# Patient Record
Sex: FEMALE | Race: WHITE | Employment: UNEMPLOYED | URBAN - METROPOLITAN AREA
[De-identification: names, ages, dates, MRNs, and addresses within clinical notes are randomized per-mention and may not be internally consistent; named-entity substitution may affect disease eponyms.]

---

## 2017-01-01 ENCOUNTER — HOSPITAL ENCOUNTER (OUTPATIENT)
Age: 62
Setting detail: SPECIMEN
Discharge: HOME | End: 2017-11-10

## 2017-01-01 ENCOUNTER — TELEPHONE (OUTPATIENT)
Dept: FAMILY MEDICINE CLINIC | Age: 62
End: 2017-01-01

## 2017-01-01 LAB
ALBUMIN SERPL-MCNC: 3.8 G/DL (ref 3.5–5.2)
ALBUMIN/GLOBULIN RATIO: 1 (ref 1–2.5)
ALP BLD-CCNC: 79 U/L (ref 35–104)
ALT SERPL-CCNC: <5 U/L (ref 5–33)
ANION GAP SERPL CALCULATED.3IONS-SCNC: 17 MMOL/L (ref 9–17)
AST SERPL-CCNC: 8 U/L
BILIRUB SERPL-MCNC: 0.18 MG/DL (ref 0.3–1.2)
BUN BLDV-MCNC: 64 MG/DL (ref 8–23)
BUN/CREAT BLD: ABNORMAL (ref 9–20)
CALCIUM SERPL-MCNC: 10 MG/DL (ref 8.6–10.4)
CHLORIDE BLD-SCNC: 110 MMOL/L (ref 98–107)
CO2: 16 MMOL/L (ref 20–31)
CREAT SERPL-MCNC: 3.13 MG/DL (ref 0.5–0.9)
ESTIMATED AVERAGE GLUCOSE: 108 MG/DL
GFR AFRICAN AMERICAN: 18 ML/MIN
GFR NON-AFRICAN AMERICAN: 15 ML/MIN
GFR SERPL CREATININE-BSD FRML MDRD: ABNORMAL ML/MIN/{1.73_M2}
GFR SERPL CREATININE-BSD FRML MDRD: ABNORMAL ML/MIN/{1.73_M2}
GLUCOSE BLD-MCNC: 56 MG/DL (ref 70–99)
HBA1C MFR BLD: 5.4 % (ref 4–6)
POTASSIUM SERPL-SCNC: 5.2 MMOL/L (ref 3.7–5.3)
SODIUM BLD-SCNC: 143 MMOL/L (ref 135–144)
TOTAL PROTEIN: 7.7 G/DL (ref 6.4–8.3)

## 2017-01-01 RX ORDER — LEVOTHYROXINE SODIUM 0.1 MG/1
100 TABLET ORAL DAILY
Qty: 90 TABLET | Refills: 3 | Status: SHIPPED | OUTPATIENT
Start: 2017-01-01 | End: 2017-01-01 | Stop reason: SDUPTHER

## 2017-01-01 RX ORDER — LEVOTHYROXINE SODIUM 0.1 MG/1
100 TABLET ORAL DAILY
Qty: 90 TABLET | Refills: 3 | Status: SHIPPED | OUTPATIENT
Start: 2017-01-01 | End: 2018-01-01

## 2017-01-04 RX ORDER — INSULIN GLARGINE 100 [IU]/ML
60 INJECTION, SOLUTION SUBCUTANEOUS 2 TIMES DAILY
Qty: 11 VIAL | Refills: 3 | Status: SHIPPED | OUTPATIENT
Start: 2017-01-04 | End: 2018-01-01 | Stop reason: SDUPTHER

## 2017-02-23 ENCOUNTER — TELEPHONE (OUTPATIENT)
Dept: FAMILY MEDICINE CLINIC | Facility: CLINIC | Age: 62
End: 2017-02-23

## 2017-03-09 ENCOUNTER — TELEPHONE (OUTPATIENT)
Dept: FAMILY MEDICINE CLINIC | Facility: CLINIC | Age: 62
End: 2017-03-09

## 2017-03-21 ENCOUNTER — TELEPHONE (OUTPATIENT)
Dept: FAMILY MEDICINE CLINIC | Age: 62
End: 2017-03-21

## 2017-04-13 RX ORDER — AMLODIPINE BESYLATE 10 MG/1
10 TABLET ORAL DAILY
Qty: 90 TABLET | Refills: 3 | Status: ON HOLD | OUTPATIENT
Start: 2017-04-13 | End: 2018-01-01 | Stop reason: HOSPADM

## 2017-04-13 RX ORDER — LISINOPRIL AND HYDROCHLOROTHIAZIDE 20; 12.5 MG/1; MG/1
1 TABLET ORAL 2 TIMES DAILY
Qty: 180 TABLET | Refills: 3 | Status: ON HOLD | OUTPATIENT
Start: 2017-04-13 | End: 2018-01-01 | Stop reason: HOSPADM

## 2017-04-13 RX ORDER — AMITRIPTYLINE HYDROCHLORIDE 75 MG/1
75 TABLET, FILM COATED ORAL NIGHTLY
Qty: 90 TABLET | Refills: 3 | Status: SHIPPED | OUTPATIENT
Start: 2017-04-13 | End: 2018-01-01 | Stop reason: SDUPTHER

## 2017-04-13 RX ORDER — GLYBURIDE 5 MG/1
TABLET ORAL
Qty: 360 TABLET | Refills: 3 | Status: SHIPPED | OUTPATIENT
Start: 2017-04-13 | End: 2018-01-01 | Stop reason: ALTCHOICE

## 2017-04-28 ENCOUNTER — TELEPHONE (OUTPATIENT)
Dept: FAMILY MEDICINE CLINIC | Age: 62
End: 2017-04-28

## 2017-05-25 ENCOUNTER — HOSPITAL ENCOUNTER (OUTPATIENT)
Dept: DIABETES SERVICES | Age: 62
Setting detail: THERAPIES SERIES
Discharge: HOME | End: 2017-05-25
Payer: MEDICAID

## 2017-05-25 VITALS
HEART RATE: 88 BPM | WEIGHT: 246.69 LBS | HEIGHT: 69 IN | BODY MASS INDEX: 36.54 KG/M2 | SYSTOLIC BLOOD PRESSURE: 160 MMHG | DIASTOLIC BLOOD PRESSURE: 101 MMHG

## 2017-05-25 PROCEDURE — G0108 DIAB MANAGE TRN  PER INDIV: HCPCS

## 2017-05-25 PROCEDURE — 99078 GROUP HEALTH EDUCATION: CPT

## 2017-05-25 RX ORDER — INSULIN ASPART 100 [IU]/ML
INJECTION, SOLUTION INTRAVENOUS; SUBCUTANEOUS 2 TIMES DAILY WITH MEALS
COMMUNITY
End: 2018-01-01

## 2017-06-05 ENCOUNTER — HOSPITAL ENCOUNTER (OUTPATIENT)
Dept: DIABETES SERVICES | Age: 62
Setting detail: THERAPIES SERIES
Discharge: HOME | End: 2017-06-05
Payer: MEDICAID

## 2017-06-05 DIAGNOSIS — Z79.4 TYPE 2 DIABETES MELLITUS WITH DIABETIC NEPHROPATHY, WITH LONG-TERM CURRENT USE OF INSULIN (HCC): Primary | ICD-10-CM

## 2017-06-05 DIAGNOSIS — E11.21 TYPE 2 DIABETES MELLITUS WITH DIABETIC NEPHROPATHY, WITH LONG-TERM CURRENT USE OF INSULIN (HCC): Primary | ICD-10-CM

## 2017-06-05 PROCEDURE — G0109 DIAB MANAGE TRN IND/GROUP: HCPCS

## 2017-06-05 PROCEDURE — 99078 GROUP HEALTH EDUCATION: CPT

## 2017-06-06 ENCOUNTER — TELEPHONE (OUTPATIENT)
Dept: FAMILY MEDICINE CLINIC | Age: 62
End: 2017-06-06

## 2017-06-13 ENCOUNTER — HOSPITAL ENCOUNTER (OUTPATIENT)
Dept: DIABETES SERVICES | Age: 62
Setting detail: THERAPIES SERIES
Discharge: HOME | End: 2017-06-13
Payer: MEDICAID

## 2017-06-13 DIAGNOSIS — Z79.4 TYPE 2 DIABETES MELLITUS WITH DIABETIC NEPHROPATHY, WITH LONG-TERM CURRENT USE OF INSULIN (HCC): Primary | ICD-10-CM

## 2017-06-13 DIAGNOSIS — E11.21 TYPE 2 DIABETES MELLITUS WITH DIABETIC NEPHROPATHY, WITH LONG-TERM CURRENT USE OF INSULIN (HCC): Primary | ICD-10-CM

## 2017-06-13 PROCEDURE — 99078 GROUP HEALTH EDUCATION: CPT

## 2017-06-13 PROCEDURE — G0109 DIAB MANAGE TRN IND/GROUP: HCPCS

## 2017-06-21 ENCOUNTER — HOSPITAL ENCOUNTER (OUTPATIENT)
Dept: DIABETES SERVICES | Age: 62
Setting detail: THERAPIES SERIES
Discharge: HOME | End: 2017-06-21
Payer: MEDICAID

## 2017-06-21 PROCEDURE — 99078 GROUP HEALTH EDUCATION: CPT

## 2017-06-21 PROCEDURE — G0109 DIAB MANAGE TRN IND/GROUP: HCPCS

## 2017-06-29 ENCOUNTER — HOSPITAL ENCOUNTER (OUTPATIENT)
Dept: DIABETES SERVICES | Age: 62
Setting detail: THERAPIES SERIES
Discharge: HOME | End: 2017-06-29
Payer: MEDICAID

## 2017-06-29 DIAGNOSIS — E11.21 TYPE 2 DIABETES MELLITUS WITH DIABETIC NEPHROPATHY, WITH LONG-TERM CURRENT USE OF INSULIN (HCC): Primary | ICD-10-CM

## 2017-06-29 DIAGNOSIS — Z79.4 TYPE 2 DIABETES MELLITUS WITH DIABETIC NEPHROPATHY, WITH LONG-TERM CURRENT USE OF INSULIN (HCC): Primary | ICD-10-CM

## 2017-06-29 PROCEDURE — G0109 DIAB MANAGE TRN IND/GROUP: HCPCS

## 2017-07-27 RX ORDER — ATORVASTATIN CALCIUM 20 MG/1
TABLET, FILM COATED ORAL
Qty: 90 TABLET | Refills: 3 | Status: SHIPPED | OUTPATIENT
Start: 2017-07-27 | End: 2018-01-01 | Stop reason: SDUPTHER

## 2017-08-21 ENCOUNTER — HOSPITAL ENCOUNTER (OUTPATIENT)
Age: 62
Setting detail: SPECIMEN
Discharge: HOME | End: 2017-08-21
Payer: MEDICAID

## 2017-08-21 DIAGNOSIS — Z11.59 ENCOUNTER FOR HEPATITIS C SCREENING TEST FOR LOW RISK PATIENT: ICD-10-CM

## 2017-08-21 DIAGNOSIS — Z79.4 TYPE 2 DIABETES MELLITUS WITH DIABETIC NEPHROPATHY, WITH LONG-TERM CURRENT USE OF INSULIN (HCC): Chronic | ICD-10-CM

## 2017-08-21 DIAGNOSIS — N28.9 RENAL INSUFFICIENCY: Chronic | ICD-10-CM

## 2017-08-21 DIAGNOSIS — Z11.4 SCREENING FOR HIV (HUMAN IMMUNODEFICIENCY VIRUS): Primary | ICD-10-CM

## 2017-08-21 DIAGNOSIS — E11.21 TYPE 2 DIABETES MELLITUS WITH DIABETIC NEPHROPATHY, WITH LONG-TERM CURRENT USE OF INSULIN (HCC): Chronic | ICD-10-CM

## 2017-08-21 DIAGNOSIS — Z11.4 SCREENING FOR HIV (HUMAN IMMUNODEFICIENCY VIRUS): ICD-10-CM

## 2017-08-21 LAB
CHOLESTEROL/HDL RATIO: 4.1
CHOLESTEROL: 107 MG/DL
CREATININE URINE: 88.8 MG/DL (ref 28–217)
HDLC SERPL-MCNC: 26 MG/DL
HEPATITIS C ANTIBODY: NONREACTIVE
HIV AG/AB: NONREACTIVE
LDL CHOLESTEROL: 55 MG/DL (ref 0–130)
MICROALBUMIN/CREAT 24H UR: 206 MG/L
MICROALBUMIN/CREAT UR-RTO: 232 MCG/MG CREAT
TRIGL SERPL-MCNC: 131 MG/DL
VLDLC SERPL CALC-MCNC: ABNORMAL MG/DL (ref 1–30)

## 2017-09-07 ENCOUNTER — OFFICE VISIT (OUTPATIENT)
Dept: FAMILY MEDICINE CLINIC | Age: 62
End: 2017-09-07
Payer: MEDICAID

## 2017-09-07 VITALS
WEIGHT: 239.6 LBS | OXYGEN SATURATION: 98 % | DIASTOLIC BLOOD PRESSURE: 60 MMHG | SYSTOLIC BLOOD PRESSURE: 120 MMHG | HEART RATE: 92 BPM | BODY MASS INDEX: 36.31 KG/M2 | HEIGHT: 68 IN

## 2017-09-07 DIAGNOSIS — K21.9 GASTROESOPHAGEAL REFLUX DISEASE, ESOPHAGITIS PRESENCE NOT SPECIFIED: ICD-10-CM

## 2017-09-07 DIAGNOSIS — L60.9 NAIL ABNORMALITY: Primary | ICD-10-CM

## 2017-09-07 PROCEDURE — 99213 OFFICE O/P EST LOW 20 MIN: CPT | Performed by: FAMILY MEDICINE

## 2017-09-07 PROCEDURE — 90471 IMMUNIZATION ADMIN: CPT | Performed by: FAMILY MEDICINE

## 2017-09-07 PROCEDURE — 90688 IIV4 VACCINE SPLT 0.5 ML IM: CPT | Performed by: FAMILY MEDICINE

## 2017-09-07 RX ORDER — OMEPRAZOLE 20 MG/1
20 CAPSULE, DELAYED RELEASE ORAL DAILY
Qty: 90 CAPSULE | Refills: 3 | Status: SHIPPED | OUTPATIENT
Start: 2017-09-07 | End: 2018-01-01 | Stop reason: SDUPTHER

## 2017-09-07 ASSESSMENT — PATIENT HEALTH QUESTIONNAIRE - PHQ9
SUM OF ALL RESPONSES TO PHQ9 QUESTIONS 1 & 2: 0
SUM OF ALL RESPONSES TO PHQ QUESTIONS 1-9: 0
1. LITTLE INTEREST OR PLEASURE IN DOING THINGS: 0

## 2017-09-08 ENCOUNTER — HOSPITAL ENCOUNTER (OUTPATIENT)
Dept: DIABETES SERVICES | Age: 62
Setting detail: THERAPIES SERIES
Discharge: HOME | End: 2017-09-08
Payer: COMMERCIAL

## 2017-09-08 DIAGNOSIS — Z79.4 TYPE 2 DIABETES MELLITUS WITH DIABETIC NEPHROPATHY, WITH LONG-TERM CURRENT USE OF INSULIN (HCC): Primary | ICD-10-CM

## 2017-09-08 DIAGNOSIS — E11.21 TYPE 2 DIABETES MELLITUS WITH DIABETIC NEPHROPATHY, WITH LONG-TERM CURRENT USE OF INSULIN (HCC): Primary | ICD-10-CM

## 2017-09-08 PROCEDURE — G0109 DIAB MANAGE TRN IND/GROUP: HCPCS

## 2017-09-08 PROCEDURE — G0108 DIAB MANAGE TRN  PER INDIV: HCPCS

## 2017-09-26 RX ORDER — LEVOTHYROXINE SODIUM 0.1 MG/1
100 TABLET ORAL DAILY
Qty: 90 TABLET | Refills: 3 | Status: SHIPPED | OUTPATIENT
Start: 2017-09-26 | End: 2017-01-01 | Stop reason: SDUPTHER

## 2017-10-16 NOTE — TELEPHONE ENCOUNTER
Left message on patient's recorder that Synthroid from [employer] patient assistance is at  for

## 2017-11-14 NOTE — TELEPHONE ENCOUNTER
Patient needs rx for januvia and synthroid [facility] to [facility].this must be a patient assistance thing.

## 2017-11-16 NOTE — TELEPHONE ENCOUNTER
I have dc'd the [facility] and [facility], her hba1c is too low. Synthroid rx has been printed.     Recheck hba1c in 12 weeks

## 2017-11-16 NOTE — TELEPHONE ENCOUNTER
It is the patient assistance company. She also needs a script for synthroid for patient assistance as well please.

## 2018-01-01 ENCOUNTER — OFFICE VISIT (OUTPATIENT)
Dept: FAMILY MEDICINE CLINIC | Age: 63
End: 2018-01-01
Payer: COMMERCIAL

## 2018-01-01 ENCOUNTER — HOSPITAL ENCOUNTER (OUTPATIENT)
Dept: PAIN MANAGEMENT | Age: 63
Discharge: HOME | End: 2018-07-12
Payer: COMMERCIAL

## 2018-01-01 ENCOUNTER — TELEPHONE (OUTPATIENT)
Dept: FAMILY MEDICINE CLINIC | Age: 63
End: 2018-01-01

## 2018-01-01 ENCOUNTER — HOSPITAL ENCOUNTER (OUTPATIENT)
Facility: MEDICAL CENTER | Age: 63
End: 2018-01-01
Payer: COMMERCIAL

## 2018-01-01 ENCOUNTER — APPOINTMENT (OUTPATIENT)
Dept: GENERAL RADIOLOGY | Age: 63
DRG: 682 | End: 2018-01-01
Attending: INTERNAL MEDICINE
Payer: COMMERCIAL

## 2018-01-01 ENCOUNTER — HOSPITAL ENCOUNTER (OUTPATIENT)
Dept: INFUSION THERAPY | Facility: MEDICAL CENTER | Age: 63
Discharge: HOME | End: 2018-08-01
Payer: COMMERCIAL

## 2018-01-01 ENCOUNTER — ANESTHESIA (OUTPATIENT)
Dept: ENDOSCOPY | Age: 63
DRG: 356 | End: 2018-01-01
Payer: COMMERCIAL

## 2018-01-01 ENCOUNTER — HOSPITAL ENCOUNTER (INPATIENT)
Age: 63
LOS: 5 days | Discharge: NURSING, REHAB OR LONG-TERM CARE | DRG: 871 | End: 2018-09-20
Attending: EMERGENCY MEDICINE | Admitting: FAMILY MEDICINE
Payer: COMMERCIAL

## 2018-01-01 ENCOUNTER — TELEPHONE (OUTPATIENT)
Dept: ONCOLOGY | Age: 63
End: 2018-01-01

## 2018-01-01 ENCOUNTER — HOSPITAL ENCOUNTER (OUTPATIENT)
Facility: MEDICAL CENTER | Age: 63
Discharge: HOME | End: 2018-03-21
Payer: COMMERCIAL

## 2018-01-01 ENCOUNTER — HOSPITAL ENCOUNTER (INPATIENT)
Age: 63
LOS: 3 days | Discharge: HOME HEALTH | DRG: 682 | End: 2018-06-10
Attending: EMERGENCY MEDICINE | Admitting: INTERNAL MEDICINE
Payer: COMMERCIAL

## 2018-01-01 ENCOUNTER — ANESTHESIA (OUTPATIENT)
Dept: OPERATING ROOM | Age: 63
DRG: 516 | End: 2018-01-01
Payer: COMMERCIAL

## 2018-01-01 ENCOUNTER — APPOINTMENT (OUTPATIENT)
Dept: MRI IMAGING | Age: 63
DRG: 682 | End: 2018-01-01
Payer: COMMERCIAL

## 2018-01-01 ENCOUNTER — HOSPITAL ENCOUNTER (OUTPATIENT)
Age: 63
Setting detail: SPECIMEN
Discharge: HOME | End: 2018-06-22
Payer: COMMERCIAL

## 2018-01-01 ENCOUNTER — OFFICE VISIT (OUTPATIENT)
Dept: ONCOLOGY | Age: 63
End: 2018-01-01
Payer: COMMERCIAL

## 2018-01-01 ENCOUNTER — TELEPHONE (OUTPATIENT)
Dept: GASTROENTEROLOGY | Age: 63
End: 2018-01-01

## 2018-01-01 ENCOUNTER — TELEPHONE (OUTPATIENT)
Dept: PHARMACY | Age: 63
End: 2018-01-01

## 2018-01-01 ENCOUNTER — HOSPITAL ENCOUNTER (OUTPATIENT)
Dept: RADIATION ONCOLOGY | Facility: MEDICAL CENTER | Age: 63
Discharge: HOME | End: 2018-02-13
Payer: COMMERCIAL

## 2018-01-01 ENCOUNTER — TELEPHONE (OUTPATIENT)
Dept: CASE MANAGEMENT | Age: 63
End: 2018-01-01

## 2018-01-01 ENCOUNTER — HOSPITAL ENCOUNTER (OUTPATIENT)
Age: 63
Setting detail: SPECIMEN
Discharge: HOME | End: 2018-03-15
Payer: COMMERCIAL

## 2018-01-01 ENCOUNTER — APPOINTMENT (OUTPATIENT)
Dept: DIALYSIS | Age: 63
DRG: 356 | End: 2018-01-01
Payer: COMMERCIAL

## 2018-01-01 ENCOUNTER — APPOINTMENT (OUTPATIENT)
Dept: GENERAL RADIOLOGY | Age: 63
DRG: 516 | End: 2018-01-01
Attending: INTERNAL MEDICINE
Payer: COMMERCIAL

## 2018-01-01 ENCOUNTER — HOSPITAL ENCOUNTER (OUTPATIENT)
Dept: INFUSION THERAPY | Facility: MEDICAL CENTER | Age: 63
Discharge: HOME | End: 2018-07-25

## 2018-01-01 ENCOUNTER — OFFICE VISIT (OUTPATIENT)
Dept: NEUROSURGERY | Age: 63
End: 2018-01-01
Payer: MEDICAID

## 2018-01-01 ENCOUNTER — HOSPITAL ENCOUNTER (OUTPATIENT)
Age: 63
Setting detail: SPECIMEN
Discharge: HOME | End: 2018-03-22
Payer: COMMERCIAL

## 2018-01-01 ENCOUNTER — APPOINTMENT (OUTPATIENT)
Dept: GENERAL RADIOLOGY | Age: 63
DRG: 871 | End: 2018-01-01
Payer: COMMERCIAL

## 2018-01-01 ENCOUNTER — TELEPHONE (OUTPATIENT)
Dept: INFUSION THERAPY | Age: 63
End: 2018-01-01

## 2018-01-01 ENCOUNTER — APPOINTMENT (OUTPATIENT)
Dept: CT IMAGING | Age: 63
DRG: 516 | End: 2018-01-01
Attending: INTERNAL MEDICINE
Payer: COMMERCIAL

## 2018-01-01 ENCOUNTER — HOSPITAL ENCOUNTER (OUTPATIENT)
Age: 63
Discharge: HOME | End: 2018-05-02
Payer: COMMERCIAL

## 2018-01-01 ENCOUNTER — APPOINTMENT (OUTPATIENT)
Dept: CT IMAGING | Age: 63
DRG: 516 | End: 2018-01-01
Payer: COMMERCIAL

## 2018-01-01 ENCOUNTER — TELEPHONE (OUTPATIENT)
Dept: UROLOGY | Age: 63
End: 2018-01-01

## 2018-01-01 ENCOUNTER — APPOINTMENT (OUTPATIENT)
Dept: MRI IMAGING | Age: 63
DRG: 673 | End: 2018-01-01
Payer: COMMERCIAL

## 2018-01-01 ENCOUNTER — ANESTHESIA EVENT (OUTPATIENT)
Dept: ENDOSCOPY | Age: 63
DRG: 356 | End: 2018-01-01
Payer: COMMERCIAL

## 2018-01-01 ENCOUNTER — HOSPITAL ENCOUNTER (INPATIENT)
Age: 63
LOS: 7 days | Discharge: NURSING, REHAB OR LONG-TERM CARE | DRG: 356 | End: 2018-08-21
Attending: EMERGENCY MEDICINE | Admitting: INTERNAL MEDICINE
Payer: COMMERCIAL

## 2018-01-01 ENCOUNTER — HOSPITAL ENCOUNTER (OUTPATIENT)
Dept: PAIN MANAGEMENT | Age: 63
Discharge: HOME | End: 2018-06-21
Payer: COMMERCIAL

## 2018-01-01 ENCOUNTER — HOSPITAL ENCOUNTER (OUTPATIENT)
Dept: GENERAL RADIOLOGY | Age: 63
Discharge: HOME | End: 2018-07-14
Payer: COMMERCIAL

## 2018-01-01 ENCOUNTER — APPOINTMENT (OUTPATIENT)
Dept: CT IMAGING | Age: 63
DRG: 673 | End: 2018-01-01
Payer: COMMERCIAL

## 2018-01-01 ENCOUNTER — HOSPITAL ENCOUNTER (OUTPATIENT)
Age: 63
Setting detail: SPECIMEN
Discharge: HOME | End: 2018-01-31
Payer: COMMERCIAL

## 2018-01-01 ENCOUNTER — HOSPITAL ENCOUNTER (OUTPATIENT)
Facility: CLINIC | Age: 63
Discharge: HOME | End: 2018-07-19
Payer: COMMERCIAL

## 2018-01-01 ENCOUNTER — HOSPITAL ENCOUNTER (EMERGENCY)
Facility: CLINIC | Age: 63
Discharge: ANOTHER HOSPITAL | End: 2018-01-18
Attending: EMERGENCY MEDICINE
Payer: MEDICAID

## 2018-01-01 ENCOUNTER — TELEPHONE (OUTPATIENT)
Dept: INFUSION THERAPY | Facility: MEDICAL CENTER | Age: 63
End: 2018-01-01

## 2018-01-01 ENCOUNTER — HOSPITAL ENCOUNTER (OUTPATIENT)
Dept: RADIATION ONCOLOGY | Facility: MEDICAL CENTER | Age: 63
Discharge: HOME | End: 2018-08-09
Payer: COMMERCIAL

## 2018-01-01 ENCOUNTER — HOSPITAL ENCOUNTER (OUTPATIENT)
Facility: CLINIC | Age: 63
Discharge: HOME | End: 2018-03-02
Payer: COMMERCIAL

## 2018-01-01 ENCOUNTER — OFFICE VISIT (OUTPATIENT)
Dept: UROLOGY | Age: 63
End: 2018-01-01
Payer: COMMERCIAL

## 2018-01-01 ENCOUNTER — TELEPHONE (OUTPATIENT)
Dept: NEUROSURGERY | Age: 63
End: 2018-01-01

## 2018-01-01 ENCOUNTER — APPOINTMENT (OUTPATIENT)
Dept: DIALYSIS | Age: 63
DRG: 673 | End: 2018-01-01
Payer: COMMERCIAL

## 2018-01-01 ENCOUNTER — HOSPITAL ENCOUNTER (OUTPATIENT)
Age: 63
Discharge: HOME | End: 2018-05-16
Payer: COMMERCIAL

## 2018-01-01 ENCOUNTER — HOSPITAL ENCOUNTER (OUTPATIENT)
Dept: INFUSION THERAPY | Facility: MEDICAL CENTER | Age: 63
End: 2018-01-01
Payer: COMMERCIAL

## 2018-01-01 ENCOUNTER — HOSPITAL ENCOUNTER (OUTPATIENT)
Dept: RADIATION ONCOLOGY | Facility: MEDICAL CENTER | Age: 63
Discharge: HOME | End: 2018-02-26
Payer: COMMERCIAL

## 2018-01-01 ENCOUNTER — HOSPITAL ENCOUNTER (OUTPATIENT)
Facility: MEDICAL CENTER | Age: 63
End: 2018-01-01

## 2018-01-01 ENCOUNTER — APPOINTMENT (OUTPATIENT)
Dept: GENERAL RADIOLOGY | Facility: CLINIC | Age: 63
End: 2018-01-01
Payer: MEDICAID

## 2018-01-01 ENCOUNTER — HOSPITAL ENCOUNTER (OUTPATIENT)
Dept: GENERAL RADIOLOGY | Age: 63
Discharge: HOME | End: 2018-09-02
Payer: COMMERCIAL

## 2018-01-01 ENCOUNTER — APPOINTMENT (OUTPATIENT)
Dept: ULTRASOUND IMAGING | Age: 63
DRG: 673 | End: 2018-01-01
Payer: COMMERCIAL

## 2018-01-01 ENCOUNTER — TELEPHONE (OUTPATIENT)
Dept: NEUROLOGY | Age: 63
End: 2018-01-01

## 2018-01-01 ENCOUNTER — HOSPITAL ENCOUNTER (OUTPATIENT)
Dept: DIALYSIS | Age: 63
Setting detail: DIALYSIS SERIES
Discharge: HOME | End: 2018-09-07
Payer: COMMERCIAL

## 2018-01-01 ENCOUNTER — HOSPITAL ENCOUNTER (OUTPATIENT)
Dept: CT IMAGING | Facility: CLINIC | Age: 63
Discharge: HOME | End: 2018-05-26
Payer: COMMERCIAL

## 2018-01-01 ENCOUNTER — OFFICE VISIT (OUTPATIENT)
Dept: NEUROSURGERY | Age: 63
End: 2018-01-01
Payer: COMMERCIAL

## 2018-01-01 ENCOUNTER — HOSPITAL ENCOUNTER (OUTPATIENT)
Dept: RADIATION ONCOLOGY | Facility: MEDICAL CENTER | Age: 63
Discharge: HOME | End: 2018-02-08
Payer: COMMERCIAL

## 2018-01-01 ENCOUNTER — HOSPITAL ENCOUNTER (OUTPATIENT)
Dept: GENERAL RADIOLOGY | Age: 63
Discharge: HOME | End: 2018-05-16
Payer: COMMERCIAL

## 2018-01-01 ENCOUNTER — OFFICE VISIT (OUTPATIENT)
Dept: NEUROSURGERY | Age: 63
End: 2018-01-01

## 2018-01-01 ENCOUNTER — APPOINTMENT (OUTPATIENT)
Dept: MRI IMAGING | Age: 63
DRG: 682 | End: 2018-01-01
Attending: INTERNAL MEDICINE
Payer: COMMERCIAL

## 2018-01-01 ENCOUNTER — HOSPITAL ENCOUNTER (OUTPATIENT)
Age: 63
Discharge: HOME | End: 2018-06-29
Payer: COMMERCIAL

## 2018-01-01 ENCOUNTER — APPOINTMENT (OUTPATIENT)
Dept: DIALYSIS | Age: 63
DRG: 871 | End: 2018-01-01
Payer: COMMERCIAL

## 2018-01-01 ENCOUNTER — APPOINTMENT (OUTPATIENT)
Dept: CT IMAGING | Age: 63
DRG: 871 | End: 2018-01-01
Payer: COMMERCIAL

## 2018-01-01 ENCOUNTER — HOSPITAL ENCOUNTER (OUTPATIENT)
Facility: CLINIC | Age: 63
Discharge: HOME | End: 2018-02-13
Payer: COMMERCIAL

## 2018-01-01 ENCOUNTER — ANESTHESIA EVENT (OUTPATIENT)
Dept: OPERATING ROOM | Age: 63
DRG: 516 | End: 2018-01-01
Payer: COMMERCIAL

## 2018-01-01 ENCOUNTER — APPOINTMENT (OUTPATIENT)
Dept: ULTRASOUND IMAGING | Age: 63
DRG: 682 | End: 2018-01-01
Payer: COMMERCIAL

## 2018-01-01 ENCOUNTER — HOSPITAL ENCOUNTER (OUTPATIENT)
Age: 63
Discharge: HOME | End: 2018-04-11
Payer: COMMERCIAL

## 2018-01-01 ENCOUNTER — APPOINTMENT (OUTPATIENT)
Dept: MRI IMAGING | Age: 63
DRG: 516 | End: 2018-01-01
Attending: INTERNAL MEDICINE
Payer: COMMERCIAL

## 2018-01-01 ENCOUNTER — OFFICE VISIT (OUTPATIENT)
Dept: NEUROLOGY | Age: 63
End: 2018-01-01
Payer: COMMERCIAL

## 2018-01-01 ENCOUNTER — HOSPITAL ENCOUNTER (OUTPATIENT)
Dept: MRI IMAGING | Age: 63
Discharge: HOME | End: 2018-07-11
Payer: COMMERCIAL

## 2018-01-01 ENCOUNTER — HOSPITAL ENCOUNTER (INPATIENT)
Age: 63
LOS: 11 days | Discharge: OTHER INSTITUTION | DRG: 682 | End: 2018-03-14
Attending: INTERNAL MEDICINE | Admitting: INTERNAL MEDICINE
Payer: COMMERCIAL

## 2018-01-01 ENCOUNTER — HOSPITAL ENCOUNTER (OUTPATIENT)
Dept: RADIATION ONCOLOGY | Facility: MEDICAL CENTER | Age: 63
Discharge: HOME | End: 2018-03-05
Payer: COMMERCIAL

## 2018-01-01 ENCOUNTER — HOSPITAL ENCOUNTER (INPATIENT)
Age: 63
LOS: 9 days | Discharge: NURSING, REHAB OR LONG-TERM CARE | DRG: 673 | End: 2018-08-03
Attending: EMERGENCY MEDICINE | Admitting: FAMILY MEDICINE
Payer: COMMERCIAL

## 2018-01-01 ENCOUNTER — APPOINTMENT (OUTPATIENT)
Dept: ULTRASOUND IMAGING | Age: 63
DRG: 516 | End: 2018-01-01
Attending: INTERNAL MEDICINE
Payer: COMMERCIAL

## 2018-01-01 ENCOUNTER — HOSPITAL ENCOUNTER (INPATIENT)
Age: 63
LOS: 9 days | Discharge: HOME HEALTH | DRG: 516 | End: 2018-01-27
Attending: INTERNAL MEDICINE | Admitting: INTERNAL MEDICINE
Payer: COMMERCIAL

## 2018-01-01 ENCOUNTER — HOSPITAL ENCOUNTER (EMERGENCY)
Facility: CLINIC | Age: 63
Discharge: ANOTHER HOSPITAL | End: 2018-03-03
Attending: EMERGENCY MEDICINE
Payer: COMMERCIAL

## 2018-01-01 ENCOUNTER — APPOINTMENT (OUTPATIENT)
Dept: INTERVENTIONAL RADIOLOGY/VASCULAR | Age: 63
DRG: 673 | End: 2018-01-01
Payer: COMMERCIAL

## 2018-01-01 ENCOUNTER — HOSPITAL ENCOUNTER (OUTPATIENT)
Dept: RADIATION ONCOLOGY | Facility: MEDICAL CENTER | Age: 63
Discharge: HOME | End: 2018-02-20
Payer: COMMERCIAL

## 2018-01-01 ENCOUNTER — APPOINTMENT (OUTPATIENT)
Dept: ULTRASOUND IMAGING | Age: 63
DRG: 682 | End: 2018-01-01
Attending: INTERNAL MEDICINE
Payer: COMMERCIAL

## 2018-01-01 ENCOUNTER — HOSPITAL ENCOUNTER (OUTPATIENT)
Dept: PAIN MANAGEMENT | Age: 63
Discharge: HOME | End: 2018-07-13
Payer: COMMERCIAL

## 2018-01-01 ENCOUNTER — APPOINTMENT (OUTPATIENT)
Dept: CT IMAGING | Age: 63
DRG: 682 | End: 2018-01-01
Attending: INTERNAL MEDICINE
Payer: COMMERCIAL

## 2018-01-01 ENCOUNTER — HOSPITAL ENCOUNTER (OUTPATIENT)
Age: 63
Discharge: HOME | End: 2018-07-14
Payer: COMMERCIAL

## 2018-01-01 ENCOUNTER — HOSPITAL ENCOUNTER (OUTPATIENT)
Age: 63
Discharge: HOME | End: 2018-09-02
Payer: COMMERCIAL

## 2018-01-01 VITALS
DIASTOLIC BLOOD PRESSURE: 48 MMHG | TEMPERATURE: 99.1 F | WEIGHT: 189 LBS | HEART RATE: 92 BPM | RESPIRATION RATE: 18 BRPM | SYSTOLIC BLOOD PRESSURE: 98 MMHG | BODY MASS INDEX: 28.74 KG/M2

## 2018-01-01 VITALS
HEIGHT: 68 IN | RESPIRATION RATE: 18 BRPM | WEIGHT: 186 LBS | HEART RATE: 97 BPM | DIASTOLIC BLOOD PRESSURE: 57 MMHG | HEIGHT: 68 IN | WEIGHT: 216 LBS | HEART RATE: 93 BPM | SYSTOLIC BLOOD PRESSURE: 142 MMHG | BODY MASS INDEX: 32.74 KG/M2 | DIASTOLIC BLOOD PRESSURE: 64 MMHG | SYSTOLIC BLOOD PRESSURE: 107 MMHG | TEMPERATURE: 98.5 F | BODY MASS INDEX: 28.19 KG/M2

## 2018-01-01 VITALS
BODY MASS INDEX: 27.26 KG/M2 | OXYGEN SATURATION: 94 % | HEIGHT: 68 IN | HEART RATE: 74 BPM | SYSTOLIC BLOOD PRESSURE: 122 MMHG | RESPIRATION RATE: 16 BRPM | TEMPERATURE: 98.2 F | DIASTOLIC BLOOD PRESSURE: 53 MMHG | WEIGHT: 179.9 LBS

## 2018-01-01 VITALS
SYSTOLIC BLOOD PRESSURE: 120 MMHG | DIASTOLIC BLOOD PRESSURE: 80 MMHG | BODY MASS INDEX: 28.62 KG/M2 | OXYGEN SATURATION: 97 % | HEART RATE: 97 BPM | WEIGHT: 188.2 LBS

## 2018-01-01 VITALS
RESPIRATION RATE: 20 BRPM | SYSTOLIC BLOOD PRESSURE: 154 MMHG | BODY MASS INDEX: 27.52 KG/M2 | HEART RATE: 81 BPM | TEMPERATURE: 97.7 F | WEIGHT: 181 LBS | DIASTOLIC BLOOD PRESSURE: 65 MMHG

## 2018-01-01 VITALS
TEMPERATURE: 98.1 F | HEART RATE: 93 BPM | RESPIRATION RATE: 16 BRPM | OXYGEN SATURATION: 97 % | HEIGHT: 68 IN | BODY MASS INDEX: 29.67 KG/M2 | DIASTOLIC BLOOD PRESSURE: 64 MMHG | SYSTOLIC BLOOD PRESSURE: 134 MMHG | WEIGHT: 195.8 LBS

## 2018-01-01 VITALS
BODY MASS INDEX: 33.34 KG/M2 | TEMPERATURE: 98.9 F | WEIGHT: 220 LBS | HEART RATE: 77 BPM | DIASTOLIC BLOOD PRESSURE: 67 MMHG | HEIGHT: 68 IN | SYSTOLIC BLOOD PRESSURE: 138 MMHG

## 2018-01-01 VITALS
HEIGHT: 68 IN | BODY MASS INDEX: 27.58 KG/M2 | TEMPERATURE: 98.4 F | DIASTOLIC BLOOD PRESSURE: 56 MMHG | HEART RATE: 87 BPM | SYSTOLIC BLOOD PRESSURE: 110 MMHG | RESPIRATION RATE: 16 BRPM | WEIGHT: 182 LBS

## 2018-01-01 VITALS
RESPIRATION RATE: 15 BRPM | BODY MASS INDEX: 31.83 KG/M2 | WEIGHT: 210 LBS | HEIGHT: 68 IN | SYSTOLIC BLOOD PRESSURE: 120 MMHG | TEMPERATURE: 97.6 F | DIASTOLIC BLOOD PRESSURE: 49 MMHG | HEART RATE: 86 BPM | OXYGEN SATURATION: 97 %

## 2018-01-01 VITALS
HEART RATE: 88 BPM | WEIGHT: 191.58 LBS | TEMPERATURE: 98.2 F | RESPIRATION RATE: 17 BRPM | DIASTOLIC BLOOD PRESSURE: 50 MMHG | BODY MASS INDEX: 29.13 KG/M2 | SYSTOLIC BLOOD PRESSURE: 118 MMHG

## 2018-01-01 VITALS
TEMPERATURE: 99.5 F | DIASTOLIC BLOOD PRESSURE: 51 MMHG | WEIGHT: 190 LBS | BODY MASS INDEX: 28.89 KG/M2 | SYSTOLIC BLOOD PRESSURE: 96 MMHG | HEART RATE: 98 BPM

## 2018-01-01 VITALS
HEIGHT: 68 IN | RESPIRATION RATE: 16 BRPM | HEART RATE: 80 BPM | BODY MASS INDEX: 30.04 KG/M2 | DIASTOLIC BLOOD PRESSURE: 47 MMHG | SYSTOLIC BLOOD PRESSURE: 110 MMHG | TEMPERATURE: 98.4 F | OXYGEN SATURATION: 96 % | WEIGHT: 198.19 LBS

## 2018-01-01 VITALS
RESPIRATION RATE: 20 BRPM | HEART RATE: 89 BPM | SYSTOLIC BLOOD PRESSURE: 123 MMHG | DIASTOLIC BLOOD PRESSURE: 59 MMHG | WEIGHT: 180 LBS | BODY MASS INDEX: 27.37 KG/M2

## 2018-01-01 VITALS — SYSTOLIC BLOOD PRESSURE: 96 MMHG | DIASTOLIC BLOOD PRESSURE: 54 MMHG | TEMPERATURE: 96.5 F | OXYGEN SATURATION: 95 %

## 2018-01-01 VITALS
OXYGEN SATURATION: 99 % | HEIGHT: 68 IN | HEART RATE: 79 BPM | DIASTOLIC BLOOD PRESSURE: 53 MMHG | BODY MASS INDEX: 28.8 KG/M2 | TEMPERATURE: 98.6 F | RESPIRATION RATE: 17 BRPM | WEIGHT: 190.04 LBS | SYSTOLIC BLOOD PRESSURE: 126 MMHG

## 2018-01-01 VITALS — HEART RATE: 94 BPM | DIASTOLIC BLOOD PRESSURE: 54 MMHG | SYSTOLIC BLOOD PRESSURE: 83 MMHG

## 2018-01-01 VITALS
DIASTOLIC BLOOD PRESSURE: 51 MMHG | RESPIRATION RATE: 13 BRPM | HEART RATE: 72 BPM | BODY MASS INDEX: 37.59 KG/M2 | WEIGHT: 248 LBS | HEIGHT: 68 IN | SYSTOLIC BLOOD PRESSURE: 153 MMHG | OXYGEN SATURATION: 95 % | TEMPERATURE: 97.1 F

## 2018-01-01 VITALS
HEART RATE: 93 BPM | SYSTOLIC BLOOD PRESSURE: 124 MMHG | BODY MASS INDEX: 27.22 KG/M2 | OXYGEN SATURATION: 98 % | DIASTOLIC BLOOD PRESSURE: 70 MMHG | WEIGHT: 179 LBS

## 2018-01-01 VITALS
OXYGEN SATURATION: 99 % | TEMPERATURE: 98.4 F | WEIGHT: 202.16 LBS | DIASTOLIC BLOOD PRESSURE: 51 MMHG | BODY MASS INDEX: 30.64 KG/M2 | RESPIRATION RATE: 16 BRPM | HEIGHT: 68 IN | SYSTOLIC BLOOD PRESSURE: 108 MMHG | HEART RATE: 88 BPM

## 2018-01-01 VITALS
SYSTOLIC BLOOD PRESSURE: 116 MMHG | BODY MASS INDEX: 28.7 KG/M2 | DIASTOLIC BLOOD PRESSURE: 69 MMHG | HEART RATE: 100 BPM | WEIGHT: 189.4 LBS | HEIGHT: 68 IN

## 2018-01-01 VITALS
WEIGHT: 182 LBS | SYSTOLIC BLOOD PRESSURE: 114 MMHG | HEIGHT: 68 IN | DIASTOLIC BLOOD PRESSURE: 72 MMHG | HEART RATE: 98 BPM | BODY MASS INDEX: 27.58 KG/M2

## 2018-01-01 VITALS
RESPIRATION RATE: 15 BRPM | DIASTOLIC BLOOD PRESSURE: 48 MMHG | OXYGEN SATURATION: 100 % | SYSTOLIC BLOOD PRESSURE: 104 MMHG

## 2018-01-01 VITALS
BODY MASS INDEX: 33.45 KG/M2 | HEART RATE: 85 BPM | SYSTOLIC BLOOD PRESSURE: 145 MMHG | WEIGHT: 220 LBS | DIASTOLIC BLOOD PRESSURE: 72 MMHG

## 2018-01-01 VITALS
SYSTOLIC BLOOD PRESSURE: 110 MMHG | OXYGEN SATURATION: 96 % | TEMPERATURE: 97.1 F | RESPIRATION RATE: 14 BRPM | DIASTOLIC BLOOD PRESSURE: 60 MMHG | HEART RATE: 85 BPM

## 2018-01-01 VITALS
WEIGHT: 220 LBS | HEIGHT: 68 IN | DIASTOLIC BLOOD PRESSURE: 66 MMHG | OXYGEN SATURATION: 100 % | SYSTOLIC BLOOD PRESSURE: 148 MMHG | BODY MASS INDEX: 33.34 KG/M2 | RESPIRATION RATE: 16 BRPM | HEART RATE: 88 BPM | TEMPERATURE: 98 F

## 2018-01-01 VITALS
WEIGHT: 183 LBS | SYSTOLIC BLOOD PRESSURE: 105 MMHG | HEART RATE: 99 BPM | BODY MASS INDEX: 27.83 KG/M2 | DIASTOLIC BLOOD PRESSURE: 68 MMHG

## 2018-01-01 VITALS
HEART RATE: 82 BPM | DIASTOLIC BLOOD PRESSURE: 64 MMHG | RESPIRATION RATE: 18 BRPM | WEIGHT: 189.7 LBS | SYSTOLIC BLOOD PRESSURE: 118 MMHG | BODY MASS INDEX: 28.84 KG/M2 | TEMPERATURE: 98.4 F

## 2018-01-01 VITALS — DIASTOLIC BLOOD PRESSURE: 64 MMHG | HEIGHT: 68 IN | SYSTOLIC BLOOD PRESSURE: 101 MMHG | HEART RATE: 98 BPM

## 2018-01-01 VITALS
RESPIRATION RATE: 18 BRPM | TEMPERATURE: 97.6 F | DIASTOLIC BLOOD PRESSURE: 34 MMHG | HEART RATE: 87 BPM | SYSTOLIC BLOOD PRESSURE: 92 MMHG | WEIGHT: 182 LBS | BODY MASS INDEX: 27.67 KG/M2

## 2018-01-01 VITALS
WEIGHT: 177 LBS | BODY MASS INDEX: 26.91 KG/M2 | HEART RATE: 85 BPM | DIASTOLIC BLOOD PRESSURE: 74 MMHG | OXYGEN SATURATION: 98 % | SYSTOLIC BLOOD PRESSURE: 120 MMHG

## 2018-01-01 VITALS
WEIGHT: 189 LBS | BODY MASS INDEX: 28.64 KG/M2 | HEIGHT: 68 IN | DIASTOLIC BLOOD PRESSURE: 48 MMHG | SYSTOLIC BLOOD PRESSURE: 83 MMHG | HEART RATE: 73 BPM

## 2018-01-01 VITALS
TEMPERATURE: 98.2 F | SYSTOLIC BLOOD PRESSURE: 133 MMHG | HEART RATE: 77 BPM | RESPIRATION RATE: 16 BRPM | DIASTOLIC BLOOD PRESSURE: 78 MMHG

## 2018-01-01 VITALS
OXYGEN SATURATION: 100 % | RESPIRATION RATE: 16 BRPM | SYSTOLIC BLOOD PRESSURE: 113 MMHG | DIASTOLIC BLOOD PRESSURE: 41 MMHG

## 2018-01-01 VITALS
DIASTOLIC BLOOD PRESSURE: 63 MMHG | SYSTOLIC BLOOD PRESSURE: 127 MMHG | WEIGHT: 182 LBS | HEART RATE: 77 BPM | BODY MASS INDEX: 27.67 KG/M2 | RESPIRATION RATE: 20 BRPM | TEMPERATURE: 98.5 F

## 2018-01-01 VITALS
BODY MASS INDEX: 27.83 KG/M2 | HEART RATE: 97 BPM | DIASTOLIC BLOOD PRESSURE: 70 MMHG | SYSTOLIC BLOOD PRESSURE: 128 MMHG | TEMPERATURE: 98.5 F | WEIGHT: 183 LBS

## 2018-01-01 VITALS
WEIGHT: 183 LBS | SYSTOLIC BLOOD PRESSURE: 114 MMHG | DIASTOLIC BLOOD PRESSURE: 76 MMHG | HEART RATE: 78 BPM | BODY MASS INDEX: 27.83 KG/M2

## 2018-01-01 VITALS
HEART RATE: 100 BPM | DIASTOLIC BLOOD PRESSURE: 64 MMHG | WEIGHT: 181 LBS | SYSTOLIC BLOOD PRESSURE: 120 MMHG | BODY MASS INDEX: 27.52 KG/M2 | RESPIRATION RATE: 20 BRPM | TEMPERATURE: 98.4 F

## 2018-01-01 VITALS
HEART RATE: 72 BPM | DIASTOLIC BLOOD PRESSURE: 76 MMHG | SYSTOLIC BLOOD PRESSURE: 138 MMHG | BODY MASS INDEX: 34.15 KG/M2 | WEIGHT: 224.6 LBS

## 2018-01-01 DIAGNOSIS — E87.5 HYPERKALEMIA: ICD-10-CM

## 2018-01-01 DIAGNOSIS — C64.1 RENAL CELL CARCINOMA OF RIGHT KIDNEY METASTATIC TO OTHER SITE (HCC): Primary | ICD-10-CM

## 2018-01-01 DIAGNOSIS — C64.9 RENAL CELL CARCINOMA, UNSPECIFIED LATERALITY (HCC): Primary | ICD-10-CM

## 2018-01-01 DIAGNOSIS — N28.89 RENAL MASS: ICD-10-CM

## 2018-01-01 DIAGNOSIS — C64.9 RENAL CELL CARCINOMA, UNSPECIFIED LATERALITY (HCC): ICD-10-CM

## 2018-01-01 DIAGNOSIS — M54.2 CERVICALGIA: ICD-10-CM

## 2018-01-01 DIAGNOSIS — C79.51 METASTASIS TO SPINAL COLUMN (HCC): ICD-10-CM

## 2018-01-01 DIAGNOSIS — C79.51 METASTASIS TO SPINAL COLUMN (HCC): Primary | ICD-10-CM

## 2018-01-01 DIAGNOSIS — D50.9 IRON DEFICIENCY ANEMIA, UNSPECIFIED IRON DEFICIENCY ANEMIA TYPE: ICD-10-CM

## 2018-01-01 DIAGNOSIS — I10 ESSENTIAL HYPERTENSION: ICD-10-CM

## 2018-01-01 DIAGNOSIS — R32 BOWEL AND BLADDER INCONTINENCE: Primary | ICD-10-CM

## 2018-01-01 DIAGNOSIS — D50.0 BLOOD LOSS ANEMIA: ICD-10-CM

## 2018-01-01 DIAGNOSIS — N18.6 ESRD (END STAGE RENAL DISEASE) (HCC): ICD-10-CM

## 2018-01-01 DIAGNOSIS — E43 SEVERE MALNUTRITION (HCC): ICD-10-CM

## 2018-01-01 DIAGNOSIS — M84.48XA PATHOLOGIC FRACTURE OF LUMBAR VERTEBRA, INITIAL ENCOUNTER: ICD-10-CM

## 2018-01-01 DIAGNOSIS — M54.5 CHRONIC BILATERAL LOW BACK PAIN, WITH SCIATICA PRESENCE UNSPECIFIED: ICD-10-CM

## 2018-01-01 DIAGNOSIS — C79.51 SECONDARY MALIGNANT NEOPLASM OF LUMBAR VERTEBRAL COLUMN (HCC): ICD-10-CM

## 2018-01-01 DIAGNOSIS — N17.9 ACUTE KIDNEY INJURY (HCC): Primary | ICD-10-CM

## 2018-01-01 DIAGNOSIS — S32.010A CLOSED WEDGE COMPRESSION FRACTURE OF FIRST LUMBAR VERTEBRA, INITIAL ENCOUNTER: Primary | ICD-10-CM

## 2018-01-01 DIAGNOSIS — M48.50XG PATHOLOGIC COMPRESSION FRACTURE OF SPINE WITH DELAYED HEALING: ICD-10-CM

## 2018-01-01 DIAGNOSIS — N18.4 STAGE 4 CHRONIC KIDNEY DISEASE (HCC): Primary | ICD-10-CM

## 2018-01-01 DIAGNOSIS — R39.9 UTI SYMPTOMS: ICD-10-CM

## 2018-01-01 DIAGNOSIS — N18.9 ANEMIA IN CHRONIC KIDNEY DISEASE, UNSPECIFIED CKD STAGE: ICD-10-CM

## 2018-01-01 DIAGNOSIS — Z98.1 S/P LUMBAR FUSION: ICD-10-CM

## 2018-01-01 DIAGNOSIS — N18.4 STAGE 4 CHRONIC KIDNEY DISEASE (HCC): ICD-10-CM

## 2018-01-01 DIAGNOSIS — C79.51 CANCER, METASTATIC TO BONE (HCC): ICD-10-CM

## 2018-01-01 DIAGNOSIS — C64.1 RENAL CELL CARCINOMA OF RIGHT KIDNEY (HCC): Primary | ICD-10-CM

## 2018-01-01 DIAGNOSIS — N17.9 ACUTE RENAL FAILURE, UNSPECIFIED ACUTE RENAL FAILURE TYPE (HCC): ICD-10-CM

## 2018-01-01 DIAGNOSIS — R11.0 NAUSEA: ICD-10-CM

## 2018-01-01 DIAGNOSIS — C64.9 MALIGNANT NEOPLASM OF KIDNEY, UNSPECIFIED LATERALITY (HCC): ICD-10-CM

## 2018-01-01 DIAGNOSIS — E16.2 HYPOGLYCEMIA: ICD-10-CM

## 2018-01-01 DIAGNOSIS — I10 ESSENTIAL HYPERTENSION: Primary | ICD-10-CM

## 2018-01-01 DIAGNOSIS — R31.9 HEMATURIA, UNSPECIFIED TYPE: ICD-10-CM

## 2018-01-01 DIAGNOSIS — Z71.89 GOALS OF CARE, COUNSELING/DISCUSSION: ICD-10-CM

## 2018-01-01 DIAGNOSIS — D64.9 ANEMIA, UNSPECIFIED TYPE: ICD-10-CM

## 2018-01-01 DIAGNOSIS — N18.9 ANEMIA DUE TO CHRONIC KIDNEY DISEASE: ICD-10-CM

## 2018-01-01 DIAGNOSIS — I82.4Z9 DEEP VEIN THROMBOSIS (DVT) OF DISTAL VEIN OF LOWER EXTREMITY, UNSPECIFIED CHRONICITY, UNSPECIFIED LATERALITY (HCC): ICD-10-CM

## 2018-01-01 DIAGNOSIS — R31.0 GROSS HEMATURIA: Primary | ICD-10-CM

## 2018-01-01 DIAGNOSIS — E87.20 METABOLIC ACIDOSIS: ICD-10-CM

## 2018-01-01 DIAGNOSIS — C64.1 RENAL CELL CARCINOMA OF RIGHT KIDNEY (HCC): ICD-10-CM

## 2018-01-01 DIAGNOSIS — G89.29 CHRONIC BILATERAL LOW BACK PAIN, WITH SCIATICA PRESENCE UNSPECIFIED: Primary | ICD-10-CM

## 2018-01-01 DIAGNOSIS — C79.51 SECONDARY MALIGNANT NEOPLASM OF LUMBAR VERTEBRAL COLUMN (HCC): Primary | ICD-10-CM

## 2018-01-01 DIAGNOSIS — R15.9 BOWEL AND BLADDER INCONTINENCE: Primary | ICD-10-CM

## 2018-01-01 DIAGNOSIS — N17.9 ACUTE RENAL FAILURE, UNSPECIFIED ACUTE RENAL FAILURE TYPE (HCC): Primary | ICD-10-CM

## 2018-01-01 DIAGNOSIS — G89.29 CHRONIC RIGHT-SIDED LOW BACK PAIN WITHOUT SCIATICA: Primary | ICD-10-CM

## 2018-01-01 DIAGNOSIS — R33.9 URINARY RETENTION: Primary | ICD-10-CM

## 2018-01-01 DIAGNOSIS — E86.0 DEHYDRATION: ICD-10-CM

## 2018-01-01 DIAGNOSIS — E87.6 HYPOKALEMIA: ICD-10-CM

## 2018-01-01 DIAGNOSIS — G95.29 SPINAL CORD COMPRESSION DUE TO MALIGNANT NEOPLASM METASTATIC TO SPINE (HCC): Primary | ICD-10-CM

## 2018-01-01 DIAGNOSIS — I95.9 HYPOTENSION, UNSPECIFIED HYPOTENSION TYPE: ICD-10-CM

## 2018-01-01 DIAGNOSIS — K92.2 CHRONIC GI BLEEDING: Primary | ICD-10-CM

## 2018-01-01 DIAGNOSIS — M84.48XS: ICD-10-CM

## 2018-01-01 DIAGNOSIS — R79.89 CREATININE ELEVATION: ICD-10-CM

## 2018-01-01 DIAGNOSIS — S30.0XXA TRAUMATIC HEMATOMA OF BUTTOCK, INITIAL ENCOUNTER: ICD-10-CM

## 2018-01-01 DIAGNOSIS — M54.2 CERVICAL MUSCLE PAIN: ICD-10-CM

## 2018-01-01 DIAGNOSIS — N28.89 RENAL MASS: Primary | ICD-10-CM

## 2018-01-01 DIAGNOSIS — Z79.4 TYPE 2 DIABETES MELLITUS WITH DIABETIC NEPHROPATHY, WITH LONG-TERM CURRENT USE OF INSULIN (HCC): ICD-10-CM

## 2018-01-01 DIAGNOSIS — C79.51 SPINAL CORD COMPRESSION DUE TO MALIGNANT NEOPLASM METASTATIC TO SPINE (HCC): Primary | ICD-10-CM

## 2018-01-01 DIAGNOSIS — D63.1 ANEMIA DUE TO CHRONIC KIDNEY DISEASE: ICD-10-CM

## 2018-01-01 DIAGNOSIS — K92.2 GASTROINTESTINAL HEMORRHAGE, UNSPECIFIED GASTROINTESTINAL HEMORRHAGE TYPE: ICD-10-CM

## 2018-01-01 DIAGNOSIS — G89.29 CHRONIC BILATERAL LOW BACK PAIN, WITH SCIATICA PRESENCE UNSPECIFIED: ICD-10-CM

## 2018-01-01 DIAGNOSIS — S32.010D CLOSED WEDGE COMPRESSION FRACTURE OF FIRST LUMBAR VERTEBRA WITH ROUTINE HEALING: Primary | ICD-10-CM

## 2018-01-01 DIAGNOSIS — A41.9 SEPSIS, DUE TO UNSPECIFIED ORGANISM: Primary | ICD-10-CM

## 2018-01-01 DIAGNOSIS — E11.21 TYPE 2 DIABETES MELLITUS WITH DIABETIC NEPHROPATHY, WITH LONG-TERM CURRENT USE OF INSULIN (HCC): ICD-10-CM

## 2018-01-01 DIAGNOSIS — G89.3 CANCER RELATED PAIN: ICD-10-CM

## 2018-01-01 DIAGNOSIS — Z98.1 S/P LUMBAR FUSION: Primary | ICD-10-CM

## 2018-01-01 DIAGNOSIS — M54.5 CHRONIC BILATERAL LOW BACK PAIN, WITH SCIATICA PRESENCE UNSPECIFIED: Primary | ICD-10-CM

## 2018-01-01 DIAGNOSIS — D36.9 TUBULAR ADENOMA: ICD-10-CM

## 2018-01-01 DIAGNOSIS — I82.4Y1 ACUTE DEEP VEIN THROMBOSIS (DVT) OF PROXIMAL VEIN OF RIGHT LOWER EXTREMITY (HCC): Primary | ICD-10-CM

## 2018-01-01 DIAGNOSIS — D63.1 ANEMIA IN CHRONIC KIDNEY DISEASE, UNSPECIFIED CKD STAGE: ICD-10-CM

## 2018-01-01 DIAGNOSIS — C64.1 RENAL CELL CANCER, RIGHT (HCC): ICD-10-CM

## 2018-01-01 DIAGNOSIS — I82.412 ACUTE DEEP VEIN THROMBOSIS (DVT) OF FEMORAL VEIN OF LEFT LOWER EXTREMITY (HCC): ICD-10-CM

## 2018-01-01 DIAGNOSIS — N18.9 CHRONIC KIDNEY DISEASE, UNSPECIFIED CKD STAGE: ICD-10-CM

## 2018-01-01 DIAGNOSIS — R33.9 URINARY RETENTION: ICD-10-CM

## 2018-01-01 DIAGNOSIS — N28.9 RENAL DYSFUNCTION: ICD-10-CM

## 2018-01-01 DIAGNOSIS — G24.3 SPASMODIC TORTICOLLIS: Primary | ICD-10-CM

## 2018-01-01 DIAGNOSIS — J18.9 PNEUMONIA DUE TO ORGANISM: ICD-10-CM

## 2018-01-01 DIAGNOSIS — M43.6 TORTICOLLIS: ICD-10-CM

## 2018-01-01 DIAGNOSIS — M54.50 CHRONIC RIGHT-SIDED LOW BACK PAIN WITHOUT SCIATICA: Primary | ICD-10-CM

## 2018-01-01 LAB
-: ABNORMAL
-: NORMAL
6-ACETYLMORPHINE, UR: NOT DETECTED
7-AMINOCLONAZEPAM, URINE: NOT DETECTED
ABO/RH: NORMAL
ABSOLUTE EOS #: 0 K/UL (ref 0–0.4)
ABSOLUTE EOS #: 0 K/UL (ref 0–0.44)
ABSOLUTE EOS #: 0.07 K/UL (ref 0–0.44)
ABSOLUTE EOS #: 0.08 K/UL (ref 0–0.44)
ABSOLUTE EOS #: 0.08 K/UL (ref 0–0.44)
ABSOLUTE EOS #: 0.09 K/UL (ref 0–0.4)
ABSOLUTE EOS #: 0.09 K/UL (ref 0–0.44)
ABSOLUTE EOS #: 0.12 K/UL (ref 0–0.4)
ABSOLUTE EOS #: 0.14 K/UL (ref 0–0.44)
ABSOLUTE EOS #: 0.16 K/UL (ref 0–0.4)
ABSOLUTE EOS #: 0.16 K/UL (ref 0–0.4)
ABSOLUTE EOS #: 0.18 K/UL (ref 0–0.44)
ABSOLUTE EOS #: 0.21 K/UL (ref 0–0.44)
ABSOLUTE EOS #: 0.22 K/UL (ref 0–0.44)
ABSOLUTE EOS #: 0.23 K/UL (ref 0–0.44)
ABSOLUTE EOS #: 0.23 K/UL (ref 0–0.44)
ABSOLUTE EOS #: 0.24 K/UL (ref 0–0.44)
ABSOLUTE EOS #: 0.25 K/UL (ref 0–0.44)
ABSOLUTE EOS #: 0.26 K/UL (ref 0–0.44)
ABSOLUTE EOS #: 0.27 K/UL (ref 0–0.44)
ABSOLUTE EOS #: 0.28 K/UL (ref 0–0.44)
ABSOLUTE EOS #: 0.29 K/UL (ref 0–0.4)
ABSOLUTE EOS #: 0.29 K/UL (ref 0–0.44)
ABSOLUTE EOS #: 0.3 K/UL (ref 0–0.4)
ABSOLUTE EOS #: 0.31 K/UL (ref 0–0.4)
ABSOLUTE EOS #: 0.32 K/UL (ref 0–0.4)
ABSOLUTE EOS #: 0.32 K/UL (ref 0–0.4)
ABSOLUTE EOS #: 0.34 K/UL (ref 0–0.4)
ABSOLUTE EOS #: 0.36 K/UL (ref 0–0.4)
ABSOLUTE EOS #: 0.56 K/UL (ref 0–0.4)
ABSOLUTE IMMATURE GRANULOCYTE: 0 K/UL (ref 0–0.3)
ABSOLUTE IMMATURE GRANULOCYTE: 0.05 K/UL (ref 0–0.3)
ABSOLUTE IMMATURE GRANULOCYTE: 0.07 K/UL (ref 0–0.3)
ABSOLUTE IMMATURE GRANULOCYTE: 0.07 K/UL (ref 0–0.3)
ABSOLUTE IMMATURE GRANULOCYTE: 0.08 K/UL (ref 0–0.3)
ABSOLUTE IMMATURE GRANULOCYTE: 0.09 K/UL (ref 0–0.3)
ABSOLUTE IMMATURE GRANULOCYTE: 0.1 K/UL (ref 0–0.3)
ABSOLUTE IMMATURE GRANULOCYTE: 0.11 K/UL (ref 0–0.3)
ABSOLUTE IMMATURE GRANULOCYTE: 0.12 K/UL (ref 0–0.3)
ABSOLUTE IMMATURE GRANULOCYTE: 0.13 K/UL (ref 0–0.3)
ABSOLUTE IMMATURE GRANULOCYTE: 0.14 K/UL (ref 0–0.3)
ABSOLUTE IMMATURE GRANULOCYTE: 0.14 K/UL (ref 0–0.3)
ABSOLUTE IMMATURE GRANULOCYTE: 0.16 K/UL (ref 0–0.3)
ABSOLUTE IMMATURE GRANULOCYTE: 0.16 K/UL (ref 0–0.3)
ABSOLUTE IMMATURE GRANULOCYTE: 0.18 K/UL (ref 0–0.3)
ABSOLUTE IMMATURE GRANULOCYTE: <0.03 K/UL (ref 0–0.3)
ABSOLUTE IMMATURE GRANULOCYTE: ABNORMAL K/UL (ref 0–0.3)
ABSOLUTE LYMPH #: 0.36 K/UL (ref 1–4.8)
ABSOLUTE LYMPH #: 0.37 K/UL (ref 1–4.8)
ABSOLUTE LYMPH #: 0.58 K/UL (ref 1.1–3.7)
ABSOLUTE LYMPH #: 0.6 K/UL (ref 1–4.8)
ABSOLUTE LYMPH #: 0.63 K/UL (ref 1.1–3.7)
ABSOLUTE LYMPH #: 0.71 K/UL (ref 1–4.8)
ABSOLUTE LYMPH #: 0.75 K/UL (ref 1.1–3.7)
ABSOLUTE LYMPH #: 0.76 K/UL (ref 1.1–3.7)
ABSOLUTE LYMPH #: 0.78 K/UL (ref 1.1–3.7)
ABSOLUTE LYMPH #: 0.81 K/UL (ref 1.1–3.7)
ABSOLUTE LYMPH #: 0.82 K/UL (ref 1.1–3.7)
ABSOLUTE LYMPH #: 0.82 K/UL (ref 1–4.8)
ABSOLUTE LYMPH #: 0.82 K/UL (ref 1–4.8)
ABSOLUTE LYMPH #: 0.83 K/UL (ref 1.1–3.7)
ABSOLUTE LYMPH #: 0.83 K/UL (ref 1–4.8)
ABSOLUTE LYMPH #: 0.9 K/UL (ref 1–4.8)
ABSOLUTE LYMPH #: 0.9 K/UL (ref 1–4.8)
ABSOLUTE LYMPH #: 0.94 K/UL (ref 1.1–3.7)
ABSOLUTE LYMPH #: 0.94 K/UL (ref 1–4.8)
ABSOLUTE LYMPH #: 1.03 K/UL (ref 1.1–3.7)
ABSOLUTE LYMPH #: 1.05 K/UL (ref 1.1–3.7)
ABSOLUTE LYMPH #: 1.08 K/UL (ref 1.1–3.7)
ABSOLUTE LYMPH #: 1.12 K/UL (ref 1.1–3.7)
ABSOLUTE LYMPH #: 1.13 K/UL (ref 1.1–3.7)
ABSOLUTE LYMPH #: 1.23 K/UL (ref 1–4.8)
ABSOLUTE LYMPH #: 1.39 K/UL (ref 1.1–3.7)
ABSOLUTE LYMPH #: 1.62 K/UL (ref 1–4.8)
ABSOLUTE LYMPH #: 1.68 K/UL (ref 1–4.8)
ABSOLUTE LYMPH #: 1.73 K/UL (ref 1–4.8)
ABSOLUTE LYMPH #: 2.31 K/UL (ref 1–4.8)
ABSOLUTE LYMPH #: 2.83 K/UL (ref 1–4.8)
ABSOLUTE MONO #: 0 K/UL (ref 0.1–0.8)
ABSOLUTE MONO #: 0.1 K/UL (ref 0.1–0.8)
ABSOLUTE MONO #: 0.12 K/UL (ref 0.1–0.8)
ABSOLUTE MONO #: 0.12 K/UL (ref 0.1–0.8)
ABSOLUTE MONO #: 0.2 K/UL (ref 0.1–0.8)
ABSOLUTE MONO #: 0.24 K/UL (ref 0.1–0.8)
ABSOLUTE MONO #: 0.29 K/UL (ref 0.2–0.8)
ABSOLUTE MONO #: 0.3 K/UL (ref 0.2–0.8)
ABSOLUTE MONO #: 0.32 K/UL (ref 0.2–0.8)
ABSOLUTE MONO #: 0.33 K/UL (ref 0.1–1.2)
ABSOLUTE MONO #: 0.34 K/UL (ref 0.1–0.8)
ABSOLUTE MONO #: 0.43 K/UL (ref 0.1–1.2)
ABSOLUTE MONO #: 0.45 K/UL (ref 0.1–1.2)
ABSOLUTE MONO #: 0.47 K/UL (ref 0.1–1.2)
ABSOLUTE MONO #: 0.48 K/UL (ref 0.1–1.2)
ABSOLUTE MONO #: 0.48 K/UL (ref 0.1–1.2)
ABSOLUTE MONO #: 0.51 K/UL (ref 0.1–1.2)
ABSOLUTE MONO #: 0.52 K/UL (ref 0.1–1.2)
ABSOLUTE MONO #: 0.55 K/UL (ref 0.1–0.8)
ABSOLUTE MONO #: 0.56 K/UL (ref 0.1–0.8)
ABSOLUTE MONO #: 0.56 K/UL (ref 0.1–1.2)
ABSOLUTE MONO #: 0.57 K/UL (ref 0.1–1.2)
ABSOLUTE MONO #: 0.59 K/UL (ref 0.1–1.2)
ABSOLUTE MONO #: 0.6 K/UL (ref 0.1–1.2)
ABSOLUTE MONO #: 0.61 K/UL (ref 0.1–1.2)
ABSOLUTE MONO #: 0.62 K/UL (ref 0.2–0.8)
ABSOLUTE MONO #: 0.63 K/UL (ref 0.1–1.2)
ABSOLUTE MONO #: 0.64 K/UL (ref 0.2–0.8)
ABSOLUTE MONO #: 0.68 K/UL (ref 0.1–1.2)
ABSOLUTE MONO #: 0.7 K/UL (ref 0.1–0.8)
ABSOLUTE MONO #: 0.75 K/UL (ref 0.1–1.2)
ABSOLUTE MONO #: 0.82 K/UL (ref 0.1–1.2)
ABSOLUTE MONO #: 1.41 K/UL (ref 0.1–0.8)
ALBUMIN SERPL-MCNC: 1.1 G/DL (ref 3.5–5.2)
ALBUMIN SERPL-MCNC: 1.4 G/DL (ref 3.5–5.2)
ALBUMIN SERPL-MCNC: 1.6 G/DL (ref 3.5–5.2)
ALBUMIN SERPL-MCNC: 2 G/DL (ref 3.5–5.2)
ALBUMIN SERPL-MCNC: 2.1 G/DL (ref 3.5–5.2)
ALBUMIN SERPL-MCNC: 2.5 G/DL (ref 3.5–5.2)
ALBUMIN SERPL-MCNC: 2.8 G/DL (ref 3.5–5.2)
ALBUMIN SERPL-MCNC: 2.9 G/DL (ref 3.5–5.2)
ALBUMIN SERPL-MCNC: 2.9 G/DL (ref 3.5–5.2)
ALBUMIN SERPL-MCNC: 3.1 G/DL (ref 3.5–5.2)
ALBUMIN SERPL-MCNC: 3.2 G/DL (ref 3.5–5.2)
ALBUMIN SERPL-MCNC: 3.3 G/DL (ref 3.5–5.2)
ALBUMIN SERPL-MCNC: 3.4 G/DL (ref 3.5–5.2)
ALBUMIN/GLOBULIN RATIO: 0.4 (ref 1–2.5)
ALBUMIN/GLOBULIN RATIO: 0.5 (ref 1–2.5)
ALBUMIN/GLOBULIN RATIO: 0.6 (ref 1–2.5)
ALBUMIN/GLOBULIN RATIO: 0.7 (ref 1–2.5)
ALBUMIN/GLOBULIN RATIO: 0.8 (ref 1–2.5)
ALBUMIN/GLOBULIN RATIO: 0.9 (ref 1–2.5)
ALBUMIN/GLOBULIN RATIO: 0.9 (ref 1–2.5)
ALBUMIN/GLOBULIN RATIO: 1 (ref 1–2.5)
ALBUMIN/GLOBULIN RATIO: 1.1 (ref 1–2.5)
ALBUMIN/GLOBULIN RATIO: ABNORMAL (ref 1–2.5)
ALLEN TEST: ABNORMAL
ALP BLD-CCNC: 106 U/L (ref 35–104)
ALP BLD-CCNC: 200 U/L (ref 35–104)
ALP BLD-CCNC: 237 U/L (ref 35–104)
ALP BLD-CCNC: 61 U/L (ref 35–104)
ALP BLD-CCNC: 63 U/L (ref 35–104)
ALP BLD-CCNC: 65 U/L (ref 35–104)
ALP BLD-CCNC: 70 U/L (ref 35–104)
ALP BLD-CCNC: 70 U/L (ref 35–104)
ALP BLD-CCNC: 74 U/L (ref 35–104)
ALP BLD-CCNC: 76 U/L (ref 35–104)
ALP BLD-CCNC: 80 U/L (ref 35–104)
ALP BLD-CCNC: 92 U/L (ref 35–104)
ALP BLD-CCNC: 99 U/L (ref 35–104)
ALPHA-OH-ALPRAZ, URINE: NOT DETECTED
ALPRAZOLAM, URINE: NOT DETECTED
ALT SERPL-CCNC: 5 U/L (ref 5–33)
ALT SERPL-CCNC: 6 U/L (ref 5–33)
ALT SERPL-CCNC: 8 U/L (ref 5–33)
ALT SERPL-CCNC: 8 U/L (ref 5–33)
ALT SERPL-CCNC: <5 U/L (ref 5–33)
AMORPHOUS: ABNORMAL
AMORPHOUS: NORMAL
AMORPHOUS: NORMAL
AMPHETAMINES, URINE: NOT DETECTED
AMYLASE: 46 U/L (ref 28–100)
ANION GAP SERPL CALCULATED.3IONS-SCNC: 10 MMOL/L (ref 9–17)
ANION GAP SERPL CALCULATED.3IONS-SCNC: 11 MMOL/L (ref 9–17)
ANION GAP SERPL CALCULATED.3IONS-SCNC: 12 MMOL/L (ref 9–17)
ANION GAP SERPL CALCULATED.3IONS-SCNC: 13 MMOL/L (ref 9–17)
ANION GAP SERPL CALCULATED.3IONS-SCNC: 14 MMOL/L (ref 9–17)
ANION GAP SERPL CALCULATED.3IONS-SCNC: 15 MMOL/L (ref 9–17)
ANION GAP SERPL CALCULATED.3IONS-SCNC: 16 MMOL/L (ref 9–17)
ANION GAP SERPL CALCULATED.3IONS-SCNC: 17 MMOL/L (ref 9–17)
ANION GAP SERPL CALCULATED.3IONS-SCNC: 18 MMOL/L (ref 9–17)
ANION GAP SERPL CALCULATED.3IONS-SCNC: 21 MMOL/L (ref 9–17)
ANION GAP SERPL CALCULATED.3IONS-SCNC: 21 MMOL/L (ref 9–17)
ANION GAP SERPL CALCULATED.3IONS-SCNC: 22 MMOL/L (ref 9–17)
ANION GAP SERPL CALCULATED.3IONS-SCNC: 24 MMOL/L (ref 9–17)
ANION GAP SERPL CALCULATED.3IONS-SCNC: 9 MMOL/L (ref 9–17)
ANION GAP: 12 MMOL/L (ref 7–16)
ANTI-NUCLEAR ANTIBODY (ANA): NEGATIVE
ANTIBODY SCREEN: NEGATIVE
ARM BAND NUMBER: NORMAL
AST SERPL-CCNC: 10 U/L
AST SERPL-CCNC: 11 U/L
AST SERPL-CCNC: 12 U/L
AST SERPL-CCNC: 14 U/L
AST SERPL-CCNC: 17 U/L
AST SERPL-CCNC: 25 U/L
AST SERPL-CCNC: 7 U/L
AST SERPL-CCNC: 8 U/L
AST SERPL-CCNC: 8 U/L
AST SERPL-CCNC: 9 U/L
AST SERPL-CCNC: 9 U/L
AST SERPL-CCNC: <5 U/L
BACTERIA: ABNORMAL
BACTERIA: NORMAL
BACTERIA: NORMAL
BARBITURATES, URINE: NOT DETECTED
BASOPHILS # BLD: 0 %
BASOPHILS # BLD: 0 % (ref 0–2)
BASOPHILS # BLD: 1 %
BASOPHILS # BLD: 1 % (ref 0–2)
BASOPHILS ABSOLUTE: 0 K/UL (ref 0–0.2)
BASOPHILS ABSOLUTE: 0.03 K/UL (ref 0–0.2)
BASOPHILS ABSOLUTE: 0.03 K/UL (ref 0–0.2)
BASOPHILS ABSOLUTE: 0.04 K/UL (ref 0–0.2)
BASOPHILS ABSOLUTE: 0.04 K/UL (ref 0–0.2)
BASOPHILS ABSOLUTE: 0.05 K/UL (ref 0–0.2)
BASOPHILS ABSOLUTE: 0.06 K/UL (ref 0–0.2)
BASOPHILS ABSOLUTE: 0.06 K/UL (ref 0–0.2)
BASOPHILS ABSOLUTE: 0.07 K/UL (ref 0–0.2)
BASOPHILS ABSOLUTE: 0.08 K/UL (ref 0–0.2)
BASOPHILS ABSOLUTE: 0.09 K/UL (ref 0–0.2)
BASOPHILS ABSOLUTE: <0.03 K/UL (ref 0–0.2)
BENZOYLECGONINE, UR: NOT DETECTED
BILIRUB SERPL-MCNC: 0.18 MG/DL (ref 0.3–1.2)
BILIRUB SERPL-MCNC: 0.19 MG/DL (ref 0.3–1.2)
BILIRUB SERPL-MCNC: 0.19 MG/DL (ref 0.3–1.2)
BILIRUB SERPL-MCNC: 0.2 MG/DL (ref 0.3–1.2)
BILIRUB SERPL-MCNC: 0.2 MG/DL (ref 0.3–1.2)
BILIRUB SERPL-MCNC: 0.21 MG/DL (ref 0.3–1.2)
BILIRUB SERPL-MCNC: 0.22 MG/DL (ref 0.3–1.2)
BILIRUB SERPL-MCNC: 0.23 MG/DL (ref 0.3–1.2)
BILIRUB SERPL-MCNC: 0.23 MG/DL (ref 0.3–1.2)
BILIRUB SERPL-MCNC: 0.27 MG/DL (ref 0.3–1.2)
BILIRUB SERPL-MCNC: 0.29 MG/DL (ref 0.3–1.2)
BILIRUB SERPL-MCNC: 0.39 MG/DL (ref 0.3–1.2)
BILIRUB SERPL-MCNC: 0.43 MG/DL (ref 0.3–1.2)
BILIRUB SERPL-MCNC: 0.55 MG/DL (ref 0.3–1.2)
BILIRUB SERPL-MCNC: <0.1 MG/DL (ref 0.3–1.2)
BILIRUBIN DIRECT: 0.09 MG/DL
BILIRUBIN URINE: NEGATIVE
BILIRUBIN, INDIRECT: 0.09 MG/DL (ref 0–1)
BILIRUBIN, POC: ABNORMAL
BILIRUBIN, POC: ABNORMAL
BLD PROD TYP BPU: NORMAL
BLOOD BANK SPECIMEN: NORMAL
BLOOD URINE, POC: ABNORMAL
BLOOD URINE, POC: ABNORMAL
BNP INTERPRETATION: ABNORMAL
BUN BLDV-MCNC: 101 MG/DL (ref 8–23)
BUN BLDV-MCNC: 101 MG/DL (ref 8–23)
BUN BLDV-MCNC: 13 MG/DL (ref 8–23)
BUN BLDV-MCNC: 18 MG/DL (ref 8–23)
BUN BLDV-MCNC: 19 MG/DL (ref 8–23)
BUN BLDV-MCNC: 19 MG/DL (ref 8–23)
BUN BLDV-MCNC: 22 MG/DL (ref 8–23)
BUN BLDV-MCNC: 23 MG/DL (ref 8–23)
BUN BLDV-MCNC: 24 MG/DL (ref 8–23)
BUN BLDV-MCNC: 25 MG/DL (ref 8–23)
BUN BLDV-MCNC: 29 MG/DL (ref 8–23)
BUN BLDV-MCNC: 31 MG/DL (ref 8–23)
BUN BLDV-MCNC: 34 MG/DL (ref 8–23)
BUN BLDV-MCNC: 34 MG/DL (ref 8–23)
BUN BLDV-MCNC: 35 MG/DL (ref 8–23)
BUN BLDV-MCNC: 38 MG/DL (ref 8–23)
BUN BLDV-MCNC: 38 MG/DL (ref 8–23)
BUN BLDV-MCNC: 39 MG/DL (ref 8–23)
BUN BLDV-MCNC: 40 MG/DL (ref 8–23)
BUN BLDV-MCNC: 41 MG/DL (ref 8–23)
BUN BLDV-MCNC: 42 MG/DL (ref 8–23)
BUN BLDV-MCNC: 43 MG/DL (ref 8–23)
BUN BLDV-MCNC: 46 MG/DL (ref 8–23)
BUN BLDV-MCNC: 46 MG/DL (ref 8–23)
BUN BLDV-MCNC: 47 MG/DL (ref 8–23)
BUN BLDV-MCNC: 48 MG/DL (ref 8–23)
BUN BLDV-MCNC: 49 MG/DL (ref 8–23)
BUN BLDV-MCNC: 50 MG/DL (ref 8–23)
BUN BLDV-MCNC: 52 MG/DL (ref 8–23)
BUN BLDV-MCNC: 54 MG/DL (ref 8–23)
BUN BLDV-MCNC: 54 MG/DL (ref 8–23)
BUN BLDV-MCNC: 55 MG/DL (ref 8–23)
BUN BLDV-MCNC: 55 MG/DL (ref 8–23)
BUN BLDV-MCNC: 57 MG/DL (ref 8–23)
BUN BLDV-MCNC: 57 MG/DL (ref 8–23)
BUN BLDV-MCNC: 58 MG/DL (ref 8–23)
BUN BLDV-MCNC: 58 MG/DL (ref 8–23)
BUN BLDV-MCNC: 59 MG/DL (ref 8–23)
BUN BLDV-MCNC: 60 MG/DL (ref 8–23)
BUN BLDV-MCNC: 62 MG/DL (ref 8–23)
BUN BLDV-MCNC: 63 MG/DL (ref 8–23)
BUN BLDV-MCNC: 63 MG/DL (ref 8–23)
BUN BLDV-MCNC: 64 MG/DL (ref 8–23)
BUN BLDV-MCNC: 66 MG/DL (ref 8–23)
BUN BLDV-MCNC: 67 MG/DL (ref 8–23)
BUN BLDV-MCNC: 67 MG/DL (ref 8–23)
BUN BLDV-MCNC: 72 MG/DL (ref 8–23)
BUN BLDV-MCNC: 98 MG/DL (ref 8–23)
BUN BLDV-MCNC: 99 MG/DL (ref 8–23)
BUN/CREAT BLD: 14 (ref 9–20)
BUN/CREAT BLD: 15 (ref 9–20)
BUN/CREAT BLD: 16 (ref 9–20)
BUN/CREAT BLD: 17 (ref 9–20)
BUN/CREAT BLD: 18 (ref 9–20)
BUN/CREAT BLD: 18 (ref 9–20)
BUN/CREAT BLD: 19 (ref 9–20)
BUN/CREAT BLD: ABNORMAL (ref 9–20)
BUPRENORPHINE URINE: NOT DETECTED
CALCIUM IONIZED: 1.12 MMOL/L (ref 1.13–1.33)
CALCIUM IONIZED: 1.29 MMOL/L (ref 1.13–1.33)
CALCIUM SERPL-MCNC: 7.4 MG/DL (ref 8.6–10.4)
CALCIUM SERPL-MCNC: 7.5 MG/DL (ref 8.6–10.4)
CALCIUM SERPL-MCNC: 7.6 MG/DL (ref 8.6–10.4)
CALCIUM SERPL-MCNC: 7.7 MG/DL (ref 8.6–10.4)
CALCIUM SERPL-MCNC: 7.7 MG/DL (ref 8.6–10.4)
CALCIUM SERPL-MCNC: 7.8 MG/DL (ref 8.6–10.4)
CALCIUM SERPL-MCNC: 7.9 MG/DL (ref 8.6–10.4)
CALCIUM SERPL-MCNC: 8 MG/DL (ref 8.6–10.4)
CALCIUM SERPL-MCNC: 8 MG/DL (ref 8.6–10.4)
CALCIUM SERPL-MCNC: 8.2 MG/DL (ref 8.6–10.4)
CALCIUM SERPL-MCNC: 8.4 MG/DL (ref 8.6–10.4)
CALCIUM SERPL-MCNC: 8.6 MG/DL (ref 8.6–10.4)
CALCIUM SERPL-MCNC: 8.6 MG/DL (ref 8.6–10.4)
CALCIUM SERPL-MCNC: 8.7 MG/DL (ref 8.6–10.4)
CALCIUM SERPL-MCNC: 8.8 MG/DL (ref 8.6–10.4)
CALCIUM SERPL-MCNC: 8.9 MG/DL (ref 8.6–10.4)
CALCIUM SERPL-MCNC: 8.9 MG/DL (ref 8.6–10.4)
CALCIUM SERPL-MCNC: 9 MG/DL (ref 8.6–10.4)
CALCIUM SERPL-MCNC: 9.1 MG/DL (ref 8.6–10.4)
CALCIUM SERPL-MCNC: 9.2 MG/DL (ref 8.6–10.4)
CALCIUM SERPL-MCNC: 9.3 MG/DL (ref 8.6–10.4)
CALCIUM SERPL-MCNC: 9.4 MG/DL (ref 8.6–10.4)
CALCIUM SERPL-MCNC: 9.5 MG/DL (ref 8.6–10.4)
CALCIUM SERPL-MCNC: 9.6 MG/DL (ref 8.6–10.4)
CALCIUM SERPL-MCNC: 9.7 MG/DL (ref 8.6–10.4)
CALCIUM SERPL-MCNC: 9.8 MG/DL (ref 8.6–10.4)
CALCIUM SERPL-MCNC: 9.9 MG/DL (ref 8.6–10.4)
CALCIUM SERPL-MCNC: 9.9 MG/DL (ref 8.6–10.4)
CARBOXYHEMOGLOBIN: 1.3 % (ref 0–5)
CARBOXYHEMOGLOBIN: 1.4 % (ref 0–5)
CARISOPRODOL, UR: NOT DETECTED
CASTS UA: ABNORMAL /LPF (ref 0–2)
CASTS UA: ABNORMAL /LPF (ref 0–8)
CASTS UA: NORMAL /LPF
CASTS UA: NORMAL /LPF (ref 0–8)
CHLORIDE BLD-SCNC: 100 MMOL/L (ref 98–107)
CHLORIDE BLD-SCNC: 101 MMOL/L (ref 98–107)
CHLORIDE BLD-SCNC: 102 MMOL/L (ref 98–107)
CHLORIDE BLD-SCNC: 103 MMOL/L (ref 98–107)
CHLORIDE BLD-SCNC: 104 MMOL/L (ref 98–107)
CHLORIDE BLD-SCNC: 105 MMOL/L (ref 98–107)
CHLORIDE BLD-SCNC: 106 MMOL/L (ref 98–107)
CHLORIDE BLD-SCNC: 107 MMOL/L (ref 98–107)
CHLORIDE BLD-SCNC: 108 MMOL/L (ref 98–107)
CHLORIDE BLD-SCNC: 110 MMOL/L (ref 98–107)
CHLORIDE BLD-SCNC: 92 MMOL/L (ref 98–107)
CHLORIDE BLD-SCNC: 93 MMOL/L (ref 98–107)
CHLORIDE BLD-SCNC: 95 MMOL/L (ref 98–107)
CHLORIDE BLD-SCNC: 97 MMOL/L (ref 98–107)
CHLORIDE BLD-SCNC: 98 MMOL/L (ref 98–107)
CHLORIDE BLD-SCNC: 99 MMOL/L (ref 98–107)
CHLORIDE, WHOLE BLOOD: 111 MMOL/L (ref 98–110)
CHLORIDE, WHOLE BLOOD: 113 MMOL/L (ref 98–110)
CLARITY, POC: CLEAR
CLARITY, POC: CLEAR
CLONAZEPAM, URINE: NOT DETECTED
CO2: 10 MMOL/L (ref 20–31)
CO2: 11 MMOL/L (ref 20–31)
CO2: 13 MMOL/L (ref 20–31)
CO2: 17 MMOL/L (ref 20–31)
CO2: 18 MMOL/L (ref 20–31)
CO2: 19 MMOL/L (ref 20–31)
CO2: 20 MMOL/L (ref 20–31)
CO2: 21 MMOL/L (ref 20–31)
CO2: 22 MMOL/L (ref 20–31)
CO2: 23 MMOL/L (ref 20–31)
CO2: 24 MMOL/L (ref 20–31)
CO2: 25 MMOL/L (ref 20–31)
CO2: 26 MMOL/L (ref 20–31)
CO2: 27 MMOL/L (ref 20–31)
CO2: 27 MMOL/L (ref 20–31)
CODEINE, URINE: NOT DETECTED
COLOR, POC: YELLOW
COLOR, POC: YELLOW
COLOR: YELLOW
COMMENT UA: ABNORMAL
COMMENT UA: NORMAL
COMPLEMENT C3: 117 MG/DL (ref 90–180)
COMPLEMENT C4: 26 MG/DL (ref 10–40)
CORTISOL COLLECTION INFO: NORMAL
CORTISOL: 15 UG/DL (ref 2.7–18.4)
CREAT SERPL-MCNC: 1.79 MG/DL (ref 0.5–0.9)
CREAT SERPL-MCNC: 1.86 MG/DL (ref 0.5–0.9)
CREAT SERPL-MCNC: 1.92 MG/DL (ref 0.5–0.9)
CREAT SERPL-MCNC: 1.97 MG/DL (ref 0.5–0.9)
CREAT SERPL-MCNC: 2 MG/DL (ref 0.5–0.9)
CREAT SERPL-MCNC: 2.01 MG/DL (ref 0.5–0.9)
CREAT SERPL-MCNC: 2.03 MG/DL (ref 0.5–0.9)
CREAT SERPL-MCNC: 2.03 MG/DL (ref 0.5–0.9)
CREAT SERPL-MCNC: 2.05 MG/DL (ref 0.5–0.9)
CREAT SERPL-MCNC: 2.08 MG/DL (ref 0.5–0.9)
CREAT SERPL-MCNC: 2.16 MG/DL (ref 0.5–0.9)
CREAT SERPL-MCNC: 2.17 MG/DL (ref 0.5–0.9)
CREAT SERPL-MCNC: 2.18 MG/DL (ref 0.5–0.9)
CREAT SERPL-MCNC: 2.28 MG/DL (ref 0.5–0.9)
CREAT SERPL-MCNC: 2.31 MG/DL (ref 0.5–0.9)
CREAT SERPL-MCNC: 2.32 MG/DL (ref 0.5–0.9)
CREAT SERPL-MCNC: 2.38 MG/DL (ref 0.5–0.9)
CREAT SERPL-MCNC: 2.38 MG/DL (ref 0.5–0.9)
CREAT SERPL-MCNC: 2.42 MG/DL (ref 0.5–0.9)
CREAT SERPL-MCNC: 2.46 MG/DL (ref 0.5–0.9)
CREAT SERPL-MCNC: 2.48 MG/DL (ref 0.5–0.9)
CREAT SERPL-MCNC: 2.55 MG/DL (ref 0.5–0.9)
CREAT SERPL-MCNC: 2.56 MG/DL (ref 0.5–0.9)
CREAT SERPL-MCNC: 2.6 MG/DL (ref 0.5–0.9)
CREAT SERPL-MCNC: 2.66 MG/DL (ref 0.5–0.9)
CREAT SERPL-MCNC: 2.8 MG/DL (ref 0.5–0.9)
CREAT SERPL-MCNC: 2.87 MG/DL (ref 0.5–0.9)
CREAT SERPL-MCNC: 2.87 MG/DL (ref 0.5–0.9)
CREAT SERPL-MCNC: 2.88 MG/DL (ref 0.5–0.9)
CREAT SERPL-MCNC: 2.89 MG/DL (ref 0.5–0.9)
CREAT SERPL-MCNC: 2.9 MG/DL (ref 0.5–0.9)
CREAT SERPL-MCNC: 2.96 MG/DL (ref 0.5–0.9)
CREAT SERPL-MCNC: 2.99 MG/DL (ref 0.5–0.9)
CREAT SERPL-MCNC: 2.99 MG/DL (ref 0.5–0.9)
CREAT SERPL-MCNC: 3 MG/DL (ref 0.5–0.9)
CREAT SERPL-MCNC: 3.05 MG/DL (ref 0.5–0.9)
CREAT SERPL-MCNC: 3.06 MG/DL (ref 0.5–0.9)
CREAT SERPL-MCNC: 3.06 MG/DL (ref 0.5–0.9)
CREAT SERPL-MCNC: 3.08 MG/DL (ref 0.5–0.9)
CREAT SERPL-MCNC: 3.08 MG/DL (ref 0.5–0.9)
CREAT SERPL-MCNC: 3.11 MG/DL (ref 0.5–0.9)
CREAT SERPL-MCNC: 3.31 MG/DL (ref 0.5–0.9)
CREAT SERPL-MCNC: 3.4 MG/DL (ref 0.5–0.9)
CREAT SERPL-MCNC: 3.43 MG/DL (ref 0.5–0.9)
CREAT SERPL-MCNC: 3.44 MG/DL (ref 0.5–0.9)
CREAT SERPL-MCNC: 3.68 MG/DL (ref 0.5–0.9)
CREAT SERPL-MCNC: 3.7 MG/DL (ref 0.5–0.9)
CREAT SERPL-MCNC: 3.7 MG/DL (ref 0.5–0.9)
CREAT SERPL-MCNC: 4.03 MG/DL (ref 0.5–0.9)
CREAT SERPL-MCNC: 4.21 MG/DL (ref 0.5–0.9)
CREAT SERPL-MCNC: 4.24 MG/DL (ref 0.5–0.9)
CREAT SERPL-MCNC: 4.54 MG/DL (ref 0.5–0.9)
CREAT SERPL-MCNC: 4.6 MG/DL (ref 0.5–0.9)
CREAT SERPL-MCNC: 5.11 MG/DL (ref 0.5–0.9)
CREAT SERPL-MCNC: 5.54 MG/DL (ref 0.5–0.9)
CREAT SERPL-MCNC: 6.79 MG/DL (ref 0.5–0.9)
CREAT SERPL-MCNC: 9.06 MG/DL (ref 0.5–0.9)
CREAT SERPL-MCNC: 9.09 MG/DL (ref 0.5–0.9)
CREAT SERPL-MCNC: 9.34 MG/DL (ref 0.5–0.9)
CREAT SERPL-MCNC: 9.62 MG/DL (ref 0.5–0.9)
CREATININE URINE: 109.9 MG/DL (ref 28–217)
CREATININE URINE: 118 MG/DL (ref 28–217)
CREATININE URINE: 121.3 MG/DL (ref 20–400)
CREATININE URINE: 44.2 MG/DL (ref 28–217)
CROSSMATCH RESULT: NORMAL
CRYSTALS, UA: ABNORMAL /HPF
CRYSTALS, UA: NORMAL /HPF
CRYSTALS, UA: NORMAL /HPF
CULTURE: ABNORMAL
CULTURE: NO GROWTH
CULTURE: NORMAL
DIAZEPAM, URINE: NOT DETECTED
DIFFERENTIAL TYPE: ABNORMAL
DIRECT EXAM: NORMAL
DISPENSE STATUS BLOOD BANK: NORMAL
EER PAIN MGT DRUG PANEL, HIGH RES/EMIT U: NORMAL
EKG ATRIAL RATE: 78 BPM
EKG ATRIAL RATE: 82 BPM
EKG ATRIAL RATE: 89 BPM
EKG ATRIAL RATE: 89 BPM
EKG ATRIAL RATE: 95 BPM
EKG P AXIS: -4 DEGREES
EKG P AXIS: 33 DEGREES
EKG P AXIS: 42 DEGREES
EKG P AXIS: 46 DEGREES
EKG P-R INTERVAL: 128 MS
EKG P-R INTERVAL: 138 MS
EKG P-R INTERVAL: 140 MS
EKG P-R INTERVAL: 152 MS
EKG P-R INTERVAL: 168 MS
EKG Q-T INTERVAL: 342 MS
EKG Q-T INTERVAL: 356 MS
EKG Q-T INTERVAL: 368 MS
EKG Q-T INTERVAL: 382 MS
EKG Q-T INTERVAL: 398 MS
EKG QRS DURATION: 74 MS
EKG QRS DURATION: 76 MS
EKG QRS DURATION: 78 MS
EKG QRS DURATION: 82 MS
EKG QRS DURATION: 86 MS
EKG QTC CALCULATION (BAZETT): 416 MS
EKG QTC CALCULATION (BAZETT): 446 MS
EKG QTC CALCULATION (BAZETT): 447 MS
EKG QTC CALCULATION (BAZETT): 447 MS
EKG QTC CALCULATION (BAZETT): 453 MS
EKG R AXIS: -3 DEGREES
EKG R AXIS: -4 DEGREES
EKG R AXIS: -5 DEGREES
EKG R AXIS: 11 DEGREES
EKG R AXIS: 6 DEGREES
EKG T AXIS: 14 DEGREES
EKG T AXIS: 172 DEGREES
EKG T AXIS: 22 DEGREES
EKG T AXIS: 3 DEGREES
EKG T AXIS: 9 DEGREES
EKG VENTRICULAR RATE: 78 BPM
EKG VENTRICULAR RATE: 82 BPM
EKG VENTRICULAR RATE: 89 BPM
EKG VENTRICULAR RATE: 89 BPM
EKG VENTRICULAR RATE: 95 BPM
EOSINOPHILS RELATIVE PERCENT: 0 % (ref 1–4)
EOSINOPHILS RELATIVE PERCENT: 1 % (ref 1–4)
EOSINOPHILS RELATIVE PERCENT: 10 % (ref 1–4)
EOSINOPHILS RELATIVE PERCENT: 10 % (ref 1–4)
EOSINOPHILS RELATIVE PERCENT: 2 % (ref 1–4)
EOSINOPHILS RELATIVE PERCENT: 3 % (ref 1–4)
EOSINOPHILS RELATIVE PERCENT: 4 % (ref 1–4)
EOSINOPHILS RELATIVE PERCENT: 5 % (ref 1–4)
EOSINOPHILS RELATIVE PERCENT: 6 % (ref 1–4)
EOSINOPHILS RELATIVE PERCENT: 7 % (ref 1–4)
EPITHELIAL CELLS UA: ABNORMAL /HPF (ref 0–5)
EPITHELIAL CELLS UA: NORMAL /HPF (ref 0–5)
EPITHELIAL CELLS UA: NORMAL /HPF (ref 0–5)
ESTIMATED AVERAGE GLUCOSE: 123 MG/DL
ESTIMATED AVERAGE GLUCOSE: 131 MG/DL
ETHYL GLUCURONIDE UR: NOT DETECTED
EXPIRATION DATE: NORMAL
FENTANYL URINE: NOT DETECTED
FERRITIN: 187 UG/L (ref 13–150)
FERRITIN: 97 UG/L (ref 13–150)
FIO2: 40
FIO2: ABNORMAL
FIO2: ABNORMAL
FOLATE: 12.8 NG/ML
FOLATE: 5.7 NG/ML
FOLATE: 8.4 NG/ML
FREE KAPPA/LAMBDA RATIO: 1.3 (ref 0.26–1.65)
GFR AFRICAN AMERICAN: 10 ML/MIN
GFR AFRICAN AMERICAN: 12 ML/MIN
GFR AFRICAN AMERICAN: 12 ML/MIN
GFR AFRICAN AMERICAN: 13 ML/MIN
GFR AFRICAN AMERICAN: 13 ML/MIN
GFR AFRICAN AMERICAN: 14 ML/MIN
GFR AFRICAN AMERICAN: 15 ML/MIN
GFR AFRICAN AMERICAN: 16 ML/MIN
GFR AFRICAN AMERICAN: 16 ML/MIN
GFR AFRICAN AMERICAN: 17 ML/MIN
GFR AFRICAN AMERICAN: 17 ML/MIN
GFR AFRICAN AMERICAN: 18 ML/MIN
GFR AFRICAN AMERICAN: 19 ML/MIN
GFR AFRICAN AMERICAN: 20 ML/MIN
GFR AFRICAN AMERICAN: 21 ML/MIN
GFR AFRICAN AMERICAN: 22 ML/MIN
GFR AFRICAN AMERICAN: 23 ML/MIN
GFR AFRICAN AMERICAN: 24 ML/MIN
GFR AFRICAN AMERICAN: 24 ML/MIN
GFR AFRICAN AMERICAN: 25 ML/MIN
GFR AFRICAN AMERICAN: 26 ML/MIN
GFR AFRICAN AMERICAN: 28 ML/MIN
GFR AFRICAN AMERICAN: 29 ML/MIN
GFR AFRICAN AMERICAN: 30 ML/MIN
GFR AFRICAN AMERICAN: 31 ML/MIN
GFR AFRICAN AMERICAN: 32 ML/MIN
GFR AFRICAN AMERICAN: 33 ML/MIN
GFR AFRICAN AMERICAN: 35 ML/MIN
GFR AFRICAN AMERICAN: 5 ML/MIN
GFR AFRICAN AMERICAN: 7 ML/MIN
GFR AFRICAN AMERICAN: 9 ML/MIN
GFR NON-AFRICAN AMERICAN: 10 ML/MIN
GFR NON-AFRICAN AMERICAN: 10 ML/MIN
GFR NON-AFRICAN AMERICAN: 11 ML/MIN
GFR NON-AFRICAN AMERICAN: 12 ML/MIN
GFR NON-AFRICAN AMERICAN: 12 ML/MIN
GFR NON-AFRICAN AMERICAN: 13 ML/MIN
GFR NON-AFRICAN AMERICAN: 14 ML/MIN
GFR NON-AFRICAN AMERICAN: 15 ML/MIN
GFR NON-AFRICAN AMERICAN: 16 ML/MIN
GFR NON-AFRICAN AMERICAN: 17 ML/MIN
GFR NON-AFRICAN AMERICAN: 18 ML/MIN
GFR NON-AFRICAN AMERICAN: 19 ML/MIN
GFR NON-AFRICAN AMERICAN: 20 ML/MIN
GFR NON-AFRICAN AMERICAN: 21 ML/MIN
GFR NON-AFRICAN AMERICAN: 22 ML/MIN
GFR NON-AFRICAN AMERICAN: 23 ML/MIN
GFR NON-AFRICAN AMERICAN: 24 ML/MIN
GFR NON-AFRICAN AMERICAN: 25 ML/MIN
GFR NON-AFRICAN AMERICAN: 26 ML/MIN
GFR NON-AFRICAN AMERICAN: 27 ML/MIN
GFR NON-AFRICAN AMERICAN: 28 ML/MIN
GFR NON-AFRICAN AMERICAN: 29 ML/MIN
GFR NON-AFRICAN AMERICAN: 4 ML/MIN
GFR NON-AFRICAN AMERICAN: 6 ML/MIN
GFR NON-AFRICAN AMERICAN: 8 ML/MIN
GFR NON-AFRICAN AMERICAN: 9 ML/MIN
GFR SERPL CREATININE-BSD FRML MDRD: 20 ML/MIN
GFR SERPL CREATININE-BSD FRML MDRD: ABNORMAL ML/MIN/{1.73_M2}
GLUCOSE BLD-MCNC: 100 MG/DL (ref 65–105)
GLUCOSE BLD-MCNC: 101 MG/DL (ref 65–105)
GLUCOSE BLD-MCNC: 102 MG/DL (ref 65–105)
GLUCOSE BLD-MCNC: 103 MG/DL (ref 65–105)
GLUCOSE BLD-MCNC: 104 MG/DL (ref 70–99)
GLUCOSE BLD-MCNC: 105 MG/DL (ref 70–99)
GLUCOSE BLD-MCNC: 106 MG/DL (ref 65–105)
GLUCOSE BLD-MCNC: 107 MG/DL (ref 65–105)
GLUCOSE BLD-MCNC: 108 MG/DL (ref 65–105)
GLUCOSE BLD-MCNC: 109 MG/DL (ref 65–105)
GLUCOSE BLD-MCNC: 110 MG/DL (ref 65–105)
GLUCOSE BLD-MCNC: 110 MG/DL (ref 65–105)
GLUCOSE BLD-MCNC: 110 MG/DL (ref 70–99)
GLUCOSE BLD-MCNC: 111 MG/DL (ref 65–105)
GLUCOSE BLD-MCNC: 112 MG/DL (ref 65–105)
GLUCOSE BLD-MCNC: 112 MG/DL (ref 65–105)
GLUCOSE BLD-MCNC: 112 MG/DL (ref 70–99)
GLUCOSE BLD-MCNC: 113 MG/DL (ref 65–105)
GLUCOSE BLD-MCNC: 113 MG/DL (ref 70–99)
GLUCOSE BLD-MCNC: 113 MG/DL (ref 70–99)
GLUCOSE BLD-MCNC: 115 MG/DL (ref 70–99)
GLUCOSE BLD-MCNC: 116 MG/DL (ref 65–105)
GLUCOSE BLD-MCNC: 116 MG/DL (ref 70–99)
GLUCOSE BLD-MCNC: 117 MG/DL (ref 65–105)
GLUCOSE BLD-MCNC: 118 MG/DL (ref 65–105)
GLUCOSE BLD-MCNC: 119 MG/DL (ref 65–105)
GLUCOSE BLD-MCNC: 120 MG/DL (ref 65–105)
GLUCOSE BLD-MCNC: 120 MG/DL (ref 70–99)
GLUCOSE BLD-MCNC: 122 MG/DL (ref 70–99)
GLUCOSE BLD-MCNC: 122 MG/DL (ref 70–99)
GLUCOSE BLD-MCNC: 123 MG/DL (ref 65–105)
GLUCOSE BLD-MCNC: 123 MG/DL (ref 65–105)
GLUCOSE BLD-MCNC: 123 MG/DL (ref 70–99)
GLUCOSE BLD-MCNC: 123 MG/DL (ref 70–99)
GLUCOSE BLD-MCNC: 125 MG/DL (ref 65–105)
GLUCOSE BLD-MCNC: 126 MG/DL (ref 65–105)
GLUCOSE BLD-MCNC: 126 MG/DL (ref 70–99)
GLUCOSE BLD-MCNC: 128 MG/DL (ref 65–105)
GLUCOSE BLD-MCNC: 128 MG/DL (ref 65–105)
GLUCOSE BLD-MCNC: 128 MG/DL (ref 70–99)
GLUCOSE BLD-MCNC: 129 MG/DL (ref 65–105)
GLUCOSE BLD-MCNC: 130 MG/DL (ref 70–99)
GLUCOSE BLD-MCNC: 131 MG/DL (ref 65–105)
GLUCOSE BLD-MCNC: 132 MG/DL (ref 65–105)
GLUCOSE BLD-MCNC: 132 MG/DL (ref 65–105)
GLUCOSE BLD-MCNC: 133 MG/DL (ref 65–105)
GLUCOSE BLD-MCNC: 134 MG/DL (ref 65–105)
GLUCOSE BLD-MCNC: 135 MG/DL (ref 65–105)
GLUCOSE BLD-MCNC: 136 MG/DL (ref 65–105)
GLUCOSE BLD-MCNC: 136 MG/DL (ref 65–105)
GLUCOSE BLD-MCNC: 137 MG/DL (ref 70–99)
GLUCOSE BLD-MCNC: 138 MG/DL (ref 65–105)
GLUCOSE BLD-MCNC: 139 MG/DL (ref 65–105)
GLUCOSE BLD-MCNC: 139 MG/DL (ref 70–99)
GLUCOSE BLD-MCNC: 140 MG/DL (ref 65–105)
GLUCOSE BLD-MCNC: 142 MG/DL (ref 65–105)
GLUCOSE BLD-MCNC: 142 MG/DL (ref 70–99)
GLUCOSE BLD-MCNC: 142 MG/DL (ref 70–99)
GLUCOSE BLD-MCNC: 143 MG/DL (ref 65–105)
GLUCOSE BLD-MCNC: 143 MG/DL (ref 70–99)
GLUCOSE BLD-MCNC: 144 MG/DL (ref 65–105)
GLUCOSE BLD-MCNC: 145 MG/DL (ref 70–99)
GLUCOSE BLD-MCNC: 145 MG/DL (ref 74–100)
GLUCOSE BLD-MCNC: 146 MG/DL (ref 65–105)
GLUCOSE BLD-MCNC: 147 MG/DL (ref 70–99)
GLUCOSE BLD-MCNC: 148 MG/DL (ref 65–105)
GLUCOSE BLD-MCNC: 150 MG/DL (ref 70–99)
GLUCOSE BLD-MCNC: 151 MG/DL (ref 65–105)
GLUCOSE BLD-MCNC: 152 MG/DL (ref 65–105)
GLUCOSE BLD-MCNC: 153 MG/DL (ref 65–105)
GLUCOSE BLD-MCNC: 153 MG/DL (ref 65–105)
GLUCOSE BLD-MCNC: 153 MG/DL (ref 70–99)
GLUCOSE BLD-MCNC: 154 MG/DL (ref 65–105)
GLUCOSE BLD-MCNC: 155 MG/DL (ref 65–105)
GLUCOSE BLD-MCNC: 155 MG/DL (ref 65–105)
GLUCOSE BLD-MCNC: 156 MG/DL (ref 65–105)
GLUCOSE BLD-MCNC: 157 MG/DL (ref 65–105)
GLUCOSE BLD-MCNC: 157 MG/DL (ref 65–105)
GLUCOSE BLD-MCNC: 158 MG/DL (ref 65–105)
GLUCOSE BLD-MCNC: 158 MG/DL (ref 70–99)
GLUCOSE BLD-MCNC: 159 MG/DL (ref 65–105)
GLUCOSE BLD-MCNC: 160 MG/DL (ref 65–105)
GLUCOSE BLD-MCNC: 161 MG/DL (ref 65–105)
GLUCOSE BLD-MCNC: 162 MG/DL (ref 65–105)
GLUCOSE BLD-MCNC: 163 MG/DL (ref 65–105)
GLUCOSE BLD-MCNC: 163 MG/DL (ref 65–105)
GLUCOSE BLD-MCNC: 164 MG/DL (ref 65–105)
GLUCOSE BLD-MCNC: 164 MG/DL (ref 65–105)
GLUCOSE BLD-MCNC: 164 MG/DL (ref 70–99)
GLUCOSE BLD-MCNC: 165 MG/DL (ref 65–105)
GLUCOSE BLD-MCNC: 165 MG/DL (ref 70–99)
GLUCOSE BLD-MCNC: 166 MG/DL (ref 65–105)
GLUCOSE BLD-MCNC: 166 MG/DL (ref 70–99)
GLUCOSE BLD-MCNC: 168 MG/DL (ref 65–105)
GLUCOSE BLD-MCNC: 168 MG/DL (ref 70–99)
GLUCOSE BLD-MCNC: 169 MG/DL (ref 65–105)
GLUCOSE BLD-MCNC: 170 MG/DL (ref 65–105)
GLUCOSE BLD-MCNC: 171 MG/DL (ref 65–105)
GLUCOSE BLD-MCNC: 172 MG/DL (ref 65–105)
GLUCOSE BLD-MCNC: 172 MG/DL (ref 70–99)
GLUCOSE BLD-MCNC: 173 MG/DL (ref 70–99)
GLUCOSE BLD-MCNC: 174 MG/DL (ref 70–99)
GLUCOSE BLD-MCNC: 176 MG/DL (ref 65–105)
GLUCOSE BLD-MCNC: 177 MG/DL (ref 65–105)
GLUCOSE BLD-MCNC: 177 MG/DL (ref 65–105)
GLUCOSE BLD-MCNC: 180 MG/DL (ref 65–105)
GLUCOSE BLD-MCNC: 181 MG/DL (ref 65–105)
GLUCOSE BLD-MCNC: 181 MG/DL (ref 70–99)
GLUCOSE BLD-MCNC: 183 MG/DL (ref 65–105)
GLUCOSE BLD-MCNC: 184 MG/DL (ref 65–105)
GLUCOSE BLD-MCNC: 184 MG/DL (ref 65–105)
GLUCOSE BLD-MCNC: 185 MG/DL (ref 65–105)
GLUCOSE BLD-MCNC: 185 MG/DL (ref 65–105)
GLUCOSE BLD-MCNC: 186 MG/DL (ref 65–105)
GLUCOSE BLD-MCNC: 186 MG/DL (ref 65–105)
GLUCOSE BLD-MCNC: 188 MG/DL (ref 65–105)
GLUCOSE BLD-MCNC: 188 MG/DL (ref 70–99)
GLUCOSE BLD-MCNC: 189 MG/DL (ref 65–105)
GLUCOSE BLD-MCNC: 190 MG/DL (ref 65–105)
GLUCOSE BLD-MCNC: 190 MG/DL (ref 65–105)
GLUCOSE BLD-MCNC: 192 MG/DL (ref 65–105)
GLUCOSE BLD-MCNC: 196 MG/DL (ref 65–105)
GLUCOSE BLD-MCNC: 197 MG/DL (ref 70–99)
GLUCOSE BLD-MCNC: 201 MG/DL (ref 65–105)
GLUCOSE BLD-MCNC: 202 MG/DL (ref 65–105)
GLUCOSE BLD-MCNC: 202 MG/DL (ref 65–105)
GLUCOSE BLD-MCNC: 206 MG/DL (ref 65–105)
GLUCOSE BLD-MCNC: 207 MG/DL (ref 65–105)
GLUCOSE BLD-MCNC: 208 MG/DL (ref 70–99)
GLUCOSE BLD-MCNC: 209 MG/DL (ref 70–99)
GLUCOSE BLD-MCNC: 212 MG/DL (ref 65–105)
GLUCOSE BLD-MCNC: 215 MG/DL (ref 70–99)
GLUCOSE BLD-MCNC: 216 MG/DL (ref 65–105)
GLUCOSE BLD-MCNC: 216 MG/DL (ref 65–105)
GLUCOSE BLD-MCNC: 222 MG/DL (ref 65–105)
GLUCOSE BLD-MCNC: 223 MG/DL (ref 65–105)
GLUCOSE BLD-MCNC: 228 MG/DL (ref 65–105)
GLUCOSE BLD-MCNC: 230 MG/DL (ref 65–105)
GLUCOSE BLD-MCNC: 239 MG/DL (ref 65–105)
GLUCOSE BLD-MCNC: 245 MG/DL (ref 65–105)
GLUCOSE BLD-MCNC: 245 MG/DL (ref 70–99)
GLUCOSE BLD-MCNC: 246 MG/DL (ref 70–99)
GLUCOSE BLD-MCNC: 258 MG/DL (ref 65–105)
GLUCOSE BLD-MCNC: 260 MG/DL (ref 65–105)
GLUCOSE BLD-MCNC: 268 MG/DL (ref 70–99)
GLUCOSE BLD-MCNC: 271 MG/DL (ref 65–105)
GLUCOSE BLD-MCNC: 272 MG/DL (ref 65–105)
GLUCOSE BLD-MCNC: 278 MG/DL (ref 65–105)
GLUCOSE BLD-MCNC: 280 MG/DL (ref 65–105)
GLUCOSE BLD-MCNC: 282 MG/DL (ref 65–105)
GLUCOSE BLD-MCNC: 283 MG/DL (ref 70–99)
GLUCOSE BLD-MCNC: 285 MG/DL (ref 65–105)
GLUCOSE BLD-MCNC: 286 MG/DL (ref 65–105)
GLUCOSE BLD-MCNC: 291 MG/DL (ref 70–99)
GLUCOSE BLD-MCNC: 296 MG/DL (ref 65–105)
GLUCOSE BLD-MCNC: 304 MG/DL (ref 65–105)
GLUCOSE BLD-MCNC: 311 MG/DL (ref 65–105)
GLUCOSE BLD-MCNC: 312 MG/DL (ref 70–99)
GLUCOSE BLD-MCNC: 315 MG/DL (ref 65–105)
GLUCOSE BLD-MCNC: 32 MG/DL (ref 65–105)
GLUCOSE BLD-MCNC: 324 MG/DL (ref 65–105)
GLUCOSE BLD-MCNC: 326 MG/DL (ref 70–99)
GLUCOSE BLD-MCNC: 33 MG/DL (ref 65–105)
GLUCOSE BLD-MCNC: 349 MG/DL (ref 65–105)
GLUCOSE BLD-MCNC: 36 MG/DL (ref 65–105)
GLUCOSE BLD-MCNC: 36 MG/DL (ref 65–105)
GLUCOSE BLD-MCNC: 389 MG/DL (ref 65–105)
GLUCOSE BLD-MCNC: 39 MG/DL (ref 70–99)
GLUCOSE BLD-MCNC: 415 MG/DL (ref 65–105)
GLUCOSE BLD-MCNC: 43 MG/DL (ref 65–105)
GLUCOSE BLD-MCNC: 458 MG/DL (ref 65–105)
GLUCOSE BLD-MCNC: 46 MG/DL (ref 65–105)
GLUCOSE BLD-MCNC: 49 MG/DL (ref 65–105)
GLUCOSE BLD-MCNC: 55 MG/DL (ref 70–99)
GLUCOSE BLD-MCNC: 56 MG/DL (ref 70–99)
GLUCOSE BLD-MCNC: 60 MG/DL (ref 65–105)
GLUCOSE BLD-MCNC: 61 MG/DL (ref 65–105)
GLUCOSE BLD-MCNC: 63 MG/DL (ref 70–99)
GLUCOSE BLD-MCNC: 66 MG/DL (ref 65–105)
GLUCOSE BLD-MCNC: 67 MG/DL (ref 65–105)
GLUCOSE BLD-MCNC: 72 MG/DL (ref 65–105)
GLUCOSE BLD-MCNC: 72 MG/DL (ref 70–99)
GLUCOSE BLD-MCNC: 73 MG/DL (ref 70–99)
GLUCOSE BLD-MCNC: 74 MG/DL (ref 65–105)
GLUCOSE BLD-MCNC: 74 MG/DL (ref 70–99)
GLUCOSE BLD-MCNC: 75 MG/DL (ref 65–105)
GLUCOSE BLD-MCNC: 80 MG/DL (ref 65–105)
GLUCOSE BLD-MCNC: 81 MG/DL (ref 65–105)
GLUCOSE BLD-MCNC: 81 MG/DL (ref 65–105)
GLUCOSE BLD-MCNC: 82 MG/DL (ref 65–105)
GLUCOSE BLD-MCNC: 84 MG/DL (ref 70–99)
GLUCOSE BLD-MCNC: 86 MG/DL (ref 65–105)
GLUCOSE BLD-MCNC: 87 MG/DL (ref 65–105)
GLUCOSE BLD-MCNC: 87 MG/DL (ref 65–105)
GLUCOSE BLD-MCNC: 89 MG/DL (ref 70–99)
GLUCOSE BLD-MCNC: 90 MG/DL (ref 70–99)
GLUCOSE BLD-MCNC: 96 MG/DL (ref 65–105)
GLUCOSE BLD-MCNC: 96 MG/DL (ref 65–105)
GLUCOSE BLD-MCNC: 96 MG/DL (ref 70–99)
GLUCOSE BLD-MCNC: 97 MG/DL (ref 70–99)
GLUCOSE BLD-MCNC: 97 MG/DL (ref 70–99)
GLUCOSE BLD-MCNC: 98 MG/DL (ref 65–105)
GLUCOSE BLD-MCNC: 98 MG/DL (ref 70–99)
GLUCOSE BLD-MCNC: 99 MG/DL (ref 65–105)
GLUCOSE BLD-MCNC: 99 MG/DL (ref 70–99)
GLUCOSE URINE, POC: ABNORMAL
GLUCOSE URINE, POC: ABNORMAL
GLUCOSE URINE: NEGATIVE
HBA1C MFR BLD: 5.9 % (ref 4–6)
HBA1C MFR BLD: 6.2 % (ref 4–6)
HBV SURFACE AB TITR SER: <3.5 MIU/ML
HCO3 ARTERIAL: 17.7 MMOL/L (ref 22–27)
HCO3 ARTERIAL: 18.3 MMOL/L (ref 22–27)
HCO3 VENOUS: 27.1 MMOL/L (ref 22–29)
HCT VFR BLD CALC: 21.1 % (ref 36.3–47.1)
HCT VFR BLD CALC: 21.2 %
HCT VFR BLD CALC: 21.4 % (ref 36.3–47.1)
HCT VFR BLD CALC: 22.6 % (ref 36.3–47.1)
HCT VFR BLD CALC: 23 % (ref 36.3–47.1)
HCT VFR BLD CALC: 23.1 % (ref 36.3–47.1)
HCT VFR BLD CALC: 23.1 % (ref 36.3–47.1)
HCT VFR BLD CALC: 23.2 % (ref 36.3–47.1)
HCT VFR BLD CALC: 23.5 % (ref 36.3–47.1)
HCT VFR BLD CALC: 23.5 % (ref 36.3–47.1)
HCT VFR BLD CALC: 23.8 % (ref 36.3–47.1)
HCT VFR BLD CALC: 23.8 % (ref 36.3–47.1)
HCT VFR BLD CALC: 24 % (ref 36.3–47.1)
HCT VFR BLD CALC: 24.1 % (ref 36.3–47.1)
HCT VFR BLD CALC: 24.2 % (ref 36.3–47.1)
HCT VFR BLD CALC: 24.3 % (ref 36.3–47.1)
HCT VFR BLD CALC: 24.4 % (ref 36.3–47.1)
HCT VFR BLD CALC: 24.4 % (ref 36.3–47.1)
HCT VFR BLD CALC: 24.8 % (ref 36.3–47.1)
HCT VFR BLD CALC: 25 % (ref 36.3–47.1)
HCT VFR BLD CALC: 25.1 % (ref 36.3–47.1)
HCT VFR BLD CALC: 25.1 % (ref 36.3–47.1)
HCT VFR BLD CALC: 25.5 % (ref 36.3–47.1)
HCT VFR BLD CALC: 25.5 % (ref 36.3–47.1)
HCT VFR BLD CALC: 25.8 % (ref 36.3–47.1)
HCT VFR BLD CALC: 26.1 % (ref 36.3–47.1)
HCT VFR BLD CALC: 26.1 % (ref 36.3–47.1)
HCT VFR BLD CALC: 26.2 % (ref 36.3–47.1)
HCT VFR BLD CALC: 26.5 % (ref 36.3–47.1)
HCT VFR BLD CALC: 26.7 % (ref 36.3–47.1)
HCT VFR BLD CALC: 26.8 %
HCT VFR BLD CALC: 27 % (ref 36–46)
HCT VFR BLD CALC: 27.1 % (ref 36.3–47.1)
HCT VFR BLD CALC: 27.1 % (ref 36–46)
HCT VFR BLD CALC: 27.9 % (ref 36.3–47.1)
HCT VFR BLD CALC: 28 % (ref 36–46)
HCT VFR BLD CALC: 28.2 % (ref 36–46)
HCT VFR BLD CALC: 28.4 % (ref 36.3–47.1)
HCT VFR BLD CALC: 28.4 % (ref 36.3–47.1)
HCT VFR BLD CALC: 28.6 % (ref 36–46)
HCT VFR BLD CALC: 28.8 % (ref 36.3–47.1)
HCT VFR BLD CALC: 28.9 % (ref 36.3–47.1)
HCT VFR BLD CALC: 29 % (ref 36.3–47.1)
HCT VFR BLD CALC: 29.7 % (ref 36.3–47.1)
HCT VFR BLD CALC: 30 % (ref 36.3–47.1)
HCT VFR BLD CALC: 30.1 % (ref 36.3–47.1)
HCT VFR BLD CALC: 30.3 % (ref 36.3–47.1)
HCT VFR BLD CALC: 30.9 % (ref 36.3–47.1)
HCT VFR BLD CALC: 31 % (ref 36.3–47.1)
HCT VFR BLD CALC: 31.1 % (ref 36.3–47.1)
HCT VFR BLD CALC: 31.1 % (ref 36.3–47.1)
HCT VFR BLD CALC: 31.9 % (ref 36.3–47.1)
HCT VFR BLD CALC: 32.3 % (ref 36.3–47.1)
HCT VFR BLD CALC: 33.2 % (ref 36–46)
HCT VFR BLD CALC: 33.4 % (ref 36.3–47.1)
HCT VFR BLD CALC: 33.5 % (ref 36.3–47.1)
HCT VFR BLD CALC: 33.7 % (ref 36.3–47.1)
HCT VFR BLD CALC: 34 % (ref 36.3–47.1)
HCT VFR BLD CALC: 34.2 % (ref 36.3–47.1)
HCT VFR BLD CALC: 34.6 % (ref 36.3–47.1)
HCT VFR BLD CALC: 35.8 % (ref 36–46)
HCT VFR BLD CALC: 36 % (ref 36–46)
HEMOGLOBIN: 10.2 G/DL (ref 11.9–15.1)
HEMOGLOBIN: 10.4 G/DL (ref 12–16)
HEMOGLOBIN: 11.1 G/DL (ref 12–16)
HEMOGLOBIN: 11.1 G/DL (ref 12–16)
HEMOGLOBIN: 6.4 G/DL (ref 11.9–15.1)
HEMOGLOBIN: 6.4 G/DL (ref 11.9–15.1)
HEMOGLOBIN: 6.6 G/DL (ref 11.9–15.1)
HEMOGLOBIN: 6.8 GM/DL
HEMOGLOBIN: 6.9 G/DL (ref 11.9–15.1)
HEMOGLOBIN: 6.9 G/DL (ref 11.9–15.1)
HEMOGLOBIN: 7 G/DL (ref 11.9–15.1)
HEMOGLOBIN: 7.1 G/DL (ref 11.9–15.1)
HEMOGLOBIN: 7.2 G/DL (ref 11.9–15.1)
HEMOGLOBIN: 7.3 G/DL (ref 11.9–15.1)
HEMOGLOBIN: 7.3 G/DL (ref 11.9–15.1)
HEMOGLOBIN: 7.4 G/DL (ref 11.9–15.1)
HEMOGLOBIN: 7.4 G/DL (ref 11.9–15.1)
HEMOGLOBIN: 7.5 G/DL (ref 11.9–15.1)
HEMOGLOBIN: 7.7 G/DL (ref 11.9–15.1)
HEMOGLOBIN: 7.8 G/DL (ref 11.9–15.1)
HEMOGLOBIN: 7.9 G/DL (ref 11.9–15.1)
HEMOGLOBIN: 7.9 G/DL (ref 11.9–15.1)
HEMOGLOBIN: 8 G/DL (ref 11.9–15.1)
HEMOGLOBIN: 8.1 G/DL (ref 11.9–15.1)
HEMOGLOBIN: 8.2 G/DL (ref 11.9–15.1)
HEMOGLOBIN: 8.2 G/DL (ref 11.9–15.1)
HEMOGLOBIN: 8.3 G/DL (ref 11.9–15.1)
HEMOGLOBIN: 8.3 G/DL (ref 12–16)
HEMOGLOBIN: 8.4 G/DL (ref 11.9–15.1)
HEMOGLOBIN: 8.4 G/DL (ref 12–16)
HEMOGLOBIN: 8.5 G/DL (ref 11.9–15.1)
HEMOGLOBIN: 8.5 G/DL (ref 11.9–15.1)
HEMOGLOBIN: 8.6 G/DL (ref 11.9–15.1)
HEMOGLOBIN: 8.6 G/DL (ref 11.9–15.1)
HEMOGLOBIN: 8.6 GM/DL
HEMOGLOBIN: 8.7 G/DL (ref 11.9–15.1)
HEMOGLOBIN: 8.8 G/DL (ref 11.9–15.1)
HEMOGLOBIN: 8.8 G/DL (ref 12–16)
HEMOGLOBIN: 8.8 G/DL (ref 12–16)
HEMOGLOBIN: 9 G/DL (ref 11.9–15.1)
HEMOGLOBIN: 9 G/DL (ref 11.9–15.1)
HEMOGLOBIN: 9.2 G/DL (ref 11.9–15.1)
HEMOGLOBIN: 9.3 G/DL (ref 11.9–15.1)
HEMOGLOBIN: 9.3 G/DL (ref 11.9–15.1)
HEMOGLOBIN: 9.5 G/DL (ref 12–16)
HEMOGLOBIN: 9.6 G/DL (ref 11.9–15.1)
HEMOGLOBIN: 9.7 G/DL (ref 11.9–15.1)
HEPATITIS B CORE TOTAL ANTIBODY: NONREACTIVE
HEPATITIS B SURFACE ANTIGEN: NONREACTIVE
HEPATITIS C ANTIBODY: NONREACTIVE
HYDROCODONE, URINE: NOT DETECTED
HYDROMORPHONE, URINE: NOT DETECTED
IMMATURE GRANULOCYTES: 0 %
IMMATURE GRANULOCYTES: 1 %
IMMATURE GRANULOCYTES: 2 %
IMMATURE GRANULOCYTES: ABNORMAL %
INR BLD: 0.9
INR BLD: 1
INR BLD: 1.1
INR BLD: 1.1
INR BLD: 1.2
INR BLD: 1.3
INR BLD: 1.4
INR BLD: 1.6
INR BLD: 2
INR BLD: 2.1
INR BLD: 2.3
INR BLD: 2.5
INR BLD: 2.5
INR BLD: 2.8
INR BLD: 6.3
INR BLD: 6.9
INR BLD: 7.6
IRON SATURATION: 10 % (ref 20–55)
IRON SATURATION: 20 % (ref 20–55)
IRON SATURATION: 8 % (ref 20–55)
IRON: 17 UG/DL (ref 37–145)
IRON: 19 UG/DL (ref 37–145)
IRON: 36 UG/DL (ref 37–145)
KAPPA FREE LIGHT CHAINS QNT: 2.28 MG/DL (ref 0.37–1.94)
KETONES, POC: ABNORMAL
KETONES, POC: ABNORMAL
KETONES, URINE: NEGATIVE
LACTATE DEHYDROGENASE: 153 U/L (ref 135–214)
LACTIC ACID, WHOLE BLOOD: 1.8 MMOL/L (ref 0.7–2.1)
LAMBDA FREE LIGHT CHAINS QNT: 1.76 MG/DL (ref 0.57–2.63)
LEUKOCYTE EST, POC: ABNORMAL
LEUKOCYTE EST, POC: ABNORMAL
LEUKOCYTE ESTERASE, URINE: ABNORMAL
LEUKOCYTE ESTERASE, URINE: NEGATIVE
LIPASE: 243 U/L (ref 13–60)
LIPASE: 43 U/L (ref 13–60)
LIPASE: 9 U/L (ref 13–60)
LORAZEPAM, URINE: NOT DETECTED
LV EF: 58 %
LVEF MODALITY: NORMAL
LYMPHOCYTES # BLD: 10 % (ref 24–43)
LYMPHOCYTES # BLD: 11 % (ref 24–43)
LYMPHOCYTES # BLD: 12 % (ref 24–43)
LYMPHOCYTES # BLD: 13 % (ref 24–43)
LYMPHOCYTES # BLD: 14 % (ref 24–43)
LYMPHOCYTES # BLD: 14 % (ref 24–44)
LYMPHOCYTES # BLD: 14 % (ref 24–44)
LYMPHOCYTES # BLD: 15 % (ref 24–43)
LYMPHOCYTES # BLD: 17 % (ref 24–43)
LYMPHOCYTES # BLD: 17 % (ref 24–44)
LYMPHOCYTES # BLD: 18 % (ref 24–44)
LYMPHOCYTES # BLD: 19 % (ref 24–43)
LYMPHOCYTES # BLD: 20 % (ref 24–43)
LYMPHOCYTES # BLD: 21 % (ref 24–43)
LYMPHOCYTES # BLD: 21 % (ref 24–44)
LYMPHOCYTES # BLD: 22 % (ref 24–44)
LYMPHOCYTES # BLD: 25 % (ref 24–44)
LYMPHOCYTES # BLD: 26 % (ref 24–44)
LYMPHOCYTES # BLD: 26 % (ref 24–44)
LYMPHOCYTES # BLD: 27 % (ref 24–43)
LYMPHOCYTES # BLD: 27 % (ref 24–44)
LYMPHOCYTES # BLD: 3 % (ref 24–44)
LYMPHOCYTES # BLD: 3 % (ref 24–44)
LYMPHOCYTES # BLD: 30 % (ref 24–44)
LYMPHOCYTES # BLD: 5 % (ref 24–43)
LYMPHOCYTES # BLD: 6 % (ref 24–44)
LYMPHOCYTES # BLD: 6 % (ref 24–44)
LYMPHOCYTES # BLD: 7 % (ref 24–43)
LYMPHOCYTES # BLD: 8 % (ref 24–44)
Lab: ABNORMAL
Lab: NORMAL
MAGNESIUM: 1.6 MG/DL (ref 1.6–2.6)
MAGNESIUM: 1.7 MG/DL (ref 1.6–2.6)
MAGNESIUM: 1.7 MG/DL (ref 1.6–2.6)
MAGNESIUM: 1.8 MG/DL (ref 1.6–2.6)
MAGNESIUM: 1.9 MG/DL (ref 1.6–2.6)
MAGNESIUM: 2 MG/DL (ref 1.6–2.6)
MAGNESIUM: 2.1 MG/DL (ref 1.6–2.6)
MAGNESIUM: 2.2 MG/DL (ref 1.6–2.6)
MAGNESIUM: 2.2 MG/DL (ref 1.6–2.6)
MAGNESIUM: 2.3 MG/DL (ref 1.6–2.6)
MAGNESIUM: 2.3 MG/DL (ref 1.6–2.6)
MARIJUANA METAB, UR: NOT DETECTED
MCH RBC QN AUTO: 20.7 PG (ref 26–34)
MCH RBC QN AUTO: 20.9 PG (ref 25.2–33.5)
MCH RBC QN AUTO: 21.2 PG (ref 25.2–33.5)
MCH RBC QN AUTO: 22.2 PG (ref 25.2–33.5)
MCH RBC QN AUTO: 22.3 PG (ref 25.2–33.5)
MCH RBC QN AUTO: 22.3 PG (ref 25.2–33.5)
MCH RBC QN AUTO: 22.4 PG (ref 25.2–33.5)
MCH RBC QN AUTO: 22.5 PG (ref 25.2–33.5)
MCH RBC QN AUTO: 22.6 PG (ref 25.2–33.5)
MCH RBC QN AUTO: 22.6 PG (ref 25.2–33.5)
MCH RBC QN AUTO: 22.6 PG (ref 26–34)
MCH RBC QN AUTO: 22.7 PG (ref 25.2–33.5)
MCH RBC QN AUTO: 22.8 PG (ref 25.2–33.5)
MCH RBC QN AUTO: 22.9 PG (ref 25.2–33.5)
MCH RBC QN AUTO: 23.2 PG (ref 26–34)
MCH RBC QN AUTO: 23.7 PG (ref 26–34)
MCH RBC QN AUTO: 24.3 PG (ref 26–34)
MCH RBC QN AUTO: 25.8 PG (ref 25.2–33.5)
MCH RBC QN AUTO: 26 PG (ref 25.2–33.5)
MCH RBC QN AUTO: 26.3 PG (ref 26–34)
MCH RBC QN AUTO: 26.3 PG (ref 26–34)
MCH RBC QN AUTO: 26.5 PG (ref 25.2–33.5)
MCH RBC QN AUTO: 26.5 PG (ref 25.2–33.5)
MCH RBC QN AUTO: 26.7 PG (ref 25.2–33.5)
MCH RBC QN AUTO: 26.8 PG (ref 25.2–33.5)
MCH RBC QN AUTO: 27.1 PG (ref 25.2–33.5)
MCH RBC QN AUTO: 27.1 PG (ref 25.2–33.5)
MCH RBC QN AUTO: 27.1 PG (ref 26–34)
MCH RBC QN AUTO: 27.2 PG (ref 25.2–33.5)
MCH RBC QN AUTO: 27.3 PG (ref 25.2–33.5)
MCH RBC QN AUTO: 27.4 PG (ref 25.2–33.5)
MCH RBC QN AUTO: 28 PG (ref 25.2–33.5)
MCH RBC QN AUTO: 28 PG (ref 25.2–33.5)
MCH RBC QN AUTO: 28.3 PG (ref 25.2–33.5)
MCH RBC QN AUTO: 28.5 PG (ref 25.2–33.5)
MCH RBC QN AUTO: 28.6 PG (ref 25.2–33.5)
MCH RBC QN AUTO: 28.8 PG (ref 25.2–33.5)
MCH RBC QN AUTO: 28.9 PG (ref 25.2–33.5)
MCH RBC QN AUTO: 29.1 PG (ref 25.2–33.5)
MCH RBC QN AUTO: 29.3 PG (ref 25.2–33.5)
MCH RBC QN AUTO: 29.9 PG (ref 25.2–33.5)
MCH RBC QN AUTO: 30.4 PG (ref 25.2–33.5)
MCHC RBC AUTO-ENTMCNC: 26.4 G/DL (ref 28.4–34.8)
MCHC RBC AUTO-ENTMCNC: 26.6 G/DL (ref 28.4–34.8)
MCHC RBC AUTO-ENTMCNC: 26.9 G/DL (ref 28.4–34.8)
MCHC RBC AUTO-ENTMCNC: 26.9 G/DL (ref 28.4–34.8)
MCHC RBC AUTO-ENTMCNC: 27.3 G/DL (ref 28.4–34.8)
MCHC RBC AUTO-ENTMCNC: 27.3 G/DL (ref 28.4–34.8)
MCHC RBC AUTO-ENTMCNC: 27.5 G/DL (ref 28.4–34.8)
MCHC RBC AUTO-ENTMCNC: 27.5 G/DL (ref 28.4–34.8)
MCHC RBC AUTO-ENTMCNC: 27.6 G/DL (ref 28.4–34.8)
MCHC RBC AUTO-ENTMCNC: 27.6 G/DL (ref 28.4–34.8)
MCHC RBC AUTO-ENTMCNC: 27.7 G/DL (ref 28.4–34.8)
MCHC RBC AUTO-ENTMCNC: 27.8 G/DL (ref 28.4–34.8)
MCHC RBC AUTO-ENTMCNC: 27.8 G/DL (ref 28.4–34.8)
MCHC RBC AUTO-ENTMCNC: 28.2 G/DL (ref 28.4–34.8)
MCHC RBC AUTO-ENTMCNC: 28.2 G/DL (ref 28.4–34.8)
MCHC RBC AUTO-ENTMCNC: 28.4 G/DL (ref 28.4–34.8)
MCHC RBC AUTO-ENTMCNC: 28.5 G/DL (ref 28.4–34.8)
MCHC RBC AUTO-ENTMCNC: 28.6 G/DL (ref 28.4–34.8)
MCHC RBC AUTO-ENTMCNC: 28.6 G/DL (ref 28.4–34.8)
MCHC RBC AUTO-ENTMCNC: 28.8 G/DL (ref 28.4–34.8)
MCHC RBC AUTO-ENTMCNC: 28.9 G/DL (ref 28.4–34.8)
MCHC RBC AUTO-ENTMCNC: 28.9 G/DL (ref 28.4–34.8)
MCHC RBC AUTO-ENTMCNC: 29.3 G/DL (ref 28.4–34.8)
MCHC RBC AUTO-ENTMCNC: 29.4 G/DL (ref 28.4–34.8)
MCHC RBC AUTO-ENTMCNC: 29.4 G/DL (ref 28.4–34.8)
MCHC RBC AUTO-ENTMCNC: 29.5 G/DL (ref 28.4–34.8)
MCHC RBC AUTO-ENTMCNC: 29.6 G/DL (ref 28.4–34.8)
MCHC RBC AUTO-ENTMCNC: 29.7 G/DL (ref 31–37)
MCHC RBC AUTO-ENTMCNC: 29.8 G/DL (ref 28.4–34.8)
MCHC RBC AUTO-ENTMCNC: 29.8 G/DL (ref 28.4–34.8)
MCHC RBC AUTO-ENTMCNC: 29.9 G/DL (ref 28.4–34.8)
MCHC RBC AUTO-ENTMCNC: 29.9 G/DL (ref 31–37)
MCHC RBC AUTO-ENTMCNC: 30 G/DL (ref 28.4–34.8)
MCHC RBC AUTO-ENTMCNC: 30.2 G/DL (ref 28.4–34.8)
MCHC RBC AUTO-ENTMCNC: 30.2 G/DL (ref 28.4–34.8)
MCHC RBC AUTO-ENTMCNC: 30.3 G/DL (ref 28.4–34.8)
MCHC RBC AUTO-ENTMCNC: 30.5 G/DL (ref 28.4–34.8)
MCHC RBC AUTO-ENTMCNC: 30.7 G/DL (ref 28.4–34.8)
MCHC RBC AUTO-ENTMCNC: 30.7 G/DL (ref 28.4–34.8)
MCHC RBC AUTO-ENTMCNC: 30.8 G/DL (ref 31–37)
MCHC RBC AUTO-ENTMCNC: 31.1 G/DL (ref 31–37)
MCHC RBC AUTO-ENTMCNC: 31.3 G/DL (ref 31–37)
MCHC RBC AUTO-ENTMCNC: 32.3 G/DL (ref 31–37)
MCHC RBC AUTO-ENTMCNC: 32.4 G/DL (ref 31–37)
MCHC RBC AUTO-ENTMCNC: 33.3 G/DL (ref 31–37)
MCV RBC AUTO: 100 FL (ref 82.6–102.9)
MCV RBC AUTO: 101.3 FL (ref 82.6–102.9)
MCV RBC AUTO: 69.4 FL (ref 80–100)
MCV RBC AUTO: 75.2 FL (ref 80–100)
MCV RBC AUTO: 75.6 FL (ref 80–100)
MCV RBC AUTO: 76.2 FL (ref 80–100)
MCV RBC AUTO: 77 FL (ref 82.6–102.9)
MCV RBC AUTO: 77.4 FL (ref 82.6–102.9)
MCV RBC AUTO: 77.7 FL (ref 80–100)
MCV RBC AUTO: 78.2 FL (ref 82.6–102.9)
MCV RBC AUTO: 79 FL (ref 82.6–102.9)
MCV RBC AUTO: 79.3 FL (ref 82.6–102.9)
MCV RBC AUTO: 80.4 FL (ref 82.6–102.9)
MCV RBC AUTO: 80.5 FL (ref 82.6–102.9)
MCV RBC AUTO: 80.7 FL (ref 82.6–102.9)
MCV RBC AUTO: 80.8 FL (ref 82.6–102.9)
MCV RBC AUTO: 81.1 FL (ref 82.6–102.9)
MCV RBC AUTO: 81.2 FL (ref 80–100)
MCV RBC AUTO: 81.2 FL (ref 82.6–102.9)
MCV RBC AUTO: 81.3 FL (ref 82.6–102.9)
MCV RBC AUTO: 81.5 FL (ref 80–100)
MCV RBC AUTO: 81.6 FL (ref 80–100)
MCV RBC AUTO: 81.6 FL (ref 82.6–102.9)
MCV RBC AUTO: 81.8 FL (ref 82.6–102.9)
MCV RBC AUTO: 81.9 FL (ref 82.6–102.9)
MCV RBC AUTO: 82.6 FL (ref 82.6–102.9)
MCV RBC AUTO: 83.1 FL (ref 82.6–102.9)
MCV RBC AUTO: 83.6 FL (ref 82.6–102.9)
MCV RBC AUTO: 84.3 FL (ref 82.6–102.9)
MCV RBC AUTO: 87 FL (ref 82.6–102.9)
MCV RBC AUTO: 89.3 FL (ref 82.6–102.9)
MCV RBC AUTO: 89.4 FL (ref 82.6–102.9)
MCV RBC AUTO: 89.6 FL (ref 82.6–102.9)
MCV RBC AUTO: 90.4 FL (ref 82.6–102.9)
MCV RBC AUTO: 91.9 FL (ref 82.6–102.9)
MCV RBC AUTO: 92.4 FL (ref 82.6–102.9)
MCV RBC AUTO: 92.7 FL (ref 82.6–102.9)
MCV RBC AUTO: 93.9 FL (ref 82.6–102.9)
MCV RBC AUTO: 94.4 FL (ref 82.6–102.9)
MCV RBC AUTO: 94.9 FL (ref 82.6–102.9)
MCV RBC AUTO: 95.1 FL (ref 82.6–102.9)
MCV RBC AUTO: 95.9 FL (ref 82.6–102.9)
MCV RBC AUTO: 96.3 FL (ref 82.6–102.9)
MCV RBC AUTO: 97.3 FL (ref 82.6–102.9)
MCV RBC AUTO: 97.4 FL (ref 82.6–102.9)
MCV RBC AUTO: 97.9 FL (ref 82.6–102.9)
MCV RBC AUTO: 99.6 FL (ref 82.6–102.9)
MDA, UR: NOT DETECTED
MDEA, EVE, UR: NOT DETECTED
MDMA URINE: NOT DETECTED
MEPERIDINE METAB, UR: NOT DETECTED
METHADONE, URINE: NOT DETECTED
METHAMPHETAMINE, URINE: NOT DETECTED
METHEMOGLOBIN: ABNORMAL % (ref 0–1.5)
METHEMOGLOBIN: ABNORMAL % (ref 0–1.5)
METHYLPHENIDATE: NOT DETECTED
MIDAZOLAM, URINE: NOT DETECTED
MODE: ABNORMAL
MONOCYTES # BLD: 0 % (ref 1–7)
MONOCYTES # BLD: 1 % (ref 1–7)
MONOCYTES # BLD: 1 % (ref 1–7)
MONOCYTES # BLD: 10 % (ref 1–7)
MONOCYTES # BLD: 10 % (ref 3–12)
MONOCYTES # BLD: 11 % (ref 1–7)
MONOCYTES # BLD: 13 % (ref 3–12)
MONOCYTES # BLD: 2 % (ref 1–7)
MONOCYTES # BLD: 3 % (ref 1–7)
MONOCYTES # BLD: 5 % (ref 1–7)
MONOCYTES # BLD: 5 % (ref 3–12)
MONOCYTES # BLD: 6 % (ref 1–7)
MONOCYTES # BLD: 7 % (ref 1–7)
MONOCYTES # BLD: 7 % (ref 3–12)
MONOCYTES # BLD: 7 % (ref 3–12)
MONOCYTES # BLD: 8 % (ref 1–7)
MONOCYTES # BLD: 8 % (ref 3–12)
MONOCYTES # BLD: 9 % (ref 1–7)
MONOCYTES # BLD: 9 % (ref 1–7)
MONOCYTES # BLD: 9 % (ref 3–12)
MORPHINE URINE: NOT DETECTED
MORPHOLOGY: ABNORMAL
MUCUS: ABNORMAL
MUCUS: NORMAL
MUCUS: NORMAL
NEGATIVE BASE EXCESS, ART: 7.2 MMOL/L (ref 0–2)
NEGATIVE BASE EXCESS, ART: 7.6 MMOL/L (ref 0–2)
NEGATIVE BASE EXCESS, VEN: ABNORMAL (ref 0–2)
NITRITE, POC: ABNORMAL
NITRITE, POC: ABNORMAL
NITRITE, URINE: NEGATIVE
NITRITE, URINE: POSITIVE
NITRITE, URINE: POSITIVE
NORBUPRENORPHINE, URINE: NOT DETECTED
NORDIAZEPAM, URINE: NOT DETECTED
NORFENTANYL, URINE: NOT DETECTED
NORHYDROCODONE, URINE: NOT DETECTED
NOROXYCODONE, URINE: PRESENT
NOROXYMORPHONE, URINE: PRESENT
NOTIFICATION TIME: ABNORMAL
NOTIFICATION TIME: ABNORMAL
NOTIFICATION: ABNORMAL
NOTIFICATION: ABNORMAL
NRBC AUTOMATED: 0 PER 100 WBC
NRBC AUTOMATED: 0.1 PER 100 WBC
NRBC AUTOMATED: 0.2 PER 100 WBC
NRBC AUTOMATED: 0.4 PER 100 WBC
NRBC AUTOMATED: ABNORMAL PER 100 WBC
NUCLEATED RED BLOOD CELLS: 1 PER 100 WBC
O2 DEVICE/FLOW/%: ABNORMAL
O2 SAT, ARTERIAL: 98.4 % (ref 94–100)
O2 SAT, ARTERIAL: 99.5 % (ref 94–100)
O2 SAT, VEN: 33 % (ref 60–85)
ORGANISM: ABNORMAL
ORGANISM: ABNORMAL
OTHER OBSERVATIONS UA: ABNORMAL
OTHER OBSERVATIONS UA: NORMAL
OTHER OBSERVATIONS UA: NORMAL
OXAZEPAM, URINE: NOT DETECTED
OXYCODONE URINE: PRESENT
OXYHEMOGLOBIN: ABNORMAL % (ref 95–98)
OXYHEMOGLOBIN: ABNORMAL % (ref 95–98)
OXYMORPHONE, URINE: PRESENT
PAIN MGT DRUG PANEL, HI RES, UR: NORMAL
PARTIAL THROMBOPLASTIN TIME: 19.2 SEC (ref 21.3–31.3)
PARTIAL THROMBOPLASTIN TIME: 21 SEC (ref 21.3–31.3)
PARTIAL THROMBOPLASTIN TIME: 26.3 SEC (ref 20.5–30.5)
PARTIAL THROMBOPLASTIN TIME: 27.4 SEC (ref 20.5–30.5)
PARTIAL THROMBOPLASTIN TIME: 38.1 SEC (ref 20.5–30.5)
PARTIAL THROMBOPLASTIN TIME: 38.4 SEC (ref 20.5–30.5)
PARTIAL THROMBOPLASTIN TIME: 41.5 SEC (ref 20.5–30.5)
PARTIAL THROMBOPLASTIN TIME: 59.8 SEC (ref 20.5–30.5)
PARTIAL THROMBOPLASTIN TIME: 61 SEC (ref 20.5–30.5)
PARTIAL THROMBOPLASTIN TIME: 62 SEC (ref 20.5–30.5)
PARTIAL THROMBOPLASTIN TIME: 89.3 SEC (ref 20.5–30.5)
PARTIAL THROMBOPLASTIN TIME: >120 SEC (ref 20.5–30.5)
PARTIAL THROMBOPLASTIN TIME: >120 SEC (ref 20.5–30.5)
PATIENT TEMP: 37
PATIENT TEMP: 37
PATIENT TEMP: ABNORMAL
PCO2 ARTERIAL: 37.8 MMHG (ref 32–45)
PCO2 ARTERIAL: 39 MMHG (ref 32–45)
PCO2, ART, TEMP ADJ: ABNORMAL (ref 32–45)
PCO2, ART, TEMP ADJ: ABNORMAL (ref 32–45)
PCO2, VEN: 35.5 MM HG (ref 41–51)
PCP,URINE: NOT DETECTED
PDW BLD-RTO: 17.8 % (ref 11.8–14.4)
PDW BLD-RTO: 18.1 % (ref 11.8–14.4)
PDW BLD-RTO: 18.4 % (ref 11.8–14.4)
PDW BLD-RTO: 18.5 % (ref 11.8–14.4)
PDW BLD-RTO: 18.5 % (ref 11.8–14.4)
PDW BLD-RTO: 18.6 % (ref 11.8–14.4)
PDW BLD-RTO: 18.8 % (ref 11.8–14.4)
PDW BLD-RTO: 18.9 % (ref 11.8–14.4)
PDW BLD-RTO: 18.9 % (ref 11.8–14.4)
PDW BLD-RTO: 19 % (ref 11.8–14.4)
PDW BLD-RTO: 19.1 % (ref 11.8–14.4)
PDW BLD-RTO: 19.2 % (ref 11.8–14.4)
PDW BLD-RTO: 19.5 % (ref 11.8–14.4)
PDW BLD-RTO: 19.7 % (ref 11.8–14.4)
PDW BLD-RTO: 19.9 % (ref 11.8–14.4)
PDW BLD-RTO: 20 % (ref 11.8–14.4)
PDW BLD-RTO: 20.1 % (ref 11.8–14.4)
PDW BLD-RTO: 20.3 % (ref 11.8–14.4)
PDW BLD-RTO: 20.4 % (ref 11.8–14.4)
PDW BLD-RTO: 20.6 % (ref 11.8–14.4)
PDW BLD-RTO: 20.7 % (ref 11.8–14.4)
PDW BLD-RTO: 20.7 % (ref 11.8–14.4)
PDW BLD-RTO: 20.8 % (ref 11.8–14.4)
PDW BLD-RTO: 21 % (ref 11.8–14.4)
PDW BLD-RTO: 21.1 % (ref 11.8–14.4)
PDW BLD-RTO: 21.2 % (ref 11.8–14.4)
PDW BLD-RTO: 21.4 % (ref 11.8–14.4)
PDW BLD-RTO: 21.4 % (ref 12.5–15.4)
PDW BLD-RTO: 21.5 % (ref 11.8–14.4)
PDW BLD-RTO: 21.6 % (ref 11.8–14.4)
PDW BLD-RTO: 21.6 % (ref 11.8–14.4)
PDW BLD-RTO: 21.8 % (ref 11.5–14.5)
PDW BLD-RTO: 21.8 % (ref 11.8–14.4)
PDW BLD-RTO: 21.8 % (ref 11.8–14.4)
PDW BLD-RTO: 21.9 % (ref 11.5–14.5)
PDW BLD-RTO: 22 % (ref 11.8–14.4)
PDW BLD-RTO: 22 % (ref 11.8–14.4)
PDW BLD-RTO: 22.3 % (ref 11.5–14.5)
PDW BLD-RTO: 22.3 % (ref 11.8–14.4)
PDW BLD-RTO: 22.3 % (ref 12.5–15.4)
PDW BLD-RTO: 22.5 % (ref 11.8–14.4)
PDW BLD-RTO: 23.1 % (ref 11.5–14.5)
PDW BLD-RTO: 23.4 % (ref 11.8–14.4)
PEEP/CPAP: ABNORMAL
PEEP/CPAP: ABNORMAL
PH ARTERIAL: 7.29 (ref 7.35–7.45)
PH ARTERIAL: 7.29 (ref 7.35–7.45)
PH UA: 5 (ref 5–8)
PH UA: 7 (ref 5–8)
PH UA: 7.5 (ref 5–8)
PH UA: 8 (ref 5–8)
PH UA: 8 (ref 5–8)
PH VENOUS: 7.49 (ref 7.32–7.43)
PH, ART, TEMP ADJ: ABNORMAL (ref 7.35–7.45)
PH, ART, TEMP ADJ: ABNORMAL (ref 7.35–7.45)
PH, POC: 7
PH, POC: 7.5
PHENTERMINE, UR: NOT DETECTED
PHOSPHORUS: 3.7 MG/DL (ref 2.6–4.5)
PLATELET # BLD: 169 K/UL (ref 138–453)
PLATELET # BLD: 184 K/UL (ref 130–400)
PLATELET # BLD: 185 K/UL (ref 138–453)
PLATELET # BLD: 197 K/UL (ref 138–453)
PLATELET # BLD: 224 K/UL (ref 138–453)
PLATELET # BLD: 229 K/UL (ref 130–400)
PLATELET # BLD: 231 K/UL (ref 138–453)
PLATELET # BLD: 234 K/UL (ref 130–400)
PLATELET # BLD: 235 K/UL (ref 138–453)
PLATELET # BLD: 236 K/UL (ref 138–453)
PLATELET # BLD: 236 K/UL (ref 138–453)
PLATELET # BLD: 240 K/UL (ref 138–453)
PLATELET # BLD: 240 K/UL (ref 138–453)
PLATELET # BLD: 243 K/UL (ref 138–453)
PLATELET # BLD: 250 K/UL (ref 138–453)
PLATELET # BLD: 251 K/UL (ref 138–453)
PLATELET # BLD: 256 K/UL (ref 138–453)
PLATELET # BLD: 258 K/UL (ref 138–453)
PLATELET # BLD: 269 K/UL (ref 138–453)
PLATELET # BLD: 283 K/UL (ref 138–453)
PLATELET # BLD: 286 K/UL (ref 138–453)
PLATELET # BLD: 286 K/UL (ref 138–453)
PLATELET # BLD: 287 K/UL (ref 138–453)
PLATELET # BLD: 289 K/UL (ref 138–453)
PLATELET # BLD: 298 K/UL (ref 138–453)
PLATELET # BLD: 302 K/UL (ref 138–453)
PLATELET # BLD: 310 K/UL (ref 138–453)
PLATELET # BLD: 310 K/UL (ref 138–453)
PLATELET # BLD: 314 K/UL (ref 138–453)
PLATELET # BLD: 316 K/UL (ref 138–453)
PLATELET # BLD: 316 K/UL (ref 138–453)
PLATELET # BLD: 321 K/UL (ref 138–453)
PLATELET # BLD: 326 K/UL (ref 138–453)
PLATELET # BLD: 327 K/UL (ref 138–453)
PLATELET # BLD: 331 K/UL (ref 138–453)
PLATELET # BLD: 335 K/UL (ref 138–453)
PLATELET # BLD: 336 K/UL (ref 140–450)
PLATELET # BLD: 337 K/UL (ref 138–453)
PLATELET # BLD: 367 K/UL (ref 130–400)
PLATELET # BLD: 367 K/UL (ref 138–453)
PLATELET # BLD: 371 K/UL (ref 138–453)
PLATELET # BLD: 389 K/UL (ref 138–453)
PLATELET # BLD: 394 K/UL (ref 138–453)
PLATELET # BLD: 421 K/UL (ref 138–453)
PLATELET # BLD: 445 K/UL (ref 138–453)
PLATELET # BLD: 513 K/UL (ref 130–400)
PLATELET # BLD: 521 K/UL (ref 130–400)
PLATELET # BLD: 557 K/UL (ref 140–450)
PLATELET ESTIMATE: ABNORMAL
PMV BLD AUTO: 10 FL (ref 8.1–13.5)
PMV BLD AUTO: 10.1 FL (ref 8.1–13.5)
PMV BLD AUTO: 10.1 FL (ref 8.1–13.5)
PMV BLD AUTO: 10.3 FL (ref 8.1–13.5)
PMV BLD AUTO: 10.3 FL (ref 8.1–13.5)
PMV BLD AUTO: 10.4 FL (ref 8.1–13.5)
PMV BLD AUTO: 6.4 FL (ref 6–12)
PMV BLD AUTO: 6.6 FL (ref 6–12)
PMV BLD AUTO: 6.6 FL (ref 6–12)
PMV BLD AUTO: 6.7 FL (ref 6–12)
PMV BLD AUTO: 6.8 FL (ref 6–12)
PMV BLD AUTO: 7 FL (ref 6–12)
PMV BLD AUTO: 7.1 FL (ref 6–12)
PMV BLD AUTO: 7.2 FL (ref 6–12)
PMV BLD AUTO: 8.7 FL (ref 8.1–13.5)
PMV BLD AUTO: 8.9 FL (ref 8.1–13.5)
PMV BLD AUTO: 8.9 FL (ref 8.1–13.5)
PMV BLD AUTO: 9 FL (ref 8.1–13.5)
PMV BLD AUTO: 9.1 FL (ref 8.1–13.5)
PMV BLD AUTO: 9.2 FL (ref 8.1–13.5)
PMV BLD AUTO: 9.2 FL (ref 8.1–13.5)
PMV BLD AUTO: 9.3 FL (ref 8.1–13.5)
PMV BLD AUTO: 9.3 FL (ref 8.1–13.5)
PMV BLD AUTO: 9.4 FL (ref 8.1–13.5)
PMV BLD AUTO: 9.5 FL (ref 8.1–13.5)
PMV BLD AUTO: 9.6 FL (ref 8.1–13.5)
PMV BLD AUTO: 9.7 FL (ref 8.1–13.5)
PO2 ARTERIAL: 130 MMHG (ref 75–95)
PO2 ARTERIAL: 159 MMHG (ref 75–95)
PO2, ART, TEMP ADJ: ABNORMAL MMHG (ref 75–95)
PO2, ART, TEMP ADJ: ABNORMAL MMHG (ref 75–95)
PO2, VEN: 18.7 MM HG (ref 30–50)
POC CHLORIDE: 99 MMOL/L (ref 98–107)
POC CREATININE: 2.9 MG/DL (ref 0.51–1.19)
POC HEMATOCRIT: 19 % (ref 36–46)
POC HEMOGLOBIN: 6.6 G/DL (ref 12–16)
POC IONIZED CALCIUM: 1.02 MMOL/L (ref 1.15–1.33)
POC LACTIC ACID: 4.39 MMOL/L (ref 0.56–1.39)
POC PCO2 TEMP: ABNORMAL MM HG
POC PH TEMP: ABNORMAL
POC PO2 TEMP: ABNORMAL MM HG
POC POTASSIUM: 3.9 MMOL/L (ref 3.5–4.5)
POC SODIUM: 138 MMOL/L (ref 138–146)
POC TROPONIN I: 0 NG/ML (ref 0–0.1)
POC TROPONIN I: 0 NG/ML (ref 0–0.1)
POC TROPONIN I: 0.01 NG/ML (ref 0–0.1)
POC TROPONIN I: 0.03 NG/ML (ref 0–0.1)
POC TROPONIN INTERP: NORMAL
POSITIVE BASE EXCESS, ART: ABNORMAL MMOL/L (ref 0–2)
POSITIVE BASE EXCESS, ART: ABNORMAL MMOL/L (ref 0–2)
POSITIVE BASE EXCESS, VEN: 4 (ref 0–3)
POTASSIUM SERPL-SCNC: 2.9 MMOL/L (ref 3.7–5.3)
POTASSIUM SERPL-SCNC: 3.1 MMOL/L (ref 3.7–5.3)
POTASSIUM SERPL-SCNC: 3.3 MMOL/L (ref 3.7–5.3)
POTASSIUM SERPL-SCNC: 3.4 MMOL/L (ref 3.7–5.3)
POTASSIUM SERPL-SCNC: 3.5 MMOL/L (ref 3.7–5.3)
POTASSIUM SERPL-SCNC: 3.6 MMOL/L (ref 3.7–5.3)
POTASSIUM SERPL-SCNC: 3.7 MMOL/L (ref 3.7–5.3)
POTASSIUM SERPL-SCNC: 3.7 MMOL/L (ref 3.7–5.3)
POTASSIUM SERPL-SCNC: 3.8 MMOL/L (ref 3.7–5.3)
POTASSIUM SERPL-SCNC: 3.8 MMOL/L (ref 3.7–5.3)
POTASSIUM SERPL-SCNC: 3.9 MMOL/L (ref 3.7–5.3)
POTASSIUM SERPL-SCNC: 4 MMOL/L (ref 3.7–5.3)
POTASSIUM SERPL-SCNC: 4.1 MMOL/L (ref 3.7–5.3)
POTASSIUM SERPL-SCNC: 4.2 MMOL/L (ref 3.7–5.3)
POTASSIUM SERPL-SCNC: 4.3 MMOL/L (ref 3.7–5.3)
POTASSIUM SERPL-SCNC: 4.4 MMOL/L (ref 3.7–5.3)
POTASSIUM SERPL-SCNC: 4.5 MMOL/L (ref 3.7–5.3)
POTASSIUM SERPL-SCNC: 4.6 MMOL/L (ref 3.7–5.3)
POTASSIUM SERPL-SCNC: 4.7 MMOL/L (ref 3.7–5.3)
POTASSIUM SERPL-SCNC: 4.8 MMOL/L (ref 3.7–5.3)
POTASSIUM SERPL-SCNC: 4.9 MMOL/L (ref 3.7–5.3)
POTASSIUM SERPL-SCNC: 4.9 MMOL/L (ref 3.7–5.3)
POTASSIUM SERPL-SCNC: 5 MMOL/L (ref 3.7–5.3)
POTASSIUM SERPL-SCNC: 5.1 MMOL/L (ref 3.7–5.3)
POTASSIUM SERPL-SCNC: 5.1 MMOL/L (ref 3.7–5.3)
POTASSIUM SERPL-SCNC: 5.2 MMOL/L (ref 3.7–5.3)
POTASSIUM SERPL-SCNC: 5.3 MMOL/L (ref 3.7–5.3)
POTASSIUM SERPL-SCNC: 5.3 MMOL/L (ref 3.7–5.3)
POTASSIUM SERPL-SCNC: 5.5 MMOL/L (ref 3.7–5.3)
POTASSIUM SERPL-SCNC: 5.6 MMOL/L (ref 3.7–5.3)
POTASSIUM SERPL-SCNC: 5.9 MMOL/L (ref 3.7–5.3)
POTASSIUM SERPL-SCNC: 6.1 MMOL/L (ref 3.7–5.3)
POTASSIUM, WHOLE BLOOD: 4 MMOL/L (ref 3.6–5)
POTASSIUM, WHOLE BLOOD: 4.5 MMOL/L (ref 3.6–5)
PREALBUMIN: 9.2 MG/DL (ref 20–40)
PRO-BNP: 8544 PG/ML
PROPOXYPHENE, URINE: NOT DETECTED
PROTEIN UA: ABNORMAL
PROTEIN UA: NEGATIVE
PROTEIN, POC: ABNORMAL
PROTEIN, POC: ABNORMAL
PROTHROMBIN TIME: 10.4 SEC (ref 9.4–12.6)
PROTHROMBIN TIME: 10.5 SEC (ref 9.4–12.6)
PROTHROMBIN TIME: 10.5 SEC (ref 9–12)
PROTHROMBIN TIME: 10.6 SEC (ref 9.4–12.6)
PROTHROMBIN TIME: 10.7 SEC (ref 9–12)
PROTHROMBIN TIME: 11.1 SEC (ref 9–12)
PROTHROMBIN TIME: 11.1 SEC (ref 9–12)
PROTHROMBIN TIME: 11.2 SEC (ref 9–12)
PROTHROMBIN TIME: 11.3 SEC (ref 9–12)
PROTHROMBIN TIME: 13.1 SEC (ref 9–12)
PROTHROMBIN TIME: 14 SEC (ref 9–12)
PROTHROMBIN TIME: 14.5 SEC (ref 9–12)
PROTHROMBIN TIME: 16.3 SEC (ref 9–12)
PROTHROMBIN TIME: 20.1 SEC (ref 9–12)
PROTHROMBIN TIME: 21 SEC (ref 9–12)
PROTHROMBIN TIME: 21.4 SEC (ref 9–12)
PROTHROMBIN TIME: 21.5 SEC (ref 9–12)
PROTHROMBIN TIME: 23.1 SEC (ref 9–12)
PROTHROMBIN TIME: 25 SEC (ref 9–12)
PROTHROMBIN TIME: 25.2 SEC (ref 9–12)
PROTHROMBIN TIME: 27.8 SEC (ref 9–12)
PROTHROMBIN TIME: 60.2 SEC (ref 9–12)
PROTHROMBIN TIME: 65 SEC (ref 9–12)
PROTHROMBIN TIME: 71 SEC (ref 9–12)
PROTHROMBIN TIME: 9.8 SEC (ref 9.7–11.6)
PSV: ABNORMAL
PSV: ABNORMAL
PT. POSITION: ABNORMAL
PT. POSITION: ABNORMAL
RBC # BLD: 2.14 M/UL (ref 3.95–5.11)
RBC # BLD: 2.27 M/UL (ref 3.95–5.11)
RBC # BLD: 2.36 M/UL (ref 3.95–5.11)
RBC # BLD: 2.36 M/UL (ref 3.95–5.11)
RBC # BLD: 2.39 M/UL (ref 3.95–5.11)
RBC # BLD: 2.56 M/UL (ref 3.95–5.11)
RBC # BLD: 2.58 M/UL (ref 3.95–5.11)
RBC # BLD: 2.6 M/UL (ref 3.95–5.11)
RBC # BLD: 2.6 M/UL (ref 3.95–5.11)
RBC # BLD: 2.62 M/UL (ref 3.95–5.11)
RBC # BLD: 2.68 M/UL (ref 3.95–5.11)
RBC # BLD: 2.68 M/UL (ref 3.95–5.11)
RBC # BLD: 2.69 M/UL (ref 3.95–5.11)
RBC # BLD: 2.7 M/UL (ref 3.95–5.11)
RBC # BLD: 2.71 M/UL (ref 3.95–5.11)
RBC # BLD: 2.72 M/UL (ref 3.95–5.11)
RBC # BLD: 2.72 M/UL (ref 3.95–5.11)
RBC # BLD: 2.79 M/UL (ref 3.95–5.11)
RBC # BLD: 2.81 M/UL (ref 3.95–5.11)
RBC # BLD: 3.01 M/UL (ref 3.95–5.11)
RBC # BLD: 3.33 M/UL (ref 4–5.2)
RBC # BLD: 3.33 M/UL (ref 4–5.2)
RBC # BLD: 3.39 M/UL (ref 3.95–5.11)
RBC # BLD: 3.51 M/UL (ref 3.95–5.11)
RBC # BLD: 3.51 M/UL (ref 4–5.2)
RBC # BLD: 3.53 M/UL (ref 3.95–5.11)
RBC # BLD: 3.57 M/UL (ref 3.95–5.11)
RBC # BLD: 3.58 M/UL (ref 3.95–5.11)
RBC # BLD: 3.6 M/UL (ref 3.95–5.11)
RBC # BLD: 3.69 M/UL (ref 3.95–5.11)
RBC # BLD: 3.73 M/UL (ref 4–5.2)
RBC # BLD: 3.75 M/UL (ref 3.95–5.11)
RBC # BLD: 3.76 M/UL (ref 3.95–5.11)
RBC # BLD: 3.8 M/UL (ref 3.95–5.11)
RBC # BLD: 3.81 M/UL (ref 3.95–5.11)
RBC # BLD: 3.83 M/UL (ref 3.95–5.11)
RBC # BLD: 3.85 M/UL (ref 3.95–5.11)
RBC # BLD: 3.91 M/UL (ref 3.95–5.11)
RBC # BLD: 3.93 M/UL (ref 3.95–5.11)
RBC # BLD: 3.97 M/UL (ref 3.95–5.11)
RBC # BLD: 4.02 M/UL (ref 3.95–5.11)
RBC # BLD: 4.03 M/UL (ref 3.95–5.11)
RBC # BLD: 4.04 M/UL (ref 4–5.2)
RBC # BLD: 4.12 M/UL (ref 3.95–5.11)
RBC # BLD: 4.12 M/UL (ref 3.95–5.11)
RBC # BLD: 4.26 M/UL (ref 3.95–5.11)
RBC # BLD: 4.28 M/UL (ref 4–5.2)
RBC # BLD: 4.7 M/UL (ref 4–5.2)
RBC # BLD: 4.79 M/UL (ref 4–5.2)
RBC # BLD: ABNORMAL 10*6/UL
RBC UA: ABNORMAL /HPF (ref 0–2)
RBC UA: ABNORMAL /HPF (ref 0–4)
RBC UA: NORMAL /HPF (ref 0–2)
RBC UA: NORMAL /HPF (ref 0–4)
REASON FOR REJECTION: NORMAL
REASON FOR REJECTION: NORMAL
RENAL EPITHELIAL, UA: ABNORMAL /HPF
RENAL EPITHELIAL, UA: NORMAL /HPF
RENAL EPITHELIAL, UA: NORMAL /HPF
RESPIRATORY RATE: ABNORMAL
RESPIRATORY RATE: ABNORMAL
SAMPLE SITE: ABNORMAL
SEG NEUTROPHILS: 54 % (ref 36–66)
SEG NEUTROPHILS: 55 % (ref 36–66)
SEG NEUTROPHILS: 57 % (ref 36–65)
SEG NEUTROPHILS: 57 % (ref 36–66)
SEG NEUTROPHILS: 58 % (ref 36–66)
SEG NEUTROPHILS: 62 % (ref 36–65)
SEG NEUTROPHILS: 65 % (ref 36–65)
SEG NEUTROPHILS: 65 % (ref 36–66)
SEG NEUTROPHILS: 66 % (ref 36–65)
SEG NEUTROPHILS: 66 % (ref 36–65)
SEG NEUTROPHILS: 67 % (ref 36–65)
SEG NEUTROPHILS: 68 % (ref 36–65)
SEG NEUTROPHILS: 68 % (ref 36–66)
SEG NEUTROPHILS: 69 % (ref 36–65)
SEG NEUTROPHILS: 69 % (ref 36–65)
SEG NEUTROPHILS: 71 % (ref 36–65)
SEG NEUTROPHILS: 71 % (ref 36–66)
SEG NEUTROPHILS: 72 % (ref 36–65)
SEG NEUTROPHILS: 74 % (ref 36–65)
SEG NEUTROPHILS: 74 % (ref 36–65)
SEG NEUTROPHILS: 74 % (ref 36–66)
SEG NEUTROPHILS: 77 % (ref 36–66)
SEG NEUTROPHILS: 78 % (ref 36–65)
SEG NEUTROPHILS: 79 % (ref 36–65)
SEG NEUTROPHILS: 83 % (ref 36–65)
SEG NEUTROPHILS: 84 % (ref 36–66)
SEG NEUTROPHILS: 89 % (ref 36–65)
SEG NEUTROPHILS: 91 % (ref 36–66)
SEG NEUTROPHILS: 92 % (ref 36–66)
SEG NEUTROPHILS: 95 % (ref 36–66)
SEG NEUTROPHILS: 96 % (ref 36–66)
SEGMENTED NEUTROPHILS ABSOLUTE COUNT: 1.9 K/UL (ref 1.8–7.7)
SEGMENTED NEUTROPHILS ABSOLUTE COUNT: 1.97 K/UL (ref 1.8–7.7)
SEGMENTED NEUTROPHILS ABSOLUTE COUNT: 11.14 K/UL (ref 1.8–7.7)
SEGMENTED NEUTROPHILS ABSOLUTE COUNT: 11.52 K/UL (ref 1.8–7.7)
SEGMENTED NEUTROPHILS ABSOLUTE COUNT: 11.67 K/UL (ref 1.8–7.7)
SEGMENTED NEUTROPHILS ABSOLUTE COUNT: 11.79 K/UL (ref 1.8–7.7)
SEGMENTED NEUTROPHILS ABSOLUTE COUNT: 14.5 K/UL (ref 1.5–8.1)
SEGMENTED NEUTROPHILS ABSOLUTE COUNT: 2.24 K/UL (ref 1.5–8.1)
SEGMENTED NEUTROPHILS ABSOLUTE COUNT: 2.77 K/UL (ref 1.5–8.1)
SEGMENTED NEUTROPHILS ABSOLUTE COUNT: 2.92 K/UL (ref 1.8–7.7)
SEGMENTED NEUTROPHILS ABSOLUTE COUNT: 3.14 K/UL (ref 1.8–7.7)
SEGMENTED NEUTROPHILS ABSOLUTE COUNT: 3.3 K/UL (ref 1.5–8.1)
SEGMENTED NEUTROPHILS ABSOLUTE COUNT: 3.3 K/UL (ref 1.5–8.1)
SEGMENTED NEUTROPHILS ABSOLUTE COUNT: 3.4 K/UL (ref 1.8–7.7)
SEGMENTED NEUTROPHILS ABSOLUTE COUNT: 3.47 K/UL (ref 1.8–7.7)
SEGMENTED NEUTROPHILS ABSOLUTE COUNT: 3.65 K/UL (ref 1.8–7.7)
SEGMENTED NEUTROPHILS ABSOLUTE COUNT: 3.83 K/UL (ref 1.5–8.1)
SEGMENTED NEUTROPHILS ABSOLUTE COUNT: 3.9 K/UL (ref 1.5–8.1)
SEGMENTED NEUTROPHILS ABSOLUTE COUNT: 4 K/UL (ref 1.5–8.1)
SEGMENTED NEUTROPHILS ABSOLUTE COUNT: 4.22 K/UL (ref 1.5–8.1)
SEGMENTED NEUTROPHILS ABSOLUTE COUNT: 4.23 K/UL (ref 1.5–8.1)
SEGMENTED NEUTROPHILS ABSOLUTE COUNT: 4.34 K/UL (ref 1.8–7.7)
SEGMENTED NEUTROPHILS ABSOLUTE COUNT: 4.44 K/UL (ref 1.5–8.1)
SEGMENTED NEUTROPHILS ABSOLUTE COUNT: 4.67 K/UL (ref 1.5–8.1)
SEGMENTED NEUTROPHILS ABSOLUTE COUNT: 5 K/UL (ref 1.5–8.1)
SEGMENTED NEUTROPHILS ABSOLUTE COUNT: 5.02 K/UL (ref 1.5–8.1)
SEGMENTED NEUTROPHILS ABSOLUTE COUNT: 5.45 K/UL (ref 1.5–8.1)
SEGMENTED NEUTROPHILS ABSOLUTE COUNT: 5.83 K/UL (ref 1.5–8.1)
SEGMENTED NEUTROPHILS ABSOLUTE COUNT: 6.77 K/UL (ref 1.8–7.7)
SEGMENTED NEUTROPHILS ABSOLUTE COUNT: 7.47 K/UL (ref 1.5–8.1)
SEGMENTED NEUTROPHILS ABSOLUTE COUNT: 8.14 K/UL (ref 1.8–7.7)
SEGMENTED NEUTROPHILS ABSOLUTE COUNT: 9.2 K/UL (ref 1.8–7.7)
SEGMENTED NEUTROPHILS ABSOLUTE COUNT: 9.38 K/UL (ref 1.8–7.7)
SET RATE: ABNORMAL
SET RATE: ABNORMAL
SODIUM BLD-SCNC: 131 MMOL/L (ref 135–144)
SODIUM BLD-SCNC: 132 MMOL/L (ref 135–144)
SODIUM BLD-SCNC: 133 MMOL/L (ref 135–144)
SODIUM BLD-SCNC: 134 MMOL/L (ref 135–144)
SODIUM BLD-SCNC: 135 MMOL/L (ref 135–144)
SODIUM BLD-SCNC: 136 MMOL/L (ref 135–144)
SODIUM BLD-SCNC: 137 MMOL/L (ref 135–144)
SODIUM BLD-SCNC: 138 MMOL/L (ref 135–144)
SODIUM BLD-SCNC: 139 MMOL/L (ref 135–144)
SODIUM BLD-SCNC: 140 MMOL/L (ref 135–144)
SODIUM BLD-SCNC: 141 MMOL/L (ref 135–144)
SODIUM BLD-SCNC: 142 MMOL/L (ref 135–144)
SODIUM BLD-SCNC: 144 MMOL/L (ref 135–144)
SODIUM, WHOLE BLOOD: 143 MMOL/L (ref 136–145)
SODIUM, WHOLE BLOOD: 146 MMOL/L (ref 136–145)
SODIUM,UR: 44 MMOL/L
SODIUM,UR: 46 MMOL/L
SODIUM,UR: 52 MMOL/L
SODIUM,UR: 59 MMOL/L
SODIUM,UR: 89 MMOL/L
SPECIFIC GRAVITY UA: 1.01 (ref 1–1.03)
SPECIFIC GRAVITY UA: 1.02 (ref 1–1.03)
SPECIFIC GRAVITY UA: 1.02 (ref 1–1.03)
SPECIFIC GRAVITY, POC: 1.01
SPECIFIC GRAVITY, POC: 1.01
SPECIMEN DESCRIPTION: ABNORMAL
SPECIMEN DESCRIPTION: NORMAL
STATUS: ABNORMAL
STATUS: NORMAL
SURGICAL PATHOLOGY REPORT: NORMAL
T4 TOTAL: 8.6 UG/DL (ref 4.5–12)
TAPENTADOL, URINE: NOT DETECTED
TAPENTADOL-O-SULFATE, URINE: NOT DETECTED
TEMAZEPAM, URINE: NOT DETECTED
TEXT FOR RESPIRATORY: ABNORMAL
TEXT FOR RESPIRATORY: ABNORMAL
TOTAL CO2, VENOUS: 28 MMOL/L (ref 23–30)
TOTAL HB: ABNORMAL G/DL (ref 12–16)
TOTAL HB: ABNORMAL G/DL (ref 12–16)
TOTAL IRON BINDING CAPACITY: 176 UG/DL (ref 250–450)
TOTAL IRON BINDING CAPACITY: 199 UG/DL (ref 250–450)
TOTAL IRON BINDING CAPACITY: 204 UG/DL (ref 250–450)
TOTAL PROTEIN, URINE: 14 MG/DL
TOTAL PROTEIN, URINE: 15 MG/DL
TOTAL PROTEIN, URINE: 18 MG/DL
TOTAL PROTEIN, URINE: 25 MG/DL
TOTAL PROTEIN, URINE: 68 MG/DL
TOTAL PROTEIN: 4 G/DL (ref 6.4–8.3)
TOTAL PROTEIN: 4.3 G/DL (ref 6.4–8.3)
TOTAL PROTEIN: 4.4 G/DL (ref 6.4–8.3)
TOTAL PROTEIN: 4.9 G/DL (ref 6.4–8.3)
TOTAL PROTEIN: 5.7 G/DL (ref 6.4–8.3)
TOTAL PROTEIN: 5.7 G/DL (ref 6.4–8.3)
TOTAL PROTEIN: 6 G/DL (ref 6.4–8.3)
TOTAL PROTEIN: 6.1 G/DL (ref 6.4–8.3)
TOTAL PROTEIN: 6.3 G/DL (ref 6.4–8.3)
TOTAL PROTEIN: 6.3 G/DL (ref 6.4–8.3)
TOTAL PROTEIN: 6.5 G/DL (ref 6.4–8.3)
TOTAL PROTEIN: 6.6 G/DL (ref 6.4–8.3)
TOTAL PROTEIN: 6.7 G/DL (ref 6.4–8.3)
TOTAL PROTEIN: 7 G/DL (ref 6.4–8.3)
TOTAL PROTEIN: 7.5 G/DL (ref 6.4–8.3)
TOTAL RATE: ABNORMAL
TOTAL RATE: ABNORMAL
TRAMADOL, URINE: NOT DETECTED
TRANSFUSION STATUS: NORMAL
TRICHOMONAS: ABNORMAL
TRICHOMONAS: NORMAL
TRICHOMONAS: NORMAL
TROPONIN INTERP: NORMAL
TROPONIN INTERP: NORMAL
TROPONIN T: <0.03 NG/ML
TROPONIN T: <0.03 NG/ML
TSH SERPL DL<=0.05 MIU/L-ACNC: 11.1 MIU/L (ref 0.3–5)
TSH SERPL DL<=0.05 MIU/L-ACNC: 2.1 MIU/L (ref 0.3–5)
TSH SERPL DL<=0.05 MIU/L-ACNC: 4.6 MIU/L (ref 0.3–5)
TURBIDITY: ABNORMAL
TURBIDITY: CLEAR
UNIT DIVISION: 0
UNIT NUMBER: NORMAL
UNSATURATED IRON BINDING CAPACITY: 140 UG/DL (ref 112–347)
UNSATURATED IRON BINDING CAPACITY: 180 UG/DL (ref 112–347)
UNSATURATED IRON BINDING CAPACITY: 187 UG/DL (ref 112–347)
URINE HGB: ABNORMAL
URINE HGB: ABNORMAL
URINE HGB: NEGATIVE
URINE TOTAL PROTEIN CREATININE RATIO: 0.23 (ref 0–0.2)
UROBILINOGEN, POC: 0.2
UROBILINOGEN, POC: 1
UROBILINOGEN, URINE: NORMAL
VITAMIN B-12: 194 PG/ML (ref 232–1245)
VITAMIN B-12: 294 PG/ML (ref 232–1245)
VITAMIN B-12: >2000 PG/ML (ref 232–1245)
VT: ABNORMAL
VT: ABNORMAL
WBC # BLD: 10 K/UL (ref 3.5–11.3)
WBC # BLD: 10.3 K/UL (ref 3.5–11.3)
WBC # BLD: 10.4 K/UL (ref 3.5–11.3)
WBC # BLD: 11 K/UL (ref 3.5–11)
WBC # BLD: 12 K/UL (ref 3.5–11.3)
WBC # BLD: 12.4 K/UL (ref 3.5–11.3)
WBC # BLD: 13.9 K/UL (ref 3.5–11.3)
WBC # BLD: 15.7 K/UL (ref 3.5–11.3)
WBC # BLD: 16.3 K/UL (ref 3.5–11.3)
WBC # BLD: 17 K/UL (ref 3.5–11.3)
WBC # BLD: 3.5 K/UL (ref 3.5–11)
WBC # BLD: 3.6 K/UL (ref 3.5–11)
WBC # BLD: 3.8 K/UL (ref 3.5–11.3)
WBC # BLD: 3.9 K/UL (ref 3.5–11.3)
WBC # BLD: 4 K/UL (ref 3.5–11.3)
WBC # BLD: 4.1 K/UL (ref 3.5–11.3)
WBC # BLD: 4.3 K/UL (ref 3.5–11.3)
WBC # BLD: 4.5 K/UL (ref 3.5–11.3)
WBC # BLD: 4.8 K/UL (ref 3.5–11)
WBC # BLD: 4.8 K/UL (ref 3.5–11.3)
WBC # BLD: 5 K/UL (ref 3.5–11.3)
WBC # BLD: 5.1 K/UL (ref 3.5–11)
WBC # BLD: 5.1 K/UL (ref 3.5–11.3)
WBC # BLD: 5.4 K/UL (ref 3.5–11)
WBC # BLD: 5.5 K/UL (ref 3.5–11.3)
WBC # BLD: 5.7 K/UL (ref 3.5–11.3)
WBC # BLD: 5.8 K/UL (ref 3.5–11.3)
WBC # BLD: 5.8 K/UL (ref 3.5–11.3)
WBC # BLD: 6 K/UL (ref 3.5–11.3)
WBC # BLD: 6.2 K/UL (ref 3.5–11.3)
WBC # BLD: 6.3 K/UL (ref 3.5–11.3)
WBC # BLD: 6.4 K/UL (ref 3.5–11)
WBC # BLD: 6.5 K/UL (ref 3.5–11.3)
WBC # BLD: 6.7 K/UL (ref 3.5–11.3)
WBC # BLD: 6.8 K/UL (ref 3.5–11.3)
WBC # BLD: 6.9 K/UL (ref 3.5–11.3)
WBC # BLD: 7.3 K/UL (ref 3.5–11.3)
WBC # BLD: 7.5 K/UL (ref 3.5–11.3)
WBC # BLD: 8.8 K/UL (ref 3.5–11)
WBC # BLD: 8.8 K/UL (ref 3.5–11.3)
WBC # BLD: 9 K/UL (ref 3.5–11.3)
WBC # BLD: 9.8 K/UL (ref 3.5–11.3)
WBC # BLD: ABNORMAL 10*3/UL
WBC UA: ABNORMAL /HPF (ref 0–5)
WBC UA: NORMAL /HPF (ref 0–5)
WBC UA: NORMAL /HPF (ref 0–5)
YEAST: ABNORMAL
YEAST: NORMAL
YEAST: NORMAL
ZOLPIDEM, URINE: NOT DETECTED
ZZ NTE CLEAN UP: ORDERED TEST: NORMAL
ZZ NTE CLEAN UP: ORDERED TEST: NORMAL
ZZ NTE WITH NAME CLEAN UP: SPECIMEN SOURCE: NORMAL
ZZ NTE WITH NAME CLEAN UP: SPECIMEN SOURCE: NORMAL

## 2018-01-01 PROCEDURE — 1200000000 HC SEMI PRIVATE

## 2018-01-01 PROCEDURE — 83540 ASSAY OF IRON: CPT

## 2018-01-01 PROCEDURE — G8417 CALC BMI ABV UP PARAM F/U: HCPCS | Performed by: INTERNAL MEDICINE

## 2018-01-01 PROCEDURE — 6360000002 HC RX W HCPCS: Performed by: EMERGENCY MEDICINE

## 2018-01-01 PROCEDURE — 85610 PROTHROMBIN TIME: CPT

## 2018-01-01 PROCEDURE — 6360000002 HC RX W HCPCS: Performed by: REGISTERED NURSE

## 2018-01-01 PROCEDURE — 1111F DSCHRG MED/CURRENT MED MERGE: CPT | Performed by: PHYSICIAN ASSISTANT

## 2018-01-01 PROCEDURE — 6370000000 HC RX 637 (ALT 250 FOR IP): Performed by: INTERNAL MEDICINE

## 2018-01-01 PROCEDURE — 86900 BLOOD TYPING SEROLOGIC ABO: CPT

## 2018-01-01 PROCEDURE — 99239 HOSP IP/OBS DSCHRG MGMT >30: CPT | Performed by: INTERNAL MEDICINE

## 2018-01-01 PROCEDURE — 6360000002 HC RX W HCPCS: Performed by: CLINICAL NURSE SPECIALIST

## 2018-01-01 PROCEDURE — G8427 DOCREV CUR MEDS BY ELIG CLIN: HCPCS | Performed by: UROLOGY

## 2018-01-01 PROCEDURE — 81001 URINALYSIS AUTO W/SCOPE: CPT

## 2018-01-01 PROCEDURE — 2580000003 HC RX 258: Performed by: INTERNAL MEDICINE

## 2018-01-01 PROCEDURE — 1036F TOBACCO NON-USER: CPT | Performed by: INTERNAL MEDICINE

## 2018-01-01 PROCEDURE — 6370000000 HC RX 637 (ALT 250 FOR IP): Performed by: FAMILY MEDICINE

## 2018-01-01 PROCEDURE — P9016 RBC LEUKOCYTES REDUCED: HCPCS

## 2018-01-01 PROCEDURE — 82150 ASSAY OF AMYLASE: CPT

## 2018-01-01 PROCEDURE — 86920 COMPATIBILITY TEST SPIN: CPT

## 2018-01-01 PROCEDURE — 36415 COLL VENOUS BLD VENIPUNCTURE: CPT

## 2018-01-01 PROCEDURE — 80048 BASIC METABOLIC PNL TOTAL CA: CPT

## 2018-01-01 PROCEDURE — 86901 BLOOD TYPING SEROLOGIC RH(D): CPT

## 2018-01-01 PROCEDURE — 99211 OFF/OP EST MAY X REQ PHY/QHP: CPT | Performed by: INTERNAL MEDICINE

## 2018-01-01 PROCEDURE — 2580000003 HC RX 258: Performed by: NURSE PRACTITIONER

## 2018-01-01 PROCEDURE — 7100000001 HC PACU RECOVERY - ADDTL 15 MIN: Performed by: NEUROLOGICAL SURGERY

## 2018-01-01 PROCEDURE — 82607 VITAMIN B-12: CPT

## 2018-01-01 PROCEDURE — 99211 OFF/OP EST MAY X REQ PHY/QHP: CPT

## 2018-01-01 PROCEDURE — G8987 SELF CARE CURRENT STATUS: HCPCS

## 2018-01-01 PROCEDURE — 85025 COMPLETE CBC W/AUTO DIFF WBC: CPT

## 2018-01-01 PROCEDURE — 6370000000 HC RX 637 (ALT 250 FOR IP): Performed by: STUDENT IN AN ORGANIZED HEALTH CARE EDUCATION/TRAINING PROGRAM

## 2018-01-01 PROCEDURE — 99232 SBSQ HOSP IP/OBS MODERATE 35: CPT | Performed by: INTERNAL MEDICINE

## 2018-01-01 PROCEDURE — 51798 US URINE CAPACITY MEASURE: CPT

## 2018-01-01 PROCEDURE — 0RH634Z INSERTION OF INTERNAL FIXATION DEVICE INTO THORACIC VERTEBRAL JOINT, PERCUTANEOUS APPROACH: ICD-10-PCS | Performed by: NEUROLOGICAL SURGERY

## 2018-01-01 PROCEDURE — 2580000003 HC RX 258: Performed by: CLINICAL NURSE SPECIALIST

## 2018-01-01 PROCEDURE — 93971 EXTREMITY STUDY: CPT

## 2018-01-01 PROCEDURE — 3017F COLORECTAL CA SCREEN DOC REV: CPT | Performed by: INTERNAL MEDICINE

## 2018-01-01 PROCEDURE — 7100000000 HC PACU RECOVERY - FIRST 15 MIN: Performed by: NEUROLOGICAL SURGERY

## 2018-01-01 PROCEDURE — 6370000000 HC RX 637 (ALT 250 FOR IP): Performed by: NURSE PRACTITIONER

## 2018-01-01 PROCEDURE — 6360000002 HC RX W HCPCS: Performed by: INTERNAL MEDICINE

## 2018-01-01 PROCEDURE — 84300 ASSAY OF URINE SODIUM: CPT

## 2018-01-01 PROCEDURE — 99215 OFFICE O/P EST HI 40 MIN: CPT | Performed by: INTERNAL MEDICINE

## 2018-01-01 PROCEDURE — 2580000003 HC RX 258: Performed by: STUDENT IN AN ORGANIZED HEALTH CARE EDUCATION/TRAINING PROGRAM

## 2018-01-01 PROCEDURE — 93005 ELECTROCARDIOGRAM TRACING: CPT

## 2018-01-01 PROCEDURE — 51701 INSERT BLADDER CATHETER: CPT

## 2018-01-01 PROCEDURE — 80307 DRUG TEST PRSMV CHEM ANLYZR: CPT

## 2018-01-01 PROCEDURE — 6370000000 HC RX 637 (ALT 250 FOR IP): Performed by: PHYSICIAN ASSISTANT

## 2018-01-01 PROCEDURE — 37191 INS ENDOVAS VENA CAVA FILTR: CPT | Performed by: SURGERY

## 2018-01-01 PROCEDURE — 82947 ASSAY GLUCOSE BLOOD QUANT: CPT

## 2018-01-01 PROCEDURE — 3014F SCREEN MAMMO DOC REV: CPT | Performed by: INTERNAL MEDICINE

## 2018-01-01 PROCEDURE — G8417 CALC BMI ABV UP PARAM F/U: HCPCS | Performed by: PSYCHIATRY & NEUROLOGY

## 2018-01-01 PROCEDURE — 84295 ASSAY OF SERUM SODIUM: CPT

## 2018-01-01 PROCEDURE — G8988 SELF CARE GOAL STATUS: HCPCS

## 2018-01-01 PROCEDURE — 1111F DSCHRG MED/CURRENT MED MERGE: CPT | Performed by: UROLOGY

## 2018-01-01 PROCEDURE — 1036F TOBACCO NON-USER: CPT | Performed by: FAMILY MEDICINE

## 2018-01-01 PROCEDURE — 90937 HEMODIALYSIS REPEATED EVAL: CPT

## 2018-01-01 PROCEDURE — 83735 ASSAY OF MAGNESIUM: CPT

## 2018-01-01 PROCEDURE — 2580000003 HC RX 258: Performed by: FAMILY MEDICINE

## 2018-01-01 PROCEDURE — 85049 AUTOMATED PLATELET COUNT: CPT

## 2018-01-01 PROCEDURE — 6360000002 HC RX W HCPCS: Performed by: FAMILY MEDICINE

## 2018-01-01 PROCEDURE — 99213 OFFICE O/P EST LOW 20 MIN: CPT | Performed by: NEUROLOGICAL SURGERY

## 2018-01-01 PROCEDURE — 76937 US GUIDE VASCULAR ACCESS: CPT

## 2018-01-01 PROCEDURE — 77402 RADIATION TX DELIVERY LVL 1: CPT | Performed by: RADIOLOGY

## 2018-01-01 PROCEDURE — 3017F COLORECTAL CA SCREEN DOC REV: CPT | Performed by: FAMILY MEDICINE

## 2018-01-01 PROCEDURE — 77336 RADIATION PHYSICS CONSULT: CPT | Performed by: RADIOLOGY

## 2018-01-01 PROCEDURE — 99232 SBSQ HOSP IP/OBS MODERATE 35: CPT | Performed by: FAMILY MEDICINE

## 2018-01-01 PROCEDURE — C9113 INJ PANTOPRAZOLE SODIUM, VIA: HCPCS | Performed by: EMERGENCY MEDICINE

## 2018-01-01 PROCEDURE — 97535 SELF CARE MNGMENT TRAINING: CPT

## 2018-01-01 PROCEDURE — 1111F DSCHRG MED/CURRENT MED MERGE: CPT | Performed by: INTERNAL MEDICINE

## 2018-01-01 PROCEDURE — 6360000002 HC RX W HCPCS: Performed by: PHYSICIAN ASSISTANT

## 2018-01-01 PROCEDURE — G8482 FLU IMMUNIZE ORDER/ADMIN: HCPCS | Performed by: FAMILY MEDICINE

## 2018-01-01 PROCEDURE — 99253 IP/OBS CNSLTJ NEW/EST LOW 45: CPT | Performed by: PHYSICAL MEDICINE & REHABILITATION

## 2018-01-01 PROCEDURE — 3700000001 HC ADD 15 MINUTES (ANESTHESIA): Performed by: NEUROLOGICAL SURGERY

## 2018-01-01 PROCEDURE — 85014 HEMATOCRIT: CPT

## 2018-01-01 PROCEDURE — 86850 RBC ANTIBODY SCREEN: CPT

## 2018-01-01 PROCEDURE — 36430 TRANSFUSION BLD/BLD COMPNT: CPT

## 2018-01-01 PROCEDURE — 1111F DSCHRG MED/CURRENT MED MERGE: CPT | Performed by: FAMILY MEDICINE

## 2018-01-01 PROCEDURE — 82248 BILIRUBIN DIRECT: CPT

## 2018-01-01 PROCEDURE — P9603 ONE-WAY ALLOW PRORATED MILES: HCPCS

## 2018-01-01 PROCEDURE — G8979 MOBILITY GOAL STATUS: HCPCS

## 2018-01-01 PROCEDURE — G8484 FLU IMMUNIZE NO ADMIN: HCPCS | Performed by: UROLOGY

## 2018-01-01 PROCEDURE — 2580000003 HC RX 258: Performed by: EMERGENCY MEDICINE

## 2018-01-01 PROCEDURE — 84484 ASSAY OF TROPONIN QUANT: CPT

## 2018-01-01 PROCEDURE — 2060000000 HC ICU INTERMEDIATE R&B

## 2018-01-01 PROCEDURE — 7100000011 HC PHASE II RECOVERY - ADDTL 15 MIN: Performed by: INTERNAL MEDICINE

## 2018-01-01 PROCEDURE — 5A1D70Z PERFORMANCE OF URINARY FILTRATION, INTERMITTENT, LESS THAN 6 HOURS PER DAY: ICD-10-PCS | Performed by: INTERNAL MEDICINE

## 2018-01-01 PROCEDURE — 77387 GUIDANCE FOR RADJ TX DLVR: CPT | Performed by: RADIOLOGY

## 2018-01-01 PROCEDURE — 97530 THERAPEUTIC ACTIVITIES: CPT

## 2018-01-01 PROCEDURE — 94762 N-INVAS EAR/PLS OXIMTRY CONT: CPT

## 2018-01-01 PROCEDURE — 1036F TOBACCO NON-USER: CPT | Performed by: NEUROLOGICAL SURGERY

## 2018-01-01 PROCEDURE — 1036F TOBACCO NON-USER: CPT | Performed by: UROLOGY

## 2018-01-01 PROCEDURE — 99253 IP/OBS CNSLTJ NEW/EST LOW 45: CPT | Performed by: INTERNAL MEDICINE

## 2018-01-01 PROCEDURE — 6370000000 HC RX 637 (ALT 250 FOR IP): Performed by: CLINICAL NURSE SPECIALIST

## 2018-01-01 PROCEDURE — 0SH034Z INSERTION OF INTERNAL FIXATION DEVICE INTO LUMBAR VERTEBRAL JOINT, PERCUTANEOUS APPROACH: ICD-10-PCS | Performed by: NEUROLOGICAL SURGERY

## 2018-01-01 PROCEDURE — 1036F TOBACCO NON-USER: CPT | Performed by: PHYSICIAN ASSISTANT

## 2018-01-01 PROCEDURE — 80053 COMPREHEN METABOLIC PANEL: CPT

## 2018-01-01 PROCEDURE — 3017F COLORECTAL CA SCREEN DOC REV: CPT | Performed by: NEUROLOGICAL SURGERY

## 2018-01-01 PROCEDURE — G8427 DOCREV CUR MEDS BY ELIG CLIN: HCPCS | Performed by: NEUROLOGICAL SURGERY

## 2018-01-01 PROCEDURE — 77290 THER RAD SIMULAJ FIELD CPLX: CPT | Performed by: RADIOLOGY

## 2018-01-01 PROCEDURE — 84443 ASSAY THYROID STIM HORMONE: CPT

## 2018-01-01 PROCEDURE — 2580000003 HC RX 258: Performed by: NURSE ANESTHETIST, CERTIFIED REGISTERED

## 2018-01-01 PROCEDURE — 6360000002 HC RX W HCPCS: Performed by: NURSE ANESTHETIST, CERTIFIED REGISTERED

## 2018-01-01 PROCEDURE — 82330 ASSAY OF CALCIUM: CPT

## 2018-01-01 PROCEDURE — 6370000000 HC RX 637 (ALT 250 FOR IP): Performed by: NURSE ANESTHETIST, CERTIFIED REGISTERED

## 2018-01-01 PROCEDURE — 99221 1ST HOSP IP/OBS SF/LOW 40: CPT | Performed by: NEUROLOGICAL SURGERY

## 2018-01-01 PROCEDURE — 85730 THROMBOPLASTIN TIME PARTIAL: CPT

## 2018-01-01 PROCEDURE — 97110 THERAPEUTIC EXERCISES: CPT

## 2018-01-01 PROCEDURE — G8980 MOBILITY D/C STATUS: HCPCS

## 2018-01-01 PROCEDURE — 85018 HEMOGLOBIN: CPT

## 2018-01-01 PROCEDURE — 97162 PT EVAL MOD COMPLEX 30 MIN: CPT

## 2018-01-01 PROCEDURE — 86317 IMMUNOASSAY INFECTIOUS AGENT: CPT

## 2018-01-01 PROCEDURE — 74181 MRI ABDOMEN W/O CONTRAST: CPT

## 2018-01-01 PROCEDURE — C1769 GUIDE WIRE: HCPCS

## 2018-01-01 PROCEDURE — 82565 ASSAY OF CREATININE: CPT

## 2018-01-01 PROCEDURE — 84134 ASSAY OF PREALBUMIN: CPT

## 2018-01-01 PROCEDURE — 85027 COMPLETE CBC AUTOMATED: CPT

## 2018-01-01 PROCEDURE — G8417 CALC BMI ABV UP PARAM F/U: HCPCS | Performed by: PHYSICIAN ASSISTANT

## 2018-01-01 PROCEDURE — C1750 CATH, HEMODIALYSIS,LONG-TERM: HCPCS

## 2018-01-01 PROCEDURE — 2500000003 HC RX 250 WO HCPCS: Performed by: NEUROLOGICAL SURGERY

## 2018-01-01 PROCEDURE — G8427 DOCREV CUR MEDS BY ELIG CLIN: HCPCS | Performed by: INTERNAL MEDICINE

## 2018-01-01 PROCEDURE — 77412 RADIATION TX DELIVERY LVL 3: CPT | Performed by: RADIOLOGY

## 2018-01-01 PROCEDURE — C9113 INJ PANTOPRAZOLE SODIUM, VIA: HCPCS | Performed by: INTERNAL MEDICINE

## 2018-01-01 PROCEDURE — 96365 THER/PROPH/DIAG IV INF INIT: CPT

## 2018-01-01 PROCEDURE — 99024 POSTOP FOLLOW-UP VISIT: CPT | Performed by: NEUROLOGICAL SURGERY

## 2018-01-01 PROCEDURE — 6360000002 HC RX W HCPCS: Performed by: NURSE PRACTITIONER

## 2018-01-01 PROCEDURE — 84436 ASSAY OF TOTAL THYROXINE: CPT

## 2018-01-01 PROCEDURE — 6360000002 HC RX W HCPCS

## 2018-01-01 PROCEDURE — 87086 URINE CULTURE/COLONY COUNT: CPT

## 2018-01-01 PROCEDURE — 99233 SBSQ HOSP IP/OBS HIGH 50: CPT | Performed by: INTERNAL MEDICINE

## 2018-01-01 PROCEDURE — 3700000000 HC ANESTHESIA ATTENDED CARE: Performed by: INTERNAL MEDICINE

## 2018-01-01 PROCEDURE — 2500000003 HC RX 250 WO HCPCS: Performed by: EMERGENCY MEDICINE

## 2018-01-01 PROCEDURE — 87186 SC STD MICRODIL/AGAR DIL: CPT

## 2018-01-01 PROCEDURE — 80051 ELECTROLYTE PANEL: CPT

## 2018-01-01 PROCEDURE — 86803 HEPATITIS C AB TEST: CPT

## 2018-01-01 PROCEDURE — P9046 ALBUMIN (HUMAN), 25%, 20 ML: HCPCS | Performed by: INTERNAL MEDICINE

## 2018-01-01 PROCEDURE — 81003 URINALYSIS AUTO W/O SCOPE: CPT | Performed by: UROLOGY

## 2018-01-01 PROCEDURE — 37191 INS ENDOVAS VENA CAVA FILTR: CPT

## 2018-01-01 PROCEDURE — G8978 MOBILITY CURRENT STATUS: HCPCS

## 2018-01-01 PROCEDURE — 99212 OFFICE O/P EST SF 10 MIN: CPT | Performed by: PAIN MEDICINE

## 2018-01-01 PROCEDURE — 6370000000 HC RX 637 (ALT 250 FOR IP): Performed by: REGISTERED NURSE

## 2018-01-01 PROCEDURE — 3700000001 HC ADD 15 MINUTES (ANESTHESIA): Performed by: INTERNAL MEDICINE

## 2018-01-01 PROCEDURE — 20552 NJX 1/MLT TRIGGER POINT 1/2: CPT

## 2018-01-01 PROCEDURE — 99214 OFFICE O/P EST MOD 30 MIN: CPT | Performed by: INTERNAL MEDICINE

## 2018-01-01 PROCEDURE — 2500000003 HC RX 250 WO HCPCS: Performed by: INTERNAL MEDICINE

## 2018-01-01 PROCEDURE — 77306 TELETHX ISODOSE PLAN SIMPLE: CPT | Performed by: RADIOLOGY

## 2018-01-01 PROCEDURE — 99222 1ST HOSP IP/OBS MODERATE 55: CPT | Performed by: INTERNAL MEDICINE

## 2018-01-01 PROCEDURE — 2500000003 HC RX 250 WO HCPCS: Performed by: NURSE ANESTHETIST, CERTIFIED REGISTERED

## 2018-01-01 PROCEDURE — 3609010600 HC COLONOSCOPY POLYPECTOMY SNARE/COLD BIOPSY: Performed by: INTERNAL MEDICINE

## 2018-01-01 PROCEDURE — 6360000002 HC RX W HCPCS: Performed by: RADIOLOGY

## 2018-01-01 PROCEDURE — 83036 HEMOGLOBIN GLYCOSYLATED A1C: CPT

## 2018-01-01 PROCEDURE — 99214 OFFICE O/P EST MOD 30 MIN: CPT | Performed by: UROLOGY

## 2018-01-01 PROCEDURE — 72141 MRI NECK SPINE W/O DYE: CPT

## 2018-01-01 PROCEDURE — 99223 1ST HOSP IP/OBS HIGH 75: CPT | Performed by: FAMILY MEDICINE

## 2018-01-01 PROCEDURE — 3609012400 HC EGD TRANSORAL BIOPSY SINGLE/MULTIPLE: Performed by: INTERNAL MEDICINE

## 2018-01-01 PROCEDURE — G8427 DOCREV CUR MEDS BY ELIG CLIN: HCPCS | Performed by: PHYSICIAN ASSISTANT

## 2018-01-01 PROCEDURE — 87040 BLOOD CULTURE FOR BACTERIA: CPT

## 2018-01-01 PROCEDURE — 8E0WXBF COMPUTER ASSISTED PROCEDURE OF TRUNK REGION, WITH FLUOROSCOPY: ICD-10-PCS | Performed by: NEUROLOGICAL SURGERY

## 2018-01-01 PROCEDURE — 99213 OFFICE O/P EST LOW 20 MIN: CPT | Performed by: UROLOGY

## 2018-01-01 PROCEDURE — 3017F COLORECTAL CA SCREEN DOC REV: CPT | Performed by: UROLOGY

## 2018-01-01 PROCEDURE — 87077 CULTURE AEROBIC IDENTIFY: CPT

## 2018-01-01 PROCEDURE — 99233 SBSQ HOSP IP/OBS HIGH 50: CPT | Performed by: FAMILY MEDICINE

## 2018-01-01 PROCEDURE — 99231 SBSQ HOSP IP/OBS SF/LOW 25: CPT | Performed by: NEUROLOGICAL SURGERY

## 2018-01-01 PROCEDURE — 72040 X-RAY EXAM NECK SPINE 2-3 VW: CPT

## 2018-01-01 PROCEDURE — 72148 MRI LUMBAR SPINE W/O DYE: CPT

## 2018-01-01 PROCEDURE — G8417 CALC BMI ABV UP PARAM F/U: HCPCS | Performed by: NEUROLOGICAL SURGERY

## 2018-01-01 PROCEDURE — 3700000000 HC ANESTHESIA ATTENDED CARE: Performed by: NEUROLOGICAL SURGERY

## 2018-01-01 PROCEDURE — 96375 TX/PRO/DX INJ NEW DRUG ADDON: CPT

## 2018-01-01 PROCEDURE — 82728 ASSAY OF FERRITIN: CPT

## 2018-01-01 PROCEDURE — 3017F COLORECTAL CA SCREEN DOC REV: CPT | Performed by: PHYSICIAN ASSISTANT

## 2018-01-01 PROCEDURE — 72100 X-RAY EXAM L-S SPINE 2/3 VWS: CPT

## 2018-01-01 PROCEDURE — 82805 BLOOD GASES W/O2 SATURATION: CPT

## 2018-01-01 PROCEDURE — 99214 OFFICE O/P EST MOD 30 MIN: CPT | Performed by: FAMILY MEDICINE

## 2018-01-01 PROCEDURE — 99255 IP/OBS CONSLTJ NEW/EST HI 80: CPT | Performed by: INTERNAL MEDICINE

## 2018-01-01 PROCEDURE — 99223 1ST HOSP IP/OBS HIGH 75: CPT | Performed by: INTERNAL MEDICINE

## 2018-01-01 PROCEDURE — 86038 ANTINUCLEAR ANTIBODIES: CPT

## 2018-01-01 PROCEDURE — G8484 FLU IMMUNIZE NO ADMIN: HCPCS | Performed by: INTERNAL MEDICINE

## 2018-01-01 PROCEDURE — 99284 EMERGENCY DEPT VISIT MOD MDM: CPT

## 2018-01-01 PROCEDURE — 0RHA34Z INSERTION OF INTERNAL FIXATION DEVICE INTO THORACOLUMBAR VERTEBRAL JOINT, PERCUTANEOUS APPROACH: ICD-10-PCS | Performed by: NEUROLOGICAL SURGERY

## 2018-01-01 PROCEDURE — 90935 HEMODIALYSIS ONE EVALUATION: CPT

## 2018-01-01 PROCEDURE — 77334 RADIATION TREATMENT AID(S): CPT | Performed by: RADIOLOGY

## 2018-01-01 PROCEDURE — 2709999900 HC NON-CHARGEABLE SUPPLY: Performed by: INTERNAL MEDICINE

## 2018-01-01 PROCEDURE — 84132 ASSAY OF SERUM POTASSIUM: CPT

## 2018-01-01 PROCEDURE — 43239 EGD BIOPSY SINGLE/MULTIPLE: CPT | Performed by: INTERNAL MEDICINE

## 2018-01-01 PROCEDURE — 3017F COLORECTAL CA SCREEN DOC REV: CPT | Performed by: PSYCHIATRY & NEUROLOGY

## 2018-01-01 PROCEDURE — 2720000010 HC SURG SUPPLY STERILE: Performed by: NEUROLOGICAL SURGERY

## 2018-01-01 PROCEDURE — 82746 ASSAY OF FOLIC ACID SERUM: CPT

## 2018-01-01 PROCEDURE — 93970 EXTREMITY STUDY: CPT

## 2018-01-01 PROCEDURE — 51702 INSERT TEMP BLADDER CATH: CPT

## 2018-01-01 PROCEDURE — 86704 HEP B CORE ANTIBODY TOTAL: CPT

## 2018-01-01 PROCEDURE — 02H633Z INSERTION OF INFUSION DEVICE INTO RIGHT ATRIUM, PERCUTANEOUS APPROACH: ICD-10-PCS | Performed by: RADIOLOGY

## 2018-01-01 PROCEDURE — 86160 COMPLEMENT ANTIGEN: CPT

## 2018-01-01 PROCEDURE — 84156 ASSAY OF PROTEIN URINE: CPT

## 2018-01-01 PROCEDURE — 83880 ASSAY OF NATRIURETIC PEPTIDE: CPT

## 2018-01-01 PROCEDURE — G8427 DOCREV CUR MEDS BY ELIG CLIN: HCPCS | Performed by: FAMILY MEDICINE

## 2018-01-01 PROCEDURE — 99253 IP/OBS CNSLTJ NEW/EST LOW 45: CPT | Performed by: FAMILY MEDICINE

## 2018-01-01 PROCEDURE — 72131 CT LUMBAR SPINE W/O DYE: CPT

## 2018-01-01 PROCEDURE — 97116 GAIT TRAINING THERAPY: CPT

## 2018-01-01 PROCEDURE — 6360000004 HC RX CONTRAST MEDICATION

## 2018-01-01 PROCEDURE — 81003 URINALYSIS AUTO W/O SCOPE: CPT | Performed by: FAMILY MEDICINE

## 2018-01-01 PROCEDURE — 3014F SCREEN MAMMO DOC REV: CPT | Performed by: UROLOGY

## 2018-01-01 PROCEDURE — 0SS034Z REPOSITION LUMBAR VERTEBRAL JOINT WITH INTERNAL FIXATION DEVICE, PERCUTANEOUS APPROACH: ICD-10-PCS | Performed by: NEUROLOGICAL SURGERY

## 2018-01-01 PROCEDURE — 76770 US EXAM ABDO BACK WALL COMP: CPT

## 2018-01-01 PROCEDURE — 73502 X-RAY EXAM HIP UNI 2-3 VIEWS: CPT

## 2018-01-01 PROCEDURE — 82570 ASSAY OF URINE CREATININE: CPT

## 2018-01-01 PROCEDURE — 74176 CT ABD & PELVIS W/O CONTRAST: CPT

## 2018-01-01 PROCEDURE — C1713 ANCHOR/SCREW BN/BN,TIS/BN: HCPCS | Performed by: NEUROLOGICAL SURGERY

## 2018-01-01 PROCEDURE — 7100000010 HC PHASE II RECOVERY - FIRST 15 MIN: Performed by: INTERNAL MEDICINE

## 2018-01-01 PROCEDURE — 1036F TOBACCO NON-USER: CPT | Performed by: PSYCHIATRY & NEUROLOGY

## 2018-01-01 PROCEDURE — C1894 INTRO/SHEATH, NON-LASER: HCPCS

## 2018-01-01 PROCEDURE — 96374 THER/PROPH/DIAG INJ IV PUSH: CPT

## 2018-01-01 PROCEDURE — 97166 OT EVAL MOD COMPLEX 45 MIN: CPT

## 2018-01-01 PROCEDURE — 3014F SCREEN MAMMO DOC REV: CPT | Performed by: FAMILY MEDICINE

## 2018-01-01 PROCEDURE — G8417 CALC BMI ABV UP PARAM F/U: HCPCS | Performed by: UROLOGY

## 2018-01-01 PROCEDURE — 99231 SBSQ HOSP IP/OBS SF/LOW 25: CPT | Performed by: FAMILY MEDICINE

## 2018-01-01 PROCEDURE — 87340 HEPATITIS B SURFACE AG IA: CPT

## 2018-01-01 PROCEDURE — 71045 X-RAY EXAM CHEST 1 VIEW: CPT

## 2018-01-01 PROCEDURE — 99213 OFFICE O/P EST LOW 20 MIN: CPT

## 2018-01-01 PROCEDURE — 70551 MRI BRAIN STEM W/O DYE: CPT

## 2018-01-01 PROCEDURE — 71250 CT THORAX DX C-: CPT

## 2018-01-01 PROCEDURE — 99232 SBSQ HOSP IP/OBS MODERATE 35: CPT | Performed by: PHYSICIAN ASSISTANT

## 2018-01-01 PROCEDURE — 22610 ARTHRD PST TQ 1NTRSPC THRC: CPT | Performed by: NEUROLOGICAL SURGERY

## 2018-01-01 PROCEDURE — 99204 OFFICE O/P NEW MOD 45 MIN: CPT | Performed by: PSYCHIATRY & NEUROLOGY

## 2018-01-01 PROCEDURE — 6370000000 HC RX 637 (ALT 250 FOR IP): Performed by: EMERGENCY MEDICINE

## 2018-01-01 PROCEDURE — 84520 ASSAY OF UREA NITROGEN: CPT

## 2018-01-01 PROCEDURE — A4364 ADHESIVE, LIQUID OR EQUAL: HCPCS | Performed by: NEUROLOGICAL SURGERY

## 2018-01-01 PROCEDURE — 36558 INSERT TUNNELED CV CATH: CPT

## 2018-01-01 PROCEDURE — 77300 RADIATION THERAPY DOSE PLAN: CPT | Performed by: RADIOLOGY

## 2018-01-01 PROCEDURE — 72192 CT PELVIS W/O DYE: CPT

## 2018-01-01 PROCEDURE — 99254 IP/OBS CNSLTJ NEW/EST MOD 60: CPT | Performed by: SURGERY

## 2018-01-01 PROCEDURE — G8427 DOCREV CUR MEDS BY ELIG CLIN: HCPCS | Performed by: PSYCHIATRY & NEUROLOGY

## 2018-01-01 PROCEDURE — G8482 FLU IMMUNIZE ORDER/ADMIN: HCPCS | Performed by: UROLOGY

## 2018-01-01 PROCEDURE — 06H03DZ INSERTION OF INTRALUMINAL DEVICE INTO INFERIOR VENA CAVA, PERCUTANEOUS APPROACH: ICD-10-PCS | Performed by: SURGERY

## 2018-01-01 PROCEDURE — 2709999900 HC NON-CHARGEABLE SUPPLY

## 2018-01-01 PROCEDURE — 93975 VASCULAR STUDY: CPT

## 2018-01-01 PROCEDURE — G8417 CALC BMI ABV UP PARAM F/U: HCPCS | Performed by: FAMILY MEDICINE

## 2018-01-01 PROCEDURE — 99254 IP/OBS CNSLTJ NEW/EST MOD 60: CPT | Performed by: INTERNAL MEDICINE

## 2018-01-01 PROCEDURE — 83690 ASSAY OF LIPASE: CPT

## 2018-01-01 PROCEDURE — 83550 IRON BINDING TEST: CPT

## 2018-01-01 PROCEDURE — 99214 OFFICE O/P EST MOD 30 MIN: CPT | Performed by: NEUROLOGICAL SURGERY

## 2018-01-01 PROCEDURE — A6403 STERILE GAUZE>16 <= 48 SQ IN: HCPCS | Performed by: NEUROLOGICAL SURGERY

## 2018-01-01 PROCEDURE — 3600000004 HC SURGERY LEVEL 4 BASE: Performed by: NEUROLOGICAL SURGERY

## 2018-01-01 PROCEDURE — A6252 ABSORPT DRG >16 <=48 W/O BDR: HCPCS | Performed by: NEUROLOGICAL SURGERY

## 2018-01-01 PROCEDURE — 77280 THER RAD SIMULAJ FIELD SMPL: CPT | Performed by: RADIOLOGY

## 2018-01-01 PROCEDURE — 99213 OFFICE O/P EST LOW 20 MIN: CPT | Performed by: RADIOLOGY

## 2018-01-01 PROCEDURE — 99239 HOSP IP/OBS DSCHRG MGMT >30: CPT | Performed by: FAMILY MEDICINE

## 2018-01-01 PROCEDURE — 99215 OFFICE O/P EST HI 40 MIN: CPT | Performed by: UROLOGY

## 2018-01-01 PROCEDURE — C1880 VENA CAVA FILTER: HCPCS

## 2018-01-01 PROCEDURE — 0DB68ZX EXCISION OF STOMACH, VIA NATURAL OR ARTIFICIAL OPENING ENDOSCOPIC, DIAGNOSTIC: ICD-10-PCS | Performed by: INTERNAL MEDICINE

## 2018-01-01 PROCEDURE — 99285 EMERGENCY DEPT VISIT HI MDM: CPT

## 2018-01-01 PROCEDURE — 99212 OFFICE O/P EST SF 10 MIN: CPT | Performed by: UROLOGY

## 2018-01-01 PROCEDURE — 0JH63XZ INSERTION OF TUNNELED VASCULAR ACCESS DEVICE INTO CHEST SUBCUTANEOUS TISSUE AND FASCIA, PERCUTANEOUS APPROACH: ICD-10-PCS | Performed by: RADIOLOGY

## 2018-01-01 PROCEDURE — 88305 TISSUE EXAM BY PATHOLOGIST: CPT

## 2018-01-01 PROCEDURE — 1111F DSCHRG MED/CURRENT MED MERGE: CPT | Performed by: PSYCHIATRY & NEUROLOGY

## 2018-01-01 PROCEDURE — 0TB03ZX EXCISION OF RIGHT KIDNEY, PERCUTANEOUS APPROACH, DIAGNOSTIC: ICD-10-PCS | Performed by: RADIOLOGY

## 2018-01-01 PROCEDURE — 83883 ASSAY NEPHELOMETRY NOT SPEC: CPT

## 2018-01-01 PROCEDURE — 1111F DSCHRG MED/CURRENT MED MERGE: CPT | Performed by: NEUROLOGICAL SURGERY

## 2018-01-01 PROCEDURE — 84100 ASSAY OF PHOSPHORUS: CPT

## 2018-01-01 PROCEDURE — 50200 RENAL BIOPSY PERQ: CPT

## 2018-01-01 PROCEDURE — 83615 LACTATE (LD) (LDH) ENZYME: CPT

## 2018-01-01 PROCEDURE — 99213 OFFICE O/P EST LOW 20 MIN: CPT | Performed by: FAMILY MEDICINE

## 2018-01-01 PROCEDURE — 6370000000 HC RX 637 (ALT 250 FOR IP): Performed by: ANESTHESIOLOGY

## 2018-01-01 PROCEDURE — 87804 INFLUENZA ASSAY W/OPTIC: CPT

## 2018-01-01 PROCEDURE — 72146 MRI CHEST SPINE W/O DYE: CPT

## 2018-01-01 PROCEDURE — 76775 US EXAM ABDO BACK WALL LIM: CPT

## 2018-01-01 PROCEDURE — 99244 OFF/OP CNSLTJ NEW/EST MOD 40: CPT | Performed by: PAIN MEDICINE

## 2018-01-01 PROCEDURE — 3600000014 HC SURGERY LEVEL 4 ADDTL 15MIN: Performed by: NEUROLOGICAL SURGERY

## 2018-01-01 PROCEDURE — 77001 FLUOROGUIDE FOR VEIN DEVICE: CPT

## 2018-01-01 PROCEDURE — 88333 PATH CONSLTJ SURG CYTO XM 1: CPT

## 2018-01-01 PROCEDURE — 77417 THER RADIOLOGY PORT IMAGE(S): CPT | Performed by: RADIOLOGY

## 2018-01-01 PROCEDURE — 96360 HYDRATION IV INFUSION INIT: CPT

## 2018-01-01 PROCEDURE — 83605 ASSAY OF LACTIC ACID: CPT

## 2018-01-01 PROCEDURE — 77295 3-D RADIOTHERAPY PLAN: CPT | Performed by: RADIOLOGY

## 2018-01-01 PROCEDURE — 99214 OFFICE O/P EST MOD 30 MIN: CPT | Performed by: NURSE PRACTITIONER

## 2018-01-01 PROCEDURE — 93306 TTE W/DOPPLER COMPLETE: CPT

## 2018-01-01 PROCEDURE — 6360000002 HC RX W HCPCS: Performed by: NEUROLOGICAL SURGERY

## 2018-01-01 PROCEDURE — 22614 ARTHRD PST TQ 1NTRSPC EA ADD: CPT | Performed by: NEUROLOGICAL SURGERY

## 2018-01-01 PROCEDURE — 99203 OFFICE O/P NEW LOW 30 MIN: CPT

## 2018-01-01 PROCEDURE — 20552 NJX 1/MLT TRIGGER POINT 1/2: CPT | Performed by: PAIN MEDICINE

## 2018-01-01 PROCEDURE — 2500000003 HC RX 250 WO HCPCS

## 2018-01-01 PROCEDURE — 99213 OFFICE O/P EST LOW 20 MIN: CPT | Performed by: PHYSICIAN ASSISTANT

## 2018-01-01 PROCEDURE — 77370 RADIATION PHYSICS CONSULT: CPT | Performed by: RADIOLOGY

## 2018-01-01 PROCEDURE — 99215 OFFICE O/P EST HI 40 MIN: CPT

## 2018-01-01 PROCEDURE — 82435 ASSAY OF BLOOD CHLORIDE: CPT

## 2018-01-01 PROCEDURE — 6360000002 HC RX W HCPCS: Performed by: ANESTHESIOLOGY

## 2018-01-01 PROCEDURE — 82533 TOTAL CORTISOL: CPT

## 2018-01-01 PROCEDURE — 6360000002 HC RX W HCPCS: Performed by: STUDENT IN AN ORGANIZED HEALTH CARE EDUCATION/TRAINING PROGRAM

## 2018-01-01 PROCEDURE — 82803 BLOOD GASES ANY COMBINATION: CPT

## 2018-01-01 PROCEDURE — 87088 URINE BACTERIA CULTURE: CPT

## 2018-01-01 PROCEDURE — G8482 FLU IMMUNIZE ORDER/ADMIN: HCPCS | Performed by: INTERNAL MEDICINE

## 2018-01-01 PROCEDURE — 22842 INSERT SPINE FIXATION DEVICE: CPT | Performed by: NEUROLOGICAL SURGERY

## 2018-01-01 PROCEDURE — 82040 ASSAY OF SERUM ALBUMIN: CPT

## 2018-01-01 PROCEDURE — 6370000000 HC RX 637 (ALT 250 FOR IP): Performed by: NEUROLOGICAL SURGERY

## 2018-01-01 DEVICE — ROD 1476006160 4.75 CCM+ STRT PERC 160MM
Type: IMPLANTABLE DEVICE | Site: BACK | Status: FUNCTIONAL
Brand: CD HORIZON® SPINAL SYSTEM

## 2018-01-01 DEVICE — MAS 54850017545 4.75 EXT TAB CANN 7.5X45
Type: IMPLANTABLE DEVICE | Site: BACK | Status: FUNCTIONAL
Brand: CD HORIZON® SOLERA® SPINAL SYSTEM

## 2018-01-01 DEVICE — SET SCR SPNL DIA4.75MM POST THORLUM SACR TI PERC APPRCH CDH: Type: IMPLANTABLE DEVICE | Site: BACK | Status: FUNCTIONAL

## 2018-01-01 RX ORDER — DEXAMETHASONE SODIUM PHOSPHATE 10 MG/ML
INJECTION INTRAMUSCULAR; INTRAVENOUS PRN
Status: DISCONTINUED | OUTPATIENT
Start: 2018-01-01 | End: 2018-01-01 | Stop reason: SDUPTHER

## 2018-01-01 RX ORDER — SODIUM CHLORIDE 9 MG/ML
INJECTION, SOLUTION INTRAVENOUS CONTINUOUS PRN
Status: DISCONTINUED | OUTPATIENT
Start: 2018-01-01 | End: 2018-01-01 | Stop reason: SDUPTHER

## 2018-01-01 RX ORDER — INSULIN GLARGINE 100 [IU]/ML
5 INJECTION, SOLUTION SUBCUTANEOUS NIGHTLY
Status: DISCONTINUED | OUTPATIENT
Start: 2018-01-01 | End: 2018-01-01 | Stop reason: HOSPADM

## 2018-01-01 RX ORDER — 0.9 % SODIUM CHLORIDE 0.9 %
250 INTRAVENOUS SOLUTION INTRAVENOUS PRN
Status: DISCONTINUED | OUTPATIENT
Start: 2018-01-01 | End: 2018-01-01 | Stop reason: HOSPADM

## 2018-01-01 RX ORDER — PANTOPRAZOLE SODIUM 40 MG/1
40 TABLET, DELAYED RELEASE ORAL
Status: DISCONTINUED | OUTPATIENT
Start: 2018-01-01 | End: 2018-01-01 | Stop reason: HOSPADM

## 2018-01-01 RX ORDER — GABAPENTIN 100 MG/1
100 CAPSULE ORAL 3 TIMES DAILY
COMMUNITY

## 2018-01-01 RX ORDER — CEFUROXIME AXETIL 250 MG/1
250 TABLET ORAL DAILY
Status: DISCONTINUED | OUTPATIENT
Start: 2018-01-01 | End: 2018-01-01 | Stop reason: HOSPADM

## 2018-01-01 RX ORDER — 0.9 % SODIUM CHLORIDE 0.9 %
250 INTRAVENOUS SOLUTION INTRAVENOUS ONCE
Status: COMPLETED | OUTPATIENT
Start: 2018-01-01 | End: 2018-01-01

## 2018-01-01 RX ORDER — HEPARIN SODIUM 5000 [USP'U]/ML
5000 INJECTION, SOLUTION INTRAVENOUS; SUBCUTANEOUS 2 TIMES DAILY
Status: DISCONTINUED | OUTPATIENT
Start: 2018-01-01 | End: 2018-01-01 | Stop reason: HOSPADM

## 2018-01-01 RX ORDER — SODIUM CHLORIDE 9 MG/ML
INJECTION, SOLUTION INTRAVENOUS CONTINUOUS
Status: DISCONTINUED | OUTPATIENT
Start: 2018-01-01 | End: 2018-01-01

## 2018-01-01 RX ORDER — OXYCODONE HCL 10 MG/1
10 TABLET, FILM COATED, EXTENDED RELEASE ORAL 2 TIMES DAILY
Qty: 6 TABLET | Refills: 0 | Status: SHIPPED | OUTPATIENT
Start: 2018-01-01 | End: 2018-10-18

## 2018-01-01 RX ORDER — ONDANSETRON 2 MG/ML
INJECTION INTRAMUSCULAR; INTRAVENOUS PRN
Status: DISCONTINUED | OUTPATIENT
Start: 2018-01-01 | End: 2018-01-01 | Stop reason: SDUPTHER

## 2018-01-01 RX ORDER — WARFARIN SODIUM 5 MG/1
5 TABLET ORAL DAILY
Status: DISCONTINUED | OUTPATIENT
Start: 2018-01-01 | End: 2018-01-01

## 2018-01-01 RX ORDER — PROMETHAZINE HYDROCHLORIDE 25 MG/ML
12.5 INJECTION, SOLUTION INTRAMUSCULAR; INTRAVENOUS EVERY 4 HOURS PRN
Status: DISCONTINUED | OUTPATIENT
Start: 2018-01-01 | End: 2018-01-01 | Stop reason: HOSPADM

## 2018-01-01 RX ORDER — SODIUM CHLORIDE 0.9 % (FLUSH) 0.9 %
10 SYRINGE (ML) INJECTION EVERY 12 HOURS SCHEDULED
Status: DISCONTINUED | OUTPATIENT
Start: 2018-01-01 | End: 2018-01-01 | Stop reason: HOSPADM

## 2018-01-01 RX ORDER — OXYCODONE HYDROCHLORIDE 5 MG/1
10 TABLET ORAL EVERY 6 HOURS PRN
Status: DISCONTINUED | OUTPATIENT
Start: 2018-01-01 | End: 2018-01-01 | Stop reason: HOSPADM

## 2018-01-01 RX ORDER — SODIUM POLYSTYRENE SULFONATE 15 G/60ML
30 SUSPENSION ORAL; RECTAL ONCE
Status: COMPLETED | OUTPATIENT
Start: 2018-01-01 | End: 2018-01-01

## 2018-01-01 RX ORDER — OMEPRAZOLE 40 MG/1
40 CAPSULE, DELAYED RELEASE ORAL DAILY
Qty: 90 CAPSULE | Refills: 3 | Status: SHIPPED | OUTPATIENT
Start: 2018-01-01

## 2018-01-01 RX ORDER — MEGESTROL ACETATE 20 MG/1
40 TABLET ORAL DAILY
Status: DISCONTINUED | OUTPATIENT
Start: 2018-01-01 | End: 2018-01-01

## 2018-01-01 RX ORDER — LEVOTHYROXINE SODIUM 0.1 MG/1
100 TABLET ORAL DAILY
Status: DISCONTINUED | OUTPATIENT
Start: 2018-01-01 | End: 2018-01-01 | Stop reason: HOSPADM

## 2018-01-01 RX ORDER — CIPROFLOXACIN 250 MG/1
250 TABLET, FILM COATED ORAL 2 TIMES DAILY
Qty: 14 TABLET | Refills: 0 | Status: SHIPPED | OUTPATIENT
Start: 2018-01-01 | End: 2018-01-01 | Stop reason: SDUPTHER

## 2018-01-01 RX ORDER — SODIUM POLYSTYRENE SULFONATE 15 G/60ML
15 SUSPENSION ORAL; RECTAL ONCE
Status: COMPLETED | OUTPATIENT
Start: 2018-01-01 | End: 2018-01-01

## 2018-01-01 RX ORDER — DEXAMETHASONE SODIUM PHOSPHATE 4 MG/ML
4 INJECTION, SOLUTION INTRA-ARTICULAR; INTRALESIONAL; INTRAMUSCULAR; INTRAVENOUS; SOFT TISSUE EVERY 6 HOURS
Status: DISCONTINUED | OUTPATIENT
Start: 2018-01-01 | End: 2018-01-01

## 2018-01-01 RX ORDER — FUROSEMIDE 10 MG/ML
40 INJECTION INTRAMUSCULAR; INTRAVENOUS DAILY
Status: DISCONTINUED | OUTPATIENT
Start: 2018-01-01 | End: 2018-01-01

## 2018-01-01 RX ORDER — ACETAMINOPHEN 325 MG/1
650 TABLET ORAL EVERY 4 HOURS PRN
Status: DISCONTINUED | OUTPATIENT
Start: 2018-01-01 | End: 2018-01-01 | Stop reason: HOSPADM

## 2018-01-01 RX ORDER — ACETAMINOPHEN 500 MG
1000 TABLET ORAL 3 TIMES DAILY
Status: DISCONTINUED | OUTPATIENT
Start: 2018-01-01 | End: 2018-01-01 | Stop reason: HOSPADM

## 2018-01-01 RX ORDER — POTASSIUM CHLORIDE 7.45 MG/ML
30 INJECTION INTRAVENOUS ONCE
Status: DISCONTINUED | OUTPATIENT
Start: 2018-01-01 | End: 2018-01-01

## 2018-01-01 RX ORDER — SODIUM CHLORIDE 0.9 % (FLUSH) 0.9 %
10 SYRINGE (ML) INJECTION PRN
Status: DISCONTINUED | OUTPATIENT
Start: 2018-01-01 | End: 2018-01-01 | Stop reason: HOSPADM

## 2018-01-01 RX ORDER — MORPHINE SULFATE 4 MG/ML
4 INJECTION, SOLUTION INTRAMUSCULAR; INTRAVENOUS ONCE
Status: COMPLETED | OUTPATIENT
Start: 2018-01-01 | End: 2018-01-01

## 2018-01-01 RX ORDER — 0.9 % SODIUM CHLORIDE 0.9 %
1000 INTRAVENOUS SOLUTION INTRAVENOUS ONCE
Status: COMPLETED | OUTPATIENT
Start: 2018-01-01 | End: 2018-01-01

## 2018-01-01 RX ORDER — ACETAMINOPHEN 500 MG
1000 TABLET ORAL 3 TIMES DAILY
Status: DISCONTINUED | OUTPATIENT
Start: 2018-01-01 | End: 2018-01-01 | Stop reason: DRUGHIGH

## 2018-01-01 RX ORDER — INSULIN GLARGINE 100 [IU]/ML
60 INJECTION, SOLUTION SUBCUTANEOUS 2 TIMES DAILY
Status: DISCONTINUED | OUTPATIENT
Start: 2018-01-01 | End: 2018-01-01

## 2018-01-01 RX ORDER — SODIUM CHLORIDE 9 MG/ML
INJECTION, SOLUTION INTRAVENOUS CONTINUOUS
Status: DISCONTINUED | OUTPATIENT
Start: 2018-01-01 | End: 2018-01-01 | Stop reason: HOSPADM

## 2018-01-01 RX ORDER — PROMETHAZINE HYDROCHLORIDE 25 MG/ML
6.25 INJECTION, SOLUTION INTRAMUSCULAR; INTRAVENOUS
Status: DISCONTINUED | OUTPATIENT
Start: 2018-01-01 | End: 2018-01-01

## 2018-01-01 RX ORDER — OXYCODONE HYDROCHLORIDE 10 MG/1
10 TABLET ORAL EVERY 6 HOURS PRN
Qty: 120 TABLET | Refills: 0 | Status: SHIPPED | OUTPATIENT
Start: 2018-01-01 | End: 2018-01-01 | Stop reason: SDUPTHER

## 2018-01-01 RX ORDER — MEPERIDINE HYDROCHLORIDE 50 MG/ML
12.5 INJECTION INTRAMUSCULAR; INTRAVENOUS; SUBCUTANEOUS ONCE
Status: CANCELLED | OUTPATIENT
Start: 2018-01-01 | End: 2018-01-01

## 2018-01-01 RX ORDER — ONDANSETRON 4 MG/1
4 TABLET, FILM COATED ORAL DAILY PRN
Qty: 40 TABLET | Refills: 0 | Status: SHIPPED | OUTPATIENT
Start: 2018-01-01

## 2018-01-01 RX ORDER — ATORVASTATIN CALCIUM 20 MG/1
20 TABLET, FILM COATED ORAL NIGHTLY
Status: DISCONTINUED | OUTPATIENT
Start: 2018-01-01 | End: 2018-01-01 | Stop reason: HOSPADM

## 2018-01-01 RX ORDER — NICOTINE 21 MG/24HR
1 PATCH, TRANSDERMAL 24 HOURS TRANSDERMAL DAILY PRN
Status: DISCONTINUED | OUTPATIENT
Start: 2018-01-01 | End: 2018-01-01 | Stop reason: HOSPADM

## 2018-01-01 RX ORDER — HEPARIN SODIUM 5000 [USP'U]/ML
5000 INJECTION, SOLUTION INTRAVENOUS; SUBCUTANEOUS EVERY 8 HOURS SCHEDULED
Status: DISCONTINUED | OUTPATIENT
Start: 2018-01-01 | End: 2018-01-01

## 2018-01-01 RX ORDER — OXYCODONE HYDROCHLORIDE 10 MG/1
10 TABLET ORAL EVERY 6 HOURS PRN
Qty: 4 TABLET | Refills: 0 | Status: ON HOLD | OUTPATIENT
Start: 2018-01-01 | End: 2018-01-01

## 2018-01-01 RX ORDER — LIDOCAINE 50 MG/G
1 PATCH TOPICAL DAILY
Status: DISCONTINUED | OUTPATIENT
Start: 2018-01-01 | End: 2018-01-01 | Stop reason: HOSPADM

## 2018-01-01 RX ORDER — MIDODRINE HYDROCHLORIDE 5 MG/1
5 TABLET ORAL
Status: DISCONTINUED | OUTPATIENT
Start: 2018-01-01 | End: 2018-01-01

## 2018-01-01 RX ORDER — HEPARIN SODIUM 1000 [USP'U]/ML
1600 INJECTION, SOLUTION INTRAVENOUS; SUBCUTANEOUS PRN
Status: DISCONTINUED | OUTPATIENT
Start: 2018-01-01 | End: 2018-01-01 | Stop reason: HOSPADM

## 2018-01-01 RX ORDER — DOCUSATE SODIUM 100 MG/1
200 CAPSULE, LIQUID FILLED ORAL 2 TIMES DAILY
Status: DISCONTINUED | OUTPATIENT
Start: 2018-01-01 | End: 2018-01-01 | Stop reason: HOSPADM

## 2018-01-01 RX ORDER — LEVOTHYROXINE SODIUM 0.1 MG/1
100 TABLET ORAL DAILY
Status: CANCELLED | OUTPATIENT
Start: 2018-01-01

## 2018-01-01 RX ORDER — GLYCOPYRROLATE 0.2 MG/ML
INJECTION INTRAMUSCULAR; INTRAVENOUS PRN
Status: DISCONTINUED | OUTPATIENT
Start: 2018-01-01 | End: 2018-01-01 | Stop reason: SDUPTHER

## 2018-01-01 RX ORDER — PROCHLORPERAZINE MALEATE 5 MG/1
5 TABLET ORAL EVERY 8 HOURS PRN
Status: DISCONTINUED | OUTPATIENT
Start: 2018-01-01 | End: 2018-01-01 | Stop reason: HOSPADM

## 2018-01-01 RX ORDER — PANTOPRAZOLE SODIUM 40 MG/1
40 TABLET, DELAYED RELEASE ORAL
Status: DISCONTINUED | OUTPATIENT
Start: 2018-01-01 | End: 2018-01-01

## 2018-01-01 RX ORDER — ZOLPIDEM TARTRATE 5 MG/1
5 TABLET ORAL ONCE
Status: COMPLETED | OUTPATIENT
Start: 2018-01-01 | End: 2018-01-01

## 2018-01-01 RX ORDER — SENNA PLUS 8.6 MG/1
1 TABLET ORAL NIGHTLY
Status: DISCONTINUED | OUTPATIENT
Start: 2018-01-01 | End: 2018-01-01 | Stop reason: HOSPADM

## 2018-01-01 RX ORDER — SODIUM POLYSTYRENE SULFONATE 15 G/60ML
30 SUSPENSION ORAL; RECTAL
Status: ACTIVE | OUTPATIENT
Start: 2018-01-01 | End: 2018-01-01

## 2018-01-01 RX ORDER — DEXTROSE MONOHYDRATE 25 G/50ML
12.5 INJECTION, SOLUTION INTRAVENOUS PRN
Status: DISCONTINUED | OUTPATIENT
Start: 2018-01-01 | End: 2018-01-01 | Stop reason: HOSPADM

## 2018-01-01 RX ORDER — 0.9 % SODIUM CHLORIDE 0.9 %
250 INTRAVENOUS SOLUTION INTRAVENOUS ONCE
Status: DISCONTINUED | OUTPATIENT
Start: 2018-01-01 | End: 2018-01-01 | Stop reason: HOSPADM

## 2018-01-01 RX ORDER — DEXTROSE MONOHYDRATE 50 MG/ML
100 INJECTION, SOLUTION INTRAVENOUS PRN
Status: CANCELLED | OUTPATIENT
Start: 2018-01-01

## 2018-01-01 RX ORDER — INSULIN GLARGINE 100 [IU]/ML
60 INJECTION, SOLUTION SUBCUTANEOUS 2 TIMES DAILY
Status: CANCELLED | OUTPATIENT
Start: 2018-01-01

## 2018-01-01 RX ORDER — OXYCODONE HYDROCHLORIDE 10 MG/1
10 TABLET ORAL EVERY 6 HOURS PRN
Qty: 10 TABLET | Refills: 0 | Status: CANCELLED | OUTPATIENT
Start: 2018-01-01 | End: 2018-01-01

## 2018-01-01 RX ORDER — OXYCODONE HYDROCHLORIDE 10 MG/1
10 TABLET ORAL EVERY 6 HOURS PRN
Qty: 60 TABLET | Refills: 0 | Status: ON HOLD | OUTPATIENT
Start: 2018-01-01 | End: 2018-01-01

## 2018-01-01 RX ORDER — MEGESTROL ACETATE 40 MG/ML
400 SUSPENSION ORAL DAILY
Status: DISCONTINUED | OUTPATIENT
Start: 2018-01-01 | End: 2018-01-01 | Stop reason: HOSPADM

## 2018-01-01 RX ORDER — OXYCODONE HCL 10 MG/1
10 TABLET, FILM COATED, EXTENDED RELEASE ORAL 2 TIMES DAILY
Status: DISCONTINUED | OUTPATIENT
Start: 2018-01-01 | End: 2018-01-01 | Stop reason: HOSPADM

## 2018-01-01 RX ORDER — CARVEDILOL 6.25 MG/1
6.25 TABLET ORAL 2 TIMES DAILY WITH MEALS
Qty: 60 TABLET | Refills: 3 | Status: SHIPPED | OUTPATIENT
Start: 2018-01-01 | End: 2018-01-01

## 2018-01-01 RX ORDER — MEGESTROL ACETATE 40 MG/ML
400 SUSPENSION ORAL DAILY
Status: DISCONTINUED | OUTPATIENT
Start: 2018-01-01 | End: 2018-01-01 | Stop reason: RX

## 2018-01-01 RX ORDER — ALBUMIN (HUMAN) 12.5 G/50ML
25 SOLUTION INTRAVENOUS ONCE
Status: COMPLETED | OUTPATIENT
Start: 2018-01-01 | End: 2018-01-01

## 2018-01-01 RX ORDER — CYCLOBENZAPRINE HCL 10 MG
TABLET ORAL
COMMUNITY
Start: 2018-01-01 | End: 2018-01-01

## 2018-01-01 RX ORDER — FAMOTIDINE 20 MG/1
20 TABLET, FILM COATED ORAL 2 TIMES DAILY
Status: DISCONTINUED | OUTPATIENT
Start: 2018-01-01 | End: 2018-01-01

## 2018-01-01 RX ORDER — WARFARIN SODIUM 2.5 MG/1
2.5 TABLET ORAL DAILY
Status: DISCONTINUED | OUTPATIENT
Start: 2018-01-01 | End: 2018-01-01 | Stop reason: HOSPADM

## 2018-01-01 RX ORDER — CALCIUM CARBONATE 200(500)MG
500 TABLET,CHEWABLE ORAL 3 TIMES DAILY PRN
Status: DISCONTINUED | OUTPATIENT
Start: 2018-01-01 | End: 2018-01-01 | Stop reason: HOSPADM

## 2018-01-01 RX ORDER — LANCETS 30 GAUGE
EACH MISCELLANEOUS
Qty: 100 EACH | Refills: 3 | Status: SHIPPED | OUTPATIENT
Start: 2018-01-01

## 2018-01-01 RX ORDER — ONDANSETRON 4 MG/1
4 TABLET, FILM COATED ORAL EVERY 6 HOURS PRN
DISCHARGE
Start: 2018-01-01 | End: 2018-01-01 | Stop reason: CLARIF

## 2018-01-01 RX ORDER — FENTANYL CITRATE 50 UG/ML
25 INJECTION, SOLUTION INTRAMUSCULAR; INTRAVENOUS ONCE
Status: COMPLETED | OUTPATIENT
Start: 2018-01-01 | End: 2018-01-01

## 2018-01-01 RX ORDER — CYCLOBENZAPRINE HCL 10 MG
10 TABLET ORAL 3 TIMES DAILY PRN
Qty: 21 TABLET | Refills: 0 | Status: SHIPPED | OUTPATIENT
Start: 2018-01-01 | End: 2018-01-01

## 2018-01-01 RX ORDER — LORAZEPAM 2 MG/ML
1 INJECTION INTRAMUSCULAR ONCE
Status: COMPLETED | OUTPATIENT
Start: 2018-01-01 | End: 2018-01-01

## 2018-01-01 RX ORDER — LIDOCAINE HYDROCHLORIDE 10 MG/ML
INJECTION, SOLUTION EPIDURAL; INFILTRATION; INTRACAUDAL; PERINEURAL PRN
Status: DISCONTINUED | OUTPATIENT
Start: 2018-01-01 | End: 2018-01-01 | Stop reason: SDUPTHER

## 2018-01-01 RX ORDER — ONDANSETRON 2 MG/ML
4 INJECTION INTRAMUSCULAR; INTRAVENOUS EVERY 6 HOURS PRN
Status: CANCELLED | OUTPATIENT
Start: 2018-01-01

## 2018-01-01 RX ORDER — ALPRAZOLAM 0.5 MG/1
0.5 TABLET ORAL ONCE
Status: COMPLETED | OUTPATIENT
Start: 2018-01-01 | End: 2018-01-01

## 2018-01-01 RX ORDER — OXYCODONE HYDROCHLORIDE 10 MG/1
10 TABLET ORAL EVERY 6 HOURS PRN
Qty: 120 TABLET | Refills: 0 | Status: SHIPPED | OUTPATIENT
Start: 2018-01-01 | End: 2018-01-01

## 2018-01-01 RX ORDER — LISINOPRIL 20 MG/1
20 TABLET ORAL DAILY
Qty: 30 TABLET | Refills: 3 | Status: ON HOLD | DISCHARGE
Start: 2018-01-01 | End: 2018-01-01 | Stop reason: ALTCHOICE

## 2018-01-01 RX ORDER — BISACODYL 10 MG
10 SUPPOSITORY, RECTAL RECTAL DAILY PRN
Status: DISCONTINUED | OUTPATIENT
Start: 2018-01-01 | End: 2018-01-01 | Stop reason: HOSPADM

## 2018-01-01 RX ORDER — ACETAMINOPHEN 325 MG/1
650 TABLET ORAL EVERY 4 HOURS PRN
Status: DISCONTINUED | OUTPATIENT
Start: 2018-01-01 | End: 2018-01-01

## 2018-01-01 RX ORDER — OXYCODONE HYDROCHLORIDE 5 MG/1
5 TABLET ORAL EVERY 4 HOURS PRN
Status: DISCONTINUED | OUTPATIENT
Start: 2018-01-01 | End: 2018-01-01 | Stop reason: HOSPADM

## 2018-01-01 RX ORDER — OXYCODONE HYDROCHLORIDE 5 MG/1
10 TABLET ORAL EVERY 8 HOURS PRN
Status: DISCONTINUED | OUTPATIENT
Start: 2018-01-01 | End: 2018-01-01

## 2018-01-01 RX ORDER — MIDODRINE HYDROCHLORIDE 5 MG/1
10 TABLET ORAL
Status: DISCONTINUED | OUTPATIENT
Start: 2018-01-01 | End: 2018-01-01 | Stop reason: HOSPADM

## 2018-01-01 RX ORDER — SODIUM POLYSTYRENE SULFONATE 15 G/60ML
15 SUSPENSION ORAL; RECTAL ONCE
Status: DISCONTINUED | OUTPATIENT
Start: 2018-01-01 | End: 2018-01-01

## 2018-01-01 RX ORDER — OXYCODONE HYDROCHLORIDE 5 MG/1
10 TABLET ORAL NIGHTLY PRN
Status: DISCONTINUED | OUTPATIENT
Start: 2018-01-01 | End: 2018-01-01

## 2018-01-01 RX ORDER — LISINOPRIL AND HYDROCHLOROTHIAZIDE 20; 12.5 MG/1; MG/1
1 TABLET ORAL 2 TIMES DAILY
COMMUNITY
End: 2018-01-01 | Stop reason: SDUPTHER

## 2018-01-01 RX ORDER — POTASSIUM CHLORIDE 20MEQ/15ML
40 LIQUID (ML) ORAL PRN
Status: DISCONTINUED | OUTPATIENT
Start: 2018-01-01 | End: 2018-01-01 | Stop reason: HOSPADM

## 2018-01-01 RX ORDER — SODIUM CHLORIDE 9 MG/ML
INJECTION, SOLUTION INTRAVENOUS CONTINUOUS
Status: CANCELLED | OUTPATIENT
Start: 2018-01-01

## 2018-01-01 RX ORDER — HEPARIN SODIUM 5000 [USP'U]/ML
5000 INJECTION, SOLUTION INTRAVENOUS; SUBCUTANEOUS EVERY 8 HOURS SCHEDULED
Status: CANCELLED | OUTPATIENT
Start: 2018-01-01

## 2018-01-01 RX ORDER — ONDANSETRON 4 MG/1
4 TABLET, ORALLY DISINTEGRATING ORAL EVERY 6 HOURS PRN
Status: DISCONTINUED | OUTPATIENT
Start: 2018-01-01 | End: 2018-01-01 | Stop reason: HOSPADM

## 2018-01-01 RX ORDER — FLUDROCORTISONE ACETATE 0.1 MG/1
0.1 TABLET ORAL ONCE
Status: COMPLETED | OUTPATIENT
Start: 2018-01-01 | End: 2018-01-01

## 2018-01-01 RX ORDER — FUROSEMIDE 10 MG/ML
40 INJECTION INTRAMUSCULAR; INTRAVENOUS ONCE
Status: COMPLETED | OUTPATIENT
Start: 2018-01-01 | End: 2018-01-01

## 2018-01-01 RX ORDER — LISINOPRIL AND HYDROCHLOROTHIAZIDE 20; 12.5 MG/1; MG/1
1 TABLET ORAL 2 TIMES DAILY
Qty: 60 TABLET | Refills: 11 | Status: ON HOLD | OUTPATIENT
Start: 2018-01-01 | End: 2018-01-01 | Stop reason: HOSPADM

## 2018-01-01 RX ORDER — LISINOPRIL 5 MG/1
5 TABLET ORAL DAILY
Status: DISCONTINUED | OUTPATIENT
Start: 2018-01-01 | End: 2018-01-01

## 2018-01-01 RX ORDER — ONDANSETRON 4 MG/1
4 TABLET, FILM COATED ORAL DAILY PRN
Status: DISCONTINUED | OUTPATIENT
Start: 2018-01-01 | End: 2018-01-01 | Stop reason: HOSPADM

## 2018-01-01 RX ORDER — HYDROCODONE BITARTRATE AND ACETAMINOPHEN 5; 325 MG/1; MG/1
1 TABLET ORAL EVERY 4 HOURS PRN
Status: DISCONTINUED | OUTPATIENT
Start: 2018-01-01 | End: 2018-01-01 | Stop reason: HOSPADM

## 2018-01-01 RX ORDER — GABAPENTIN 100 MG/1
100 CAPSULE ORAL 3 TIMES DAILY
Status: DISCONTINUED | OUTPATIENT
Start: 2018-01-01 | End: 2018-01-01 | Stop reason: HOSPADM

## 2018-01-01 RX ORDER — OXYCODONE HCL 10 MG/1
10 TABLET, FILM COATED, EXTENDED RELEASE ORAL 2 TIMES DAILY
Qty: 28 TABLET | Refills: 0 | Status: ON HOLD | OUTPATIENT
Start: 2018-01-01 | End: 2018-01-01

## 2018-01-01 RX ORDER — FUROSEMIDE 40 MG/1
40 TABLET ORAL DAILY
Qty: 60 TABLET | Refills: 3 | Status: SHIPPED | OUTPATIENT
Start: 2018-01-01 | End: 2018-01-01 | Stop reason: ALTCHOICE

## 2018-01-01 RX ORDER — MIDODRINE HYDROCHLORIDE 5 MG/1
5 TABLET ORAL
Qty: 90 TABLET | Refills: 3 | DISCHARGE
Start: 2018-01-01 | End: 2018-01-01

## 2018-01-01 RX ORDER — ALBUTEROL SULFATE 2.5 MG/3ML
2.5 SOLUTION RESPIRATORY (INHALATION) EVERY 6 HOURS PRN
Status: DISCONTINUED | OUTPATIENT
Start: 2018-01-01 | End: 2018-01-01 | Stop reason: HOSPADM

## 2018-01-01 RX ORDER — POTASSIUM CHLORIDE 20 MEQ/1
20 TABLET, EXTENDED RELEASE ORAL ONCE
Status: COMPLETED | OUTPATIENT
Start: 2018-01-01 | End: 2018-01-01

## 2018-01-01 RX ORDER — INSULIN GLARGINE 100 [IU]/ML
30 INJECTION, SOLUTION SUBCUTANEOUS 2 TIMES DAILY
Status: DISCONTINUED | OUTPATIENT
Start: 2018-01-01 | End: 2018-01-01

## 2018-01-01 RX ORDER — WARFARIN SODIUM 2.5 MG/1
2.5 TABLET ORAL DAILY
Qty: 30 TABLET | Refills: 3 | Status: ON HOLD | OUTPATIENT
Start: 2018-01-01 | End: 2018-01-01 | Stop reason: HOSPADM

## 2018-01-01 RX ORDER — OXYCODONE HYDROCHLORIDE 10 MG/1
TABLET ORAL
Qty: 90 TABLET | Refills: 0 | Status: SHIPPED | OUTPATIENT
Start: 2018-01-01 | End: 2018-01-01

## 2018-01-01 RX ORDER — OXYCODONE HCL 10 MG/1
10 TABLET, FILM COATED, EXTENDED RELEASE ORAL EVERY 12 HOURS
Qty: 60 TABLET | Refills: 0 | Status: ON HOLD | OUTPATIENT
Start: 2018-01-01 | End: 2018-01-01

## 2018-01-01 RX ORDER — MORPHINE SULFATE 2 MG/ML
1 INJECTION, SOLUTION INTRAMUSCULAR; INTRAVENOUS EVERY 4 HOURS PRN
Status: DISCONTINUED | OUTPATIENT
Start: 2018-01-01 | End: 2018-01-01

## 2018-01-01 RX ORDER — CYCLOBENZAPRINE HCL 5 MG
5 TABLET ORAL 3 TIMES DAILY PRN
Status: DISCONTINUED | OUTPATIENT
Start: 2018-01-01 | End: 2018-01-01 | Stop reason: HOSPADM

## 2018-01-01 RX ORDER — AMITRIPTYLINE HYDROCHLORIDE 25 MG/1
75 TABLET, FILM COATED ORAL NIGHTLY
Status: DISCONTINUED | OUTPATIENT
Start: 2018-01-01 | End: 2018-01-01 | Stop reason: HOSPADM

## 2018-01-01 RX ORDER — NICOTINE POLACRILEX 4 MG
15 LOZENGE BUCCAL PRN
Status: DISCONTINUED | OUTPATIENT
Start: 2018-01-01 | End: 2018-01-01 | Stop reason: HOSPADM

## 2018-01-01 RX ORDER — CEFUROXIME AXETIL 250 MG/1
250 TABLET ORAL EVERY 12 HOURS SCHEDULED
Qty: 10 TABLET | Refills: 0 | Status: SHIPPED | OUTPATIENT
Start: 2018-01-01 | End: 2018-01-01

## 2018-01-01 RX ORDER — GABAPENTIN 100 MG/1
100 CAPSULE ORAL 3 TIMES DAILY
Qty: 90 CAPSULE | Refills: 0 | Status: SHIPPED | OUTPATIENT
Start: 2018-01-01 | End: 2018-01-01

## 2018-01-01 RX ORDER — HEPARIN SODIUM 1000 [USP'U]/ML
1600 INJECTION, SOLUTION INTRAVENOUS; SUBCUTANEOUS ONCE
Status: DISCONTINUED | OUTPATIENT
Start: 2018-01-01 | End: 2018-01-01 | Stop reason: HOSPADM

## 2018-01-01 RX ORDER — TRAMADOL HYDROCHLORIDE 50 MG/1
50 TABLET ORAL EVERY 6 HOURS PRN
Status: DISCONTINUED | OUTPATIENT
Start: 2018-01-01 | End: 2018-01-01

## 2018-01-01 RX ORDER — SODIUM POLYSTYRENE SULFONATE 15 G/60ML
15 SUSPENSION ORAL; RECTAL
Status: CANCELLED | OUTPATIENT
Start: 2018-01-01 | End: 2018-01-01

## 2018-01-01 RX ORDER — VANCOMYCIN HYDROCHLORIDE 1 G/200ML
1000 INJECTION, SOLUTION INTRAVENOUS EVERY 12 HOURS
Status: DISCONTINUED | OUTPATIENT
Start: 2018-01-01 | End: 2018-01-01

## 2018-01-01 RX ORDER — CEFUROXIME AXETIL 250 MG/1
250 TABLET ORAL 2 TIMES DAILY
Qty: 20 TABLET | Refills: 0 | Status: SHIPPED | OUTPATIENT
Start: 2018-01-01 | End: 2018-01-01

## 2018-01-01 RX ORDER — POTASSIUM CHLORIDE 20 MEQ/1
40 TABLET, EXTENDED RELEASE ORAL PRN
Status: DISCONTINUED | OUTPATIENT
Start: 2018-01-01 | End: 2018-01-01 | Stop reason: HOSPADM

## 2018-01-01 RX ORDER — LISINOPRIL 10 MG/1
10 TABLET ORAL 2 TIMES DAILY
Status: DISCONTINUED | OUTPATIENT
Start: 2018-01-01 | End: 2018-01-01

## 2018-01-01 RX ORDER — HYDROCODONE BITARTRATE AND ACETAMINOPHEN 5; 325 MG/1; MG/1
1 TABLET ORAL EVERY 4 HOURS PRN
Status: DISCONTINUED | OUTPATIENT
Start: 2018-01-01 | End: 2018-01-01

## 2018-01-01 RX ORDER — MIDODRINE HYDROCHLORIDE 2.5 MG/1
2.5 TABLET ORAL 3 TIMES DAILY PRN
Status: DISCONTINUED | OUTPATIENT
Start: 2018-01-01 | End: 2018-01-01 | Stop reason: HOSPADM

## 2018-01-01 RX ORDER — LEVOFLOXACIN 750 MG/1
750 TABLET ORAL ONCE
Status: DISCONTINUED | OUTPATIENT
Start: 2018-01-01 | End: 2018-01-01

## 2018-01-01 RX ORDER — LIDOCAINE HYDROCHLORIDE AND EPINEPHRINE BITARTRATE 20; .01 MG/ML; MG/ML
INJECTION, SOLUTION SUBCUTANEOUS PRN
Status: DISCONTINUED | OUTPATIENT
Start: 2018-01-01 | End: 2018-01-01

## 2018-01-01 RX ORDER — LISINOPRIL 5 MG/1
5 TABLET ORAL DAILY
Qty: 30 TABLET | Refills: 3 | Status: SHIPPED | OUTPATIENT
Start: 2018-01-01 | End: 2018-01-01 | Stop reason: HOSPADM

## 2018-01-01 RX ORDER — SODIUM POLYSTYRENE SULFONATE 15 G/60ML
30 SUSPENSION ORAL; RECTAL
Status: CANCELLED | OUTPATIENT
Start: 2018-01-01 | End: 2018-01-01

## 2018-01-01 RX ORDER — LISINOPRIL 20 MG/1
20 TABLET ORAL DAILY
Status: DISCONTINUED | OUTPATIENT
Start: 2018-01-01 | End: 2018-01-01 | Stop reason: HOSPADM

## 2018-01-01 RX ORDER — PROCHLORPERAZINE MALEATE 5 MG/1
5 TABLET ORAL EVERY 8 HOURS PRN
COMMUNITY
Start: 2018-01-01 | End: 2018-01-01 | Stop reason: ALTCHOICE

## 2018-01-01 RX ORDER — CYANOCOBALAMIN 1000 UG/ML
1000 INJECTION INTRAMUSCULAR; SUBCUTANEOUS ONCE
Status: COMPLETED | OUTPATIENT
Start: 2018-01-01 | End: 2018-01-01

## 2018-01-01 RX ORDER — ACETAMINOPHEN 325 MG/1
650 TABLET ORAL 3 TIMES DAILY PRN
Status: DISCONTINUED | OUTPATIENT
Start: 2018-01-01 | End: 2018-01-01 | Stop reason: HOSPADM

## 2018-01-01 RX ORDER — DIAZEPAM 5 MG/1
5 TABLET ORAL ONCE
Status: COMPLETED | OUTPATIENT
Start: 2018-01-01 | End: 2018-01-01

## 2018-01-01 RX ORDER — LEVOFLOXACIN 500 MG/1
500 TABLET, FILM COATED ORAL EVERY OTHER DAY
Status: DISCONTINUED | OUTPATIENT
Start: 2018-01-01 | End: 2018-01-01

## 2018-01-01 RX ORDER — HEPARIN SODIUM 1000 [USP'U]/ML
80 INJECTION, SOLUTION INTRAVENOUS; SUBCUTANEOUS ONCE
Status: COMPLETED | OUTPATIENT
Start: 2018-01-01 | End: 2018-01-01

## 2018-01-01 RX ORDER — HEPARIN SODIUM 5000 [USP'U]/ML
INJECTION, SOLUTION INTRAVENOUS; SUBCUTANEOUS
Status: COMPLETED | OUTPATIENT
Start: 2018-01-01 | End: 2018-01-01

## 2018-01-01 RX ORDER — POLYETHYLENE GLYCOL 3350 17 G/17G
17 POWDER, FOR SOLUTION ORAL DAILY
Qty: 510 G | Refills: 11 | Status: SHIPPED | OUTPATIENT
Start: 2018-01-01 | End: 2018-01-01

## 2018-01-01 RX ORDER — POLYETHYLENE GLYCOL 3350 17 G/17G
17 POWDER, FOR SOLUTION ORAL DAILY
Status: CANCELLED | OUTPATIENT
Start: 2018-01-01

## 2018-01-01 RX ORDER — HEPARIN SODIUM 1000 [USP'U]/ML
40 INJECTION, SOLUTION INTRAVENOUS; SUBCUTANEOUS PRN
Status: DISCONTINUED | OUTPATIENT
Start: 2018-01-01 | End: 2018-01-01

## 2018-01-01 RX ORDER — ACETAMINOPHEN 500 MG
1000 TABLET ORAL 3 TIMES DAILY
Status: DISCONTINUED | OUTPATIENT
Start: 2018-01-01 | End: 2018-01-01 | Stop reason: ALTCHOICE

## 2018-01-01 RX ORDER — ATORVASTATIN CALCIUM 20 MG/1
1 TABLET, FILM COATED ORAL DAILY
Status: CANCELLED | OUTPATIENT
Start: 2018-01-01

## 2018-01-01 RX ORDER — OXYCODONE HYDROCHLORIDE 10 MG/1
10 TABLET ORAL 2 TIMES DAILY PRN
Qty: 60 TABLET | Refills: 0 | Status: SHIPPED | OUTPATIENT
Start: 2018-01-01 | End: 2018-01-01 | Stop reason: SDUPTHER

## 2018-01-01 RX ORDER — BACITRACIN 50000 [USP'U]/1
INJECTION, POWDER, LYOPHILIZED, FOR SOLUTION INTRAMUSCULAR PRN
Status: DISCONTINUED | OUTPATIENT
Start: 2018-01-01 | End: 2018-01-01

## 2018-01-01 RX ORDER — OXYCODONE HYDROCHLORIDE 10 MG/1
10 TABLET ORAL EVERY 6 HOURS PRN
Qty: 10 TABLET | Refills: 0 | Status: SHIPPED | OUTPATIENT
Start: 2018-01-01 | End: 2018-01-01 | Stop reason: SDUPTHER

## 2018-01-01 RX ORDER — SODIUM CHLORIDE 9 MG/ML
INJECTION, SOLUTION INTRAVENOUS ONCE
Status: CANCELLED | OUTPATIENT
Start: 2018-01-01 | End: 2018-01-01

## 2018-01-01 RX ORDER — SUB-Q INSULIN DEVICE, 40 UNIT
EACH MISCELLANEOUS
COMMUNITY
Start: 2018-01-01

## 2018-01-01 RX ORDER — LISINOPRIL 5 MG/1
5 TABLET ORAL 2 TIMES DAILY
Status: DISCONTINUED | OUTPATIENT
Start: 2018-01-01 | End: 2018-01-01 | Stop reason: HOSPADM

## 2018-01-01 RX ORDER — MORPHINE SULFATE 4 MG/ML
4 INJECTION, SOLUTION INTRAMUSCULAR; INTRAVENOUS
Status: DISCONTINUED | OUTPATIENT
Start: 2018-01-01 | End: 2018-01-01 | Stop reason: RX

## 2018-01-01 RX ORDER — OXYCODONE HYDROCHLORIDE 10 MG/1
10 TABLET ORAL EVERY 6 HOURS PRN
Qty: 30 TABLET | Refills: 0 | Status: SHIPPED | OUTPATIENT
Start: 2018-01-01 | End: 2018-01-01

## 2018-01-01 RX ORDER — FUROSEMIDE 40 MG/1
40 TABLET ORAL DAILY
Status: DISCONTINUED | OUTPATIENT
Start: 2018-01-01 | End: 2018-01-01 | Stop reason: HOSPADM

## 2018-01-01 RX ORDER — HEPARIN SODIUM (PORCINE) LOCK FLUSH IV SOLN 100 UNIT/ML 100 UNIT/ML
500 SOLUTION INTRAVENOUS PRN
Status: CANCELLED | OUTPATIENT
Start: 2018-01-01

## 2018-01-01 RX ORDER — WARFARIN SODIUM 2.5 MG/1
2.5 TABLET ORAL DAILY
Status: DISCONTINUED | OUTPATIENT
Start: 2018-01-01 | End: 2018-01-01

## 2018-01-01 RX ORDER — MIDAZOLAM HYDROCHLORIDE 1 MG/ML
INJECTION INTRAMUSCULAR; INTRAVENOUS PRN
Status: DISCONTINUED | OUTPATIENT
Start: 2018-01-01 | End: 2018-01-01 | Stop reason: SDUPTHER

## 2018-01-01 RX ORDER — SODIUM CHLORIDE 0.9 % (FLUSH) 0.9 %
10 SYRINGE (ML) INJECTION PRN
Status: CANCELLED | OUTPATIENT
Start: 2018-01-01

## 2018-01-01 RX ORDER — 0.9 % SODIUM CHLORIDE 0.9 %
1000 INTRAVENOUS SOLUTION INTRAVENOUS ONCE
Status: DISCONTINUED | OUTPATIENT
Start: 2018-01-01 | End: 2018-01-01

## 2018-01-01 RX ORDER — TRAMADOL HYDROCHLORIDE 50 MG/1
100 TABLET ORAL EVERY 6 HOURS PRN
Status: DISCONTINUED | OUTPATIENT
Start: 2018-01-01 | End: 2018-01-01

## 2018-01-01 RX ORDER — AMLODIPINE BESYLATE 10 MG/1
10 TABLET ORAL DAILY
Status: DISCONTINUED | OUTPATIENT
Start: 2018-01-01 | End: 2018-01-01

## 2018-01-01 RX ORDER — HEPARIN SODIUM 10000 [USP'U]/100ML
18 INJECTION, SOLUTION INTRAVENOUS CONTINUOUS
Status: DISCONTINUED | OUTPATIENT
Start: 2018-01-01 | End: 2018-01-01

## 2018-01-01 RX ORDER — ONDANSETRON 2 MG/ML
4 INJECTION INTRAMUSCULAR; INTRAVENOUS EVERY 6 HOURS PRN
Status: DISCONTINUED | OUTPATIENT
Start: 2018-01-01 | End: 2018-01-01 | Stop reason: HOSPADM

## 2018-01-01 RX ORDER — 0.9 % SODIUM CHLORIDE 0.9 %
1417 INTRAVENOUS SOLUTION INTRAVENOUS ONCE
Status: COMPLETED | OUTPATIENT
Start: 2018-01-01 | End: 2018-01-01

## 2018-01-01 RX ORDER — CEFUROXIME AXETIL 250 MG/1
250 TABLET ORAL EVERY 12 HOURS SCHEDULED
Status: DISCONTINUED | OUTPATIENT
Start: 2018-01-01 | End: 2018-01-01

## 2018-01-01 RX ORDER — CYCLOBENZAPRINE HCL 10 MG
10 TABLET ORAL 3 TIMES DAILY PRN
Status: DISCONTINUED | OUTPATIENT
Start: 2018-01-01 | End: 2018-01-01 | Stop reason: HOSPADM

## 2018-01-01 RX ORDER — IBUPROFEN 600 MG/1
600 TABLET ORAL ONCE
Status: COMPLETED | OUTPATIENT
Start: 2018-01-01 | End: 2018-01-01

## 2018-01-01 RX ORDER — HEPARIN SODIUM 1000 [USP'U]/ML
1600 INJECTION, SOLUTION INTRAVENOUS; SUBCUTANEOUS PRN
Status: DISCONTINUED | OUTPATIENT
Start: 2018-01-01 | End: 2018-01-01

## 2018-01-01 RX ORDER — LISINOPRIL AND HYDROCHLOROTHIAZIDE 20; 12.5 MG/1; MG/1
1 TABLET ORAL 2 TIMES DAILY
Status: DISCONTINUED | OUTPATIENT
Start: 2018-01-01 | End: 2018-01-01

## 2018-01-01 RX ORDER — LEVOFLOXACIN 750 MG/1
750 TABLET ORAL DAILY
Status: DISCONTINUED | OUTPATIENT
Start: 2018-01-01 | End: 2018-01-01 | Stop reason: CLARIF

## 2018-01-01 RX ORDER — OXYCODONE HYDROCHLORIDE AND ACETAMINOPHEN 5; 325 MG/1; MG/1
1 TABLET ORAL EVERY 4 HOURS PRN
Status: DISCONTINUED | OUTPATIENT
Start: 2018-01-01 | End: 2018-01-01

## 2018-01-01 RX ORDER — DEXTROSE MONOHYDRATE 50 MG/ML
100 INJECTION, SOLUTION INTRAVENOUS PRN
Status: DISCONTINUED | OUTPATIENT
Start: 2018-01-01 | End: 2018-01-01 | Stop reason: HOSPADM

## 2018-01-01 RX ORDER — SODIUM POLYSTYRENE SULFONATE 15 G/60ML
15 SUSPENSION ORAL; RECTAL
Status: ACTIVE | OUTPATIENT
Start: 2018-01-01 | End: 2018-01-01

## 2018-01-01 RX ORDER — AMLODIPINE BESYLATE 10 MG/1
10 TABLET ORAL DAILY
Status: CANCELLED | OUTPATIENT
Start: 2018-01-01

## 2018-01-01 RX ORDER — ALBUTEROL SULFATE 2.5 MG/3ML
2.5 SOLUTION RESPIRATORY (INHALATION)
Status: DISCONTINUED | OUTPATIENT
Start: 2018-01-01 | End: 2018-01-01

## 2018-01-01 RX ORDER — ONDANSETRON 2 MG/ML
4 INJECTION INTRAMUSCULAR; INTRAVENOUS ONCE
Status: COMPLETED | OUTPATIENT
Start: 2018-01-01 | End: 2018-01-01

## 2018-01-01 RX ORDER — DEXTROSE MONOHYDRATE 25 G/50ML
12.5 INJECTION, SOLUTION INTRAVENOUS PRN
Status: CANCELLED | OUTPATIENT
Start: 2018-01-01

## 2018-01-01 RX ORDER — OXYCODONE HYDROCHLORIDE AND ACETAMINOPHEN 5; 325 MG/1; MG/1
2 TABLET ORAL EVERY 4 HOURS PRN
Status: DISCONTINUED | OUTPATIENT
Start: 2018-01-01 | End: 2018-01-01

## 2018-01-01 RX ORDER — OXYCODONE HCL 10 MG/1
10 TABLET, FILM COATED, EXTENDED RELEASE ORAL 2 TIMES DAILY
Qty: 60 TABLET | Refills: 0 | Status: ON HOLD | OUTPATIENT
Start: 2018-01-01 | End: 2018-01-01 | Stop reason: HOSPADM

## 2018-01-01 RX ORDER — SODIUM BICARBONATE 650 MG/1
650 TABLET ORAL DAILY
Qty: 14 TABLET | Refills: 0 | Status: SHIPPED | OUTPATIENT
Start: 2018-01-01 | End: 2018-01-01

## 2018-01-01 RX ORDER — PROMETHAZINE HYDROCHLORIDE 25 MG/ML
6.25 INJECTION, SOLUTION INTRAMUSCULAR; INTRAVENOUS ONCE
Status: COMPLETED | OUTPATIENT
Start: 2018-01-01 | End: 2018-01-01

## 2018-01-01 RX ORDER — 0.9 % SODIUM CHLORIDE 0.9 %
150 INTRAVENOUS SOLUTION INTRAVENOUS PRN
Status: DISCONTINUED | OUTPATIENT
Start: 2018-01-01 | End: 2018-01-01 | Stop reason: HOSPADM

## 2018-01-01 RX ORDER — OXYCODONE HYDROCHLORIDE 10 MG/1
10 TABLET ORAL EVERY 6 HOURS PRN
Qty: 40 TABLET | Refills: 0 | Status: ON HOLD | OUTPATIENT
Start: 2018-01-01 | End: 2018-01-01

## 2018-01-01 RX ORDER — CARVEDILOL 6.25 MG/1
6.25 TABLET ORAL 2 TIMES DAILY WITH MEALS
Status: CANCELLED | OUTPATIENT
Start: 2018-01-01

## 2018-01-01 RX ORDER — ATORVASTATIN CALCIUM 20 MG/1
TABLET, FILM COATED ORAL
Qty: 90 TABLET | Refills: 3 | Status: SHIPPED | OUTPATIENT
Start: 2018-01-01 | End: 2018-01-01 | Stop reason: ALTCHOICE

## 2018-01-01 RX ORDER — 0.9 % SODIUM CHLORIDE 0.9 %
500 INTRAVENOUS SOLUTION INTRAVENOUS ONCE
Status: COMPLETED | OUTPATIENT
Start: 2018-01-01 | End: 2018-01-01

## 2018-01-01 RX ORDER — INSULIN GLARGINE 100 [IU]/ML
60 INJECTION, SOLUTION SUBCUTANEOUS 2 TIMES DAILY
Qty: 11 VIAL | Refills: 0 | Status: SHIPPED | OUTPATIENT
Start: 2018-01-01 | End: 2018-01-01 | Stop reason: SDUPTHER

## 2018-01-01 RX ORDER — DIPHENHYDRAMINE HYDROCHLORIDE 50 MG/ML
50 INJECTION INTRAMUSCULAR; INTRAVENOUS ONCE
Status: CANCELLED | OUTPATIENT
Start: 2018-01-01 | End: 2018-01-01

## 2018-01-01 RX ORDER — LISINOPRIL AND HYDROCHLOROTHIAZIDE 20; 12.5 MG/1; MG/1
1 TABLET ORAL 2 TIMES DAILY
Qty: 30 TABLET | Refills: 11 | Status: SHIPPED | OUTPATIENT
Start: 2018-01-01 | End: 2018-01-01 | Stop reason: SDUPTHER

## 2018-01-01 RX ORDER — HYDROCODONE BITARTRATE AND ACETAMINOPHEN 5; 325 MG/1; MG/1
2 TABLET ORAL EVERY 4 HOURS PRN
Status: DISCONTINUED | OUTPATIENT
Start: 2018-01-01 | End: 2018-01-01 | Stop reason: HOSPADM

## 2018-01-01 RX ORDER — INSULIN GLARGINE 100 [IU]/ML
50 INJECTION, SOLUTION SUBCUTANEOUS 2 TIMES DAILY
Status: DISCONTINUED | OUTPATIENT
Start: 2018-01-01 | End: 2018-01-01 | Stop reason: HOSPADM

## 2018-01-01 RX ORDER — EPINEPHRINE 1 MG/ML
0.3 INJECTION, SOLUTION, CONCENTRATE INTRAVENOUS PRN
Status: CANCELLED | OUTPATIENT
Start: 2018-01-01

## 2018-01-01 RX ORDER — MIDODRINE HYDROCHLORIDE 5 MG/1
10 TABLET ORAL
Qty: 90 TABLET | Refills: 3 | DISCHARGE
Start: 2018-01-01

## 2018-01-01 RX ORDER — CYCLOBENZAPRINE HCL 10 MG
5 TABLET ORAL 3 TIMES DAILY PRN
Status: DISCONTINUED | OUTPATIENT
Start: 2018-01-01 | End: 2018-01-01 | Stop reason: HOSPADM

## 2018-01-01 RX ORDER — PROPOFOL 10 MG/ML
INJECTION, EMULSION INTRAVENOUS CONTINUOUS PRN
Status: DISCONTINUED | OUTPATIENT
Start: 2018-01-01 | End: 2018-01-01 | Stop reason: SDUPTHER

## 2018-01-01 RX ORDER — DEXAMETHASONE 4 MG/1
4 TABLET ORAL DAILY
Qty: 14 TABLET | Refills: 0 | Status: SHIPPED | OUTPATIENT
Start: 2018-01-01 | End: 2018-01-01

## 2018-01-01 RX ORDER — ACETAMINOPHEN 500 MG
1000 TABLET ORAL 3 TIMES DAILY
Status: DISCONTINUED | OUTPATIENT
Start: 2018-01-01 | End: 2018-01-01

## 2018-01-01 RX ORDER — PROPOFOL 10 MG/ML
INJECTION, EMULSION INTRAVENOUS PRN
Status: DISCONTINUED | OUTPATIENT
Start: 2018-01-01 | End: 2018-01-01 | Stop reason: SDUPTHER

## 2018-01-01 RX ORDER — PANTOPRAZOLE SODIUM 40 MG/1
40 TABLET, DELAYED RELEASE ORAL
Status: CANCELLED | OUTPATIENT
Start: 2018-01-01

## 2018-01-01 RX ORDER — LISINOPRIL 5 MG/1
5 TABLET ORAL 2 TIMES DAILY
Qty: 30 TABLET | Refills: 3 | Status: ON HOLD | OUTPATIENT
Start: 2018-01-01 | End: 2018-01-01

## 2018-01-01 RX ORDER — POTASSIUM CHLORIDE 7.45 MG/ML
10 INJECTION INTRAVENOUS PRN
Status: DISCONTINUED | OUTPATIENT
Start: 2018-01-01 | End: 2018-01-01 | Stop reason: HOSPADM

## 2018-01-01 RX ORDER — OXYCODONE HYDROCHLORIDE 5 MG/1
10 TABLET ORAL EVERY 6 HOURS PRN
Status: DISCONTINUED | OUTPATIENT
Start: 2018-01-01 | End: 2018-01-01

## 2018-01-01 RX ORDER — AMITRIPTYLINE HYDROCHLORIDE 75 MG/1
75 TABLET, FILM COATED ORAL NIGHTLY
Qty: 90 TABLET | Refills: 3 | Status: SHIPPED | OUTPATIENT
Start: 2018-01-01

## 2018-01-01 RX ORDER — LISINOPRIL 10 MG/1
5 TABLET ORAL ONCE
Status: COMPLETED | OUTPATIENT
Start: 2018-01-01 | End: 2018-01-01

## 2018-01-01 RX ORDER — LISINOPRIL 5 MG/1
5 TABLET ORAL 2 TIMES DAILY
Status: DISCONTINUED | OUTPATIENT
Start: 2018-01-01 | End: 2018-01-01

## 2018-01-01 RX ORDER — LISINOPRIL AND HYDROCHLOROTHIAZIDE 20; 12.5 MG/1; MG/1
1 TABLET ORAL 2 TIMES DAILY
Qty: 60 TABLET | Refills: 11 | Status: SHIPPED | OUTPATIENT
Start: 2018-01-01 | End: 2018-01-01 | Stop reason: SDUPTHER

## 2018-01-01 RX ORDER — MEGESTROL ACETATE 40 MG/ML
240 SUSPENSION ORAL
COMMUNITY

## 2018-01-01 RX ORDER — MAGNESIUM SULFATE 1 G/100ML
1 INJECTION INTRAVENOUS PRN
Status: DISCONTINUED | OUTPATIENT
Start: 2018-01-01 | End: 2018-01-01 | Stop reason: HOSPADM

## 2018-01-01 RX ORDER — METHYLPREDNISOLONE SODIUM SUCCINATE 125 MG/2ML
125 INJECTION, POWDER, LYOPHILIZED, FOR SOLUTION INTRAMUSCULAR; INTRAVENOUS ONCE
Status: CANCELLED | OUTPATIENT
Start: 2018-01-01 | End: 2018-01-01

## 2018-01-01 RX ORDER — PEN NEEDLE, DIABETIC 31 GX5/16"
NEEDLE, DISPOSABLE MISCELLANEOUS
COMMUNITY
Start: 2018-01-01

## 2018-01-01 RX ORDER — MORPHINE SULFATE 10 MG/ML
INJECTION, SOLUTION INTRAMUSCULAR; INTRAVENOUS PRN
Status: DISCONTINUED | OUTPATIENT
Start: 2018-01-01 | End: 2018-01-01 | Stop reason: SDUPTHER

## 2018-01-01 RX ORDER — INSULIN GLARGINE 100 [IU]/ML
5 INJECTION, SOLUTION SUBCUTANEOUS NIGHTLY
Qty: 1 VIAL | Refills: 3 | DISCHARGE
Start: 2018-01-01 | End: 2018-01-01

## 2018-01-01 RX ORDER — ONDANSETRON 2 MG/ML
4 INJECTION INTRAMUSCULAR; INTRAVENOUS EVERY 6 HOURS PRN
Status: DISCONTINUED | OUTPATIENT
Start: 2018-01-01 | End: 2018-01-01

## 2018-01-01 RX ORDER — ONDANSETRON 4 MG/1
4 TABLET, FILM COATED ORAL EVERY 6 HOURS PRN
Status: DISCONTINUED | OUTPATIENT
Start: 2018-01-01 | End: 2018-01-01 | Stop reason: HOSPADM

## 2018-01-01 RX ORDER — CIPROFLOXACIN 250 MG/1
250 TABLET, FILM COATED ORAL 2 TIMES DAILY
Qty: 20 TABLET | Refills: 0 | Status: SHIPPED | OUTPATIENT
Start: 2018-01-01 | End: 2018-01-01

## 2018-01-01 RX ORDER — NICOTINE POLACRILEX 4 MG
15 LOZENGE BUCCAL PRN
Status: CANCELLED | OUTPATIENT
Start: 2018-01-01

## 2018-01-01 RX ORDER — MIDODRINE HYDROCHLORIDE 2.5 MG/1
2.5 TABLET ORAL
Status: DISCONTINUED | OUTPATIENT
Start: 2018-01-01 | End: 2018-01-01

## 2018-01-01 RX ORDER — SODIUM BICARBONATE 650 MG/1
650 TABLET ORAL DAILY
Status: DISCONTINUED | OUTPATIENT
Start: 2018-01-01 | End: 2018-01-01 | Stop reason: HOSPADM

## 2018-01-01 RX ORDER — LORAZEPAM 2 MG/ML
0.5 INJECTION INTRAMUSCULAR
Status: ACTIVE | OUTPATIENT
Start: 2018-01-01 | End: 2018-01-01

## 2018-01-01 RX ORDER — SODIUM POLYSTYRENE SULFONATE 15 G/60ML
30 SUSPENSION ORAL; RECTAL
Status: DISPENSED | OUTPATIENT
Start: 2018-01-01 | End: 2018-01-01

## 2018-01-01 RX ORDER — OXYCODONE HYDROCHLORIDE 10 MG/1
10 TABLET ORAL EVERY 6 HOURS PRN
Qty: 88 TABLET | Refills: 0 | Status: ON HOLD | OUTPATIENT
Start: 2018-01-01 | End: 2018-01-01 | Stop reason: HOSPADM

## 2018-01-01 RX ORDER — INSULIN GLARGINE 100 [IU]/ML
60 INJECTION, SOLUTION SUBCUTANEOUS 2 TIMES DAILY
Qty: 11 VIAL | Refills: 3 | Status: ON HOLD | OUTPATIENT
Start: 2018-01-01 | End: 2018-01-01 | Stop reason: HOSPADM

## 2018-01-01 RX ORDER — AMLODIPINE BESYLATE 10 MG/1
10 TABLET ORAL DAILY
Status: DISCONTINUED | OUTPATIENT
Start: 2018-01-01 | End: 2018-01-01 | Stop reason: HOSPADM

## 2018-01-01 RX ORDER — OXYCODONE HYDROCHLORIDE 5 MG/1
10 TABLET ORAL EVERY 6 HOURS PRN
Status: CANCELLED | OUTPATIENT
Start: 2018-01-01

## 2018-01-01 RX ORDER — DOCUSATE SODIUM 100 MG/1
100 CAPSULE, LIQUID FILLED ORAL 2 TIMES DAILY
Status: CANCELLED | OUTPATIENT
Start: 2018-01-01

## 2018-01-01 RX ORDER — MEGESTROL ACETATE 20 MG/1
400 TABLET ORAL DAILY
Status: DISCONTINUED | OUTPATIENT
Start: 2018-01-01 | End: 2018-01-01 | Stop reason: RX

## 2018-01-01 RX ORDER — DOCUSATE SODIUM 100 MG/1
200 CAPSULE, LIQUID FILLED ORAL 2 TIMES DAILY
Status: CANCELLED | OUTPATIENT
Start: 2018-01-01

## 2018-01-01 RX ORDER — POTASSIUM CHLORIDE 7.45 MG/ML
30 INJECTION INTRAVENOUS ONCE
Status: COMPLETED | OUTPATIENT
Start: 2018-01-01 | End: 2018-01-01

## 2018-01-01 RX ORDER — ATORVASTATIN CALCIUM 20 MG/1
20 TABLET, FILM COATED ORAL DAILY
Status: DISCONTINUED | OUTPATIENT
Start: 2018-01-01 | End: 2018-01-01 | Stop reason: HOSPADM

## 2018-01-01 RX ORDER — ACETAMINOPHEN 500 MG
1000 TABLET ORAL 3 TIMES DAILY
Status: CANCELLED | OUTPATIENT
Start: 2018-01-01

## 2018-01-01 RX ORDER — ONDANSETRON 4 MG/1
4 TABLET, FILM COATED ORAL ONCE
Status: DISCONTINUED | OUTPATIENT
Start: 2018-01-01 | End: 2018-01-01

## 2018-01-01 RX ORDER — 0.9 % SODIUM CHLORIDE 0.9 %
10 VIAL (ML) INJECTION ONCE
Status: CANCELLED | OUTPATIENT
Start: 2018-01-01 | End: 2018-01-01

## 2018-01-01 RX ORDER — CYCLOBENZAPRINE HCL 5 MG
5 TABLET ORAL 3 TIMES DAILY PRN
Qty: 60 TABLET | Refills: 0 | Status: SHIPPED | OUTPATIENT
Start: 2018-01-01 | End: 2018-01-01 | Stop reason: SDUPTHER

## 2018-01-01 RX ORDER — GABAPENTIN 100 MG/1
100 CAPSULE ORAL 3 TIMES DAILY
Qty: 90 CAPSULE | Refills: 0 | Status: ON HOLD | OUTPATIENT
Start: 2018-01-01 | End: 2018-01-01

## 2018-01-01 RX ORDER — DEXAMETHASONE SODIUM PHOSPHATE 10 MG/ML
4 INJECTION INTRAMUSCULAR; INTRAVENOUS DAILY
Status: DISCONTINUED | OUTPATIENT
Start: 2018-01-01 | End: 2018-01-01

## 2018-01-01 RX ORDER — ATORVASTATIN CALCIUM 20 MG/1
1 TABLET, FILM COATED ORAL DAILY
Status: DISCONTINUED | OUTPATIENT
Start: 2018-01-01 | End: 2018-01-01

## 2018-01-01 RX ORDER — OXYCODONE HCL 10 MG/1
10 TABLET, FILM COATED, EXTENDED RELEASE ORAL EVERY 6 HOURS PRN
Status: DISCONTINUED | OUTPATIENT
Start: 2018-01-01 | End: 2018-01-01

## 2018-01-01 RX ORDER — CEFUROXIME AXETIL 500 MG/1
500 TABLET ORAL EVERY 12 HOURS SCHEDULED
Status: COMPLETED | OUTPATIENT
Start: 2018-01-01 | End: 2018-01-01

## 2018-01-01 RX ORDER — ONDANSETRON 2 MG/ML
4 INJECTION INTRAMUSCULAR; INTRAVENOUS EVERY 6 HOURS PRN
Status: DISCONTINUED | OUTPATIENT
Start: 2018-01-01 | End: 2018-01-01 | Stop reason: ALTCHOICE

## 2018-01-01 RX ORDER — OXYCODONE HCL 10 MG/1
10 TABLET, FILM COATED, EXTENDED RELEASE ORAL 2 TIMES DAILY
Qty: 60 TABLET | Refills: 0 | Status: SHIPPED | OUTPATIENT
Start: 2018-01-01 | End: 2018-01-01 | Stop reason: SDUPTHER

## 2018-01-01 RX ORDER — SODIUM CHLORIDE 0.9 % (FLUSH) 0.9 %
5 SYRINGE (ML) INJECTION PRN
Status: CANCELLED | OUTPATIENT
Start: 2018-01-01

## 2018-01-01 RX ORDER — PHYTONADIONE 5 MG/1
5 TABLET ORAL ONCE
Status: COMPLETED | OUTPATIENT
Start: 2018-01-01 | End: 2018-01-01

## 2018-01-01 RX ORDER — FUROSEMIDE 40 MG/1
40 TABLET ORAL DAILY
Qty: 60 TABLET | Refills: 3 | Status: CANCELLED | OUTPATIENT
Start: 2018-01-01

## 2018-01-01 RX ORDER — DOCUSATE SODIUM 100 MG/1
100 CAPSULE, LIQUID FILLED ORAL 2 TIMES DAILY
Status: DISCONTINUED | OUTPATIENT
Start: 2018-01-01 | End: 2018-01-01

## 2018-01-01 RX ORDER — 0.9 % SODIUM CHLORIDE 0.9 %
250 INTRAVENOUS SOLUTION INTRAVENOUS ONCE
Status: DISCONTINUED | OUTPATIENT
Start: 2018-01-01 | End: 2018-01-01

## 2018-01-01 RX ORDER — MORPHINE SULFATE 2 MG/ML
2 INJECTION, SOLUTION INTRAMUSCULAR; INTRAVENOUS
Status: DISCONTINUED | OUTPATIENT
Start: 2018-01-01 | End: 2018-01-01 | Stop reason: RX

## 2018-01-01 RX ORDER — LISINOPRIL 5 MG/1
5 TABLET ORAL 2 TIMES DAILY
Status: CANCELLED | OUTPATIENT
Start: 2018-01-01

## 2018-01-01 RX ORDER — SUMATRIPTAN 50 MG/1
100 TABLET, FILM COATED ORAL DAILY PRN
Status: DISCONTINUED | OUTPATIENT
Start: 2018-01-01 | End: 2018-01-01 | Stop reason: HOSPADM

## 2018-01-01 RX ORDER — FENTANYL CITRATE 50 UG/ML
INJECTION, SOLUTION INTRAMUSCULAR; INTRAVENOUS PRN
Status: DISCONTINUED | OUTPATIENT
Start: 2018-01-01 | End: 2018-01-01 | Stop reason: SDUPTHER

## 2018-01-01 RX ORDER — OXYCODONE HYDROCHLORIDE 10 MG/1
10 TABLET ORAL EVERY 6 HOURS PRN
Qty: 30 TABLET | Refills: 0 | Status: SHIPPED | OUTPATIENT
Start: 2018-01-01 | End: 2018-01-01 | Stop reason: SDUPTHER

## 2018-01-01 RX ORDER — OXYCODONE HYDROCHLORIDE 10 MG/1
10 TABLET ORAL EVERY 6 HOURS PRN
Qty: 4 TABLET | Refills: 0 | Status: SHIPPED | OUTPATIENT
Start: 2018-01-01 | End: 2018-01-01

## 2018-01-01 RX ORDER — CYCLOBENZAPRINE HCL 10 MG
5 TABLET ORAL 2 TIMES DAILY PRN
Status: DISCONTINUED | OUTPATIENT
Start: 2018-01-01 | End: 2018-01-01 | Stop reason: HOSPADM

## 2018-01-01 RX ORDER — SUCCINYLCHOLINE CHLORIDE 20 MG/ML
INJECTION INTRAMUSCULAR; INTRAVENOUS PRN
Status: DISCONTINUED | OUTPATIENT
Start: 2018-01-01 | End: 2018-01-01 | Stop reason: SDUPTHER

## 2018-01-01 RX ORDER — SODIUM BICARBONATE 42 MG/ML
INJECTION, SOLUTION INTRAVENOUS PRN
Status: DISCONTINUED | OUTPATIENT
Start: 2018-01-01 | End: 2018-01-01 | Stop reason: SDUPTHER

## 2018-01-01 RX ORDER — CARVEDILOL 6.25 MG/1
6.25 TABLET ORAL 2 TIMES DAILY WITH MEALS
Status: DISCONTINUED | OUTPATIENT
Start: 2018-01-01 | End: 2018-01-01 | Stop reason: HOSPADM

## 2018-01-01 RX ORDER — OXYCODONE HYDROCHLORIDE 10 MG/1
10 TABLET ORAL EVERY 4 HOURS PRN
Qty: 15 TABLET | Refills: 0 | Status: SHIPPED | OUTPATIENT
Start: 2018-01-01 | End: 2018-01-01

## 2018-01-01 RX ORDER — CARVEDILOL 6.25 MG/1
6.25 TABLET ORAL 2 TIMES DAILY WITH MEALS
Status: DISCONTINUED | OUTPATIENT
Start: 2018-01-01 | End: 2018-01-01

## 2018-01-01 RX ORDER — SODIUM POLYSTYRENE SULFONATE 15 G/60ML
15 SUSPENSION ORAL; RECTAL
Status: DISPENSED | OUTPATIENT
Start: 2018-01-01 | End: 2018-01-01

## 2018-01-01 RX ORDER — DEXAMETHASONE 4 MG/1
4 TABLET ORAL DAILY
Status: DISCONTINUED | OUTPATIENT
Start: 2018-01-01 | End: 2018-01-01 | Stop reason: HOSPADM

## 2018-01-01 RX ORDER — OXYCODONE HCL 10 MG/1
10 TABLET, FILM COATED, EXTENDED RELEASE ORAL 2 TIMES DAILY
Qty: 60 TABLET | Refills: 0 | Status: ON HOLD | OUTPATIENT
Start: 2018-01-01 | End: 2018-01-01

## 2018-01-01 RX ORDER — HEPARIN SODIUM 1000 [USP'U]/ML
80 INJECTION, SOLUTION INTRAVENOUS; SUBCUTANEOUS PRN
Status: DISCONTINUED | OUTPATIENT
Start: 2018-01-01 | End: 2018-01-01

## 2018-01-01 RX ORDER — CYCLOBENZAPRINE HCL 5 MG
TABLET ORAL
Qty: 60 TABLET | Refills: 0 | Status: SHIPPED | OUTPATIENT
Start: 2018-01-01

## 2018-01-01 RX ORDER — PROCHLORPERAZINE MALEATE 10 MG
5 TABLET ORAL EVERY 8 HOURS PRN
Status: DISCONTINUED | OUTPATIENT
Start: 2018-01-01 | End: 2018-01-01 | Stop reason: HOSPADM

## 2018-01-01 RX ORDER — DIPHENHYDRAMINE HYDROCHLORIDE 50 MG/ML
12.5 INJECTION INTRAMUSCULAR; INTRAVENOUS
Status: DISCONTINUED | OUTPATIENT
Start: 2018-01-01 | End: 2018-01-01

## 2018-01-01 RX ORDER — ACETAMINOPHEN 500 MG
1000 TABLET ORAL EVERY 6 HOURS PRN
Status: DISCONTINUED | OUTPATIENT
Start: 2018-01-01 | End: 2018-01-01 | Stop reason: HOSPADM

## 2018-01-01 RX ORDER — SODIUM CHLORIDE 0.9 % (FLUSH) 0.9 %
10 SYRINGE (ML) INJECTION EVERY 12 HOURS SCHEDULED
Status: CANCELLED | OUTPATIENT
Start: 2018-01-01

## 2018-01-01 RX ORDER — ROCURONIUM BROMIDE 10 MG/ML
INJECTION, SOLUTION INTRAVENOUS PRN
Status: DISCONTINUED | OUTPATIENT
Start: 2018-01-01 | End: 2018-01-01 | Stop reason: SDUPTHER

## 2018-01-01 RX ORDER — MEGESTROL ACETATE 40 MG/ML
400 SUSPENSION ORAL DAILY
Qty: 240 ML | Refills: 3 | Status: SHIPPED | OUTPATIENT
Start: 2018-01-01 | End: 2018-01-01 | Stop reason: ALTCHOICE

## 2018-01-01 RX ORDER — POTASSIUM CHLORIDE 7.45 MG/ML
10 INJECTION INTRAVENOUS
Status: DISCONTINUED | OUTPATIENT
Start: 2018-01-01 | End: 2018-01-01

## 2018-01-01 RX ORDER — BLOOD-GLUCOSE METER
KIT MISCELLANEOUS
Qty: 1 KIT | Refills: 0 | Status: ON HOLD | OUTPATIENT
Start: 2018-01-01 | End: 2018-01-01 | Stop reason: HOSPADM

## 2018-01-01 RX ORDER — ACETAMINOPHEN 325 MG/1
650 TABLET ORAL EVERY 4 HOURS PRN
Status: CANCELLED | OUTPATIENT
Start: 2018-01-01

## 2018-01-01 RX ORDER — DOCUSATE SODIUM 100 MG/1
100 CAPSULE, LIQUID FILLED ORAL 2 TIMES DAILY
Status: DISCONTINUED | OUTPATIENT
Start: 2018-01-01 | End: 2018-01-01 | Stop reason: HOSPADM

## 2018-01-01 RX ORDER — ROPINIROLE 0.25 MG/1
0.5 TABLET, FILM COATED ORAL 3 TIMES DAILY
Status: DISCONTINUED | OUTPATIENT
Start: 2018-01-01 | End: 2018-01-01 | Stop reason: HOSPADM

## 2018-01-01 RX ORDER — DOCUSATE SODIUM 100 MG/1
200 CAPSULE, LIQUID FILLED ORAL 2 TIMES DAILY
Qty: 120 CAPSULE | Refills: 11 | Status: SHIPPED | OUTPATIENT
Start: 2018-01-01

## 2018-01-01 RX ORDER — OXYCODONE HYDROCHLORIDE 10 MG/1
10 TABLET ORAL EVERY 8 HOURS PRN
Qty: 90 TABLET | Refills: 0 | Status: SHIPPED | OUTPATIENT
Start: 2018-01-01 | End: 2018-01-01 | Stop reason: SDUPTHER

## 2018-01-01 RX ORDER — INSULIN GLARGINE 100 [IU]/ML
60 INJECTION, SOLUTION SUBCUTANEOUS 2 TIMES DAILY
Qty: 11 VIAL | Refills: 3 | Status: SHIPPED | OUTPATIENT
Start: 2018-01-01 | End: 2018-01-01 | Stop reason: SDUPTHER

## 2018-01-01 RX ORDER — FAMOTIDINE 20 MG/1
20 TABLET, FILM COATED ORAL DAILY
Status: DISCONTINUED | OUTPATIENT
Start: 2018-01-01 | End: 2018-01-01 | Stop reason: HOSPADM

## 2018-01-01 RX ORDER — CEFAZOLIN SODIUM 1 G/50ML
1 INJECTION, SOLUTION INTRAVENOUS ONCE
Status: COMPLETED | OUTPATIENT
Start: 2018-01-01 | End: 2018-01-01

## 2018-01-01 RX ORDER — MORPHINE SULFATE 2 MG/ML
2 INJECTION, SOLUTION INTRAMUSCULAR; INTRAVENOUS
Status: DISCONTINUED | OUTPATIENT
Start: 2018-01-01 | End: 2018-01-01

## 2018-01-01 RX ORDER — FENTANYL CITRATE 50 UG/ML
INJECTION, SOLUTION INTRAMUSCULAR; INTRAVENOUS
Status: COMPLETED | OUTPATIENT
Start: 2018-01-01 | End: 2018-01-01

## 2018-01-01 RX ORDER — OXYCODONE HYDROCHLORIDE 5 MG/1
10 TABLET ORAL EVERY 4 HOURS PRN
Status: DISCONTINUED | OUTPATIENT
Start: 2018-01-01 | End: 2018-01-01 | Stop reason: HOSPADM

## 2018-01-01 RX ADMIN — ENOXAPARIN SODIUM 100 MG: 100 INJECTION SUBCUTANEOUS at 11:20

## 2018-01-01 RX ADMIN — AMITRIPTYLINE HYDROCHLORIDE 75 MG: 50 TABLET, FILM COATED ORAL at 21:43

## 2018-01-01 RX ADMIN — OXYCODONE HYDROCHLORIDE 10 MG: 5 TABLET ORAL at 13:33

## 2018-01-01 RX ADMIN — INSULIN LISPRO 1 UNITS: 100 INJECTION, SOLUTION INTRAVENOUS; SUBCUTANEOUS at 22:47

## 2018-01-01 RX ADMIN — DOCUSATE SODIUM 200 MG: 100 CAPSULE, LIQUID FILLED ORAL at 21:12

## 2018-01-01 RX ADMIN — MEGESTROL ACETATE 400 MG: 40 SUSPENSION ORAL at 08:52

## 2018-01-01 RX ADMIN — MIDODRINE HYDROCHLORIDE 2.5 MG: 2.5 TABLET ORAL at 21:07

## 2018-01-01 RX ADMIN — Medication 1250 MG: at 15:03

## 2018-01-01 RX ADMIN — OXYCODONE HYDROCHLORIDE 10 MG: 5 TABLET ORAL at 15:37

## 2018-01-01 RX ADMIN — LEVOTHYROXINE SODIUM 100 MCG: 100 TABLET ORAL at 07:53

## 2018-01-01 RX ADMIN — ATORVASTATIN CALCIUM 20 MG: 20 TABLET, FILM COATED ORAL at 08:47

## 2018-01-01 RX ADMIN — DOCUSATE SODIUM 200 MG: 100 CAPSULE ORAL at 08:55

## 2018-01-01 RX ADMIN — CYCLOBENZAPRINE HYDROCHLORIDE 10 MG: 10 TABLET, FILM COATED ORAL at 09:15

## 2018-01-01 RX ADMIN — CYCLOBENZAPRINE HYDROCHLORIDE 5 MG: 5 TABLET, FILM COATED ORAL at 12:27

## 2018-01-01 RX ADMIN — OXYCODONE HYDROCHLORIDE 10 MG: 10 TABLET, FILM COATED, EXTENDED RELEASE ORAL at 23:22

## 2018-01-01 RX ADMIN — HYDROMORPHONE HYDROCHLORIDE 0.25 MG: 1 INJECTION, SOLUTION INTRAMUSCULAR; INTRAVENOUS; SUBCUTANEOUS at 07:47

## 2018-01-01 RX ADMIN — FUROSEMIDE 40 MG: 10 INJECTION, SOLUTION INTRAMUSCULAR; INTRAVENOUS at 20:59

## 2018-01-01 RX ADMIN — AMITRIPTYLINE HYDROCHLORIDE 75 MG: 50 TABLET, FILM COATED ORAL at 20:26

## 2018-01-01 RX ADMIN — WARFARIN SODIUM 2.5 MG: 2.5 TABLET ORAL at 17:44

## 2018-01-01 RX ADMIN — ACETAMINOPHEN 1000 MG: 500 TABLET ORAL at 14:04

## 2018-01-01 RX ADMIN — OXYCODONE HYDROCHLORIDE 10 MG: 5 TABLET ORAL at 13:35

## 2018-01-01 RX ADMIN — ACETAMINOPHEN 1000 MG: 500 TABLET ORAL at 14:33

## 2018-01-01 RX ADMIN — ATORVASTATIN CALCIUM 20 MG: 20 TABLET, FILM COATED ORAL at 09:15

## 2018-01-01 RX ADMIN — PANTOPRAZOLE SODIUM 40 MG: 40 TABLET, DELAYED RELEASE ORAL at 06:53

## 2018-01-01 RX ADMIN — PIPERACILLIN SODIUM,TAZOBACTAM SODIUM 2.25 G: 2; .25 INJECTION, POWDER, FOR SOLUTION INTRAVENOUS at 15:30

## 2018-01-01 RX ADMIN — FUROSEMIDE 40 MG: 40 TABLET ORAL at 08:41

## 2018-01-01 RX ADMIN — MORPHINE SULFATE 2 MG: 2 INJECTION, SOLUTION INTRAMUSCULAR; INTRAVENOUS at 06:24

## 2018-01-01 RX ADMIN — PANTOPRAZOLE SODIUM 40 MG: 40 TABLET, DELAYED RELEASE ORAL at 05:53

## 2018-01-01 RX ADMIN — DOCUSATE SODIUM 200 MG: 100 CAPSULE ORAL at 08:21

## 2018-01-01 RX ADMIN — MIDODRINE HYDROCHLORIDE 10 MG: 5 TABLET ORAL at 14:34

## 2018-01-01 RX ADMIN — Medication 1.6 ML: at 12:22

## 2018-01-01 RX ADMIN — OXYCODONE HYDROCHLORIDE 10 MG: 5 TABLET ORAL at 13:48

## 2018-01-01 RX ADMIN — SODIUM CHLORIDE 8 MG/HR: 9 INJECTION, SOLUTION INTRAVENOUS at 21:33

## 2018-01-01 RX ADMIN — SODIUM CHLORIDE: 9 INJECTION, SOLUTION INTRAVENOUS at 17:12

## 2018-01-01 RX ADMIN — LIDOCAINE HYDROCHLORIDE 50 MG: 10 INJECTION, SOLUTION EPIDURAL; INFILTRATION; INTRACAUDAL at 11:11

## 2018-01-01 RX ADMIN — AMITRIPTYLINE HYDROCHLORIDE 75 MG: 50 TABLET, FILM COATED ORAL at 21:26

## 2018-01-01 RX ADMIN — SODIUM CHLORIDE 8 MG/HR: 9 INJECTION, SOLUTION INTRAVENOUS at 13:03

## 2018-01-01 RX ADMIN — ATORVASTATIN CALCIUM 20 MG: 20 TABLET, FILM COATED ORAL at 20:43

## 2018-01-01 RX ADMIN — CYCLOBENZAPRINE 5 MG: 10 TABLET, FILM COATED ORAL at 00:53

## 2018-01-01 RX ADMIN — OXYCODONE HYDROCHLORIDE 10 MG: 10 TABLET, FILM COATED, EXTENDED RELEASE ORAL at 20:36

## 2018-01-01 RX ADMIN — OXYCODONE HYDROCHLORIDE 10 MG: 10 TABLET, FILM COATED, EXTENDED RELEASE ORAL at 09:20

## 2018-01-01 RX ADMIN — Medication 1 TABLET: at 09:16

## 2018-01-01 RX ADMIN — POTASSIUM CHLORIDE 10 MEQ: 7.46 INJECTION, SOLUTION INTRAVENOUS at 16:18

## 2018-01-01 RX ADMIN — AMITRIPTYLINE HYDROCHLORIDE 75 MG: 50 TABLET, FILM COATED ORAL at 02:50

## 2018-01-01 RX ADMIN — TRAMADOL HYDROCHLORIDE 100 MG: 50 TABLET, FILM COATED ORAL at 01:07

## 2018-01-01 RX ADMIN — ACETAMINOPHEN 1000 MG: 500 TABLET ORAL at 22:19

## 2018-01-01 RX ADMIN — ALPRAZOLAM 0.5 MG: 0.5 TABLET ORAL at 10:44

## 2018-01-01 RX ADMIN — SODIUM BICARBONATE 650 MG: 650 TABLET ORAL at 08:40

## 2018-01-01 RX ADMIN — SODIUM BICARBONATE 650 MG: 650 TABLET ORAL at 08:42

## 2018-01-01 RX ADMIN — MEGESTROL ACETATE 400 MG: 40 SUSPENSION ORAL at 09:27

## 2018-01-01 RX ADMIN — FLUDROCORTISONE ACETATE 0.1 MG: 0.1 TABLET ORAL at 12:43

## 2018-01-01 RX ADMIN — POLYETHYLENE GLYCOL 3350, SODIUM SULFATE ANHYDROUS, SODIUM BICARBONATE, SODIUM CHLORIDE, POTASSIUM CHLORIDE 4000 ML: 236; 22.74; 6.74; 5.86; 2.97 POWDER, FOR SOLUTION ORAL at 15:32

## 2018-01-01 RX ADMIN — INSULIN LISPRO 10 UNITS: 100 INJECTION, SOLUTION INTRAVENOUS; SUBCUTANEOUS at 23:19

## 2018-01-01 RX ADMIN — ALBUMIN (HUMAN) 25 G: 0.25 INJECTION, SOLUTION INTRAVENOUS at 10:32

## 2018-01-01 RX ADMIN — GABAPENTIN 100 MG: 100 CAPSULE ORAL at 14:28

## 2018-01-01 RX ADMIN — GABAPENTIN 100 MG: 100 CAPSULE ORAL at 21:33

## 2018-01-01 RX ADMIN — CYCLOBENZAPRINE HYDROCHLORIDE 5 MG: 5 TABLET, FILM COATED ORAL at 21:12

## 2018-01-01 RX ADMIN — PIPERACILLIN SODIUM,TAZOBACTAM SODIUM 2.25 G: 2; .25 INJECTION, POWDER, FOR SOLUTION INTRAVENOUS at 22:59

## 2018-01-01 RX ADMIN — CEFUROXIME AXETIL 250 MG: 250 TABLET ORAL at 20:43

## 2018-01-01 RX ADMIN — HYDROMORPHONE HYDROCHLORIDE 0.5 MG: 1 INJECTION, SOLUTION INTRAMUSCULAR; INTRAVENOUS; SUBCUTANEOUS at 16:10

## 2018-01-01 RX ADMIN — OXYCODONE HYDROCHLORIDE 10 MG: 5 TABLET ORAL at 17:44

## 2018-01-01 RX ADMIN — SODIUM CHLORIDE 8 MG/HR: 9 INJECTION, SOLUTION INTRAVENOUS at 10:18

## 2018-01-01 RX ADMIN — INSULIN LISPRO 2 UNITS: 100 INJECTION, SOLUTION INTRAVENOUS; SUBCUTANEOUS at 17:01

## 2018-01-01 RX ADMIN — OXYCODONE HYDROCHLORIDE 10 MG: 10 TABLET, FILM COATED, EXTENDED RELEASE ORAL at 14:54

## 2018-01-01 RX ADMIN — ACETAMINOPHEN 1000 MG: 500 TABLET ORAL at 18:23

## 2018-01-01 RX ADMIN — OXYCODONE HYDROCHLORIDE 10 MG: 5 TABLET ORAL at 06:42

## 2018-01-01 RX ADMIN — ENOXAPARIN SODIUM 100 MG: 100 INJECTION SUBCUTANEOUS at 09:02

## 2018-01-01 RX ADMIN — DOCUSATE SODIUM 200 MG: 100 CAPSULE ORAL at 23:13

## 2018-01-01 RX ADMIN — CYCLOBENZAPRINE HYDROCHLORIDE 5 MG: 5 TABLET, FILM COATED ORAL at 22:49

## 2018-01-01 RX ADMIN — HYDROMORPHONE HYDROCHLORIDE 0.5 MG: 1 INJECTION, SOLUTION INTRAMUSCULAR; INTRAVENOUS; SUBCUTANEOUS at 07:47

## 2018-01-01 RX ADMIN — PANTOPRAZOLE SODIUM 40 MG: 40 TABLET, DELAYED RELEASE ORAL at 06:43

## 2018-01-01 RX ADMIN — ROPINIROLE HYDROCHLORIDE 0.5 MG: 0.25 TABLET, FILM COATED ORAL at 22:06

## 2018-01-01 RX ADMIN — PANTOPRAZOLE SODIUM 40 MG: 40 TABLET, DELAYED RELEASE ORAL at 06:47

## 2018-01-01 RX ADMIN — OXYCODONE HYDROCHLORIDE 10 MG: 5 TABLET ORAL at 21:31

## 2018-01-01 RX ADMIN — GABAPENTIN 100 MG: 100 CAPSULE ORAL at 17:36

## 2018-01-01 RX ADMIN — OXYCODONE HYDROCHLORIDE 10 MG: 5 TABLET ORAL at 09:23

## 2018-01-01 RX ADMIN — CYCLOBENZAPRINE 5 MG: 10 TABLET, FILM COATED ORAL at 22:56

## 2018-01-01 RX ADMIN — OXYCODONE HYDROCHLORIDE 10 MG: 10 TABLET, FILM COATED, EXTENDED RELEASE ORAL at 17:33

## 2018-01-01 RX ADMIN — DOCUSATE SODIUM 200 MG: 100 CAPSULE ORAL at 09:08

## 2018-01-01 RX ADMIN — MORPHINE SULFATE 2 MG: 2 INJECTION, SOLUTION INTRAMUSCULAR; INTRAVENOUS at 00:49

## 2018-01-01 RX ADMIN — Medication 10 ML: at 20:58

## 2018-01-01 RX ADMIN — HYDROMORPHONE HYDROCHLORIDE 0.25 MG: 1 INJECTION, SOLUTION INTRAMUSCULAR; INTRAVENOUS; SUBCUTANEOUS at 01:18

## 2018-01-01 RX ADMIN — MIDAZOLAM HYDROCHLORIDE 2 MG: 1 INJECTION, SOLUTION INTRAMUSCULAR; INTRAVENOUS at 08:29

## 2018-01-01 RX ADMIN — OXYCODONE HYDROCHLORIDE 10 MG: 5 TABLET ORAL at 07:52

## 2018-01-01 RX ADMIN — Medication 10 ML: at 21:00

## 2018-01-01 RX ADMIN — Medication 10 ML: at 15:22

## 2018-01-01 RX ADMIN — Medication 10 ML: at 09:29

## 2018-01-01 RX ADMIN — HYDROMORPHONE HYDROCHLORIDE 0.5 MG: 1 INJECTION, SOLUTION INTRAMUSCULAR; INTRAVENOUS; SUBCUTANEOUS at 06:49

## 2018-01-01 RX ADMIN — BISACODYL 20 MG: 5 TABLET, DELAYED RELEASE ORAL at 12:59

## 2018-01-01 RX ADMIN — OXYCODONE HYDROCHLORIDE AND ACETAMINOPHEN 2 TABLET: 5; 325 TABLET ORAL at 03:53

## 2018-01-01 RX ADMIN — HEPARIN SODIUM 5000 UNITS: 5000 INJECTION, SOLUTION INTRAVENOUS; SUBCUTANEOUS at 05:37

## 2018-01-01 RX ADMIN — OXYCODONE HYDROCHLORIDE 10 MG: 5 TABLET ORAL at 00:19

## 2018-01-01 RX ADMIN — AMLODIPINE BESYLATE 10 MG: 10 TABLET ORAL at 09:27

## 2018-01-01 RX ADMIN — LISINOPRIL 5 MG: 5 TABLET ORAL at 16:27

## 2018-01-01 RX ADMIN — HYDROMORPHONE HYDROCHLORIDE 0.5 MG: 1 INJECTION, SOLUTION INTRAMUSCULAR; INTRAVENOUS; SUBCUTANEOUS at 17:36

## 2018-01-01 RX ADMIN — TRAMADOL HYDROCHLORIDE 100 MG: 50 TABLET, FILM COATED ORAL at 02:06

## 2018-01-01 RX ADMIN — HYDROMORPHONE HYDROCHLORIDE 0.5 MG: 1 INJECTION, SOLUTION INTRAMUSCULAR; INTRAVENOUS; SUBCUTANEOUS at 20:37

## 2018-01-01 RX ADMIN — Medication 10 ML: at 08:12

## 2018-01-01 RX ADMIN — HEPARIN SODIUM 5000 UNITS: 5000 INJECTION, SOLUTION INTRAVENOUS; SUBCUTANEOUS at 21:33

## 2018-01-01 RX ADMIN — MEGESTROL ACETATE 400 MG: 40 SUSPENSION ORAL at 11:25

## 2018-01-01 RX ADMIN — ROPINIROLE HYDROCHLORIDE 0.5 MG: 0.25 TABLET, FILM COATED ORAL at 08:16

## 2018-01-01 RX ADMIN — Medication 10 ML: at 12:47

## 2018-01-01 RX ADMIN — OXYCODONE HYDROCHLORIDE 10 MG: 5 TABLET ORAL at 08:40

## 2018-01-01 RX ADMIN — DOCUSATE SODIUM 200 MG: 100 CAPSULE ORAL at 08:28

## 2018-01-01 RX ADMIN — CALCIUM GLUCONATE 1 G: 98 INJECTION, SOLUTION INTRAVENOUS at 16:56

## 2018-01-01 RX ADMIN — OXYCODONE HYDROCHLORIDE 10 MG: 5 TABLET ORAL at 16:41

## 2018-01-01 RX ADMIN — INSULIN GLARGINE 5 UNITS: 100 INJECTION, SOLUTION SUBCUTANEOUS at 20:36

## 2018-01-01 RX ADMIN — HEPARIN SODIUM 5000 UNITS: 5000 INJECTION, SOLUTION INTRAVENOUS; SUBCUTANEOUS at 09:10

## 2018-01-01 RX ADMIN — CYCLOBENZAPRINE HYDROCHLORIDE 10 MG: 10 TABLET, FILM COATED ORAL at 21:41

## 2018-01-01 RX ADMIN — INSULIN LISPRO 1 UNITS: 100 INJECTION, SOLUTION INTRAVENOUS; SUBCUTANEOUS at 22:01

## 2018-01-01 RX ADMIN — SODIUM CHLORIDE 1000 ML: 9 INJECTION, SOLUTION INTRAVENOUS at 23:46

## 2018-01-01 RX ADMIN — ACETAMINOPHEN 1000 MG: 500 TABLET ORAL at 08:50

## 2018-01-01 RX ADMIN — AMITRIPTYLINE HYDROCHLORIDE 75 MG: 50 TABLET, FILM COATED ORAL at 22:04

## 2018-01-01 RX ADMIN — OXYCODONE HYDROCHLORIDE 10 MG: 10 TABLET, FILM COATED, EXTENDED RELEASE ORAL at 09:27

## 2018-01-01 RX ADMIN — DOCUSATE SODIUM 200 MG: 100 CAPSULE ORAL at 21:38

## 2018-01-01 RX ADMIN — PROCHLORPERAZINE MALEATE 5 MG: 5 TABLET, FILM COATED ORAL at 11:24

## 2018-01-01 RX ADMIN — CYCLOBENZAPRINE HYDROCHLORIDE 5 MG: 5 TABLET, FILM COATED ORAL at 13:17

## 2018-01-01 RX ADMIN — AMLODIPINE BESYLATE 10 MG: 10 TABLET ORAL at 12:34

## 2018-01-01 RX ADMIN — OXYCODONE HYDROCHLORIDE 10 MG: 5 TABLET ORAL at 13:01

## 2018-01-01 RX ADMIN — CYCLOBENZAPRINE HYDROCHLORIDE 5 MG: 5 TABLET, FILM COATED ORAL at 20:28

## 2018-01-01 RX ADMIN — ONDANSETRON HYDROCHLORIDE 4 MG: 4 TABLET, FILM COATED ORAL at 06:09

## 2018-01-01 RX ADMIN — SODIUM BICARBONATE: 84 INJECTION, SOLUTION INTRAVENOUS at 23:19

## 2018-01-01 RX ADMIN — LORAZEPAM 1 MG: 2 INJECTION INTRAMUSCULAR; INTRAVENOUS at 15:15

## 2018-01-01 RX ADMIN — PANTOPRAZOLE SODIUM 40 MG: 40 TABLET, DELAYED RELEASE ORAL at 09:29

## 2018-01-01 RX ADMIN — OXYCODONE HYDROCHLORIDE 10 MG: 5 TABLET ORAL at 11:42

## 2018-01-01 RX ADMIN — HEPARIN SODIUM AND DEXTROSE 20 UNITS/KG/HR: 10000; 5 INJECTION INTRAVENOUS at 05:12

## 2018-01-01 RX ADMIN — ATORVASTATIN CALCIUM 20 MG: 20 TABLET, FILM COATED ORAL at 07:53

## 2018-01-01 RX ADMIN — MORPHINE SULFATE 4 MG: 4 INJECTION INTRAVENOUS at 02:36

## 2018-01-01 RX ADMIN — DEXAMETHASONE SODIUM PHOSPHATE 4 MG: 4 INJECTION, SOLUTION INTRAMUSCULAR; INTRAVENOUS at 02:18

## 2018-01-01 RX ADMIN — CYCLOBENZAPRINE 5 MG: 10 TABLET, FILM COATED ORAL at 11:19

## 2018-01-01 RX ADMIN — PROCHLORPERAZINE MALEATE 5 MG: 5 TABLET, FILM COATED ORAL at 08:15

## 2018-01-01 RX ADMIN — OXYCODONE HYDROCHLORIDE 10 MG: 5 TABLET ORAL at 03:04

## 2018-01-01 RX ADMIN — CYCLOBENZAPRINE HYDROCHLORIDE 5 MG: 5 TABLET, FILM COATED ORAL at 11:44

## 2018-01-01 RX ADMIN — INSULIN LISPRO 1 UNITS: 100 INJECTION, SOLUTION INTRAVENOUS; SUBCUTANEOUS at 23:15

## 2018-01-01 RX ADMIN — PIPERACILLIN SODIUM,TAZOBACTAM SODIUM 2.25 G: 2; .25 INJECTION, POWDER, FOR SOLUTION INTRAVENOUS at 00:23

## 2018-01-01 RX ADMIN — INSULIN LISPRO 2 UNITS: 100 INJECTION, SOLUTION INTRAVENOUS; SUBCUTANEOUS at 16:47

## 2018-01-01 RX ADMIN — FENTANYL CITRATE 50 MCG: 50 INJECTION INTRAMUSCULAR; INTRAVENOUS at 11:45

## 2018-01-01 RX ADMIN — HYDROCODONE BITARTRATE AND ACETAMINOPHEN 1 TABLET: 5; 325 TABLET ORAL at 12:34

## 2018-01-01 RX ADMIN — MORPHINE SULFATE 5 MG: 10 INJECTION INTRAVENOUS at 12:03

## 2018-01-01 RX ADMIN — TRAMADOL HYDROCHLORIDE 100 MG: 50 TABLET, FILM COATED ORAL at 20:15

## 2018-01-01 RX ADMIN — MORPHINE SULFATE 1 MG: 2 INJECTION, SOLUTION INTRAMUSCULAR; INTRAVENOUS at 09:05

## 2018-01-01 RX ADMIN — ROPINIROLE HYDROCHLORIDE 0.5 MG: 0.25 TABLET, FILM COATED ORAL at 15:13

## 2018-01-01 RX ADMIN — GABAPENTIN 100 MG: 100 CAPSULE ORAL at 21:12

## 2018-01-01 RX ADMIN — OXYCODONE HYDROCHLORIDE 10 MG: 5 TABLET ORAL at 06:53

## 2018-01-01 RX ADMIN — GABAPENTIN 100 MG: 100 CAPSULE ORAL at 08:00

## 2018-01-01 RX ADMIN — ACETAMINOPHEN 1000 MG: 500 TABLET ORAL at 21:33

## 2018-01-01 RX ADMIN — MORPHINE SULFATE 1 MG: 2 INJECTION, SOLUTION INTRAMUSCULAR; INTRAVENOUS at 04:51

## 2018-01-01 RX ADMIN — MORPHINE SULFATE 2 MG: 2 INJECTION, SOLUTION INTRAMUSCULAR; INTRAVENOUS at 10:07

## 2018-01-01 RX ADMIN — PROMETHAZINE HYDROCHLORIDE 12.5 MG: 25 INJECTION INTRAMUSCULAR; INTRAVENOUS at 09:17

## 2018-01-01 RX ADMIN — HEPARIN SODIUM 1600 UNITS: 1000 INJECTION, SOLUTION INTRAVENOUS; SUBCUTANEOUS at 12:45

## 2018-01-01 RX ADMIN — OXYCODONE HYDROCHLORIDE 10 MG: 10 TABLET, FILM COATED, EXTENDED RELEASE ORAL at 23:04

## 2018-01-01 RX ADMIN — ATORVASTATIN CALCIUM 20 MG: 20 TABLET, FILM COATED ORAL at 22:21

## 2018-01-01 RX ADMIN — CYCLOBENZAPRINE HYDROCHLORIDE 5 MG: 5 TABLET, FILM COATED ORAL at 19:17

## 2018-01-01 RX ADMIN — HEPARIN SODIUM 5000 UNITS: 5000 INJECTION, SOLUTION INTRAVENOUS; SUBCUTANEOUS at 21:43

## 2018-01-01 RX ADMIN — AMITRIPTYLINE HYDROCHLORIDE 75 MG: 50 TABLET, FILM COATED ORAL at 20:20

## 2018-01-01 RX ADMIN — CEFUROXIME AXETIL 250 MG: 250 TABLET ORAL at 09:00

## 2018-01-01 RX ADMIN — HYDROMORPHONE HYDROCHLORIDE 0.5 MG: 1 INJECTION, SOLUTION INTRAMUSCULAR; INTRAVENOUS; SUBCUTANEOUS at 09:14

## 2018-01-01 RX ADMIN — ACETAMINOPHEN 1000 MG: 500 TABLET ORAL at 08:51

## 2018-01-01 RX ADMIN — INSULIN GLARGINE 5 UNITS: 100 INJECTION, SOLUTION SUBCUTANEOUS at 22:10

## 2018-01-01 RX ADMIN — SODIUM CHLORIDE 8 MG/HR: 9 INJECTION, SOLUTION INTRAVENOUS at 00:30

## 2018-01-01 RX ADMIN — CEFTRIAXONE SODIUM 1 G: 1 INJECTION, POWDER, FOR SOLUTION INTRAMUSCULAR; INTRAVENOUS at 17:28

## 2018-01-01 RX ADMIN — INSULIN LISPRO 1 UNITS: 100 INJECTION, SOLUTION INTRAVENOUS; SUBCUTANEOUS at 21:13

## 2018-01-01 RX ADMIN — ROPINIROLE HYDROCHLORIDE 0.5 MG: 0.25 TABLET, FILM COATED ORAL at 08:42

## 2018-01-01 RX ADMIN — ACETAMINOPHEN 1000 MG: 500 TABLET ORAL at 20:29

## 2018-01-01 RX ADMIN — OXYCODONE HYDROCHLORIDE 10 MG: 5 TABLET ORAL at 06:43

## 2018-01-01 RX ADMIN — PANTOPRAZOLE SODIUM 40 MG: 40 TABLET, DELAYED RELEASE ORAL at 07:52

## 2018-01-01 RX ADMIN — DOCUSATE SODIUM 200 MG: 100 CAPSULE ORAL at 20:43

## 2018-01-01 RX ADMIN — SODIUM CHLORIDE: 9 INJECTION, SOLUTION INTRAVENOUS at 04:39

## 2018-01-01 RX ADMIN — Medication 10 ML: at 08:16

## 2018-01-01 RX ADMIN — ATORVASTATIN CALCIUM 20 MG: 20 TABLET, FILM COATED ORAL at 22:32

## 2018-01-01 RX ADMIN — DOCUSATE SODIUM 200 MG: 100 CAPSULE ORAL at 08:57

## 2018-01-01 RX ADMIN — ACETAMINOPHEN 1000 MG: 500 TABLET ORAL at 16:24

## 2018-01-01 RX ADMIN — DEXAMETHASONE SODIUM PHOSPHATE 4 MG: 4 INJECTION, SOLUTION INTRAMUSCULAR; INTRAVENOUS at 19:02

## 2018-01-01 RX ADMIN — HYDROCODONE BITARTRATE AND ACETAMINOPHEN 1 TABLET: 5; 325 TABLET ORAL at 17:02

## 2018-01-01 RX ADMIN — MEGESTROL ACETATE 400 MG: 40 SUSPENSION ORAL at 08:21

## 2018-01-01 RX ADMIN — ENOXAPARIN SODIUM 90 MG: 100 INJECTION SUBCUTANEOUS at 08:42

## 2018-01-01 RX ADMIN — HEPARIN SODIUM 1600 UNITS: 1000 INJECTION, SOLUTION INTRAVENOUS; SUBCUTANEOUS at 13:27

## 2018-01-01 RX ADMIN — ROPINIROLE HYDROCHLORIDE 0.5 MG: 0.25 TABLET, FILM COATED ORAL at 20:36

## 2018-01-01 RX ADMIN — HEPARIN SODIUM 1600 UNITS: 1000 INJECTION, SOLUTION INTRAVENOUS; SUBCUTANEOUS at 14:07

## 2018-01-01 RX ADMIN — ACETAMINOPHEN 1000 MG: 500 TABLET ORAL at 13:50

## 2018-01-01 RX ADMIN — GABAPENTIN 100 MG: 100 CAPSULE ORAL at 20:45

## 2018-01-01 RX ADMIN — INSULIN LISPRO 1 UNITS: 100 INJECTION, SOLUTION INTRAVENOUS; SUBCUTANEOUS at 20:30

## 2018-01-01 RX ADMIN — HEPARIN SODIUM 5000 UNITS: 5000 INJECTION, SOLUTION INTRAVENOUS; SUBCUTANEOUS at 20:44

## 2018-01-01 RX ADMIN — ROPINIROLE HYDROCHLORIDE 0.5 MG: 0.25 TABLET, FILM COATED ORAL at 21:55

## 2018-01-01 RX ADMIN — WARFARIN SODIUM 2.5 MG: 2.5 TABLET ORAL at 17:45

## 2018-01-01 RX ADMIN — AMLODIPINE BESYLATE 10 MG: 10 TABLET ORAL at 08:47

## 2018-01-01 RX ADMIN — HYDROMORPHONE HYDROCHLORIDE 0.5 MG: 1 INJECTION, SOLUTION INTRAMUSCULAR; INTRAVENOUS; SUBCUTANEOUS at 14:34

## 2018-01-01 RX ADMIN — AMITRIPTYLINE HYDROCHLORIDE 75 MG: 50 TABLET, FILM COATED ORAL at 20:13

## 2018-01-01 RX ADMIN — OXYCODONE HYDROCHLORIDE 10 MG: 5 TABLET ORAL at 23:44

## 2018-01-01 RX ADMIN — GLYCOPYRROLATE 0.2 MG: 0.2 INJECTION INTRAMUSCULAR; INTRAVENOUS at 09:41

## 2018-01-01 RX ADMIN — DEXAMETHASONE 4 MG: 4 TABLET ORAL at 16:41

## 2018-01-01 RX ADMIN — GABAPENTIN 100 MG: 100 CAPSULE ORAL at 14:34

## 2018-01-01 RX ADMIN — DEXTROSE 15 G: 15 GEL ORAL at 20:42

## 2018-01-01 RX ADMIN — OXYCODONE HYDROCHLORIDE 10 MG: 10 TABLET, FILM COATED, EXTENDED RELEASE ORAL at 08:00

## 2018-01-01 RX ADMIN — HYDROCODONE BITARTRATE AND ACETAMINOPHEN 1 TABLET: 5; 325 TABLET ORAL at 09:27

## 2018-01-01 RX ADMIN — ATORVASTATIN CALCIUM 20 MG: 20 TABLET, FILM COATED ORAL at 20:29

## 2018-01-01 RX ADMIN — DOCUSATE SODIUM 200 MG: 100 CAPSULE ORAL at 14:39

## 2018-01-01 RX ADMIN — SODIUM CHLORIDE 500 ML: 9 INJECTION, SOLUTION INTRAVENOUS at 12:30

## 2018-01-01 RX ADMIN — TRAMADOL HYDROCHLORIDE 100 MG: 50 TABLET, FILM COATED ORAL at 20:48

## 2018-01-01 RX ADMIN — HYDROCODONE BITARTRATE AND ACETAMINOPHEN 1 TABLET: 5; 325 TABLET ORAL at 18:59

## 2018-01-01 RX ADMIN — MORPHINE SULFATE 4 MG: 2 INJECTION, SOLUTION INTRAMUSCULAR; INTRAVENOUS at 15:47

## 2018-01-01 RX ADMIN — OXYCODONE HYDROCHLORIDE 10 MG: 5 TABLET ORAL at 04:15

## 2018-01-01 RX ADMIN — DOCUSATE SODIUM 200 MG: 100 CAPSULE, LIQUID FILLED ORAL at 08:51

## 2018-01-01 RX ADMIN — TRAMADOL HYDROCHLORIDE 100 MG: 50 TABLET, FILM COATED ORAL at 12:07

## 2018-01-01 RX ADMIN — Medication 1 TABLET: at 08:59

## 2018-01-01 RX ADMIN — PIPERACILLIN SODIUM,TAZOBACTAM SODIUM 2.25 G: 2; .25 INJECTION, POWDER, FOR SOLUTION INTRAVENOUS at 14:33

## 2018-01-01 RX ADMIN — SODIUM CHLORIDE: 9 INJECTION, SOLUTION INTRAVENOUS at 04:52

## 2018-01-01 RX ADMIN — DOCUSATE SODIUM 200 MG: 100 CAPSULE ORAL at 08:41

## 2018-01-01 RX ADMIN — OXYCODONE HYDROCHLORIDE AND ACETAMINOPHEN 1 TABLET: 5; 325 TABLET ORAL at 23:12

## 2018-01-01 RX ADMIN — ACETAMINOPHEN 1000 MG: 500 TABLET ORAL at 13:52

## 2018-01-01 RX ADMIN — ATORVASTATIN CALCIUM 20 MG: 20 TABLET, FILM COATED ORAL at 20:45

## 2018-01-01 RX ADMIN — LISINOPRIL 5 MG: 5 TABLET ORAL at 13:00

## 2018-01-01 RX ADMIN — LEVOFLOXACIN 500 MG: 500 TABLET, FILM COATED ORAL at 16:00

## 2018-01-01 RX ADMIN — AMITRIPTYLINE HYDROCHLORIDE 75 MG: 50 TABLET, FILM COATED ORAL at 22:32

## 2018-01-01 RX ADMIN — HEPARIN SODIUM AND DEXTROSE 18 UNITS/KG/HR: 10000; 5 INJECTION INTRAVENOUS at 14:43

## 2018-01-01 RX ADMIN — OXYCODONE HYDROCHLORIDE 10 MG: 10 TABLET, FILM COATED, EXTENDED RELEASE ORAL at 17:37

## 2018-01-01 RX ADMIN — INSULIN LISPRO 3 UNITS: 100 INJECTION, SOLUTION INTRAVENOUS; SUBCUTANEOUS at 11:57

## 2018-01-01 RX ADMIN — DOCUSATE SODIUM 200 MG: 100 CAPSULE ORAL at 22:20

## 2018-01-01 RX ADMIN — OXYCODONE HYDROCHLORIDE 10 MG: 10 TABLET, FILM COATED, EXTENDED RELEASE ORAL at 08:58

## 2018-01-01 RX ADMIN — OXYCODONE HYDROCHLORIDE 10 MG: 5 TABLET ORAL at 11:17

## 2018-01-01 RX ADMIN — HYDROMORPHONE HYDROCHLORIDE 0.5 MG: 1 INJECTION, SOLUTION INTRAMUSCULAR; INTRAVENOUS; SUBCUTANEOUS at 06:34

## 2018-01-01 RX ADMIN — HEPARIN SODIUM 5000 UNITS: 5000 INJECTION, SOLUTION INTRAVENOUS; SUBCUTANEOUS at 09:02

## 2018-01-01 RX ADMIN — ENOXAPARIN SODIUM 100 MG: 100 INJECTION SUBCUTANEOUS at 08:40

## 2018-01-01 RX ADMIN — OXYCODONE HYDROCHLORIDE 10 MG: 5 TABLET ORAL at 04:33

## 2018-01-01 RX ADMIN — SODIUM BICARBONATE 650 MG: 650 TABLET ORAL at 10:19

## 2018-01-01 RX ADMIN — HEPARIN SODIUM 5000 UNITS: 5000 INJECTION, SOLUTION INTRAVENOUS; SUBCUTANEOUS at 23:14

## 2018-01-01 RX ADMIN — CEFUROXIME AXETIL 500 MG: 500 TABLET ORAL at 08:59

## 2018-01-01 RX ADMIN — ONDANSETRON 4 MG: 2 INJECTION INTRAMUSCULAR; INTRAVENOUS at 11:08

## 2018-01-01 RX ADMIN — AMITRIPTYLINE HYDROCHLORIDE 75 MG: 50 TABLET, FILM COATED ORAL at 22:20

## 2018-01-01 RX ADMIN — Medication 10 ML: at 18:58

## 2018-01-01 RX ADMIN — ACETAMINOPHEN 1000 MG: 500 TABLET ORAL at 08:28

## 2018-01-01 RX ADMIN — ACETAMINOPHEN 1000 MG: 500 TABLET ORAL at 13:05

## 2018-01-01 RX ADMIN — HYDROMORPHONE HYDROCHLORIDE 0.5 MG: 1 INJECTION, SOLUTION INTRAMUSCULAR; INTRAVENOUS; SUBCUTANEOUS at 02:06

## 2018-01-01 RX ADMIN — AMLODIPINE BESYLATE 10 MG: 10 TABLET ORAL at 09:15

## 2018-01-01 RX ADMIN — HYDROMORPHONE HYDROCHLORIDE 0.5 MG: 1 INJECTION, SOLUTION INTRAMUSCULAR; INTRAVENOUS; SUBCUTANEOUS at 18:22

## 2018-01-01 RX ADMIN — AMITRIPTYLINE HYDROCHLORIDE 75 MG: 50 TABLET, FILM COATED ORAL at 21:05

## 2018-01-01 RX ADMIN — OXYCODONE HYDROCHLORIDE 10 MG: 10 TABLET, FILM COATED, EXTENDED RELEASE ORAL at 08:45

## 2018-01-01 RX ADMIN — DEXAMETHASONE SODIUM PHOSPHATE 4 MG: 4 INJECTION, SOLUTION INTRAMUSCULAR; INTRAVENOUS at 15:03

## 2018-01-01 RX ADMIN — DEXAMETHASONE SODIUM PHOSPHATE 4 MG: 4 INJECTION, SOLUTION INTRAMUSCULAR; INTRAVENOUS at 13:32

## 2018-01-01 RX ADMIN — DOCUSATE SODIUM 100 MG: 100 CAPSULE, LIQUID FILLED ORAL at 08:31

## 2018-01-01 RX ADMIN — Medication 10 ML: at 20:45

## 2018-01-01 RX ADMIN — Medication 1 TABLET: at 14:49

## 2018-01-01 RX ADMIN — OXYCODONE HYDROCHLORIDE 10 MG: 5 TABLET ORAL at 03:31

## 2018-01-01 RX ADMIN — ONDANSETRON HYDROCHLORIDE 4 MG: 4 TABLET, FILM COATED ORAL at 08:40

## 2018-01-01 RX ADMIN — Medication 10 ML: at 12:15

## 2018-01-01 RX ADMIN — HYDROCODONE BITARTRATE AND ACETAMINOPHEN 1 TABLET: 5; 325 TABLET ORAL at 21:26

## 2018-01-01 RX ADMIN — DOCUSATE SODIUM 200 MG: 100 CAPSULE ORAL at 09:01

## 2018-01-01 RX ADMIN — AMLODIPINE BESYLATE 10 MG: 10 TABLET ORAL at 08:31

## 2018-01-01 RX ADMIN — HEPARIN SODIUM 1600 UNITS: 1000 INJECTION, SOLUTION INTRAVENOUS; SUBCUTANEOUS at 13:20

## 2018-01-01 RX ADMIN — INSULIN LISPRO 1 UNITS: 100 INJECTION, SOLUTION INTRAVENOUS; SUBCUTANEOUS at 08:44

## 2018-01-01 RX ADMIN — ATORVASTATIN CALCIUM 20 MG: 20 TABLET, FILM COATED ORAL at 08:20

## 2018-01-01 RX ADMIN — DOCUSATE SODIUM 200 MG: 100 CAPSULE, LIQUID FILLED ORAL at 09:28

## 2018-01-01 RX ADMIN — AMITRIPTYLINE HYDROCHLORIDE 75 MG: 50 TABLET, FILM COATED ORAL at 21:51

## 2018-01-01 RX ADMIN — AMLODIPINE BESYLATE 10 MG: 10 TABLET ORAL at 09:08

## 2018-01-01 RX ADMIN — SODIUM CHLORIDE 80 MG: 9 INJECTION, SOLUTION INTRAVENOUS at 12:29

## 2018-01-01 RX ADMIN — Medication 10 ML: at 13:13

## 2018-01-01 RX ADMIN — AMITRIPTYLINE HYDROCHLORIDE 75 MG: 50 TABLET, FILM COATED ORAL at 21:54

## 2018-01-01 RX ADMIN — AMITRIPTYLINE HYDROCHLORIDE 75 MG: 50 TABLET, FILM COATED ORAL at 20:43

## 2018-01-01 RX ADMIN — SODIUM CHLORIDE 8 MG/HR: 9 INJECTION, SOLUTION INTRAVENOUS at 18:24

## 2018-01-01 RX ADMIN — OXYCODONE HYDROCHLORIDE 10 MG: 5 TABLET ORAL at 00:32

## 2018-01-01 RX ADMIN — ROPINIROLE HYDROCHLORIDE 0.5 MG: 0.25 TABLET, FILM COATED ORAL at 21:38

## 2018-01-01 RX ADMIN — OXYCODONE HYDROCHLORIDE 10 MG: 10 TABLET, FILM COATED, EXTENDED RELEASE ORAL at 20:06

## 2018-01-01 RX ADMIN — ACETAMINOPHEN 1000 MG: 500 TABLET ORAL at 15:20

## 2018-01-01 RX ADMIN — HYDROCODONE BITARTRATE AND ACETAMINOPHEN 1 TABLET: 5; 325 TABLET ORAL at 02:11

## 2018-01-01 RX ADMIN — AMITRIPTYLINE HYDROCHLORIDE 75 MG: 50 TABLET, FILM COATED ORAL at 21:33

## 2018-01-01 RX ADMIN — Medication: at 17:33

## 2018-01-01 RX ADMIN — HEPARIN SODIUM 6970 UNITS: 1000 INJECTION, SOLUTION INTRAVENOUS; SUBCUTANEOUS at 14:42

## 2018-01-01 RX ADMIN — PROMETHAZINE HYDROCHLORIDE 6.25 MG: 25 INJECTION INTRAMUSCULAR; INTRAVENOUS at 20:52

## 2018-01-01 RX ADMIN — LEVOTHYROXINE SODIUM 100 MCG: 100 TABLET ORAL at 09:08

## 2018-01-01 RX ADMIN — AMITRIPTYLINE HYDROCHLORIDE 75 MG: 50 TABLET, FILM COATED ORAL at 23:13

## 2018-01-01 RX ADMIN — CEFUROXIME AXETIL 250 MG: 250 TABLET ORAL at 13:36

## 2018-01-01 RX ADMIN — PANTOPRAZOLE SODIUM 40 MG: 40 TABLET, DELAYED RELEASE ORAL at 06:12

## 2018-01-01 RX ADMIN — OXYCODONE HYDROCHLORIDE AND ACETAMINOPHEN 2 TABLET: 5; 325 TABLET ORAL at 07:57

## 2018-01-01 RX ADMIN — PROCHLORPERAZINE MALEATE 5 MG: 5 TABLET, FILM COATED ORAL at 20:33

## 2018-01-01 RX ADMIN — ENOXAPARIN SODIUM 90 MG: 100 INJECTION SUBCUTANEOUS at 08:15

## 2018-01-01 RX ADMIN — PROPOFOL 50 MCG/KG/MIN: 10 INJECTION, EMULSION INTRAVENOUS at 09:15

## 2018-01-01 RX ADMIN — SODIUM CHLORIDE 8 MG/HR: 9 INJECTION, SOLUTION INTRAVENOUS at 09:01

## 2018-01-01 RX ADMIN — Medication 10 ML: at 09:02

## 2018-01-01 RX ADMIN — MEGESTROL ACETATE 400 MG: 40 SUSPENSION ORAL at 08:09

## 2018-01-01 RX ADMIN — OXYCODONE HYDROCHLORIDE AND ACETAMINOPHEN 1 TABLET: 5; 325 TABLET ORAL at 15:21

## 2018-01-01 RX ADMIN — DEXAMETHASONE SODIUM PHOSPHATE 10 MG: 10 INJECTION INTRAMUSCULAR; INTRAVENOUS at 09:43

## 2018-01-01 RX ADMIN — SODIUM CHLORIDE 1417 ML: 9 INJECTION, SOLUTION INTRAVENOUS at 13:47

## 2018-01-01 RX ADMIN — ACETAMINOPHEN 1000 MG: 500 TABLET ORAL at 09:58

## 2018-01-01 RX ADMIN — GABAPENTIN 100 MG: 100 CAPSULE ORAL at 20:21

## 2018-01-01 RX ADMIN — ACETAMINOPHEN 1000 MG: 500 TABLET ORAL at 09:29

## 2018-01-01 RX ADMIN — ENOXAPARIN SODIUM 100 MG: 100 INJECTION SUBCUTANEOUS at 13:45

## 2018-01-01 RX ADMIN — ATORVASTATIN CALCIUM 20 MG: 20 TABLET, FILM COATED ORAL at 20:36

## 2018-01-01 RX ADMIN — FENTANYL CITRATE 25 MCG: 50 INJECTION INTRAMUSCULAR; INTRAVENOUS at 16:03

## 2018-01-01 RX ADMIN — ATORVASTATIN CALCIUM 20 MG: 20 TABLET, FILM COATED ORAL at 22:20

## 2018-01-01 RX ADMIN — INSULIN LISPRO 2 UNITS: 100 INJECTION, SOLUTION INTRAVENOUS; SUBCUTANEOUS at 10:10

## 2018-01-01 RX ADMIN — FUROSEMIDE 40 MG: 40 TABLET ORAL at 08:16

## 2018-01-01 RX ADMIN — AMITRIPTYLINE HYDROCHLORIDE 75 MG: 50 TABLET, FILM COATED ORAL at 20:59

## 2018-01-01 RX ADMIN — ACETAMINOPHEN 1000 MG: 500 TABLET ORAL at 21:11

## 2018-01-01 RX ADMIN — INSULIN LISPRO 4 UNITS: 100 INJECTION, SOLUTION INTRAVENOUS; SUBCUTANEOUS at 08:35

## 2018-01-01 RX ADMIN — INSULIN LISPRO 3 UNITS: 100 INJECTION, SOLUTION INTRAVENOUS; SUBCUTANEOUS at 20:37

## 2018-01-01 RX ADMIN — PIPERACILLIN SODIUM,TAZOBACTAM SODIUM 2.25 G: 2; .25 INJECTION, POWDER, FOR SOLUTION INTRAVENOUS at 23:55

## 2018-01-01 RX ADMIN — DOCUSATE SODIUM 200 MG: 100 CAPSULE ORAL at 08:09

## 2018-01-01 RX ADMIN — OXYCODONE HYDROCHLORIDE 10 MG: 10 TABLET, FILM COATED, EXTENDED RELEASE ORAL at 22:21

## 2018-01-01 RX ADMIN — INSULIN LISPRO 2 UNITS: 100 INJECTION, SOLUTION INTRAVENOUS; SUBCUTANEOUS at 17:45

## 2018-01-01 RX ADMIN — AMITRIPTYLINE HYDROCHLORIDE 75 MG: 50 TABLET, FILM COATED ORAL at 20:14

## 2018-01-01 RX ADMIN — GABAPENTIN 100 MG: 100 CAPSULE ORAL at 20:14

## 2018-01-01 RX ADMIN — PANTOPRAZOLE SODIUM 40 MG: 40 TABLET, DELAYED RELEASE ORAL at 06:50

## 2018-01-01 RX ADMIN — OXYCODONE HYDROCHLORIDE 10 MG: 10 TABLET, FILM COATED, EXTENDED RELEASE ORAL at 09:15

## 2018-01-01 RX ADMIN — HEPARIN SODIUM 5000 UNITS: 5000 INJECTION, SOLUTION INTRAVENOUS; SUBCUTANEOUS at 14:04

## 2018-01-01 RX ADMIN — ENOXAPARIN SODIUM 100 MG: 100 INJECTION SUBCUTANEOUS at 16:42

## 2018-01-01 RX ADMIN — GABAPENTIN 100 MG: 100 CAPSULE ORAL at 13:37

## 2018-01-01 RX ADMIN — ONDANSETRON 4 MG: 2 INJECTION INTRAMUSCULAR; INTRAVENOUS at 01:31

## 2018-01-01 RX ADMIN — ACETAMINOPHEN 1000 MG: 500 TABLET ORAL at 08:55

## 2018-01-01 RX ADMIN — WARFARIN SODIUM 2.5 MG: 2.5 TABLET ORAL at 17:52

## 2018-01-01 RX ADMIN — WARFARIN SODIUM 2.5 MG: 2.5 TABLET ORAL at 18:24

## 2018-01-01 RX ADMIN — DOCUSATE SODIUM 100 MG: 100 CAPSULE, LIQUID FILLED ORAL at 21:21

## 2018-01-01 RX ADMIN — OXYCODONE HYDROCHLORIDE 10 MG: 5 TABLET ORAL at 23:10

## 2018-01-01 RX ADMIN — HYDROCODONE BITARTRATE AND ACETAMINOPHEN 1 TABLET: 5; 325 TABLET ORAL at 06:42

## 2018-01-01 RX ADMIN — AMLODIPINE BESYLATE 10 MG: 10 TABLET ORAL at 09:12

## 2018-01-01 RX ADMIN — OXYCODONE HYDROCHLORIDE 10 MG: 5 TABLET ORAL at 08:30

## 2018-01-01 RX ADMIN — DOCUSATE SODIUM 200 MG: 100 CAPSULE ORAL at 13:35

## 2018-01-01 RX ADMIN — TRAMADOL HYDROCHLORIDE 100 MG: 50 TABLET, FILM COATED ORAL at 18:30

## 2018-01-01 RX ADMIN — SODIUM CHLORIDE: 9 INJECTION, SOLUTION INTRAVENOUS at 10:00

## 2018-01-01 RX ADMIN — HEPARIN SODIUM 5000 UNITS: 5000 INJECTION, SOLUTION INTRAVENOUS; SUBCUTANEOUS at 09:15

## 2018-01-01 RX ADMIN — CYCLOBENZAPRINE HYDROCHLORIDE 5 MG: 10 TABLET, FILM COATED ORAL at 10:38

## 2018-01-01 RX ADMIN — AMITRIPTYLINE HYDROCHLORIDE 75 MG: 25 TABLET, FILM COATED ORAL at 20:48

## 2018-01-01 RX ADMIN — HYDROCODONE BITARTRATE AND ACETAMINOPHEN 1 TABLET: 5; 325 TABLET ORAL at 18:40

## 2018-01-01 RX ADMIN — DEXTROSE 15 G: 15 GEL ORAL at 04:16

## 2018-01-01 RX ADMIN — OXYCODONE HYDROCHLORIDE 10 MG: 5 TABLET ORAL at 12:29

## 2018-01-01 RX ADMIN — FAMOTIDINE 20 MG: 20 TABLET, FILM COATED ORAL at 16:41

## 2018-01-01 RX ADMIN — ACETAMINOPHEN 1000 MG: 500 TABLET ORAL at 20:15

## 2018-01-01 RX ADMIN — OXYCODONE HYDROCHLORIDE 10 MG: 10 TABLET, FILM COATED, EXTENDED RELEASE ORAL at 20:51

## 2018-01-01 RX ADMIN — AMITRIPTYLINE HYDROCHLORIDE 75 MG: 50 TABLET, FILM COATED ORAL at 21:08

## 2018-01-01 RX ADMIN — ROPINIROLE HYDROCHLORIDE 0.5 MG: 0.25 TABLET, FILM COATED ORAL at 13:45

## 2018-01-01 RX ADMIN — OXYCODONE HYDROCHLORIDE 10 MG: 5 TABLET ORAL at 21:42

## 2018-01-01 RX ADMIN — AMITRIPTYLINE HYDROCHLORIDE 75 MG: 50 TABLET, FILM COATED ORAL at 20:28

## 2018-01-01 RX ADMIN — SODIUM BICARBONATE 50 MEQ: 42 INJECTION, SOLUTION INTRAVENOUS at 10:41

## 2018-01-01 RX ADMIN — OXYCODONE HYDROCHLORIDE 10 MG: 5 TABLET ORAL at 18:04

## 2018-01-01 RX ADMIN — FENTANYL CITRATE 50 MCG: 50 INJECTION INTRAMUSCULAR; INTRAVENOUS at 08:33

## 2018-01-01 RX ADMIN — LISINOPRIL 20 MG: 20 TABLET ORAL at 08:42

## 2018-01-01 RX ADMIN — Medication 10 ML: at 20:28

## 2018-01-01 RX ADMIN — ATORVASTATIN CALCIUM 20 MG: 20 TABLET, FILM COATED ORAL at 20:13

## 2018-01-01 RX ADMIN — DOCUSATE SODIUM 200 MG: 100 CAPSULE ORAL at 21:11

## 2018-01-01 RX ADMIN — HYDROMORPHONE HYDROCHLORIDE 0.5 MG: 1 INJECTION, SOLUTION INTRAMUSCULAR; INTRAVENOUS; SUBCUTANEOUS at 14:54

## 2018-01-01 RX ADMIN — LEVOTHYROXINE SODIUM 100 MCG: 100 TABLET ORAL at 09:27

## 2018-01-01 RX ADMIN — OXYCODONE HYDROCHLORIDE 10 MG: 5 TABLET ORAL at 16:27

## 2018-01-01 RX ADMIN — INSULIN LISPRO 3 UNITS: 100 INJECTION, SOLUTION INTRAVENOUS; SUBCUTANEOUS at 12:33

## 2018-01-01 RX ADMIN — ACETAMINOPHEN 1000 MG: 500 TABLET ORAL at 15:29

## 2018-01-01 RX ADMIN — HEPARIN SODIUM 1000 UNITS: 5000 INJECTION, SOLUTION INTRAVENOUS; SUBCUTANEOUS at 14:55

## 2018-01-01 RX ADMIN — ROPINIROLE HYDROCHLORIDE 0.5 MG: 0.25 TABLET, FILM COATED ORAL at 14:03

## 2018-01-01 RX ADMIN — INSULIN HUMAN 5 UNITS: 100 INJECTION, SOLUTION PARENTERAL at 14:19

## 2018-01-01 RX ADMIN — INSULIN LISPRO 4 UNITS: 100 INJECTION, SOLUTION INTRAVENOUS; SUBCUTANEOUS at 07:54

## 2018-01-01 RX ADMIN — ACETAMINOPHEN 1000 MG: 500 TABLET ORAL at 14:53

## 2018-01-01 RX ADMIN — HEPARIN SODIUM 5000 UNITS: 5000 INJECTION, SOLUTION INTRAVENOUS; SUBCUTANEOUS at 20:18

## 2018-01-01 RX ADMIN — GABAPENTIN 100 MG: 100 CAPSULE ORAL at 09:29

## 2018-01-01 RX ADMIN — LISINOPRIL 20 MG: 20 TABLET ORAL at 10:19

## 2018-01-01 RX ADMIN — OXYCODONE HYDROCHLORIDE 10 MG: 5 TABLET ORAL at 11:03

## 2018-01-01 RX ADMIN — INSULIN LISPRO 2 UNITS: 100 INJECTION, SOLUTION INTRAVENOUS; SUBCUTANEOUS at 09:06

## 2018-01-01 RX ADMIN — HEPARIN SODIUM 5000 UNITS: 5000 INJECTION, SOLUTION INTRAVENOUS; SUBCUTANEOUS at 05:34

## 2018-01-01 RX ADMIN — OXYCODONE HYDROCHLORIDE 10 MG: 10 TABLET, FILM COATED, EXTENDED RELEASE ORAL at 20:29

## 2018-01-01 RX ADMIN — HYDROCODONE BITARTRATE AND ACETAMINOPHEN 1 TABLET: 5; 325 TABLET ORAL at 22:45

## 2018-01-01 RX ADMIN — AMITRIPTYLINE HYDROCHLORIDE 75 MG: 50 TABLET, FILM COATED ORAL at 21:38

## 2018-01-01 RX ADMIN — AMITRIPTYLINE HYDROCHLORIDE 75 MG: 50 TABLET, FILM COATED ORAL at 21:41

## 2018-01-01 RX ADMIN — ROPINIROLE HYDROCHLORIDE 0.5 MG: 0.25 TABLET, FILM COATED ORAL at 20:49

## 2018-01-01 RX ADMIN — DOCUSATE SODIUM 200 MG: 100 CAPSULE ORAL at 22:06

## 2018-01-01 RX ADMIN — OXYCODONE HYDROCHLORIDE 10 MG: 5 TABLET ORAL at 14:04

## 2018-01-01 RX ADMIN — GABAPENTIN 100 MG: 100 CAPSULE ORAL at 09:12

## 2018-01-01 RX ADMIN — Medication 10 ML: at 21:47

## 2018-01-01 RX ADMIN — OXYCODONE HYDROCHLORIDE 10 MG: 5 TABLET ORAL at 13:18

## 2018-01-01 RX ADMIN — ATORVASTATIN CALCIUM 20 MG: 20 TABLET, FILM COATED ORAL at 09:17

## 2018-01-01 RX ADMIN — PIPERACILLIN AND TAZOBACTAM 3.38 G: 3; .375 INJECTION, POWDER, LYOPHILIZED, FOR SOLUTION INTRAVENOUS; PARENTERAL at 22:55

## 2018-01-01 RX ADMIN — MORPHINE SULFATE 2 MG: 2 INJECTION, SOLUTION INTRAMUSCULAR; INTRAVENOUS at 09:21

## 2018-01-01 RX ADMIN — OXYCODONE HYDROCHLORIDE 10 MG: 5 TABLET ORAL at 18:31

## 2018-01-01 RX ADMIN — LEVOTHYROXINE SODIUM 100 MCG: 100 TABLET ORAL at 08:21

## 2018-01-01 RX ADMIN — SODIUM POLYSTYRENE SULFONATE 15 G: 15 SUSPENSION ORAL; RECTAL at 06:56

## 2018-01-01 RX ADMIN — SODIUM CHLORIDE: 9 INJECTION, SOLUTION INTRAVENOUS at 23:01

## 2018-01-01 RX ADMIN — HEPARIN SODIUM 1600 UNITS: 1000 INJECTION, SOLUTION INTRAVENOUS; SUBCUTANEOUS at 13:10

## 2018-01-01 RX ADMIN — SODIUM CHLORIDE: 9 INJECTION, SOLUTION INTRAVENOUS at 12:35

## 2018-01-01 RX ADMIN — ACETAMINOPHEN 1000 MG: 500 TABLET ORAL at 17:52

## 2018-01-01 RX ADMIN — MORPHINE SULFATE 5 MG: 10 INJECTION INTRAVENOUS at 12:05

## 2018-01-01 RX ADMIN — AMITRIPTYLINE HYDROCHLORIDE 75 MG: 50 TABLET, FILM COATED ORAL at 20:52

## 2018-01-01 RX ADMIN — HYDROMORPHONE HYDROCHLORIDE 0.5 MG: 1 INJECTION, SOLUTION INTRAMUSCULAR; INTRAVENOUS; SUBCUTANEOUS at 18:04

## 2018-01-01 RX ADMIN — MORPHINE SULFATE 2 MG: 2 INJECTION, SOLUTION INTRAMUSCULAR; INTRAVENOUS at 07:36

## 2018-01-01 RX ADMIN — GABAPENTIN 100 MG: 100 CAPSULE ORAL at 15:36

## 2018-01-01 RX ADMIN — INSULIN LISPRO 2 UNITS: 100 INJECTION, SOLUTION INTRAVENOUS; SUBCUTANEOUS at 16:50

## 2018-01-01 RX ADMIN — HEPARIN SODIUM 5000 UNITS: 5000 INJECTION, SOLUTION INTRAVENOUS; SUBCUTANEOUS at 15:20

## 2018-01-01 RX ADMIN — MORPHINE SULFATE 2 MG: 2 INJECTION, SOLUTION INTRAMUSCULAR; INTRAVENOUS at 18:51

## 2018-01-01 RX ADMIN — LISINOPRIL 20 MG: 20 TABLET ORAL at 08:16

## 2018-01-01 RX ADMIN — DEXAMETHASONE SODIUM PHOSPHATE 4 MG: 4 INJECTION, SOLUTION INTRAMUSCULAR; INTRAVENOUS at 07:54

## 2018-01-01 RX ADMIN — DIAZEPAM 5 MG: 5 TABLET ORAL at 17:48

## 2018-01-01 RX ADMIN — CEFUROXIME AXETIL 250 MG: 250 TABLET ORAL at 08:39

## 2018-01-01 RX ADMIN — FUROSEMIDE 40 MG: 40 TABLET ORAL at 11:20

## 2018-01-01 RX ADMIN — ACETAMINOPHEN 1000 MG: 500 TABLET ORAL at 09:02

## 2018-01-01 RX ADMIN — INSULIN LISPRO 1 UNITS: 100 INJECTION, SOLUTION INTRAVENOUS; SUBCUTANEOUS at 22:06

## 2018-01-01 RX ADMIN — DOCUSATE SODIUM 200 MG: 100 CAPSULE ORAL at 21:54

## 2018-01-01 RX ADMIN — OXYCODONE HYDROCHLORIDE 10 MG: 5 TABLET ORAL at 06:50

## 2018-01-01 RX ADMIN — HYDROMORPHONE HYDROCHLORIDE 0.5 MG: 1 INJECTION, SOLUTION INTRAMUSCULAR; INTRAVENOUS; SUBCUTANEOUS at 10:31

## 2018-01-01 RX ADMIN — ONDANSETRON HYDROCHLORIDE 4 MG: 4 TABLET, FILM COATED ORAL at 23:51

## 2018-01-01 RX ADMIN — OXYCODONE HYDROCHLORIDE 10 MG: 5 TABLET ORAL at 14:37

## 2018-01-01 RX ADMIN — Medication 10 ML: at 20:06

## 2018-01-01 RX ADMIN — OXYCODONE HYDROCHLORIDE 10 MG: 5 TABLET ORAL at 15:34

## 2018-01-01 RX ADMIN — FUROSEMIDE 40 MG: 10 INJECTION, SOLUTION INTRAMUSCULAR; INTRAVENOUS at 08:50

## 2018-01-01 RX ADMIN — CYCLOBENZAPRINE HYDROCHLORIDE 5 MG: 5 TABLET, FILM COATED ORAL at 04:25

## 2018-01-01 RX ADMIN — OXYCODONE HYDROCHLORIDE 10 MG: 5 TABLET ORAL at 16:05

## 2018-01-01 RX ADMIN — INSULIN LISPRO 1 UNITS: 100 INJECTION, SOLUTION INTRAVENOUS; SUBCUTANEOUS at 21:09

## 2018-01-01 RX ADMIN — OXYCODONE HYDROCHLORIDE 10 MG: 5 TABLET ORAL at 18:22

## 2018-01-01 RX ADMIN — AMITRIPTYLINE HYDROCHLORIDE 75 MG: 50 TABLET, FILM COATED ORAL at 20:33

## 2018-01-01 RX ADMIN — MORPHINE SULFATE 2 MG: 2 INJECTION, SOLUTION INTRAMUSCULAR; INTRAVENOUS at 05:12

## 2018-01-01 RX ADMIN — PANTOPRAZOLE SODIUM 40 MG: 40 TABLET, DELAYED RELEASE ORAL at 06:24

## 2018-01-01 RX ADMIN — LISINOPRIL 5 MG: 5 TABLET ORAL at 08:59

## 2018-01-01 RX ADMIN — DEXAMETHASONE SODIUM PHOSPHATE 4 MG: 4 INJECTION, SOLUTION INTRAMUSCULAR; INTRAVENOUS at 20:29

## 2018-01-01 RX ADMIN — ONDANSETRON 4 MG: 2 INJECTION INTRAMUSCULAR; INTRAVENOUS at 21:06

## 2018-01-01 RX ADMIN — LISINOPRIL 20 MG: 20 TABLET ORAL at 11:20

## 2018-01-01 RX ADMIN — INSULIN LISPRO 1 UNITS: 100 INJECTION, SOLUTION INTRAVENOUS; SUBCUTANEOUS at 13:28

## 2018-01-01 RX ADMIN — HEPARIN SODIUM 5000 UNITS: 5000 INJECTION, SOLUTION INTRAVENOUS; SUBCUTANEOUS at 05:36

## 2018-01-01 RX ADMIN — SODIUM CHLORIDE: 9 INJECTION, SOLUTION INTRAVENOUS at 05:16

## 2018-01-01 RX ADMIN — PANTOPRAZOLE SODIUM 40 MG: 40 TABLET, DELAYED RELEASE ORAL at 06:37

## 2018-01-01 RX ADMIN — INSULIN LISPRO 2 UNITS: 100 INJECTION, SOLUTION INTRAVENOUS; SUBCUTANEOUS at 12:29

## 2018-01-01 RX ADMIN — OXYCODONE HYDROCHLORIDE 10 MG: 5 TABLET ORAL at 06:04

## 2018-01-01 RX ADMIN — HYDROMORPHONE HYDROCHLORIDE 0.5 MG: 1 INJECTION, SOLUTION INTRAMUSCULAR; INTRAVENOUS; SUBCUTANEOUS at 23:44

## 2018-01-01 RX ADMIN — CYCLOBENZAPRINE HYDROCHLORIDE 10 MG: 10 TABLET, FILM COATED ORAL at 16:41

## 2018-01-01 RX ADMIN — ACETAMINOPHEN 1000 MG: 500 TABLET ORAL at 14:03

## 2018-01-01 RX ADMIN — OXYCODONE HYDROCHLORIDE 10 MG: 5 TABLET ORAL at 06:15

## 2018-01-01 RX ADMIN — ACETAMINOPHEN 1000 MG: 500 TABLET ORAL at 08:15

## 2018-01-01 RX ADMIN — ACETAMINOPHEN 1000 MG: 500 TABLET ORAL at 21:26

## 2018-01-01 RX ADMIN — TRAMADOL HYDROCHLORIDE 100 MG: 50 TABLET, FILM COATED ORAL at 18:16

## 2018-01-01 RX ADMIN — HEPARIN SODIUM 1600 UNITS: 1000 INJECTION, SOLUTION INTRAVENOUS; SUBCUTANEOUS at 17:55

## 2018-01-01 RX ADMIN — CEFUROXIME AXETIL 250 MG: 250 TABLET ORAL at 18:24

## 2018-01-01 RX ADMIN — OXYCODONE HYDROCHLORIDE AND ACETAMINOPHEN 2 TABLET: 5; 325 TABLET ORAL at 13:28

## 2018-01-01 RX ADMIN — ATORVASTATIN CALCIUM 20 MG: 20 TABLET, FILM COATED ORAL at 12:34

## 2018-01-01 RX ADMIN — TRAMADOL HYDROCHLORIDE 100 MG: 50 TABLET, FILM COATED ORAL at 08:04

## 2018-01-01 RX ADMIN — HYDROMORPHONE HYDROCHLORIDE 0.5 MG: 1 INJECTION, SOLUTION INTRAMUSCULAR; INTRAVENOUS; SUBCUTANEOUS at 04:42

## 2018-01-01 RX ADMIN — FUROSEMIDE 40 MG: 10 INJECTION, SOLUTION INTRAMUSCULAR; INTRAVENOUS at 09:09

## 2018-01-01 RX ADMIN — HYDROMORPHONE HYDROCHLORIDE 0.5 MG: 1 INJECTION, SOLUTION INTRAMUSCULAR; INTRAVENOUS; SUBCUTANEOUS at 18:05

## 2018-01-01 RX ADMIN — DOCUSATE SODIUM 200 MG: 100 CAPSULE ORAL at 08:16

## 2018-01-01 RX ADMIN — PROPOFOL 120 MG: 10 INJECTION, EMULSION INTRAVENOUS at 11:11

## 2018-01-01 RX ADMIN — OXYCODONE HYDROCHLORIDE 10 MG: 5 TABLET ORAL at 12:42

## 2018-01-01 RX ADMIN — ENOXAPARIN SODIUM 90 MG: 100 INJECTION SUBCUTANEOUS at 08:41

## 2018-01-01 RX ADMIN — DEXAMETHASONE SODIUM PHOSPHATE 4 MG: 10 INJECTION INTRAMUSCULAR; INTRAVENOUS at 08:46

## 2018-01-01 RX ADMIN — CYCLOBENZAPRINE HYDROCHLORIDE 5 MG: 5 TABLET, FILM COATED ORAL at 07:24

## 2018-01-01 RX ADMIN — GABAPENTIN 100 MG: 100 CAPSULE ORAL at 18:23

## 2018-01-01 RX ADMIN — CEFUROXIME AXETIL 250 MG: 250 TABLET ORAL at 20:45

## 2018-01-01 RX ADMIN — AMITRIPTYLINE HYDROCHLORIDE 75 MG: 50 TABLET, FILM COATED ORAL at 20:06

## 2018-01-01 RX ADMIN — DOCUSATE SODIUM 200 MG: 100 CAPSULE ORAL at 22:03

## 2018-01-01 RX ADMIN — GABAPENTIN 100 MG: 100 CAPSULE ORAL at 20:18

## 2018-01-01 RX ADMIN — PROCHLORPERAZINE MALEATE 5 MG: 5 TABLET, FILM COATED ORAL at 14:03

## 2018-01-01 RX ADMIN — SODIUM CHLORIDE 250 ML: 9 INJECTION, SOLUTION INTRAVENOUS at 14:29

## 2018-01-01 RX ADMIN — Medication 10 ML: at 11:26

## 2018-01-01 RX ADMIN — GABAPENTIN 100 MG: 100 CAPSULE ORAL at 21:11

## 2018-01-01 RX ADMIN — DOCUSATE SODIUM 100 MG: 100 CAPSULE, LIQUID FILLED ORAL at 09:05

## 2018-01-01 RX ADMIN — OXYCODONE HYDROCHLORIDE 10 MG: 5 TABLET ORAL at 11:44

## 2018-01-01 RX ADMIN — Medication 1.6 ML: at 11:36

## 2018-01-01 RX ADMIN — ONDANSETRON 4 MG: 2 INJECTION INTRAMUSCULAR; INTRAVENOUS at 02:21

## 2018-01-01 RX ADMIN — OXYCODONE HYDROCHLORIDE 10 MG: 5 TABLET ORAL at 17:52

## 2018-01-01 RX ADMIN — ACETAMINOPHEN 1000 MG: 500 TABLET ORAL at 21:15

## 2018-01-01 RX ADMIN — PIPERACILLIN SODIUM,TAZOBACTAM SODIUM 2.25 G: 2; .25 INJECTION, POWDER, FOR SOLUTION INTRAVENOUS at 08:20

## 2018-01-01 RX ADMIN — HYDROMORPHONE HYDROCHLORIDE 0.5 MG: 1 INJECTION, SOLUTION INTRAMUSCULAR; INTRAVENOUS; SUBCUTANEOUS at 10:37

## 2018-01-01 RX ADMIN — INSULIN LISPRO 6 UNITS: 100 INJECTION, SOLUTION INTRAVENOUS; SUBCUTANEOUS at 12:46

## 2018-01-01 RX ADMIN — OXYCODONE HYDROCHLORIDE 10 MG: 10 TABLET, FILM COATED, EXTENDED RELEASE ORAL at 05:56

## 2018-01-01 RX ADMIN — HYDROMORPHONE HYDROCHLORIDE 0.5 MG: 1 INJECTION, SOLUTION INTRAMUSCULAR; INTRAVENOUS; SUBCUTANEOUS at 07:02

## 2018-01-01 RX ADMIN — OXYCODONE HYDROCHLORIDE 10 MG: 5 TABLET ORAL at 21:06

## 2018-01-01 RX ADMIN — POTASSIUM CHLORIDE 20 MEQ: 1500 TABLET, EXTENDED RELEASE ORAL at 15:24

## 2018-01-01 RX ADMIN — PANTOPRAZOLE SODIUM 40 MG: 40 TABLET, DELAYED RELEASE ORAL at 06:18

## 2018-01-01 RX ADMIN — OXYCODONE HYDROCHLORIDE AND ACETAMINOPHEN 1 TABLET: 5; 325 TABLET ORAL at 13:00

## 2018-01-01 RX ADMIN — INSULIN LISPRO 1 UNITS: 100 INJECTION, SOLUTION INTRAVENOUS; SUBCUTANEOUS at 22:10

## 2018-01-01 RX ADMIN — OXYCODONE HYDROCHLORIDE AND ACETAMINOPHEN 1 TABLET: 5; 325 TABLET ORAL at 17:29

## 2018-01-01 RX ADMIN — LIDOCAINE HYDROCHLORIDE 50 MG: 10 INJECTION, SOLUTION EPIDURAL; INFILTRATION; INTRACAUDAL; PERINEURAL at 08:33

## 2018-01-01 RX ADMIN — DOCUSATE SODIUM 200 MG: 100 CAPSULE ORAL at 10:20

## 2018-01-01 RX ADMIN — SODIUM CHLORIDE: 9 INJECTION, SOLUTION INTRAVENOUS at 14:44

## 2018-01-01 RX ADMIN — ACETAMINOPHEN 1000 MG: 500 TABLET ORAL at 20:51

## 2018-01-01 RX ADMIN — OXYCODONE HYDROCHLORIDE 10 MG: 10 TABLET, FILM COATED, EXTENDED RELEASE ORAL at 08:11

## 2018-01-01 RX ADMIN — OXYCODONE HYDROCHLORIDE 10 MG: 5 TABLET ORAL at 17:15

## 2018-01-01 RX ADMIN — REMIFENTANIL HYDROCHLORIDE 0.25 MCG/KG/MIN: 1 INJECTION, POWDER, LYOPHILIZED, FOR SOLUTION INTRAVENOUS at 09:28

## 2018-01-01 RX ADMIN — OXYCODONE HYDROCHLORIDE AND ACETAMINOPHEN 2 TABLET: 5; 325 TABLET ORAL at 22:05

## 2018-01-01 RX ADMIN — DOCUSATE SODIUM 200 MG: 100 CAPSULE ORAL at 20:52

## 2018-01-01 RX ADMIN — CEFUROXIME AXETIL 250 MG: 250 TABLET ORAL at 17:36

## 2018-01-01 RX ADMIN — OXYCODONE HYDROCHLORIDE 10 MG: 5 TABLET ORAL at 10:16

## 2018-01-01 RX ADMIN — LISINOPRIL 20 MG: 20 TABLET ORAL at 08:41

## 2018-01-01 RX ADMIN — AMLODIPINE BESYLATE 10 MG: 10 TABLET ORAL at 07:53

## 2018-01-01 RX ADMIN — INSULIN GLARGINE 5 UNITS: 100 INJECTION, SOLUTION SUBCUTANEOUS at 22:06

## 2018-01-01 RX ADMIN — ACETAMINOPHEN 1000 MG: 500 TABLET ORAL at 12:34

## 2018-01-01 RX ADMIN — CYCLOBENZAPRINE HYDROCHLORIDE 5 MG: 5 TABLET, FILM COATED ORAL at 11:55

## 2018-01-01 RX ADMIN — SODIUM CHLORIDE: 9 INJECTION, SOLUTION INTRAVENOUS at 11:07

## 2018-01-01 RX ADMIN — AMITRIPTYLINE HYDROCHLORIDE 75 MG: 50 TABLET, FILM COATED ORAL at 21:31

## 2018-01-01 RX ADMIN — GABAPENTIN 100 MG: 100 CAPSULE ORAL at 20:36

## 2018-01-01 RX ADMIN — MIDODRINE HYDROCHLORIDE 10 MG: 5 TABLET ORAL at 18:28

## 2018-01-01 RX ADMIN — MEGESTROL ACETATE 400 MG: 40 SUSPENSION ORAL at 16:19

## 2018-01-01 RX ADMIN — TRAMADOL HYDROCHLORIDE 100 MG: 50 TABLET, FILM COATED ORAL at 14:10

## 2018-01-01 RX ADMIN — ATORVASTATIN CALCIUM 20 MG: 20 TABLET, FILM COATED ORAL at 21:11

## 2018-01-01 RX ADMIN — OXYCODONE HYDROCHLORIDE 10 MG: 10 TABLET, FILM COATED, EXTENDED RELEASE ORAL at 09:35

## 2018-01-01 RX ADMIN — ROPINIROLE HYDROCHLORIDE 0.5 MG: 0.25 TABLET, FILM COATED ORAL at 10:20

## 2018-01-01 RX ADMIN — DOCUSATE SODIUM 100 MG: 100 CAPSULE, LIQUID FILLED ORAL at 20:47

## 2018-01-01 RX ADMIN — HYDROMORPHONE HYDROCHLORIDE 0.5 MG: 1 INJECTION, SOLUTION INTRAMUSCULAR; INTRAVENOUS; SUBCUTANEOUS at 16:15

## 2018-01-01 RX ADMIN — OXYCODONE HYDROCHLORIDE AND ACETAMINOPHEN 1 TABLET: 5; 325 TABLET ORAL at 02:02

## 2018-01-01 RX ADMIN — DOCUSATE SODIUM 200 MG: 100 CAPSULE ORAL at 20:13

## 2018-01-01 RX ADMIN — Medication 10 ML: at 21:06

## 2018-01-01 RX ADMIN — SODIUM CHLORIDE 8 MG/HR: 9 INJECTION, SOLUTION INTRAVENOUS at 03:50

## 2018-01-01 RX ADMIN — CYCLOBENZAPRINE HYDROCHLORIDE 10 MG: 10 TABLET, FILM COATED ORAL at 20:36

## 2018-01-01 RX ADMIN — LISINOPRIL 5 MG: 5 TABLET ORAL at 09:15

## 2018-01-01 RX ADMIN — ATORVASTATIN CALCIUM 20 MG: 20 TABLET, FILM COATED ORAL at 21:05

## 2018-01-01 RX ADMIN — POTASSIUM CHLORIDE 20 MEQ: 20 TABLET, EXTENDED RELEASE ORAL at 12:42

## 2018-01-01 RX ADMIN — SENNA 8.6 MG: 8.6 TABLET, COATED ORAL at 20:35

## 2018-01-01 RX ADMIN — ROPINIROLE HYDROCHLORIDE 0.5 MG: 0.25 TABLET, FILM COATED ORAL at 13:52

## 2018-01-01 RX ADMIN — SODIUM CHLORIDE 1000 ML: 9 INJECTION, SOLUTION INTRAVENOUS at 13:58

## 2018-01-01 RX ADMIN — PANTOPRAZOLE SODIUM 40 MG: 40 TABLET, DELAYED RELEASE ORAL at 06:31

## 2018-01-01 RX ADMIN — ZOLPIDEM TARTRATE 5 MG: 5 TABLET ORAL at 22:48

## 2018-01-01 RX ADMIN — DOCUSATE SODIUM 200 MG: 100 CAPSULE ORAL at 20:29

## 2018-01-01 RX ADMIN — OXYCODONE HYDROCHLORIDE 10 MG: 5 TABLET ORAL at 00:33

## 2018-01-01 RX ADMIN — Medication 10 ML: at 08:48

## 2018-01-01 RX ADMIN — DOCUSATE SODIUM 200 MG: 100 CAPSULE ORAL at 20:58

## 2018-01-01 RX ADMIN — HYDROCODONE BITARTRATE AND ACETAMINOPHEN 1 TABLET: 5; 325 TABLET ORAL at 00:10

## 2018-01-01 RX ADMIN — LISINOPRIL 5 MG: 5 TABLET ORAL at 11:39

## 2018-01-01 RX ADMIN — INSULIN LISPRO 6 UNITS: 100 INJECTION, SOLUTION INTRAVENOUS; SUBCUTANEOUS at 17:28

## 2018-01-01 RX ADMIN — TRAMADOL HYDROCHLORIDE 100 MG: 50 TABLET, FILM COATED ORAL at 11:15

## 2018-01-01 RX ADMIN — ATORVASTATIN CALCIUM 20 MG: 20 TABLET, FILM COATED ORAL at 21:33

## 2018-01-01 RX ADMIN — MIDODRINE HYDROCHLORIDE 2.5 MG: 2.5 TABLET ORAL at 08:20

## 2018-01-01 RX ADMIN — CYCLOBENZAPRINE HYDROCHLORIDE 5 MG: 5 TABLET, FILM COATED ORAL at 21:08

## 2018-01-01 RX ADMIN — MORPHINE SULFATE 4 MG: 4 INJECTION INTRAVENOUS at 21:22

## 2018-01-01 RX ADMIN — TRAMADOL HYDROCHLORIDE 100 MG: 50 TABLET, FILM COATED ORAL at 23:31

## 2018-01-01 RX ADMIN — ACETAMINOPHEN 1000 MG: 500 TABLET ORAL at 08:20

## 2018-01-01 RX ADMIN — HYDROMORPHONE HYDROCHLORIDE 0.25 MG: 1 INJECTION, SOLUTION INTRAMUSCULAR; INTRAVENOUS; SUBCUTANEOUS at 05:19

## 2018-01-01 RX ADMIN — DOCUSATE SODIUM 200 MG: 100 CAPSULE ORAL at 09:00

## 2018-01-01 RX ADMIN — Medication 10 ML: at 22:31

## 2018-01-01 RX ADMIN — OXYCODONE HYDROCHLORIDE 10 MG: 10 TABLET, FILM COATED, EXTENDED RELEASE ORAL at 10:53

## 2018-01-01 RX ADMIN — ATORVASTATIN CALCIUM 20 MG: 20 TABLET, FILM COATED ORAL at 20:06

## 2018-01-01 RX ADMIN — FENTANYL CITRATE 50 MCG: 50 INJECTION INTRAMUSCULAR; INTRAVENOUS at 11:30

## 2018-01-01 RX ADMIN — HEPARIN SODIUM 1000 UNITS: 5000 INJECTION, SOLUTION INTRAVENOUS; SUBCUTANEOUS at 14:54

## 2018-01-01 RX ADMIN — SODIUM BICARBONATE 650 MG: 650 TABLET ORAL at 11:39

## 2018-01-01 RX ADMIN — PANTOPRAZOLE SODIUM 40 MG: 40 TABLET, DELAYED RELEASE ORAL at 14:34

## 2018-01-01 RX ADMIN — INSULIN LISPRO 2 UNITS: 100 INJECTION, SOLUTION INTRAVENOUS; SUBCUTANEOUS at 20:46

## 2018-01-01 RX ADMIN — HEPARIN SODIUM 5000 UNITS: 5000 INJECTION, SOLUTION INTRAVENOUS; SUBCUTANEOUS at 17:29

## 2018-01-01 RX ADMIN — SODIUM CHLORIDE 250 ML: 9 INJECTION, SOLUTION INTRAVENOUS at 18:31

## 2018-01-01 RX ADMIN — DOCUSATE SODIUM 200 MG: 100 CAPSULE ORAL at 20:49

## 2018-01-01 RX ADMIN — DOCUSATE SODIUM 200 MG: 100 CAPSULE ORAL at 21:40

## 2018-01-01 RX ADMIN — OXYCODONE HYDROCHLORIDE 10 MG: 5 TABLET ORAL at 01:27

## 2018-01-01 RX ADMIN — OXYCODONE HYDROCHLORIDE 10 MG: 5 TABLET ORAL at 15:49

## 2018-01-01 RX ADMIN — INSULIN LISPRO 2 UNITS: 100 INJECTION, SOLUTION INTRAVENOUS; SUBCUTANEOUS at 20:29

## 2018-01-01 RX ADMIN — OXYCODONE HYDROCHLORIDE 10 MG: 10 TABLET, FILM COATED, EXTENDED RELEASE ORAL at 11:56

## 2018-01-01 RX ADMIN — ROPINIROLE HYDROCHLORIDE 0.5 MG: 0.25 TABLET, FILM COATED ORAL at 11:21

## 2018-01-01 RX ADMIN — HYDROMORPHONE HYDROCHLORIDE 0.5 MG: 1 INJECTION, SOLUTION INTRAMUSCULAR; INTRAVENOUS; SUBCUTANEOUS at 11:04

## 2018-01-01 RX ADMIN — SODIUM CHLORIDE: 9 INJECTION, SOLUTION INTRAVENOUS at 05:47

## 2018-01-01 RX ADMIN — OXYCODONE HYDROCHLORIDE 10 MG: 10 TABLET, FILM COATED, EXTENDED RELEASE ORAL at 20:43

## 2018-01-01 RX ADMIN — ROPINIROLE HYDROCHLORIDE 0.5 MG: 0.25 TABLET, FILM COATED ORAL at 08:41

## 2018-01-01 RX ADMIN — FUROSEMIDE 40 MG: 40 TABLET ORAL at 08:40

## 2018-01-01 RX ADMIN — OXYCODONE HYDROCHLORIDE 10 MG: 5 TABLET ORAL at 20:47

## 2018-01-01 RX ADMIN — OXYCODONE HYDROCHLORIDE 10 MG: 10 TABLET, FILM COATED, EXTENDED RELEASE ORAL at 08:15

## 2018-01-01 RX ADMIN — LEVOTHYROXINE SODIUM 100 MCG: 100 TABLET ORAL at 09:15

## 2018-01-01 RX ADMIN — INSULIN LISPRO 2 UNITS: 100 INJECTION, SOLUTION INTRAVENOUS; SUBCUTANEOUS at 21:15

## 2018-01-01 RX ADMIN — HYDROCODONE BITARTRATE AND ACETAMINOPHEN 1 TABLET: 5; 325 TABLET ORAL at 04:51

## 2018-01-01 RX ADMIN — OXYCODONE HYDROCHLORIDE 10 MG: 10 TABLET, FILM COATED, EXTENDED RELEASE ORAL at 08:05

## 2018-01-01 RX ADMIN — SENNA 8.6 MG: 8.6 TABLET, COATED ORAL at 21:42

## 2018-01-01 RX ADMIN — OXYCODONE HYDROCHLORIDE 10 MG: 10 TABLET, FILM COATED, EXTENDED RELEASE ORAL at 08:28

## 2018-01-01 RX ADMIN — DEXAMETHASONE 4 MG: 4 TABLET ORAL at 08:59

## 2018-01-01 RX ADMIN — SODIUM BICARBONATE 650 MG: 650 TABLET ORAL at 11:21

## 2018-01-01 RX ADMIN — MORPHINE SULFATE 4 MG: 4 INJECTION INTRAVENOUS at 01:37

## 2018-01-01 RX ADMIN — ATORVASTATIN CALCIUM 20 MG: 20 TABLET, FILM COATED ORAL at 09:12

## 2018-01-01 RX ADMIN — Medication 10 ML: at 09:00

## 2018-01-01 RX ADMIN — PANTOPRAZOLE SODIUM 40 MG: 40 TABLET, DELAYED RELEASE ORAL at 06:48

## 2018-01-01 RX ADMIN — ACETAMINOPHEN 1000 MG: 500 TABLET ORAL at 21:02

## 2018-01-01 RX ADMIN — INSULIN LISPRO 1 UNITS: 100 INJECTION, SOLUTION INTRAVENOUS; SUBCUTANEOUS at 21:02

## 2018-01-01 RX ADMIN — MEGESTROL ACETATE 400 MG: 40 SUSPENSION ORAL at 09:29

## 2018-01-01 RX ADMIN — OXYCODONE HYDROCHLORIDE 10 MG: 5 TABLET ORAL at 18:45

## 2018-01-01 RX ADMIN — HEPARIN SODIUM 5000 UNITS: 5000 INJECTION, SOLUTION INTRAVENOUS; SUBCUTANEOUS at 08:30

## 2018-01-01 RX ADMIN — Medication 10 ML: at 09:16

## 2018-01-01 RX ADMIN — OXYCODONE HYDROCHLORIDE 10 MG: 5 TABLET ORAL at 08:51

## 2018-01-01 RX ADMIN — CEFAZOLIN SODIUM 1 G: 1 INJECTION, SOLUTION INTRAVENOUS at 13:36

## 2018-01-01 RX ADMIN — ONDANSETRON HYDROCHLORIDE 4 MG: 4 TABLET, FILM COATED ORAL at 09:27

## 2018-01-01 RX ADMIN — MORPHINE SULFATE 2 MG: 2 INJECTION, SOLUTION INTRAMUSCULAR; INTRAVENOUS at 21:58

## 2018-01-01 RX ADMIN — GABAPENTIN 100 MG: 100 CAPSULE ORAL at 22:59

## 2018-01-01 RX ADMIN — ACETAMINOPHEN 1000 MG: 500 TABLET ORAL at 15:00

## 2018-01-01 RX ADMIN — LEVOTHYROXINE SODIUM 100 MCG: 100 TABLET ORAL at 12:34

## 2018-01-01 RX ADMIN — HYDROCODONE BITARTRATE AND ACETAMINOPHEN 1 TABLET: 5; 325 TABLET ORAL at 06:19

## 2018-01-01 RX ADMIN — GABAPENTIN 100 MG: 100 CAPSULE ORAL at 10:04

## 2018-01-01 RX ADMIN — OXYCODONE HYDROCHLORIDE 10 MG: 10 TABLET, FILM COATED, EXTENDED RELEASE ORAL at 20:23

## 2018-01-01 RX ADMIN — DOCUSATE SODIUM 200 MG: 100 CAPSULE ORAL at 20:28

## 2018-01-01 RX ADMIN — AMITRIPTYLINE HYDROCHLORIDE 75 MG: 25 TABLET, FILM COATED ORAL at 20:20

## 2018-01-01 RX ADMIN — AMLODIPINE BESYLATE 10 MG: 10 TABLET ORAL at 08:59

## 2018-01-01 RX ADMIN — Medication 10 ML: at 07:55

## 2018-01-01 RX ADMIN — INSULIN LISPRO 4 UNITS: 100 INJECTION, SOLUTION INTRAVENOUS; SUBCUTANEOUS at 14:17

## 2018-01-01 RX ADMIN — SODIUM CHLORIDE: 9 INJECTION, SOLUTION INTRAVENOUS at 21:24

## 2018-01-01 RX ADMIN — OXYCODONE HYDROCHLORIDE 10 MG: 5 TABLET ORAL at 16:47

## 2018-01-01 RX ADMIN — ROPINIROLE HYDROCHLORIDE 0.5 MG: 0.25 TABLET, FILM COATED ORAL at 09:01

## 2018-01-01 RX ADMIN — TRAMADOL HYDROCHLORIDE 100 MG: 50 TABLET, FILM COATED ORAL at 05:58

## 2018-01-01 RX ADMIN — Medication 1 TABLET: at 09:08

## 2018-01-01 RX ADMIN — CYCLOBENZAPRINE HYDROCHLORIDE 5 MG: 5 TABLET, FILM COATED ORAL at 22:19

## 2018-01-01 RX ADMIN — SODIUM BICARBONATE: 84 INJECTION, SOLUTION INTRAVENOUS at 14:17

## 2018-01-01 RX ADMIN — CEFTRIAXONE SODIUM 1 G: 1 INJECTION, POWDER, FOR SOLUTION INTRAMUSCULAR; INTRAVENOUS at 16:30

## 2018-01-01 RX ADMIN — MORPHINE SULFATE 4 MG: 4 INJECTION INTRAVENOUS at 00:10

## 2018-01-01 RX ADMIN — OXYCODONE HYDROCHLORIDE 10 MG: 5 TABLET ORAL at 08:02

## 2018-01-01 RX ADMIN — OXYCODONE HYDROCHLORIDE 10 MG: 10 TABLET, FILM COATED, EXTENDED RELEASE ORAL at 21:11

## 2018-01-01 RX ADMIN — HYDROCODONE BITARTRATE AND ACETAMINOPHEN 1 TABLET: 5; 325 TABLET ORAL at 02:00

## 2018-01-01 RX ADMIN — ACETAMINOPHEN 1000 MG: 500 TABLET ORAL at 07:53

## 2018-01-01 RX ADMIN — ONDANSETRON HYDROCHLORIDE 4 MG: 4 TABLET, FILM COATED ORAL at 21:03

## 2018-01-01 RX ADMIN — INSULIN GLARGINE 60 UNITS: 100 INJECTION, SOLUTION SUBCUTANEOUS at 22:31

## 2018-01-01 RX ADMIN — CYANOCOBALAMIN 1000 MCG: 1000 INJECTION, SOLUTION INTRAMUSCULAR at 16:48

## 2018-01-01 RX ADMIN — ROPINIROLE HYDROCHLORIDE 0.5 MG: 0.25 TABLET, FILM COATED ORAL at 15:31

## 2018-01-01 RX ADMIN — ATORVASTATIN CALCIUM 20 MG: 20 TABLET, FILM COATED ORAL at 09:08

## 2018-01-01 RX ADMIN — DOCUSATE SODIUM 100 MG: 100 CAPSULE, LIQUID FILLED ORAL at 20:18

## 2018-01-01 RX ADMIN — ACETAMINOPHEN 1000 MG: 500 TABLET ORAL at 20:35

## 2018-01-01 RX ADMIN — OXYCODONE HYDROCHLORIDE 10 MG: 10 TABLET, FILM COATED, EXTENDED RELEASE ORAL at 20:57

## 2018-01-01 RX ADMIN — SODIUM CHLORIDE 250 ML: 9 INJECTION, SOLUTION INTRAVENOUS at 21:43

## 2018-01-01 RX ADMIN — LEVOTHYROXINE SODIUM 100 MCG: 100 TABLET ORAL at 08:59

## 2018-01-01 RX ADMIN — ATORVASTATIN CALCIUM 20 MG: 20 TABLET, FILM COATED ORAL at 22:58

## 2018-01-01 RX ADMIN — ROPINIROLE HYDROCHLORIDE 0.5 MG: 0.25 TABLET, FILM COATED ORAL at 11:05

## 2018-01-01 RX ADMIN — AMITRIPTYLINE HYDROCHLORIDE 75 MG: 50 TABLET, FILM COATED ORAL at 20:35

## 2018-01-01 RX ADMIN — OXYCODONE HYDROCHLORIDE 10 MG: 10 TABLET, FILM COATED, EXTENDED RELEASE ORAL at 09:59

## 2018-01-01 RX ADMIN — ACETAMINOPHEN 1000 MG: 500 TABLET ORAL at 22:04

## 2018-01-01 RX ADMIN — PANTOPRAZOLE SODIUM 40 MG: 40 TABLET, DELAYED RELEASE ORAL at 08:58

## 2018-01-01 RX ADMIN — HYDROMORPHONE HYDROCHLORIDE 0.5 MG: 1 INJECTION, SOLUTION INTRAMUSCULAR; INTRAVENOUS; SUBCUTANEOUS at 12:29

## 2018-01-01 RX ADMIN — INSULIN GLARGINE 60 UNITS: 100 INJECTION, SOLUTION SUBCUTANEOUS at 09:09

## 2018-01-01 RX ADMIN — MEGESTROL ACETATE 400 MG: 40 SUSPENSION ORAL at 10:07

## 2018-01-01 RX ADMIN — DOCUSATE SODIUM 200 MG: 100 CAPSULE, LIQUID FILLED ORAL at 11:24

## 2018-01-01 RX ADMIN — OXYCODONE HYDROCHLORIDE 10 MG: 5 TABLET ORAL at 18:03

## 2018-01-01 RX ADMIN — OXYCODONE HYDROCHLORIDE 10 MG: 10 TABLET, FILM COATED, EXTENDED RELEASE ORAL at 20:12

## 2018-01-01 RX ADMIN — INSULIN GLARGINE 50 UNITS: 100 INJECTION, SOLUTION SUBCUTANEOUS at 21:43

## 2018-01-01 RX ADMIN — INSULIN LISPRO 5 UNITS: 100 INJECTION, SOLUTION INTRAVENOUS; SUBCUTANEOUS at 22:32

## 2018-01-01 RX ADMIN — SODIUM CHLORIDE 250 ML: 9 INJECTION, SOLUTION INTRAVENOUS at 18:50

## 2018-01-01 RX ADMIN — HYDROMORPHONE HYDROCHLORIDE 0.5 MG: 1 INJECTION, SOLUTION INTRAMUSCULAR; INTRAVENOUS; SUBCUTANEOUS at 13:04

## 2018-01-01 RX ADMIN — INSULIN LISPRO 12 UNITS: 100 INJECTION, SOLUTION INTRAVENOUS; SUBCUTANEOUS at 08:26

## 2018-01-01 RX ADMIN — SODIUM CHLORIDE 20 ML: 9 INJECTION, SOLUTION INTRAVENOUS at 17:52

## 2018-01-01 RX ADMIN — ACETAMINOPHEN 1000 MG: 500 TABLET ORAL at 08:40

## 2018-01-01 RX ADMIN — GABAPENTIN 100 MG: 100 CAPSULE ORAL at 14:29

## 2018-01-01 RX ADMIN — SODIUM BICARBONATE 650 MG: 650 TABLET ORAL at 08:41

## 2018-01-01 RX ADMIN — SODIUM CHLORIDE: 9 INJECTION, SOLUTION INTRAVENOUS at 04:45

## 2018-01-01 RX ADMIN — ACETAMINOPHEN 1000 MG: 500 TABLET ORAL at 09:09

## 2018-01-01 RX ADMIN — Medication 10 ML: at 20:33

## 2018-01-01 RX ADMIN — INSULIN LISPRO 1 UNITS: 100 INJECTION, SOLUTION INTRAVENOUS; SUBCUTANEOUS at 21:37

## 2018-01-01 RX ADMIN — ACETAMINOPHEN 650 MG: 325 TABLET ORAL at 15:03

## 2018-01-01 RX ADMIN — OXYCODONE HYDROCHLORIDE 10 MG: 10 TABLET, FILM COATED, EXTENDED RELEASE ORAL at 20:28

## 2018-01-01 RX ADMIN — ONDANSETRON HYDROCHLORIDE 4 MG: 4 TABLET, FILM COATED ORAL at 10:20

## 2018-01-01 RX ADMIN — ROPINIROLE HYDROCHLORIDE 0.5 MG: 0.25 TABLET, FILM COATED ORAL at 22:03

## 2018-01-01 RX ADMIN — HYDROCODONE BITARTRATE AND ACETAMINOPHEN 2 TABLET: 5; 325 TABLET ORAL at 09:01

## 2018-01-01 RX ADMIN — FUROSEMIDE 40 MG: 40 TABLET ORAL at 10:19

## 2018-01-01 RX ADMIN — DEXAMETHASONE SODIUM PHOSPHATE 4 MG: 10 INJECTION INTRAMUSCULAR; INTRAVENOUS at 09:15

## 2018-01-01 RX ADMIN — PANTOPRAZOLE SODIUM 40 MG: 40 TABLET, DELAYED RELEASE ORAL at 08:41

## 2018-01-01 RX ADMIN — ROPINIROLE HYDROCHLORIDE 0.5 MG: 0.25 TABLET, FILM COATED ORAL at 15:02

## 2018-01-01 RX ADMIN — PANTOPRAZOLE SODIUM 40 MG: 40 TABLET, DELAYED RELEASE ORAL at 08:17

## 2018-01-01 RX ADMIN — ACETAMINOPHEN 1000 MG: 500 TABLET ORAL at 21:05

## 2018-01-01 RX ADMIN — ACETAMINOPHEN 1000 MG: 500 TABLET ORAL at 20:13

## 2018-01-01 RX ADMIN — MORPHINE SULFATE 4 MG: 4 INJECTION INTRAVENOUS at 17:22

## 2018-01-01 RX ADMIN — INSULIN LISPRO 4 UNITS: 100 INJECTION, SOLUTION INTRAVENOUS; SUBCUTANEOUS at 16:18

## 2018-01-01 RX ADMIN — DEXAMETHASONE SODIUM PHOSPHATE 4 MG: 4 INJECTION, SOLUTION INTRAMUSCULAR; INTRAVENOUS at 02:15

## 2018-01-01 RX ADMIN — HYDROMORPHONE HYDROCHLORIDE 0.5 MG: 1 INJECTION, SOLUTION INTRAMUSCULAR; INTRAVENOUS; SUBCUTANEOUS at 12:31

## 2018-01-01 RX ADMIN — GABAPENTIN 100 MG: 100 CAPSULE ORAL at 22:31

## 2018-01-01 RX ADMIN — ONDANSETRON HYDROCHLORIDE 4 MG: 4 TABLET, FILM COATED ORAL at 17:30

## 2018-01-01 RX ADMIN — OXYCODONE HYDROCHLORIDE AND ACETAMINOPHEN 2 TABLET: 5; 325 TABLET ORAL at 06:09

## 2018-01-01 RX ADMIN — HYDROCODONE BITARTRATE AND ACETAMINOPHEN 1 TABLET: 5; 325 TABLET ORAL at 23:28

## 2018-01-01 RX ADMIN — INSULIN LISPRO 2 UNITS: 100 INJECTION, SOLUTION INTRAVENOUS; SUBCUTANEOUS at 17:44

## 2018-01-01 RX ADMIN — LISINOPRIL 5 MG: 5 TABLET ORAL at 20:35

## 2018-01-01 RX ADMIN — CYCLOBENZAPRINE HYDROCHLORIDE 5 MG: 5 TABLET, FILM COATED ORAL at 11:25

## 2018-01-01 RX ADMIN — OXYCODONE HYDROCHLORIDE 10 MG: 10 TABLET, FILM COATED, EXTENDED RELEASE ORAL at 10:09

## 2018-01-01 RX ADMIN — Medication 100 MG: at 08:33

## 2018-01-01 RX ADMIN — HYDROMORPHONE HYDROCHLORIDE 0.5 MG: 1 INJECTION, SOLUTION INTRAMUSCULAR; INTRAVENOUS; SUBCUTANEOUS at 20:29

## 2018-01-01 RX ADMIN — DOCUSATE SODIUM 200 MG: 100 CAPSULE ORAL at 08:40

## 2018-01-01 RX ADMIN — INSULIN LISPRO 3 UNITS: 100 INJECTION, SOLUTION INTRAVENOUS; SUBCUTANEOUS at 18:32

## 2018-01-01 RX ADMIN — PANTOPRAZOLE SODIUM 40 MG: 40 TABLET, DELAYED RELEASE ORAL at 11:25

## 2018-01-01 RX ADMIN — AMLODIPINE BESYLATE 10 MG: 10 TABLET ORAL at 09:05

## 2018-01-01 RX ADMIN — INSULIN LISPRO 2 UNITS: 100 INJECTION, SOLUTION INTRAVENOUS; SUBCUTANEOUS at 23:00

## 2018-01-01 RX ADMIN — DOCUSATE SODIUM 200 MG: 100 CAPSULE ORAL at 09:15

## 2018-01-01 RX ADMIN — HYDROMORPHONE HYDROCHLORIDE 0.5 MG: 1 INJECTION, SOLUTION INTRAMUSCULAR; INTRAVENOUS; SUBCUTANEOUS at 18:45

## 2018-01-01 RX ADMIN — SODIUM CHLORIDE: 9 INJECTION, SOLUTION INTRAVENOUS at 00:08

## 2018-01-01 RX ADMIN — GABAPENTIN 100 MG: 100 CAPSULE ORAL at 08:52

## 2018-01-01 RX ADMIN — Medication 10 ML: at 10:06

## 2018-01-01 RX ADMIN — HYDROMORPHONE HYDROCHLORIDE 0.5 MG: 1 INJECTION, SOLUTION INTRAMUSCULAR; INTRAVENOUS; SUBCUTANEOUS at 12:52

## 2018-01-01 RX ADMIN — INSULIN GLARGINE 50 UNITS: 100 INJECTION, SOLUTION SUBCUTANEOUS at 08:59

## 2018-01-01 RX ADMIN — GABAPENTIN 100 MG: 100 CAPSULE ORAL at 09:20

## 2018-01-01 RX ADMIN — Medication 10 ML: at 22:20

## 2018-01-01 RX ADMIN — LEVOTHYROXINE SODIUM 100 MCG: 100 TABLET ORAL at 08:47

## 2018-01-01 RX ADMIN — HYDROCODONE BITARTRATE AND ACETAMINOPHEN 1 TABLET: 5; 325 TABLET ORAL at 08:22

## 2018-01-01 RX ADMIN — ONDANSETRON 4 MG: 2 INJECTION, SOLUTION INTRAMUSCULAR; INTRAVENOUS at 13:58

## 2018-01-01 RX ADMIN — ACETAMINOPHEN 1000 MG: 500 TABLET ORAL at 20:49

## 2018-01-01 RX ADMIN — LIDOCAINE HYDROCHLORIDE 25 MG: 10 INJECTION, SOLUTION EPIDURAL; INFILTRATION; INTRACAUDAL at 11:45

## 2018-01-01 RX ADMIN — PHYTONADIONE 5 MG: 5 TABLET ORAL at 08:05

## 2018-01-01 RX ADMIN — OXYCODONE HYDROCHLORIDE AND ACETAMINOPHEN 1 TABLET: 5; 325 TABLET ORAL at 21:37

## 2018-01-01 RX ADMIN — MORPHINE SULFATE 4 MG: 4 INJECTION INTRAVENOUS at 03:00

## 2018-01-01 RX ADMIN — ACETAMINOPHEN 1000 MG: 500 TABLET ORAL at 08:57

## 2018-01-01 RX ADMIN — HYDROMORPHONE HYDROCHLORIDE 0.5 MG: 1 INJECTION, SOLUTION INTRAMUSCULAR; INTRAVENOUS; SUBCUTANEOUS at 22:21

## 2018-01-01 RX ADMIN — AMITRIPTYLINE HYDROCHLORIDE 75 MG: 25 TABLET, FILM COATED ORAL at 21:21

## 2018-01-01 RX ADMIN — DEXAMETHASONE SODIUM PHOSPHATE 4 MG: 4 INJECTION, SOLUTION INTRAMUSCULAR; INTRAVENOUS at 08:24

## 2018-01-01 RX ADMIN — INSULIN LISPRO 1 UNITS: 100 INJECTION, SOLUTION INTRAVENOUS; SUBCUTANEOUS at 21:52

## 2018-01-01 RX ADMIN — Medication 10 ML: at 08:43

## 2018-01-01 RX ADMIN — MORPHINE SULFATE 2 MG: 2 INJECTION, SOLUTION INTRAMUSCULAR; INTRAVENOUS at 18:11

## 2018-01-01 RX ADMIN — OXYCODONE HYDROCHLORIDE 10 MG: 5 TABLET ORAL at 04:59

## 2018-01-01 RX ADMIN — ACETAMINOPHEN 1000 MG: 500 TABLET ORAL at 08:10

## 2018-01-01 RX ADMIN — ROPINIROLE HYDROCHLORIDE 0.5 MG: 0.25 TABLET, FILM COATED ORAL at 08:40

## 2018-01-01 RX ADMIN — INSULIN LISPRO 4 UNITS: 100 INJECTION, SOLUTION INTRAVENOUS; SUBCUTANEOUS at 19:10

## 2018-01-01 RX ADMIN — ATORVASTATIN CALCIUM 20 MG: 20 TABLET, FILM COATED ORAL at 21:12

## 2018-01-01 RX ADMIN — ACETAMINOPHEN 1000 MG: 500 TABLET ORAL at 20:33

## 2018-01-01 RX ADMIN — DOCUSATE SODIUM 200 MG: 100 CAPSULE ORAL at 22:21

## 2018-01-01 RX ADMIN — AMITRIPTYLINE HYDROCHLORIDE 75 MG: 50 TABLET, FILM COATED ORAL at 21:02

## 2018-01-01 RX ADMIN — CEFTRIAXONE SODIUM 1 G: 1 INJECTION, POWDER, FOR SOLUTION INTRAMUSCULAR; INTRAVENOUS at 16:31

## 2018-01-01 RX ADMIN — HEPARIN SODIUM 5000 UNITS: 5000 INJECTION, SOLUTION INTRAVENOUS; SUBCUTANEOUS at 09:06

## 2018-01-01 RX ADMIN — INSULIN LISPRO 6 UNITS: 100 INJECTION, SOLUTION INTRAVENOUS; SUBCUTANEOUS at 17:35

## 2018-01-01 RX ADMIN — ACETAMINOPHEN 1000 MG: 500 TABLET ORAL at 08:08

## 2018-01-01 RX ADMIN — HYDROMORPHONE HYDROCHLORIDE 0.5 MG: 1 INJECTION, SOLUTION INTRAMUSCULAR; INTRAVENOUS; SUBCUTANEOUS at 14:10

## 2018-01-01 RX ADMIN — MIDODRINE HYDROCHLORIDE 10 MG: 5 TABLET ORAL at 09:28

## 2018-01-01 RX ADMIN — ONDANSETRON 4 MG: 2 INJECTION INTRAMUSCULAR; INTRAVENOUS at 11:49

## 2018-01-01 RX ADMIN — DOCUSATE SODIUM 200 MG: 100 CAPSULE ORAL at 21:08

## 2018-01-01 RX ADMIN — OXYCODONE HYDROCHLORIDE 10 MG: 10 TABLET, FILM COATED, EXTENDED RELEASE ORAL at 22:31

## 2018-01-01 RX ADMIN — AMITRIPTYLINE HYDROCHLORIDE 75 MG: 50 TABLET, FILM COATED ORAL at 22:47

## 2018-01-01 RX ADMIN — AMLODIPINE BESYLATE 10 MG: 10 TABLET ORAL at 14:04

## 2018-01-01 RX ADMIN — GABAPENTIN 100 MG: 100 CAPSULE ORAL at 08:41

## 2018-01-01 RX ADMIN — OXYCODONE HYDROCHLORIDE 10 MG: 5 TABLET ORAL at 00:54

## 2018-01-01 RX ADMIN — ATORVASTATIN CALCIUM 20 MG: 20 TABLET, FILM COATED ORAL at 20:53

## 2018-01-01 RX ADMIN — FUROSEMIDE 40 MG: 40 TABLET ORAL at 15:34

## 2018-01-01 RX ADMIN — CEFUROXIME AXETIL 500 MG: 500 TABLET ORAL at 21:43

## 2018-01-01 RX ADMIN — HEPARIN SODIUM 5000 UNITS: 5000 INJECTION, SOLUTION INTRAVENOUS; SUBCUTANEOUS at 09:12

## 2018-01-01 RX ADMIN — OXYCODONE HYDROCHLORIDE 10 MG: 5 TABLET ORAL at 18:23

## 2018-01-01 RX ADMIN — ENOXAPARIN SODIUM 90 MG: 100 INJECTION SUBCUTANEOUS at 10:19

## 2018-01-01 RX ADMIN — DOCUSATE SODIUM 200 MG: 100 CAPSULE ORAL at 21:31

## 2018-01-01 RX ADMIN — ALBUMIN (HUMAN) 25 G: 0.25 INJECTION, SOLUTION INTRAVENOUS at 10:28

## 2018-01-01 RX ADMIN — MORPHINE SULFATE 4 MG: 4 INJECTION INTRAVENOUS at 23:46

## 2018-01-01 RX ADMIN — INSULIN LISPRO 3 UNITS: 100 INJECTION, SOLUTION INTRAVENOUS; SUBCUTANEOUS at 17:28

## 2018-01-01 RX ADMIN — ACETAMINOPHEN 1000 MG: 500 TABLET ORAL at 09:16

## 2018-01-01 RX ADMIN — AMITRIPTYLINE HYDROCHLORIDE 75 MG: 50 TABLET, FILM COATED ORAL at 20:34

## 2018-01-01 RX ADMIN — HYDROMORPHONE HYDROCHLORIDE 0.5 MG: 1 INJECTION, SOLUTION INTRAMUSCULAR; INTRAVENOUS; SUBCUTANEOUS at 07:54

## 2018-01-01 RX ADMIN — OXYCODONE HYDROCHLORIDE 10 MG: 5 TABLET ORAL at 22:01

## 2018-01-01 RX ADMIN — DOCUSATE SODIUM 200 MG: 100 CAPSULE ORAL at 08:33

## 2018-01-01 RX ADMIN — PIPERACILLIN AND TAZOBACTAM 3.38 G: 3; .375 INJECTION, POWDER, LYOPHILIZED, FOR SOLUTION INTRAVENOUS; PARENTERAL at 11:00

## 2018-01-01 RX ADMIN — Medication 10 ML: at 20:50

## 2018-01-01 RX ADMIN — HYDROCODONE BITARTRATE AND ACETAMINOPHEN 1 TABLET: 5; 325 TABLET ORAL at 22:32

## 2018-01-01 RX ADMIN — INSULIN LISPRO 2 UNITS: 100 INJECTION, SOLUTION INTRAVENOUS; SUBCUTANEOUS at 22:53

## 2018-01-01 RX ADMIN — ACETAMINOPHEN 1000 MG: 500 TABLET ORAL at 06:09

## 2018-01-01 RX ADMIN — INSULIN LISPRO 2 UNITS: 100 INJECTION, SOLUTION INTRAVENOUS; SUBCUTANEOUS at 13:34

## 2018-01-01 RX ADMIN — GABAPENTIN 100 MG: 100 CAPSULE ORAL at 20:43

## 2018-01-01 RX ADMIN — SODIUM CHLORIDE: 9 INJECTION, SOLUTION INTRAVENOUS at 08:24

## 2018-01-01 RX ADMIN — DOCUSATE SODIUM 200 MG: 100 CAPSULE ORAL at 20:06

## 2018-01-01 RX ADMIN — AMITRIPTYLINE HYDROCHLORIDE 75 MG: 50 TABLET, FILM COATED ORAL at 21:11

## 2018-01-01 RX ADMIN — Medication: at 03:09

## 2018-01-01 RX ADMIN — SODIUM CHLORIDE 1000 ML: 9 INJECTION, SOLUTION INTRAVENOUS at 21:09

## 2018-01-01 RX ADMIN — DOCUSATE SODIUM 100 MG: 100 CAPSULE, LIQUID FILLED ORAL at 09:12

## 2018-01-01 RX ADMIN — INSULIN LISPRO 9 UNITS: 100 INJECTION, SOLUTION INTRAVENOUS; SUBCUTANEOUS at 09:00

## 2018-01-01 RX ADMIN — Medication 10 ML: at 05:12

## 2018-01-01 RX ADMIN — ROPINIROLE HYDROCHLORIDE 0.5 MG: 0.25 TABLET, FILM COATED ORAL at 21:01

## 2018-01-01 RX ADMIN — TRAMADOL HYDROCHLORIDE 100 MG: 50 TABLET, FILM COATED ORAL at 08:11

## 2018-01-01 RX ADMIN — OXYCODONE HYDROCHLORIDE AND ACETAMINOPHEN 2 TABLET: 5; 325 TABLET ORAL at 17:45

## 2018-01-01 RX ADMIN — HEPARIN SODIUM 5000 UNITS: 5000 INJECTION, SOLUTION INTRAVENOUS; SUBCUTANEOUS at 14:33

## 2018-01-01 RX ADMIN — HYDROCODONE BITARTRATE AND ACETAMINOPHEN 1 TABLET: 5; 325 TABLET ORAL at 12:53

## 2018-01-01 RX ADMIN — SODIUM POLYSTYRENE SULFONATE 30 G: 15 SUSPENSION ORAL; RECTAL at 06:42

## 2018-01-01 RX ADMIN — TRAMADOL HYDROCHLORIDE 100 MG: 50 TABLET, FILM COATED ORAL at 04:34

## 2018-01-01 RX ADMIN — AMITRIPTYLINE HYDROCHLORIDE 75 MG: 50 TABLET, FILM COATED ORAL at 22:22

## 2018-01-01 RX ADMIN — WARFARIN SODIUM 5 MG: 5 TABLET ORAL at 17:11

## 2018-01-01 RX ADMIN — SODIUM POLYSTYRENE SULFONATE 15 G: 15 SUSPENSION ORAL; RECTAL at 00:15

## 2018-01-01 RX ADMIN — Medication 10 ML: at 20:15

## 2018-01-01 RX ADMIN — ACETAMINOPHEN 1000 MG: 500 TABLET ORAL at 11:23

## 2018-01-01 RX ADMIN — ATORVASTATIN CALCIUM 20 MG: 20 TABLET, FILM COATED ORAL at 20:15

## 2018-01-01 RX ADMIN — AMITRIPTYLINE HYDROCHLORIDE 75 MG: 50 TABLET, FILM COATED ORAL at 21:37

## 2018-01-01 RX ADMIN — IBUPROFEN 600 MG: 600 TABLET, FILM COATED ORAL at 19:28

## 2018-01-01 RX ADMIN — DEXAMETHASONE SODIUM PHOSPHATE 4 MG: 10 INJECTION INTRAMUSCULAR; INTRAVENOUS at 07:54

## 2018-01-01 RX ADMIN — SODIUM BICARBONATE 50 MEQ: 42 INJECTION, SOLUTION INTRAVENOUS at 11:49

## 2018-01-01 RX ADMIN — HYDROCODONE BITARTRATE AND ACETAMINOPHEN 1 TABLET: 5; 325 TABLET ORAL at 02:18

## 2018-01-01 RX ADMIN — LISINOPRIL 5 MG: 10 TABLET ORAL at 12:03

## 2018-01-01 RX ADMIN — OXYCODONE HYDROCHLORIDE 10 MG: 5 TABLET ORAL at 14:28

## 2018-01-01 RX ADMIN — SODIUM CHLORIDE 1000 ML: 9 INJECTION, SOLUTION INTRAVENOUS at 21:06

## 2018-01-01 RX ADMIN — INSULIN LISPRO 2 UNITS: 100 INJECTION, SOLUTION INTRAVENOUS; SUBCUTANEOUS at 12:59

## 2018-01-01 RX ADMIN — INSULIN HUMAN 10 UNITS: 100 INJECTION, SOLUTION PARENTERAL at 11:50

## 2018-01-01 RX ADMIN — CYCLOBENZAPRINE HYDROCHLORIDE 5 MG: 5 TABLET, FILM COATED ORAL at 20:31

## 2018-01-01 RX ADMIN — ACETAMINOPHEN 1000 MG: 500 TABLET ORAL at 09:28

## 2018-01-01 RX ADMIN — FUROSEMIDE 40 MG: 10 INJECTION, SOLUTION INTRAMUSCULAR; INTRAVENOUS at 11:07

## 2018-01-01 RX ADMIN — DEXAMETHASONE SODIUM PHOSPHATE 4 MG: 4 INJECTION, SOLUTION INTRAMUSCULAR; INTRAVENOUS at 02:00

## 2018-01-01 RX ADMIN — MEGESTROL ACETATE 400 MG: 40 SUSPENSION ORAL at 13:36

## 2018-01-01 RX ADMIN — INSULIN LISPRO 1 UNITS: 100 INJECTION, SOLUTION INTRAVENOUS; SUBCUTANEOUS at 21:40

## 2018-01-01 RX ADMIN — INSULIN LISPRO 2 UNITS: 100 INJECTION, SOLUTION INTRAVENOUS; SUBCUTANEOUS at 12:15

## 2018-01-01 RX ADMIN — CYCLOBENZAPRINE HYDROCHLORIDE 10 MG: 10 TABLET, FILM COATED ORAL at 13:43

## 2018-01-01 RX ADMIN — OXYCODONE HYDROCHLORIDE AND ACETAMINOPHEN 2 TABLET: 5; 325 TABLET ORAL at 12:18

## 2018-01-01 RX ADMIN — OXYCODONE HYDROCHLORIDE 10 MG: 10 TABLET, FILM COATED, EXTENDED RELEASE ORAL at 21:12

## 2018-01-01 RX ADMIN — ONDANSETRON 4 MG: 2 INJECTION INTRAMUSCULAR; INTRAVENOUS at 19:49

## 2018-01-01 RX ADMIN — ACETAMINOPHEN 1000 MG: 500 TABLET ORAL at 20:28

## 2018-01-01 RX ADMIN — DOCUSATE SODIUM 200 MG: 100 CAPSULE ORAL at 08:50

## 2018-01-01 RX ADMIN — SODIUM CHLORIDE: 9 INJECTION, SOLUTION INTRAVENOUS at 11:15

## 2018-01-01 RX ADMIN — OXYCODONE HYDROCHLORIDE 10 MG: 5 TABLET ORAL at 11:26

## 2018-01-01 RX ADMIN — OXYCODONE HYDROCHLORIDE 10 MG: 5 TABLET ORAL at 18:37

## 2018-01-01 RX ADMIN — MORPHINE SULFATE 2 MG: 2 INJECTION, SOLUTION INTRAMUSCULAR; INTRAVENOUS at 14:48

## 2018-01-01 RX ADMIN — HEPARIN SODIUM 5000 UNITS: 5000 INJECTION, SOLUTION INTRAVENOUS; SUBCUTANEOUS at 00:15

## 2018-01-01 RX ADMIN — TRAMADOL HYDROCHLORIDE 100 MG: 50 TABLET, FILM COATED ORAL at 14:06

## 2018-01-01 RX ADMIN — MEGESTROL ACETATE 400 MG: 40 SUSPENSION ORAL at 18:24

## 2018-01-01 RX ADMIN — SODIUM CHLORIDE 8 MG/HR: 9 INJECTION, SOLUTION INTRAVENOUS at 09:24

## 2018-01-01 RX ADMIN — MORPHINE SULFATE 2 MG: 2 INJECTION, SOLUTION INTRAMUSCULAR; INTRAVENOUS at 03:25

## 2018-01-01 RX ADMIN — FUROSEMIDE 40 MG: 40 TABLET ORAL at 09:01

## 2018-01-01 RX ADMIN — ROPINIROLE HYDROCHLORIDE 0.5 MG: 0.25 TABLET, FILM COATED ORAL at 15:00

## 2018-01-01 RX ADMIN — PROPOFOL 200 MG: 10 INJECTION, EMULSION INTRAVENOUS at 08:33

## 2018-01-01 RX ADMIN — HYDROMORPHONE HYDROCHLORIDE 0.5 MG: 1 INJECTION, SOLUTION INTRAMUSCULAR; INTRAVENOUS; SUBCUTANEOUS at 14:16

## 2018-01-01 RX ADMIN — INSULIN LISPRO 2 UNITS: 100 INJECTION, SOLUTION INTRAVENOUS; SUBCUTANEOUS at 11:26

## 2018-01-01 RX ADMIN — Medication 10 ML: at 09:09

## 2018-01-01 RX ADMIN — OXYCODONE HYDROCHLORIDE 10 MG: 5 TABLET ORAL at 02:50

## 2018-01-01 RX ADMIN — HEPARIN SODIUM 1600 UNITS: 1000 INJECTION, SOLUTION INTRAVENOUS; SUBCUTANEOUS at 13:28

## 2018-01-01 RX ADMIN — INSULIN LISPRO 1 UNITS: 100 INJECTION, SOLUTION INTRAVENOUS; SUBCUTANEOUS at 22:31

## 2018-01-01 RX ADMIN — OXYCODONE HYDROCHLORIDE 10 MG: 5 TABLET ORAL at 23:14

## 2018-01-01 RX ADMIN — TRAMADOL HYDROCHLORIDE 100 MG: 50 TABLET, FILM COATED ORAL at 23:12

## 2018-01-01 RX ADMIN — DOCUSATE SODIUM 200 MG: 100 CAPSULE ORAL at 20:34

## 2018-01-01 RX ADMIN — HYDROMORPHONE HYDROCHLORIDE 0.5 MG: 1 INJECTION, SOLUTION INTRAMUSCULAR; INTRAVENOUS; SUBCUTANEOUS at 00:34

## 2018-01-01 RX ADMIN — INSULIN LISPRO 1 UNITS: 100 INJECTION, SOLUTION INTRAVENOUS; SUBCUTANEOUS at 10:21

## 2018-01-01 RX ADMIN — SODIUM CHLORIDE: 9 INJECTION, SOLUTION INTRAVENOUS at 09:27

## 2018-01-01 RX ADMIN — HEPARIN SODIUM 1600 UNITS: 1000 INJECTION, SOLUTION INTRAVENOUS; SUBCUTANEOUS at 12:42

## 2018-01-01 RX ADMIN — HYDROMORPHONE HYDROCHLORIDE 0.5 MG: 1 INJECTION, SOLUTION INTRAMUSCULAR; INTRAVENOUS; SUBCUTANEOUS at 15:24

## 2018-01-01 RX ADMIN — OXYCODONE HYDROCHLORIDE AND ACETAMINOPHEN 2 TABLET: 5; 325 TABLET ORAL at 08:16

## 2018-01-01 RX ADMIN — CEFUROXIME AXETIL 250 MG: 250 TABLET ORAL at 22:32

## 2018-01-01 RX ADMIN — OXYCODONE HYDROCHLORIDE 10 MG: 5 TABLET ORAL at 05:20

## 2018-01-01 RX ADMIN — DOCUSATE SODIUM 200 MG: 100 CAPSULE ORAL at 21:01

## 2018-01-01 RX ADMIN — AMITRIPTYLINE HYDROCHLORIDE 75 MG: 50 TABLET, FILM COATED ORAL at 20:45

## 2018-01-01 RX ADMIN — MORPHINE SULFATE 4 MG: 4 INJECTION INTRAVENOUS at 13:51

## 2018-01-01 RX ADMIN — GABAPENTIN 100 MG: 100 CAPSULE ORAL at 15:24

## 2018-01-01 RX ADMIN — FAMOTIDINE 20 MG: 20 TABLET, FILM COATED ORAL at 08:59

## 2018-01-01 RX ADMIN — OXYCODONE HYDROCHLORIDE 10 MG: 10 TABLET, FILM COATED, EXTENDED RELEASE ORAL at 22:26

## 2018-01-01 RX ADMIN — OXYCODONE HYDROCHLORIDE 10 MG: 5 TABLET ORAL at 06:12

## 2018-01-01 RX ADMIN — PROPOFOL 220 MG: 10 INJECTION, EMULSION INTRAVENOUS at 11:45

## 2018-01-01 RX ADMIN — POTASSIUM CHLORIDE 20 MEQ: 1500 TABLET, EXTENDED RELEASE ORAL at 08:45

## 2018-01-01 RX ADMIN — GABAPENTIN 100 MG: 100 CAPSULE ORAL at 11:24

## 2018-01-01 RX ADMIN — HEPARIN SODIUM 1600 UNITS: 1000 INJECTION, SOLUTION INTRAVENOUS; SUBCUTANEOUS at 14:09

## 2018-01-01 RX ADMIN — HEPARIN SODIUM 5000 UNITS: 5000 INJECTION, SOLUTION INTRAVENOUS; SUBCUTANEOUS at 22:12

## 2018-01-01 RX ADMIN — ATORVASTATIN CALCIUM 20 MG: 20 TABLET, FILM COATED ORAL at 08:59

## 2018-01-01 RX ADMIN — ATORVASTATIN CALCIUM 20 MG: 20 TABLET, FILM COATED ORAL at 20:28

## 2018-01-01 RX ADMIN — Medication 10 ML: at 21:13

## 2018-01-01 RX ADMIN — AMLODIPINE BESYLATE 10 MG: 10 TABLET ORAL at 12:55

## 2018-01-01 RX ADMIN — SODIUM CHLORIDE: 9 INJECTION, SOLUTION INTRAVENOUS at 08:59

## 2018-01-01 RX ADMIN — OXYCODONE HYDROCHLORIDE 10 MG: 5 TABLET ORAL at 18:32

## 2018-01-01 RX ADMIN — HYDROCODONE BITARTRATE AND ACETAMINOPHEN 1 TABLET: 5; 325 TABLET ORAL at 17:09

## 2018-01-01 RX ADMIN — HYDROMORPHONE HYDROCHLORIDE 0.5 MG: 1 INJECTION, SOLUTION INTRAMUSCULAR; INTRAVENOUS; SUBCUTANEOUS at 15:22

## 2018-01-01 RX ADMIN — HEPARIN SODIUM 3480 UNITS: 1000 INJECTION, SOLUTION INTRAVENOUS; SUBCUTANEOUS at 05:13

## 2018-01-01 RX ADMIN — HYDROMORPHONE HYDROCHLORIDE 0.5 MG: 1 INJECTION, SOLUTION INTRAMUSCULAR; INTRAVENOUS; SUBCUTANEOUS at 13:13

## 2018-01-01 RX ADMIN — HEPARIN SODIUM 5000 UNITS: 5000 INJECTION, SOLUTION INTRAVENOUS; SUBCUTANEOUS at 20:47

## 2018-01-01 RX ADMIN — SODIUM CHLORIDE 250 ML: 9 INJECTION, SOLUTION INTRAVENOUS at 23:45

## 2018-01-01 RX ADMIN — DEXAMETHASONE SODIUM PHOSPHATE 4 MG: 4 INJECTION, SOLUTION INTRAMUSCULAR; INTRAVENOUS at 14:28

## 2018-01-01 RX ADMIN — OXYCODONE HYDROCHLORIDE 10 MG: 5 TABLET ORAL at 20:36

## 2018-01-01 RX ADMIN — Medication 10 ML: at 11:06

## 2018-01-01 RX ADMIN — SODIUM BICARBONATE 650 MG: 650 TABLET ORAL at 08:16

## 2018-01-01 RX ADMIN — OXYCODONE HYDROCHLORIDE 10 MG: 10 TABLET, FILM COATED, EXTENDED RELEASE ORAL at 20:47

## 2018-01-01 RX ADMIN — CYCLOBENZAPRINE HYDROCHLORIDE 5 MG: 5 TABLET, FILM COATED ORAL at 09:16

## 2018-01-01 RX ADMIN — OXYCODONE HYDROCHLORIDE 10 MG: 5 TABLET ORAL at 12:10

## 2018-01-01 RX ADMIN — OXYCODONE HYDROCHLORIDE 10 MG: 10 TABLET, FILM COATED, EXTENDED RELEASE ORAL at 08:34

## 2018-01-01 RX ADMIN — DOCUSATE SODIUM 200 MG: 100 CAPSULE ORAL at 11:20

## 2018-01-01 RX ADMIN — DEXAMETHASONE SODIUM PHOSPHATE 4 MG: 4 INJECTION, SOLUTION INTRAMUSCULAR; INTRAVENOUS at 20:33

## 2018-01-01 RX ADMIN — HYDROMORPHONE HYDROCHLORIDE 0.5 MG: 1 INJECTION, SOLUTION INTRAMUSCULAR; INTRAVENOUS; SUBCUTANEOUS at 18:58

## 2018-01-01 RX ADMIN — INSULIN LISPRO 5 UNITS: 100 INJECTION, SOLUTION INTRAVENOUS; SUBCUTANEOUS at 21:35

## 2018-01-01 RX ADMIN — ATORVASTATIN CALCIUM 20 MG: 20 TABLET, FILM COATED ORAL at 08:31

## 2018-01-01 RX ADMIN — ONDANSETRON HYDROCHLORIDE 4 MG: 4 TABLET, FILM COATED ORAL at 14:16

## 2018-01-01 RX ADMIN — INSULIN GLARGINE 60 UNITS: 100 INJECTION, SOLUTION SUBCUTANEOUS at 20:26

## 2018-01-01 RX ADMIN — INSULIN HUMAN 10 UNITS: 100 INJECTION, SOLUTION PARENTERAL at 08:20

## 2018-01-01 RX ADMIN — MEGESTROL ACETATE 400 MG: 40 SUSPENSION ORAL at 12:42

## 2018-01-01 RX ADMIN — MORPHINE SULFATE 4 MG: 4 INJECTION INTRAVENOUS at 21:47

## 2018-01-01 RX ADMIN — CEFUROXIME AXETIL 250 MG: 250 TABLET ORAL at 21:12

## 2018-01-01 RX ADMIN — OXYCODONE HYDROCHLORIDE 10 MG: 5 TABLET ORAL at 03:10

## 2018-01-01 RX ADMIN — GABAPENTIN 100 MG: 100 CAPSULE ORAL at 14:56

## 2018-01-01 RX ADMIN — Medication 10 ML: at 21:14

## 2018-01-01 RX ADMIN — GABAPENTIN 100 MG: 100 CAPSULE ORAL at 14:00

## 2018-01-01 RX ADMIN — ACETAMINOPHEN 1000 MG: 500 TABLET ORAL at 14:39

## 2018-01-01 RX ADMIN — DOCUSATE SODIUM 200 MG: 100 CAPSULE, LIQUID FILLED ORAL at 21:05

## 2018-01-01 RX ADMIN — HEPARIN SODIUM AND DEXTROSE 12 UNITS/KG/HR: 10000; 5 INJECTION INTRAVENOUS at 01:07

## 2018-01-01 RX ADMIN — SODIUM CHLORIDE: 9 INJECTION, SOLUTION INTRAVENOUS at 10:50

## 2018-01-01 RX ADMIN — SODIUM CHLORIDE: 9 INJECTION, SOLUTION INTRAVENOUS at 08:30

## 2018-01-01 RX ADMIN — AMITRIPTYLINE HYDROCHLORIDE 75 MG: 50 TABLET, FILM COATED ORAL at 22:53

## 2018-01-01 RX ADMIN — INSULIN LISPRO 1 UNITS: 100 INJECTION, SOLUTION INTRAVENOUS; SUBCUTANEOUS at 20:33

## 2018-01-01 RX ADMIN — OXYCODONE HYDROCHLORIDE 10 MG: 5 TABLET ORAL at 14:33

## 2018-01-01 RX ADMIN — HYDROCODONE BITARTRATE AND ACETAMINOPHEN 2 TABLET: 5; 325 TABLET ORAL at 15:28

## 2018-01-01 RX ADMIN — DOCUSATE SODIUM 200 MG: 100 CAPSULE ORAL at 08:10

## 2018-01-01 RX ADMIN — OXYCODONE HYDROCHLORIDE AND ACETAMINOPHEN 1 TABLET: 5; 325 TABLET ORAL at 19:09

## 2018-01-01 RX ADMIN — OXYCODONE HYDROCHLORIDE 10 MG: 5 TABLET ORAL at 20:19

## 2018-01-01 RX ADMIN — ACETAMINOPHEN 1000 MG: 500 TABLET ORAL at 15:31

## 2018-01-01 RX ADMIN — ACETAMINOPHEN 1000 MG: 500 TABLET ORAL at 22:21

## 2018-01-01 RX ADMIN — HYDROCODONE BITARTRATE AND ACETAMINOPHEN 1 TABLET: 5; 325 TABLET ORAL at 11:57

## 2018-01-01 RX ADMIN — OXYCODONE HYDROCHLORIDE 10 MG: 5 TABLET ORAL at 18:12

## 2018-01-01 RX ADMIN — MEGESTROL ACETATE 400 MG: 40 SUSPENSION ORAL at 11:23

## 2018-01-01 RX ADMIN — ROPINIROLE HYDROCHLORIDE 0.5 MG: 0.25 TABLET, FILM COATED ORAL at 21:37

## 2018-01-01 RX ADMIN — INSULIN GLARGINE 60 UNITS: 100 INJECTION, SOLUTION SUBCUTANEOUS at 08:46

## 2018-01-01 RX ADMIN — MORPHINE SULFATE 4 MG: 4 INJECTION INTRAVENOUS at 13:59

## 2018-01-01 RX ADMIN — CARVEDILOL 6.25 MG: 6.25 TABLET, FILM COATED ORAL at 18:30

## 2018-01-01 RX ADMIN — HYDROMORPHONE HYDROCHLORIDE 0.5 MG: 1 INJECTION, SOLUTION INTRAMUSCULAR; INTRAVENOUS; SUBCUTANEOUS at 13:12

## 2018-01-01 RX ADMIN — SODIUM CHLORIDE: 9 INJECTION, SOLUTION INTRAVENOUS at 01:07

## 2018-01-01 RX ADMIN — AMITRIPTYLINE HYDROCHLORIDE 75 MG: 50 TABLET, FILM COATED ORAL at 20:36

## 2018-01-01 RX ADMIN — LISINOPRIL 5 MG: 5 TABLET ORAL at 21:42

## 2018-01-01 RX ADMIN — GABAPENTIN 100 MG: 100 CAPSULE ORAL at 21:05

## 2018-01-01 RX ADMIN — HYDROMORPHONE HYDROCHLORIDE 0.5 MG: 1 INJECTION, SOLUTION INTRAMUSCULAR; INTRAVENOUS; SUBCUTANEOUS at 16:23

## 2018-01-01 RX ADMIN — ROCURONIUM BROMIDE 5 MG: 10 INJECTION INTRAVENOUS at 08:30

## 2018-01-01 RX ADMIN — DOCUSATE SODIUM 200 MG: 100 CAPSULE ORAL at 12:42

## 2018-01-01 RX ADMIN — AMITRIPTYLINE HYDROCHLORIDE 75 MG: 50 TABLET, FILM COATED ORAL at 22:59

## 2018-01-01 RX ADMIN — OXYCODONE HYDROCHLORIDE 10 MG: 10 TABLET, FILM COATED, EXTENDED RELEASE ORAL at 08:51

## 2018-01-01 RX ADMIN — MORPHINE SULFATE 2 MG: 2 INJECTION, SOLUTION INTRAMUSCULAR; INTRAVENOUS at 13:16

## 2018-01-01 RX ADMIN — HYDROCODONE BITARTRATE AND ACETAMINOPHEN 1 TABLET: 5; 325 TABLET ORAL at 14:43

## 2018-01-01 RX ADMIN — ATORVASTATIN CALCIUM 20 MG: 20 TABLET, FILM COATED ORAL at 20:21

## 2018-01-01 RX ADMIN — Medication 10 ML: at 10:46

## 2018-01-01 RX ADMIN — MIDODRINE HYDROCHLORIDE 2.5 MG: 2.5 TABLET ORAL at 11:24

## 2018-01-01 RX ADMIN — INSULIN LISPRO 2 UNITS: 100 INJECTION, SOLUTION INTRAVENOUS; SUBCUTANEOUS at 00:55

## 2018-01-01 RX ADMIN — HYDROMORPHONE HYDROCHLORIDE 0.5 MG: 1 INJECTION, SOLUTION INTRAMUSCULAR; INTRAVENOUS; SUBCUTANEOUS at 13:34

## 2018-01-01 RX ADMIN — ATORVASTATIN CALCIUM 20 MG: 20 TABLET, FILM COATED ORAL at 09:27

## 2018-01-01 RX ADMIN — OXYCODONE HYDROCHLORIDE 10 MG: 5 TABLET ORAL at 14:39

## 2018-01-01 RX ADMIN — TRAMADOL HYDROCHLORIDE 100 MG: 50 TABLET, FILM COATED ORAL at 06:59

## 2018-01-01 RX ADMIN — INSULIN LISPRO 3 UNITS: 100 INJECTION, SOLUTION INTRAVENOUS; SUBCUTANEOUS at 12:39

## 2018-01-01 RX ADMIN — SODIUM CHLORIDE: 9 INJECTION, SOLUTION INTRAVENOUS at 08:33

## 2018-01-01 RX ADMIN — MORPHINE SULFATE 2 MG: 2 INJECTION, SOLUTION INTRAMUSCULAR; INTRAVENOUS at 12:02

## 2018-01-01 RX ADMIN — HYDROCODONE BITARTRATE AND ACETAMINOPHEN 2 TABLET: 5; 325 TABLET ORAL at 20:21

## 2018-01-01 RX ADMIN — GABAPENTIN 100 MG: 100 CAPSULE ORAL at 12:29

## 2018-01-01 RX ADMIN — OXYCODONE HYDROCHLORIDE 10 MG: 5 TABLET ORAL at 21:43

## 2018-01-01 RX ADMIN — INSULIN LISPRO 1 UNITS: 100 INJECTION, SOLUTION INTRAVENOUS; SUBCUTANEOUS at 20:36

## 2018-01-01 RX ADMIN — ACETAMINOPHEN 1000 MG: 500 TABLET ORAL at 13:17

## 2018-01-01 RX ADMIN — DOCUSATE SODIUM 200 MG: 100 CAPSULE, LIQUID FILLED ORAL at 20:36

## 2018-01-01 RX ADMIN — OXYCODONE HYDROCHLORIDE 10 MG: 5 TABLET ORAL at 02:33

## 2018-01-01 ASSESSMENT — PAIN DESCRIPTION - PROGRESSION
CLINICAL_PROGRESSION: NOT CHANGED
CLINICAL_PROGRESSION: GRADUALLY IMPROVING
CLINICAL_PROGRESSION: NOT CHANGED
CLINICAL_PROGRESSION: GRADUALLY WORSENING
CLINICAL_PROGRESSION: NOT CHANGED
CLINICAL_PROGRESSION: OTHER (COMMENT)
CLINICAL_PROGRESSION: NOT CHANGED
CLINICAL_PROGRESSION: OTHER (COMMENT)
CLINICAL_PROGRESSION: NOT CHANGED
CLINICAL_PROGRESSION: GRADUALLY WORSENING
CLINICAL_PROGRESSION: NOT CHANGED
CLINICAL_PROGRESSION: GRADUALLY IMPROVING
CLINICAL_PROGRESSION: NOT CHANGED
CLINICAL_PROGRESSION: GRADUALLY IMPROVING
CLINICAL_PROGRESSION: NOT CHANGED
CLINICAL_PROGRESSION: NOT CHANGED
CLINICAL_PROGRESSION: GRADUALLY WORSENING
CLINICAL_PROGRESSION: NOT CHANGED
CLINICAL_PROGRESSION: GRADUALLY IMPROVING
CLINICAL_PROGRESSION: GRADUALLY WORSENING
CLINICAL_PROGRESSION: NOT CHANGED

## 2018-01-01 ASSESSMENT — PAIN SCALES - GENERAL
PAINLEVEL_OUTOF10: 6
PAINLEVEL_OUTOF10: 7
PAINLEVEL_OUTOF10: 0
PAINLEVEL_OUTOF10: 7
PAINLEVEL_OUTOF10: 8
PAINLEVEL_OUTOF10: 0
PAINLEVEL_OUTOF10: 7
PAINLEVEL_OUTOF10: 7
PAINLEVEL_OUTOF10: 5
PAINLEVEL_OUTOF10: 7
PAINLEVEL_OUTOF10: 4
PAINLEVEL_OUTOF10: 9
PAINLEVEL_OUTOF10: 9
PAINLEVEL_OUTOF10: 7
PAINLEVEL_OUTOF10: 0
PAINLEVEL_OUTOF10: 9
PAINLEVEL_OUTOF10: 6
PAINLEVEL_OUTOF10: 8
PAINLEVEL_OUTOF10: 8
PAINLEVEL_OUTOF10: 7
PAINLEVEL_OUTOF10: 5
PAINLEVEL_OUTOF10: 0
PAINLEVEL_OUTOF10: 3
PAINLEVEL_OUTOF10: 6
PAINLEVEL_OUTOF10: 0
PAINLEVEL_OUTOF10: 7
PAINLEVEL_OUTOF10: 6
PAINLEVEL_OUTOF10: 10
PAINLEVEL_OUTOF10: 0
PAINLEVEL_OUTOF10: 8
PAINLEVEL_OUTOF10: 2
PAINLEVEL_OUTOF10: 8
PAINLEVEL_OUTOF10: 7
PAINLEVEL_OUTOF10: 0
PAINLEVEL_OUTOF10: 5
PAINLEVEL_OUTOF10: 8
PAINLEVEL_OUTOF10: 7
PAINLEVEL_OUTOF10: 8
PAINLEVEL_OUTOF10: 0
PAINLEVEL_OUTOF10: 7
PAINLEVEL_OUTOF10: 5
PAINLEVEL_OUTOF10: 8
PAINLEVEL_OUTOF10: 9
PAINLEVEL_OUTOF10: 5
PAINLEVEL_OUTOF10: 7
PAINLEVEL_OUTOF10: 5
PAINLEVEL_OUTOF10: 7
PAINLEVEL_OUTOF10: 8
PAINLEVEL_OUTOF10: 0
PAINLEVEL_OUTOF10: 0
PAINLEVEL_OUTOF10: 8
PAINLEVEL_OUTOF10: 0
PAINLEVEL_OUTOF10: 7
PAINLEVEL_OUTOF10: 8
PAINLEVEL_OUTOF10: 5
PAINLEVEL_OUTOF10: 6
PAINLEVEL_OUTOF10: 5
PAINLEVEL_OUTOF10: 6
PAINLEVEL_OUTOF10: 6
PAINLEVEL_OUTOF10: 7
PAINLEVEL_OUTOF10: 6
PAINLEVEL_OUTOF10: 7
PAINLEVEL_OUTOF10: 6
PAINLEVEL_OUTOF10: 0
PAINLEVEL_OUTOF10: 8
PAINLEVEL_OUTOF10: 8
PAINLEVEL_OUTOF10: 9
PAINLEVEL_OUTOF10: 0
PAINLEVEL_OUTOF10: 8
PAINLEVEL_OUTOF10: 0
PAINLEVEL_OUTOF10: 6
PAINLEVEL_OUTOF10: 9
PAINLEVEL_OUTOF10: 8
PAINLEVEL_OUTOF10: 7
PAINLEVEL_OUTOF10: 6
PAINLEVEL_OUTOF10: 8
PAINLEVEL_OUTOF10: 7
PAINLEVEL_OUTOF10: 7
PAINLEVEL_OUTOF10: 0
PAINLEVEL_OUTOF10: 8
PAINLEVEL_OUTOF10: 6
PAINLEVEL_OUTOF10: 7
PAINLEVEL_OUTOF10: 0
PAINLEVEL_OUTOF10: 7
PAINLEVEL_OUTOF10: 7
PAINLEVEL_OUTOF10: 0
PAINLEVEL_OUTOF10: 7
PAINLEVEL_OUTOF10: 9
PAINLEVEL_OUTOF10: 3
PAINLEVEL_OUTOF10: 7
PAINLEVEL_OUTOF10: 6
PAINLEVEL_OUTOF10: 0
PAINLEVEL_OUTOF10: 8
PAINLEVEL_OUTOF10: 10
PAINLEVEL_OUTOF10: 9
PAINLEVEL_OUTOF10: 3
PAINLEVEL_OUTOF10: 8
PAINLEVEL_OUTOF10: 6
PAINLEVEL_OUTOF10: 7
PAINLEVEL_OUTOF10: 6
PAINLEVEL_OUTOF10: 3
PAINLEVEL_OUTOF10: 7
PAINLEVEL_OUTOF10: 8
PAINLEVEL_OUTOF10: 8
PAINLEVEL_OUTOF10: 10
PAINLEVEL_OUTOF10: 0
PAINLEVEL_OUTOF10: 7
PAINLEVEL_OUTOF10: 7
PAINLEVEL_OUTOF10: 5
PAINLEVEL_OUTOF10: 6
PAINLEVEL_OUTOF10: 0
PAINLEVEL_OUTOF10: 3
PAINLEVEL_OUTOF10: 9
PAINLEVEL_OUTOF10: 4
PAINLEVEL_OUTOF10: 6
PAINLEVEL_OUTOF10: 5
PAINLEVEL_OUTOF10: 6
PAINLEVEL_OUTOF10: 8
PAINLEVEL_OUTOF10: 6
PAINLEVEL_OUTOF10: 6
PAINLEVEL_OUTOF10: 0
PAINLEVEL_OUTOF10: 7
PAINLEVEL_OUTOF10: 7
PAINLEVEL_OUTOF10: 0
PAINLEVEL_OUTOF10: 8
PAINLEVEL_OUTOF10: 7
PAINLEVEL_OUTOF10: 0
PAINLEVEL_OUTOF10: 8
PAINLEVEL_OUTOF10: 8
PAINLEVEL_OUTOF10: 0
PAINLEVEL_OUTOF10: 8
PAINLEVEL_OUTOF10: 7
PAINLEVEL_OUTOF10: 8
PAINLEVEL_OUTOF10: 8
PAINLEVEL_OUTOF10: 7
PAINLEVEL_OUTOF10: 2
PAINLEVEL_OUTOF10: 8
PAINLEVEL_OUTOF10: 6
PAINLEVEL_OUTOF10: 8
PAINLEVEL_OUTOF10: 4
PAINLEVEL_OUTOF10: 7
PAINLEVEL_OUTOF10: 7
PAINLEVEL_OUTOF10: 4
PAINLEVEL_OUTOF10: 8
PAINLEVEL_OUTOF10: 7
PAINLEVEL_OUTOF10: 3
PAINLEVEL_OUTOF10: 7
PAINLEVEL_OUTOF10: 4
PAINLEVEL_OUTOF10: 4
PAINLEVEL_OUTOF10: 6
PAINLEVEL_OUTOF10: 0
PAINLEVEL_OUTOF10: 8
PAINLEVEL_OUTOF10: 5
PAINLEVEL_OUTOF10: 6
PAINLEVEL_OUTOF10: 7
PAINLEVEL_OUTOF10: 8
PAINLEVEL_OUTOF10: 4
PAINLEVEL_OUTOF10: 0
PAINLEVEL_OUTOF10: 7
PAINLEVEL_OUTOF10: 7
PAINLEVEL_OUTOF10: 8
PAINLEVEL_OUTOF10: 7
PAINLEVEL_OUTOF10: 0
PAINLEVEL_OUTOF10: 7
PAINLEVEL_OUTOF10: 8
PAINLEVEL_OUTOF10: 7
PAINLEVEL_OUTOF10: 8
PAINLEVEL_OUTOF10: 7
PAINLEVEL_OUTOF10: 7
PAINLEVEL_OUTOF10: 6
PAINLEVEL_OUTOF10: 2
PAINLEVEL_OUTOF10: 6
PAINLEVEL_OUTOF10: 7
PAINLEVEL_OUTOF10: 6
PAINLEVEL_OUTOF10: 7
PAINLEVEL_OUTOF10: 8
PAINLEVEL_OUTOF10: 8
PAINLEVEL_OUTOF10: 6
PAINLEVEL_OUTOF10: 10
PAINLEVEL_OUTOF10: 7
PAINLEVEL_OUTOF10: 7
PAINLEVEL_OUTOF10: 9
PAINLEVEL_OUTOF10: 7
PAINLEVEL_OUTOF10: 8
PAINLEVEL_OUTOF10: 8
PAINLEVEL_OUTOF10: 7
PAINLEVEL_OUTOF10: 9
PAINLEVEL_OUTOF10: 6
PAINLEVEL_OUTOF10: 6
PAINLEVEL_OUTOF10: 0
PAINLEVEL_OUTOF10: 7
PAINLEVEL_OUTOF10: 6
PAINLEVEL_OUTOF10: 7
PAINLEVEL_OUTOF10: 6
PAINLEVEL_OUTOF10: 5
PAINLEVEL_OUTOF10: 0
PAINLEVEL_OUTOF10: 7
PAINLEVEL_OUTOF10: 3
PAINLEVEL_OUTOF10: 7
PAINLEVEL_OUTOF10: 4
PAINLEVEL_OUTOF10: 8
PAINLEVEL_OUTOF10: 7
PAINLEVEL_OUTOF10: 6
PAINLEVEL_OUTOF10: 7
PAINLEVEL_OUTOF10: 10
PAINLEVEL_OUTOF10: 8
PAINLEVEL_OUTOF10: 7
PAINLEVEL_OUTOF10: 3
PAINLEVEL_OUTOF10: 7
PAINLEVEL_OUTOF10: 8
PAINLEVEL_OUTOF10: 6
PAINLEVEL_OUTOF10: 7
PAINLEVEL_OUTOF10: 7
PAINLEVEL_OUTOF10: 8
PAINLEVEL_OUTOF10: 4
PAINLEVEL_OUTOF10: 10
PAINLEVEL_OUTOF10: 8
PAINLEVEL_OUTOF10: 7
PAINLEVEL_OUTOF10: 8
PAINLEVEL_OUTOF10: 0
PAINLEVEL_OUTOF10: 7
PAINLEVEL_OUTOF10: 8
PAINLEVEL_OUTOF10: 7
PAINLEVEL_OUTOF10: 6
PAINLEVEL_OUTOF10: 8
PAINLEVEL_OUTOF10: 8
PAINLEVEL_OUTOF10: 3
PAINLEVEL_OUTOF10: 6
PAINLEVEL_OUTOF10: 8
PAINLEVEL_OUTOF10: 7
PAINLEVEL_OUTOF10: 7
PAINLEVEL_OUTOF10: 8
PAINLEVEL_OUTOF10: 7
PAINLEVEL_OUTOF10: 10
PAINLEVEL_OUTOF10: 7
PAINLEVEL_OUTOF10: 6
PAINLEVEL_OUTOF10: 9
PAINLEVEL_OUTOF10: 5
PAINLEVEL_OUTOF10: 8
PAINLEVEL_OUTOF10: 6
PAINLEVEL_OUTOF10: 5
PAINLEVEL_OUTOF10: 8
PAINLEVEL_OUTOF10: 4
PAINLEVEL_OUTOF10: 8
PAINLEVEL_OUTOF10: 7
PAINLEVEL_OUTOF10: 9
PAINLEVEL_OUTOF10: 8
PAINLEVEL_OUTOF10: 6
PAINLEVEL_OUTOF10: 7
PAINLEVEL_OUTOF10: 8
PAINLEVEL_OUTOF10: 8
PAINLEVEL_OUTOF10: 6
PAINLEVEL_OUTOF10: 0
PAINLEVEL_OUTOF10: 9
PAINLEVEL_OUTOF10: 9
PAINLEVEL_OUTOF10: 7
PAINLEVEL_OUTOF10: 8
PAINLEVEL_OUTOF10: 7
PAINLEVEL_OUTOF10: 5
PAINLEVEL_OUTOF10: 0
PAINLEVEL_OUTOF10: 7
PAINLEVEL_OUTOF10: 6
PAINLEVEL_OUTOF10: 6
PAINLEVEL_OUTOF10: 0
PAINLEVEL_OUTOF10: 6
PAINLEVEL_OUTOF10: 7
PAINLEVEL_OUTOF10: 8
PAINLEVEL_OUTOF10: 7
PAINLEVEL_OUTOF10: 8
PAINLEVEL_OUTOF10: 3
PAINLEVEL_OUTOF10: 5
PAINLEVEL_OUTOF10: 7
PAINLEVEL_OUTOF10: 7
PAINLEVEL_OUTOF10: 8
PAINLEVEL_OUTOF10: 8
PAINLEVEL_OUTOF10: 10
PAINLEVEL_OUTOF10: 0
PAINLEVEL_OUTOF10: 8
PAINLEVEL_OUTOF10: 4
PAINLEVEL_OUTOF10: 7
PAINLEVEL_OUTOF10: 7
PAINLEVEL_OUTOF10: 0
PAINLEVEL_OUTOF10: 0
PAINLEVEL_OUTOF10: 7
PAINLEVEL_OUTOF10: 4
PAINLEVEL_OUTOF10: 4
PAINLEVEL_OUTOF10: 5
PAINLEVEL_OUTOF10: 7
PAINLEVEL_OUTOF10: 0
PAINLEVEL_OUTOF10: 8
PAINLEVEL_OUTOF10: 7
PAINLEVEL_OUTOF10: 5
PAINLEVEL_OUTOF10: 8
PAINLEVEL_OUTOF10: 0
PAINLEVEL_OUTOF10: 6
PAINLEVEL_OUTOF10: 9
PAINLEVEL_OUTOF10: 1
PAINLEVEL_OUTOF10: 8
PAINLEVEL_OUTOF10: 0
PAINLEVEL_OUTOF10: 7
PAINLEVEL_OUTOF10: 8
PAINLEVEL_OUTOF10: 6
PAINLEVEL_OUTOF10: 0
PAINLEVEL_OUTOF10: 7
PAINLEVEL_OUTOF10: 9
PAINLEVEL_OUTOF10: 5
PAINLEVEL_OUTOF10: 6
PAINLEVEL_OUTOF10: 7
PAINLEVEL_OUTOF10: 7
PAINLEVEL_OUTOF10: 8
PAINLEVEL_OUTOF10: 8
PAINLEVEL_OUTOF10: 0
PAINLEVEL_OUTOF10: 7
PAINLEVEL_OUTOF10: 7
PAINLEVEL_OUTOF10: 3
PAINLEVEL_OUTOF10: 8
PAINLEVEL_OUTOF10: 6
PAINLEVEL_OUTOF10: 0
PAINLEVEL_OUTOF10: 7
PAINLEVEL_OUTOF10: 7
PAINLEVEL_OUTOF10: 8
PAINLEVEL_OUTOF10: 0
PAINLEVEL_OUTOF10: 8
PAINLEVEL_OUTOF10: 7
PAINLEVEL_OUTOF10: 7
PAINLEVEL_OUTOF10: 9
PAINLEVEL_OUTOF10: 6
PAINLEVEL_OUTOF10: 7
PAINLEVEL_OUTOF10: 0
PAINLEVEL_OUTOF10: 5
PAINLEVEL_OUTOF10: 8
PAINLEVEL_OUTOF10: 8
PAINLEVEL_OUTOF10: 10
PAINLEVEL_OUTOF10: 7
PAINLEVEL_OUTOF10: 6
PAINLEVEL_OUTOF10: 7
PAINLEVEL_OUTOF10: 6
PAINLEVEL_OUTOF10: 0
PAINLEVEL_OUTOF10: 8
PAINLEVEL_OUTOF10: 3
PAINLEVEL_OUTOF10: 7
PAINLEVEL_OUTOF10: 7
PAINLEVEL_OUTOF10: 8
PAINLEVEL_OUTOF10: 8
PAINLEVEL_OUTOF10: 6
PAINLEVEL_OUTOF10: 8
PAINLEVEL_OUTOF10: 0
PAINLEVEL_OUTOF10: 7
PAINLEVEL_OUTOF10: 8
PAINLEVEL_OUTOF10: 7
PAINLEVEL_OUTOF10: 7
PAINLEVEL_OUTOF10: 5
PAINLEVEL_OUTOF10: 8
PAINLEVEL_OUTOF10: 0
PAINLEVEL_OUTOF10: 4
PAINLEVEL_OUTOF10: 6
PAINLEVEL_OUTOF10: 0
PAINLEVEL_OUTOF10: 0
PAINLEVEL_OUTOF10: 7
PAINLEVEL_OUTOF10: 7
PAINLEVEL_OUTOF10: 5
PAINLEVEL_OUTOF10: 7
PAINLEVEL_OUTOF10: 7
PAINLEVEL_OUTOF10: 8
PAINLEVEL_OUTOF10: 5
PAINLEVEL_OUTOF10: 3
PAINLEVEL_OUTOF10: 3
PAINLEVEL_OUTOF10: 7
PAINLEVEL_OUTOF10: 7
PAINLEVEL_OUTOF10: 9
PAINLEVEL_OUTOF10: 7
PAINLEVEL_OUTOF10: 10
PAINLEVEL_OUTOF10: 6
PAINLEVEL_OUTOF10: 7
PAINLEVEL_OUTOF10: 5
PAINLEVEL_OUTOF10: 6
PAINLEVEL_OUTOF10: 3
PAINLEVEL_OUTOF10: 3
PAINLEVEL_OUTOF10: 10
PAINLEVEL_OUTOF10: 6
PAINLEVEL_OUTOF10: 9
PAINLEVEL_OUTOF10: 4
PAINLEVEL_OUTOF10: 7
PAINLEVEL_OUTOF10: 7
PAINLEVEL_OUTOF10: 8
PAINLEVEL_OUTOF10: 10
PAINLEVEL_OUTOF10: 6
PAINLEVEL_OUTOF10: 0
PAINLEVEL_OUTOF10: 9
PAINLEVEL_OUTOF10: 7
PAINLEVEL_OUTOF10: 6
PAINLEVEL_OUTOF10: 7
PAINLEVEL_OUTOF10: 7
PAINLEVEL_OUTOF10: 4
PAINLEVEL_OUTOF10: 8
PAINLEVEL_OUTOF10: 7
PAINLEVEL_OUTOF10: 8
PAINLEVEL_OUTOF10: 8
PAINLEVEL_OUTOF10: 7
PAINLEVEL_OUTOF10: 10
PAINLEVEL_OUTOF10: 4
PAINLEVEL_OUTOF10: 8
PAINLEVEL_OUTOF10: 7
PAINLEVEL_OUTOF10: 3
PAINLEVEL_OUTOF10: 8
PAINLEVEL_OUTOF10: 7
PAINLEVEL_OUTOF10: 8
PAINLEVEL_OUTOF10: 8
PAINLEVEL_OUTOF10: 9
PAINLEVEL_OUTOF10: 3
PAINLEVEL_OUTOF10: 8
PAINLEVEL_OUTOF10: 3
PAINLEVEL_OUTOF10: 7
PAINLEVEL_OUTOF10: 9
PAINLEVEL_OUTOF10: 4
PAINLEVEL_OUTOF10: 5
PAINLEVEL_OUTOF10: 5
PAINLEVEL_OUTOF10: 7
PAINLEVEL_OUTOF10: 9
PAINLEVEL_OUTOF10: 8
PAINLEVEL_OUTOF10: 6
PAINLEVEL_OUTOF10: 0
PAINLEVEL_OUTOF10: 9
PAINLEVEL_OUTOF10: 7
PAINLEVEL_OUTOF10: 8
PAINLEVEL_OUTOF10: 8
PAINLEVEL_OUTOF10: 0
PAINLEVEL_OUTOF10: 7
PAINLEVEL_OUTOF10: 7
PAINLEVEL_OUTOF10: 3
PAINLEVEL_OUTOF10: 7
PAINLEVEL_OUTOF10: 7

## 2018-01-01 ASSESSMENT — ENCOUNTER SYMPTOMS
EYES NEGATIVE: 1
WHEEZING: 0
NAUSEA: 0
CONSTIPATION: 0
HEMOPTYSIS: 0
WHEEZING: 0
NAUSEA: 0
VOMITING: 0
ABDOMINAL PAIN: 0
BACK PAIN: 1
VOMITING: 0
BLOOD IN STOOL: 0
BACK PAIN: 1
COUGH: 0
RESPIRATORY NEGATIVE: 1
SHORTNESS OF BREATH: 0
BLOOD IN STOOL: 0
ALLERGIC/IMMUNOLOGIC NEGATIVE: 1
COUGH: 0
DIARRHEA: 0
HEMOPTYSIS: 0
ABDOMINAL PAIN: 0
ABDOMINAL PAIN: 0
BLOOD IN STOOL: 0
WHEEZING: 0
EYE PAIN: 0
CHEST TIGHTNESS: 0
NAUSEA: 0
SHORTNESS OF BREATH: 1
RHINORRHEA: 0
WHEEZING: 0
ABDOMINAL PAIN: 1
ABDOMINAL PAIN: 0
NAUSEA: 0
SHORTNESS OF BREATH: 1
COUGH: 0
VOMITING: 0
EYES NEGATIVE: 1
SPUTUM PRODUCTION: 0
GASTROINTESTINAL NEGATIVE: 1
DIARRHEA: 1
NAUSEA: 0
SPUTUM PRODUCTION: 0
BLOOD IN STOOL: 0
DIARRHEA: 0
COUGH: 0
COUGH: 1
BLOOD IN STOOL: 1
HEARTBURN: 0
ABDOMINAL PAIN: 0
SHORTNESS OF BREATH: 0
EYES NEGATIVE: 1
ABDOMINAL PAIN: 1
NAUSEA: 0
SHORTNESS OF BREATH: 0
BLURRED VISION: 0
VOMITING: 0
SPUTUM PRODUCTION: 0
SHORTNESS OF BREATH: 0
NAUSEA: 0
NAUSEA: 0
SORE THROAT: 0
COUGH: 0
ABDOMINAL PAIN: 0
EYES NEGATIVE: 1
WHEEZING: 0
CONSTIPATION: 0
DOUBLE VISION: 0
ALLERGIC/IMMUNOLOGIC NEGATIVE: 1
NAUSEA: 0
VOMITING: 0
BACK PAIN: 1
NAUSEA: 0
SHORTNESS OF BREATH: 0
SORE THROAT: 0
BACK PAIN: 1
RESPIRATORY NEGATIVE: 1
SPUTUM PRODUCTION: 0
BACK PAIN: 1
BACK PAIN: 1
VOMITING: 0
ABDOMINAL PAIN: 0
VOMITING: 0
RESPIRATORY NEGATIVE: 1
DIARRHEA: 0
ABDOMINAL PAIN: 0
VOMITING: 0
VOMITING: 0
SHORTNESS OF BREATH: 1
ABDOMINAL PAIN: 1
BACK PAIN: 0
CHEST TIGHTNESS: 0
SHORTNESS OF BREATH: 0
SHORTNESS OF BREATH: 1
WHEEZING: 0
COUGH: 0
ALLERGIC/IMMUNOLOGIC NEGATIVE: 1
DIARRHEA: 0
VOMITING: 0
DIARRHEA: 0
SHORTNESS OF BREATH: 0
ABDOMINAL PAIN: 0
EYES NEGATIVE: 1
NAUSEA: 0
SHORTNESS OF BREATH: 1
EYES NEGATIVE: 1
SORE THROAT: 0
SHORTNESS OF BREATH: 0
SHORTNESS OF BREATH: 1
ABDOMINAL PAIN: 0
BLOOD IN STOOL: 0
NAUSEA: 0
EYES NEGATIVE: 1
RESPIRATORY NEGATIVE: 1
RESPIRATORY NEGATIVE: 1
BACK PAIN: 0
SHORTNESS OF BREATH: 0
COUGH: 0
DIARRHEA: 0
BACK PAIN: 1
VOMITING: 0
BACK PAIN: 1
ABDOMINAL PAIN: 0
ABDOMINAL PAIN: 0
HEARTBURN: 0
ALLERGIC/IMMUNOLOGIC NEGATIVE: 1
RESPIRATORY NEGATIVE: 1
SHORTNESS OF BREATH: 0
SINUS PRESSURE: 0
VOMITING: 0
COUGH: 0
VOMITING: 0
ABDOMINAL PAIN: 0
SPUTUM PRODUCTION: 0
BACK PAIN: 1
HEMOPTYSIS: 0
BLOOD IN STOOL: 0
HEMOPTYSIS: 0
DIARRHEA: 0
DIARRHEA: 0
ALLERGIC/IMMUNOLOGIC NEGATIVE: 1
VOMITING: 0
COLOR CHANGE: 0
NAUSEA: 0
RHINORRHEA: 0
NAUSEA: 0
NAUSEA: 1
ALLERGIC/IMMUNOLOGIC NEGATIVE: 1
ALLERGIC/IMMUNOLOGIC NEGATIVE: 1
SHORTNESS OF BREATH: 0
ABDOMINAL PAIN: 1
DIARRHEA: 0
NAUSEA: 0
BACK PAIN: 1
HEMOPTYSIS: 0

## 2018-01-01 ASSESSMENT — PAIN DESCRIPTION - FREQUENCY
FREQUENCY: CONTINUOUS
FREQUENCY: INTERMITTENT
FREQUENCY: CONTINUOUS
FREQUENCY: INTERMITTENT
FREQUENCY: CONTINUOUS
FREQUENCY: INTERMITTENT
FREQUENCY: INTERMITTENT
FREQUENCY: CONTINUOUS
FREQUENCY: INTERMITTENT
FREQUENCY: INTERMITTENT
FREQUENCY: CONTINUOUS
FREQUENCY: INTERMITTENT
FREQUENCY: CONTINUOUS
FREQUENCY: CONTINUOUS
FREQUENCY: INTERMITTENT
FREQUENCY: CONTINUOUS
FREQUENCY: INTERMITTENT
FREQUENCY: INTERMITTENT
FREQUENCY: CONTINUOUS
FREQUENCY: CONTINUOUS
FREQUENCY: INTERMITTENT
FREQUENCY: CONTINUOUS
FREQUENCY: CONTINUOUS
FREQUENCY: INTERMITTENT
FREQUENCY: CONTINUOUS
FREQUENCY: INTERMITTENT
FREQUENCY: CONTINUOUS
FREQUENCY: INTERMITTENT
FREQUENCY: CONTINUOUS
FREQUENCY: INTERMITTENT
FREQUENCY: CONTINUOUS

## 2018-01-01 ASSESSMENT — PAIN DESCRIPTION - ORIENTATION
ORIENTATION: RIGHT;LEFT
ORIENTATION: RIGHT;LEFT
ORIENTATION: MID
ORIENTATION: LOWER
ORIENTATION: LOWER
ORIENTATION: RIGHT;LEFT
ORIENTATION: LEFT;LOWER
ORIENTATION: LEFT
ORIENTATION: LOWER
ORIENTATION: LOWER
ORIENTATION: LEFT;LOWER
ORIENTATION: RIGHT;LEFT
ORIENTATION: LEFT;LOWER
ORIENTATION: LOWER;MID
ORIENTATION: RIGHT;LEFT
ORIENTATION: LOWER
ORIENTATION: LEFT
ORIENTATION: RIGHT;LOWER
ORIENTATION: LEFT;RIGHT
ORIENTATION: LEFT;RIGHT;LOWER
ORIENTATION: LEFT;RIGHT
ORIENTATION: RIGHT;LEFT
ORIENTATION: LEFT;LOWER
ORIENTATION: LOWER
ORIENTATION: RIGHT;LEFT
ORIENTATION: RIGHT;LEFT
ORIENTATION: MID;LEFT;RIGHT
ORIENTATION: LOWER
ORIENTATION: LOWER
ORIENTATION: LEFT;LOWER
ORIENTATION: LOWER;LEFT;RIGHT
ORIENTATION: LOWER
ORIENTATION: RIGHT;LEFT
ORIENTATION: LOWER;MID
ORIENTATION: LOWER
ORIENTATION: RIGHT;LEFT
ORIENTATION: LOWER
ORIENTATION: MID

## 2018-01-01 ASSESSMENT — PULMONARY FUNCTION TESTS
PIF_VALUE: 18
PIF_VALUE: 20
PIF_VALUE: 21
PIF_VALUE: 18
PIF_VALUE: 20
PIF_VALUE: 24
PIF_VALUE: 21
PIF_VALUE: 21
PIF_VALUE: 18
PIF_VALUE: 22
PIF_VALUE: 27
PIF_VALUE: 4
PIF_VALUE: 21
PIF_VALUE: 18
PIF_VALUE: 3
PIF_VALUE: 26
PIF_VALUE: 22
PIF_VALUE: 19
PIF_VALUE: 18
PIF_VALUE: 19
PIF_VALUE: 32
PIF_VALUE: 18
PIF_VALUE: 21
PIF_VALUE: 3
PIF_VALUE: 18
PIF_VALUE: 3
PIF_VALUE: 20
PIF_VALUE: 21
PIF_VALUE: 4
PIF_VALUE: 24
PIF_VALUE: 21
PIF_VALUE: 17
PIF_VALUE: 22
PIF_VALUE: 23
PIF_VALUE: 26
PIF_VALUE: 20
PIF_VALUE: 21
PIF_VALUE: 23
PIF_VALUE: 5
PIF_VALUE: 22
PIF_VALUE: 19
PIF_VALUE: 21
PIF_VALUE: 20
PIF_VALUE: 4
PIF_VALUE: 20
PIF_VALUE: 20
PIF_VALUE: 4
PIF_VALUE: 18
PIF_VALUE: 21
PIF_VALUE: 20
PIF_VALUE: 20
PIF_VALUE: 22
PIF_VALUE: 3
PIF_VALUE: 21
PIF_VALUE: 4
PIF_VALUE: 22
PIF_VALUE: 18
PIF_VALUE: 22
PIF_VALUE: 20
PIF_VALUE: 21
PIF_VALUE: 20
PIF_VALUE: 22
PIF_VALUE: 21
PIF_VALUE: 22
PIF_VALUE: 21
PIF_VALUE: 6
PIF_VALUE: 21
PIF_VALUE: 22
PIF_VALUE: 19
PIF_VALUE: 18
PIF_VALUE: 21
PIF_VALUE: 20
PIF_VALUE: 23
PIF_VALUE: 20
PIF_VALUE: 21
PIF_VALUE: 21
PIF_VALUE: 18
PIF_VALUE: 3
PIF_VALUE: 25
PIF_VALUE: 16
PIF_VALUE: 21
PIF_VALUE: 22
PIF_VALUE: 23
PIF_VALUE: 22
PIF_VALUE: 20
PIF_VALUE: 22
PIF_VALUE: 19
PIF_VALUE: 25
PIF_VALUE: 20
PIF_VALUE: 22
PIF_VALUE: 5
PIF_VALUE: 24
PIF_VALUE: 3
PIF_VALUE: 22
PIF_VALUE: 21
PIF_VALUE: 20
PIF_VALUE: 23
PIF_VALUE: 19
PIF_VALUE: 18
PIF_VALUE: 3
PIF_VALUE: 21
PIF_VALUE: 22
PIF_VALUE: 21
PIF_VALUE: 20
PIF_VALUE: 18
PIF_VALUE: 19
PIF_VALUE: 16
PIF_VALUE: 20
PIF_VALUE: 21
PIF_VALUE: 22
PIF_VALUE: 22
PIF_VALUE: 21
PIF_VALUE: 5
PIF_VALUE: 21
PIF_VALUE: 23
PIF_VALUE: 20
PIF_VALUE: 21
PIF_VALUE: 21
PIF_VALUE: 18
PIF_VALUE: 29
PIF_VALUE: 21
PIF_VALUE: 23
PIF_VALUE: 20
PIF_VALUE: 3
PIF_VALUE: 4
PIF_VALUE: 9
PIF_VALUE: 22
PIF_VALUE: 18
PIF_VALUE: 22
PIF_VALUE: 21
PIF_VALUE: 20
PIF_VALUE: 19
PIF_VALUE: 21
PIF_VALUE: 21
PIF_VALUE: 19
PIF_VALUE: 19
PIF_VALUE: 16
PIF_VALUE: 4
PIF_VALUE: 8
PIF_VALUE: 19
PIF_VALUE: 19
PIF_VALUE: 18
PIF_VALUE: 21
PIF_VALUE: 18
PIF_VALUE: 23
PIF_VALUE: 18
PIF_VALUE: 22
PIF_VALUE: 22
PIF_VALUE: 23
PIF_VALUE: 9
PIF_VALUE: 25
PIF_VALUE: 22
PIF_VALUE: 24
PIF_VALUE: 4
PIF_VALUE: 23
PIF_VALUE: 31
PIF_VALUE: 21
PIF_VALUE: 18
PIF_VALUE: 19
PIF_VALUE: 21
PIF_VALUE: 31
PIF_VALUE: 24
PIF_VALUE: 21
PIF_VALUE: 24
PIF_VALUE: 4
PIF_VALUE: 9
PIF_VALUE: 18
PIF_VALUE: 21
PIF_VALUE: 17
PIF_VALUE: 21
PIF_VALUE: 20
PIF_VALUE: 21
PIF_VALUE: 22
PIF_VALUE: 20
PIF_VALUE: 22
PIF_VALUE: 26
PIF_VALUE: 7
PIF_VALUE: 21
PIF_VALUE: 3
PIF_VALUE: 17
PIF_VALUE: 17
PIF_VALUE: 4
PIF_VALUE: 22
PIF_VALUE: 19
PIF_VALUE: 23
PIF_VALUE: 19
PIF_VALUE: 19
PIF_VALUE: 22
PIF_VALUE: 24
PIF_VALUE: 20
PIF_VALUE: 3
PIF_VALUE: 20
PIF_VALUE: 21
PIF_VALUE: 18
PIF_VALUE: 4
PIF_VALUE: 22
PIF_VALUE: 22
PIF_VALUE: 21
PIF_VALUE: 4
PIF_VALUE: 4
PIF_VALUE: 20
PIF_VALUE: 40
PIF_VALUE: 27
PIF_VALUE: 26
PIF_VALUE: 17
PIF_VALUE: 23
PIF_VALUE: 3
PIF_VALUE: 4
PIF_VALUE: 20
PIF_VALUE: 20
PIF_VALUE: 18
PIF_VALUE: 23
PIF_VALUE: 4
PIF_VALUE: 21
PIF_VALUE: 3
PIF_VALUE: 21
PIF_VALUE: 23
PIF_VALUE: 21

## 2018-01-01 ASSESSMENT — PAIN DESCRIPTION - LOCATION
LOCATION: LEG
LOCATION: BACK
LOCATION: LEG
LOCATION: LEG
LOCATION: BACK
LOCATION: ABDOMEN;BACK;LEG
LOCATION: BACK
LOCATION: LEG
LOCATION: BACK;LEG
LOCATION: BACK
LOCATION: BACK
LOCATION: LEG
LOCATION: BACK
LOCATION: BACK;LEG
LOCATION: LEG
LOCATION: BACK
LOCATION: LEG
LOCATION: BACK
LOCATION: LEG
LOCATION: BACK
LOCATION: LEG
LOCATION: BACK
LOCATION: LEG
LOCATION: BACK
LOCATION: LEG
LOCATION: BACK
LOCATION: HEAD
LOCATION: BACK
LOCATION: BACK
LOCATION: LEG
LOCATION: LEG
LOCATION: BACK
LOCATION: BACK;LEG
LOCATION: BACK
LOCATION: BACK
LOCATION: LEG
LOCATION: BACK
LOCATION: LEG
LOCATION: LEG
LOCATION: BACK
LOCATION: GENERALIZED
LOCATION: BACK
LOCATION: LEG;BACK
LOCATION: LEG
LOCATION: BACK
LOCATION: LEG;BACK
LOCATION: BACK
LOCATION: BACK
LOCATION: ABDOMEN
LOCATION: BACK
LOCATION: BACK;LEG
LOCATION: BACK
LOCATION: LEG

## 2018-01-01 ASSESSMENT — PAIN - FUNCTIONAL ASSESSMENT
PAIN_FUNCTIONAL_ASSESSMENT: 0-10

## 2018-01-01 ASSESSMENT — PAIN DESCRIPTION - PAIN TYPE
TYPE: ACUTE PAIN
TYPE: ACUTE PAIN
TYPE: SURGICAL PAIN
TYPE: CHRONIC PAIN
TYPE: ACUTE PAIN
TYPE: ACUTE PAIN
TYPE: CHRONIC PAIN
TYPE: ACUTE PAIN
TYPE: ACUTE PAIN
TYPE: SURGICAL PAIN
TYPE: ACUTE PAIN
TYPE: SURGICAL PAIN;CHRONIC PAIN
TYPE: ACUTE PAIN
TYPE: SURGICAL PAIN
TYPE: ACUTE PAIN
TYPE: CHRONIC PAIN
TYPE: SURGICAL PAIN
TYPE: SURGICAL PAIN
TYPE: CHRONIC PAIN
TYPE: SURGICAL PAIN
TYPE: ACUTE PAIN
TYPE: ACUTE PAIN
TYPE: REFERRED PAIN
TYPE: CHRONIC PAIN
TYPE: CHRONIC PAIN;ACUTE PAIN
TYPE: CHRONIC PAIN
TYPE: OTHER (COMMENT)
TYPE: SURGICAL PAIN
TYPE: ACUTE PAIN;CHRONIC PAIN
TYPE: ACUTE PAIN
TYPE: SURGICAL PAIN
TYPE: ACUTE PAIN
TYPE: ACUTE PAIN
TYPE: CHRONIC PAIN
TYPE: ACUTE PAIN
TYPE: CHRONIC PAIN
TYPE: ACUTE PAIN
TYPE: ACUTE PAIN
TYPE: SURGICAL PAIN
TYPE: SURGICAL PAIN
TYPE: ACUTE PAIN
TYPE: ACUTE PAIN
TYPE: CHRONIC PAIN
TYPE: CHRONIC PAIN
TYPE: SURGICAL PAIN
TYPE: ACUTE PAIN
TYPE: ACUTE PAIN
TYPE: CHRONIC PAIN
TYPE: SURGICAL PAIN
TYPE: ACUTE PAIN
TYPE: CHRONIC PAIN
TYPE: ACUTE PAIN
TYPE: CHRONIC PAIN
TYPE: ACUTE PAIN
TYPE: ACUTE PAIN
TYPE: CHRONIC PAIN
TYPE: ACUTE PAIN
TYPE: CHRONIC PAIN
TYPE: SURGICAL PAIN
TYPE: CHRONIC PAIN
TYPE: ACUTE PAIN
TYPE: SURGICAL PAIN
TYPE: SURGICAL PAIN
TYPE: ACUTE PAIN
TYPE: ACUTE PAIN
TYPE: SURGICAL PAIN
TYPE: ACUTE PAIN
TYPE: CHRONIC PAIN
TYPE: ACUTE PAIN
TYPE: CHRONIC PAIN
TYPE: ACUTE PAIN
TYPE: SURGICAL PAIN
TYPE: CHRONIC PAIN
TYPE: ACUTE PAIN
TYPE: ACUTE PAIN;SURGICAL PAIN
TYPE: SURGICAL PAIN
TYPE: CHRONIC PAIN
TYPE: CHRONIC PAIN

## 2018-01-01 ASSESSMENT — PAIN SCALES - WONG BAKER
WONGBAKER_NUMERICALRESPONSE: 0
WONGBAKER_NUMERICALRESPONSE: 6
WONGBAKER_NUMERICALRESPONSE: 0

## 2018-01-01 ASSESSMENT — PAIN DESCRIPTION - ONSET
ONSET: ON-GOING
ONSET: ON-GOING
ONSET: GRADUAL
ONSET: ON-GOING
ONSET: GRADUAL
ONSET: ON-GOING
ONSET: GRADUAL
ONSET: ON-GOING
ONSET: PROGRESSIVE
ONSET: ON-GOING
ONSET: ON-GOING
ONSET: PROGRESSIVE
ONSET: GRADUAL
ONSET: ON-GOING
ONSET: PROGRESSIVE

## 2018-01-01 ASSESSMENT — PATIENT HEALTH QUESTIONNAIRE - PHQ9
SUM OF ALL RESPONSES TO PHQ QUESTIONS 1-9: 0
1. LITTLE INTEREST OR PLEASURE IN DOING THINGS: 0
SUM OF ALL RESPONSES TO PHQ9 QUESTIONS 1 & 2: 0
2. FEELING DOWN, DEPRESSED OR HOPELESS: 0

## 2018-01-01 ASSESSMENT — PAIN DESCRIPTION - DESCRIPTORS
DESCRIPTORS: ACHING
DESCRIPTORS: ACHING;CRAMPING
DESCRIPTORS: PATIENT UNABLE TO DESCRIBE
DESCRIPTORS: ACHING;DISCOMFORT
DESCRIPTORS: ACHING
DESCRIPTORS: CRAMPING;ACHING
DESCRIPTORS: ACHING;CRAMPING
DESCRIPTORS: DISCOMFORT
DESCRIPTORS: ACHING
DESCRIPTORS: ACHING;DISCOMFORT;SHARP
DESCRIPTORS: CONSTANT;ACHING
DESCRIPTORS: ACHING
DESCRIPTORS: CRAMPING
DESCRIPTORS: ACHING;SHARP
DESCRIPTORS: CONSTANT;DISCOMFORT
DESCRIPTORS: ACHING;CRAMPING;STABBING
DESCRIPTORS: ACHING;CRAMPING
DESCRIPTORS: CONSTANT;DISCOMFORT
DESCRIPTORS: ACHING;CRAMPING
DESCRIPTORS: ACHING
DESCRIPTORS: CONSTANT
DESCRIPTORS: ACHING;DISCOMFORT;SHARP
DESCRIPTORS: ACHING;DISCOMFORT
DESCRIPTORS: ACHING
DESCRIPTORS: ACHING;CRAMPING
DESCRIPTORS: CRAMPING;SPASM
DESCRIPTORS: ACHING
DESCRIPTORS: ACHING
DESCRIPTORS: ACHING;CONSTANT
DESCRIPTORS: ACHING;STABBING
DESCRIPTORS: ACHING
DESCRIPTORS: SHARP;ACHING;CONSTANT
DESCRIPTORS: ACHING
DESCRIPTORS: CRAMPING;SPASM
DESCRIPTORS: ACHING;SHARP
DESCRIPTORS: ACHING;STABBING
DESCRIPTORS: CONSTANT;ACHING
DESCRIPTORS: ACHING;SHARP;STABBING
DESCRIPTORS: ACHING;CRAMPING;SHARP
DESCRIPTORS: CRAMPING
DESCRIPTORS: ACHING
DESCRIPTORS: CONSTANT;DISCOMFORT
DESCRIPTORS: CONSTANT;ACHING;SHOOTING;SHARP
DESCRIPTORS: CONSTANT
DESCRIPTORS: DISCOMFORT
DESCRIPTORS: ACHING;CRAMPING
DESCRIPTORS: CRAMPING
DESCRIPTORS: ACHING
DESCRIPTORS: DISCOMFORT
DESCRIPTORS: ACHING;CRAMPING
DESCRIPTORS: ACHING;CRAMPING

## 2018-01-18 PROBLEM — E86.0 DEHYDRATION: Status: ACTIVE | Noted: 2018-01-01

## 2018-01-19 PROBLEM — E86.0 DEHYDRATION: Status: RESOLVED | Noted: 2018-01-01 | Resolved: 2018-01-01

## 2018-01-19 PROBLEM — N18.4 STAGE 4 CHRONIC KIDNEY DISEASE (HCC): Status: ACTIVE | Noted: 2018-01-01

## 2018-01-19 PROBLEM — R55 SYNCOPE AND COLLAPSE: Status: ACTIVE | Noted: 2018-01-01

## 2018-01-19 PROBLEM — N18.9 ANEMIA DUE TO CHRONIC KIDNEY DISEASE: Status: ACTIVE | Noted: 2018-01-01

## 2018-01-19 PROBLEM — D63.1 ANEMIA DUE TO CHRONIC KIDNEY DISEASE: Status: ACTIVE | Noted: 2018-01-01

## 2018-01-19 PROBLEM — E87.5 HYPERKALEMIA: Status: ACTIVE | Noted: 2018-01-01

## 2018-01-19 PROBLEM — S32.010D CLOSED WEDGE COMPRESSION FRACTURE OF FIRST LUMBAR VERTEBRA WITH ROUTINE HEALING: Status: ACTIVE | Noted: 2018-01-01

## 2018-01-19 NOTE — ED NOTES
Michelle - Access called room 244-1 ext [phone].  [facility] ETA 5 min      Josef Vargas RN  01/18/18 3899

## 2018-01-19 NOTE — ED PROVIDER NOTES
for nausea. Negative for abdominal pain, diarrhea and vomiting. Genitourinary: Negative for decreased urine volume, difficulty urinating, dysuria and hematuria. Musculoskeletal: Positive for back pain. Negative for neck pain. Skin: Negative for rash. Neurological: Positive for dizziness. Negative for tremors, seizures, syncope, facial asymmetry, speech difficulty, weakness, light-headedness, numbness and headaches. All other systems reviewed and are negative. PAST MEDICAL HISTORY    has a past medical history of Chronic kidney disease; Depression; Diabetes mellitus ([address] GERD (gastroesophageal reflux disease); HA (headache); HBP (high blood pressure); History of blood transfusion; Hyperlipidemia; Hyperplastic polyp of intestine; Hypothyroid; Hypothyroidism; Left knee pain; Non-smoker; OA (osteoarthritis); and Tubular adenoma. SURGICAL HISTORY      has a past surgical history that includes Hysterectomy; Tonsillectomy; and Colonoscopy (08/16/2016).     CURRENT MEDICATIONS       Discharge Medication List as of 1/18/2018 11:19 PM      CONTINUE these medications which have NOT CHANGED    Details   insulin glargine (LANTUS) 100 UNIT/ML injection vial Inject 60 Units into the skin 2 times daily, Disp-11 vial, R-0Print      levothyroxine (SYNTHROID) 100 MCG tablet Take 1 tablet by mouth daily, Disp-90 tablet, R-3Print      omeprazole (PRILOSEC) 20 MG delayed release capsule Take 1 capsule by mouth daily, Disp-90 capsule, R-3Normal      atorvastatin (LIPITOR) 20 MG tablet TAKE 1 TABLET BY MOUTH ONE TIME A DAY , Disp-90 tablet, R-3Normal      insulin aspart (NOVOLOG FLEXPEN) 100 UNIT/ML injection pen Inject into the skin 2 times daily (with meals) Taking 3 units at lunch and 6 units at supperHistorical Med      Insulin Pen Needle 32G X 4 MM MISC 2 TIMES DAILY, Until Discontinued, Historical Med      glyBURIDE (DIABETA) 5 MG tablet TAKE 2 TABLETS BY MOUTH TWO TIMES A DAY, Disp-360 tablet, R-3Normal amitriptyline (ELAVIL) 75 MG tablet Take 1 tablet by mouth nightly, Disp-90 tablet, R-3Normal      amLODIPine (NORVASC) 10 MG tablet Take 1 tablet by mouth daily, Disp-90 tablet, R-3Normal      lisinopril-hydrochlorothiazide (PRINZIDE;ZESTORETIC) 20-12.5 MG per tablet Take 1 tablet by mouth 2 times daily, Disp-180 tablet, R-3Normal      acetaminophen (TYLENOL) 500 MG tablet Take 1,000 mg by mouth 3 times daily      rizatriptan (MAXALT) 10 MG tablet Take 1 tablet by mouth once as needed for Migraine for up to 1 dose. May repeat in 2 hours if needed, Disp-30 tablet, R-3Normal             ALLERGIES     has No Known Allergies. FAMILY HISTORY     indicated that her mother is . She indicated that her father is . She indicated that her maternal grandfather is . family history includes Cancer in her father and maternal grandfather; Diabetes in her mother; Heart Disease in her mother. SOCIAL HISTORY      reports that she has never smoked. She has never used smokeless tobacco. She reports that she does not drink alcohol or use drugs. PHYSICAL EXAM     INITIAL VITALS:  height is 5' 8\" (1.727 m) and weight is 99.8 kg (220 lb). Her oral temperature is 98 °F (36.7 °C). Her blood pressure is 148/66 (abnormal) and her pulse is 88. Her respiration is 16 and oxygen saturation is 100%. Physical Exam   Constitutional: She is oriented to person, place, and time and well-developed, well-nourished, and in no distress. She appears not dehydrated. Non-toxic appearance. She does not have a sickly appearance. No distress. HENT:   Head: Normocephalic and atraumatic. Right Ear: External ear normal.   Left Ear: External ear normal.   Nose: Nose normal.   Mouth/Throat: Oropharynx is clear and moist.   Eyes: Conjunctivae, EOM and lids are normal. Pupils are equal, round, and reactive to light. Right eye exhibits no discharge. Left eye exhibits no discharge. No scleral icterus.    Neck: Normal range of motion. Neck supple. No JVD present. No tracheal tenderness, no spinous process tenderness and no muscular tenderness present. No tracheal deviation and no edema present. Cardiovascular: Normal rate, regular rhythm, S1 normal, S2 normal, normal heart sounds, intact distal pulses and normal pulses. Exam reveals no gallop and no friction rub. No murmur heard. No peripheral edema noted. Pulmonary/Chest: Effort normal and breath sounds normal. No respiratory distress. She has no wheezes. She has no rales. She exhibits no tenderness, no crepitus and no edema. No chest wall tenderness. Abdominal: Soft. Bowel sounds are normal. She exhibits no distension. There is no tenderness. There is no guarding. Musculoskeletal: Normal range of motion. She exhibits no edema or tenderness. No back tenderness. Lymphadenopathy:     She has no cervical adenopathy. Neurological: She is alert and oriented to person, place, and time. She has normal motor skills and normal reflexes. She is not agitated. She displays no weakness, facial symmetry and normal speech. No cranial nerve deficit. She exhibits normal muscle tone. She has a normal Cerebellar Exam, a normal Heel to [employer] and a normal Tandem Gait Test. She shows no pronator drift. Coordination normal. GCS score is 15. Skin: Skin is warm and dry. No rash noted. She is not diaphoretic. Psychiatric: Affect normal.   Nursing note and vitals reviewed. DIFFERENTIAL DIAGNOSIS/ MDM:     Plan at this time will be to check imaging of the back and baseline labs and ECG. Patient is nontoxic-appearing.       DIAGNOSTIC RESULTS     EKG: All EKG's are interpreted by the Emergency Department Physician who either signs or Co-signs this chart in the absence of a cardiologist.    Interpreted by No att. providers found     Rhythm: normal sinus   Rate: normal  Axis: normal  Ectopy: none  Conduction: normal  ST Segments: no acute change  T Waves: no acute anatomic alignment. Cortical margins are intact. Soft tissues are unremarkable. No fracture       LABS:  Results for orders placed or performed during the hospital encounter of 01/18/18   Rapid influenza A/B antigens   Result Value Ref Range    Specimen Description . NASOPHARYNGEAL SWAB     Special Requests NOT REPORTED     Direct Exam PRESUMPTIVE NEGATIVE for Influenza A + B antigens. Direct Exam       PCR testing to confirm this result is available upon request.  Specimen will be    Direct Exam        saved in the laboratory for 7 days. Please call [phone] if PCR testing is    Direct Exam  indicated.      Direct Exam       Performed at [facility] Emergency Dept and [address] [facility], [address] [facility] , [place], [address] [place] [address]     Status FINAL 01/18/2018    CBC Auto Differential   Result Value Ref Range    WBC 11.0 3.5 - 11.0 k/uL    RBC 4.04 4.0 - 5.2 m/uL    Hemoglobin 8.3 (L) 12.0 - 16.0 g/dL    Hematocrit 28.0 (L) 36 - 46 %    MCV 69.4 (L) 80 - 100 fL    MCH 20.7 (L) 26 - 34 pg    MCHC 29.7 (L) 31 - 37 g/dL    RDW 21.4 (H) 12.5 - 15.4 %    Platelets 511 (H) 349 - 450 k/uL    MPV 6.8 6.0 - 12.0 fL    NRBC Automated NOT REPORTED per 100 WBC    Differential Type NOT REPORTED     Immature Granulocytes NOT REPORTED 0 %    Absolute Immature Granulocyte NOT REPORTED 0.00 - 0.30 k/uL    WBC Morphology NOT REPORTED     RBC Morphology NOT REPORTED     Platelet Estimate NOT REPORTED     Seg Neutrophils 74 (H) 36 - 66 %    Lymphocytes 21 (L) 24 - 44 %    Monocytes 5 1 - 7 %    Eosinophils % 0 (L) 1 - 4 %    Basophils 0 0 - 2 %    Segs Absolute 8.14 (H) 1.8 - 7.7 k/uL    Absolute Lymph # 2.31 1.0 - 4.8 k/uL    Absolute Mono # 0.55 0.1 - 0.8 k/uL    Absolute Eos # 0.00 0.0 - 0.4 k/uL    Basophils # 0.00 0.0 - 0.2 k/uL    Morphology ANISOCYTOSIS PRESENT    Comprehensive Metabolic Panel   Result Value Ref Range    Glucose 73 70 - 99 mg/dL    BUN 58 (H) 8 - 23 mg/dL    CREATININE 3.00 (H) 0.50 - 0.90 mg/dL    Bun/Cre Ratio NOT REPORTED 9 - 20    Calcium 9.9 8.6 - 10.4 mg/dL    Sodium 141 135 - 144 mmol/L    Potassium 5.9 (H) 3.7 - 5.3 mmol/L    Chloride 106 98 - 107 mmol/L    CO2 19 (L) 20 - 31 mmol/L    Anion Gap 16 9 - 17 mmol/L    Alkaline Phosphatase 106 (H) 35 - 104 U/L    ALT <5 (L) 5 - 33 U/L    AST 9 <32 U/L    Total Bilirubin 0.20 (L) 0.3 - 1.2 mg/dL    Total Protein 7.5 6.4 - 8.3 g/dL    Alb 3.4 (L) 3.5 - 5.2 g/dL    Albumin/Globulin Ratio 0.8 (L) 1.0 - 2.5    GFR Non- 16 (L) >60 mL/min    GFR  19 (L) >60 mL/min    GFR Comment          GFR Staging NOT REPORTED    Lipase   Result Value Ref Range    Lipase 243 (H) 13 - 60 U/L   Magnesium   Result Value Ref Range    Magnesium 2.3 1.6 - 2.6 mg/dL   Troponin   Result Value Ref Range    Troponin T <0.03 <0.03 ng/mL    Troponin Interp         EKG 12 Lead   Result Value Ref Range    Ventricular Rate 95 BPM    Atrial Rate 95 BPM    P-R Interval 138 ms    QRS Duration 74 ms    Q-T Interval 356 ms    QTc Calculation (Bazett) 447 ms    P Axis 46 degrees    R Axis 6 degrees    T Axis 9 degrees       EMERGENCY DEPARTMENT COURSE:     The patient was given the following medications:  Orders Placed This Encounter   Medications    ibuprofen (ADVIL;MOTRIN) tablet 600 mg    0.9 % sodium chloride bolus    ondansetron (ZOFRAN) injection 4 mg    morphine (PF) injection 4 mg        Vitals:    Vitals:    01/18/18 1910 01/18/18 2245   BP: (!) 156/52 (!) 148/66   Pulse: 99 88   Resp: 16 16   Temp: 98.3 °F (36.8 °C) 98 °F (36.7 °C)   TempSrc: Oral Oral   SpO2: 100% 100%   Weight: 99.8 kg (220 lb)    Height: 5' 8\" (1.727 m)      -------------------------  BP: (!) 148/66, Temp: 98 °F (36.7 °C), Pulse: 88, Resp: 16      Re-evaluation Notes    10:11 PM:   Patient is feeling a little bit better after some IV fluids. Cardiac workup so far is unremarkable.   due to the patient's symptoms and hyperkalemia with anemia and fall I feel she is appropriate for admission. Hospitalist paged. We'll also have the patient seen by neurosurgery due to the wedge compression fracture of L1. I spoke with the hospital as to accept admission of the patient. The patient in TLS precautions until evaluated by neurosurgery. She is nontoxic-appearing clinically. Continues to feel better on reevaluation his. No further events throughout her stay here in the department. CONSULTS:    None    CRITICAL CARE:     None    PROCEDURES:    None    FINAL IMPRESSION      1. Closed wedge compression fracture of first lumbar vertebra, initial encounter ([facility] [address]    2. Hyperkalemia    3. Anemia, unspecified type    4. Dehydration          DISPOSITION/PLAN   DISPOSITION Decision To Transfer 01/18/2018 09:57:00 PM      Condition on Disposition    Improved    PATIENT REFERRED TO:  No follow-up provider specified.     DISCHARGE MEDICATIONS:  Discharge Medication List as of 1/18/2018 11:19 PM          (Please note that portions of this note were completed with a voice recognition program.  Efforts were made to edit the dictations but occasionally words are mis-transcribed.)    Luis Manuel Brown DO  Attending Emergency Physician           Luis Manuel Brown DO  01/19/18 9072

## 2018-01-19 NOTE — PROGRESS NOTES
[address]  Occupational Therapy Not Seen Note    Patient not available for Occupational Therapy due to:    [] Testing:    [] Hemodialysis    [] Blood Transfusion in Progress    []Refusal by Patient:    [] Surgery/Procedure:    [] Strict Bedrest    [] Sedation    [x] Spine Precautions: RN clarifying TLS precautions at this time     [] Pt being transferred to palliative care at this time. Spoke with pt/family and OT services to be defered. [] Pt independent with functional mobility and functional tasks.  Pt with no OT acute care needs at this time, will defer OT eval.    Next Scheduled Treatment: Will check back 1/20/2018    Signature: Gayatri Solitario, OTR/L

## 2018-01-19 NOTE — H&P
differently: Inject 100 Units into the skin daily  1/11/18   Caren Ferrera MD   levothyroxine (SYNTHROID) 100 MCG tablet Take 1 tablet by mouth daily 11/16/17   Caren Ferrera MD   omeprazole (PRILOSEC) 20 MG delayed release capsule Take 1 capsule by mouth daily 9/7/17   Caren Ferrera MD   atorvastatin (LIPITOR) 20 MG tablet TAKE 1 TABLET BY MOUTH ONE TIME A DAY  7/27/17   Bennie Mendez MD   insulin aspart (NOVOLOG FLEXPEN) 100 UNIT/ML injection pen Inject into the skin 2 times daily (with meals) Taking 3 units at lunch and 6 units at supper    Historical Provider, MD   Insulin Pen Needle 32G X 4 MM MISC 2 each by Does not apply route 2 times daily    Historical Provider, MD   glyBURIDE (DIABETA) 5 MG tablet TAKE 2 TABLETS BY MOUTH TWO TIMES A DAY 4/13/17   Bennie Mendez MD   amitriptyline (ELAVIL) 75 MG tablet Take 1 tablet by mouth nightly 4/13/17   Bennie Mendez MD   amLODIPine (NORVASC) 10 MG tablet Take 1 tablet by mouth daily 4/13/17   Bennie Mendez MD   lisinopril-hydrochlorothiazide GARZA D [facility]) 20-12.5 MG per tablet Take 1 tablet by mouth 2 times daily 4/13/17   Bennie Mendez MD   acetaminophen (TYLENOL) 500 MG tablet Take 1,000 mg by mouth 3 times daily    Historical Provider, MD   rizatriptan (MAXALT) 10 MG tablet Take 1 tablet by mouth once as needed for Migraine for up to 1 dose. May repeat in 2 hours if needed 11/10/14 1/18/18  Bennie Mendez MD        Allergies:     Review of patient's allergies indicates no known allergies. Social History:     Tobacco:    reports that she has never smoked. She has never used smokeless tobacco.  Alcohol:      reports that she does not drink alcohol. Drug Use:  reports that she does not use drugs.     Family History:     Family History   Problem Relation Age of Onset    Heart Disease Mother     Diabetes Mother     Cancer Father      esophageal cancer    Cancer Maternal Grandfather      colon       Review of Systems:     Positive Collection Time: 01/19/18 12:23 AM   Result Value Ref Range    POC Glucose 106 (H) 65 - 105 mg/dL   CBC    Collection Time: 01/19/18  5:13 AM   Result Value Ref Range    WBC 8.8 3.5 - 11.3 k/uL    RBC 3.58 (L) 3.95 - 5.11 m/uL    Hemoglobin 7.5 (L) 11.9 - 15.1 g/dL    Hematocrit 28.4 (L) 36.3 - 47.1 %    MCV 79.3 (L) 82.6 - 102.9 fL    MCH 20.9 (L) 25.2 - 33.5 pg    MCHC 26.4 (L) 28.4 - 34.8 g/dL    RDW 20.8 (H) 11.8 - 14.4 %    Platelets 081 201 - 179 k/uL    MPV 8.9 8.1 - 13.5 fL    NRBC Automated 0.0 0.0 per 100 WBC   Protime-INR    Collection Time: 01/19/18  5:13 AM   Result Value Ref Range    Protime 10.6 9.4 - 12.6 sec    INR 1.0    POC Glucose Fingerstick    Collection Time: 01/19/18  8:17 AM   Result Value Ref Range    POC Glucose 61 (L) 65 - 105 mg/dL   POC Glucose Fingerstick    Collection Time: 01/19/18  9:30 AM   Result Value Ref Range    POC Glucose 96 65 - 105 mg/dL       Imaging/Diagnostics:    Lumbar xray:     Impression   L1 wedge compression fracture with about 25% decrease in height which is   probably an acute fracture. Left Hip xray:     Impression   No fracture       Ekg: nsr, nothing acute; no change going back several years    Assessment :      Primary Problem  Closed wedge compression fracture of first lumbar vertebra with routine healing    Active Hospital Problems    Diagnosis Date Noted    Closed wedge compression fracture of first lumbar vertebra with routine healing [S32.010D] 01/19/2018    Stage 4 chronic kidney disease ([facility] [address] [N18.4] 01/19/2018    Syncope and collapse [R55] 01/19/2018    Hyperkalemia [E87.5] 01/19/2018    Anemia due to chronic kidney disease [N18.9, D63.1] 01/19/2018    Diabetes mellitus ([facility] [address] [E11.9] 08/21/2012       Plan:     Patient status Admit as inpatient in the  Med/Surge    1. Ct lumbar to be done  2. Recheck k-was given kayelate late last night  3.  Check echo as cannot definitively say her syncope was from hypoglycemia (could have been vagal since

## 2018-01-19 NOTE — FLOWSHEET NOTE
Spiritual Service Consult:  Pt seen per [facility] re: wants info re living will and durable power of     Rev. Swapnil Boothe, [address]     01/19/18 1748   Encounter Summary   Services provided to: Patient   Referral/Consult From: Multi-disciplinary team  ([address])   Support System Children;Family members   Continue Visiting (1/19)   Complexity of Encounter Moderate   Length of Encounter 15 minutes   Routine   Type Follow up   Assessment Calm; Approachable;Coping   Intervention Sustaining presence/ Ministry of presence; Active listening;Explored feelings, thoughts, concerns; Discussed illness/injury and it's impact   Outcome Receptive; Acceptance; Coping;Encouraged;Expressed gratitude   Advance Directives (For Healthcare)   Healthcare Directive No, patient does not have an advance directive for healthcare treatment   Information on Healthcare Directives Requested Yes   Patient Requests Assistance Yes, referral made to    Advance Directives Documents given;Documents explained

## 2018-01-19 NOTE — ED NOTES
Pt arrived at ED via ambulance with c/o left lower back pain and left hip pain that started after she fell out of bed on Monday. Sts she rolled out of bed after being slightly incontinent. Sts the pain has gotten progressively worse since the accident. Skin warm and dry. Respirations non-labored.      Keenan Sandoval RN  01/18/18 5184

## 2018-01-20 PROBLEM — S32.010D CLOSED WEDGE COMPRESSION FRACTURE OF FIRST LUMBAR VERTEBRA WITH ROUTINE HEALING: Status: RESOLVED | Noted: 2018-01-01 | Resolved: 2018-01-01

## 2018-01-20 PROBLEM — N30.00 ACUTE CYSTITIS WITHOUT HEMATURIA: Status: ACTIVE | Noted: 2018-01-01

## 2018-01-20 NOTE — PROGRESS NOTES
magnesium hydroxide, magnesium sulfate, morphine **OR** morphine, nicotine, ondansetron, potassium chloride **OR** potassium chloride **OR** potassium chloride, sodium chloride flush, dextrose, dextrose, glucagon (rDNA), glucose, albuterol    Data:     Past Medical History:   has a past medical history of Chronic kidney disease; Depression; Diabetes mellitus ([address] GERD (gastroesophageal reflux disease); HA (headache); HBP (high blood pressure); History of blood transfusion; Hyperlipidemia; Hyperplastic polyp of intestine; Hypothyroid; Hypothyroidism; Left knee pain; Non-smoker; OA (osteoarthritis); and Tubular adenoma. Social History:   reports that she has never smoked. She has never used smokeless tobacco. She reports that she does not drink alcohol or use drugs. Family History:   Family History   Problem Relation Age of Onset    Heart Disease Mother     Diabetes Mother     Cancer Father      esophageal cancer    Cancer Maternal Grandfather      colon       Vitals:  BP (!) 124/59   Pulse 94   Temp 99.3 °F (37.4 °C) (Oral)   Resp 15   Ht 5' 8\" (1.727 m)   Wt 209 lb 4.8 oz (94.9 kg)   SpO2 95%   BMI 31.82 kg/m²   Temp (24hrs), Av.5 °F (36.9 °C), Min:98 °F (36.7 °C), Max:99.3 °F (37.4 °C)    Recent Labs      18   1250  18   1715  18   2204  18   0922   POCGLU  86  66  112*  82       I/O (24Hr):     Intake/Output Summary (Last 24 hours) at 18 1157  Last data filed at 18 1100   Gross per 24 hour   Intake             1200 ml   Output               50 ml   Net             1150 ml       Labs:    Hematology:  Recent Labs      18   2056  18   0513   WBC  11.0  8.8   RBC  4.04  3.58*   HGB  8.3*  7.5*   HCT  28.0*  28.4*   MCV  69.4*  79.3*   MCH  20.7*  20.9*   MCHC  29.7*  26.4*   RDW  21.4*  20.8*   PLT  557*  445   MPV  6.8  8.9   INR   --   1.0     Chemistry:  Recent Labs      18   2056  18   0953  18   0437  18   1006   NA

## 2018-01-20 NOTE — PROGRESS NOTES
Physical Therapy  DATE: 2018    NAME: Hilda Denney  MRN: [id number]   : 1955    Patient not seen this date for Physical Therapy due to:  [] Blood transfusion in progress  [] Hemodialysis  []  Patient Declined  [x] Spine Precautions   [x] Strict Bedrest  [] Surgery/ Procedure  [] Testing      [x] Other-+ L1 fracture requiring surgical fixation-pathologic fx. Due to renal CA with mets-will she s/p surgery for appropriateness on 18. [] PT being discontinued at this time. Patient independent. No further needs. [] PT being discontinued at this time as the patient has been transferred to palliative care. No further needs.     Ben Vasquez, PT

## 2018-01-20 NOTE — CONSULTS
Today's Date: 1/20/2018  Patient Name: Lobo Sweet  Date of admission: 1/18/2018 11:38 PM  Patient's age: 63 y.o., 1955  Admission Dx: Lumbar compression fracture ([facility] [address] [S32.000A]  Dehydration [E86.0]    Reason for Consult: renal mass  Requesting Physician: Amalia Jimenez DO    CHIEF COMPLAINT:  Back pain    History Obtained From: pt and chart    HISTORY OF PRESENT ILLNESS:      This is a 64 YO female was admitted with back pain after a fall. On admission she had imaging studies which showed L2 metastatic lesion with compression fracture. Had MRI spine which showed right renal mass c/w RCC and inferior vena cava invasion. It also showed L1 metastasis, pathologic fracture, epidural invasion, and severe thecal sac stenosis. Seen by neurosurgery and input awaited. She reports wt loss possibly intentional over last year for about 50 lbs. Patient seen by urology. Past Medical History:   has a past medical history of Chronic kidney disease; Depression; Diabetes mellitus ([address] GERD (gastroesophageal reflux disease); HA (headache); HBP (high blood pressure); History of blood transfusion; Hyperlipidemia; Hyperplastic polyp of intestine; Hypothyroid; Hypothyroidism; Left knee pain; Non-smoker; OA (osteoarthritis); and Tubular adenoma. Past Surgical History:   has a past surgical history that includes Hysterectomy; Tonsillectomy; and Colonoscopy (08/16/2016). Medications:    Prior to Admission medications    Medication Sig Start Date End Date Taking?  Authorizing Provider   insulin glargine (LANTUS) 100 UNIT/ML injection vial Inject 60 Units into the skin 2 times daily  Patient taking differently: Inject 100 Units into the skin daily  1/11/18   Chari Rouse MD   levothyroxine (SYNTHROID) 100 MCG tablet Take 1 tablet by mouth daily 11/16/17   Chari Rouse MD   omeprazole (PRILOSEC) 20 MG delayed release capsule Take 1 capsule by mouth daily 9/7/17   Chari Rouse MD   atorvastatin Intramuscular PRN Jo Milian NP        glucose (GLUTOSE) 40 % oral gel 15 g  15 g Oral PRN Jo Milian NP        insulin lispro (HUMALOG) injection vial 0-6 Units  0-6 Units Subcutaneous TID WC Jo Milian NP        insulin lispro (HUMALOG) injection vial 0-3 Units  0-3 Units Subcutaneous Nightly Jo Milian NP        albuterol (PROVENTIL) nebulizer solution 2.5 mg  2.5 mg Nebulization As Directed RT PRN Jo Milian NP           Allergies:  Review of patient's allergies indicates no known allergies. Social History:   reports that she has never smoked. She has never used smokeless tobacco. She reports that she does not drink alcohol or use drugs. Family History: family history includes Cancer in her father and maternal grandfather; Diabetes in her mother; Heart Disease in her mother. REVIEW OF SYSTEMS:    Constitutional: No fever or chills. No night sweats, no weight loss   Eyes: No eye discharge, double vision, or eye pain   HEENT: negative for sore mouth, sore throat, hoarseness and voice change   Respiratory: negative for cough , sputum, dyspnea, wheezing, hemoptysis, chest pain   Cardiovascular: negative for chest pain, dyspnea, palpitations, orthopnea, PND   Gastrointestinal: negative for nausea, vomiting, diarrhea, constipation, abdominal pain, Dysphagia, hematemesis and hematochezia   Genitourinary: negative for frequency, dysuria, nocturia, urinary incontinence, and hematuria   Integument: negative for rash, skin lesions, bruises.    Hematologic/Lymphatic: negative for easy bruising, bleeding, lymphadenopathy, or petechiae   Endocrine: negative for heat or cold intolerance,weight changes, change in bowel habits and hair loss   Musculoskeletal: negative for myalgias, arthralgias,++back  pain, joint swelling,and bone pain   Neurological: negative for headaches, dizziness, seizures, weakness, numbness    PHYSICAL EXAM:      BP (!) 124/59 periphery. Mild facet arthropathy and ligamentum flavum thickening. No significant acquired spinal stenosis. Mild neural foraminal stenosis. SOFT TISSUES/RETROPERITONEUM: Partially visualized heterogeneous large right renal mass measuring at least 8 cm with prominent vessels in the renal hilum. There are small lymph nodes and soft tissue stranding in the renal hilar region. Apparent previous hysterectomy. Heterogeneous 1 cm calcification in the deep pelvis is felt to be related to a colonic diverticulum and it appears to be outside of the bladder.      Pathologic compression fracture of L1 with tumor infiltration involving primarily the left half of the vertebral body extending to the posterior elements with a probable associated soft tissue component. MRI is recommended for further evaluation. Partially visualized complex large right renal mass; renal cell carcinoma should be excluded. Findings were discussed with Ninoska Mckinley NP, at 3:13 pm on 1/19/2018.      Mri Cervical Spine Wo Contrast     Result Date: 1/19/2018  EXAMINATION: MRI OF THE CERVICAL SPINE WITHOUT CONTRAST 1/19/2018 4:44 pm TECHNIQUE: Multiplanar multisequence MRI of the cervical spine was performed without the administration of intravenous contrast. COMPARISON: None. HISTORY: ORDERING SYSTEM PROVIDED HISTORY: back pain FINDINGS: BONES/ALIGNMENT: There is normal alignment of the spine. The vertebral body heights are maintained. The bone marrow signal appears unremarkable. SPINAL CORD: No abnormal cord signal is seen. SOFT TISSUES: No paraspinal mass identified. C2-C3: There is no significant disc protrusion, spinal canal stenosis or neural foraminal narrowing. C3-C4: There is no significant disc protrusion, spinal canal stenosis or neural foraminal narrowing. C4-C5: Moderate uncinate spondylosis and neural foraminal narrowing bilaterally. Central canal is patent.  C5-C6: There is no significant disc protrusion, spinal canal stenosis or neural foraminal narrowing. C6-C7: There is no significant disc protrusion, spinal canal stenosis or neural foraminal narrowing. C7-T1: There is no significant disc protrusion, spinal canal stenosis or neural foraminal narrowing.      Moderate Uncinate spondylosis and neural foraminal narrowing at C4-5.      Mri Thoracic Spine Wo Contrast     Addendum Date: 1/19/2018    ADDENDUM: Right renal mass is incompletely imaged and suspicious for neoplasm. Further evaluation recommended.      Result Date: 1/19/2018  EXAMINATION: MRI OF THE THORACIC SPINE WITHOUT CONTRAST  1/19/2018 4:44 pm TECHNIQUE: Multiplanar multisequence MRI of the thoracic spine was performed without the administration of intravenous contrast. COMPARISON: None. HISTORY: ORDERING SYSTEM PROVIDED HISTORY: back pain FINDINGS: BONES/ALIGNMENT: There is normal alignment of the spine. The vertebral body heights are maintained except for a burst fracture at L1 with loss of height and retropulsion. This is incompletely imaged within the field of view. . The bone marrow signal appears unremarkable except for T1 and T2 lengthening within the L1 vertebral body. . SPINAL CORD: No abnormal cord signal is seen. SOFT TISSUES: Left paracentral well-circumscribed subcutaneous soft tissue densities noted at T12 on the left. These measure 10 in 25 mm respectively. DEGENERATIVE CHANGES: No significant spinal canal stenosis or neural foraminal narrowing of the thoracic spine.      Acute Burst fracture L1 vertebral body incompletely imaged within the field of view. Several mm retropulsion with encroachment upon the conus. See also MRI lumbar spine report.  Left paracentral subcutaneous nodules at T12.      Mri Lumbar Spine Wo Contrast     Result Date: 1/19/2018  EXAMINATION: MRI OF THE LUMBAR SPINE WITHOUT CONTRAST, 1/19/2018 4:44 pm TECHNIQUE: Multiplanar multisequence MRI of the lumbar spine was performed without the administration of intravenous contrast. COMPARISON: CT from earlier today. HISTORY: ORDERING SYSTEM PROVIDED HISTORY: poss mets Back pain for 3 days. L1 fracture. FINDINGS: BONES/ALIGNMENT: There is a metastasis invading almost the entire L1 vertebral body and the left L1 pedicle. The metastasis erodes through the cortex of the bone and invades the adjacent soft tissues. It invades the left lateral epidural space. There is a pathologic compression fracture with posterior bowing of the vertebral body which results in severe spinal canal and thecal sac stenosis with crowding of the cauda equina nerve roots around the conus medullaris. There are 2 small T1 hypointense, T2 mixed intensity lesions in the superior endplates of L3 and L4 which have an appearance most consistent with Schmorl's nodes. There are disc bulges in the lower lumbar spine which do not cause significant canal stenosis. SOFT TISSUES: A 10 cm heterogeneous cystic and solid right renal mass most likely representing a renal cell carcinoma invades the right renal veins and inferior vena cava. A 5 cm metastasis along the lateral aspect of the right lobe of the liver is only partially imaged on the coronal localizer sequence.      1. L1 metastasis, pathologic fracture, epidural invasion, and severe thecal sac stenosis. 2. 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 3. Intra-abdominal metastasis adjacent to the liver. CT abd  Impression   1. Large right renal mass estimated at at least 11 x 11 cm essentially   replacing the right kidney consistent with renal cell carcinoma.  Limited   evaluation in the absence of IV contrast.  There is probably extension into   the right renal vein.    2. There is intraperitoneal metastasis with numerous upper abdominal discrete   and confluent nodules with largest measuring 2-3 cm.  Additionally there is   confluent hypodensity almost encircling the right lobe of liver up to 3 cm in   thickness representing metastatic disease. Dl Godoy is

## 2018-01-20 NOTE — CONSULTS
Liz Cabrera, Flako Byrnes, Brandan Albarran, & Beau   Urology Consultation      Patient: Hilda Denney  MRN: [id number]  YOB: 1955    CHIEF COMPLAINT:  Renal mass with possible mets    HISTORY OF PRESENT ILLNESS:   The patient is a 63 y.o. female who presents with right sided renal mass with possible metastases incidentally discovered after mechanical fall at home. The patient states she had fall yesterday at home, believed to be due to hypoglycemia but had normal blood glucose so presented to ER for further evaluation. There, pathologic L1 fracture was noted on MRI, and further imaging revealed irregular mass of R kidney, approximately 10-11cm, with intraperitoneal abnormalities concerning for metastases, as well as abnormalities/hypodensities of liver also concerning for mets. The patient is a lifetime nonsmoker but has had second-hand smoke exposure from father growing up and from . She has family history of esophageal cancer in father and colon cancer in other relatives. She works as an  and denies any industrial exposures. She has DM and has had 50 pound weight loss in the past year, but attributed this to better blood glucose control. She had one episode of gross hematuria with R flank pain in October 2016 but attributed it to a kidney stone, as it resolved spontaneously. She has no history of  malignancy, or other constitutional symptoms. Patient's old records, notes and chart reviewed and summarized above.     Past Medical History:    Past Medical History:   Diagnosis Date    Chronic kidney disease     Depression     Diabetes mellitus (HCC)     type 2    GERD (gastroesophageal reflux disease)     HA (headache)     HBP (high blood pressure)     History of blood transfusion     no reaction    Hyperlipidemia     Hyperplastic polyp of intestine     Hypothyroid 11/26/2016    Hypothyroidism     Left knee pain     Non-smoker     OA (osteoarthritis)     bilat knees    Tubular adenoma        Past Surgical History:    Past Surgical History:   Procedure Laterality Date    COLONOSCOPY  08/16/2016    hyperplastic polyp and tubular adenoma     HYSTERECTOMY      TONSILLECTOMY         Medications:      Current Facility-Administered Medications:     lidocaine (LIDODERM) 5 % 1 patch, 1 patch, Transdermal, Daily, Ariel ARRIOLA Blood, DO, 1 patch at 01/20/18 1303    dexamethasone (DECADRON) injection 4 mg, 4 mg, Intravenous, Q6H, Greta Leslie PA-C, 4 mg at 01/20/18 1503    HYDROmorphone (DILAUDID) injection 0.25 mg, 0.25 mg, Intravenous, Q2H PRN **OR** HYDROmorphone (DILAUDID) injection 0.5 mg, 0.5 mg, Intravenous, Q2H PRN, Rodriguez Hidalgo NP    amitriptyline (ELAVIL) tablet 75 mg, 75 mg, Oral, Nightly, Mel Carrera CNP, 75 mg at 01/19/18 2151    amLODIPine (NORVASC) tablet 10 mg, 10 mg, Oral, Daily, Mel Carrera CNP, 10 mg at 01/20/18 0927    atorvastatin (LIPITOR) tablet 20 mg, 20 mg, Oral, Daily, Mel Carrera CNP, 20 mg at 01/20/18 0927    levothyroxine (SYNTHROID) tablet 100 mcg, 100 mcg, Oral, Daily, Mel Carrera CNP, 100 mcg at 01/20/18 0927    acetaminophen (TYLENOL) tablet 1,000 mg, 1,000 mg, Oral, TID, Mel Carrera CNP, 1,000 mg at 01/19/18 1350    0.9 % sodium chloride infusion, , Intravenous, Continuous, Ariel ARRIOLA Blood, DO, Last Rate: 50 mL/hr at 01/20/18 1444    acetaminophen (TYLENOL) tablet 650 mg, 650 mg, Oral, Q4H PRN, Rodriguez Hidalgo NP    bisacodyl (DULCOLAX) suppository 10 mg, 10 mg, Rectal, Daily PRN, Rodriguez Hidalgo NP    HYDROcodone-acetaminophen (NORCO) 5-325 MG per tablet 1 tablet, 1 tablet, Oral, Q4H PRN, Rodriguez Hidalgo NP, 1 tablet at 01/20/18 1443    magnesium hydroxide (MILK OF MAGNESIA) 400 MG/5ML suspension 30 mL, 30 mL, Oral, Daily PRN, Rodriguez Hidalgo, KENY    magnesium sulfate 1 g in dextrose 5% 100 mL IVPB, 1 g, Intravenous, PRN, Rodriguez Hidalgo, Family History   Problem Relation Age of Onset    Heart Disease Mother     Diabetes Mother     Cancer Father      esophageal cancer    Cancer Maternal Grandfather      colon       REVIEW OF SYSTEMS:  A comprehensive 14 point review of systems was obtained. Constitutional: No fatigue  Eyes: No blurry vision  Ears, nose, mouth, throat, face: No ringing in the ears; no facial droop. Respiratory: No cough or cold. Cardiovascular: No palpitations  Gastrointestinal: No diarrhea or constipation. Genitourinary: No burning with urination  Integument/Skin: No rashes  Hematologic/Lymphatic: No easy bruising  Musculoskeletal: No muscle pains  Neurologic: No weakness in the extremities. Psychiatric: No depression or suicidal thoughts. Endocrine: No heat or cold intolerances. Allergic/Immunologic: No current seasonal allergies; no skin hives. Physical Exam:      This a 63 y.o. female   Vitals:    01/20/18 0821   BP: (!) 124/59   Pulse: 94   Resp: 15   Temp: 99.3 °F (37.4 °C)   SpO2: 95%     Constitutional: Patient in no acute distress. Neuro: alert and oriented to person place and time. Head: Atraumatic and normocephalic. Neck: Trachea midline. Ext: 2+ radial pulses bilaterally. Psych: Mood and affect normal.  Skin: No rashes or bruising present. Lungs: Respiratory effort normal.  Cardiovascular:  Regular rhythm. Abdomen: Soft, non-tender, non-distended. Neither side has CVA tenderness on exam.  Bladder non-tender and not distended. Lymphatics: no palpable lymphadenopathy  Pelvic exam: deferred. Rectal exam not indicated.     Labs:  Recent Labs      01/18/18 2056 01/19/18   0513   WBC  11.0  8.8   HGB  8.3*  7.5*   HCT  28.0*  28.4*   MCV  69.4*  79.3*   PLT  557*  445     Recent Labs      01/18/18 2056 01/19/18   0953  01/20/18   0437  01/20/18   1006   NA  141  139  140   --    K  5.9*  4.7  5.5*  4.7   CL  106  105  106   --    CO2  19*  19*  21   --    BUN  58*  59*  57*   --    CREATININE 3.00*  3.06*  3.06*   --        Recent Labs      01/20/18   0930   COLORU  YELLOW   PHUR  5.0   WBCUA  50    RBCUA  5 TO 10   MUCUS  NOT REPORTED   TRICHOMONAS  NOT REPORTED   YEAST  NOT REPORTED   BACTERIA  MANY*   SPECGRAV  1.016   LEUKOCYTESUR  MODERATE*   UROBILINOGEN  Normal   BILIRUBINUR  NEGATIVE           -----------------------------------------------------------------  Imaging Results:  Xr Lumbar Spine (2-3 Views)    Result Date: 1/18/2018  EXAMINATION: 3 VIEWS OF THE LUMBAR SPINE 1/18/2018 7:40 pm COMPARISON: None. HISTORY: ORDERING SYSTEM PROVIDED HISTORY: fall TECHNOLOGIST PROVIDED HISTORY: Reason for exam:->fall Ordering Physician Provided Reason for Exam: s/p fall on Monday, patient fell out of bed and struck back onto ground, low back pain Acuity: Acute Type of Exam: Initial Mechanism of Injury: fall Relevant Medical/Surgical History: none FINDINGS: Normal lumbar lordosis. There is anterior wedge compression deformity of L1 vertebral body with about 25% decrease in height consistent with compression fracture which is concerning for an acute fracture in the clinical setting provided. No priors to compare. No paraspinal mass. L1 wedge compression fracture with about 25% decrease in height which is probably an acute fracture. Please correlate with point tenderness. Ct Lumbar Spine Wo Contrast    Result Date: 1/19/2018  EXAMINATION: CT OF THE LUMBAR SPINE WITHOUT CONTRAST  1/19/2018 TECHNIQUE: CT of the lumbar spine was performed without the administration of intravenous contrast. Multiplanar reformatted images are provided for review. Dose modulation, iterative reconstruction, and/or weight based adjustment of the mA/kV was utilized to reduce the radiation dose to as low as reasonably achievable.  COMPARISON: 01/18/2018 HISTORY: ORDERING SYSTEM PROVIDED HISTORY: L1 compression fx TECHNOLOGIST PROVIDED HISTORY: Reason for exam:->L1 compression fx FINDINGS: BONES/ALIGNMENT: Correlating Date: 1/19/2018    ADDENDUM: Right renal mass is incompletely imaged and suspicious for neoplasm. Further evaluation recommended. Result Date: 1/19/2018  EXAMINATION: MRI OF THE THORACIC SPINE WITHOUT CONTRAST  1/19/2018 4:44 pm TECHNIQUE: Multiplanar multisequence MRI of the thoracic spine was performed without the administration of intravenous contrast. COMPARISON: None. HISTORY: ORDERING SYSTEM PROVIDED HISTORY: back pain FINDINGS: BONES/ALIGNMENT: There is normal alignment of the spine. The vertebral body heights are maintained except for a burst fracture at L1 with loss of height and retropulsion. This is incompletely imaged within the field of view. . The bone marrow signal appears unremarkable except for T1 and T2 lengthening within the L1 vertebral body. . SPINAL CORD: No abnormal cord signal is seen. SOFT TISSUES: Left paracentral well-circumscribed subcutaneous soft tissue densities noted at T12 on the left. These measure 10 in 25 mm respectively. DEGENERATIVE CHANGES: No significant spinal canal stenosis or neural foraminal narrowing of the thoracic spine. Acute Burst fracture L1 vertebral body incompletely imaged within the field of view. Several mm retropulsion with encroachment upon the conus. See also MRI lumbar spine report. Left paracentral subcutaneous nodules at T12. Mri Lumbar Spine Wo Contrast    Result Date: 1/19/2018  EXAMINATION: MRI OF THE LUMBAR SPINE WITHOUT CONTRAST, 1/19/2018 4:44 pm TECHNIQUE: Multiplanar multisequence MRI of the lumbar spine was performed without the administration of intravenous contrast. COMPARISON: CT from earlier today. HISTORY: ORDERING SYSTEM PROVIDED HISTORY: poss mets Back pain for 3 days. L1 fracture. FINDINGS: BONES/ALIGNMENT: There is a metastasis invading almost the entire L1 vertebral body and the left L1 pedicle. The metastasis erodes through the cortex of the bone and invades the adjacent soft tissues.   It invades the left lateral

## 2018-01-20 NOTE — PLAN OF CARE
Problem: Falls - Risk of  Goal: Absence of falls  Outcome: Met This Shift      Problem: Pain:  Goal: Pain level will decrease  Pain level will decrease   Outcome: Met This Shift    Goal: Control of acute pain  Control of acute pain   Outcome: Met This Shift    Goal: Control of chronic pain  Control of chronic pain   Outcome: Met This Shift

## 2018-01-20 NOTE — CONSULTS
Patient seen and chart reviewed. Patient admitted with low back pains after sustaining a fall 3 days ago. Imaging shows a right renal mass consistent with a renal cell carcinoma with possible IVC invasion, liver and retroperitoneal metastases. L-spine MRI documented L1 pathological fracture with epidural invasion and severe thecal sac stenosis. MRI of the C/T-spine unremarkable except for a soft tissue nodule at the level of T12 and moderate foraminal narrowing at C4/5. No cord compression. Patient has been seen by neurosurgery. Patient is neurologically stable, with 5/5 power throughout intact sensation. Plan 1. Agree with urology consult          2. Agree with medical oncology consult          3. Await neurosurgery assessment. Possible candidate for               kyphoplasty if not a neurosurgical candidate. 4.  Candidate for palliative radiation therapy to the L-spine               region as an outpatient. 5.  No indication for radiation therapy intervention while                inpatient. See full dictated.

## 2018-01-20 NOTE — CONSULTS
Spouse name: N/A    Number of children: N/A    Years of education: N/A     Occupational History    Not on file.      Social History Main Topics    Smoking status: Never Smoker    Smokeless tobacco: Never Used    Alcohol use No    Drug use: No    Sexual activity: Not on file     Other Topics Concern    Not on file     Social History Narrative    No narrative on file       Family History:   Family History   Problem Relation Age of Onset    Heart Disease Mother     Diabetes Mother     Cancer Father      esophageal cancer    Cancer Maternal Grandfather      colon       Review of Systems:    CONSTITUTIONAL:  positive for  fatigue  RESPIRATORY:  positive for  Dyspnea ON EXERTION  CARDIOVASCULAR:  positive for  Dyspnea on exertion  GASTROINTESTINAL:  negative  GENITOURINARY:  negative  MUSCULOSKELETAL:  positive for  Back pain   NEUROLOGICAL:  negative    Objective:  CURRENT TEMPERATURE:  Temp: 99.3 °F (37.4 °C)  MAXIMUM TEMPERATURE OVER 24HRS:  Temp (24hrs), Av.5 °F (36.9 °C), Min:98 °F (36.7 °C), Max:99.3 °F (37.4 °C)    CURRENT RESPIRATORY RATE:  Resp: 15  CURRENT PULSE:  Pulse: 94  CURRENT BLOOD PRESSURE:  BP: (!) 124/59  24HR BLOOD PRESSURE RANGE:  Systolic (58YSW), RA , Min:124 , ZIE:980   ; Diastolic (96BVM), QNP:32, Min:52, Max:60    24HR INTAKE/OUTPUT:    Intake/Output Summary (Last 24 hours) at 18 0935  Last data filed at 18 0815   Gross per 24 hour   Intake             1200 ml   Output                0 ml   Net             1200 ml     Patient Vitals for the past 96 hrs (Last 3 readings):   Weight   18 0600 209 lb 4.8 oz (94.9 kg)   18 0033 209 lb 4.8 oz (94.9 kg)         Physical Exam:  General appearance:Awake, alert, in no acute distress  Skin: warm and dry, no rash or erythema  Eyes: conjunctivae normal and sclera anicteric  ENT:no thrush no pharyngeal congestion   Neck:no JVD  Pulmonary: clear to auscultation bilaterally- no wheezes, rales or rhonchi, normal

## 2018-01-20 NOTE — PROGRESS NOTES
[address]  Occupational Therapy Not Seen Note    Patient not available for Occupational Therapy due to:    [] Testing:    [] Hemodialysis    [] Blood Transfusion in Progress    []Refusal by Patient:    [x] Surgery/Procedure: + L1 fracture requiring surgical fixation-pathologic fx. Due to renal CA with mets-will she s/p surgery for appropriateness on 1-22-18    [x] Strict Bedrest    [] Sedation    [x] Spine Precautions     [] Pt being transferred to palliative care at this time. Spoke with pt/family and OT services to be defered. [] Pt independent with functional mobility and functional tasks.  Pt with no OT acute care needs at this time, will defer OT eval.    [] Other    Next Scheduled Treatment: Re-check 1/22/2018    Signature: JESSENIA Smith/GILDA

## 2018-01-21 NOTE — PROGRESS NOTES
infusion   Intravenous Continuous Ariel ARRIOLA Blood, DO 50 mL/hr at 01/20/18 1444      acetaminophen (TYLENOL) tablet 650 mg  650 mg Oral Q4H PRN Angelo Lowery NP        bisacodyl (DULCOLAX) suppository 10 mg  10 mg Rectal Daily PRN Angelo Lowery NP        HYDROcodone-acetaminophen (NORCO) 5-325 MG per tablet 1 tablet  1 tablet Oral Q4H PRN Angelo Lowery NP   1 tablet at 01/21/18 1157    magnesium hydroxide (MILK OF MAGNESIA) 400 MG/5ML suspension 30 mL  30 mL Oral Daily PRN Angelo Lowery NP        magnesium sulfate 1 g in dextrose 5% 100 mL IVPB  1 g Intravenous PRN Angelo Lowery NP        nicotine (NICODERM CQ) 21 MG/24HR 1 patch  1 patch Transdermal Daily PRN Angelo Lowery NP        ondansetron [facility]) injection 4 mg  4 mg Intravenous Q6H PRN Angelo Lowery NP   4 mg at 01/20/18 0131    potassium chloride (KLOR-CON M) extended release tablet 40 mEq  40 mEq Oral PRN Angelo Lowery NP        Or    potassium chloride 20 MEQ/15ML (10%) oral solution 40 mEq  40 mEq Oral PRN Angelo Lowery NP        Or    potassium chloride 10 mEq/100 mL IVPB (Peripheral Line)  10 mEq Intravenous PRN Angelo Lowery NP        sodium chloride flush 0.9 % injection 10 mL  10 mL Intravenous 2 times per day Angelo Lowery NP        sodium chloride flush 0.9 % injection 10 mL  10 mL Intravenous PRN Angelo Lowery NP        dextrose 5 % solution  100 mL/hr Intravenous PRN Angelo Lowery NP        dextrose 50 % solution 12.5 g  12.5 g Intravenous PRN Angelo Lowery NP        glucagon (rDNA) injection 1 mg  1 mg Intramuscular PRN Angelo Lowery NP        glucose (GLUTOSE) 40 % oral gel 15 g  15 g Oral PRN Angelo Lowery NP        insulin lispro (HUMALOG) injection vial 0-6 Units  0-6 Units Subcutaneous TID WC Angelo Lowery NP   3 Units at 01/21/18 1157    insulin lispro well appearing, no in pain or distress   Mental status - alert and cooperative   Eyes - pupils equal and reactive, extraocular eye movements intact   Ears - bilateral TM's and external ear canals normal   Mouth - mucous membranes moist, pharynx normal without lesions   Neck - supple, no significant adenopathy   Lymphatics - no palpable lymphadenopathy, no hepatosplenomegaly   Chest - clear to auscultation, no wheezes, rales or rhonchi, symmetric air entry   Heart - normal rate, regular rhythm, normal S1, S2, no murmurs  Abdomen - soft, nontender, nondistended, no masses or organomegaly   Neurological - alert, oriented, normal speech, no focal findings or movement disorder noted   Musculoskeletal - no joint tenderness, deformity or swelling   Extremities - peripheral pulses normal, no pedal edema, no clubbing or cyanosis   Skin - normal coloration and turgor, no rashes, no suspicious skin lesions noted ,    DATA:    Labs:   CBC:   Recent Labs      01/18/18   2056  01/19/18   0513   WBC  11.0  8.8   HGB  8.3*  7.5*   HCT  28.0*  28.4*   PLT  557*  445     BMP:   Recent Labs      01/20/18   0437   01/21/18   0458  01/21/18   1133   NA  140   --   133*   --    K  5.5*   < >  6.1*  5.3   CO2  21   --   19*   --    BUN  57*   --   57*   --    CREATININE  3.06*   --   2.99*   --    LABGLOM  16*   --   16*   --    GLUCOSE  147*   --   312*   --     < > = values in this interval not displayed. PT/INR:   Recent Labs      01/19/18   0513   PROTIME  10.6   INR  1.0       IMAGING DATA:  Ct Lumbar Spine Wo Contrast     Result Date: 1/19/2018  EXAMINATION: CT OF THE LUMBAR SPINE WITHOUT CONTRAST  1/19/2018 TECHNIQUE: CT of the lumbar spine was performed without the administration of intravenous contrast. Multiplanar reformatted images are provided for review. Dose modulation, iterative reconstruction, and/or weight based adjustment of the mA/kV was utilized to reduce the radiation dose to as low as reasonably achievable. COMPARISON: 01/18/2018 HISTORY: ORDERING SYSTEM PROVIDED HISTORY: L1 compression fx TECHNOLOGIST PROVIDED HISTORY: Reason for exam:->L1 compression fx FINDINGS: BONES/ALIGNMENT: Correlating with previous radiographs is a mild L1 compression fracture with approximately 25 percent loss of height at its narrowest point. Mixed lytic and sclerotic appearance centered in the left half of the vertebral body extending across the midline and to the left pedicle, transverse process, and lamina most compatible with metastatic disease/pathologic compression fracture. Less likely infection is in the differential diagnosis. There appears to be a soft tissue component extending left laterally/posterolaterally. Posterior cortical buckling/destruction of the posterior vertebral body wall with a soft tissue component which appears to extend to the spinal canal probably causing some degree of spinal stenosis and probable left neural foraminal stenosis. MRI is recommended for further evaluation. Slight levoconvex curvature and straightening of lumbar lordosis. DEGENERATIVE CHANGES: Mild lumbar spondylotic changes. At L2-L3 there is mild facet arthropathy and ligamentum flavum thickening. Slight flattening of the ventral aspect of thecal sac. Mild neural foraminal stenosis. At L3-L4 there is mild diffuse disc bulging. Vacuum disc phenomenon. Mild facet arthropathy and ligamentum flavum thickening. Combination results in mild to moderate spinal stenosis. Mild neural foraminal stenosis, right greater than left. At L4-L5 there is diffuse disc bulging, eccentric to the left. Bilateral facet arthropathy, greater on the left. Mild central spinal and moderate left neural foraminal stenosis. At L5-S1 there is mild diffuse disc bulging with calcification posteriorly along the periphery. Mild facet arthropathy and ligamentum flavum thickening. No significant acquired spinal stenosis. Mild neural foraminal stenosis.  SOFT TISSUES/RETROPERITONEUM: Partially visualized heterogeneous large right renal mass measuring at least 8 cm with prominent vessels in the renal hilum. There are small lymph nodes and soft tissue stranding in the renal hilar region. Apparent previous hysterectomy. Heterogeneous 1 cm calcification in the deep pelvis is felt to be related to a colonic diverticulum and it appears to be outside of the bladder.      Pathologic compression fracture of L1 with tumor infiltration involving primarily the left half of the vertebral body extending to the posterior elements with a probable associated soft tissue component. MRI is recommended for further evaluation. Partially visualized complex large right renal mass; renal cell carcinoma should be excluded. Findings were discussed with Chelsi Pope NP, at 3:13 pm on 1/19/2018.      Mri Cervical Spine Wo Contrast     Result Date: 1/19/2018  EXAMINATION: MRI OF THE CERVICAL SPINE WITHOUT CONTRAST 1/19/2018 4:44 pm TECHNIQUE: Multiplanar multisequence MRI of the cervical spine was performed without the administration of intravenous contrast. COMPARISON: None. HISTORY: ORDERING SYSTEM PROVIDED HISTORY: back pain FINDINGS: BONES/ALIGNMENT: There is normal alignment of the spine. The vertebral body heights are maintained. The bone marrow signal appears unremarkable. SPINAL CORD: No abnormal cord signal is seen. SOFT TISSUES: No paraspinal mass identified. C2-C3: There is no significant disc protrusion, spinal canal stenosis or neural foraminal narrowing. C3-C4: There is no significant disc protrusion, spinal canal stenosis or neural foraminal narrowing. C4-C5: Moderate uncinate spondylosis and neural foraminal narrowing bilaterally. Central canal is patent. C5-C6: There is no significant disc protrusion, spinal canal stenosis or neural foraminal narrowing. C6-C7: There is no significant disc protrusion, spinal canal stenosis or neural foraminal narrowing.  C7-T1: There is no

## 2018-01-21 NOTE — PROGRESS NOTES
utilized to reduce the radiation dose to as low as reasonably achievable. COMPARISON: CT and MRI  lumbar spine 01/19/2018 HISTORY: ORDERING SYSTEM PROVIDED HISTORY: renal mass, also look for mets TECHNOLOGIST PROVIDED HISTORY: Additional Contrast?->Radiologist Recommendation FINDINGS: Lower Chest: Patchy bibasilar infiltrates and trace bilateral pleural effusion right greater than left. Organs: Limited evaluation due to absence of IV contrast. Circumscribed 2.5 cm hypodensity in the medial aspect dome of the liver which may represent a cyst or possibly metastatic lesion. There is an intermediate density fluid encircling most of the right lobe of the liver up to 3 cm in thickness causing impressions on the liver margin. Likely peritoneal metastasis given findings in kidney described below. The pancreas, left adrenal gland and left kidney show no significant abnormalities. There is gallbladder distention. Right kidney subnormal.  The entire kidneys essentially replaced by abnormal lobular heterogeneous mass with hypodense and intermediate signal components represents renal cell carcinoma. .  There is perinephric stranding. There is probably extension into right renal vein but difficult to assess in the absence of IV contrast. GI/Bowel: There is limited evaluation due to absence of oral contrast. Stomach grossly normal.  Normal caliber small bowel loops. Normal appendix. The colon shows no acute process. Pelvis: Urinary bladder grossly normal.  No suspicious pelvic mass. Peritoneum/Retroperitoneum: There are mesenteric numerous nodules of different sizes scattered throughout the upper abdomen with the largest measuring 2-3 cm. A few are confluent. Consistent with peritoneal metastasis. Small amount of ascites mainly in the pelvis. Bones/Soft Tissues: No superficial soft tissue lesion.   There is mixed sclerotic lytic metastasis to L1 vertebral body with compression fracture retropulsed fragment causing spinal Contrast    Result Date: 1/19/2018  EXAMINATION: CT OF THE LUMBAR SPINE WITHOUT CONTRAST  1/19/2018 TECHNIQUE: CT of the lumbar spine was performed without the administration of intravenous contrast. Multiplanar reformatted images are provided for review. Dose modulation, iterative reconstruction, and/or weight based adjustment of the mA/kV was utilized to reduce the radiation dose to as low as reasonably achievable. COMPARISON: 01/18/2018 HISTORY: ORDERING SYSTEM PROVIDED HISTORY: L1 compression fx TECHNOLOGIST PROVIDED HISTORY: Reason for exam:->L1 compression fx FINDINGS: BONES/ALIGNMENT: Correlating with previous radiographs is a mild L1 compression fracture with approximately 25 percent loss of height at its narrowest point. Mixed lytic and sclerotic appearance centered in the left half of the vertebral body extending across the midline and to the left pedicle, transverse process, and lamina most compatible with metastatic disease/pathologic compression fracture. Less likely infection is in the differential diagnosis. There appears to be a soft tissue component extending left laterally/posterolaterally. Posterior cortical buckling/destruction of the posterior vertebral body wall with a soft tissue component which appears to extend to the spinal canal probably causing some degree of spinal stenosis and probable left neural foraminal stenosis. MRI is recommended for further evaluation. Slight levoconvex curvature and straightening of lumbar lordosis. DEGENERATIVE CHANGES: Mild lumbar spondylotic changes. At L2-L3 there is mild facet arthropathy and ligamentum flavum thickening. Slight flattening of the ventral aspect of thecal sac. Mild neural foraminal stenosis. At L3-L4 there is mild diffuse disc bulging. Vacuum disc phenomenon. Mild facet arthropathy and ligamentum flavum thickening. Combination results in mild to moderate spinal stenosis. Mild neural foraminal stenosis, right greater than left. 1/18/2018  EXAMINATION: SINGLE VIEW OF THE PELVIS AND 2 VIEWS LEFT HIP 1/18/2018 7:40 pm COMPARISON: None. HISTORY: ORDERING SYSTEM PROVIDED HISTORY: fall TECHNOLOGIST PROVIDED HISTORY: Reason for exam:->fall Ordering Physician Provided Reason for Exam: s/p fall on Monday, patient fell out of bed and struck back onto ground, low back pain Acuity: Acute Type of Exam: Initial Mechanism of Injury: fall Relevant Medical/Surgical History: none FINDINGS: Pelvic ring is intact. Hips are in anatomic alignment. Cortical margins are intact. Soft tissues are unremarkable. No fracture     Impression:    Patient Active Problem List   Diagnosis    Diabetes mellitus ([facility] [address]    Renal insufficiency    Osteoarthritis    Migraine    Obesity    Hyperplastic polyp of intestine    Tubular adenoma    Stage 4 chronic kidney disease (HCC)    Syncope and collapse    Hyperkalemia    Anemia due to chronic kidney disease    Pathologic fracture of lumbar vertebra, initial encounter    Secondary malignant neoplasm of lumbar vertebral column ([facility] [address]    Acute cystitis without hematuria    Renal mass    Cancer, metastatic to bone [facility])       Plan:  CT findings are extremely concerning for neoplasm of R kidney. Will discuss need for further imaging with attending. Cr stable at ~3.  Plans for IR biopsy Monday noted. Will need both L-spine and renal biopsy and confirm that pathology matches. After pathologic diagnosis confirmed, will discuss order of therapies. May benefit from chemotherapy first to check for response. Cytoreductive nephrectomy may very well also put patient into renal failure requiring dialysis. Multidisciplinary team recommendations appreciated.       Bernard Caraballo  9:05 AM 1/21/2018

## 2018-01-21 NOTE — PROGRESS NOTES
contrast. Christean Chroman is probably extension into   the right renal vein. 2. There is intraperitoneal metastasis with numerous upper abdominal discrete   and confluent nodules with largest measuring 2-3 cm.  Additionally there is   confluent hypodensity almost encircling the right lobe of liver up to 3 cm in   thickness representing metastatic disease. Christean Chroman is small amount of ascites. 3. A 2.5 cm medial right lobe of liver dome round lesion is probably a cyst   but cannot exclude metastasis. 4. L1 metastasis with pathologic compression fracture and retropulsed   fragment causing spinal stenosis described in detail on MRI lumbar spine from   previous day. CT of Chest  1. A 4 mm anterior segment right upper lobe nodule which may or may not be   neoplastic.  Follow-up CT in 12 months. 2. Trace bilateral pleural effusions and patchy bibasilar infiltrates   suggestive of atelectasis or residual of pneumonitis. 3. Partially imaged liver shows confluent hypodensity of varying thickness   encircling most of the right lobe up to 3 cm in thickness.  This could be of   metastatic or infectious etiology or even hematoma in appropriate clinical   setting.  Absence of IV contrast limits evaluat         Assessment:  1. CKD stage IV secondary to diabetes and kidney mass  2. Hyperkalemia, 6.1 this morning. Received Kayexalate. 2. Newly detected large right renal mass estimated to be around 11 x 11 cm and replacing the right kidney, radiologically consistent with renal cell carcinoma. inferior vena cava invasion. Intraperitoneal metastasis, possible liver metastasis, L1 metastasis with pathological compression fracture causing spinal stenosis  2. Diabetes mellitus  3. Essential hypertension   4. Hypothyroidism  5. Microcytic anemia. Labs are suggestive of iron deficiency in the past       Plan:  1. Give Florinef 0.1 X 1 now for elevated potassium  2. Recheck potassium and reverse if indicated  3.  Continue Normal Saline at

## 2018-01-21 NOTE — PROGRESS NOTES
kidney consistent with renal cell carcinoma.  Limited evaluation in the absence of IV contrast.  There is probably extension into the right renal vein. 2. There is intraperitoneal metastasis with numerous upper abdominal discrete and confluent nodules with largest measuring 2-3 cm.  Additionally there is confluent hypodensity almost encircling the right lobe of liver up to 3 cm in thickness representing metastatic disease. Alexandra Maze is small amount of ascites. 3. A 2.5 cm medial right lobe of liver dome round lesion is probably a cyst but cannot exclude metastasis. 4. L1 metastasis with pathologic compression fracture and retropulsed fragment causing spinal stenosis described in detail on MRI lumbar spine from previous day. A/P:  63 y.o. female who presents with severe back biomechanic pain s/p fall from standing height, 60 lbs weight loss in the last year. Imaging reveals oncologic fracture at L1 w retropulsion, large renal mass, intraperitoneal mets to lung & liver as well. - NPO tonight after midnight for L1 vertebral body fx biopsy  - Neuro checks per protocol  - No anticoagulants/antiplatelets/NSAIDs  - Urology, oncology recommend tissue diagnosis from kidney as well, awaiting additional imaging as needed. Radiation oncology consulted for outpatient treatment as needed. - Decadron [id number] for metastatic cord compression w oncologic pain  - Pre-op labs, imaging, medical clearance  - Additional recommendations may follow. Please contact neurosurgery with any changes in patients neurologic status.        YANNA Hu pager [phone]  1/20/18  10:25 AM

## 2018-01-22 NOTE — PROGRESS NOTES
[facility] [facility]    Progress Note    1/22/2018    7:48 AM    Name:   Yvonne Wilde  MRN:     [id number]     [place]:      [de-identified]   Room:   [address] [place] [address] Day:  4  Admit Date:  1/18/2018 11:38 PM    PCP:   Yasmani Smith MD  Code Status:  Full Code    Subjective:     C/C: back pain  Interval History Status:   Back pain a bit better  Nothing by mouth for kidney biopsy today by IR for tissue diagnosis  Neurosurgery plans to do L1 biopsy/surgery tomorrow  Potassium 4.5 today  Creatinine has improved, 2.66 today  Brief History:   The patient is a 63 y.o.   female who presents with back pain   and she is admitted to the hospital for the management of  Back pain.  3 days pta in the am she had fallen out of bed after episode of diarrhea.  She thought her bs was low, as happens frequently to her into [facility], she thinks her sugar was in 90s at that time. Arlette Annemarie woke up on the floor, no recollection of how she fell. Berry Wright done suggest she has probably acute L1 fracture; confirmed by CT-but it suggested likely met with fracture      Review of Systems:     Constitutional:  negative for chills, fevers, sweats  Respiratory:  negative for cough, dyspnea on exertion, hemoptysis, shortness of breath, wheezing  Cardiovascular:  negative for chest pain, chest pressure/discomfort, lower extremity edema, palpitations  Gastrointestinal:  negative for abdominal pain, constipation, diarrhea, nausea, vomiting  Neurological:  negative for dizziness, headache    Medications:      Allergies:  No Known Allergies    Current Meds:   Scheduled Meds:    lidocaine  1 patch Transdermal Daily    dexamethasone  4 mg Intravenous Q6H    amitriptyline  75 mg Oral Nightly    amLODIPine  10 mg Oral Daily    atorvastatin  20 mg Oral Daily    levothyroxine  100 mcg Oral Daily    acetaminophen  1,000 mg Oral TID    sodium chloride flush  10 mL Intravenous 2

## 2018-01-22 NOTE — PROGRESS NOTES
Adolfok[facility] Irene  Urology Progress Note     Subjective:   No acute issues overnight. No fevers or chills. No nausea or vomiting. No chest pain or shortness of breath. No calf pain. Pain Controlled. Ambulating. Currently NPO  IR biopsy today of renal mass and spine. Patient Vitals for the past 24 hrs:   BP Temp Temp src Resp SpO2 Weight   01/22/18 0442 - - - - - 210 lb (95.3 kg)   01/21/18 2015 (!) 147/63 98.9 °F (37.2 °C) Oral 16 92 % -       Intake/Output Summary (Last 24 hours) at 01/22/18 0756  Last data filed at 01/22/18 0443   Gross per 24 hour   Intake             2301 ml   Output                1 ml   Net             2300 ml       No results for input(s): WBC, HGB, HCT, MCV, PLT in the last 72 hours. Recent Labs      01/20/18   0437   01/21/18   0458  01/21/18   1133  01/21/18   2057  01/22/18   0539   NA  140   --   133*   --    --   135   K  5.5*   < >  6.1*  5.3  4.6  4.5   CL  106   --   99   --    --   100   CO2  21   --   19*   --    --   20   BUN  57*   --   57*   --    --   66*   CREATININE  3.06*   --   2.99*   --    --   2.66*    < > = values in this interval not displayed. Recent Labs      01/20/18   0930   COLORU  YELLOW   PHUR  5.0   WBCUA  50    RBCUA  5 TO 10   MUCUS  NOT REPORTED   TRICHOMONAS  NOT REPORTED   YEAST  NOT REPORTED   BACTERIA  MANY*   SPECGRAV  1.016   LEUKOCYTESUR  MODERATE*   UROBILINOGEN  Normal   BILIRUBINUR  NEGATIVE       Physical Exam:   AAOx3  NAD   Unlabored breathing  Normal rate  Abdomen soft  No lower extremity tenderness to palpation.        Impression:    Patient Active Problem List   Diagnosis    Diabetes mellitus ([address]    Renal insufficiency    Osteoarthritis    Migraine    Obesity    Hyperplastic polyp of intestine    Tubular adenoma    Stage 4 chronic kidney disease (HCC)    Syncope and collapse    Hyperkalemia    Anemia due to chronic kidney disease    Pathologic fracture of lumbar vertebra, initial encounter    Secondary malignant neoplasm of lumbar vertebral column ([facility] [address]    Acute cystitis without hematuria    Renal mass    Cancer, metastatic to bone [facility])       Plan: To IR for biopsy today. Appreciate oncology input. Patient may require spinal stabilization and systemic therapy prior to possible cytoreductive nephrectomy. Baseline CKD discussed with patient along with possible need for dialysis should we pursue cytoreductive nephrectomy. Will follow along with you.        Diamond Sam  7:56 AM 1/22/2018

## 2018-01-22 NOTE — PROGRESS NOTES
Today's Date: 1/22/2018  Patient Name: Grant Benjamin  Date of admission: 1/18/2018 11:38 PM  Patient's age: 63 y.o., 1955  Admission Dx: Lumbar compression fracture ([address] [S32.000A]  Dehydration [E86.0]    Reason for Consult: renal mass  Requesting Physician: Tiffanie Christian Blood, DO    SUBJECTIVE:    Pt seen and examined  I discussed with neurosurgery, who is planning surgical intervention after Biopsy confirmation  Had renal biopsy  Surgery planned tomorrow  Back pain  Worse today    HISTORY OF PRESENT ILLNESS IN BRIEF:    This is a 64 YO female was admitted with back pain after a fall. On admission she had imaging studies which showed L2 metastatic lesion with compression fracture. Had MRI spine which showed right renal mass c/w RCC and inferior vena cava invasion. It also showed L1 metastasis, pathologic fracture, epidural invasion, and severe thecal sac stenosis. Seen by neurosurgery and input awaited. She reports wt loss possibly intentional over last year for about 50 lbs. Patient seen by urology. Medications:    Prior to Admission medications    Medication Sig Start Date End Date Taking?  Authorizing Provider   insulin glargine (LANTUS) 100 UNIT/ML injection vial Inject 60 Units into the skin 2 times daily  Patient taking differently: Inject 100 Units into the skin daily  1/11/18   Kana Julio MD   levothyroxine (SYNTHROID) 100 MCG tablet Take 1 tablet by mouth daily 11/16/17   Kana Julio MD   omeprazole (PRILOSEC) 20 MG delayed release capsule Take 1 capsule by mouth daily 9/7/17   Kana Julio MD   atorvastatin (LIPITOR) 20 MG tablet TAKE 1 TABLET BY MOUTH ONE TIME A DAY  7/27/17   Marv Bagley MD   insulin aspart (NOVOLOG FLEXPEN) 100 UNIT/ML injection pen Inject into the skin 2 times daily (with meals) Taking 3 units at lunch and 6 units at supper    Historical Provider, MD   Insulin Pen Needle 32G X 4 MM MISC 2 each by Does not apply route 2 times daily    Historical Provider, MD   glyBURIDE (DIABETA) 5 MG tablet TAKE 2 TABLETS BY MOUTH TWO TIMES A DAY 4/13/17   Devin Keenan MD   amitriptyline (ELAVIL) 75 MG tablet Take 1 tablet by mouth nightly 4/13/17   Devin Keenan MD   amLODIPine (NORVASC) 10 MG tablet Take 1 tablet by mouth daily 4/13/17   Devin Keenan MD   lisinopril-hydrochlorothiazide GARZA [facility]) 20-12.5 MG per tablet Take 1 tablet by mouth 2 times daily 4/13/17   Devin Keenan MD   acetaminophen (TYLENOL) 500 MG tablet Take 1,000 mg by mouth 3 times daily    Historical Provider, MD   rizatriptan (MAXALT) 10 MG tablet Take 1 tablet by mouth once as needed for Migraine for up to 1 dose.  May repeat in 2 hours if needed 11/10/14 1/18/18  Devin Keenan MD     Current Facility-Administered Medications   Medication Dose Route Frequency Provider Last Rate Last Dose    insulin lispro (HUMALOG) injection vial 0-18 Units  0-18 Units Subcutaneous TID  Donya Rubi MD   3 Units at 01/22/18 1233    insulin lispro (HUMALOG) injection vial 0-9 Units  0-9 Units Subcutaneous Nightly Pedro Mc MD        acetaminophen (TYLENOL) tablet 650 mg  650 mg Oral Q4H PRN Belkys Avila MD        cefTRIAXone (ROCEPHIN) 1 g in sterile water 10 mL IV syringe  1 g Intravenous Q24H Pedro Mc MD        lidocaine (LIDODERM) 5 % 1 patch  1 patch Transdermal Daily Ariel ARRIOLA Blood, DO   1 patch at 01/22/18 1234    dexamethasone (DECADRON) injection 4 mg  4 mg Intravenous Q6H Greta Leslie PA-C   4 mg at 01/22/18 1428    HYDROmorphone (DILAUDID) injection 0.25 mg  0.25 mg Intravenous Q2H PRN Ronnell Bouquet, NP   0.25 mg at 01/21/18 0747    Or    HYDROmorphone (DILAUDID) injection 0.5 mg  0.5 mg Intravenous Q2H PRN Ronnell Bouquet, NP   0.5 mg at 01/22/18 1434    amitriptyline (ELAVIL) tablet 75 mg  75 mg Oral Nightly Mel Carrera CNP   75 mg at 01/21/18 2033    amLODIPine (NORVASC) tablet 10 mg  10 mg Oral Daily Mel COLBERT solution 12.5 g  12.5 g Intravenous PRN Austen Ren, NP        glucagon (rDNA) injection 1 mg  1 mg Intramuscular PRN Austen Ren, NP        glucose (GLUTOSE) 40 % oral gel 15 g  15 g Oral PRN Austen Ren, NP        albuterol (PROVENTIL) nebulizer solution 2.5 mg  2.5 mg Nebulization As Directed RT PRN Austen Ren, NP           Allergies:  Review of patient's allergies indicates no known allergies. Social History:   reports that she has never smoked. She has never used smokeless tobacco. She reports that she does not drink alcohol or use drugs. Family History: family history includes Cancer in her father and maternal grandfather; Diabetes in her mother; Heart Disease in her mother. REVIEW OF SYSTEMS:    Constitutional: No fever or chills. No night sweats, no weight loss   Eyes: No eye discharge, double vision, or eye pain   HEENT: negative for sore mouth, sore throat, hoarseness and voice change   Respiratory: negative for cough , sputum, dyspnea, wheezing, hemoptysis, chest pain   Cardiovascular: negative for chest pain, dyspnea, palpitations, orthopnea, PND   Gastrointestinal: negative for nausea, vomiting, diarrhea, constipation, abdominal pain, Dysphagia, hematemesis and hematochezia   Genitourinary: negative for frequency, dysuria, nocturia, urinary incontinence, and hematuria   Integument: negative for rash, skin lesions, bruises.    Hematologic/Lymphatic: negative for easy bruising, bleeding, lymphadenopathy, or petechiae   Endocrine: negative for heat or cold intolerance,weight changes, change in bowel habits and hair loss   Musculoskeletal: negative for myalgias, arthralgias,++back  pain, joint swelling,and bone pain   Neurological: negative for headaches, dizziness, seizures, weakness, numbness    PHYSICAL EXAM:      BP (!) 137/53   Pulse 72   Temp 98 °F (36.7 °C) (Oral)   Resp 18   Ht 5' 8\" (1.727 m)   Wt 210 lb (95.3 kg)   SpO2 98%   BMI compression fx TECHNOLOGIST PROVIDED HISTORY: Reason for exam:->L1 compression fx FINDINGS: BONES/ALIGNMENT: Correlating with previous radiographs is a mild L1 compression fracture with approximately 25 percent loss of height at its narrowest point. Mixed lytic and sclerotic appearance centered in the left half of the vertebral body extending across the midline and to the left pedicle, transverse process, and lamina most compatible with metastatic disease/pathologic compression fracture. Less likely infection is in the differential diagnosis. There appears to be a soft tissue component extending left laterally/posterolaterally. Posterior cortical buckling/destruction of the posterior vertebral body wall with a soft tissue component which appears to extend to the spinal canal probably causing some degree of spinal stenosis and probable left neural foraminal stenosis. MRI is recommended for further evaluation. Slight levoconvex curvature and straightening of lumbar lordosis. DEGENERATIVE CHANGES: Mild lumbar spondylotic changes. At L2-L3 there is mild facet arthropathy and ligamentum flavum thickening. Slight flattening of the ventral aspect of thecal sac. Mild neural foraminal stenosis. At L3-L4 there is mild diffuse disc bulging. Vacuum disc phenomenon. Mild facet arthropathy and ligamentum flavum thickening. Combination results in mild to moderate spinal stenosis. Mild neural foraminal stenosis, right greater than left. At L4-L5 there is diffuse disc bulging, eccentric to the left. Bilateral facet arthropathy, greater on the left. Mild central spinal and moderate left neural foraminal stenosis. At L5-S1 there is mild diffuse disc bulging with calcification posteriorly along the periphery. Mild facet arthropathy and ligamentum flavum thickening. No significant acquired spinal stenosis. Mild neural foraminal stenosis.  SOFT TISSUES/RETROPERITONEUM: Partially visualized heterogeneous large right renal mass measuring at least 8 cm with prominent vessels in the renal hilum. There are small lymph nodes and soft tissue stranding in the renal hilar region. Apparent previous hysterectomy. Heterogeneous 1 cm calcification in the deep pelvis is felt to be related to a colonic diverticulum and it appears to be outside of the bladder.      Pathologic compression fracture of L1 with tumor infiltration involving primarily the left half of the vertebral body extending to the posterior elements with a probable associated soft tissue component. MRI is recommended for further evaluation. Partially visualized complex large right renal mass; renal cell carcinoma should be excluded. Findings were discussed with Sommer Sanchez NP, at 3:13 pm on 1/19/2018.      Mri Cervical Spine Wo Contrast     Result Date: 1/19/2018  EXAMINATION: MRI OF THE CERVICAL SPINE WITHOUT CONTRAST 1/19/2018 4:44 pm TECHNIQUE: Multiplanar multisequence MRI of the cervical spine was performed without the administration of intravenous contrast. COMPARISON: None. HISTORY: ORDERING SYSTEM PROVIDED HISTORY: back pain FINDINGS: BONES/ALIGNMENT: There is normal alignment of the spine. The vertebral body heights are maintained. The bone marrow signal appears unremarkable. SPINAL CORD: No abnormal cord signal is seen. SOFT TISSUES: No paraspinal mass identified. C2-C3: There is no significant disc protrusion, spinal canal stenosis or neural foraminal narrowing. C3-C4: There is no significant disc protrusion, spinal canal stenosis or neural foraminal narrowing. C4-C5: Moderate uncinate spondylosis and neural foraminal narrowing bilaterally. Central canal is patent. C5-C6: There is no significant disc protrusion, spinal canal stenosis or neural foraminal narrowing. C6-C7: There is no significant disc protrusion, spinal canal stenosis or neural foraminal narrowing.  C7-T1: There is no significant disc protrusion, spinal canal stenosis or neural foraminal narrowing.      Moderate Uncinate spondylosis and neural foraminal narrowing at C4-5.      Mri Thoracic Spine Wo Contrast     Addendum Date: 1/19/2018    ADDENDUM: Right renal mass is incompletely imaged and suspicious for neoplasm. Further evaluation recommended.      Result Date: 1/19/2018  EXAMINATION: MRI OF THE THORACIC SPINE WITHOUT CONTRAST  1/19/2018 4:44 pm TECHNIQUE: Multiplanar multisequence MRI of the thoracic spine was performed without the administration of intravenous contrast. COMPARISON: None. HISTORY: ORDERING SYSTEM PROVIDED HISTORY: back pain FINDINGS: BONES/ALIGNMENT: There is normal alignment of the spine. The vertebral body heights are maintained except for a burst fracture at L1 with loss of height and retropulsion. This is incompletely imaged within the field of view. . The bone marrow signal appears unremarkable except for T1 and T2 lengthening within the L1 vertebral body. . SPINAL CORD: No abnormal cord signal is seen. SOFT TISSUES: Left paracentral well-circumscribed subcutaneous soft tissue densities noted at T12 on the left. These measure 10 in 25 mm respectively. DEGENERATIVE CHANGES: No significant spinal canal stenosis or neural foraminal narrowing of the thoracic spine.      Acute Burst fracture L1 vertebral body incompletely imaged within the field of view. Several mm retropulsion with encroachment upon the conus. See also MRI lumbar spine report. Left paracentral subcutaneous nodules at T12.      Mri Lumbar Spine Wo Contrast     Result Date: 1/19/2018  EXAMINATION: MRI OF THE LUMBAR SPINE WITHOUT CONTRAST, 1/19/2018 4:44 pm TECHNIQUE: Multiplanar multisequence MRI of the lumbar spine was performed without the administration of intravenous contrast. COMPARISON: CT from earlier today. HISTORY: ORDERING SYSTEM PROVIDED HISTORY: poss mets Back pain for 3 days. L1 fracture.  FINDINGS: BONES/ALIGNMENT: There is a metastasis invading almost the entire L1 vertebral body and the

## 2018-01-22 NOTE — ANESTHESIA PRE PROCEDURE
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    Acute cystitis without hematuria N30.00    Renal mass N28.89    Cancer, metastatic to bone (HCC) C79.51       Past Medical History:        Diagnosis Date    Chronic kidney disease     Depression     Diabetes mellitus (HCC)     type 2    GERD (gastroesophageal reflux disease)     HA (headache)     HBP (high blood pressure)     History of blood transfusion     no reaction    Hyperlipidemia     Hyperplastic polyp of intestine     Hypothyroid 11/26/2016    Hypothyroidism     Left knee pain     Non-smoker     OA (osteoarthritis)     bilat knees    Tubular adenoma        Past Surgical History:        Procedure Laterality Date    COLONOSCOPY  08/16/2016    hyperplastic polyp and tubular adenoma     HYSTERECTOMY      TONSILLECTOMY         Social History:    Social History   Substance Use Topics    Smoking status: Never Smoker    Smokeless tobacco: Never Used    Alcohol use No                                Counseling given: Not Answered      Vital Signs (Current):   Vitals:    01/22/18 1245 01/22/18 1300 01/22/18 1315 01/22/18 1330   BP:   (!) 170/66    Pulse:   71    Resp:   18    Temp:   36.6 °C (97.9 °F)    TempSrc:   Oral    SpO2: 95% 96% 96% 96%   Weight:       Height:                                                  BP Readings from Last 3 Encounters:   01/22/18 (!) 170/66   01/18/18 (!) 148/66   09/07/17 120/60       NPO Status:                                                                                 BMI:   Wt Readings from Last 3 Encounters:   01/22/18 210 lb (95.3 kg)   01/18/18 220 lb (99.8 kg)   09/07/17 239 lb 9.6 oz (108.7 kg)     Body mass index is 31.93 kg/m².     CBC:   Lab Results   Component

## 2018-01-22 NOTE — FLOWSHEET NOTE
visited pt as a pre-surgery visit. Pt was calm and pleasant. Pt talked about her fears of the surgery. Pt shared with  memories of her family.  provided spiritual and emotional support.  offered prayer and she accepted. Pt thanked  for coming.

## 2018-01-22 NOTE — PROGRESS NOTES
Nephrology Progress Note      SUBJECTIVE       Neurosurgery plans on L1 vertebral body fracture Biopsy and renal biopsy per IR - planned for today   Hemodynamically stable  UOP suboptimal  Potassium 4.5 today - s/p Kayexalate.    Renal function continues to improve - Cr. 3.06 -> 2.99 -> 2.66  No complaints this AM    OBJECTIVE      CURRENT TEMPERATURE:  Temp: 98.9 °F (37.2 °C)  MAXIMUM TEMPERATURE OVER 24HRS:  Temp (24hrs), Av.9 °F (37.2 °C), Min:98.9 °F (37.2 °C), Max:98.9 °F (37.2 °C)    CURRENT RESPIRATORY RATE:  Resp: 16  CURRENT PULSE:  Pulse: 82  CURRENT BLOOD PRESSURE:  BP: (!) 147/63  24HR BLOOD PRESSURE RANGE:  Systolic (55LMG), IXO:382 , Min:147 , KOX:343   ; Diastolic (92HYG), GXZ:25, Min:63, Max:63    24HR INTAKE/OUTPUT:      Intake/Output Summary (Last 24 hours) at 18 0836  Last data filed at 18 0443   Gross per 24 hour   Intake             2301 ml   Output                1 ml   Net             2300 ml       PHYSICAL EXAM      GENERAL APPEARANCE:Awake, alert, in no acute distress  SKIN: warm and dry, no rash or erythema  EYES: conjunctivae normal and sclera anicteric  NECK:  carotids without bruits bilaterally JVD: None   PULMONARY: decreased breath sounds noted- in posterior bases  CADRDIOVASCULAR: normal rate, normal S1 and S2, no gallops, intact distal pulses and no carotid bruits  ABDOMEN: soft, non-tender, non-distended, normal bowel sounds, no masses or organomegaly and soft nontender, bowel sounds present, no organomegaly,  no ascites  EXTREMITIES: no cyanosis, clubbing or edema    CURRENT MEDICATIONS        insulin lispro (HUMALOG) injection vial 0-18 Units TID WC   insulin lispro (HUMALOG) injection vial 0-9 Units Nightly   lidocaine (LIDODERM) 5 % 1 patch Daily   dexamethasone (DECADRON) injection 4 mg Q6H   HYDROmorphone (DILAUDID) injection 0.25 mg Q2H PRN   Or    HYDROmorphone (DILAUDID) injection 0.5 mg Q2H PRN   amitriptyline (ELAVIL) tablet 75 mg Nightly   amLODIPine

## 2018-01-23 NOTE — OP NOTE
Operative Note    Date of Service: 1/23/18      Pre-operative Diagnosis: L1 metastatic renal cell lesion with unstable pathologic fracture       Post-operative Diagnosis: same      Procedure:   T11, T12, L2, L3 posterior nonsegmental fixation with pedicle screws  T11, T12, L1, L2, L3 posterior arthrodesis  Use of spinal neuronavigation with intraoperative imaging and interpretation of images  Use of neural monitoring  Use of fluoroscopy      Anesthesia: General      Surgeons/Assistants: Anant Santana DO    First Assist:  Erasmo Whatley      Estimated Blood Loss: 277PW      Complications: None      Specimens: none      Findings: unstable pathologic fx of L1      Indications for procedure: unstable fracture and canal stenosis    Description of procedure:     I went over all imaging indications for surgery as well as the risks with the patient as well as the patient's daughter. I explained to both of them that the primary goals of surgery would be decompression of the spinal cord fixation of the pathologic fracture as well as local control of the tumor. I explained to them the multitude of options in terms of corpectomized in the vertebral body as well as fixating the fracture. Ultimately based on the patient's overall medical status including significant anemia, significant hyperglycemia, as well as diffuse metastatic disease my concern with this patient was that a corpectomy in terms of removing the extent of the tumor would be unacceptably high risk without sufficient benefit. This is based on the knowledge of renal cell metastatic lesions which are very hypervascular and have the potential for significant potentially fatal blood loss in the setting of the patient was artery significantly anemic preoperatively. Considering this I made the decision preoperatively to place percutaneous screw fixation above and below the fracture mainly to stabilize the lesion.   I had a discussion with the radiation oncologist screw placement. After all screws were placed a confirmatory arm spin was done. All screws were noted to be in appropriate position without need for any revision. Fluoroscopy was then brought into the room and a lai template was used to measure out 170mm rods bilaterally. Slight kyphosis and slight lordosis were placed at either extreme of the lai appropriately. The lai was introduced at the cephalad portion and noted to be within all of the screw heads from T11 down to L3. Reduction And Placed at the Most Cephalad and the Most Caudad Levels and the Lai Was Reduced down to the Level of the screw head. I then placed Loosely over T12 and L2. Distracted the T12 and L2 screws and noted to be significant reduction in terms of the angulation and loss of height at L1. Caps were then tightened down at T12 and L2. Subsequently a separate lai was placed on the right and noted to be within all 4 screws using the . Caps were again placed at T11 and L3. Again the distraction tools placed between T12 and L2 and caps are placed into the subsequent screw heads. Under direct fluoroscopy distraction was performed and those caps were locked down. The final lateral showed very good reduction in terms of angulation and loss of height of the L1 level. All caps were final tightened and a final AP and lateral x-ray appeared to be appropriate. Final motors and sseps were stable. After final tightening AND noting appropriate x-ray the towers were broken off. Hemostasis was obtained within the stab incisions and vigorous irrigation was performed with Betadine followed by saline irrigation. Minimal amounts of Vanco dust was placed into each of the stab incisions all the way to the depth of the hardware itself. 2-0 Vicryl was used to close the deep fascia with 1-2 stitches alexi hole. Subsequently 2-0 Vicryl was again used interrupted fashion to close the subcu space.   Running 4-0 Monocryl was used to close each

## 2018-01-23 NOTE — PROGRESS NOTES
[address] [facility]  Occupational Therapy Not Seen Note    Patient not available for Occupational Therapy due to:    [] Testing:    [] Hemodialysis    [] Blood Transfusion in Progress    []Refusal by Patient:    [x] Surgery/Procedure: L1 corpectomy & fusion    [] Strict Bedrest    [] Sedation    [] Spine Precautions     [] Pt being transferred to palliative care at this time. Spoke with pt/family and OT services to be defered. [] Pt independent with functional mobility and functional tasks.  Pt with no OT acute care needs at this time, will defer OT eval.    Next Scheduled Treatment: Will check back 1/24/2018    Signature: Natalya Corona OTR/L

## 2018-01-23 NOTE — PROGRESS NOTES
Min:97.9 °F (36.6 °C), Max:98 °F (36.7 °C)    Recent Labs      01/22/18   0751  01/22/18   1223  01/22/18   1637  01/22/18   2128   POCGLU  311*  183*  239*  280*       I/O (24Hr):     Intake/Output Summary (Last 24 hours) at 01/23/18 0749  Last data filed at 01/23/18 5225   Gross per 24 hour   Intake             1752 ml   Output             1200 ml   Net              552 ml       Labs:    Hematology:  Recent Labs      01/22/18   1549   WBC  10.0   HGB  7.2*   HCT  26.1*   PLT  389   INR  1.0     Chemistry:  Recent Labs      01/22/18   0539  01/22/18   1549  01/23/18   0455   NA  135  140  141   K  4.5  4.6  4.7   CL  100  105  105   CO2  20  21  20   GLUCOSE  291*  245*  326*   BUN  66*  63*  67*   CREATININE  2.66*  2.56*  2.38*   ANIONGAP  15  14  16   LABGLOM  18*  19*  21*   GFRAA  22*  23*  25*   CALCIUM  8.4*  8.6  8.4*     Recent Labs      01/22/18   1549   PROT  6.0*   LABALBU  2.8*   AST  <5   ALT  <5*   ALKPHOS  70   BILITOT  <0.10*         Lab Results   Component Value Date/Time    SPECIAL NOT REPORTED 01/22/2018 05:49 PM     Lab Results   Component Value Date/Time    CULTURE CULTURE IN PROGRESS 01/22/2018 05:49 PM    CULTURE  01/22/2018 05:49 PM     [address] [phone]       Lab Results   Component Value Date    FIO2 ROOM AIR 07/31/2013   Urine culture in progress    Radiology:    None new        Physical Examination:        General appearance:  alert, cooperative and no distress  Mental Status:  oriented to person, place and time and normal affect  Lungs:  clear to auscultation bilaterally, normal effort  Heart:  regular rate and rhythm, no murmur  Abdomen:  soft, nontender, nondistended, normal bowel sounds, no masses, hepatomegaly, splenomegaly  Extremities:  no edema, redness, tenderness in the calves,+ edema  Skin:  no gross lesions, rashes, induration    Assessment:        Primary Problem  Pathologic fracture of lumbar vertebra, initial

## 2018-01-24 NOTE — PROGRESS NOTES
assistance  Quality of Gait 2: decreased step length and velocity  Distance: 60 ft  Stairs/Curb  Stairs?: No     Balance  Posture: Fair  Sitting - Static: Good  Sitting - Dynamic: Good  Standing - Static: Good  Standing - Dynamic: Fair;+        Assessment   Assessment: pt agreeable to PT. pt reports her pain went up to 10/10 after ambulation. pt has decreased functional mobility that requires acute PT to return to prior level of function. pt currently unsafe to return home independently. Body structures, Functions, Activity limitations: Decreased functional mobility ; Decreased ROM; Decreased strength;Decreased endurance;Decreased balance  Prognosis: Good  Decision Making: Medium Complexity  Patient Education: PT POC  REQUIRES PT FOLLOW UP: Yes  Activity Tolerance  Activity Tolerance: Patient limited by pain; Patient limited by endurance            Plan   Plan  Times per week: 5-6 times/week  Times per day: Daily  Current Treatment Recommendations: Strengthening, ROM, Balance Training, Functional Mobility Training, Transfer Training, Endurance Training, Gait Training, Stair training, Home Exercise Program  Safety Devices  Type of devices: All fall risk precautions in place, Bed alarm in place, Call light within reach, Gait belt, Patient at risk for falls, Left in bed, Nurse notified  Restraints  Initially in place: No    G-Code  PT G-Codes  Functional Assessment Tool Used: [place]  Score: 16  Functional Limitation: Mobility: Walking and moving around  Mobility: Walking and Moving Around Current Status ():  At least 40 percent but less than 60 percent impaired, limited or restricted  Mobility: Walking and Moving Around Goal Status (): 0 percent impaired, limited or restricted             Goals  Short term goals  Time Frame for Short term goals: 12 visits  Short term goal 1: pt ambulate 150 ft on level surfaces rolling walker independently  Short term goal 2: pt ascend/descend 2 stairs with no handrail

## 2018-01-24 NOTE — ANESTHESIA POSTPROCEDURE EVALUATION
Department of Anesthesiology  Postprocedure Note    Patient: Abby Mak  MRN: [id number]  YOB: 1955  Date of evaluation: 1/24/2018  Time:  6:52 AM     Procedure Summary     Date:  01/23/18 Room / Location:  [address] OR    Anesthesia Start:  2885 Anesthesia Stop:  0812    Procedure:  T11-L3 POSTERIOR FIXATION AND FUSION, SPINE ABBY, C-ARM, O-ARM WITH STEALTH,  [facility] [id number]N/A Back) Diagnosis:  (FRACTURE LUMBAR)    Surgeon:  Cornelio Drummond DO Responsible Provider:  Moris Matthews MD    Anesthesia Type:  general ASA Status:  4          Anesthesia Type: general    Yasir Phase I: Yasir Score: 9    Yasir Phase II:      Last vitals: Reviewed and per EMR flowsheets.        Anesthesia Post Evaluation    Patient location during evaluation: PACU  Patient participation: complete - patient participated  Level of consciousness: awake and alert  Pain score: 5  Airway patency: patent  Nausea & Vomiting: no nausea and no vomiting  Complications: no  Cardiovascular status: blood pressure returned to baseline and hemodynamically stable  Respiratory status: acceptable and nonlabored ventilation  Hydration status: euvolemic

## 2018-01-24 NOTE — PROGRESS NOTES
Nephrology Progress Note      SUBJECTIVE      IR biopsy of kidney on  - proven to be clear cell RCC  Lumbar fracture surgery     Hemodynamically stable  UOP optimal  PO intake optimal  Renal function worsening - Cr. 1.97 -> 2.17  Patient complaining of pain at incision site    OBJECTIVE      CURRENT TEMPERATURE:  Temp: 99.5 °F (37.5 °C)  MAXIMUM TEMPERATURE OVER 24HRS:  Temp (24hrs), Av.6 °F (35.9 °C), Min:96.4 °F (35.8 °C), Max:99.5 °F (37.5 °C)    CURRENT RESPIRATORY RATE:  Resp: 11  CURRENT PULSE:  Pulse: 92  CURRENT BLOOD PRESSURE:  BP: 136/64  24HR BLOOD PRESSURE RANGE:  Systolic (01SET), RZO:363 , Min:115 , TOA:216   ; Diastolic (60IVL), SXZ:86, Min:58, Max:81    24HR INTAKE/OUTPUT:      Intake/Output Summary (Last 24 hours) at 18 1010  Last data filed at 18 0605   Gross per 24 hour   Intake             4178 ml   Output             1680 ml   Net             2498 ml       PHYSICAL EXAM      GENERAL APPEARANCE:Awake, alert, in no acute distress  SKIN: warm and dry, no rash or erythema  EYES: conjunctivae normal and sclera anicteric  NECK:  carotids without bruits bilaterally JVD: None   PULMONARY: decreased breath sounds noted- in posterior bases  CADRDIOVASCULAR: normal rate, normal S1 and S2, no gallops, intact distal pulses and no carotid bruits  ABDOMEN: soft, non-tender, non-distended, normal bowel sounds, no masses or organomegaly and soft nontender, bowel sounds present, no organomegaly,  no ascites  EXTREMITIES: no cyanosis, clubbing or edema  WOUND: dressing in place    CURRENT MEDICATIONS        insulin glargine (LANTUS) injection vial 60 Units BID   dexamethasone (DECADRON) injection 4 mg Daily   oxyCODONE (ROXICODONE) immediate release tablet 5 mg Q4H PRN   Or    oxyCODONE (ROXICODONE) immediate release tablet 10 mg Q4H PRN   cyclobenzaprine (FLEXERIL) tablet 10 mg TID PRN   insulin lispro (HUMALOG) injection vial 0-18 Units TID WC   insulin lispro (HUMALOG) injection vial 0-9 Units Nightly   cefTRIAXone (ROCEPHIN) 1 g in sterile water 10 mL IV syringe Q24H   lidocaine (LIDODERM) 5 % 1 patch Daily   HYDROmorphone (DILAUDID) injection 0.25 mg Q2H PRN   Or    HYDROmorphone (DILAUDID) injection 0.5 mg Q2H PRN   amitriptyline (ELAVIL) tablet 75 mg Nightly   amLODIPine (NORVASC) tablet 10 mg Daily   atorvastatin (LIPITOR) tablet 20 mg Daily   levothyroxine (SYNTHROID) tablet 100 mcg Daily   0.9 % sodium chloride infusion Continuous   acetaminophen (TYLENOL) tablet 650 mg Q4H PRN   bisacodyl (DULCOLAX) suppository 10 mg Daily PRN   magnesium hydroxide (MILK OF MAGNESIA) 400 MG/5ML suspension 30 mL Daily PRN   magnesium sulfate 1 g in dextrose 5% 100 mL IVPB PRN   nicotine (NICODERM CQ) 21 MG/24HR 1 patch Daily PRN   ondansetron (ZOFRAN) injection 4 mg Q6H PRN   potassium chloride (KLOR-CON M) extended release tablet 40 mEq PRN   Or    potassium chloride 20 MEQ/15ML (10%) oral solution 40 mEq PRN   Or    potassium chloride 10 mEq/100 mL IVPB (Peripheral Line) PRN   sodium chloride flush 0.9 % injection 10 mL 2 times per day   sodium chloride flush 0.9 % injection 10 mL PRN   dextrose 5 % solution PRN   dextrose 50 % solution 12.5 g PRN   glucagon (rDNA) injection 1 mg PRN   glucose (GLUTOSE) 40 % oral gel 15 g PRN   albuterol (PROVENTIL) nebulizer solution 2.5 mg As Directed RT PRN       LABS    CBC:   Recent Labs      01/22/18   1549   01/23/18   1133  01/23/18   1759  01/24/18   0648   WBC  10.0   --    --   12.0*  10.4   RBC  3.39*   --    --   4.02  3.91*   HGB  7.2*   < >  8.6  9.0*  8.7*   HCT  26.1*   < >  26.8  31.1*  30.9*   MCV  77.0*   --    --   77.4*  79.0*   MCH  21.2*   --    --   22.4*  22.3*   MCHC  27.6*   --    --   28.9  28.2*   RDW  20.1*   --    --   19.9*  20.0*   PLT  389   --    --   371  326   MPV  9.0   --    --   9.0  9.4    < > = values in this interval not displayed.       BMP:   Recent Labs      01/23/18   0455   01/23/18   1133  01/23/18   1759  01/24/18   4553 NA  141   < >  146*  140  139   K  4.7   < >  4.5  4.3  4.3   CL  105   --    --   106  108*   CO2  20   --    --   19*  20   BUN  67*   --    --   64*  60*   CREATININE  2.38*   --    --   1.97*  2.17*   GLUCOSE  326*   --    --   209*  137*   CALCIUM  8.4*   --    --   8.9  8.9    < > = values in this interval not displayed. Magnesium:   No results for input(s): MG in the last 72 hours. Albumin:   Recent Labs      01/23/18   1759   LABALBU  2.9*       IESHA: No results found for: IESHA  SPEP:   Lab Results   Component Value Date    PROT 6.1 01/23/2018     UPEP: No results found for: TPU     Urine Sodium:    Lab Results   Component Value Date    DEBBY 52 01/20/2018      Urine Creatinine:    Lab Results   Component Value Date    LABCREA 109.9 01/20/2018     Urine Protein:  No results found for: TPU  Urinalysis:  U/A:   Lab Results   Component Value Date    NITRU POSITIVE 01/22/2018    COLORU YELLOW 01/22/2018    PHUR 5.0 01/22/2018    WBCUA 50  01/22/2018    RBCUA 2 TO 5 01/22/2018    MUCUS NOT REPORTED 01/22/2018    TRICHOMONAS NOT REPORTED 01/22/2018    YEAST NOT REPORTED 01/22/2018    BACTERIA MANY 01/22/2018    SPECGRAV 1.015 01/22/2018    LEUKOCYTESUR MODERATE 01/22/2018    UROBILINOGEN Normal 01/22/2018    BILIRUBINUR NEGATIVE 01/22/2018    GLUCOSEU NEGATIVE 01/22/2018    KETUA NEGATIVE 01/22/2018    AMORPHOUS NOT REPORTED 01/22/2018       BMP:   Recent Labs      01/24/18   0648   NA  139   K  4.3   CL  108*   CO2  20   BUN  60*   CREATININE  2.17*   GLUCOSE  137*   CALCIUM  8.9        Phosphorus:  No results for input(s): PHOS in the last 72 hours. Magnesium:   No results for input(s): MG in the last 72 hours. Albumin:   Recent Labs      01/22/18   1549  01/23/18   1759   LABALBU  2.8*  2.9*       Radiology:  Renal US  1. Abnormal appearing right kidney as described on the CT most likely related   to a right renal mass.    2. Possible exophytic solid left renal mass measuring 2.4 cm in diameter

## 2018-01-24 NOTE — PROGRESS NOTES
[facility]  Urology Progress Note     Subjective:   No acute overnight events. Denies f/c, n/v, cp/sob. Pain well-controlled. Yesterday delores to OR for T11-L3 posterior fixation, T11-L3 posterior arthodesis. Tissue was not obtained for pathology.          Patient Vitals for the past 24 hrs:   BP Temp Temp src Pulse Resp SpO2   01/24/18 0607 - 98 °F (36.7 °C) Oral - - -   01/24/18 0605 - - - 80 13 98 %   01/24/18 0016 - 98.5 °F (36.9 °C) Oral - - -   01/23/18 1849 - 98.2 °F (36.8 °C) Oral - - -   01/23/18 1845 (!) 155/63 98.2 °F (36.8 °C) Oral 78 15 94 %   01/23/18 1830 (!) 141/62 98.1 °F (36.7 °C) - 75 12 97 %   01/23/18 1815 139/81 - - 77 10 97 %   01/23/18 1800 (!) 160/61 - - 73 9 94 %   01/23/18 1700 (!) 155/67 - - 74 13 98 %   01/23/18 1600 115/76 - - 89 12 98 %   01/23/18 1515 (!) 147/58 98.1 °F (36.7 °C) - 73 10 94 %   01/23/18 1500 139/65 - - 78 10 97 %   01/23/18 1445 (!) 151/63 - - 74 11 96 %   01/23/18 1430 (!) 140/66 - - 68 10 97 %   01/23/18 1415 (!) 149/61 - - 79 11 97 %   01/23/18 1400 (!) 146/62 - - 68 10 97 %   01/23/18 1345 (!) 144/64 - - 72 10 97 %   01/23/18 1330 (!) 141/68 - - 76 13 97 %   01/23/18 1315 (!) 147/67 - - 82 12 99 %   01/23/18 1300 (!) 155/67 - - 92 18 98 %   01/23/18 1245 (!) 149/69 - - 94 21 98 %   01/23/18 1230 132/72 - - 95 17 99 %   01/23/18 1220 (!) 140/69 97.9 °F (36.6 °C) Temporal 83 16 98 %   01/23/18 0754 (!) 163/72 96.8 °F (36 °C) Temporal 78 18 97 %       Intake/Output Summary (Last 24 hours) at 01/24/18 0638  Last data filed at 01/24/18 6957   Gross per 24 hour   Intake             5178 ml   Output             1680 ml   Net             3498 ml       Recent Labs      01/22/18   1549  01/23/18   1003  01/23/18   1133  01/23/18   1759   WBC  10.0   --    --   12.0*   HGB  7.2*  6.8  8.6  9.0*   HCT  26.1*  21.2  26.8  31.1*   MCV  77.0*   --    --   77.4*   PLT  389   --    --   371     Recent Labs      01/22/18   1549  01/23/18   0451

## 2018-01-24 NOTE — PROGRESS NOTES
dressing/bathing activity seated with min A  Short term goal 5: demo activity tolerance of 60 min     Therapy Time   Individual Concurrent Group Co-treatment   Time In 1346         Time Out 1408         Minutes 22            Discharge recommendations discussed with patient during initial evaluation.     Chip Rice OTR/L

## 2018-01-25 NOTE — PROGRESS NOTES
NEPHROLOGY PROGRESS NOTE      SUBJECTIVE     Renal Mass Bx c/w Clear cell Ca. Plans for systemic treatment prior to cytoreduction noted. No shortness of breath. Blood pressure is still elevated. Renal function is stable. Urology wants to go MRI with contrast.  Her renal function is < 30 GFR. OBJECTIVE     Vitals:    01/25/18 0645 01/25/18 0800 01/25/18 0818 01/25/18 1150   BP:   (!) 130/53 (!) 157/59   Pulse:  81 81 77   Resp:   15 15   Temp:   97.8 °F (36.6 °C) 98 °F (36.7 °C)   TempSrc:   Oral Oral   SpO2:   96% 96%   Weight: 248 lb (112.5 kg)      Height:         24HR INTAKE/OUTPUT:    Intake/Output Summary (Last 24 hours) at 01/25/18 1340  Last data filed at 01/25/18 0935   Gross per 24 hour   Intake              250 ml   Output                0 ml   Net              250 ml       General appearance:Awake, alert, in no acute distress  HEENT: PERRLA  Respiratory::vesicular breath sounds,no wheeze/crackles  Cardiovascular:S1 S2 normal,no gallop or organic murmur. Abdomen:Non tender/non distended. Bowel sounds present  Extremities: No Cyanosis or Clubbing,Lower extremity edema  Neurological:Alert and oriented. No abnormalities of mood, affect, memory, mentation, or behavior are noted      MEDICATIONS     Scheduled Meds:    senna  1 tablet Oral Nightly    lisinopril  5 mg Oral BID    calcium carbonate-vitamin D  1 tablet Oral Daily    heparin (porcine)  5,000 Units Subcutaneous BID    insulin glargine  60 Units Subcutaneous BID    dexamethasone  4 mg Intravenous Daily    insulin lispro  0-18 Units Subcutaneous TID     insulin lispro  0-9 Units Subcutaneous Nightly    cefTRIAXone (ROCEPHIN) IV  1 g Intravenous Q24H    lidocaine  1 patch Transdermal Daily    amitriptyline  75 mg Oral Nightly    amLODIPine  10 mg Oral Daily    atorvastatin  20 mg Oral Daily    levothyroxine  100 mcg Oral Daily    sodium chloride flush  10 mL Intravenous 2 times per day     Continuous Infusions:    sodium chloride 50 mL/hr at 01/20/18 1444    dextrose       PRN Meds:  oxyCODONE **OR** oxyCODONE, cyclobenzaprine, HYDROmorphone **OR** HYDROmorphone, acetaminophen, bisacodyl, magnesium hydroxide, magnesium sulfate, nicotine, ondansetron, potassium chloride **OR** potassium chloride **OR** potassium chloride, sodium chloride flush, dextrose, dextrose, glucagon (rDNA), glucose, albuterol  Home Meds:                Prescriptions Prior to Admission: insulin glargine (LANTUS) 100 UNIT/ML injection vial, Inject 60 Units into the skin 2 times daily (Patient taking differently: Inject 100 Units into the skin daily )  levothyroxine (SYNTHROID) 100 MCG tablet, Take 1 tablet by mouth daily  omeprazole (PRILOSEC) 20 MG delayed release capsule, Take 1 capsule by mouth daily  atorvastatin (LIPITOR) 20 MG tablet, TAKE 1 TABLET BY MOUTH ONE TIME A DAY   insulin aspart (NOVOLOG FLEXPEN) 100 UNIT/ML injection pen, Inject into the skin 2 times daily (with meals) Taking 3 units at lunch and 6 units at supper  Insulin Pen Needle 32G X 4 MM MISC, 2 each by Does not apply route 2 times daily  glyBURIDE (DIABETA) 5 MG tablet, TAKE 2 TABLETS BY MOUTH TWO TIMES A DAY  amitriptyline (ELAVIL) 75 MG tablet, Take 1 tablet by mouth nightly  amLODIPine (NORVASC) 10 MG tablet, Take 1 tablet by mouth daily  lisinopril-hydrochlorothiazide (PRINZIDE;ZESTORETIC) 20-12.5 MG per tablet, Take 1 tablet by mouth 2 times daily  acetaminophen (TYLENOL) 500 MG tablet, Take 1,000 mg by mouth 3 times daily  rizatriptan (MAXALT) 10 MG tablet, Take 1 tablet by mouth once as needed for Migraine for up to 1 dose.  May repeat in 2 hours if needed    INVESTIGATIONS     Last 3 CMP:  Recent Labs      01/22/18   1549   01/23/18   1759  01/24/18   0648  01/25/18   0645   NA  140   < >  140  139  144   K  4.6   < >  4.3  4.3  3.7   CL  105   < >  106  108*  110*   CO2  21   < >  19*  20  20   BUN  63*   < >  64*  60*  58*   CREATININE  2.56*   < >  1.97*  2.17*  2.08*   CALCIUM  8.6

## 2018-01-25 NOTE — CONSULTS
she has peritoneal metastasis is likely and possibly vertebral metastases. She was seen by oncology and the plan was outpatient chemotherapy at this time she had a fall and she she's recovering from back pain and possibly will need extended care facility/rehab and then follow-up with oncology for chemotherapy, she can be followed up by palliative care in nursing home and also she is interested in talking to hospice as mentioned above.   We will be available in case there is any other question or concern                        [facility]

## 2018-01-25 NOTE — PROGRESS NOTES
Meds:   Scheduled Meds:    calcium carbonate-vitamin D  1 tablet Oral Daily    heparin (porcine)  5,000 Units Subcutaneous BID    insulin glargine  60 Units Subcutaneous BID    dexamethasone  4 mg Intravenous Daily    insulin lispro  0-18 Units Subcutaneous TID WC    insulin lispro  0-9 Units Subcutaneous Nightly    cefTRIAXone (ROCEPHIN) IV  1 g Intravenous Q24H    lidocaine  1 patch Transdermal Daily    amitriptyline  75 mg Oral Nightly    amLODIPine  10 mg Oral Daily    atorvastatin  20 mg Oral Daily    levothyroxine  100 mcg Oral Daily    sodium chloride flush  10 mL Intravenous 2 times per day     Continuous Infusions:    sodium chloride 50 mL/hr at 01/20/18 1444    dextrose       PRN Meds: oxyCODONE **OR** oxyCODONE, cyclobenzaprine, HYDROmorphone **OR** HYDROmorphone, acetaminophen, bisacodyl, magnesium hydroxide, magnesium sulfate, nicotine, ondansetron, potassium chloride **OR** potassium chloride **OR** potassium chloride, sodium chloride flush, dextrose, dextrose, glucagon (rDNA), glucose, albuterol    Data:     Past Medical History:   has a past medical history of Chronic kidney disease; Depression; Diabetes mellitus ([facility] [address] GERD (gastroesophageal reflux disease); HA (headache); HBP (high blood pressure); History of blood transfusion; Hyperlipidemia; Hyperplastic polyp of intestine; Hypothyroid; Hypothyroidism; Left knee pain; Non-smoker; OA (osteoarthritis); and Tubular adenoma. Social History:   reports that she has never smoked. She has never used smokeless tobacco. She reports that she does not drink alcohol or use drugs.      Family History:   Family History   Problem Relation Age of Onset    Heart Disease Mother     Diabetes Mother     Cancer Father      esophageal cancer    Cancer Maternal Grandfather      colon       Vitals:  BP (!) 130/53   Pulse 81   Temp 98.3 °F (36.8 °C) (Oral)   Resp 15   Ht 5' 8\" (1.727 m)   Wt 248 lb (112.5 kg)   SpO2 96%   BMI 37.71 kg/m²   Temp Klebsiella    Radiology:    CT lumbar spine  Intraoperative CT during thoracolumbar stabilization and fusion for   pathologic L1 compression fracture.        Physical Examination:        General appearance:  alert, cooperative and no distress, feeling depressed  Mental Status:  oriented to person, place and time and normal affect  Lungs:  clear to auscultation bilaterally, normal effort  Heart:  regular rate and rhythm, no murmur  Abdomen:  soft, nontender, nondistended, normal bowel sounds, no masses, hepatomegaly, splenomegaly  Extremities:  no edema, redness, tenderness in the calves,+ edema  Skin:  no gross lesions, rashes, induration  Mild tenderness at surgical site which is dressed  Assessment:        Primary Problem  Pathologic fracture of lumbar vertebra, initial encounter  Renal CA with mets to bone, liver, and intraperitoneal-Status post kidney biopsy(1/22/17)-renal mass biopsy: clear cell renal cell carcinoma  Klebsiella UTI  Active Hospital Problems    Diagnosis Date Noted    Renal cell carcinoma ([address] [C64.9]     Acute cystitis without hematuria [N30.00] 01/20/2018    Renal mass [N28.89]     Cancer, metastatic to bone ([address] [C79.51]     Stage 4 chronic kidney disease ([address] [N18.4] 01/19/2018    Syncope and collapse [R55] 01/19/2018    Hyperkalemia [E87.5] 01/19/2018    Anemia due to chronic kidney disease [N18.9, D63.1] 01/19/2018    Pathologic fracture of lumbar vertebra, initial encounter [M84.48XA]     Secondary malignant neoplasm of lumbar vertebral column ([address] [C79.51]     Diabetes mellitus ([address] [E11.9] 08/21/2012       Plan:      Plans for systemic treatment prior to cytoreduction noted  Continue insulin sliding scale to high intensity, and Lantus insulin 60 units twice a day   Continue IV Rocephin , will need one week antibiotics treatment  Monitor daily labs  Up as tolerated and start  DVT prophylaxis    consult palliative care      Aleshia Moyer MD  1/25/2018  9:18 AM pro

## 2018-01-25 NOTE — CARE COORDINATION
DISCHARGE PLANNING:    Received facility choices from patient. Referrals sent at this time to [address] [place] [address] at [address], [address] and [facility] and [address] [place] [address] Rd. Will await return calls with availability at facilities.

## 2018-01-25 NOTE — PROGRESS NOTES
Today's Date: 1/25/2018  Patient Name: Grant Benjamin  Date of admission: 1/18/2018 11:38 PM  Patient's age: 63 y.o., 1955  Admission Dx: Lumbar compression fracture ([address] [S32.000A]  Dehydration [E86.0]    Reason for Consult: renal mass  Requesting Physician: Devonte Berry MD    SUBJECTIVE:    Pt seen and examined  Pain is getting better  NO fever chills   No NV    HISTORY OF PRESENT ILLNESS IN BRIEF:    This is a 64 YO female was admitted with back pain after a fall. On admission she had imaging studies which showed L2 metastatic lesion with compression fracture. Had MRI spine which showed right renal mass c/w RCC and inferior vena cava invasion. It also showed L1 metastasis, pathologic fracture, epidural invasion, and severe thecal sac stenosis. Seen by neurosurgery and input awaited. She reports wt loss possibly intentional over last year for about 50 lbs. Patient seen by urology. Medications:    Prior to Admission medications    Medication Sig Start Date End Date Taking?  Authorizing Provider   insulin glargine (LANTUS) 100 UNIT/ML injection vial Inject 60 Units into the skin 2 times daily  Patient taking differently: Inject 100 Units into the skin daily  1/11/18   Kana Julio MD   levothyroxine (SYNTHROID) 100 MCG tablet Take 1 tablet by mouth daily 11/16/17   Kana Julio MD   omeprazole (PRILOSEC) 20 MG delayed release capsule Take 1 capsule by mouth daily 9/7/17   Kana Julio MD   atorvastatin (LIPITOR) 20 MG tablet TAKE 1 TABLET BY MOUTH ONE TIME A DAY  7/27/17   Marv Bagley MD   insulin aspart (NOVOLOG FLEXPEN) 100 UNIT/ML injection pen Inject into the skin 2 times daily (with meals) Taking 3 units at lunch and 6 units at supper    Historical Provider, MD   Insulin Pen Needle 32G X 4 MM MISC 2 each by Does not apply route 2 times daily    Historical Provider, MD   glyBURIDE (DIABETA) 5 MG tablet TAKE 2 TABLETS BY MOUTH TWO TIMES A DAY 4/13/17   Marv Bagley MD Hematologic/Lymphatic: negative for easy bruising, bleeding, lymphadenopathy, or petechiae   Endocrine: negative for heat or cold intolerance,weight changes, change in bowel habits and hair loss   Musculoskeletal: negative for myalgias, arthralgias,++back  pain, joint swelling,and bone pain   Neurological: negative for headaches, dizziness, seizures, weakness, numbness    PHYSICAL EXAM:      BP (!) 157/59   Pulse 77   Temp 98 °F (36.7 °C) (Oral)   Resp 15   Ht 5' 8\" (1.727 m)   Wt 248 lb (112.5 kg)   SpO2 96%   BMI 37.71 kg/m²    Temp (24hrs), Av.2 °F (36.8 °C), Min:97.8 °F (36.6 °C), Max:98.7 °F (37.1 °C)    General appearance - well appearing, no in pain or distress   Mental status - alert and cooperative   Eyes - pupils equal and reactive, extraocular eye movements intact   Ears - bilateral TM's and external ear canals normal   Mouth - mucous membranes moist, pharynx normal without lesions   Neck - supple, no significant adenopathy   Lymphatics - no palpable lymphadenopathy, no hepatosplenomegaly   Chest - clear to auscultation, no wheezes, rales or rhonchi, symmetric air entry   Heart - normal rate, regular rhythm, normal S1, S2, no murmurs  Abdomen - soft, nontender, nondistended, no masses or organomegaly   Neurological - alert, oriented, normal speech, no focal findings or movement disorder noted   Musculoskeletal - no joint tenderness, deformity or swelling   Extremities - peripheral pulses normal, no pedal edema, no clubbing or cyanosis   Skin - normal coloration and turgor, no rashes, no suspicious skin lesions noted ,    DATA:    Labs:   CBC:   Recent Labs      18   1759  18   0648   WBC  12.0*  10.4   HGB  9.0*  8.7*   HCT  31.1*  30.9*   PLT  371  326     BMP:   Recent Labs      18   0648  18   0645   NA  139  144   K  4.3  3.7   CO2  20  20   BUN  60*  58*   CREATININE  2.17*  2.08*   LABGLOM  23*  24*   GLUCOSE  137*  39*     PT/INR:   Recent Labs      18 agreed to proceed as outlined above. Discussed with patient and Nurse. Thank you for asking us to see this patient.     Karen Bush MD  Hematologist/Medical Oncologist  Cell: [phone]

## 2018-01-25 NOTE — PROGRESS NOTES
32G X 4 MM MISC 2 each by Does not apply route 2 times daily    Historical Provider, MD   glyBURIDE (DIABETA) 5 MG tablet TAKE 2 TABLETS BY MOUTH TWO TIMES A DAY 4/13/17   Damon Parry MD   amitriptyline (ELAVIL) 75 MG tablet Take 1 tablet by mouth nightly 4/13/17   Damon Parry MD   amLODIPine (NORVASC) 10 MG tablet Take 1 tablet by mouth daily 4/13/17   Damon Parry MD   lisinopril-hydrochlorothiazide [facility]) 20-12.5 MG per tablet Take 1 tablet by mouth 2 times daily 4/13/17   Damon Parry MD   acetaminophen (TYLENOL) 500 MG tablet Take 1,000 mg by mouth 3 times daily    Historical Provider, MD   rizatriptan (MAXALT) 10 MG tablet Take 1 tablet by mouth once as needed for Migraine for up to 1 dose.  May repeat in 2 hours if needed 11/10/14 1/18/18  Damon Parry MD     Current Facility-Administered Medications   Medication Dose Route Frequency Provider Last Rate Last Dose    calcium carbonate-vitamin D (CALTRATE) 600-400 MG-UNIT per tab 1 tablet  1 tablet Oral Daily Mart Hemp, NP   1 tablet at 01/24/18 1449    heparin (porcine) injection 5,000 Units  5,000 Units Subcutaneous BID Christopher Archer MD   5,000 Units at 01/24/18 1729    insulin glargine (LANTUS) injection vial 60 Units  60 Units Subcutaneous BID Christopher Archer MD   60 Units at 01/24/18 0846    dexamethasone (DECADRON) injection 4 mg  4 mg Intravenous Daily Jaycee Leslie PA-C   4 mg at 01/24/18 0846    oxyCODONE (ROXICODONE) immediate release tablet 5 mg  5 mg Oral Q4H PRN Glenys Urias PA-C        Or    oxyCODONE (ROXICODONE) immediate release tablet 10 mg  10 mg Oral Q4H PRN Jaycee Leslie PA-C   10 mg at 01/24/18 0604    cyclobenzaprine (FLEXERIL) tablet 10 mg  10 mg Oral TID PRN Jaycee Leslie PA-C   10 mg at 01/23/18 2141    insulin lispro (HUMALOG) injection vial 0-18 Units  0-18 Units Subcutaneous TID DESIREE Archer MD   3 Units at 01/24/18 7516    insulin lispro (HUMALOG) injection vial Hematologic/Lymphatic: negative for easy bruising, bleeding, lymphadenopathy, or petechiae   Endocrine: negative for heat or cold intolerance,weight changes, change in bowel habits and hair loss   Musculoskeletal: negative for myalgias, arthralgias,++back  pain, joint swelling,and bone pain   Neurological: negative for headaches, dizziness, seizures, weakness, numbness    PHYSICAL EXAM:      BP (!) 150/86   Pulse 84   Temp 98.4 °F (36.9 °C)   Resp 15   Ht 5' 8\" (1.727 m)   Wt 210 lb (95.3 kg)   SpO2 98%   BMI 31.93 kg/m²    Temp (24hrs), Av.6 °F (37 °C), Min:98 °F (36.7 °C), Max:99.5 °F (37.5 °C)    General appearance - well appearing, no in pain or distress   Mental status - alert and cooperative   Eyes - pupils equal and reactive, extraocular eye movements intact   Ears - bilateral TM's and external ear canals normal   Mouth - mucous membranes moist, pharynx normal without lesions   Neck - supple, no significant adenopathy   Lymphatics - no palpable lymphadenopathy, no hepatosplenomegaly   Chest - clear to auscultation, no wheezes, rales or rhonchi, symmetric air entry   Heart - normal rate, regular rhythm, normal S1, S2, no murmurs  Abdomen - soft, nontender, nondistended, no masses or organomegaly   Neurological - alert, oriented, normal speech, no focal findings or movement disorder noted   Musculoskeletal - no joint tenderness, deformity or swelling   Extremities - peripheral pulses normal, no pedal edema, no clubbing or cyanosis   Skin - normal coloration and turgor, no rashes, no suspicious skin lesions noted ,    DATA:    Labs:   CBC:   Recent Labs      18   17518   0648   WBC  12.0*  10.4   HGB  9.0*  8.7*   HCT  31.1*  30.9*   PLT  371  326     BMP:   Recent Labs      18   1759  18   0648   NA  140  139   K  4.3  4.3   CO2  19*  20   BUN  64*  60*   CREATININE  1.97*  2.17*   LABGLOM  26*  23*   GLUCOSE  209*  137*     PT/INR:   Recent Labs      18   4838 01/23/18   1759   IncellDx  10.5  10.4   INR  1.0  1.0       IMAGING DATA:  Ct Lumbar Spine Wo Contrast     Result Date: 1/19/2018  EXAMINATION: CT OF THE LUMBAR SPINE WITHOUT CONTRAST  1/19/2018 TECHNIQUE: CT of the lumbar spine was performed without the administration of intravenous contrast. Multiplanar reformatted images are provided for review. Dose modulation, iterative reconstruction, and/or weight based adjustment of the mA/kV was utilized to reduce the radiation dose to as low as reasonably achievable. COMPARISON: 01/18/2018 HISTORY: ORDERING SYSTEM PROVIDED HISTORY: L1 compression fx TECHNOLOGIST PROVIDED HISTORY: Reason for exam:->L1 compression fx FINDINGS: BONES/ALIGNMENT: Correlating with previous radiographs is a mild L1 compression fracture with approximately 25 percent loss of height at its narrowest point. Mixed lytic and sclerotic appearance centered in the left half of the vertebral body extending across the midline and to the left pedicle, transverse process, and lamina most compatible with metastatic disease/pathologic compression fracture. Less likely infection is in the differential diagnosis. There appears to be a soft tissue component extending left laterally/posterolaterally. Posterior cortical buckling/destruction of the posterior vertebral body wall with a soft tissue component which appears to extend to the spinal canal probably causing some degree of spinal stenosis and probable left neural foraminal stenosis. MRI is recommended for further evaluation. Slight levoconvex curvature and straightening of lumbar lordosis. DEGENERATIVE CHANGES: Mild lumbar spondylotic changes. At L2-L3 there is mild facet arthropathy and ligamentum flavum thickening. Slight flattening of the ventral aspect of thecal sac. Mild neural foraminal stenosis. At L3-L4 there is mild diffuse disc bulging. Vacuum disc phenomenon. Mild facet arthropathy and ligamentum flavum thickening.   Combination results the conus. See also MRI lumbar spine report. Left paracentral subcutaneous nodules at T12.      Mri Lumbar Spine Wo Contrast     Result Date: 1/19/2018  EXAMINATION: MRI OF THE LUMBAR SPINE WITHOUT CONTRAST, 1/19/2018 4:44 pm TECHNIQUE: Multiplanar multisequence MRI of the lumbar spine was performed without the administration of intravenous contrast. COMPARISON: CT from earlier today. HISTORY: ORDERING SYSTEM PROVIDED HISTORY: poss mets Back pain for 3 days. L1 fracture. FINDINGS: BONES/ALIGNMENT: There is a metastasis invading almost the entire L1 vertebral body and the left L1 pedicle. The metastasis erodes through the cortex of the bone and invades the adjacent soft tissues. It invades the left lateral epidural space. There is a pathologic compression fracture with posterior bowing of the vertebral body which results in severe spinal canal and thecal sac stenosis with crowding of the cauda equina nerve roots around the conus medullaris. There are 2 small T1 hypointense, T2 mixed intensity lesions in the superior endplates of L3 and L4 which have an appearance most consistent with Schmorl's nodes. There are disc bulges in the lower lumbar spine which do not cause significant canal stenosis. SOFT TISSUES: A 10 cm heterogeneous cystic and solid right renal mass most likely representing a renal cell carcinoma invades the right renal veins and inferior vena cava. A 5 cm metastasis along the lateral aspect of the right lobe of the liver is only partially imaged on the coronal localizer sequence.      1. L1 metastasis, pathologic fracture, epidural invasion, and severe thecal sac stenosis. 2. 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 3. Intra-abdominal metastasis adjacent to the liver. CT abd  Impression   1.  Large right renal mass estimated at at least 11 x 11 cm essentially   replacing the right kidney consistent with renal cell carcinoma.  Limited   evaluation in the absence of IV

## 2018-01-25 NOTE — PROGRESS NOTES
Tolerated treatment well         Goals  Short term goals  Time Frame for Short term goals: 12 visits  Short term goal 1: pt ambulate 150 ft on level surfaces rolling walker independently  Short term goal 2: pt ascend/descend 2 stairs with no handrail independently  Short term goal 3: pt min A bed mobility  Short term goal 4: pt CGA sit to stand    Plan    Plan  Times per week: 5-6 times/week  Times per day: Daily  Current Treatment Recommendations: Strengthening, ROM, Balance Training, Functional Mobility Training, Transfer Training, Endurance Training, Gait Training, Stair training, Home Exercise Program  Safety Devices  Type of devices:  All fall risk precautions in place, Call light within reach, Gait belt, Nurse notified, Left in bed, Bed alarm in place  Restraints  Initially in place: No     Therapy Time   Individual Concurrent Group Co-treatment   Time In 1520         Time Out 1600         Minutes Lexie 42, PTA

## 2018-01-26 NOTE — PROGRESS NOTES
Intravenous 2 times per day     Continuous Infusions:    sodium chloride 50 mL/hr at 01/20/18 1444    dextrose       PRN Meds:  albuterol, oxyCODONE **OR** oxyCODONE, cyclobenzaprine, HYDROmorphone **OR** HYDROmorphone, acetaminophen, bisacodyl, magnesium hydroxide, magnesium sulfate, nicotine, ondansetron, potassium chloride **OR** potassium chloride **OR** potassium chloride, sodium chloride flush, dextrose, dextrose, glucagon (rDNA), glucose  Home Meds:                Prescriptions Prior to Admission: insulin glargine (LANTUS) 100 UNIT/ML injection vial, Inject 60 Units into the skin 2 times daily (Patient taking differently: Inject 100 Units into the skin daily )  levothyroxine (SYNTHROID) 100 MCG tablet, Take 1 tablet by mouth daily  omeprazole (PRILOSEC) 20 MG delayed release capsule, Take 1 capsule by mouth daily  atorvastatin (LIPITOR) 20 MG tablet, TAKE 1 TABLET BY MOUTH ONE TIME A DAY   insulin aspart (NOVOLOG FLEXPEN) 100 UNIT/ML injection pen, Inject into the skin 2 times daily (with meals) Taking 3 units at lunch and 6 units at supper  Insulin Pen Needle 32G X 4 MM MISC, 2 each by Does not apply route 2 times daily  glyBURIDE (DIABETA) 5 MG tablet, TAKE 2 TABLETS BY MOUTH TWO TIMES A DAY  amitriptyline (ELAVIL) 75 MG tablet, Take 1 tablet by mouth nightly  amLODIPine (NORVASC) 10 MG tablet, Take 1 tablet by mouth daily  lisinopril-hydrochlorothiazide (PRINZIDE;ZESTORETIC) 20-12.5 MG per tablet, Take 1 tablet by mouth 2 times daily  acetaminophen (TYLENOL) 500 MG tablet, Take 1,000 mg by mouth 3 times daily  rizatriptan (MAXALT) 10 MG tablet, Take 1 tablet by mouth once as needed for Migraine for up to 1 dose.  May repeat in 2 hours if needed    INVESTIGATIONS     Last 3 CMP:    Recent Labs      01/23/18   1759  01/24/18   0648  01/25/18   0645  01/26/18   0622   NA  140  139  144  136   K  4.3  4.3  3.7  5.0   CL  106  108*  110*  100   CO2  19*  20  20  22   BUN  64*  60*  58*  55*   CREATININE  1.97*

## 2018-01-26 NOTE — CONSULTS
ZACK,POST INTRBDY N/A 1/23/2018    T11-L3 POSTERIOR FIXATION AND FUSION, SPINE ABBY, [facility]  [facility] [id number] LJ performed by Berna Gee DO at [address] [facility]:    No Known Allergies     Current Medications:   Current Facility-Administered Medications: insulin glargine (LANTUS) injection vial 50 Units, 50 Units, Subcutaneous, BID  senna (SENOKOT) tablet 8.6 mg, 1 tablet, Oral, Nightly  lisinopril (PRINIVIL;ZESTRIL) tablet 5 mg, 5 mg, Oral, BID  albuterol (PROVENTIL) nebulizer solution 2.5 mg, 2.5 mg, Nebulization, Q6H PRN  calcium carbonate-vitamin D (CALTRATE) 600-400 MG-UNIT per tab 1 tablet, 1 tablet, Oral, Daily  heparin (porcine) injection 5,000 Units, 5,000 Units, Subcutaneous, BID  dexamethasone (DECADRON) injection 4 mg, 4 mg, Intravenous, Daily  oxyCODONE (ROXICODONE) immediate release tablet 5 mg, 5 mg, Oral, Q4H PRN **OR** oxyCODONE (ROXICODONE) immediate release tablet 10 mg, 10 mg, Oral, Q4H PRN  cyclobenzaprine (FLEXERIL) tablet 10 mg, 10 mg, Oral, TID PRN  insulin lispro (HUMALOG) injection vial 0-18 Units, 0-18 Units, Subcutaneous, TID WC  insulin lispro (HUMALOG) injection vial 0-9 Units, 0-9 Units, Subcutaneous, Nightly  cefTRIAXone (ROCEPHIN) 1 g in sterile water 10 mL IV syringe, 1 g, Intravenous, Q24H  lidocaine (LIDODERM) 5 % 1 patch, 1 patch, Transdermal, Daily  HYDROmorphone (DILAUDID) injection 0.25 mg, 0.25 mg, Intravenous, Q2H PRN **OR** HYDROmorphone (DILAUDID) injection 0.5 mg, 0.5 mg, Intravenous, Q2H PRN  amitriptyline (ELAVIL) tablet 75 mg, 75 mg, Oral, Nightly  amLODIPine (NORVASC) tablet 10 mg, 10 mg, Oral, Daily  atorvastatin (LIPITOR) tablet 20 mg, 20 mg, Oral, Daily  levothyroxine (SYNTHROID) tablet 100 mcg, 100 mcg, Oral, Daily  0.9 % sodium chloride infusion, , Intravenous, Continuous  acetaminophen (TYLENOL) tablet 650 mg, 650 mg, Oral, Q4H PRN  bisacodyl (DULCOLAX) suppository 10 mg, 10 mg, Rectal, Daily PRN  magnesium compression deformity. MRI lumbar spine 1/19/2018     FINDINGS:   BONES/ALIGNMENT: There is a metastasis invading almost the entire L1   vertebral body and the left L1 pedicle.  The metastasis erodes through the   cortex of the bone and invades the adjacent soft tissues.  It invades the   left lateral epidural space.  There is a pathologic compression fracture with   posterior bowing of the vertebral body which results in severe spinal canal   and thecal sac stenosis with crowding of the cauda equina nerve roots around   the conus medullaris.       There are 2 small T1 hypointense, T2 mixed intensity lesions in the superior   endplates of L3 and L4 which have an appearance most consistent with   Schmorl's nodes.       There are disc bulges in the lower lumbar spine which do not cause   significant canal stenosis.       SOFT TISSUES: A 10 cm heterogeneous cystic and solid right renal mass most   likely representing a renal cell carcinoma invades the right renal veins and   inferior vena cava.       A 5 cm metastasis along the lateral aspect of the right lobe of the liver is   only partially imaged on the coronal localizer sequence.           Impression   1. L1 metastasis, pathologic fracture, epidural invasion, and severe thecal   sac stenosis. 2. 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 3. Intra-abdominal metastasis adjacent to the liver. Diagnostics:    CBC:   Recent Labs      01/23/18   1133  01/23/18   1759  01/24/18   0648   WBC   --   12.0*  10.4   RBC   --   4.02  3.91*   HGB  8.6  9.0*  8.7*   HCT  26.8  31.1*  30.9*   MCV   --   77.4*  79.0*   RDW   --   19.9*  20.0*   PLT   --   371  326     BMP:   Recent Labs      01/24/18   0648  01/25/18   0645  01/26/18   0622   NA  139  144  136   K  4.3  3.7  5.0   CL  108*  110*  100   CO2  20  20  22   BUN  60*  58*  55*   CREATININE  2.17*  2.08*  1.86*     BNP: No results for input(s): BNP in the last 72 hours.   PT/INR: Recent Labs      01/23/18   1759   PROTIME  10.4   INR  1.0     APTT:   Recent Labs      01/23/18   1759   APTT  19.2*     CARDIAC ENZYMES: No results for input(s): CKMB, CKMBINDEX, TROPONINT in the last 72 hours. Invalid input(s): CKTOTAL;3  FASTING LIPID PANEL:  Lab Results   Component Value Date    CHOL 107 08/21/2017    HDL 26 (L) 08/21/2017    TRIG 131 08/21/2017     LIVER PROFILE:   Recent Labs      01/23/18   1759   AST  25   ALT  8   BILITOT  0.43   ALKPHOS  70          Physical Exam:  /61   Pulse 78   Temp 98 °F (36.7 °C) (Oral)   Resp 13   Ht 5' 8\" (1.727 m)   Wt 248 lb (112.5 kg)   SpO2 98%   BMI 37.71 kg/m²   Alert, no distress. Oriented ×3  Answers questions appropriately and follows all commands  Good speech and language function. Lungs clear. Heart regular. Abdomen non-distended, non-tender, soft positive bowel sounds. No calf tenderness or edema. MSR- 2+ equal in bilateral upper and lower   Sensory: Intact in BUE and BLE to soft sensation. Motor: Muscle tone and bulk are normal bilaterally. Motor strengthBUE and BLE - 5/5    Impression:  Patient is a 26-year-old female with metastatic renal cell carcinoma status post fall with L1 vertebral fracture status post surgical fixation T11-L3    Recommendations:  1. The patient is doing very well in physical therapy walking 300 feet contact guard assist without loss of balance transfers contact-guard assist.  Patient needs updated occupational therapy note and will be seen later today. I do not believe patient will qualify for acute inpatient rehab as she is doing very well in physical therapy. The rehab team will continue to follow the patient. 2.  Neuro- status post surgical fixation of L1 vertebral fracture. Patient is on Decadron. 3.  DVT prophylaxispatient is on heparin twice daily and SCDs in place. .  4.  Hematology/Oncology consulted for recommendations for treatment of renal cell carcinoma.   Discussed plans for

## 2018-01-26 NOTE — PROGRESS NOTES
Nutrition Assessment    Type and Reason for Visit: Reassess, Consult (DM with chronic hypoglycemia )    Nutrition Recommendations: Continue Carb Controlled diet. Encouraged meal/carb consistency with pt to help prevent hypoglycemia. Will continue to follow. Nutrition Diagnosis:   · Problem: Altered nutrition-related lab values  · Etiology: related to Endocrine dysfunction     Signs and symptoms:  as evidenced by hypoglycemia     Nutrition Assessment:  · Subjective Assessment: Pt reports she is tolerating diet/eating well. RN reports pt is eating all of meals at present. Pt reports she eats 3 meals/day and HS snack. Sometimes she will eat an afternoon snack as well. · Current Nutrition Therapies:  · Oral Diet Orders: Carb Control 4 Carbs/Meal , plus HS snack  · Oral Diet intake: % of meals today per RN  · Oral Nutrition Supplement (ONS) Orders: None  · Anthropometric Measures:  · Ht: 5' 8\" (172.7 cm)   · Current Body Wt: 248 lb (112.5 kg)  · Ideal Body Wt: 140 lb (63.5 kg), % Ideal Body 177%  · Adjusted Body Wt: 175 lb (79.4 kg)  · BMI Classification: BMI 35.0 - 39.9 Obese Class II  · Comparative Standards (Estimated Nutrition Needs):  · Estimated Daily Total Kcal: 5564-1345 kcal per day   · Estimated Daily Protein (g):  g pro per day     Estimated Intake vs Estimated Needs: Intake Improving    Nutrition Risk Level: Moderate    Nutrition Interventions:   Continue current diet  Continued Inpatient Monitoring   Discussed meal/carb consistency with pt to help prevent hypoglycemia    Nutrition Evaluation:   · Evaluation: Progressing toward goals   · Goals: Intake consistently more than 50%    · Monitoring: Meal Intake, Diet Tolerance, Weight, Comparative Standards, Pertinent Labs    See Adult Nutrition Doc Flowsheet for more detail.      Electronically signed by Igor Multani RD, LD on 1/26/18 at 3:45 PM    Contact Number: [phone]

## 2018-01-26 NOTE — PROGRESS NOTES
Pharmacy Note     Renal Dose Adjustment    Ryder Jay is a 63 y.o. female. Pharmacist assessment of renally cleared medications. Recent Labs      01/25/18   0645  01/26/18   0622   BUN  58*  55*       Recent Labs      01/25/18   0645  01/26/18   0622   CREATININE  2.08*  1.86*       Estimated Creatinine Clearance: 42 mL/min (based on SCr of 1.86 mg/dL). Height:   Ht Readings from Last 1 Encounters:   01/19/18 5' 8\" (1.727 m)     Weight:  Wt Readings from Last 1 Encounters:   01/25/18 248 lb (112.5 kg)       The following medication dose has been adjusted based upon renal function per P&T Guidelines:             Changed famotidine 20 mg BID to 20 mg once daily due to renal function.     Valeria John, PharmD/CARMELA   1/26/2018 1:14 PM

## 2018-01-26 NOTE — DISCHARGE SUMMARY
Pepito Shelton [address]    Discharge Summary     Patient ID: Candy Elias  :  1955   MRN: [id number]     ACCOUNT:  [de-identified]   Patient's PCP: Gisel Garcia MD  Admit Date: 2018   Discharge Date: 2018    Length of Stay: 9  Code Status:  Full Code  Admitting Physician: Lilly Molina MD  Discharge Physician: Lilly Molina MD     Active Discharge Diagnoses:     Primary Problem  Pathologic fracture of lumbar vertebra, initial encounter    Renal CA with mets to bone, liver, and intraperitoneal-Status post kidney biopsy(17)-renal mass biopsy: clear cell renal cell carcinoma  Klebsiella UTI    [place] Problems    Diagnosis Date Noted    Renal cell carcinoma ([address] [C64.9]     Acute cystitis without hematuria [N30.00] 2018    Renal mass [N28.89]     Cancer, metastatic to bone ([address] [C79.51]     Stage 4 chronic kidney disease ([address] [N18.4] 2018    Syncope and collapse [R55] 2018    Hyperkalemia [E87.5] 2018    Anemia due to chronic kidney disease [N18.9, D63.1] 2018    Pathologic fracture of lumbar vertebra, initial encounter [M84.48XA]     Secondary malignant neoplasm of lumbar vertebral column ([address] [C79.51]     Diabetes mellitus ([address] [E11.9] 2012       Admission Condition:  poor     Discharged Condition: fair    Hospital Stay:   Admitting history;   The patient is a 63 y.o.   female who presents with back pain   and she is admitted to the hospital for the management of  Back pain.  3 days pta in the am she had fallen out of bed after episode of diarrhea.  She thought her bs was low, as happens frequently to her into [facility], she thinks her sugar was in 90s at that time. Rony Stoddard woke up on the floor, no recollection of how she fell. Cristiano Martinez done suggest she has probably acute L1 fracture; confirmed by CT-but it suggested likely met with Additional Contrast? Radiologist Recommendation    Result Date: 1/20/2018  EXAMINATION: CT OF THE ABDOMEN AND PELVIS WITHOUT CONTRAST 1/20/2018 7:59 am TECHNIQUE: CT of the abdomen and pelvis was performed without the administration of intravenous contrast. Multiplanar reformatted images are provided for review. Dose modulation, iterative reconstruction, and/or weight based adjustment of the mA/kV was utilized to reduce the radiation dose to as low as reasonably achievable. COMPARISON: CT and MRI  lumbar spine 01/19/2018 HISTORY: ORDERING SYSTEM PROVIDED HISTORY: renal mass, also look for mets TECHNOLOGIST PROVIDED HISTORY: Additional Contrast?->Radiologist Recommendation FINDINGS: Lower Chest: Patchy bibasilar infiltrates and trace bilateral pleural effusion right greater than left. Organs: Limited evaluation due to absence of IV contrast. Circumscribed 2.5 cm hypodensity in the medial aspect dome of the liver which may represent a cyst or possibly metastatic lesion. There is an intermediate density fluid encircling most of the right lobe of the liver up to 3 cm in thickness causing impressions on the liver margin. Likely peritoneal metastasis given findings in kidney described below. The pancreas, left adrenal gland and left kidney show no significant abnormalities. There is gallbladder distention. Right kidney subnormal.  The entire kidneys essentially replaced by abnormal lobular heterogeneous mass with hypodense and intermediate signal components represents renal cell carcinoma. .  There is perinephric stranding. There is probably extension into right renal vein but difficult to assess in the absence of IV contrast. GI/Bowel: There is limited evaluation due to absence of oral contrast. Stomach grossly normal.  Normal caliber small bowel loops. Normal appendix. The colon shows no acute process. Pelvis: Urinary bladder grossly normal.  No suspicious pelvic mass.  Peritoneum/Retroperitoneum: There are mesenteric numerous nodules of different sizes scattered throughout the upper abdomen with the largest measuring 2-3 cm. A few are confluent. Consistent with peritoneal metastasis. Small amount of ascites mainly in the pelvis. Bones/Soft Tissues: No superficial soft tissue lesion. There is mixed sclerotic lytic metastasis to L1 vertebral body with compression fracture retropulsed fragment causing spinal stenosis. This has been evaluated in more detail on the previous MRI. 1. Large right renal mass estimated at at least 11 x 11 cm essentially replacing the right kidney consistent with renal cell carcinoma. Limited evaluation in the absence of IV contrast.  There is probably extension into the right renal vein. 2. There is intraperitoneal metastasis with numerous upper abdominal discrete and confluent nodules with largest measuring 2-3 cm. Additionally there is confluent hypodensity almost encircling the right lobe of liver up to 3 cm in thickness representing metastatic disease. There is small amount of ascites. 3. A 2.5 cm medial right lobe of liver dome round lesion is probably a cyst but cannot exclude metastasis. 4. L1 metastasis with pathologic compression fracture and retropulsed fragment causing spinal stenosis described in detail on MRI lumbar spine from previous day. Xr Lumbar Spine (2-3 Views)    Result Date: 1/24/2018  EXAMINATION: 3 VIEWS OF THE LUMBAR SPINE 1/24/2018 3:35 pm COMPARISON: 01/23/2018 lumbar spine radiographs, intraoperative HISTORY: ORDERING SYSTEM PROVIDED HISTORY: UPRIGHT, standing when tolerated, get up to T10 TECHNOLOGIST PROVIDED HISTORY: Reason for exam:->UPRIGHT, standing when tolerated, get up to T10 FINDINGS: AP and lateral weight-bearing views of the thoracolumbar spine are submitted for review. 5 non-rib-bearing lumbar vertebral bodies are present. Vertebral body compression deformity at L1 is unchanged.   Status post bridging L1 thoracolumbar fusion posteriorly at T11-T12 and L2-L3. Hardware is intact without evidence of loosening or fracture. No subluxation. Lower lumbar facet arthrosis. Intact thoracolumbar fusion hardware without evidence of hardware complication. No subluxation. Unchanged appearance of L1 compression deformity. Xr Lumbar Spine (2-3 Views)    Result Date: 1/23/2018  EXAMINATION: 2 VIEWS OF THE LUMBAR SPINE 1/23/2018 11:31 am COMPARISON: 1/18/2018 HISTORY: ORDERING SYSTEM PROVIDED HISTORY: intra op TECHNOLOGIST PROVIDED HISTORY: Reason for exam:->intra op FLUOROSCOPY DOSE AND TYPE OR TIME AND EXPOSURES: 9.1 seconds of fluoroscopy time utilized in surgery. D AP 2.97. Two spot views obtained. FINDINGS: Two spot views obtained in surgery show pathologic compression fracture of L1. Placement of pedicle screws and posterior fusion rods from T11-L3. Bilateral pedicle screws at T11, T12, L2, and L3. Curvilinear densities projecting over the lower thoracic spine are probably related to gauze. Please correlate with procedure report for additional details. Intraoperative fluoroscopy for thoracolumbar fusion surgery. Xr Lumbar Spine (2-3 Views)    Result Date: 1/18/2018  EXAMINATION: 3 VIEWS OF THE LUMBAR SPINE 1/18/2018 7:40 pm COMPARISON: None. HISTORY: ORDERING SYSTEM PROVIDED HISTORY: fall TECHNOLOGIST PROVIDED HISTORY: Reason for exam:->fall Ordering Physician Provided Reason for Exam: s/p fall on Monday, patient fell out of bed and struck back onto ground, low back pain Acuity: Acute Type of Exam: Initial Mechanism of Injury: fall Relevant Medical/Surgical History: none FINDINGS: Normal lumbar lordosis. There is anterior wedge compression deformity of L1 vertebral body with about 25% decrease in height consistent with compression fracture which is concerning for an acute fracture in the clinical setting provided. No priors to compare. No paraspinal mass.      L1 wedge compression fracture with about 25% decrease in height which is hysterectomy. Heterogeneous 1 cm calcification in the deep pelvis is felt to be related to a colonic diverticulum and it appears to be outside of the bladder. Pathologic compression fracture of L1 with tumor infiltration involving primarily the left half of the vertebral body extending to the posterior elements with a probable associated soft tissue component. MRI is recommended for further evaluation. Partially visualized complex large right renal mass; renal cell carcinoma should be excluded. Findings were discussed with Marques Muniz NP, at 3:13 pm on 1/19/2018. Mri Cervical Spine Wo Contrast    Result Date: 1/19/2018  EXAMINATION: MRI OF THE CERVICAL SPINE WITHOUT CONTRAST 1/19/2018 4:44 pm TECHNIQUE: Multiplanar multisequence MRI of the cervical spine was performed without the administration of intravenous contrast. COMPARISON: None. HISTORY: ORDERING SYSTEM PROVIDED HISTORY: back pain FINDINGS: BONES/ALIGNMENT: There is normal alignment of the spine. The vertebral body heights are maintained. The bone marrow signal appears unremarkable. SPINAL CORD: No abnormal cord signal is seen. SOFT TISSUES: No paraspinal mass identified. C2-C3: There is no significant disc protrusion, spinal canal stenosis or neural foraminal narrowing. C3-C4: There is no significant disc protrusion, spinal canal stenosis or neural foraminal narrowing. C4-C5: Moderate uncinate spondylosis and neural foraminal narrowing bilaterally. Central canal is patent. C5-C6: There is no significant disc protrusion, spinal canal stenosis or neural foraminal narrowing. C6-C7: There is no significant disc protrusion, spinal canal stenosis or neural foraminal narrowing. C7-T1: There is no significant disc protrusion, spinal canal stenosis or neural foraminal narrowing. Moderate Uncinate spondylosis and neural foraminal narrowing at C4-5.      Mri Thoracic Spine Wo Contrast    Addendum Date: 1/19/2018    ADDENDUM: Right renal mass is tablet  Commonly known as:  LIPITOR  TAKE 1 TABLET BY MOUTH ONE TIME A DAY     glyBURIDE 5 MG tablet  Commonly known as:  DIABETA  TAKE 2 TABLETS BY MOUTH TWO TIMES A DAY     Insulin Pen Needle 32G X 4 MM Misc     levothyroxine 100 MCG tablet  Commonly known as:  SYNTHROID  Take 1 tablet by mouth daily     NOVOLOG FLEXPEN 100 UNIT/ML injection pen  Generic drug:  insulin aspart     omeprazole 20 MG delayed release capsule  Commonly known as:  PRILOSEC  Take 1 capsule by mouth daily     rizatriptan 10 MG tablet  Commonly known as:  MAXALT  Take 1 tablet by mouth once as needed for Migraine for up to 1 dose. May repeat in 2 hours if needed        STOP taking these medications    lisinopril-hydrochlorothiazide 20-12.5 MG per tablet  Commonly known as:  PRINZIDE;ZESTORETIC           Where to Get Your Medications      These medications were sent to [address]  [facility] [address] [facility] [address]    Phone:  [phone]   · carvedilol 6.25 MG tablet  · lisinopril 5 MG tablet     You can get these medications from any pharmacy    Bring a paper prescription for each of these medications  · cefUROXime 250 MG tablet  · cyclobenzaprine 10 MG tablet  · dexamethasone 4 MG tablet  · oxyCODONE HCl 10 MG immediate release tablet         Time Spent on discharge is  35 mins in patient examination, evaluation, counseling as well as medication reconciliation, prescriptions for required medications, discharge plan and follow up. Electronically signed by   Stephanie Loo MD  1/27/2018  3:51 PM      Thank you Dr. Melody Torres MD for the opportunity to be involved in this patient's care.

## 2018-01-26 NOTE — PROGRESS NOTES
Pepito Shelton [address]    Progress Note    1/26/2018    3:17 PM    Name:   Michael Amezcua  MRN:     [id number]     [place]:      [de-identified]   Room:   [address] [place] [address]  IP Day:  6  Admit Date:  1/18/2018 11:38 PM    PCP:   Gloria Reed MD  Code Status:  Full Code    Subjective:     C/C: back pain  Interval History Status:   Had surgery yesterday, POD#3, s/p minimally invasive percutaneous fixation from T11 to L3  Got 2 units of PRBC in OR   Had  renal mass biopsy: clear cell renal cell carcinoma  Plans for systemic treatment prior to cytoreduction noted   was  Feeling overwhelmed due to diagnosis of stage IV kidney cancer,palliative care consult was taken  Feeling emotionally more stable today  Sugars were low in the morning since she did not eat much at night, stable now  She will be going home with home care and start oral chemotherapy as outpatient  Brief History:   The patient is a 63 y.o.   female who presents with back pain   and she is admitted to the hospital for the management of  Back pain.  3 days pta in the am she had fallen out of bed after episode of diarrhea.  She thought her bs was low, as happens frequently to her into [facility], she thinks her sugar was in 90s at that time. Yusef Darling woke up on the floor, no recollection of how she fell. Ani Rojas done suggest she has probably acute L1 fracture; confirmed by CT-but it suggested likely met with fracture      Review of Systems:     Constitutional:  negative for chills, fevers, sweats  Respiratory:  negative for cough, dyspnea on exertion, hemoptysis, shortness of breath, wheezing  Cardiovascular:  negative for chest pain, chest pressure/discomfort, lower extremity edema, palpitations  Gastrointestinal:  negative for abdominal pain, constipation, diarrhea, nausea, vomiting  Neurological:  negative for dizziness, headache    Medications:      Allergies:  No Known Allergies    Current Ht 5' 8\" (1.727 m)   Wt 248 lb (112.5 kg)   SpO2 97%   BMI 37.71 kg/m²   Temp (24hrs), Av °F (36.7 °C), Min:97.7 °F (36.5 °C), Max:98.6 °F (37 °C)    Recent Labs      18   0430  18   0457  18   0811  18   1138   POCGLU  67  153*  144*  99       I/O (24Hr): Intake/Output Summary (Last 24 hours) at 18 1517  Last data filed at 18 1313   Gross per 24 hour   Intake             1000 ml   Output                0 ml   Net             1000 ml       Labs:    Hematology:  Recent Labs      18   1759  18   0648   WBC  12.0*  10.4   HGB  9.0*  8.7*   HCT  31.1*  30.9*   PLT  371  326   INR  1.0   --      Chemistry:  Recent Labs      18   0648  18   0645  18   0622   NA  139  144  136   K  4.3  3.7  5.0   CL  108*  110*  100   CO2  20  20  22   GLUCOSE  137*  39*  197*   BUN  60*  58*  55*   CREATININE  2.17*  2.08*  1.86*   ANIONGAP  11  14  14   LABGLOM  23*  24*  28*   GFRAA  28*  29*  33*   CALCIUM  8.9  9.3  9.3     Recent Labs      18   1759   PROT  6.1*   LABALBU  2.9*   AST  25   ALT  8   ALKPHOS  70   BILITOT  0.43         Lab Results   Component Value Date/Time    SPECIAL FIRST INSERTION 2018 09:58 AM     Lab Results   Component Value Date/Time    CULTURE NO GROWTH 2018 09:58 AM    CULTURE  2018 09:58 AM     [address], [address] [place] [address] [phone]       Lab Results   Component Value Date    PHART 7.292 2018    QTF3VTD 39.0 2018    PO2ART 159.0 2018    IMF8GAR 18.3 2018    NBEA 7.2 2018    PBEA NOT REPORTED 2018    F6PWIEAM 99.5 2018    FIO2 40 2018   Urine culture-P Klebsiella    Radiology:    CT lumbar spine  Intraoperative CT during thoracolumbar stabilization and fusion for   pathologic L1 compression fracture.        Physical Examination:        General appearance:  alert, cooperative and no distress,emotionally more stable today  Mental Status:

## 2018-01-26 NOTE — FLOWSHEET NOTE
· Subjective: Patient was uncomfortable and in some pain. She wsd very open to talk though. She said she had been in since last week and had just had back surgery. She was afraid to go home because she didn't know if she would be able to get out of bed on her own. She hoped her daughter would be able to stay with her. · Objective: Patient lying in bed in discomfort    · Assessment: Patient has fear about the future and being able to take care of herself. · Plan:  talked with her about some different ideas, like were there other people who could help her etc...  prayed for the patient and told her that Chaplains were always available anytime she needed one. She expressed gratitude for the visit.        01/26/18 1651   Encounter Summary   Services provided to: Patient   Referral/Consult From: [facility] Family members   Place of Congregational ([employer])   Contact Hindu No   Continue Visiting (01/26/18)   Complexity of Encounter Moderate   Length of Encounter 30 minutes   Spiritual Assessment Completed Yes   Routine   Type Post-procedure   Spiritual/Judaism   Type Spiritual struggle   Assessment Coping   Intervention Active listening;Explored feelings, thoughts, concerns;Explored coping resources;Nurtured hope;Prayer;Sustaining presence/ Ministry of presence   Outcome Expressed gratitude

## 2018-01-26 NOTE — PROGRESS NOTES
Joshua Zamora, KENY        bisacodyl (DULCOLAX) suppository 10 mg  10 mg Rectal Daily PRN Yobany Herr, NP        magnesium hydroxide (MILK OF MAGNESIA) 400 MG/5ML suspension 30 mL  30 mL Oral Daily PRN Yobany Herr, NP        magnesium sulfate 1 g in dextrose 5% 100 mL IVPB  1 g Intravenous PRN Marcina Nemesio, NP        nicotine (NICODERM CQ) 21 MG/24HR 1 patch  1 patch Transdermal Daily PRN Yobany Herr, NP        ondansetron [facility]) injection 4 mg  4 mg Intravenous Q6H PRN Marcina Nemesio, NP   4 mg at 01/20/18 0131    potassium chloride (KLOR-CON M) extended release tablet 40 mEq  40 mEq Oral PRN Marcina Nemesio, NP        Or    potassium chloride 20 MEQ/15ML (10%) oral solution 40 mEq  40 mEq Oral PRN Marcina Nemesio, NP        Or    potassium chloride 10 mEq/100 mL IVPB (Peripheral Line)  10 mEq Intravenous PRN Antonioina Nemesio, NP        sodium chloride flush 0.9 % injection 10 mL  10 mL Intravenous 2 times per day Marcina Bonyight, NP   10 mL at 01/26/18 0916    sodium chloride flush 0.9 % injection 10 mL  10 mL Intravenous PRN Marcina Bonyight, NP   10 mL at 01/26/18 1313    dextrose 5 % solution  100 mL/hr Intravenous PRN Marcina Nemesio, NP        dextrose 50 % solution 12.5 g  12.5 g Intravenous PRN Yobany Herr, NP        glucagon (rDNA) injection 1 mg  1 mg Intramuscular PRN Yobany Herr, NP        glucose (GLUTOSE) 40 % oral gel 15 g  15 g Oral PRN Marcina Nemesio, NP   15 g at 01/26/18 0416       Allergies:  Review of patient's allergies indicates no known allergies. Social History:   reports that she has never smoked. She has never used smokeless tobacco. She reports that she does not drink alcohol or use drugs. Family History: family history includes Cancer in her father and maternal grandfather; Diabetes in her mother; Heart Disease in her mother.     REVIEW OF SYSTEMS:    Constitutional: No fever or chills. No night sweats, no weight loss   Eyes: No eye discharge, double vision, or eye pain   HEENT: negative for sore mouth, sore throat, hoarseness and voice change   Respiratory: negative for cough , sputum, dyspnea, wheezing, hemoptysis, chest pain   Cardiovascular: negative for chest pain, dyspnea, palpitations, orthopnea, PND   Gastrointestinal: negative for nausea, vomiting, diarrhea, constipation, abdominal pain, Dysphagia, hematemesis and hematochezia   Genitourinary: negative for frequency, dysuria, nocturia, urinary incontinence, and hematuria   Integument: negative for rash, skin lesions, bruises.    Hematologic/Lymphatic: negative for easy bruising, bleeding, lymphadenopathy, or petechiae   Endocrine: negative for heat or cold intolerance,weight changes, change in bowel habits and hair loss   Musculoskeletal: negative for myalgias, arthralgias,++back  pain, joint swelling,and bone pain   Neurological: negative for headaches, dizziness, seizures, weakness, numbness    PHYSICAL EXAM:      BP (!) 134/91   Pulse 83   Temp 98.1 °F (36.7 °C) (Oral)   Resp 17   Ht 5' 8\" (1.727 m)   Wt 248 lb (112.5 kg)   SpO2 97%   BMI 37.71 kg/m²    Temp (24hrs), Av °F (36.7 °C), Min:97.7 °F (36.5 °C), Max:98.6 °F (37 °C)    General appearance - well appearing, no in pain or distress   Mental status - alert and cooperative   Eyes - pupils equal and reactive, extraocular eye movements intact   Ears - bilateral TM's and external ear canals normal   Mouth - mucous membranes moist, pharynx normal without lesions   Neck - supple, no significant adenopathy   Lymphatics - no palpable lymphadenopathy, no hepatosplenomegaly   Chest - clear to auscultation, no wheezes, rales or rhonchi, symmetric air entry   Heart - normal rate, regular rhythm, normal S1, S2, no murmurs  Abdomen - soft, nontender, nondistended, no masses or organomegaly   Neurological - alert, oriented, normal speech, retropulsion. This is incompletely imaged within the field of view. . The bone marrow signal appears unremarkable except for T1 and T2 lengthening within the L1 vertebral body. . SPINAL CORD: No abnormal cord signal is seen. SOFT TISSUES: Left paracentral well-circumscribed subcutaneous soft tissue densities noted at T12 on the left. These measure 10 in 25 mm respectively. DEGENERATIVE CHANGES: No significant spinal canal stenosis or neural foraminal narrowing of the thoracic spine.      Acute Burst fracture L1 vertebral body incompletely imaged within the field of view. Several mm retropulsion with encroachment upon the conus. See also MRI lumbar spine report. Left paracentral subcutaneous nodules at T12.      Mri Lumbar Spine Wo Contrast     Result Date: 1/19/2018  EXAMINATION: MRI OF THE LUMBAR SPINE WITHOUT CONTRAST, 1/19/2018 4:44 pm TECHNIQUE: Multiplanar multisequence MRI of the lumbar spine was performed without the administration of intravenous contrast. COMPARISON: CT from earlier today. HISTORY: ORDERING SYSTEM PROVIDED HISTORY: poss mets Back pain for 3 days. L1 fracture. FINDINGS: BONES/ALIGNMENT: There is a metastasis invading almost the entire L1 vertebral body and the left L1 pedicle. The metastasis erodes through the cortex of the bone and invades the adjacent soft tissues. It invades the left lateral epidural space. There is a pathologic compression fracture with posterior bowing of the vertebral body which results in severe spinal canal and thecal sac stenosis with crowding of the cauda equina nerve roots around the conus medullaris. There are 2 small T1 hypointense, T2 mixed intensity lesions in the superior endplates of L3 and L4 which have an appearance most consistent with Schmorl's nodes. There are disc bulges in the lower lumbar spine which do not cause significant canal stenosis.  SOFT TISSUES: A 10 cm heterogeneous cystic and solid right renal mass most likely representing a renal

## 2018-01-26 NOTE — CARE COORDINATION
Spoke with patient in regards to home care/. Physician has been perfect served for a rolling walker and a home care list was provided.

## 2018-01-27 NOTE — PROGRESS NOTES
Intravenous Continuous Ariel P Blood, DO 50 mL/hr at 01/20/18 1444      acetaminophen (TYLENOL) tablet 650 mg  650 mg Oral Q4H PRN Rodriguez Hidalgo NP        bisacodyl (DULCOLAX) suppository 10 mg  10 mg Rectal Daily PRN Rodriguez Hidalgo NP        magnesium hydroxide (MILK OF MAGNESIA) 400 MG/5ML suspension 30 mL  30 mL Oral Daily PRN Rodriguez Hidalgo NP        magnesium sulfate 1 g in dextrose 5% 100 mL IVPB  1 g Intravenous PRN Rodriguez Hidalgo NP        nicotine (NICODERM CQ) 21 MG/24HR 1 patch  1 patch Transdermal Daily PRN Rodriguez Hidalgo NP        ondansetron [facility]) injection 4 mg  4 mg Intravenous Q6H PRN Rodriguez Hidalgo NP   4 mg at 01/20/18 0131    potassium chloride (KLOR-CON M) extended release tablet 40 mEq  40 mEq Oral PRN Rodriguez Hidalgo NP        Or    potassium chloride 20 MEQ/15ML (10%) oral solution 40 mEq  40 mEq Oral PRN Rodriguez Hidalgo NP        Or    potassium chloride 10 mEq/100 mL IVPB (Peripheral Line)  10 mEq Intravenous PRN Rodriguez Hidalgo NP        sodium chloride flush 0.9 % injection 10 mL  10 mL Intravenous 2 times per day Rodriguez Hidalgo NP   10 mL at 01/27/18 0900    sodium chloride flush 0.9 % injection 10 mL  10 mL Intravenous PRN Rodriguez Hidalgo NP   10 mL at 01/26/18 1313    dextrose 5 % solution  100 mL/hr Intravenous PRN Rodriguez Hidalgo NP        dextrose 50 % solution 12.5 g  12.5 g Intravenous PRN Rodriguez Hidalgo NP        glucagon (rDNA) injection 1 mg  1 mg Intramuscular PRN Rodriguez Hidalgo NP        glucose (GLUTOSE) 40 % oral gel 15 g  15 g Oral PRN Rodriguez Hidalgo NP   15 g at 01/26/18 2126       Allergies:  Review of patient's allergies indicates no known allergies. Social History:   reports that she has never smoked. She has never used smokeless tobacco. She reports that she does not drink alcohol or use drugs.      Family History: mass; renal cell carcinoma should be excluded. Findings were discussed with Saray Liriano NP, at 3:13 pm on 1/19/2018.      Mri Cervical Spine Wo Contrast     Result Date: 1/19/2018  EXAMINATION: MRI OF THE CERVICAL SPINE WITHOUT CONTRAST 1/19/2018 4:44 pm TECHNIQUE: Multiplanar multisequence MRI of the cervical spine was performed without the administration of intravenous contrast. COMPARISON: None. HISTORY: ORDERING SYSTEM PROVIDED HISTORY: back pain FINDINGS: BONES/ALIGNMENT: There is normal alignment of the spine. The vertebral body heights are maintained. The bone marrow signal appears unremarkable. SPINAL CORD: No abnormal cord signal is seen. SOFT TISSUES: No paraspinal mass identified. C2-C3: There is no significant disc protrusion, spinal canal stenosis or neural foraminal narrowing. C3-C4: There is no significant disc protrusion, spinal canal stenosis or neural foraminal narrowing. C4-C5: Moderate uncinate spondylosis and neural foraminal narrowing bilaterally. Central canal is patent. C5-C6: There is no significant disc protrusion, spinal canal stenosis or neural foraminal narrowing. C6-C7: There is no significant disc protrusion, spinal canal stenosis or neural foraminal narrowing. C7-T1: There is no significant disc protrusion, spinal canal stenosis or neural foraminal narrowing.      Moderate Uncinate spondylosis and neural foraminal narrowing at C4-5.      Mri Thoracic Spine Wo Contrast     Addendum Date: 1/19/2018    ADDENDUM: Right renal mass is incompletely imaged and suspicious for neoplasm. Further evaluation recommended.      Result Date: 1/19/2018  EXAMINATION: MRI OF THE THORACIC SPINE WITHOUT CONTRAST  1/19/2018 4:44 pm TECHNIQUE: Multiplanar multisequence MRI of the thoracic spine was performed without the administration of intravenous contrast. COMPARISON: None. HISTORY: ORDERING SYSTEM PROVIDED HISTORY: back pain FINDINGS: BONES/ALIGNMENT: There is normal alignment of the spine.  The

## 2018-01-27 NOTE — PROGRESS NOTES
Palliative Care Inpatient Consult    NAME:  Yvonne Wilde  MEDICAL RECORD NUMBER:  [id number]  AGE: 63 y.o. GENDER: female  : 1955  TODAY'S DATE:  2018    Reasons for Consultation:    Symptom and/or pain management  Provision of information regarding PC and/or hospice philosophies  Complex, time-intensive communication and interdisciplinary psychosocial support  Clarification of goals of care and/or assistance with difficult decision-making  Guidance in regards to resources and transition(s)    Members of PC team contributing to this consultation are :  Marzena Segura Fellow  Dr. Kelly Aleman PGY-3 Internal Medicine  Dr. Juan Carlos Alvarez Attending  History of Present Illness   Ms. Yvonne Wilde is a 63year old lady with a history of morbid obesity who presented on  with after falling from her bed. X-ray L spine revealed the presence of acute L1 fracture. Subsequent imaging and pathologic studies revealed the presence of metastatic renal cell carcinoma. The palliative care team was consulted to provide emotional support to [facility].     Interval Events  -Did PT/OT today  -Was in pain for going to the bathroom to bathe herself  -Met with Poppy from [facility] for an informational visit  -Plans to return home today with home care    Referred to Palliative Care by   [x] Physician   [] Nursing  [] Family Request   [] Other:         [address] Problems    Diagnosis Date Noted    Renal cell carcinoma ([address] [C64.9]     Acute cystitis without hematuria [N30.00] 2018    Renal mass [N28.89]     Cancer, metastatic to bone ([address] [C79.51]     Stage 4 chronic kidney disease ([address] [N18.4] 2018    Syncope and collapse [R55] 2018    Hyperkalemia [E87.5] 2018    Anemia due to chronic kidney disease [N18.9, D63.1] 2018    Pathologic fracture of lumbar vertebra, initial encounter [M84.48XA]     Secondary malignant neoplasm of

## 2018-01-27 NOTE — PROGRESS NOTES
NEPHROLOGY PROGRESS NOTE      SUBJECTIVE   F/U on RASHMI/CKD stage 4  Renal Mass Bx c/w Clear cell Ca. Has L2 metastatic lesion with compression fracture. Palliative Care met with patient and she has elected to start treatment with chemo. Plans for systemic treatment prior to cytoreduction   Blood pressure remains elevated  411-567 systolic. No I/O charted. States she urinates all the time. OBJECTIVE     Vitals:    01/26/18 2142 01/26/18 2306 01/27/18 0410 01/27/18 0824   BP: (!) 162/64 (!) 159/62 (!) 146/66 (!) 167/68   Pulse:  69 67 84   Resp:  13 12 17   Temp:  98.5 °F (36.9 °C) 98.3 °F (36.8 °C) 98.2 °F (36.8 °C)   TempSrc:  Oral Oral    SpO2:  99% 97% 96%   Weight:       Height:         24HR INTAKE/OUTPUT:      Intake/Output Summary (Last 24 hours) at 01/27/18 6101  Last data filed at 01/26/18 1313   Gross per 24 hour   Intake               10 ml   Output                0 ml   Net               10 ml       General appearance:Awake, alert, in no acute distress  HEENT: PERRLA  Respiratory::vesicular breath sounds,no wheeze/crackles  Cardiovascular:S1 S2 normal,no gallop or organic murmur. Abdomen:Non tender/non distended. Bowel sounds present  Extremities: No Cyanosis or Clubbing,Lower extremity edema  Neurological:Alert and oriented. No abnormalities of mood, affect, memory, mentation, or behavior are noted  Wound: Dressing in place on back      MEDICATIONS     Scheduled Meds:    insulin glargine  50 Units Subcutaneous BID    dexamethasone  4 mg Oral Daily    famotidine  20 mg Oral Daily    senna  1 tablet Oral Nightly    lisinopril  5 mg Oral BID    calcium carbonate-vitamin D  1 tablet Oral Daily    heparin (porcine)  5,000 Units Subcutaneous BID    insulin lispro  0-18 Units Subcutaneous TID     insulin lispro  0-9 Units Subcutaneous Nightly    lidocaine  1 patch Transdermal Daily    amitriptyline  75 mg Oral Nightly    amLODIPine  10 mg Oral Daily    atorvastatin  20 mg Oral Daily    55*   CREATININE  2.08*  1.86*  1.79*   CALCIUM  9.3  9.3  9.2       ASSESSMENT     1. CKD stage IV With baseline creatinine 2-2.2 mg/dl. At baseline. Urine protein creatinine ratio was 0.2  Likely diabetic nephropathy  2. L1 Pathological fracture status post minimally invasive Ffixation from T11 to L3  3. Newly detected large right renal mass estimated to be around 11 x 11 cm , radiologically consistent with renal cell carcinoma. inferior vena cava invasion. Intraperitoneal metastasis, possible liver metastasis, L1 metastasis with pathological compression fracture causing spinal stenosis S/p Silvia Bx C/W Clear cell Ca  4. UTI - on ceftriaxone  2. Diabetes mellitus  3. Essential hypertension   4. Hypothyroidism    PLAN     1. Avoid MRI Gadolinium contrast as she has CKD 4 with GFR <30  2. Continue Lisinopril   3. BP control. Add Coreg 3.125 BID  4. Plan for d/c home   5. BMP in 2 weeks    Please do not hesitate to call with questions    Sarita FORD  Attending Physician Statement  I have discussed the care of Devonte Wood, including pertinent history and exam findings with the resident/fellow. I have reviewed the key elements of all parts of the encounter with the resident/fellow. I have seen and examined the patient with the resident/fellow. I agree with the assessment and plan and status of the problem list as documented. Coreg started. Will sign off for now.  [id number] Crista Larsen for disc     Terrie Arevalo MD  [facility]

## 2018-01-27 NOTE — PROGRESS NOTES
[facility]    Progress Note    1/27/2018    2:57 PM    Name:   Che Guevara  MRN:     [id number]     Acct:      [de-identified]   Room:   [address] [place] [address] Day:  5  Admit Date:  1/18/2018 11:38 PM    PCP:   Jacquelynn Eisenmenger, MD  Code Status:  Full Code    Subjective:     C/C: back pain  Interval History Status:   Had surgery yesterday, POD#4, s/p minimally invasive percutaneous fixation from T11 to L3  Got 2 units of PRBC in OR   Had  renal mass biopsy: clear cell renal cell carcinoma  Plans for systemic treatment prior to cytoreduction noted   was  Feeling overwhelmed due to diagnosis of stage IV kidney cancer,palliative care consult was taken  Feeling emotionally more stable today  Sugars were stable now  She will be going home with home care and start oral chemotherapy as outpatient  Brief History:   The patient is a 63 y.o.   female who presents with back pain   and she is admitted to the hospital for the management of  Back pain.  3 days pta in the am she had fallen out of bed after episode of diarrhea.  She thought her bs was low, as happens frequently to her into [facility], she thinks her sugar was in 90s at that time. Lisseth Pichardo woke up on the floor, no recollection of how she fell. Mahsa Turner done suggest she has probably acute L1 fracture; confirmed by CT-but it suggested likely met with fracture      Review of Systems:     Constitutional:  negative for chills, fevers, sweats  Respiratory:  negative for cough, dyspnea on exertion, hemoptysis, shortness of breath, wheezing  Cardiovascular:  negative for chest pain, chest pressure/discomfort, lower extremity edema, palpitations  Gastrointestinal:  negative for abdominal pain, constipation, diarrhea, nausea, vomiting  Neurological:  negative for dizziness, headache    Medications:      Allergies:  No Known Allergies    Current Meds:   Scheduled Meds:    lisinopril  5 mg Oral BID    regular rate and rhythm, no murmur  Abdomen:  soft, nontender, nondistended, normal bowel sounds, no masses, hepatomegaly, splenomegaly  Extremities:  no edema, redness, tenderness in the calves,+ edema  Skin:  no gross lesions, rashes, induration  Mild tenderness at surgical site which is dressed  Assessment:        Primary Problem  Pathologic fracture of lumbar vertebra, initial encounter  Renal CA with mets to bone, liver, and intraperitoneal-Status post kidney biopsy(1/22/17)-renal mass biopsy: clear cell renal cell carcinoma  Klebsiella UTI  Active Hospital Problems    Diagnosis Date Noted    Renal cell carcinoma ([address] [C64.9]     Acute cystitis without hematuria [N30.00] 01/20/2018    Renal mass [N28.89]     Cancer, metastatic to bone ([address] [C79.51]     Stage 4 chronic kidney disease ([address] [N18.4] 01/19/2018    Syncope and collapse [R55] 01/19/2018    Hyperkalemia [E87.5] 01/19/2018    Anemia due to chronic kidney disease [N18.9, D63.1] 01/19/2018    Pathologic fracture of lumbar vertebra, initial encounter [M84.48XA]     Secondary malignant neoplasm of lumbar vertebral column ([address] [C79.51]     Diabetes mellitus ([address] [E11.9] 08/21/2012       Plan:      Plans for systemic treatment prior to cytoreduction noted  Resume home dose of insulins at discharge   Change antibiotic to oral Ceftin, will need one week antibiotics treatment  Will request neurosurgery will change steroids to oral  Discharge home with home care, hospice team will be following the patient for future plan of action    Carmen Whitten was evaluated today and a DME order was entered for a wheeled walker because she requires this to successfully complete daily living tasks of personal cares and ambulating.   A wheeled walker is necessary due to the patient's unsteady gait, upper body weakness, and inability to  an ambulation device; and she can ambulate only by pushing a walker instead of a lesser assistive device such as a cane, crutch, or standard walker. The need for this equipment was discussed with the patient and she understands and is in agreement.         Flavio Costa MD  1/27/2018  2:57 PM

## 2018-01-31 NOTE — PROGRESS NOTES
Visit Information    Have you changed or started any medications since your last visit including any over-the-counter medicines, vitamins, or herbal medicines? no   Are you having any side effects from any of your medications? -  no  Have you stopped taking any of your medications? Is so, why? -  no    Have you seen any other physician or provider since your last visit? Yes - Records Obtained  Have you had any other diagnostic tests since your last visit? Yes - Records Obtained  Have you been seen in the emergency room and/or had an admission to a hospital since we last saw you? Yes - Records Obtained  Have you had your routine dental cleaning in the past 6 months? yes -     Have you activated your [employer] account? If not, what are your barriers?  No:      Patient Care Team:  Oscar Arita MD as PCP - General (Family Medicine)  Anna Truong MD as Consulting Physician (Endocrinology)  Tomi Lane MD as Surgeon (Radiation Oncology)  Sam Weathers RN as Nurse Navigator (Oncology)    Medical History Review  Past Medical, Family, and Social History reviewed and  contribute to the patient presenting condition    Health Maintenance   Topic Date Due    Cervical cancer screen  02/21/1978    Zostavax vaccine  02/21/2017    Diabetic retinal exam  05/05/2017    Diabetic foot exam  07/25/2017    Pneumococcal highest risk (2 of 3 - PCV13) 07/25/2017    Breast cancer screen  12/08/2017    Lipid screen  08/21/2018    A1C test (Diabetic or Prediabetic)  01/19/2019    Potassium monitoring  01/27/2019    Creatinine monitoring  01/27/2019    Colon cancer screen colonoscopy  08/16/2019    DTaP/Tdap/Td vaccine (2 - Td) 12/30/2025    Flu vaccine  Completed    Hepatitis C screen  Completed    HIV screen  Completed

## 2018-02-07 NOTE — PROGRESS NOTES
I saw on examine the patient the office today. She states that she is able to cannulate for short distances but still has significant amount of axial pain. She is using a walker and feels a bit unsteady but mainly was prescribed from the therapist prior to discharge. No new numbness tingling weakness incontinence or retention. On exam she is 5 out of 5 in bilateral lower extremities with no sensory deficits. Reflexes are 2 out of 4. Stab incisions paraspinal appeared to be completely healed. L1 pathologic compression fracture due to metastatic renal cell carcinoma status post MIS fixation. Patient is healing appropriately and doing well overall. She has appropriate incisional pain. Despite I am okay with any type of radiation or chemotherapy that needs to be initiated for her overall therapy. I would recommend continuation of the 4 mg Decadron per day. I will allow for adjustment of the steroid dosing and management per relation oncology after they have begun radiation. We will also provide her a refill of the oxycodone tens for 1 more week today. I've advised her that is okay for her to drive considering she has no motor or sensory deficits in her lower extremities and her overall pain is controlled. Regarding the walker advised her to ask physical therapy about this when they see her tomorrow.

## 2018-02-08 NOTE — TELEPHONE ENCOUNTER
disease     Pathologic fracture of lumbar vertebra, initial encounter     Secondary malignant neoplasm of lumbar vertebral column ([facility]     Acute cystitis without hematuria     Renal mass     Cancer, metastatic to bone [facility])     Renal cell carcinoma ([facility]

## 2018-02-09 NOTE — PROGRESS NOTES
must have true metrix brand 1 kit 0    Lancets MISC Patient testing 3 times daily 100 each 3    glucose blood VI test strips (ASCENSIA AUTODISC VI;ONE TOUCH ULTRA TEST VI) strip Use with associated glucose meter. Patient testing three times daily 100 strip 3    docusate sodium (COLACE) 100 MG capsule Take 2 capsules by mouth 2 times daily 120 capsule 11    polyethylene glycol (MIRALAX) powder Take 17 g by mouth daily 510 g 11    carvedilol (COREG) 6.25 MG tablet Take 1 tablet by mouth 2 times daily (with meals) 60 tablet 3    lisinopril (PRINIVIL;ZESTRIL) 5 MG tablet Take 1 tablet by mouth 2 times daily 30 tablet 3    dexamethasone (DECADRON) 4 MG tablet Take 1 tablet by mouth daily for 14 days (Patient taking differently: Take 2 mg by mouth daily ) 14 tablet 0    levothyroxine (SYNTHROID) 100 MCG tablet Take 1 tablet by mouth daily 90 tablet 3    omeprazole (PRILOSEC) 20 MG delayed release capsule Take 1 capsule by mouth daily 90 capsule 3    atorvastatin (LIPITOR) 20 MG tablet TAKE 1 TABLET BY MOUTH ONE TIME A DAY  90 tablet 3    insulin aspart (NOVOLOG FLEXPEN) 100 UNIT/ML injection pen Inject into the skin 2 times daily (with meals) Taking 3 units at lunch and 6 units at supper      Insulin Pen Needle 32G X 4 MM MISC 2 each by Does not apply route 2 times daily      glyBURIDE (DIABETA) 5 MG tablet TAKE 2 TABLETS BY MOUTH TWO TIMES A  tablet 3    amitriptyline (ELAVIL) 75 MG tablet Take 1 tablet by mouth nightly 90 tablet 3    amLODIPine (NORVASC) 10 MG tablet Take 1 tablet by mouth daily 90 tablet 3    acetaminophen (TYLENOL) 500 MG tablet Take 1,000 mg by mouth 3 times daily         Review of patient's allergies indicates no known allergies.   History   Smoking Status    Never Smoker   Smokeless Tobacco    Never Used       History   Alcohol Use No       REVIEW OF SYSTEMS:  Constitutional: positive for  malaise  Eyes: negative  Respiratory: negative  Cardiovascular: negative  Gastrointestinal: negative  Genitourinary: negative  Musculoskeletal: positive for  pain  Skin: negative   Neurological: negative  Hematological/Lymphatic: negative  Psychological: negative    Physical Exam:    This a 63 y.o. female      Vitals:    02/09/18 0917   BP: 138/67   Pulse: 77   Temp: 98.9 °F (37.2 °C)     Patient is a 63 y.o. female in no acute distress and alert and oriented to person, place and time. Abdomen soft, non-tender, non-distended        Assessment and Plan      1. Renal cell carcinoma of right kidney (HCC)           Plan:       I dicussed at length risks and benefits of cytoreductive nephrectomy give her disease burden. Also discussed risk of worsening renal function given her baseline CKD  Will obtain MRI of abdomen to delineate caval involvement  Will discuss her case at tumor board and discuss with radiation and medical oncology and the role of neoadjuvant as well as adjuvant chemo. We will ask her PCP for pre-op clearance       I have discussed the care of this patient including pertinent history and exam findings, with the resident. I have seen and examined the patient and the key elements of all parts of the encounter have been performed by me. I agree with the assessment, plan and orders as documented by the resident.   Joe Reyes M.D

## 2018-02-15 NOTE — PROGRESS NOTES
PHYSICAL EXAM:   General appearance - well appearing, no in pain or distress   Mental status - alert and cooperative   Eyes - pupils equal and reactive, extraocular eye movements intact   Ears - bilateral TM's and external ear canals normal   Mouth - mucous membranes moist, pharynx normal without lesions   Neck - supple, no significant adenopathy   Lymphatics - no palpable lymphadenopathy, no hepatosplenomegaly   Chest - clear to auscultation, no wheezes, rales or rhonchi, symmetric air entry   Heart - normal rate, regular rhythm, normal S1, S2, no murmurs, rubs, clicks or gallops   Abdomen - soft, nontender, nondistended, no masses or organomegaly   Surgical scar noted on back healing well  Neurological - alert, oriented, normal speech, no focal findings or movement disorder noted   Musculoskeletal - no joint tenderness, deformity or swelling   Extremities - peripheral pulses normal, no pedal edema, no clubbing or cyanosis   Skin - normal coloration and turgor, no rashes, no suspicious skin lesions noted   LABORATORY DATA:     Lab Results   Component Value Date    WBC 9.8 02/13/2018    HGB 8.0 (L) 02/13/2018    HCT 28.8 (L) 02/13/2018    MCV 80.7 (L) 02/13/2018     02/13/2018    LYMPHOPCT 18 (L) 01/31/2018    RBC 3.57 (L) 02/13/2018    MCH 22.4 (L) 02/13/2018    MCHC 27.8 (L) 02/13/2018    RDW 21.5 (H) 02/13/2018    MONOPCT 9 (H) 01/31/2018    BASOPCT 0 01/31/2018    NEUTROABS 11.14 (H) 01/31/2018    LYMPHSABS 2.83 01/31/2018    MONOSABS 1.41 (H) 01/31/2018    EOSABS 0.16 01/31/2018    BASOSABS 0.00 01/31/2018         Chemistry        Component Value Date/Time     01/31/2018 1500    K 4.8 01/31/2018 1500     01/31/2018 1500    CO2 22 01/31/2018 1500    BUN 42 (H) 01/31/2018 1500    CREATININE 1.92 (H) 01/31/2018 1500        Component Value Date/Time    CALCIUM 9.0 01/31/2018 1500    ALKPHOS 65 01/31/2018 1500    AST 8 01/31/2018 1500    ALT 6 01/31/2018 1500    BILITOT 0.27 (L) 01/31/2018

## 2018-02-15 NOTE — TELEPHONE ENCOUNTER
[address] [facility] MD VISIT  DR Osbaldo Rush IN TO SEE PATIENT  ORDERS RECEIVED  SCRIPT FOR SUTENT TO TRIAGE OFFICE PATIENT WILL NEED SCHEDULED FOR CHEMO TEACHING PATIENT INSTRUCTED TO CALL OFFICE WHEN DRUG RECEIVED FOR TEACHING APPOINTMENT  RV 2 WEEKS MD VISIT 2/28/18 @ 3:20PM  AVS PRINTED AND GIVEN TO PATIENT W/ INSTRUCTIONS  PATIENT DISCHARGED

## 2018-02-23 NOTE — TELEPHONE ENCOUNTER
PATIENT NEEDS A CBC AN BUN LABS ORDERED FOR HER MRI PATIENT LEVELS WERE HIGH LAST APPOINTMENT AND PATIENT WAS SENT HOME.

## 2018-03-03 PROBLEM — N17.9 ACUTE KIDNEY INJURY (HCC): Status: ACTIVE | Noted: 2018-01-01

## 2018-03-03 NOTE — ED PROVIDER NOTES
that she is suffering from some renal failure, so we will get an EKG, repeat the labs and start the patient on some IV fluids    DIAGNOSTIC RESULTS     EKG: All EKG's are interpreted by the Emergency Department Physician who either signs or Co-signs this chart in the absence of a cardiologist.      Interpreted by Philip Sage MD     Rhythm: normal sinus   Rate: 82  Axis: -5  Ectopy: none  Conduction: normal  ST Segments: no acute change  T Waves: no acute change  Q Waves: none    Clinical Impression: normal sinus rhythm with no acute changes/normal EKG. No acute infarction/ischemia noted.         LABS:  Results for orders placed or performed during the hospital encounter of 06/82/41   Basic Metabolic Panel   Result Value Ref Range    Glucose 63 (L) 70 - 99 mg/dL    BUN 41 (H) 8 - 23 mg/dL    CREATININE 2.90 (H) 0.50 - 0.90 mg/dL    Bun/Cre Ratio NOT REPORTED 9 - 20    Calcium 9.7 8.6 - 10.4 mg/dL    Sodium 137 135 - 144 mmol/L    Potassium 5.3 3.7 - 5.3 mmol/L    Chloride 98 98 - 107 mmol/L    CO2 25 20 - 31 mmol/L    Anion Gap 14 9 - 17 mmol/L    GFR Non-African American 17 (L) >60 mL/min    GFR African American 20 (L) >60 mL/min    GFR Comment          GFR Staging NOT REPORTED    CBC Auto Differential   Result Value Ref Range    WBC 4.8 3.5 - 11.0 k/uL    RBC 3.73 (L) 4.0 - 5.2 m/uL    Hemoglobin 8.4 (L) 12.0 - 16.0 g/dL    Hematocrit 28.2 (L) 36 - 46 %    MCV 75.6 (L) 80 - 100 fL    MCH 22.6 (L) 26 - 34 pg    MCHC 29.9 (L) 31 - 37 g/dL    RDW 22.3 (H) 12.5 - 15.4 %    Platelets 646 093 - 321 k/uL    MPV 7.1 6.0 - 12.0 fL    NRBC Automated NOT REPORTED per 100 WBC    Differential Type NOT REPORTED     Immature Granulocytes NOT REPORTED 0 %    Absolute Immature Granulocyte NOT REPORTED 0.00 - 0.30 k/uL    WBC Morphology NOT REPORTED     RBC Morphology NOT REPORTED     Platelet Estimate NOT REPORTED     Seg Neutrophils 71 (H) 36 - 66 %    Lymphocytes 17 (L) 24 - 44 %    Monocytes 5 1 - 7 %    Eosinophils % 6 (H) 1 - 4 %    Basophils 1 0 - 2 %    Segs Absolute 3.40 1.8 - 7.7 k/uL    Absolute Lymph # 0.82 (L) 1.0 - 4.8 k/uL    Absolute Mono # 0.24 0.1 - 0.8 k/uL    Absolute Eos # 0.29 0.0 - 0.4 k/uL    Basophils # 0.05 0.0 - 0.2 k/uL    Morphology MICROCYTOSIS PRESENT    Troponin   Result Value Ref Range    Troponin T <0.03 <0.03 ng/mL    Troponin Interp         Magnesium   Result Value Ref Range    Magnesium 2.2 1.6 - 2.6 mg/dL   EKG 12 Lead   Result Value Ref Range    Ventricular Rate 82 BPM    Atrial Rate 82 BPM    P-R Interval 168 ms    QRS Duration 82 ms    Q-T Interval 382 ms    QTc Calculation (Bazett) 446 ms    P Axis 33 degrees    R Axis -5 degrees    T Axis 14 degrees       The patient is noted to have some renal insufficiency with an elevation in her BUN and creatinine. The patient further has some anemia that does appear to be chronic in nature. In reviewing the previous labs    EMERGENCY DEPARTMENT COURSE:   Vitals:    Vitals:    03/03/18 1616 03/03/18 1617 03/03/18 1700 03/03/18 1739   BP: (!) 149/48 (!) 149/48  (!) 138/53   Pulse:  87 86 88   Resp:  16 12 15   Temp:  97.6 °F (36.4 °C)     TempSrc:  Oral     SpO2: 98% 97%  98%   Weight:  95.3 kg (210 lb)     Height:  5' 8\" (1.727 m)       -------------------------  BP: (!) 138/53, Temp: 97.6 °F (36.4 °C), Pulse: 88, Resp: 15      RE-EVALUATION:  In reviewing the labs, the patient's appears to have suffered an acute kidney injury given the patient's symptoms of overall body cramping the worsening kidney function, I do feel that she warrants admission to the hospital setting. The patient previously, but has been admitted to [place] [address], so I did contact the hospitalist at [place] [address] and Dr. Vanessa Foreman is kind enough to admit the patient to his service    CONSULTS:  Dr. Vanessa Foreman is kind enough to admit the patient to his service. PROCEDURES:  None    FINAL IMPRESSION      1.  Acute kidney injury ([facility] [address]

## 2018-03-03 NOTE — ED NOTES
Patient up to the bathroom with assist to attempt to void, using walker to ambulate. No success with specimen for lab.             Germán Sanchez RN  03/03/18 8091

## 2018-03-03 NOTE — ED NOTES
Pt placed on cardiac monitor and continuous pulse oximetry      Gaby Anthony, RN  03/03/18 302 Alison Larsen RN  03/03/18 8396

## 2018-03-04 NOTE — CONSULTS
Prevoid bladder volume is 401 mL.  Negligible postvoid   residual urine is noted.  Only a left ureteral jet is present.           Impression   1.  Abnormal appearing right kidney, enlarged, with appearance of multiple   cysts in the upper pole.  Hydronephrosis is difficult to exclude.  No right   ureteral jet is noted. Right nephrolithiasis is seen.       2.   Isoechoic lesion left kidney with solid mass not excluded.       3.   Bladder is unremarkable but only a left ureteral jet is noted.       RECOMMENDATIONS:   If patient's renal function will permit, would advise CT urogram.  Otherwise,   MRI imaging of the kidneys could be obtained without IV contrast to assess   for cystic versus solid lesions in the kidneys. CXR central pulm vascular congestion , cardiomegaly    Assessment:  1. RASHMI sec to ATN/CRS1 from diastolic CHF exacerbation   2. CKD stage IV secondary to diabetic nephropathy  3. Newly (Jan 2018) detected large right renal mass estimated to be around 11 x 11 cm and replacing the right kidney, radiologically consistent with renal cell carcinoma. inferior vena cava invasion. Intraperitoneal metastasis, possible liver metastasis, L1 metastasis with pathological compression fracture causing spinal stenosis, S/p Silvia Bx C/W Clear cell Ca. Following with Hem/Onc, Radiation Onc.   4. Diabetes mellitus  5. Essential hypertension - decent control. 6. Diastolic CHF excaerbation  7  Hypervolemic hyponatremia   8. Recent UTI Jan 2018 was on cefriaxone    Plan:  1. Lasix 40mg iv daily  2. Send UA  3. Hold lisinopril  4. Avoid nephrotoxic agents, contrast exposure  5. Daily weights, strict I/O, daily BMP  6. Repeat US renal  7. Following   Thank you for the consultation. Please do not hesitate to call with questions.   Will discuss with Dr. Yunior Luu  Electronically signed by LILIANA Peterson on 3/4/2018 at 2:59 PM     Attending Physician Statement  I have discussed the care of Adarsh Miranda, including

## 2018-03-04 NOTE — PLAN OF CARE
[address]    Second Visit Note  For more detailed information please refer to the progress note of the day      3/4/2018    4:56 PM    Name:   Gaetano Cantor  MRN:     [id number]     [place]:      [de-identified]   Room:   6181/0225-16   Day:  1  Admit Date:  3/3/2018  9:26 PM    PCP:   Emelda Hodgkin, MD  Code Status:  Full Code        Pt vitals were reviewed   New labs were reviewed   Patient was seen    Updated plan :     1. Renal Function worsened. Nephrology consult placed.    2. Cont current management        Joe Vann MD  3/4/2018  4:56 PM

## 2018-03-04 NOTE — H&P
pressure)     History of blood transfusion     no reaction    Hyperlipidemia     Hyperplastic polyp of intestine     Hypothyroid 11/26/2016    Hypothyroidism     Left knee pain     Non-smoker     OA (osteoarthritis)     bilat knees    Tubular adenoma         Past Surgical History:     Past Surgical History:   Procedure Laterality Date    BACK SURGERY N/A 01/23/2018    Post T11-L2 Fusion    COLONOSCOPY  08/16/2016    hyperplastic polyp and tubular adenoma     HYSTERECTOMY      DC LUMBAR SPINE FUSN,POST INTRBDY N/A 1/23/2018    T11-L3 POSTERIOR FIXATION AND FUSION, SPINE ABBY C-ARM, O-ARM WITH STEALTH,  EVOKES- [id number] LJ performed by Angela Willis DO at [address] [place] [address]          Medications Prior to Admission:     Prior to Admission medications    Medication Sig Start Date End Date Taking? Authorizing Provider   oxyCODONE HCl (OXY-IR) 10 MG immediate release tablet Take 1 tablet by mouth every 6 hours as needed for Pain for up to 30 days. 2/15/18 3/17/18  Antwon Rodriguez MD   PAZOPanib HCl (VOTRIENT) 200 MG chemo tablet Take 4 tablets by mouth daily 2/15/18   Antwon Rodriguez MD   insulin glargine (LANTUS) 100 UNIT/ML injection vial Inject 60 Units into the skin 2 times daily 2/9/18   Kumar Gomez MD   Blood Glucose Monitoring Suppl (TRUE METRIX METER) w/Device KIT  2/2/18   Historical Provider, MD   glucose monitoring kit (FREESTYLE) monitoring kit Patient testing 3 times daily, must have true metrix brand 2/2/18   Kumar Gomez MD   [facility] testing 3 times daily 2/2/18   Kumar Gomez MD   glucose blood VI test strips (ASCENSIA AUTODISC VI;ONE TOUCH ULTRA TEST VI) strip Use with associated glucose meter.  Patient testing three times daily 2/2/18   Kumar Gomez MD   docusate sodium (COLACE) 100 MG capsule Take 2 capsules by mouth 2 times daily 1/31/18   Kumar Gomez MD   polyethylene glycol [facility]) powder Take 17 g by mouth daily 1/31/18   Kumar Gomez described in HPI. CONSTITUTIONAL:  negative for fevers, chills, sweats, fatigue, weight loss  HEENT:  negative for vision, hearing changes, runny nose, throat pain  RESPIRATORY:  negative for shortness of breath, cough, congestion, wheezing. CARDIOVASCULAR:  negative for chest pain, palpitations. GASTROINTESTINAL:  negative for nausea, vomiting, diarrhea, constipation, change in bowel habits, abdominal pain   GENITOURINARY:  negative for difficulty of urination, burning with urination, frequency   INTEGUMENT:  negative for rash, skin lesions, easy bruising   HEMATOLOGIC/LYMPHATIC:  negative for swelling/edema   ALLERGIC/IMMUNOLOGIC:  negative for urticaria , itching  ENDOCRINE:  negative increase in drinking, increase in urination, hot or cold intolerance  MUSCULOSKELETAL:  ++back pain  NEUROLOGICAL:  negative for headaches, dizziness, lightheadedness, numbness, pain, tingling extremities  BEHAVIOR/PSYCH:  negative for depression, anxiety    Physical Exam:   BP (!) 133/52   Pulse 78   Temp 98.2 °F (36.8 °C) (Oral)   Resp 17   Ht 5' 8\" (1.727 m)   Wt 214 lb 6.4 oz (97.3 kg)   SpO2 95%   BMI 32.60 kg/m²   Temp (24hrs), Av.8 °F (36.6 °C), Min:97.5 °F (36.4 °C), Max:98.2 °F (36.8 °C)    Recent Labs      18   0302  18   0322  18   0337  18   0429   POCGLU  33*  43*  36*  101     No intake or output data in the 24 hours ending 18 0847    General Appearance:  alert, moderately fatigued, in no acute distress. Mental status: oriented to person, place, and time with normal affect  Head:  normocephalic, atraumatic.   Eye: no icterus, redness, pupils equal and reactive, extraocular eye movements intact, conjunctiva clear  Ear: normal external ear, no discharge, hearing intact  Nose:  no drainage noted  Mouth: mucous membranes moist  Neck: supple, no carotid bruits, thyroid not palpable  Lungs: Bilateral equal air entry, clear to ausculation, no wheezing, rales or rhonchi, normal effort  Cardiovascular: normal rate, regular rhythm, no murmur, gallop, rub.   Abdomen: Soft, nontender, nondistended, normal bowel sounds, no hepatomegaly or splenomegaly  Neurologic: There are no new focal motor or sensory deficits, normal muscle tone and bulk, no abnormal sensation, normal speech, cranial nerves II through XII grossly intact  Skin: No gross lesions, rashes, bruising or bleeding on exposed skin area  Extremities:  peripheral pulses palpable, 1+ pitting edema B/L LEs  Psych: normal affect    Investigations:      Laboratory Testing:  Recent Results (from the past 24 hour(s))   EKG 12 Lead    Collection Time: 03/03/18  4:51 PM   Result Value Ref Range    Ventricular Rate 82 BPM    Atrial Rate 82 BPM    P-R Interval 168 ms    QRS Duration 82 ms    Q-T Interval 382 ms    QTc Calculation (Bazett) 446 ms    P Axis 33 degrees    R Axis -5 degrees    T Axis 14 degrees   Basic Metabolic Panel    Collection Time: 03/03/18  5:00 PM   Result Value Ref Range    Glucose 63 (L) 70 - 99 mg/dL    BUN 41 (H) 8 - 23 mg/dL    CREATININE 2.90 (H) 0.50 - 0.90 mg/dL    Bun/Cre Ratio NOT REPORTED 9 - 20    Calcium 9.7 8.6 - 10.4 mg/dL    Sodium 137 135 - 144 mmol/L    Potassium 5.3 3.7 - 5.3 mmol/L    Chloride 98 98 - 107 mmol/L    CO2 25 20 - 31 mmol/L    Anion Gap 14 9 - 17 mmol/L    GFR Non-African American 17 (L) >60 mL/min    GFR African American 20 (L) >60 mL/min    GFR Comment          GFR Staging NOT REPORTED    CBC Auto Differential    Collection Time: 03/03/18  5:00 PM   Result Value Ref Range    WBC 4.8 3.5 - 11.0 k/uL    RBC 3.73 (L) 4.0 - 5.2 m/uL    Hemoglobin 8.4 (L) 12.0 - 16.0 g/dL    Hematocrit 28.2 (L) 36 - 46 %    MCV 75.6 (L) 80 - 100 fL    MCH 22.6 (L) 26 - 34 pg    MCHC 29.9 (L) 31 - 37 g/dL    RDW 22.3 (H) 12.5 - 15.4 %    Platelets 792 790 - 120 k/uL    MPV 7.1 6.0 - 12.0 fL    NRBC Automated NOT REPORTED per 100 WBC    Differential Type NOT REPORTED     Immature Granulocytes NOT REPORTED 0 % 4:29 AM   Result Value Ref Range    POC Glucose 101 65 - 105 mg/dL   Comprehensive Metabolic Panel w/ Reflex to MG    Collection Time: 03/04/18  6:55 AM   Result Value Ref Range    Glucose 74 70 - 99 mg/dL    BUN 38 (H) 8 - 23 mg/dL    CREATININE 2.80 (H) 0.50 - 0.90 mg/dL    Bun/Cre Ratio NOT REPORTED 9 - 20    Calcium 9.3 8.6 - 10.4 mg/dL    Sodium 134 (L) 135 - 144 mmol/L    Potassium 4.6 3.7 - 5.3 mmol/L    Chloride 98 98 - 107 mmol/L    CO2 23 20 - 31 mmol/L    Anion Gap 13 9 - 17 mmol/L    Alkaline Phosphatase 61 35 - 104 U/L    ALT <5 (L) 5 - 33 U/L    AST 7 <32 U/L    Total Bilirubin 0.23 (L) 0.3 - 1.2 mg/dL    Total Protein 5.7 (L) 6.4 - 8.3 g/dL    Alb 2.9 (L) 3.5 - 5.2 g/dL    Albumin/Globulin Ratio 1.0 1.0 - 2.5    GFR Non-African American 17 (L) >60 mL/min    GFR  21 (L) >60 mL/min    GFR Comment          GFR Staging NOT REPORTED    Magnesium    Collection Time: 03/04/18  6:55 AM   Result Value Ref Range    Magnesium 2.2 1.6 - 2.6 mg/dL   CBC    Collection Time: 03/04/18  6:55 AM   Result Value Ref Range    WBC 4.3 3.5 - 11.3 k/uL    RBC 3.53 (L) 3.95 - 5.11 m/uL    Hemoglobin 7.9 (L) 11.9 - 15.1 g/dL    Hematocrit 28.9 (L) 36.3 - 47.1 %    MCV 81.9 (L) 82.6 - 102.9 fL    MCH 22.4 (L) 25.2 - 33.5 pg    MCHC 27.3 (L) 28.4 - 34.8 g/dL    RDW 20.7 (H) 11.8 - 14.4 %    Platelets 085 657 - 051 k/uL    MPV 9.4 8.1 - 13.5 fL    NRBC Automated 0.0 0.0 per 100 WBC   Protime-INR    Collection Time: 03/04/18  6:55 AM   Result Value Ref Range    Protime 10.5 9.0 - 12.0 sec    INR 1.0        Imaging/Diagnostics:    RETROPERITONEAL ULTRASOUND OF THE KIDNEYS AND URINARY BLADDER   3/4/2018  Impression 1. Abnormal appearing right kidney, enlarged, with appearance of multiple cysts in the upper pole. Hydronephrosis is difficult to exclude. No right ureteral jet is noted. Right nephrolithiasis is seen. 2.  Isoechoic lesion left kidney with solid mass not excluded.    3.  Bladder is unremarkable but only patient if care not provided in a hospital setting.     Jacqueline Benavides MD  3/4/2018  8:47 AM    Copy sent to Dr. Delma Stafford MD

## 2018-03-05 NOTE — CONSULTS
Today's Date: 3/4/2018  Patient Name: Patricia Byrnes  Date of admission: 3/3/2018  9:26 PM  Patient's age: 66 y.o., 1955  Admission Dx: Acute kidney injury ([facility] [address] [N17.9]    Reason for Consult: Metastatic RCC  Requesting Physician: Teresita Kumari MD    CHIEF COMPLAINT:  RASHMI    History Obtained From:  Pt and chart    HISTORY OF PRESENT ILLNESS:      This is a 65 YO female with recently diagnose stage IV RCC was admitted with elevated creatinine. She has recent diagnosis of metastatic RCC. She had L1 bone mets and is receiving palliative chemotherapy and will finish tomorrow. She has follow up apptt with urology to discuss cytoreductive nephrectomy. Plan for TKI as o/p. Past Medical History:   has a past medical history of Cancer ([facility] [address] Chronic kidney disease; Depression; Diabetes mellitus ([facility] [address] GERD (gastroesophageal reflux disease); HA (headache); HBP (high blood pressure); History of blood transfusion; Hyperlipidemia; Hyperplastic polyp of intestine; Hypothyroid; Hypothyroidism; Left knee pain; Non-smoker; OA (osteoarthritis); and Tubular adenoma. Past Surgical History:   has a past surgical history that includes Hysterectomy; Tonsillectomy; Colonoscopy (08/16/2016); back surgery (N/A, 01/23/2018); and pr lumbar spine fusn,post intrbdy (N/A, 1/23/2018). Medications:    Prior to Admission medications    Medication Sig Start Date End Date Taking? Authorizing Provider   oxyCODONE HCl (OXY-IR) 10 MG immediate release tablet Take 1 tablet by mouth every 6 hours as needed for Pain for up to 30 days.  2/15/18 3/17/18  Yobany Chow MD   PAZOPanib HCl (VOTRIENT) 200 MG chemo tablet Take 4 tablets by mouth daily 2/15/18   Yobany Chow MD   insulin glargine (LANTUS) 100 UNIT/ML injection vial Inject 60 Units into the skin 2 times daily 2/9/18   Suly Winchester MD   Blood Glucose Monitoring Suppl (TRUE METRIX METER) w/Device KIT  2/2/18   Historical Provider, MD   glucose monitoring kit (FREESTYLE) (97.3 kg)   SpO2 95%   BMI 32.60 kg/m²    Temp (24hrs), Av.9 °F (36.6 °C), Min:97.5 °F (36.4 °C), Max:98.2 °F (36.8 °C)    General appearance - well appearing, no in pain or distress   Mental status - alert and cooperative   Eyes - pupils equal and reactive, extraocular eye movements intact   Ears - bilateral TM's and external ear canals normal   Mouth - mucous membranes moist, pharynx normal without lesions   Neck - supple, no significant adenopathy   Lymphatics - no palpable lymphadenopathy, no hepatosplenomegaly   Chest - clear to auscultation, no wheezes, rales or rhonchi, symmetric air entry   Heart - normal rate, regular rhythm, normal S1, S2, no murmurs  Abdomen - soft, nontender, nondistended, no masses or organomegaly   Neurological - alert, oriented, normal speech, no focal findings or movement disorder noted   Musculoskeletal - no joint tenderness, deformity or swelling   Extremities - peripheral pulses normal, + pedal edema, no clubbing or cyanosis   Skin - normal coloration and turgor, no rashes, no suspicious skin lesions noted ,    DATA:    Labs:   CBC:   Recent Labs      18   1700  18   0655   WBC  4.8  4.3   HGB  8.4*  7.9*   HCT  28.2*  28.9*   PLT  336  302     BMP:   Recent Labs      18   0655  18   1335   NA  134*  132*   K  4.6  4.9   CO2  23  24   BUN  38*  38*   CREATININE  2.80*  2.96*   LABGLOM  17*  16*   GLUCOSE  74  98     PT/INR:   Recent Labs      18   0655   PROTIME  10.5   INR  1.0       IMAGING DATA:  Reviewed  Primary Problem  Acute kidney injury [facility])    Active Hospital Problems    Diagnosis Date Noted    Acute kidney injury ([address] [N17.9] 2018    Renal cell carcinoma ([facility] [address] [C64.9]     Cancer, metastatic to bone ([facility] [address] [C79.51]     Hyperkalemia [E87.5] 2018    Anemia due to chronic kidney disease [N18.9, D63.1] 2018    Stage 4 chronic kidney disease ([address] [N18.4] 2018    Diabetes mellitus ([address] [E11.9] 2012

## 2018-03-05 NOTE — PROGRESS NOTES
tablet Take 1 tablet by mouth 2 times daily (with meals) 1/27/18   Kalyani Sampson MD   lisinopril (PRINIVIL;ZESTRIL) 5 MG tablet Take 1 tablet by mouth 2 times daily 1/27/18   Kalyani Sampson MD   levothyroxine (SYNTHROID) 100 MCG tablet Take 1 tablet by mouth daily 11/16/17   Jordan Messina MD   omeprazole (PRILOSEC) 20 MG delayed release capsule Take 1 capsule by mouth daily 9/7/17   Jordan Messina MD   atorvastatin (LIPITOR) 20 MG tablet TAKE 1 TABLET BY MOUTH ONE TIME A DAY  7/27/17   Adi Reyna MD   insulin aspart (NOVOLOG FLEXPEN) 100 UNIT/ML injection pen Inject into the skin 2 times daily (with meals) Taking 3 units at lunch and 6 units at supper    Historical Provider, MD   Insulin Pen Needle 32G X 4 MM MISC 2 each by Does not apply route 2 times daily    Historical Provider, MD   glyBURIDE (DIABETA) 5 MG tablet TAKE 2 TABLETS BY MOUTH TWO TIMES A DAY 4/13/17   Adi Reyna MD   amitriptyline (ELAVIL) 75 MG tablet Take 1 tablet by mouth nightly 4/13/17   Adi Reyna MD   amLODIPine (NORVASC) 10 MG tablet Take 1 tablet by mouth daily 4/13/17   Adi Reyna MD   acetaminophen (TYLENOL) 500 MG tablet Take 1,000 mg by mouth 3 times daily    Historical Provider, MD   rizatriptan (MAXALT) 10 MG tablet Take 1 tablet by mouth once as needed for Migraine for up to 1 dose.  May repeat in 2 hours if needed 11/10/14 1/18/18  Adi Reyna MD     Current Facility-Administered Medications   Medication Dose Route Frequency Provider Last Rate Last Dose    cyclobenzaprine (FLEXERIL) tablet 5 mg  5 mg Oral BID PRN Kalpesh Grant MD   5 mg at 03/05/18 1038    insulin lispro (HUMALOG) injection vial 0-6 Units  0-6 Units Subcutaneous TID  Joe Vann MD        insulin lispro (HUMALOG) injection vial 0-3 Units  0-3 Units Subcutaneous Nightly Joe Vann MD   1 Units at 03/04/18 2102    furosemide (LASIX) injection 40 mg  40 mg Intravenous Daily Merline Alberts, MD   40 mg at 03/05/18 0909   

## 2018-03-05 NOTE — PROGRESS NOTES
POCGLU  101   --   80  107*  98  192*  81         Lab Results   Component Value Date/Time    SPECIAL NOT REPORTED 03/04/2018 07:26 PM     Lab Results   Component Value Date/Time    CULTURE CULTURE IN PROGRESS 03/04/2018 07:26 PM    CULTURE  03/04/2018 07:26 PM     [facility] [id number] [facility], [address] [place] [address] [phone]       Lab Results   Component Value Date    PHART 7.292 01/23/2018    VHM6XVP 39.0 01/23/2018    PO2ART 159.0 01/23/2018    RKE6QXB 18.3 01/23/2018    NBEA 7.2 01/23/2018    PBEA NOT REPORTED 01/23/2018    M1OBZEXC 99.5 01/23/2018    FIO2 40 01/23/2018       Radiology:    RETROPERITONEAL ULTRASOUND OF THE KIDNEYS AND URINARY BLADDER   3/4/2018  Impression 1. Abnormal appearing right kidney, enlarged, with appearance of multiple cysts in the upper pole.  Hydronephrosis is difficult to exclude.  No right ureteral jet is noted. Right nephrolithiasis is seen.   2.  Isoechoic lesion left kidney with solid mass not excluded.   3.  Bladder is unremarkable but only a left ureteral jet is noted.   RECOMMENDATIONS: If patient's renal function will permit, would advise CT urogram.  Otherwise, MRI imaging of the kidneys could be obtained without IV contrast to assess for cystic versus solid lesions in the kidneys. Physical Examination:        General appearance:  alert, cooperative and no distress  Mental Status:  oriented to person, place and time and normal affect  Lungs:  clear to auscultation bilaterally, normal effort  Heart:  regular rate and rhythm, no murmur  Abdomen:  soft, LUQ  tenderness, nondistended, normal bowel sounds, no masses, hepatomegaly, splenomegaly  Extremities:  1+ edema BL LE.  No redness, tenderness in the calves  Skin:  no gross lesions, rashes, induration    Assessment:        Primary Problem  Acute kidney injury [facility])    Active Hospital Problems    Diagnosis Date Noted    Acute kidney injury ([facility] [address] [N17.9] 03/03/2018    Renal cell carcinoma ([facility] [address] [C64.9]     Cancer,

## 2018-03-06 NOTE — PROGRESS NOTES
Today's Date: 3/6/2018  Patient Name: Kyara Quiles  Date of admission: 3/3/2018  9:26 PM  Patient's age: 66 y.o., 1955  Admission Dx: Acute kidney injury ([address] [N17.9]    Reason for Consult: Metastatic RCC  Requesting Physician: Yonis Street MD    SUBJECTIVE:    Pt seen and examined  Doppler preliminary showing acute DVT of the right leg  No NV  LE swelling   SOB    HISTORY OF PRESENT ILLNESS:    This is a 67 YO female with recently diagnose stage IV RCC was admitted with elevated creatinine. She has recent diagnosis of metastatic RCC. She had L1 bone mets and is receiving palliative chemotherapy and will finish tomorrow. She has follow up apptt with urology to discuss cytoreductive nephrectomy. Plan for TKI as o/p. Medications:    Prior to Admission medications    Medication Sig Start Date End Date Taking? Authorizing Provider   lisinopril (PRINIVIL;ZESTRIL) 5 MG tablet Take 1 tablet by mouth daily 3/6/18  Yes Geremias Agarwal MD   furosemide (LASIX) 40 MG tablet Take 1 tablet by mouth daily 3/7/18  Yes Geremias Agarwal MD   oxyCODONE HCl (OXY-IR) 10 MG immediate release tablet Take 1 tablet by mouth every 6 hours as needed for Pain for up to 30 days. 2/15/18 3/17/18  Rajani Burnette MD   PAZOPanib HCl (VOTRIENT) 200 MG chemo tablet Take 4 tablets by mouth daily 2/15/18   Rajani Burnette MD   insulin glargine (LANTUS) 100 UNIT/ML injection vial Inject 60 Units into the skin 2 times daily 2/9/18   Will MD Bertha   Blood Glucose Monitoring Suppl (TRUE METRIX METER) w/Device KIT  2/2/18   Historical Provider, MD   glucose monitoring kit (FREESTYLE) monitoring kit Patient testing 3 times daily, must have true metrix brand 2/2/18   Will MD Bertha   Lancets [facility] Patient testing 3 times daily 2/2/18   Will MD Bertha   glucose blood VI test strips (ASCENSIA AUTODISC VI;ONE TOUCH ULTRA TEST VI) strip Use with associated glucose meter.  Patient testing three times daily 2/2/18   Will Bertha Heart - normal rate, regular rhythm, normal S1, S2, no murmurs  Abdomen - soft, nontender, nondistended, no masses or organomegaly   Neurological - alert, oriented, normal speech, no focal findings or movement disorder noted   Musculoskeletal - no joint tenderness, deformity or swelling   Extremities - peripheral pulses normal, + pedal edema, no clubbing or cyanosis   Skin - normal coloration and turgor, no rashes, no suspicious skin lesions noted ,    DATA:    Labs:   CBC:   Recent Labs      03/05/18   0620  03/06/18   0559   WBC  5.1  4.8   HGB  7.8*  8.1*   HCT  29.0*  30.1*   PLT  321  335     BMP:   Recent Labs      03/05/18   0620  03/06/18   0559   NA  137  133*   K  4.7  4.6   CO2  24  20   BUN  40*  43*   CREATININE  2.87*  2.88*   LABGLOM  17*  17*   GLUCOSE  89  96     PT/INR:   Recent Labs      03/04/18   0655   PROTIME  10.5   INR  1.0       IMAGING DATA:  Reviewed  Primary Problem  Acute kidney injury [facility])    Active Hospital Problems    Diagnosis Date Noted    Pain and swelling of lower extremity, right [M79.604, M79.89]     Acute kidney injury ([facility] [N17.9] 03/03/2018    Renal cell carcinoma (HCC) [C64.9]     Cancer, metastatic to bone (HCC) [C79.51]     Hyperkalemia [E87.5] 01/19/2018    Anemia due to chronic kidney disease [N18.9, D63.1] 01/19/2018    Stage 4 chronic kidney disease ([facility] [N18.4] 01/19/2018    Diabetes mellitus ([facility] [facility] [E11.9] 08/21/2012     IMPRESSION:   1. Stage IV metastatic RCC  2. Bone mets  3. Right lower extremity DVT  4. RASHMI  5. HTN  6. Anemia  7. DM    RECOMMENDATIONS:  1. Lovenox for DVT  2. Management as per nephrology  3. Continue care as per primary  4. Plan for TKI as o/p   5. She has apptt with urology next week as o/p after MRI      Discussed with patient and Nurse. Thank you for asking us to see this patient.     Atilio Bush MD  Hematologist/Medical Oncologist  Cell: [phone]

## 2018-03-06 NOTE — PLAN OF CARE
[facility]   [address]    Second Visit Note  For more detailed information please refer to the progress note of the day      3/6/2018    5:15 PM    Name:   Grant Benjamin  MRN:     [id number]     Lara:      [de-identified]   Room:   [id number]   Day:  3  Admit Date:  3/3/2018  9:26 PM    PCP:   Kana Julio MD  Code Status:  Full Code        Pt vitals were reviewed   New labs were reviewed   Patient was seen, nauseous after her one time fentanyl dose. Discussed doppler reported results with the patient and the daughter on the phone.   Had no good sleep over the last 2 nights    Updated plan :     Phenergan x 1  Ativan to help with sleep  Await final oncology recommendations regarding [facility] for her newly diagnosed DVT        Radha Eid MD  3/6/2018  5:15 PM

## 2018-03-06 NOTE — PROGRESS NOTES
Resolved. Metastatic renal cell carcinoma with metastases to bone and intraperitoneal status post pathologic fracture: Receiving radiotherapy, finished all her  Sessions as per 3/5      Hypoglycemia secondary to poor oral intake: Continue holding Lantus with only sliding scale for now    LE swelling: doppler this am    Anemia of chronic disease: Hemoglobin stable    Hypoalbuminemia with protein calorie malnutrition: Add protein boost    HTN: continue home meds    DVT and GI prophylaxis    Will discharge when arrangements complete and ok with other services.   Follow-up with PCP in one week, Ed Collins MD  Notify PCP of discharge        Marcus Lugo MD  3/6/2018  7:55 AM

## 2018-03-07 NOTE — PROGRESS NOTES
Muscular skeletal: Positive for lower back pain and lower extremity muscle cramp  Medications: Allergies:  No Known Allergies    Current Meds:   Scheduled Meds:    rOPINIRole  0.5 mg Oral TID    furosemide  40 mg Oral Daily    enoxaparin  1 mg/kg Subcutaneous Daily    insulin lispro  0-6 Units Subcutaneous TID WC    insulin lispro  0-3 Units Subcutaneous Nightly    acetaminophen  1,000 mg Oral TID    amitriptyline  75 mg Oral Nightly    docusate sodium  200 mg Oral BID    PAZOPanib HCl  800 mg Oral Daily    sodium chloride flush  10 mL Intravenous 2 times per day     Continuous Infusions:    dextrose       PRN Meds: calcium carbonate, cyclobenzaprine, oxyCODONE HCl, SUMAtriptan, sodium chloride flush, acetaminophen, traMADol **OR** traMADol, ondansetron, nicotine, glucose, dextrose, glucagon (rDNA), dextrose    Data:     Past Medical History:   has a past medical history of Cancer ([facility] [address] Chronic kidney disease; Depression; [facility] GERD (gastroesophageal reflux disease); HA (headache); HBP (high blood pressure); History of blood transfusion; Hyperlipidemia; Hyperplastic polyp of intestine; Hypothyroid; Hypothyroidism; Left knee pain; Non-smoker; OA (osteoarthritis); and Tubular adenoma. Social History:   reports that she has never smoked. She has never used smokeless tobacco. She reports that she does not drink alcohol or use drugs.      Family History:   Family History   Problem Relation Age of Onset    Heart Disease Mother     Diabetes Mother     Cancer Father      esophageal cancer    Cancer Maternal Grandfather      colon       Vitals:  BP (!) 123/57   Pulse 76   Temp 97.4 °F (36.3 °C) (Oral)   Resp 14   Ht 5' 8\" (1.727 m)   Wt 213 lb 9 oz (96.9 kg)   SpO2 96%   BMI 32.47 kg/m²   Temp (24hrs), Av.9 °F (36.6 °C), Min:97.4 °F (36.3 °C), Max:98.1 °F (36.7 °C)    Recent Labs      18   0713  18   1138  18   1551  187   POCGLU  100  153*

## 2018-03-07 NOTE — PROGRESS NOTES
03/05/18   0620  03/06/18   0559  03/07/18   0915   WBC  5.1  4.8  6.5   RBC  3.51*  3.60*  3.83*   HGB  7.8*  8.1*  8.6*   HCT  29.0*  30.1*  32.3*   MCV  82.6  83.6  84.3   MCH  22.2*  22.5*  22.5*   MCHC  26.9*  26.9*  26.6*   RDW  20.6*  20.7*  21.0*   PLT  321  335  314   MPV  9.5  9.1  8.9       ASSESSMENT     1. RASHMI sec to ATN/CRS1 from diastolic CHF exacerbation vs progression Of intrinsic renal disease.  - Her creatinine plateau around 9.9-8.2 mg/dL. 2. CKD stage IV secondary to diabetic nephropathy. Baseline creat 2-2.2  3. Newly (Jan 2018) detected large right renal mass estimated to be around 11 x 11 cm and replacing the right kidney, radiologically consistent with renal cell carcinoma. inferior vena cava invasion. Intraperitoneal metastasis, possible liver metastasis, L1 metastasis with pathological compression fracture causing spinal stenosis, S/p Silvia Bx C/W Clear cell Ca. Following with Hem/Onc, Radiation Onc.   4. Diabetes mellitus  5. Essential hypertension - decent control. 6. Diastolic CHF excaerbation    PLAN     1. Continue same medication  2.   Will follow with you  Please do not hesitate to call with questions    This note is created with the assistance of a speech-recognition program. While intending to generate a document that actually reflects the content of the visit, no guarantees can be provided that every mistake has been identified and corrected by editing    Ariana Brown MD,  3/7/2018 11:10 AM  [facility]

## 2018-03-08 NOTE — PROGRESS NOTES
Dwayne, CNP        cyclobenzaprine (FLEXERIL) tablet 5 mg  5 mg Oral BID PRN Stephen Bardales MD   5 mg at 03/05/18 1038    insulin lispro (HUMALOG) injection vial 0-6 Units  0-6 Units Subcutaneous TID WC Michael Goodman MD        insulin lispro (HUMALOG) injection vial 0-3 Units  0-3 Units Subcutaneous Nightly Michael Goodman MD   1 Units at 03/04/18 2102    acetaminophen (TYLENOL) tablet 1,000 mg  1,000 mg Oral TID Kelsi Lime, CNP   1,000 mg at 03/08/18 1403    amitriptyline (ELAVIL) tablet 75 mg  75 mg Oral Nightly Kelsi Lime, CNP   75 mg at 03/07/18 2204    docusate sodium (COLACE) capsule 200 mg  200 mg Oral BID Kelsi Lime, CNP   200 mg at 03/08/18 0901    oxyCODONE (ROXICODONE) immediate release tablet 10 mg  10 mg Oral Q6H PRN Kelsi Lime, CNP   10 mg at 03/08/18 1117    PAZOPanib HCl (VOTRIENT) chemo tablet 800 mg  800 mg Oral Daily Kelsi Lime, CNP   Stopped at 03/05/18 1107    SUMAtriptan (IMITREX) tablet 100 mg  100 mg Oral Daily PRN Kelsi Lime, CNP        sodium chloride flush 0.9 % injection 10 mL  10 mL Intravenous 2 times per day Kelsi Lime, CNP   10 mL at 03/07/18 1046    sodium chloride flush 0.9 % injection 10 mL  10 mL Intravenous PRN Kelsi Lime, CNP        acetaminophen (TYLENOL) tablet 650 mg  650 mg Oral Q4H PRN Kelsi Lime, CNP        traMADol James Lieu) tablet 50 mg  50 mg Oral Q6H PRN Kelsi Lime, CNP        Or    traMADol James Lieu) tablet 100 mg  100 mg Oral Q6H PRN Kelsi Lime, CNP   100 mg at 03/08/18 1406    ondansetron (ZOFRAN) injection 4 mg  4 mg Intravenous Q6H PRN Kelsi Lime, CNP   4 mg at 03/07/18 1949    nicotine (NICODERM CQ) 21 MG/24HR 1 patch  1 patch Transdermal Daily PRN Kelsi Lime, CNP        glucose (GLUTOSE) 40 % oral gel 15 g  15 g Oral PRN Kelsi Lime, CNP        dextrose 50 % solution 12.5 g  12.5 g Intravenous PRN Kelsi Lime, CNP wheezes, rales or rhonchi, symmetric air entry   Heart - normal rate, regular rhythm, normal S1, S2, no murmurs  Abdomen - soft, nontender, nondistended, no masses or organomegaly   Neurological - alert, oriented, normal speech, no focal findings or movement disorder noted   Musculoskeletal - no joint tenderness, deformity or swelling   Extremities - peripheral pulses normal, + pedal edema, no clubbing or cyanosis   Skin - normal coloration and turgor, no rashes, no suspicious skin lesions noted ,    DATA:    Labs:   CBC:   Recent Labs      03/07/18   0915  03/08/18   0644   WBC  6.5  5.8   HGB  8.6*  8.4*   HCT  32.3*  30.3*   PLT  314  326     BMP:   Recent Labs      03/07/18   0915  03/08/18   0644   NA  134*  136   K  4.8  4.1   CO2  21  22   BUN  48*  47*   CREATININE  2.99*  2.80*   LABGLOM  16*  17*   GLUCOSE  112*  105*     PT/INR:   No results for input(s): PROTIME, INR in the last 72 hours. IMAGING DATA:  Reviewed  MR abd     Impression   1.  Re- demonstration of infiltrative mass occupying the near entirety of the   right renal parenchyma with cystic components.  Extension in the perinephric   space with metastatic implants along the liver are again noted.  Direct   extension into the renal vein and IVC is again demonstrated without evidence for propagation of thrombus from the renal vein/IVC junction since the prior      exam.       2.  Subtle 1.7 cm lesion in the lower pole the left kidney, as identified on   renal ultrasound, is noted.  This is concerning for an additional solid renal   mass.       3.  Nonvisualization of the right adrenal gland, which may be involved by   direct infiltration.        Primary Problem  Acute kidney injury [facility])    Active Hospital Problems    Diagnosis Date Noted    Pain and swelling of lower extremity, right [Q91.617, M79.89]     Acute kidney injury ([address] [N17.9] 03/03/2018    Renal cell carcinoma ([address] [C64.9]     Cancer, metastatic to bone ([address] [C79.51]     Hyperkalemia [E87.5] 01/19/2018    Anemia due to chronic kidney disease [N18.9, D63.1] 01/19/2018    Stage [facility] [N18.4] 01/19/2018    Diabetes mellitus ([facility] [E11.9] 08/21/2012     IMPRESSION:   1. Stage IV metastatic RCC  2. Bone mets  3. Right lower extremity DVT  4. RASHMI  5. HTN  6. Anemia  7. DM    RECOMMENDATIONS:  1. Lovenox for DVT  2. Management as per nephrology  3. Continue care as per primary  4. Plan for  pazopanib soon (after we get pre certification)      Discussed with patient and Nurse. Thank you for asking us to see this patient.     Josue Bush MD  Hematologist/Medical Oncologist  Cell: [phone]

## 2018-03-08 NOTE — PROGRESS NOTES
in 2 hours if needed    INVESTIGATIONS     Last 3 CMP:    Recent Labs      03/06/18   0559  03/07/18   0915  03/08/18   0644   NA  133*  134*  136   K  4.6  4.8  4.1   CL  97*  97*  97*   CO2  20  21  22   BUN  43*  48*  47*   CREATININE  2.88*  2.99*  2.80*   CALCIUM  9.3  9.6  9.4       Last 3 CBC:  Recent Labs      03/06/18   0559  03/07/18   0915  03/08/18   0644   WBC  4.8  6.5  5.8   RBC  3.60*  3.83*  3.75*   HGB  8.1*  8.6*  8.4*   HCT  30.1*  32.3*  30.3*   MCV  83.6  84.3  80.8*   MCH  22.5*  22.5*  22.4*   MCHC  26.9*  26.6*  27.7*   RDW  20.7*  21.0*  21.1*   PLT  335  314  326   MPV  9.1  8.9  9.2       ASSESSMENT     1. RASHMI sec to ATN/CRS1 from diastolic CHF exacerbation vs progression Of intrinsic renal disease.  - Plateau. Creatinine in between 2.8-3   2. CKD stage IV secondary to diabetic nephropathy. Baseline creat 2-2.2  3. Newly (Jan 2018) detected large right renal mass estimated to be around 11 x 11 cm and replacing the right kidney, radiologically consistent with renal cell carcinoma. inferior vena cava invasion. Intraperitoneal metastasis, possible liver metastasis, L1 metastasis with pathological compression fracture causing spinal stenosis, S/p Silvia Bx C/W Clear cell Ca. Following with Hem/Onc, Radiation Onc.   4. Diabetes mellitus  5. Essential hypertension - decent control. 6. Diastolic CHF excaerbation    PLAN     1.  PO Lasix 40 mg daily  2. On dc reduce Lisinopril to once a day  3. Outpatient BMP next week Monday  4. Follow up in 3-4 weeks. Call [phone] for appointment  5. Okay to discharge. We will follow if still in hospital farzaneh.     This note is created with the assistance of a speech-recognition program. While intending to generate a document that actually reflects the content of the visit, no guarantees can be provided that every mistake has been identified and corrected by editing    Karyn Noyola MD, [facility] [address]   3/8/2018 9:46 AM  [facility]

## 2018-03-08 NOTE — CONSULTS
Pre-op risk stratification  - Renal Cell CA with IVC invasion and L1 mets  - Abnormal ECG  - RASHMI on CKD  - DM     RECOMMENDATIONS:  - she is unable to achieve 4-5 mets of activity without feeling SOB. - she will need functional stress testing prior to risk stratification. - if she is having surgery as outpatient, stress testing can be done as outpatient. - if she is having surgery as inpatient, then will perform stress test in am tomorrow. Discussed with patient and nursing. Thank you for allowing me to participate in the care of this patient, please do not hesitate to call if you have any questions. Devin Hubbard DO, [address] [facility]  [facility]  [phone]

## 2018-03-08 NOTE — PROGRESS NOTES
Meds:   Scheduled Meds:    sodium bicarbonate  650 mg Oral Daily    lisinopril  5 mg Oral Daily    rOPINIRole  0.5 mg Oral TID    furosemide  40 mg Oral Daily    enoxaparin  1 mg/kg Subcutaneous Daily    insulin lispro  0-6 Units Subcutaneous TID WC    insulin lispro  0-3 Units Subcutaneous Nightly    acetaminophen  1,000 mg Oral TID    amitriptyline  75 mg Oral Nightly    docusate sodium  200 mg Oral BID    PAZOPanib HCl  800 mg Oral Daily    sodium chloride flush  10 mL Intravenous 2 times per day     Continuous Infusions:    dextrose       PRN Meds: calcium carbonate, cyclobenzaprine, oxyCODONE HCl, SUMAtriptan, sodium chloride flush, acetaminophen, traMADol **OR** traMADol, ondansetron, nicotine, glucose, dextrose, glucagon (rDNA), dextrose    Data:     Past Medical History:   has a [facility] Chronic kidney disease; Depression; Diabetes mellitus ([facility] GERD (gastroesophageal reflux disease); HA (headache); HBP (high blood pressure); History of blood transfusion; Hyperlipidemia; Hyperplastic polyp of intestine; Hypothyroid; Hypothyroidism; Left knee pain; Non-smoker; OA (osteoarthritis); and Tubular adenoma. Social History:   reports that she has never smoked. She has never used smokeless tobacco. She reports that she does not drink alcohol or use drugs. Family History:   Family History   Problem Relation Age of Onset    Heart Disease Mother     Diabetes Mother     Cancer Father      esophageal cancer    Cancer Maternal Grandfather      colon       Vitals:  BP (!) 144/53   Pulse 79   Temp 97.9 °F (36.6 °C)   Resp 18   Ht 5' 8\" (1.727 m)   Wt 199 lb 12.8 oz (90.6 kg)   SpO2 97%   BMI 30.38 kg/m²   Temp (24hrs), Av.2 °F (36.8 °C), Min:97.5 °F (36.4 °C), Max:98.6 °F (37 °C)    Recent Labs      18   0758  18   1636  18   2206  18   0750   POCGLU  103  162*  128*  123*       I/O (24Hr):     Intake/Output Summary (Last 24 hours) at 03/08/18 1109  Last data filed at 03/08/18 0455   Gross per 24 hour   Intake              400 ml   Output                0 ml   Net              400 ml       Labs:    Hematology:  Recent Labs      03/06/18   0559  03/07/18   0915  03/08/18   0644   WBC  4.8  6.5  5.8   RBC  3.60*  3.83*  3.75*   HGB  8.1*  8.6*  8.4*   HCT  30.1*  32.3*  30.3*   MCV  83.6  84.3  80.8*   MCH  22.5*  22.5*  22.4*   MCHC  26.9*  26.6*  27.7*   RDW  20.7*  21.0*  21.1*   PLT  335  314  326   MPV  9.1  8.9  9.2     Chemistry:  Recent Labs      03/06/18   0559  03/07/18   0915  03/08/18   0644   NA  133*  134*  136   K  4.6  4.8  4.1   CL  97*  97*  97*   CO2  20  21  22   GLUCOSE  96  112*  105*   BUN  43*  48*  47*   CREATININE  2.88*  2.99*  2.80*   MG  2.1   --    --    ANIONGAP  16  16  17   LABGLOM  17*  16*  17*   GFRAA  20*  19*  21*   CALCIUM  9.3  9.6  9.4     Recent Labs      03/06/18   1551  03/06/18   2057  03/07/18   0758  03/07/18   1636  03/07/18   2206  03/08/18   0750   POCGLU  118*  135*  103  162*  128*  123*         Lab Results   Component Value Date/Time    SPECIAL NOT REPORTED 03/04/2018 07:26 PM     Lab Results   Component Value Date/Time    CULTURE NO SIGNIFICANT GROWTH 03/04/2018 07:26 PM    CULTURE  03/04/2018 07:26 PM     [facility] [id number] [place] [address], [address] [place] [address] [phone]       Lab Results   Component Value Date    PHART 7.292 01/23/2018    IHF2TSY 39.0 01/23/2018    PO2ART 159.0 01/23/2018    PLX2MTF 18.3 01/23/2018    NBEA 7.2 01/23/2018    PBEA NOT REPORTED 01/23/2018    Q8VJJEYE 99.5 01/23/2018    FIO2 40 01/23/2018       Radiology:    VL DUP LOWER EXTREMITY VENOUS BILATERAL 3/6     Acute deep vein thrombosis of the right leg involving the popliteal,   peroneal and a deep perforating vein. No evidence of superficial venous thrombosis in the right lower extremity. No evidence of superficial or deep venous thrombosis in the left lower   extremity.         RETROPERITONEAL ULTRASOUND OF THE KIDNEYS AND URINARY BLADDER   3/4/2018  Impression 1. Abnormal appearing right kidney, enlarged, with appearance of multiple cysts in the upper pole.  Hydronephrosis is difficult to exclude.  No right ureteral jet is noted. Right nephrolithiasis is seen.   2.  Isoechoic lesion left kidney with solid mass not excluded.   3.  Bladder is unremarkable but only a left ureteral jet is noted.   RECOMMENDATIONS: If patient's renal function will permit, would advise CT urogram.  Otherwise, MRI imaging of the kidneys could be obtained without IV contrast to assess for cystic versus solid lesions in the kidneys. Physical Examination:        General appearance:  alert, cooperative and no distress  Mental Status:  oriented to person, place and time and normal affect  Lungs:  clear to auscultation bilaterally, normal effort  Heart:  regular rate and rhythm, no murmur  Abdomen:  soft, LUQ  tenderness, nondistended, normal bowel sounds, no masses, hepatomegaly, splenomegaly  Extremities:  1+ edema BL LE, no change No redness, tenderness in the calves  Skin:  no gross lesions, rashes, induration    Assessment:        Primary Problem  Acute kidney injury [facility])    Active Hospital Problems    Diagnosis Date Noted    Pain and swelling of lower extremity, right [O92.856, M79.89]     Acute [facility] ([facility] [N17.9] 03/03/2018    Renal cell carcinoma (HCC) [C64.9]     Cancer, metastatic to bone (HCC) [C79.51]     Hyperkalemia [E87.5] 01/19/2018    Anemia due to chronic kidney disease [N18.9, D63.1] 01/19/2018    Stage 4 chronic kidney disease ([address] [N18.4] 01/19/2018    Diabetes mellitus ([facility] [E11.9] 08/21/2012       Plan:        RLE DVT in popliteal and peroneal veins: unable to determine if was present on admission or afterwards: Lovenox therapeutic dose, ok per oncology and Nephrology    RASHMI on CKD4:  Worsening, creatinine today is plateau around 3.7-5  Baseline creatinine is around 2:  continue Lasix by mouth, per

## 2018-03-08 NOTE — PROGRESS NOTES
Today's Date: 3/7/2018  Patient Name: Devonte Wood  Date of admission: 3/3/2018  9:26 PM  Patient's age: 66 y.o., 1955  Admission Dx: Acute kidney injury ([address] [N17.9]    Reason for Consult: Metastatic RCC  Requesting Physician: Ting Frias MD    SUBJECTIVE:    Pt seen and examined  Doppler preliminary showing acute DVT of the right leg  Had NV  LE swelling   SOB  MRI noted showing no propogation of thrombus from renal vein    HISTORY OF PRESENT ILLNESS:    This is a 65 YO female with recently diagnose stage IV RCC was admitted with elevated creatinine. She has recent diagnosis of metastatic RCC. She had L1 bone mets and is receiving palliative chemotherapy and will finish tomorrow. She has follow up apptt with urology to discuss cytoreductive nephrectomy. Plan for TKI as o/p. Medications:    Prior to Admission medications    Medication Sig Start Date End Date Taking? Authorizing Provider   lisinopril (PRINIVIL;ZESTRIL) 5 MG tablet Take 1 tablet by mouth daily 3/6/18  Yes Levi Eddy MD   furosemide (LASIX) 40 MG tablet Take 1 tablet by mouth daily 3/7/18  Yes Levi Eddy MD   oxyCODONE HCl (OXY-IR) 10 MG immediate release tablet Take 1 tablet by mouth every 6 hours as needed for Pain for up to 30 days.  2/15/18 3/17/18  Leno Molina MD   PAZOPanib HCl (VOTRIENT) 200 MG chemo tablet Take 4 tablets by mouth daily 2/15/18   Leno Molina MD   insulin glargine (LANTUS) 100 UNIT/ML injection vial Inject 60 Units into the skin 2 times daily 2/9/18   Viri Reyes MD   Blood Glucose Monitoring Suppl (TRUE METRIX METER) w/Device KIT  2/2/18   Historical Provider, MD   glucose monitoring kit (FREESTYLE) monitoring kit Patient testing 3 times daily, must have true metrix brand 2/2/18   Viri Reyes MD   [facility] testing 3 times daily 2/2/18   Viri Reyes MD   glucose blood VI test strips (ASCENSIA AUTODISC VI;ONE TOUCH ULTRA TEST VI) strip Use with associated glucose meter. Patient testing three times daily 2/2/18   Emerita Talley MD   docusate sodium (COLACE) 100 MG capsule Take 2 capsules by mouth 2 times daily 1/31/18   Emerita Talley MD   polyethylene glycol [facility]) powder Take 17 g by mouth daily 1/31/18   Emerita Talley MD   carvedilol (COREG) 6.25 MG tablet Take 1 tablet by mouth 2 times daily (with meals) 1/27/18   Christopher Archer MD   levothyroxine (SYNTHROID) 100 MCG tablet Take 1 tablet by mouth daily 11/16/17   Emerita Talley MD   omeprazole (PRILOSEC) 20 MG delayed release capsule Take 1 capsule by mouth daily 9/7/17   Emerita Talley MD   atorvastatin (LIPITOR) 20 MG tablet TAKE 1 TABLET BY MOUTH ONE TIME A DAY  7/27/17   Damon Parry MD   insulin aspart (NOVOLOG FLEXPEN) 100 UNIT/ML injection pen Inject into the skin 2 times daily (with meals) Taking 3 units at lunch and 6 units at supper    Historical Provider, MD   Insulin Pen Needle 32G X 4 MM MISC 2 each by Does not apply route 2 times daily    Historical Provider, MD   glyBURIDE (DIABETA) 5 MG tablet TAKE 2 TABLETS BY MOUTH TWO TIMES A DAY 4/13/17   Damon Parry MD   amitriptyline (ELAVIL) 75 MG tablet Take 1 tablet by mouth nightly 4/13/17   Damon Parry MD   amLODIPine (NORVASC) 10 MG tablet Take 1 tablet by mouth daily 4/13/17   Damon Parry MD   acetaminophen (TYLENOL) 500 MG tablet Take 1,000 mg by mouth 3 times daily    Historical Provider, MD   rizatriptan (MAXALT) 10 MG tablet Take 1 tablet by mouth once as needed for Migraine for up to 1 dose.  May repeat in 2 hours if needed 11/10/14 1/18/18  Damon Parry MD     Current Facility-Administered Medications   Medication Dose Route Frequency Provider Last Rate Last Dose    rOPINIRole (REQUIP) tablet 0.5 mg  0.5 mg Oral TID Kirstie Esparza MD   0.5 mg at 03/07/18 1345    furosemide (LASIX) tablet 40 mg  40 mg Oral Daily Drew Tamayo MD   40 mg at 03/07/18 1534    enoxaparin (LOVENOX) injection 100 mg  1 mg/kg

## 2018-03-08 NOTE — CONSULTS
 TONSILLECTOMY         Social History:  reports that she has never smoked. She has never used smokeless tobacco. She reports that she does not drink alcohol or use drugs. Family History: family history includes Cancer in her father and maternal grandfather; Diabetes in her mother; Heart Disease in her mother. Review of Systems:   General: Denies fever, chills  HEENT: Denies sore throat, sinus problems, allergic rhinosinusitis  Card: Denies chest pain, palpitations, orthopnea/PND. Denies h/o murmurs  Pulm: Denies cough, shortness of breath, SCRUGGS  GI:  per HPI; denies history of constipation, diarrhea, hematochezia or melena  : Denies polyuria, dysuria, hematuria  Endo: Denies  thyroid problems. Positive DMII and hypothyroid  Heme: Denies anemia, h/o bleeding or clotting problems. Neuro: Denies h/o CVA, TIA  Skin: Denies rashes, ulcers  Musculoskeletal: Denies muscle, joint, back pain. Allergies: Patient has no known allergies.     Current Meds:  Current Facility-Administered Medications:     sodium bicarbonate tablet 650 mg, 650 mg, Oral, Daily, Drew Tamayo MD, 650 mg at 03/08/18 1139    lisinopril (PRINIVIL;ZESTRIL) tablet 5 mg, 5 mg, Oral, Daily, Luis Alberto Kurzt MD, 5 mg at 03/08/18 1139    rOPINIRole (REQUIP) tablet 0.5 mg, 0.5 mg, Oral, TID, Briseida Regalado MD, 0.5 mg at 03/08/18 1403    furosemide (LASIX) tablet 40 mg, 40 mg, Oral, Daily, Drew Rosado MD, 40 mg at 03/08/18 0901    enoxaparin (LOVENOX) injection 100 mg, 1 mg/kg, Subcutaneous, Daily, Briseida Regalado MD, 100 mg at 03/08/18 0902    calcium carbonate (TUMS) chewable tablet 500 mg, 500 mg, Oral, TID PRN, Ruma Eubanks CNP    cyclobenzaprine (FLEXERIL) tablet 5 mg, 5 mg, Oral, BID PRN, Briseida Regalado MD, 5 mg at 03/05/18 1038    insulin lispro (HUMALOG) injection vial 0-6 Units, 0-6 Units, Subcutaneous, TID Fabricio RAMÍREZ MD    insulin lispro (HUMALOG) injection vial 0-3 Units, 0-3 Units, Subcutaneous, Nightly, Tamiko MD Roel, 1 Units at 03/04/18 2102    acetaminophen (TYLENOL) tablet 1,000 mg, 1,000 mg, Oral, TID, Desma Asad, CNP, 1,000 mg at 03/08/18 1403    amitriptyline (ELAVIL) tablet 75 mg, 75 mg, Oral, Nightly, Desma Asad, CNP, 75 mg at 03/07/18 2204    docusate sodium (COLACE) capsule 200 mg, 200 mg, Oral, BID, Desma Asad, CNP, 200 mg at 03/08/18 0901    oxyCODONE (ROXICODONE) immediate release tablet 10 mg, 10 mg, Oral, Q6H PRN, Desma Asad, CNP, 10 mg at 03/08/18 1117    PAZOPanib HCl (VOTRIENT) chemo tablet 800 mg, 800 mg, Oral, Daily, Desma Asad, CNP, Stopped at 03/05/18 1107    SUMAtriptan (IMITREX) tablet 100 mg, 100 mg, Oral, Daily PRN, Desma Asad, CNP    sodium chloride flush 0.9 % injection 10 mL, 10 mL, Intravenous, 2 times per day, Desma Asad, CNP, 10 mL at 03/07/18 1046    sodium chloride flush 0.9 % injection 10 mL, 10 mL, Intravenous, PRN, Desma Asad, CNP    acetaminophen (TYLENOL) tablet 650 mg, 650 mg, Oral, Q4H PRN, Desma Asad, CNP    traMADol (ULTRAM) tablet 50 mg, 50 mg, Oral, Q6H PRN **OR** traMADol (ULTRAM) tablet 100 mg, 100 mg, Oral, Q6H PRN, Desma Asad, CNP, 100 mg at 03/08/18 1406    ondansetron (ZOFRAN) injection 4 mg, 4 mg, Intravenous, Q6H PRN, Desma Asad, CNP, 4 mg at 03/07/18 1949    nicotine (NICODERM CQ) 21 MG/24HR 1 patch, 1 patch, Transdermal, Daily PRN, Desma Asad, CNP    glucose (GLUTOSE) 40 % oral gel 15 g, 15 g, Oral, PRN, Desma Asad, CNP    dextrose 50 % solution 12.5 g, 12.5 g, Intravenous, PRN, Desma Asad, CNP    glucagon (rDNA) injection 1 mg, 1 mg, Intramuscular, PRN, Desma Asad, CNP    dextrose 5 % solution, 100 mL/hr, Intravenous, PRN, Desma Asad, CNP    Vital Signs:  Vitals:    03/08/18 0800   BP: (!) 144/53   Pulse: 79   Resp: 18   Temp: 97.9 °F (36.6 °C)   SpO2: 97%       Physical Exam:  Vital signs and Nurse's note Measurements +------------------------------------+----------+---------------+----------+ ! Location                            ! Visualized! Compressibility! Thrombosis! +------------------------------------+----------+---------------+----------+ ! Common Femoral                      !Yes       ! Yes            ! None      ! +------------------------------------+----------+---------------+----------+ ! Prox Femoral                        !Yes       ! Yes            ! None      ! +------------------------------------+----------+---------------+----------+ ! Mid Femoral                         !Yes       ! Yes            ! None      ! +------------------------------------+----------+---------------+----------+ ! Dist Femoral                        !Yes       ! Yes            ! None      ! +------------------------------------+----------+---------------+----------+ ! Deep Femoral                        !No        !               !          ! +------------------------------------+----------+---------------+----------+ ! Popliteal                           !Yes       ! No             !Acute     ! +------------------------------------+----------+---------------+----------+ ! Sapheno Femoral Junction            ! Yes       ! Yes            ! None      ! +------------------------------------+----------+---------------+----------+ ! PTV                                 ! Yes       ! Yes            ! None      ! +------------------------------------+----------+---------------+----------+ ! Peroneal                            !Yes       ! No             !Acute     ! +------------------------------------+----------+---------------+----------+ ! Gastroc                             ! Yes       ! Yes            ! None      ! +------------------------------------+----------+---------------+----------+ ! GSV Thigh                           ! Yes       ! Yes            ! None      ! +------------------------------------+----------+---------------+----------+ ! GSV Knee

## 2018-03-09 NOTE — PROGRESS NOTES
Restrictions  Restrictions/Precautions  Restrictions/Precautions: General Precautions, Fall Risk  Required Braces or Orthoses?: No  Position Activity Restriction  Other position/activity restrictions: up with assist  Subjective   General  Response To Previous Treatment: Patient with no complaints from previous session. Family / Caregiver Present: No  Subjective  Subjective: RN and pt agreed to PT, pt awake in bed upon arrival and c/o 6/10 pain in back and LE's > on R  Pain Screening  Patient Currently in Pain: Yes  Pain Assessment  Pain Level: 6  Vital Signs  Patient Currently in Pain: Yes       Orientation  Orientation  Overall Orientation Status: Within Functional Limits  Objective   Bed mobility  Supine to Sit: Stand by assistance  Sit to Supine: Minimal assistance (for BLE's)  Scooting: Stand by assistance  Transfers  Sit to Stand: Contact guard assistance  Stand to sit: Contact guard assistance  Stand Pivot Transfers: Contact guard assistance  Ambulation  Ambulation?: Yes  Ambulation 1  Surface: level tile  Device: Rolling Walker  Assistance: Contact guard assistance  Quality of Gait: grossly steady, highly painful and tearful towards the end  Distance: 150ft  Comments: pt c/o LE \"cramping\" during ambulation  Stairs/Curb  Stairs?: No        Exercises  Hip Flexion: BLE x 10 sitting  Hip Abduction: BLE x 10 sitting  Knee Long Arc Quad: BLE x 20 sitting; Increased pain RLE  Ankle Pumps: BLE x 20 sitting  Comments: pt declined any further activity d/t Extreme pain and nausea               Assessment   Body structures, Functions, Activity limitations: Decreased functional mobility ; Decreased balance  Assessment: Pt limited by BLE pain; pt able to amb 150' RW CGA. Pt had 6/10 pain in bed but significantly increased with gait.   pt tearful with additional c/o increased nausea, Pt mobility limited d/t high pain level and lives home alone therefore she may need post acute rehab prior to returning home, pt could

## 2018-03-09 NOTE — PLAN OF CARE
Problem: Pain:  Goal: Pain level will decrease  Pain level will decrease    Outcome: Ongoing      Problem: Falls - Risk of  Goal: Absence of falls  Outcome: Ongoing      Problem: Musculor/Skeletal Functional Status  Goal: Highest potential functional level  Outcome: Ongoing

## 2018-03-09 NOTE — PROGRESS NOTES
Nutrition Assessment    Type and Reason for Visit: Initial (Length of stay)    Nutrition Recommendations: Continue current diet. If po intake remains poor, suggest liberalizing diet as able. Add oral nutritional supplements if/as needed (suggest Ensure Clear if pt continues to have nausea). Malnutrition Assessment:  · Malnutrition Status: Insufficient data  · Context: Chronic illness  · Findings of the 6 clinical characteristics of malnutrition (Minimum of 2 out of 6 clinical characteristics is required to make the diagnosis of moderate or severe Protein Calorie Malnutrition based on AND/ASPEN Guidelines):  1. Energy Intake-Not available, not able to assess    2. Weight Loss-10% loss or greater, in 1 month  3. Fat Loss-Unable to assess,    4. Muscle Loss-Unable to assess,    5. Fluid Accumulation-Mild fluid accumulation (per nursing documentation), Extremities  6.  Strength-Not measured    Nutrition Diagnosis:   · Problem: Inadequate oral intake  · Etiology: related to Nausea     Signs and symptoms:  as evidenced by Patient report     Nutrition Assessment:  · Subjective Assessment: Pt c/o increased nausea and decreased po intake over past couple of days. Prior to this she states she was able to eat well. Reports last BM yesterday.   · Nutrition-Focused Physical Findings: +1 non-pitting edema BLE noted  · Wound Type: None  · Current Nutrition Therapies:  · Oral Diet Orders: Renal, Carb Control 4 Carbs/Meal   · Oral Diet intake: Unable to assess (\"Did not eat much\" of breakfast this morning per pt)  · Oral Nutrition Supplement (ONS) Orders: None  · Anthropometric Measures:  · Ht: 5' 8\" (172.7 cm)   · Current Body Wt: 199 lb 11.8 oz (90.6 kg)  · Admission Body Wt: 214 lb 8.1 oz (97.3 kg)  · % Weight Change: 11% (?fluid vs actual weight),  1.5 months  · Ideal Body Wt: 140 lb (63.5 kg), % Ideal Body 143%  · BMI Classification: BMI 30.0 - 34.9 Obese Class I  · Comparative Standards (Estimated Nutrition Needs):  · Estimated Daily Total Kcal: 6790-1380 kcals/day  · Estimated Daily Protein (g): 45-60 g/day    Estimated Intake vs Estimated Needs: Intake Less Than Needs (Estimated)    Nutrition Risk Level: Moderate    Nutrition Interventions:   Continue current diet  Continued Inpatient Monitoring, Education Not Indicated    Nutrition Evaluation:   · Evaluation: Goals set   · Goals: Po intake to meet greater than 50% of estimated nutrient needs. · Monitoring: Meal Intake, Diet Tolerance, Nausea or Vomiting, Ascites/Edema, Fluid Balance, Weight, Comparative Standards, Pertinent Labs    See Adult Nutrition Doc Flowsheet for more detail.      Electronically signed by Tiffanie Tran, MS, RD, LD on 3/9/18 at 11:48 AM    Contact Number: [phone]

## 2018-03-09 NOTE — CONSULTS
01/23/2018    Post T11-L2 Fusion    COLONOSCOPY  08/16/2016    hyperplastic polyp and tubular adenoma     HYSTERECTOMY      DC LUMBAR SPINE FUSN,POST INTRBDY N/A 1/23/2018    T11-L3 POSTERIOR FIXATION AND FUSION, SPINE ABBY C-ARM, O-ARM WITH STEALTH,  EVOKES- [id number] LJ performed by Cassie Bruce DO at [facility]:    No Known Allergies     Current Medications:   Current Facility-Administered Medications: oxyCODONE-acetaminophen (PERCOCET) 5-325 MG per tablet 1 tablet, 1 tablet, Oral, Q4H PRN **OR** oxyCODONE-acetaminophen (PERCOCET) 5-325 MG per tablet 2 tablet, 2 tablet, Oral, Q4H PRN  morphine injection 1 mg, 1 mg, Intravenous, Q4H PRN  sodium bicarbonate tablet 650 mg, 650 mg, Oral, Daily  lisinopril (PRINIVIL;ZESTRIL) tablet 5 mg, 5 mg, Oral, Daily  ondansetron (ZOFRAN) tablet 4 mg, 4 mg, Oral, Q6H PRN  rOPINIRole (REQUIP) tablet 0.5 mg, 0.5 mg, Oral, TID  furosemide (LASIX) tablet 40 mg, 40 mg, Oral, Daily  enoxaparin (LOVENOX) injection 100 mg, 1 mg/kg, Subcutaneous, Daily  calcium carbonate (TUMS) chewable tablet 500 mg, 500 mg, Oral, TID PRN  cyclobenzaprine (FLEXERIL) tablet 5 mg, 5 mg, Oral, BID PRN  insulin lispro (HUMALOG) injection vial 0-6 Units, 0-6 Units, Subcutaneous, TID WC  insulin lispro (HUMALOG) injection vial 0-3 Units, 0-3 Units, Subcutaneous, Nightly  acetaminophen (TYLENOL) tablet 1,000 mg, 1,000 mg, Oral, TID  amitriptyline (ELAVIL) tablet 75 mg, 75 mg, Oral, Nightly  docusate sodium (COLACE) capsule 200 mg, 200 mg, Oral, BID  PAZOPanib HCl (VOTRIENT) chemo tablet 800 mg, 800 mg, Oral, Daily  SUMAtriptan (IMITREX) tablet 100 mg, 100 mg, Oral, Daily PRN  sodium chloride flush 0.9 % injection 10 mL, 10 mL, Intravenous, 2 times per day  sodium chloride flush 0.9 % injection 10 mL, 10 mL, Intravenous, PRN  acetaminophen (TYLENOL) tablet 650 mg, 650 mg, Oral, Q4H PRN  nicotine (NICODERM CQ) 21 MG/24HR 1 patch, 1 patch, Transdermal, Daily PRN  glucose (GLUTOSE) 40 % oral gel 15 g, 15 g, Oral, PRN  dextrose 50 % solution 12.5 g, 12.5 g, Intravenous, PRN  glucagon (rDNA) injection 1 mg, 1 mg, Intramuscular, PRN  dextrose 5 % solution, 100 mL/hr, Intravenous, PRN    Social History:  Occupation:   Unemployed   Lives with:   alone  Home setup:   Steps into home 2 . Floors in home: 1.Bed/bathroom on floor:1. Tobacco: Denies. Ethanol: Denies. Family History:       Problem Relation Age of Onset    Heart Disease Mother     Diabetes Mother     Cancer Father      esophageal cancer    Cancer Maternal Grandfather      colon       Radiology:  MRI Abd 3/7/2018  Impression   1.  Re- demonstration of infiltrative mass occupying the near entirety of the   right renal parenchyma with cystic components.  Extension in the perinephric   space with metastatic implants along the liver are again noted.  Direct   extension into the renal vein and IVC is again demonstrated without evidence   for propagation of thrombus from the renal vein/IVC junction since the prior   exam.       2.  Subtle 1.7 cm lesion in the lower pole the left kidney, as identified on   renal ultrasound, is noted.  This is concerning for an additional solid renal   mass.       3.  Nonvisualization of the right adrenal gland, which may be involved by   direct infiltration. Diagnostics:    CBC:   Recent Labs      03/07/18   0915  03/08/18   0644  03/09/18   0631   WBC  6.5  5.8  6.3   RBC  3.83*  3.75*  3.85*   HGB  8.6*  8.4*  8.8*   HCT  32.3*  30.3*  31.0*   MCV  84.3  80.8*  80.5*   RDW  21.0*  21.1*  21.2*   PLT  314  326  310     BMP:   Recent Labs      03/07/18   0915  03/08/18   0644  03/09/18   0631   NA  134*  136  137   K  4.8  4.1  4.3   CL  97*  97*  99   CO2  21  22  23   BUN  48*  47*  46*   CREATININE  2.99*  2.80*  2.55*     BNP: No results for input(s): BNP in the last 72 hours. PT/INR: No results for input(s): PROTIME, INR in the last 72 hours. APTT: No results for input(s):  APTT in for BM, CGA for transfers and was independent during exam with sit to stand and stand to sit. Pain appears to be a limiting factor. She is doing well In PT needing very little assistance. Per nursing she is going to the commode independently in the room. She is not a candidate for acute inpatient rehab. In order to qualify she would have to have needs for 2 of 3 disciplines. 2. Home disposition- patient lives alone. She states her daughter can provide some assistance. It was my pleasure to evaluate Leo Best today. Please call with questions. This note is created with the assistance of a speech recognition program.  While intending to generate a document that actually reflects the content of the visit, the document can still have some errors including those of syntax and sound a like substitutions which may escape proof reading.   In such instances, actual meaning can be extrapolated by contextual diversion.

## 2018-03-09 NOTE — PROGRESS NOTES
NBEA 7.2 01/23/2018    PBEA NOT REPORTED 01/23/2018    [id number] 99.5 01/23/2018    FIO2 40 01/23/2018       Radiology:    VL DUP LOWER EXTREMITY VENOUS BILATERAL 3/6     Acute deep vein thrombosis of the right leg involving the popliteal,   peroneal and a deep perforating vein. No evidence of superficial venous thrombosis in the right lower extremity. No evidence of superficial or deep venous thrombosis in the left lower   extremity. RETROPERITONEAL ULTRASOUND OF THE KIDNEYS AND URINARY BLADDER   3/4/2018  Impression 1. Abnormal appearing right kidney, enlarged, with appearance of multiple cysts in the upper pole.  Hydronephrosis is difficult to exclude.  No right ureteral jet is noted. Right nephrolithiasis is seen.   2.  Isoechoic lesion left kidney with solid mass not excluded.   3.  Bladder is unremarkable but only a left ureteral jet is noted.   RECOMMENDATIONS: If patient's renal function will permit, would advise CT urogram.  Otherwise, MRI imaging of the kidneys could be obtained without IV contrast to assess for cystic versus solid lesions in the kidneys.     Physical Examination:        General appearance:  alert, cooperative and no distress  Mental Status:  oriented to person, place and time and normal affect  Lungs:  clear to auscultation bilaterally, normal effort  Heart:  regular rate and rhythm, no murmur  Abdomen:  soft, non tender,  nondistended, normal bowel sounds,  Lower back pain  Extremities:  1+ edema BL LE, no change No redness, tenderness in the calves  Skin:  no gross lesions, rashes, induration    Assessment:        Primary Problem  Acute kidney injury [facility])    Active Hospital Problems    Diagnosis Date Noted    Pain and swelling of lower extremity, right [N96.234, M79.89]     Acute kidney injury ([address] [N17.9] 03/03/2018    Renal cell carcinoma (HCC) [C64.9]     Cancer, metastatic to bone (HCC) [C79.51]     Hyperkalemia [E87.5] 01/19/2018    Anemia due to chronic kidney

## 2018-03-10 NOTE — PROGRESS NOTES
PRN Doran Boas, MD        promethazine (PHENERGAN) injection 12.5 mg  12.5 mg Intramuscular Q4H PRN Sunni Leon CNP   12.5 mg at 03/10/18 0600    sodium bicarbonate tablet 650 mg  650 mg Oral Daily Drew Castellanos MD   650 mg at 03/10/18 1121    ondansetron (ZOFRAN) tablet 4 mg  4 mg Oral Q6H PRN Sunni Leon CNP   4 mg at 03/10/18 6326    rOPINIRole (REQUIP) tablet 0.5 mg  0.5 mg Oral TID Doran Boas, MD   0.5 mg at 03/10/18 1121    furosemide (LASIX) tablet 40 mg  40 mg Oral Daily Drew Castellanos MD   40 mg at 03/10/18 1120    enoxaparin (LOVENOX) injection 100 mg  1 mg/kg Subcutaneous Daily Doran Boas, MD   100 mg at 03/10/18 1120    calcium carbonate (TUMS) chewable tablet 500 mg  500 mg Oral TID PRN Sunni Leon, CNP        cyclobenzaprine (FLEXERIL) tablet 5 mg  5 mg Oral BID PRN Doran Boas, MD   5 mg at 03/05/18 1038    insulin lispro (HUMALOG) injection vial 0-6 Units  0-6 Units Subcutaneous TID WC Daniel Murphy MD        insulin lispro (HUMALOG) injection vial 0-3 Units  0-3 Units Subcutaneous Nightly Daniel Murphy MD   1 Units at 03/08/18 2109    acetaminophen (TYLENOL) tablet 1,000 mg  1,000 mg Oral TID Sunni Leon CNP   1,000 mg at 03/09/18 1531    amitriptyline (ELAVIL) tablet 75 mg  75 mg Oral Nightly Sunni Leon, CNP   75 mg at 03/09/18 2137    docusate sodium (COLACE) capsule 200 mg  200 mg Oral BID Sunni Leon, CNP   200 mg at 03/10/18 1120    PAZOPanib HCl (VOTRIENT) chemo tablet 800 mg  800 mg Oral Daily Sunni Sack, CNP   Stopped at 03/05/18 1107    SUMAtriptan (IMITREX) tablet 100 mg  100 mg Oral Daily PRN Sunni Leon, CNP        sodium chloride flush 0.9 % injection 10 mL  10 mL Intravenous 2 times per day Sunni Leon CNP   10 mL at 03/07/18 1046    sodium chloride flush 0.9 % injection 10 mL  10 mL Intravenous PRN Sunni Leon CNP        acetaminophen (TYLENOL) tablet 650 mg  650 mg Oral equal and reactive, extraocular eye movements intact   Ears - bilateral TM's and external ear canals normal   Mouth - mucous membranes moist, pharynx normal without lesions   Neck - supple, no significant adenopathy   Lymphatics - no palpable lymphadenopathy, no hepatosplenomegaly   Chest - clear to auscultation, no wheezes, rales or rhonchi, symmetric air entry   Heart - normal rate, regular rhythm, normal S1, S2, no murmurs  Abdomen - soft, nontender, nondistended, no masses or organomegaly   Neurological - alert, oriented, normal speech, no focal findings or movement disorder noted   Musculoskeletal - no joint tenderness, deformity or swelling   Extremities - peripheral pulses normal, + pedal edema, no clubbing or cyanosis   Skin - normal coloration and turgor, no rashes, no suspicious skin lesions noted ,    DATA:    Labs:   CBC:   Recent Labs      03/09/18   0631  03/10/18   0554   WBC  6.3  6.9   HGB  8.8*  9.3*   HCT  31.0*  33.7*   PLT  310  327     BMP:   Recent Labs      03/09/18   0631  03/10/18   0554   NA  137  131*   K  4.3  4.6   CO2  23  24   BUN  46*  47*   CREATININE  2.55*  2.46*   LABGLOM  19*  20*   GLUCOSE  126*  164*     PT/INR:   No results for input(s): PROTIME, INR in the last 72 hours.     IMAGING DATA:  Reviewed  MR abd     Impression   1.  Re- demonstration of infiltrative mass occupying the near entirety of the   right renal parenchyma with cystic components.  Extension in the perinephric   space with metastatic implants along the liver are again noted.  Direct   extension into the renal vein and IVC is again demonstrated without evidence for propagation of thrombus from the renal vein/IVC junction since the prior      exam.       2.  Subtle 1.7 cm lesion in the lower pole the left kidney, as identified on   renal ultrasound, is noted.  This is concerning for an additional solid renal   mass.       3.  Nonvisualization of the right adrenal gland, which may be involved by   direct

## 2018-03-10 NOTE — PLAN OF CARE
[facility]   [address]    Second Visit Note  For more detailed information please refer to the progress note of the day      3/10/2018    4:42 PM    Name:   Ana Laura Burch  MRN:     [id number]     Cicilyside:      [de-identified]   Room:   [facility]8/0318[facility]01   Day:  7  Admit Date:  3/3/2018  9:26 PM    PCP:   Kumar Gomez MD  Code Status:  Full Code        Pt vitals were reviewed   New labs were reviewed   Patient continue to be stable    Updated plan :     1.  Await mitali Mayo MD  3/10/2018  4:42 PM

## 2018-03-10 NOTE — PROGRESS NOTES
Physical Therapy  Facility/Department: [facility] RENAL//MED SURG  Daily Treatment Note  NAME: Sweta Henry  : 1955  MRN: [id number]    Date of Service: 3/10/2018    Patient Diagnosis(es):   Patient Active Problem List    Diagnosis Date Noted    Pain and swelling of lower extremity, right     Acute kidney injury ([facility] 2018    Renal cell carcinoma ([address]     Acute cystitis without hematuria 2018    Renal mass     Cancer, metastatic to bone ([address]     Stage 4 chronic kidney disease ([address] 2018    Syncope and collapse 2018    Hyperkalemia 2018    Anemia due to chronic kidney disease 2018    Pathologic fracture of lumbar vertebra, initial encounter     Secondary malignant neoplasm of lumbar vertebral column ([address]     Hyperplastic polyp of intestine     Tubular adenoma     Osteoarthritis 11/10/2014    Migraine 11/10/2014    Obesity 11/10/2014    Renal insufficiency 2012    Diabetes mellitus ([address] 2012       Past Medical History:   Diagnosis Date    Cancer ([address]     kidney    Chronic kidney disease     Depression     Diabetes mellitus (HCC)     type 2    GERD (gastroesophageal reflux disease)     HA (headache)     HBP (high blood pressure)     History of blood transfusion     no reaction    Hyperlipidemia     Hyperplastic polyp of intestine     Hypothyroid 2016    Hypothyroidism     Left knee pain     Non-smoker     OA (osteoarthritis)     bilat knees    Tubular adenoma      Past Surgical History:   Procedure Laterality Date    BACK SURGERY N/A 2018    Post T11-L2 Fusion    COLONOSCOPY  2016    hyperplastic polyp and tubular adenoma     HYSTERECTOMY      NV LUMBAR SPINE FUSN,POST INTRBDY N/A 2018    T11-L3 POSTERIOR FIXATION AND FUSION, SP[facility] ABBY [facility] [id number] LJ performed by Shiloh Gross DO at [address] [place] [address]

## 2018-03-10 NOTE — PROGRESS NOTES
capsules by mouth 2 times daily 1/31/18   Petty Gautam MD   polyethylene glycol [facility]) powder Take 17 g by mouth daily 1/31/18   Petty Gautam MD   carvedilol (COREG) 6.25 MG tablet Take 1 tablet by mouth 2 times daily (with meals) 1/27/18   Flavio Costa MD   levothyroxine (SYNTHROID) 100 MCG tablet Take 1 tablet by mouth daily 11/16/17   Petty Gautam MD   omeprazole (PRILOSEC) 20 MG delayed release capsule Take 1 capsule by mouth daily 9/7/17   Petty Gautam MD   atorvastatin (LIPITOR) 20 MG tablet TAKE 1 TABLET BY MOUTH ONE TIME A DAY  7/27/17   Gregorio Kramer MD   insulin aspart (NOVOLOG FLEXPEN) 100 UNIT/ML injection pen Inject into the skin 2 times daily (with meals) Taking 3 units at lunch and 6 units at supper    Historical Provider, MD   Insulin Pen Needle 32G X 4 MM MISC 2 each by Does not apply route 2 times daily    Historical Provider, MD   glyBURIDE (DIABETA) 5 MG tablet TAKE 2 TABLETS BY MOUTH TWO TIMES A DAY 4/13/17   Gregorio Kramer MD   amitriptyline (ELAVIL) 75 MG tablet Take 1 tablet by mouth nightly 4/13/17   Gregorio Kramer MD   amLODIPine (NORVASC) 10 MG tablet Take 1 tablet by mouth daily 4/13/17   Gregorio Kramer MD   acetaminophen (TYLENOL) 500 MG tablet Take 1,000 mg by mouth 3 times daily    Historical Provider, MD     Current Facility-Administered Medications   Medication Dose Route Frequency Provider Last Rate Last Dose    oxyCODONE-acetaminophen (PERCOCET) 5-325 MG per tablet 1 tablet  1 tablet Oral Q4H PRN Manpreet Méndez MD   1 tablet at 03/09/18 3598    Or    oxyCODONE-acetaminophen (PERCOCET) 5-325 MG per tablet 2 tablet  2 tablet Oral Q4H PRN Manpreet Méndez MD        morphine injection 1 mg  1 mg Intravenous Q4H PRN Manpreet Méndez MD        promethazine (PHENERGAN) injection 12.5 mg  12.5 mg Intramuscular Q4H PRN Dk Garibay CNP        sodium bicarbonate tablet 650 mg  650 mg Oral Daily Drew Tamayo MD   650 mg at 03/09/18 0895    lisinopril (PRINIVIL;ZESTRIL) tablet 5 mg  5 mg Oral Daily Drew Lott MD   5 mg at 03/09/18 1300    ondansetron (ZOFRAN) tablet 4 mg  4 mg Oral Q6H PRN Moustapha Dong CNP   4 mg at 03/09/18 2351    rOPINIRole (REQUIP) tablet 0.5 mg  0.5 mg Oral TID Wyvonnia Boas, MD   0.5 mg at 03/09/18 2137    furosemide (LASIX) tablet 40 mg  40 mg Oral Daily Drew Lott MD   40 mg at 03/09/18 0840    enoxaparin (LOVENOX) injection 100 mg  1 mg/kg Subcutaneous Daily Wyvonnia Boas, MD   100 mg at 03/09/18 0840    calcium carbonate (TUMS) chewable tablet 500 mg  500 mg Oral TID PRN Moustapha Dong CNP        cyclobenzaprine (FLEXERIL) tablet 5 mg  5 mg Oral BID PRN Wyvonnia Boas, MD   5 mg at 03/05/18 1038    insulin lispro (HUMALOG) injection vial 0-6 Units  0-6 Units Subcutaneous TID WC Ting Frias MD        insulin lispro (HUMALOG) injection vial 0-3 Units  0-3 Units Subcutaneous Nightly Ting Frias MD   1 Units at 03/08/18 2109    acetaminophen (TYLENOL) tablet 1,000 mg  1,000 mg Oral TID Moustapha Dong CNP   1,000 mg at 03/09/18 1531    amitriptyline (ELAVIL) tablet 75 mg  75 mg Oral Nightly Moustapha Dong CNP   75 mg at 03/09/18 2137    docusate sodium (COLACE) capsule 200 mg  200 mg Oral BID Moustapha Dong CNP   200 mg at 03/09/18 2140    PAZOPanib HCl (VOTRIENT) chemo tablet 800 mg  800 mg Oral Daily Moustapha Dong CNP   Stopped at 03/05/18 1107    SUMAtriptan (IMITREX) tablet 100 mg  100 mg Oral Daily PRN Moustapha Dong CNP        sodium chloride flush 0.9 % injection 10 mL  10 mL Intravenous 2 times per day Moustapha Dong CNP   10 mL at 03/07/18 1046    sodium chloride flush 0.9 % injection 10 mL  10 mL Intravenous PRN Moustapha Iker, CNP        acetaminophen (TYLENOL) tablet 650 mg  650 mg Oral Q4H PRN [place] Iker, CNP        nicotine (NICODERM CQ) 21 MG/24HR 1 patch  1 patch Transdermal Daily PRN Moustapha Iker, CNP        glucose normal without lesions   Neck - supple, no significant adenopathy   Lymphatics - no palpable lymphadenopathy, no hepatosplenomegaly   Chest - clear to auscultation, no wheezes, rales or rhonchi, symmetric air entry   Heart - normal rate, regular rhythm, normal S1, S2, no murmurs  Abdomen - soft, nontender, nondistended, no masses or organomegaly   Neurological - alert, oriented, normal speech, no focal findings or movement disorder noted   Musculoskeletal - no joint tenderness, deformity or swelling   Extremities - peripheral pulses normal, + pedal edema, no clubbing or cyanosis   Skin - normal coloration and turgor, no rashes, no suspicious skin lesions noted ,    DATA:    Labs:   CBC:   Recent Labs      03/08/18   0644  03/09/18   0631   WBC  5.8  6.3   HGB  8.4*  8.8*   HCT  30.3*  31.0*   PLT  326  310     BMP:   Recent Labs      03/08/18   0644  03/09/18   0631   NA  136  137   K  4.1  4.3   CO2  22  23   BUN  47*  46*   CREATININE  2.80*  2.55*   LABGLOM  17*  19*   GLUCOSE  105*  126*     PT/INR:   No results for input(s): PROTIME, INR in the last 72 hours. IMAGING DATA:  Reviewed  MR abd     Impression   1.  Re- demonstration of infiltrative mass occupying the near entirety of the   right renal parenchyma with cystic components.  Extension in the perinephric   space with metastatic implants along the liver are again noted.  Direct   extension into the renal vein and IVC is again demonstrated without evidence for propagation of thrombus from the renal vein/IVC junction since the prior      exam.       2.  Subtle 1.7 cm lesion in the lower pole the left kidney, as identified on   renal ultrasound, is noted.  This is concerning for an additional solid renal   mass.       3.  Nonvisualization of the right adrenal gland, which may be involved by   direct infiltration.        Primary Problem  Acute kidney injury [facility])    Active Hospital Problems    Diagnosis Date Noted    Pain and swelling of lower extremity,

## 2018-03-10 NOTE — PROGRESS NOTES
[address]    Progress Note    3/10/2018    7:22 AM    Name:   Leanne Connolly  MRN:     [id number]     Cicilyside:      [de-identified]   Room:   [address] [place] [address] Day:  7  Admit Date:  3/3/2018  9:26 PM    PCP:   Gian Covarrubias MD  Code Status:  Full Code    Subjective:     C/C: Abnormal labs    Interval History Status: improved    Patient seen and examined at bedside, no acute events overnight. Had good sleep . Back pain is better controlled with pain medications. Patient denies any chest pain, shortness of breath, chills, fevers, nausea or vomiting. Patient vitals, labs and all providers notes were reviewed,from overnight shift and morning updates were noted and discussed with the nurse      Brief History:     The patient is a 66 y.o. Non-/non  female who presents with Abnormal labs and muscle cramping and she is admitted to the hospital for the management of  RASHMI.   Patient was found to have a K+ of 5.6, a creat of 2.87. She was transferred for further care. 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   During the admission she was evaluated by oncology and nephrology , finished her course of radiotherapy during the admission  3/7: MRI  Abdomen done, results as below   3/8: Consults notes reviewed Urology requested vascular and cardiology consult  Patient was seen with cardiology, need a stress test for clearance. Discussed with Dr Gretel Frances.   Stress test  OP, per vascular might need IVC filter if Urology plan for surgery   3/9: change in plane, interested in SNF     Review of Systems:     Constitutional:  negative for chills, fevers, sweats  Respiratory:  negative for cough, dyspnea on exertion, hemoptysis, shortness of breath, wheezing  Cardiovascular:  negative for chest pain, chest perforating vein. No evidence of superficial venous thrombosis in the right lower extremity. No evidence of superficial or deep venous thrombosis in the left lower   extremity. RETROPERITONEAL ULTRASOUND OF THE KIDNEYS AND URINARY BLADDER   3/4/2018  Impression 1. Abnormal appearing right kidney, enlarged, with appearance of multiple cysts in the upper pole.  Hydronephrosis is difficult to exclude.  No right ureteral jet is noted. Right nephrolithiasis is seen.   2.  Isoechoic lesion left kidney with solid mass not excluded.   3.  Bladder is unremarkable but only a left ureteral jet is noted.   RECOMMENDATIONS: If patient's renal function will permit, would advise CT urogram.  Otherwise, MRI imaging of the kidneys could be obtained without IV contrast to assess for cystic versus solid lesions in the kidneys.     Physical Examination:        General appearance:  alert, cooperative and no distress  Mental Status:  oriented to person, place and time and normal affect  Lungs:  clear to auscultation bilaterally, normal effort  Heart:  regular rate and rhythm, no murmur  Abdomen:  soft, non tender,  nondistended, normal bowel sounds,  Lower back pain  Extremities:  1+ edema BL LE, no change No redness, tenderness in the calves  Skin:  no gross lesions, rashes, induration    Assessment:        Primary Problem  Acute kidney injury [facility])    Active Hospital Problems    Diagnosis Date Noted    Pain and swelling of lower extremity, right [S98.754, M79.89]     Acute kidney injury ([address] [N17.9] 03/03/2018    Renal cell carcinoma (HCC) [C64.9]     Cancer, metastatic to bone ([address] [C79.51]     Hyperkalemia [E87.5] 01/19/2018    Anemia due to chronic kidney disease [N18.9, D63.1] 01/19/2018    Stage 4 chronic kidney disease ([address] [N18.4] 01/19/2018    Diabetes mellitus ([address] [E11.9] 08/21/2012       Plan:        RLE DVT in popliteal and peroneal veins: unable to determine if was present on admission or afterwards: Lovenox therapeutic dose, ok per oncology and Nephrology    RASHMI on CKD4: improving,  creatinine today is plateau around 1.99  Baseline creatinine is around 2:  continue Lasix by mouth, per nephrology CRS1 vs progression of intrinsic renal disease , follow-up nephrology recommendations. Hyperkalemia: Resolved. Metastatic renal cell carcinoma with metastases to L1 with pathologic fracture and intraperitoneal  Mets : Received radiotherapy, finished all her Sessions as per 3/5. Urology ordered MRI abdomen Without contrast did not show progression of the malignancy but showed suspicious lesion in the lower pole of left kidney, decision regarding nephrectomy is sensitive at this point as the patient might need to be on dialysis which she refuses, that with a suspicious new lesion in the left kidney. Await further Urology recommendations.    1  Start chemotherapy outpatient per oncology    Pain medications adjusted with good response    Hypoglycemia secondary to poor oral intake: Continue holding Lantus with only sliding scale for now    Anemia of chronic disease: Hemoglobin stable    Hypoalbuminemia with protein calorie malnutrition:   boost    HTN: continue home meds    DVT and GI prophylaxis    Patient refused to see palliative care this admission    [facility] arrangement, [facility] of Giovanny Guevara MD  3/10/2018  7:22 AM

## 2018-03-10 NOTE — PROGRESS NOTES
will be Lyle with amb 150'  Short term goal 4: Pt will navigate 4 steps Lyle with rail  Plan   Times per week: 5-6x/wk  Current Treatment Recommendations: Strengthening, Transfer Training, Endurance Training, Balance Training, Gait Training, Functional Mobility Training, Stair training, Safety Education & Training, Patient/Caregiver Education & Training  Safety Devices  Type of devices: Call light within reach, Left in bed, Nurse notified, Gait belt, All fall risk precautions in place  Restraints  Initially in place: No     Therapy Time   Individual Concurrent Group Co-treatment   Time In  1015         Time Out  1045         Minutes  930 [facility]

## 2018-03-11 NOTE — PROGRESS NOTES
ULTRA TEST VI) strip Use with associated glucose meter.  Patient testing three times daily 2/2/18   Inocente Varela MD   docusate sodium (COLACE) 100 MG capsule Take 2 capsules by mouth 2 times daily 1/31/18   Inocente Varela MD   polyethylene glycol [facility]) powder Take 17 g by mouth daily 1/31/18   Inocente Varela MD   carvedilol (COREG) 6.25 MG tablet Take 1 tablet by mouth 2 times daily (with meals) 1/27/18   Princess Scott MD   levothyroxine (SYNTHROID) 100 MCG tablet Take 1 tablet by mouth daily 11/16/17   Inocente Varela MD   omeprazole (PRILOSEC) 20 MG delayed release capsule Take 1 capsule by mouth daily 9/7/17   Inocente Vareal MD   atorvastatin (LIPITOR) 20 MG tablet TAKE 1 TABLET BY MOUTH ONE TIME A DAY  7/27/17   Mitul Jules MD   insulin aspart (NOVOLOG FLEXPEN) 100 UNIT/ML injection pen Inject into the skin 2 times daily (with meals) Taking 3 units at lunch and 6 units at supper    Historical Provider, MD   Insulin Pen Needle 32G X 4 MM MISC 2 each by Does not apply route 2 times daily    Historical Provider, MD   glyBURIDE (DIABETA) 5 MG tablet TAKE 2 TABLETS BY MOUTH TWO TIMES A DAY 4/13/17   Mitul Jules MD   amitriptyline (ELAVIL) 75 MG tablet Take 1 tablet by mouth nightly 4/13/17   Mitul Jules MD   amLODIPine (NORVASC) 10 MG tablet Take 1 tablet by mouth daily 4/13/17   Mitul Jules MD   acetaminophen (TYLENOL) 500 MG tablet Take 1,000 mg by mouth 3 times daily    Historical Provider, MD     Current Facility-Administered Medications   Medication Dose Route Frequency Provider Last Rate Last Dose    insulin glargine (LANTUS) injection vial 5 Units  5 Units Subcutaneous Nightly Da Muniz MD        lisinopril (PRINIVIL;ZESTRIL) tablet 20 mg  20 mg Oral Daily Da Muniz MD   20 mg at 03/11/18 1019    enoxaparin (LOVENOX) injection 90 mg  1 mg/kg Subcutaneous Daily Da Muniz MD   90 mg at 03/11/18 1019    oxyCODONE-acetaminophen (PERCOCET) 5-325 MG per tablet 1 tablet  1 tablet Oral Q4H PRN Marcus Lugo MD   1 tablet at 03/10/18 2312    Or    oxyCODONE-acetaminophen (PERCOCET) 5-325 MG per tablet 2 tablet  2 tablet Oral Q4H PRN Marcus Lugo MD   2 tablet at 03/11/18 0757    morphine injection 1 mg  1 mg Intravenous Q4H PRN Marcus Lugo MD        promethazine (PHENERGAN) injection 12.5 mg  12.5 mg Intramuscular Q4H PRN Merna Silverdenysith, CNP   12.5 mg at 03/10/18 6026    sodium bicarbonate tablet 650 mg  650 mg Oral Daily Drew Dodd MD   650 mg at 03/11/18 1019    ondansetron (ZOFRAN) tablet 4 mg  4 mg Oral Q6H PRN Merna Silversmith, CNP   4 mg at 03/11/18 1020    rOPINIRole (REQUIP) tablet 0.5 mg  0.5 mg Oral TID Marcus Lugo MD   0.5 mg at 03/11/18 1020    furosemide (LASIX) tablet 40 mg  40 mg Oral Daily Drew Dodd MD   40 mg at 03/11/18 1019    calcium carbonate (TUMS) chewable tablet 500 mg  500 mg Oral TID PRN Mernageorgi Eddysmith, CNP        cyclobenzaprine (FLEXERIL) tablet 5 mg  5 mg Oral BID PRN Marcus Lugo MD   5 mg at 03/05/18 1038    insulin lispro (HUMALOG) injection vial 0-6 Units  0-6 Units Subcutaneous TID WC Cleopatra Swift MD   1 Units at 03/11/18 1021    insulin lispro (HUMALOG) injection vial 0-3 Units  0-3 Units Subcutaneous Nightly Cleopatra Swift MD   1 Units at 03/10/18 2140    acetaminophen (TYLENOL) tablet 1,000 mg  1,000 mg Oral TID Merna Silversmith, CNP   1,000 mg at 03/09/18 1531    amitriptyline (ELAVIL) tablet 75 mg  75 mg Oral Nightly Merna Silversmith, CNP   75 mg at 03/10/18 2138    docusate sodium (COLACE) capsule 200 mg  200 mg Oral BID Merna Silversmith, CNP   200 mg at 03/11/18 1020    PAZOPanib HCl (VOTRIENT) chemo tablet 800 mg  800 mg Oral Daily Merna Dang CNP   Stopped at 03/05/18 1107    SUMAtriptan (IMITREX) tablet 100 mg  100 mg Oral Daily PRN Merna Dang CNP        sodium chloride flush 0.9 % injection 10 mL  10 mL Intravenous 2 times per day Merna Laurence, CNP 10 mL at 18 1046    sodium chloride flush 0.9 % injection 10 mL  10 mL Intravenous PRN Chintan Palma Sola, CNP        acetaminophen (TYLENOL) tablet 650 mg  650 mg Oral Q4H PRN Chintan Palma Sola, CNP        nicotine (NICODERM CQ) 21 MG/24HR 1 patch  1 patch Transdermal Daily PRN Chintan Palma Sola, CNP        glucose (GLUTOSE) 40 % oral gel 15 g  15 g Oral PRN Chintan Palma Sola, CNP        dextrose 50 % solution 12.5 g  12.5 g Intravenous PRN Chintan Palma Sola, CNP        glucagon (rDNA) injection 1 mg  1 mg Intramuscular PRN Chintan Palma Sola, CNP        dextrose 5 % solution  100 mL/hr Intravenous PRN Chintan Palma Sola, CNP         Allergies:  Patient has no known allergies. REVIEW OF SYSTEMS:    Constitutional: No fever or chills. No night sweats, no weight loss   Eyes: No eye discharge, double vision, or eye pain   HEENT: negative for sore mouth, sore throat, hoarseness and voice change   Respiratory: negative for cough , sputum, dyspnea, wheezing, hemoptysis, chest pain   Cardiovascular: negative for chest pain, dyspnea, palpitations, orthopnea, PND   Gastrointestinal: negative for nausea, vomiting, diarrhea, constipation, abdominal pain, Dysphagia, hematemesis and hematochezia   Genitourinary: negative for frequency, dysuria, nocturia, urinary incontinence, and hematuria   Integument: negative for rash, skin lesions, bruises.    Hematologic/Lymphatic: negative for easy bruising, bleeding, lymphadenopathy, or petechiae   Endocrine: negative for heat or cold intolerance,weight changes, change in bowel habits and hair loss   Musculoskeletal: negative for myalgias, arthralgias, pain, joint swelling,and bone pain   Neurological: negative for headaches, dizziness, seizures, weakness, numbness  PHYSICAL EXAM:      BP (!) 166/53   Pulse 95   Temp 98.2 °F (36.8 °C) (Oral)   Resp 18   Ht 5' 8\" (1.727 m)   Wt 193 lb 8 oz (87.8 kg)   SpO2 98%   BMI 29.42 kg/m²    Temp (24hrs), Av.3 ultrasound, is noted.  This is concerning for an additional solid renal   mass.       3.  Nonvisualization of the right adrenal gland, which may be involved by   direct infiltration. Primary Problem  Acute kidney injury [facility])    Active Hospital Problems    Diagnosis Date Noted    Pain and swelling of lower extremity, right [R99.982, M79.89]     Acute kidney injury ([address] [N17.9] 03/03/2018    Renal cell carcinoma ([address] [C64.9]     Cancer, metastatic to bone ([address] [C79.51]     Hyperkalemia [E87.5] 01/19/2018    Anemia due to chronic kidney disease [N18.9, D63.1] 01/19/2018    Stage 4 chronic kidney disease ([address] [N18.4] 01/19/2018    Diabetes mellitus ([address] [E11.9] 08/21/2012     IMPRESSION:   1. Stage IV metastatic RCC  2. Bone mets  3. Right lower extremity DVT  4. RASHMI  5. HTN  6. Anemia  7. DM    1. RECOMMENDATIONS:  2. Anticoagulation DVT  3. RASHMI Management as per nephrology  4. Continue care as per primary  5. Continue current pain medication regimen. 6. Urology following for possible surgery  7. Plan for  pazopanib soon (after we get pre certification) if no surgery planned      Discussed with patient and Nurse. Thank you for asking us to see this patient.     Armando Ariza

## 2018-03-11 NOTE — PROGRESS NOTES
and peroneal veins: unable to determine if was present on admission or afterwards: Lovenox therapeutic dose, ok per oncology and Nephrology    RASHMI on CKD4: improving,  creatinine today is plateau around 0.37  Baseline creatinine is around 2:  continue Lasix by mouth, per nephrology CRS1 vs progression of intrinsic renal disease , follow-up nephrology recommendations. Hyperkalemia: Resolved. Metastatic renal cell carcinoma with metastases to L1 with pathologic fracture and intraperitoneal  Mets/ direct extension th renal vein and IVC : Received radiotherapy, finished all her Sessions as per 3/5. Urology ordered MRI abdomen Without contrast did not show progression of the malignancy but showed suspicious lesion in the lower pole of left kidney, decision regarding nephrectomy is sensitive at this point as the patient might need to be on dialysis which she refuses, that with a suspicious new lesion in the left kidney. Urology will discuss the case again in tumor board, stress test as OP,  vascular recommend against IVC filter. Start chemotherapy outpatient per oncology( await precert)    Pain medications adjusted with good response 3/9    Hypoglycemia 2/2 DM:  Due to poor oral intake: BS started to spike up again .  Will restart   Lantus @ 5 U daily,  continue sliding scale and Hypoglycemia protocol    Anemia of chronic disease: Hemoglobin stable    Hypoalbuminemia with protein calorie malnutrition:   boost    HTN: continue home meds    DVT and GI prophylaxis    Patient refused to see palliative care this admission    SNF arrangement, [facility] , await precert:  DC order is in since 3/8          Jass Curtis MD  3/11/2018  7:41 AM

## 2018-03-12 NOTE — PROGRESS NOTES
Today's Date: 3/9/2018  Patient Name: Devonte Wood  Date of admission: 3/3/2018  9:26 PM  Patient's age: 66 y.o., 1955  Admission Dx: Acute kidney injury ([facility] [N17.9]    Reason for Consult: Metastatic RCC  Requesting Physician: Ting Frias MD    SUBJECTIVE:    Patient seen and examined. Labs in vitals reviewed. Denies any other interval even. Waiting for transfer to skilled nursing facility. Awaiting daily of the pazponib  Back pain is controlled    HISTORY OF PRESENT ILLNESS:    This is a 65 YO female with recently diagnose stage IV RCC was admitted with elevated creatinine. She has recent diagnosis of metastatic RCC. She had L1 bone mets and is receiving palliative chemotherapy and will finish tomorrow. She has follow up apptt with urology to discuss cytoreductive nephrectomy. Plan for TKI as o/p. Medications:    Prior to Admission medications    Medication Sig Start Date End Date Taking? Authorizing Provider   enoxaparin (LOVENOX) 100 MG/ML injection Inject 1 mL into the skin daily 3/9/18  Yes Wyvonnia Boas, MD   sodium bicarbonate 650 MG tablet Take 1 tablet by mouth daily for 14 days 3/9/18 3/23/18 Yes Wyvonnia Boas, MD   lisinopril (PRINIVIL;ZESTRIL) 5 MG tablet Take 1 tablet by mouth daily 3/6/18  Yes Levi Eddy MD   furosemide (LASIX) 40 MG tablet Take 1 tablet by mouth daily 3/7/18  Yes Levi Eddy MD   oxyCODONE HCl (OXY-IR) 10 MG immediate release tablet Take 1 tablet by mouth every 6 hours as needed for Pain for up to 30 days. 2/15/18 3/17/18  Leno Molina MD   PAZOPanib HCl (VOTRIENT) 200 MG chemo tablet Take 4 tablets by mouth daily 2/15/18   Leno Molina MD   Blood Glucose Monitoring Suppl (TRUE METRIX METER) w/Device KIT  2/2/18   Historical Provider, MD   Lancets [facility] Patient testing 3 times daily 2/2/18   Shiloh Head MD   glucose blood VI test strips (ASCENSIA AUTODISC VI;ONE TOUCH ULTRA TEST VI) strip Use with associated glucose meter.  Patient testing three times daily 2/2/18   Giselle Qureshi MD   docusate sodium (COLACE) 100 MG capsule Take 2 capsules by mouth 2 times daily 1/31/18   Giselle Qureshi MD   polyethylene glycol [facility]) powder Take 17 g by mouth daily 1/31/18   Giselle Qureshi MD   carvedilol (COREG) 6.25 MG tablet Take 1 tablet by mouth 2 times daily (with meals) 1/27/18   Berenice Meier MD   levothyroxine (SYNTHROID) 100 MCG tablet Take 1 tablet by mouth daily 11/16/17   Giselle Qureshi MD   omeprazole (PRILOSEC) 20 MG delayed release capsule Take 1 capsule by mouth daily 9/7/17   Giselle Qureshi MD   atorvastatin (LIPITOR) 20 MG tablet TAKE 1 TABLET BY MOUTH ONE TIME A DAY  7/27/17   Jason Corral MD   insulin aspart (NOVOLOG FLEXPEN) 100 UNIT/ML injection pen Inject into the skin 2 times daily (with meals) Taking 3 units at lunch and 6 units at supper    Historical Provider, MD   Insulin Pen Needle 32G X 4 MM MISC 2 each by Does not apply route 2 times daily    Historical Provider, MD   glyBURIDE (DIABETA) 5 MG tablet TAKE 2 TABLETS BY MOUTH TWO TIMES A DAY 4/13/17   Jason Corral MD   amitriptyline (ELAVIL) 75 MG tablet Take 1 tablet by mouth nightly 4/13/17   Jason Corral MD   amLODIPine (NORVASC) 10 MG tablet Take 1 tablet by mouth daily 4/13/17   Jason Corral MD   acetaminophen (TYLENOL) 500 MG tablet Take 1,000 mg by mouth 3 times daily    Historical Provider, MD     Current Facility-Administered Medications   Medication Dose Route Frequency Provider Last Rate Last Dose    insulin glargine (LANTUS) injection vial 5 Units  5 Units Subcutaneous Nightly Katy Manuel MD   5 Units at 03/11/18 2206    lisinopril (PRINIVIL;ZESTRIL) tablet 20 mg  20 mg Oral Daily Katy Manuel MD   20 mg at 03/11/18 1019    enoxaparin (LOVENOX) injection 90 mg  1 mg/kg Subcutaneous Daily Katy Manuel MD   90 mg at 03/11/18 1019    oxyCODONE-acetaminophen (PERCOCET) 5-325 MG per tablet 1 tablet  1 tablet Oral Q4H PRN Katy Manuel MD 1 tablet at 03/10/18 2312    Or    oxyCODONE-acetaminophen (PERCOCET) 5-325 MG per tablet 2 tablet  2 tablet Oral Q4H PRN Manpreet Méndez MD   2 tablet at 03/12/18 0353    morphine injection 1 mg  1 mg Intravenous Q4H PRN Manpreet Méndez MD        promethazine (PHENERGAN) injection 12.5 mg  12.5 mg Intramuscular Q4H PRN Dk Garibay CNP   12.5 mg at 03/10/18 8988    sodium bicarbonate tablet 650 mg  650 mg Oral Daily Drew Tamayo MD   650 mg at 03/11/18 1019    ondansetron (ZOFRAN) tablet 4 mg  4 mg Oral Q6H PRN Dk Garibay CNP   4 mg at 03/11/18 1020    rOPINIRole (REQUIP) tablet 0.5 mg  0.5 mg Oral TID Manpreet Méndez MD   0.5 mg at 03/11/18 2206    furosemide (LASIX) tablet 40 mg  40 mg Oral Daily Drew Zarate MD   40 mg at 03/11/18 1019    calcium carbonate (TUMS) chewable tablet 500 mg  500 mg Oral TID PRN Dk Garibay CNP        cyclobenzaprine (FLEXERIL) tablet 5 mg  5 mg Oral BID PRN Manpreet Méndez MD   5 mg at 03/05/18 1038    insulin lispro (HUMALOG) injection vial 0-6 Units  0-6 Units Subcutaneous TID WC Richardson Garcia MD   2 Units at 03/11/18 1647    insulin lispro (HUMALOG) injection vial 0-3 Units  0-3 Units Subcutaneous Nightly Richardson Garcia MD   2 Units at 03/11/18 2253    acetaminophen (TYLENOL) tablet 1,000 mg  1,000 mg Oral TID Dk Garibay CNP   1,000 mg at 03/09/18 1531    amitriptyline (ELAVIL) tablet 75 mg  75 mg Oral Nightly Dk Garibay CNP   75 mg at 03/11/18 2253    docusate sodium (COLACE) capsule 200 mg  200 mg Oral BID Dk Garibay CNP   200 mg at 03/11/18 2206    PAZOPanib HCl (VOTRIENT) chemo tablet 800 mg  800 mg Oral Daily Dk Garibay CNP   Stopped at 03/05/18 1107    SUMAtriptan (IMITREX) tablet 100 mg  100 mg Oral Daily PRN Dk Garibay CNP        sodium chloride flush 0.9 % injection 10 mL  10 mL Intravenous 2 times per day Dk Garibay CNP   10 mL at 03/07/18 1046    sodium chloride flush  This is concerning for an additional solid renal   mass.       3.  Nonvisualization of the right adrenal gland, which may be involved by   direct infiltration. Primary Problem  Acute kidney injury [facility])    Active Hospital Problems    Diagnosis Date Noted    Pain and swelling of lower extremity, right [T30.250, M79.89]     Acute kidney injury ([address] [N17.9] 03/03/2018    Renal cell carcinoma ([address] [C64.9]     Cancer, metastatic to bone ([address] [C79.51]     Hyperkalemia [E87.5] 01/19/2018    Anemia due to chronic kidney disease [N18.9, D63.1] 01/19/2018    Stage 4 chronic kidney disease ([address] [N18.4] 01/19/2018    Diabetes mellitus ([facility] [E11.9] 08/21/2012     IMPRESSION:   1. Stage IV metastatic RCC  2. Bone mets  3. Right lower extremity DVT  4. RASHMI  5. HTN  6. Anemia  7. DM    1. RECOMMENDATIONS:  2. Anticoagulation DVT. 3. RASHMI Management as per nephrology  4. Continue care as per primary  5. Continue current pain medication regimen. 6. Urology following for possible surgery  7. Plan for  pazopanib soon (after we get pre certification) if no surgery planned  8. Okay to discharge from medical oncology standpoint. Patient will need outpatient follow-up with Dr. Frankie Han. Discussed with patient and Nurse. Thank you for asking us to see this patient.     Wolf Byrnes

## 2018-03-12 NOTE — PROGRESS NOTES
[address] [facility]    Progress Note    3/12/2018    7:40 AM    Name:   Grant Benjamin  MRN:     [id number]     Acct:      [de-identified]   Room:   [address] [place] [address]  IP Day:  9  Admit Date:  3/3/2018  9:26 PM    PCP:   Kana Julio MD  Code Status:  Full Code    Subjective:     C/C: Abnormal labs    Interval History Status: improved. Patient still c/o back pain this AM. Has trouble working with PT 2/2 back pain. No other acute complaints. Awaiting pre-cert for SNF. Brief History:     The patient is a 66 y.o.  Non-/non  female who presents with Abnormal labs and muscle cramping and she is admitted to the hospital for the management of Marcha Silas was found to have a K+ of 5.6, a creat of 2.87. She was transferred for further care. 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   During the admission she was evaluated by oncology and nephrology , finished her course of radiotherapy during the admission  3/7: MRI  Abdomen done, results as below   3/8: Consults notes reviewed Urology requested vascular and cardiology consult  Patient was seen with cardiology, need a stress test for clearance. Discussed with Dr Barry Barton.   Stress test  OP, per vascular might need IVC filter if Urology plan for surgery   3/9: change in plane, interested in SNF    Review of Systems:     Constitutional:  negative for chills, fevers, sweats  Respiratory:  negative for cough, dyspnea on exertion, hemoptysis, shortness of breath, wheezing  Cardiovascular:  negative for chest pain, chest pressure/discomfort, lower extremity edema, palpitations  Gastrointestinal:  negative for abdominal pain, constipation, diarrhea, nausea, vomiting  Neurological:  negative for dizziness, headache  Musculoskeletal: +Back pain    Medications: Allergies:  No Known Allergies    Current Meds:   Scheduled Meds:    insulin glargine  5 Units Subcutaneous Nightly    lisinopril  20 mg Oral Daily    enoxaparin  1 mg/kg Subcutaneous Daily    sodium bicarbonate  650 mg Oral Daily    rOPINIRole  0.5 mg Oral TID    furosemide  40 mg Oral Daily    insulin lispro  0-6 Units Subcutaneous TID WC    insulin lispro  0-3 Units Subcutaneous Nightly    acetaminophen  1,000 mg Oral TID    amitriptyline  75 mg Oral Nightly    docusate sodium  200 mg Oral BID    PAZOPanib HCl  800 mg Oral Daily    sodium chloride flush  10 mL Intravenous 2 times per day     Continuous Infusions:    dextrose       PRN Meds: oxyCODONE-acetaminophen **OR** oxyCODONE-acetaminophen, morphine, promethazine, ondansetron, calcium carbonate, cyclobenzaprine, SUMAtriptan, sodium chloride flush, acetaminophen, nicotine, glucose, dextrose, glucagon (rDNA), dextrose    Data:     Past Medical History:   has a past medical history of Cancer ([facility] Chronic kidney disease; Depression; Diabetes mellitus ([facility] GERD (gastroesophageal reflux disease); HA (headache); HBP (high blood pressure); History of blood transfusion; Hyperlipidemia; Hyperplastic polyp of intestine; Hypothyroid; Hypothyroidism; Left knee pain; Non-smoker; OA (osteoarthritis); and Tubular adenoma. Social History:   reports that she has never smoked. She has never used smokeless tobacco. She reports that she does not drink alcohol or use drugs.      Family History:   Family History   Problem Relation Age of Onset    Heart Disease Mother     Diabetes Mother     Cancer Father      esophageal cancer    Cancer Maternal Grandfather      colon       Vitals:  BP (!) 156/71   Pulse 89   Temp 97.6 °F (36.4 °C) (Oral)   Resp 16   Ht 5' 8\" (1.727 m)   Wt 194 lb 7.1 oz (88.2 kg)   SpO2 97%   BMI 29.57 kg/m²   Temp (24hrs), Av.2 °F (36.8 °C), Min:97.6 °F (36.4 °C), Max:98.4 °F (36.9 °C)    Recent Labs

## 2018-03-12 NOTE — PROGRESS NOTES
Restrictions  Restrictions/Precautions  Restrictions/Precautions: General Precautions, Fall Risk  Required Braces or Orthoses?: No  Position Activity Restriction  Other position/activity restrictions: up with assist  Subjective   General  Chart Reviewed: Yes  Response To Previous Treatment: Patient with no complaints from previous session. Family / Caregiver Present: No  Subjective  Subjective: RN and pt agreed to PT. General Comment  Comments: pt reporting pn in LB. Pain Screening  Patient Currently in Pain: Yes  Pain Assessment  Pain Assessment: 0-10  Pain Type: Referred pain  Pain Location: Back  Pain Orientation: Lower  Pain Descriptors: Aching;Stabbing  Pain Frequency: Intermittent  Pain Onset: On-going  Pain Intervention(s): Medication (see eMar)  Vital Signs  Patient Currently in Pain: Yes       Orientation  Orientation  Overall Orientation Status: Within Functional Limits  Objective   Bed mobility  Sit to Supine: Minimal assistance (LE)  Scooting: Stand by assistance  Transfers  Sit to Stand: Contact guard assistance  Stand to sit: Stand by assistance  Ambulation  Ambulation?: Yes  Ambulation 1  Surface: level tile  Device: Rolling Walker  Assistance: Contact guard assistance  Quality of Gait: unsteadiness, painful, short steps. Distance: 15ft in room. bed to bathroom then to door and back to bed. Stairs/Curb  Stairs?: No     Balance  Posture: Fair  Sitting - Static: Good  Sitting - Dynamic: Good  Standing - Static: Fair  Standing - Dynamic: Poor         Supine LE exercise program:  Quad sets, HS sets, glut sets, ankle pumps. Reps: x20 AROM. Assessment   Body structures, Functions, Activity limitations: Decreased functional mobility ; Decreased strength;Decreased endurance;Decreased balance  Assessment: pt Limited by back pn. able to ambulate to bathroom, and perform LE exercises in bed.    Prognosis: Good  REQUIRES PT FOLLOW UP: Yes  Activity Tolerance  Activity Tolerance: Patient limited by fatigue;Patient limited by pain  Activity Tolerance: significant BLE pain with ambulation         Goals  Short term goals  Time Frame for Short term goals: 14 visits  Short term goal 1: Pt will be I with bed mobility  Short term goal 2: Pt will be I with transfers  Short term goal 3: Pt will be Lyle with amb 150'  Short term goal 4: Pt will navigate 4 steps Lyle with rail    Plan    Plan  Times per week: 5-6x/wk  Current Treatment Recommendations: Strengthening, Transfer Training, Endurance Training, Balance Training, Gait Training, Functional Mobility Training, Stair training, Safety Education & Training, Patient/Caregiver Education & Training  Safety Devices  Type of devices: Call light within reach, Gait belt, Patient at risk for falls, Left in bed, Nurse notified  Restraints  Initially in place: No     Therapy Time   Individual Concurrent Group Co-treatment   Time In 1009         Time Out 1040         Minutes [address] [place] [address]

## 2018-03-12 NOTE — CONSULTS
Daily rDew Stark MD   40 mg at 03/12/18 0816    calcium carbonate (TUMS) chewable tablet 500 mg  500 mg Oral TID PRN [facility], CNP        cyclobenzaprine (FLEXERIL) tablet 5 mg  5 mg Oral BID PRN Radha Eid MD   5 mg at 03/05/18 1038    insulin lispro (HUMALOG) injection vial 0-6 Units  0-6 Units Subcutaneous TID WC Radha Hurst MD   2 Units at 03/11/18 1647    insulin lispro (HUMALOG) injection vial 0-3 Units  0-3 Units Subcutaneous Nightly Radha Hurst MD   2 Units at 03/11/18 2253    acetaminophen (TYLENOL) tablet 1,000 mg  1,000 mg Oral TID [facility], CNP   1,000 mg at 03/09/18 1531    amitriptyline (ELAVIL) tablet 75 mg  75 mg Oral Nightly [facility], CNP   75 mg at 03/11/18 2253    docusate sodium (COLACE) capsule 200 mg  200 mg Oral BID [facility], CNP   200 mg at 03/12/18 0816    PAZOPanib HCl (VOTRIENT) chemo tablet 800 mg  800 mg Oral Daily [facility], CNP   Stopped at 03/05/18 1107    SUMAtriptan (IMITREX) tablet 100 mg  100 mg Oral Daily PRN [facility], CNP        sodium chloride flush 0.9 % injection 10 mL  10 mL Intravenous 2 times per day [facility], CNP   10 mL at 03/07/18 1046    sodium chloride flush 0.9 % injection 10 mL  10 [facility]        acetaminophen (TYLENOL) tablet 650 mg  650 mg Oral Q4H PRN [facility], CNP   650 mg at 03/11/18 1503    nicotine (NICODERM CQ) 21 MG/24HR 1 patch  1 patch Transdermal Daily [facility]N [facility]        glucose (GLUTOSE) 40 % oral gel 15 g  15 g Oral PRN [facility], CNP        dextrose 50 % solution 12.5 g  12.5 g Intravenous PRN [facility], CNP        glucagon (rDNA) injection 1 mg  1 mg Intramuscular PRN [facility], CNP        dextrose 5 % solution  100 mL/hr Intravenous PRN [facility]           Last Pain Score: (Charted in Doc Flowsheet)  Pain Level: 7    PCA:     BMP:    Lab Results   Component Value Date     03/12/2018    K 4.1 03/12/2018    CL 95 03/12/2018    CO2 25 03/12/2018    BUN 49 03/12/2018    CREATININE 2.16 03/12/2018    CALCIUM 9.4 03/12/2018    GFRAA 28 03/12/2018    LABGLOM 23 03/12/2018    GLUCOSE 181 03/12/2018     COAGS:    Lab Results   Component Value Date    PROTIME 10.5 03/04/2018    INR 1.0 03/04/2018    APTT 19.2 01/23/2018       Adjuvant pain medication:    A/P: Lalitha Hester is a 66y.o. year-old s/p chronic back pain secondary to pathological fracture. She had good pain control prior to her admission with Oxycodone 10mg every 6 hours. Patient is getting 2 percocet 5/325 every 4 hours with not much relief. Start patient with Oxycodone 10 mg every 6 hours p.o. As needed and titrate as appropriate.      -Thank you for opportunity to participate in this patient's care.     Electronically signed by Mono Jeffery MD on 3/12/2018 at 3:41 PM

## 2018-03-12 NOTE — PLAN OF CARE
Problem: Pain:  Goal: Pain level will decrease  Pain level will decrease    Outcome: Met This Shift      Problem: Falls - Risk of  Goal: Absence of falls  Outcome: Met This Shift

## 2018-03-13 NOTE — PLAN OF CARE
Problem: Pain:  Goal: Pain level will decrease  Pain level will decrease    Outcome: Ongoing  Pain level will decrease this shift.

## 2018-03-13 NOTE — PLAN OF CARE
Problem: Pain:  Goal: Pain level will decrease  Pain level will decrease    Outcome: Ongoing      Problem: Falls - Risk of  Goal: Absence of falls  Outcome: Ongoing

## 2018-03-13 NOTE — PROGRESS NOTES
Today's Date: 3/9/2018  Patient Name: Abby Mak  Date of admission: 3/3/2018  9:26 PM  Patient's age: 66 y.o., 1955  Admission Dx: Acute kidney injury ([facility] [address] [N17.9]    Reason for Consult: Metastatic RCC  Requesting Physician: Calvin Segura MD    SUBJECTIVE:    Patient seen and examined. Labs in vitals reviewed. Earlier complained of worsening low back pain. Neurosurgery was consulted. They ordered a CT abdomen pelvis. Awaiting results. Waiting for transfer to skilled nursing facility. Back pain is controlled    HISTORY OF PRESENT ILLNESS:    This is a 65 YO female with recently diagnose stage IV RCC was admitted with elevated creatinine. She has recent diagnosis of metastatic RCC. She had L1 bone mets and is receiving palliative chemotherapy and will finish tomorrow. She has follow up apptt with urology to discuss cytoreductive nephrectomy. Plan for TKI as o/p. Medications:    Prior to Admission medications    Medication Sig Start Date End Date Taking? Authorizing Provider   oxyCODONE (OXY-IR) 10 MG immediate release tablet Take 1 tablet by mouth every 6 hours as needed for Pain for up to 7 days.  3/13/18 3/20/18 Yes Calvin Segura MD   insulin glargine (LANTUS) 100 UNIT/ML injection vial Inject 5 Units into the skin nightly 3/13/18  Yes Calvin Segura MD   ondansetron (ZOFRAN) 4 MG tablet Take 1 tablet by mouth every 6 hours as needed for Nausea or Vomiting 3/13/18  Yes Calvin Segura MD   lisinopril (PRINIVIL;ZESTRIL) 20 MG tablet Take 1 tablet by mouth daily 3/14/18  Yes Calvin Segura MD   enoxaparin (LOVENOX) 100 MG/ML injection Inject 1 mL into the skin daily 3/9/18  Yes Shelia Corbett MD   sodium bicarbonate 650 MG tablet Take 1 tablet by mouth daily for 14 days 3/9/18 3/23/18 Yes Shelia Corbett MD   furosemide (LASIX) 40 MG tablet Take 1 tablet by mouth daily 3/7/18  Yes Jen Mason MD   oxyCODONE HCl (OXY-IR) 10 MG immediate release tablet Take 1 tablet by mouth every 6 hours Frequency Provider Last Rate Last Dose    oxyCODONE (ROXICODONE) immediate release tablet 10 mg  10 mg Oral Q6H PRN Teresita Kumari MD   10 mg at 03/13/18 1210    insulin glargine (LANTUS) injection vial 5 Units  5 Units Subcutaneous Nightly Niraj Esquivel MD   5 Units at 03/12/18 2210    lisinopril (PRINIVIL;ZESTRIL) tablet 20 mg  20 mg Oral Daily Niraj Esquivel MD   20 mg at 03/13/18 0842    enoxaparin (LOVENOX) injection 90 mg  1 mg/kg Subcutaneous Daily Niraj Esquivel MD   90 mg at 03/13/18 0842    promethazine (PHENERGAN) injection 12.5 mg  12.5 mg Intramuscular Q4H PRN Josias Roberson CNP   12.5 mg at 03/10/18 2993    sodium bicarbonate tablet 650 mg  650 mg Oral Daily Drew Tamayo MD   650 mg at 03/13/18 0842    ondansetron (ZOFRAN) tablet 4 mg  4 mg Oral Q6H PRN Josias Roberson CNP   4 mg at 03/11/18 1020    rOPINIRole (REQUIP) tablet 0.5 mg  0.5 mg Oral TID Niraj Esquivel MD   0.5 mg at 03/13/18 1352    furosemide (LASIX) tablet 40 mg  40 mg Oral Daily Drew Beaulieu MD   40 mg at 03/13/18 0841    calcium carbonate (TUMS) chewable tablet 500 mg  500 mg Oral TID PRN Josias Roberson CNP        cyclobenzaprine (FLEXERIL) tablet 5 mg  5 mg Oral BID PRN Niraj Esquivel MD   5 mg at 03/05/18 1038    insulin lispro (HUMALOG) injection vial 0-6 Units  0-6 Units Subcutaneous TID  Teresita Kumari MD   1 Units at 03/13/18 0844    insulin lispro (HUMALOG) injection vial 0-3 Units  0-3 Units Subcutaneous Nightly Teresita Kumari MD   1 Units at 03/12/18 2206    acetaminophen (TYLENOL) tablet 1,000 mg  1,000 mg Oral TID Josias Roberson CNP   1,000 mg at 03/13/18 1352    amitriptyline (ELAVIL) tablet 75 mg  75 mg Oral Nightly Josias Roberson CNP   75 mg at 03/12/18 2154    docusate sodium (COLACE) capsule 200 mg  200 mg Oral BID Josias Roberson CNP   200 mg at 03/13/18 0841    PAZOPanib HCl (VOTRIENT) chemo tablet 800 mg  800 mg Oral Daily Josias Roberson CNP   Stopped at 03/05/18 1107    SUMAtriptan (IMITREX) tablet 100 mg  100 mg Oral Daily PRN Desma Asad, CNP        sodium chloride flush 0.9 % injection 10 mL  10 mL Intravenous 2 times per day Desma Asad, CNP   10 mL at 03/07/18 1046    sodium chloride flush 0.9 % injection 10 mL  10 mL Intravenous PRN Desma Asad, CNP        acetaminophen (TYLENOL) tablet 650 mg  650 mg Oral Q4H PRN Desma Asad, CNP   650 mg at 03/11/18 1503    nicotine (NICODERM CQ) 21 MG/24HR 1 patch  1 patch Transdermal Daily PRN Desma Asad, CNP        glucose (GLUTOSE) 40 % oral gel 15 g  15 g Oral PRN Desma Asad, CNP        dextrose 50 % solution 12.5 g  12.5 g Intravenous PRN Desma Asad, CNP        glucagon (rDNA) injection 1 mg  1 mg Intramuscular PRN Desma Asad, CNP        dextrose 5 % solution  100 mL/hr Intravenous PRN Desma Asad, CNP         Allergies:  Patient has no known allergies. REVIEW OF SYSTEMS:    Constitutional: No fever or chills. No night sweats, no weight loss   Eyes: No eye discharge, double vision, or eye pain   HEENT: negative for sore mouth, sore throat, hoarseness and voice change   Respiratory: negative for cough , sputum, dyspnea, wheezing, hemoptysis, chest pain   Cardiovascular: negative for chest pain, dyspnea, palpitations, orthopnea, PND   Gastrointestinal: negative for nausea, vomiting, diarrhea, constipation, abdominal pain, Dysphagia, hematemesis and hematochezia   Genitourinary: negative for frequency, dysuria, nocturia, urinary incontinence, and hematuria   Integument: negative for rash, skin lesions, bruises.    Hematologic/Lymphatic: negative for easy bruising, bleeding, lymphadenopathy, or petechiae   Endocrine: negative for heat or cold intolerance,weight changes, change in bowel habits and hair loss   Musculoskeletal: negative for myalgias, arthralgias, pain, joint swelling,and bone pain   Neurological: negative for headaches, renal vein and IVC is again demonstrated without evidence for propagation of thrombus from the renal vein/IVC junction since the prior      exam.       2.  Subtle 1.7 cm lesion in the lower pole the left kidney, as identified on   renal ultrasound, is noted.  This is concerning for an additional solid renal   mass.       3.  Nonvisualization of the right adrenal gland, which may be involved by   direct infiltration. Primary Problem  Acute kidney injury [facility])    Active Hospital Problems    Diagnosis Date Noted    Pain and swelling of lower extremity, right [F85.521, M79.89]     Acute kidney injury ([address] [N17.9] 03/03/2018    Renal cell carcinoma ([address] [C64.9]     Cancer, metastatic to bone ([address] [C79.51]     Hyperkalemia [E87.5] 01/19/2018    Anemia due to chronic kidney disease [N18.9, D63.1] 01/19/2018    Stage 4 chronic kidney disease ([address] [N18.4] 01/19/2018    Diabetes mellitus ([address] [E11.9] 08/21/2012     IMPRESSION:   1. Stage IV metastatic RCC  2. Bone mets  3. Right lower extremity DVT  4. RASHMI  5. HTN  6. Anemia  7. DM    1. RECOMMENDATIONS:  2. Anticoagulation DVT. 3. RASHMI Management as per nephrology  4. Discuss with . pzopanib will be shipped to patient's residence tonight  5. Pain medication adjusted by pain management team  6. Urology following for possible surgery as outpatient. Patient may need stress test as well as possible IVC perioperatively  7. Okay to discharge from medical oncology standpoint. Patient will need outpatient follow-up with Dr. Doreen Herrera. Discussed with patient and Nurse. Thank you for asking us to see this patient.     Kiara Ashley

## 2018-03-13 NOTE — CONSULTS
 Non-smoker     OA (osteoarthritis)     bilat knees    Tubular adenoma        Past Surgical History:        Procedure Laterality Date    BACK SURGERY N/A 01/23/2018    Post T11-L2 Fusion    COLONOSCOPY  08/16/2016    hyperplastic polyp and tubular adenoma     HYSTERECTOMY      MS LUMBAR SPINE FUSN,POST INTRBDY N/A 1/23/2018    T11-L3 POSTERIOR FIXATION AND FUSION, SPINE ABBY C-ARM, O-ARM WITH STEALTH,  EVOKES- [id number] LJ performed by Mike Lou DO at [address] [place] [address] History:   Social History     Social History    Marital status:      Spouse name: N/A    Number of children: N/A    Years of education: N/A     Occupational History    Not on file. Social History Main Topics    Smoking status: Never Smoker    Smokeless tobacco: Never Used    Alcohol use No    Drug use: No    Sexual activity: Not on file     Other Topics Concern    Not on file     Social History Narrative    No narrative on file       Family History:       Problem Relation Age of Onset    Heart Disease Mother     Diabetes Mother     Cancer Father      esophageal cancer    Cancer Maternal Grandfather      colon       Allergies:  Patient has no known allergies. Home Medications:  Prior to Admission medications    Medication Sig Start Date End Date Taking? Authorizing Provider   oxyCODONE (OXY-IR) 10 MG immediate release tablet Take 1 tablet by mouth every 6 hours as needed for Pain for up to 7 days.  3/13/18 3/20/18 Yes Teresita Kumari MD   insulin glargine (LANTUS) 100 UNIT/ML injection vial Inject 5 Units into the skin nightly 3/13/18  Yes Teresita Kumari MD   ondansetron (ZOFRAN) 4 MG tablet Take 1 tablet by mouth every 6 hours as needed for Nausea or Vomiting 3/13/18  Yes Teresita Kumari MD   lisinopril (PRINIVIL;ZESTRIL) 20 MG tablet Take 1 tablet by mouth daily 3/14/18  Yes Teresita Kumari MD   enoxaparin (LOVENOX) 100 MG/ML injection Inject 1 mL into the skin daily 3/9/18  Yes Berna Yenny Beltran MD   sodium bicarbonate 650 MG tablet Take 1 tablet by mouth daily for 14 days 3/9/18 3/23/18 Yes Katty Carvajal MD   furosemide (LASIX) 40 MG tablet Take 1 tablet by mouth daily 3/7/18  Yes Tiny Claros MD   oxyCODONE HCl (OXY-IR) 10 MG immediate release tablet Take 1 tablet by mouth every 6 hours as needed for Pain for up to 30 days. 2/15/18 3/17/18  Sandy Fu MD   PAZOPanib HCl (VOTRIENT) 200 MG chemo tablet Take 4 tablets by mouth daily 2/15/18   Sandy Fu MD   Blood Glucose Monitoring Suppl (TRUE METRIX METER) w/Device KIT  2/2/18   Historical Provider, MD   Lancets [facility] [place] 3 times daily 2/2/18   Delma Stafford MD   glucose blood VI test strips (ASCENSIA AUTODISC VI;ONE TOUCH ULTRA TEST VI) strip Use with associated glucose meter.  Patient testing three times daily 2/2/18   Delma Stafford MD   docusate sodium (COLACE) 100 MG capsule Take 2 capsules by mouth 2 times daily 1/31/18   Delma Stafford MD   polyethylene glycol [facility]) powder Take 17 g by mouth daily 1/31/18   Delma Stafford MD   carvedilol (COREG) 6.25 MG tablet Take 1 tablet by mouth 2 times daily (with meals) 1/27/18   Bernie Watson MD   levothyroxine (SYNTHROID) 100 MCG tablet Take 1 tablet by mouth daily 11/16/17   Delma Stafford MD   omeprazole (PRILOSEC) 20 MG delayed release capsule Take 1 capsule by mouth daily 9/7/17   Delma Stafford MD   atorvastatin (LIPITOR) 20 MG tablet TAKE 1 TABLET BY MOUTH ONE TIME A DAY  7/27/17   Audrey Suresh MD   insulin aspart (NOVOLOG FLEXPEN) 100 UNIT/ML injection pen Inject into the skin 2 times daily (with meals) Taking 3 units at lunch and 6 units at supper    Historical Provider, MD   Insulin Pen Needle 32G X 4 MM MISC 2 each by Does not apply route 2 times daily    Historical Provider, MD   glyBURIDE (DIABETA) 5 MG tablet TAKE 2 TABLETS BY MOUTH TWO TIMES A DAY 4/13/17   Audrey Suresh MD   amitriptyline (ELAVIL) 75 MG tablet Take 1 tablet by mouth

## 2018-03-13 NOTE — PROGRESS NOTES
vomiting  Neurological:  negative for dizziness, headache  Musculoskeletal: +Back pain    Medications: Allergies:  No Known Allergies    Current Meds:   Scheduled Meds:    insulin glargine  5 Units Subcutaneous Nightly    lisinopril  20 mg Oral Daily    enoxaparin  1 mg/kg Subcutaneous Daily    sodium bicarbonate  650 mg Oral Daily    rOPINIRole  0.5 mg Oral TID    furosemide  40 mg Oral Daily    insulin lispro  0-6 Units Subcutaneous TID WC    insulin lispro  0-3 Units Subcutaneous Nightly    acetaminophen  1,000 mg Oral TID    amitriptyline  75 mg Oral Nightly    docusate sodium  200 mg Oral BID    PAZOPanib HCl  800 mg Oral Daily    sodium chloride flush  10 mL Intravenous 2 times per day     Continuous Infusions:    dextrose       PRN Meds: morphine, oxyCODONE, promethazine, ondansetron, calcium carbonate, cyclobenzaprine, SUMAtriptan, sodium chloride flush, acetaminophen, nicotine, glucose, dextrose, glucagon (rDNA), dextrose    Data:     Past Medical History:   has a past medical history of Cancer ([facility] [address] Chronic kidney disease; Depression; [facility] GERD (gastroesophageal reflux disease); HA (headache); HBP (high blood pressure); History of blood transfusion; Hyperlipidemia; Hyperplastic polyp of intestine; Hypothyroid; Hypothyroidism; Left knee pain; Non-smoker; OA (osteoarthritis); and Tubular adenoma. Social History:   reports that she has never smoked. She has never used smokeless tobacco. She reports that she does not drink alcohol or use drugs.      Family History:   Family History   Problem Relation Age of Onset    Heart Disease Mother     Diabetes Mother     Cancer Father      esophageal cancer    Cancer Maternal Grandfather      colon       Vitals:  BP (!) 159/59   Pulse 88   Temp 97.9 °F (36.6 °C) (Oral)   Resp 16   Ht 5' 8\" (1.727 m)   Wt 194 lb 8 oz (88.2 kg)   SpO2 97%   BMI 29.57 kg/m²   Temp (24hrs), Av.8 °F (36.6 °C), Min:97.3 °F (36.3 °C), QD. Outpatient BMP next week. On DC, will reduce lisinopril to QD. Will need to follow up with nephro in 3-4 weeks.      Hyperkalemia: Resolved.     Metastatic renal cell carcinoma with metastases to L1 with pathologic fracture and intraperitoneal  Mets/ direct extension th renal vein and IVC : Received radiotherapy, finished all her Sessions as per 3/5. Urology ordered MRI abdomen Without contrast did not show progression of the malignancy but showed suspicious lesion in the lower pole of left kidney, decision regarding nephrectomy is sensitive at this point as the patient might need to be on dialysis which she refuses, that with a suspicious new lesion in the left kidney. Urology will discuss the case again in tumor board, stress test as OP,  vascular recommend against IVC filter. Start chemotherapy outpatient per oncology (await precert)     Pain Mx not adequate. Pain Mx consulted. Recommend continuing 10 mg oxycodone immediate release Q 6 hours PRN     Hypoglycemia 2/2 DM, resolved: This was initially due to poor oral intake. However, BG started to spike up again. Lantus was restarted at 5 U daily.  Continue sliding scale and Hypoglycemia protocol     Anemia of chronic disease: Hemoglobin stable     Hypoalbuminemia with protein calorie malnutrition: Boost shakes     HTN: continue home meds     DVT and GI prophylaxis     Patient refused to see palliative care this admission     SNF arrangement, [facility] , await precert:  DC order is in since 3/8    Discharge today    Deborah Chaparro MD  3/13/2018  7:27 AM

## 2018-03-13 NOTE — PLAN OF CARE
Problem: Nutrition  Goal: Optimal nutrition therapy  Outcome: Ongoing  Nutrition Problem: Inadequate oral intake  Intervention: Food and/or Nutrient Delivery: Modify current diet, Start ONS  Nutritional Goals: Po intake to meet greater than 50% of estimated nutrient needs.

## 2018-03-14 NOTE — CONSULTS
Trauma, Emergency and Critical Surgical Services              History and Physical    PATIENT NAME: Maggie Hernandez  AGE: 66 y.o. MEDICAL RECORD NO. [id number]  DATE: 3/14/2018  SURGEON: Dr. Bailey: Branden Medina MD    Patient evaluated at the request of  Dr. Dr. Herron Marking  Reason for evaluation: ?L gluteal hematoma vs mass    Patient information was obtained from patient and EMS personnel. History/Exam limitations: none. 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 metastatic renal cell carcinoma  2. b/l DVT on therapuetic lovenox  3. RASHMI  4. L gluteal mass vs hematoma       MEDICAL DECISION MAKING AND PLAN:   1. Based on non-contrast CT scan, unable to differentiate if L gluteal mass is hematoma vs new metastatic disease. Given benign clinical exam, hemodynamic stability, and stable hgb would recommend continued observation of lesion and continued necessary treatment of b/l DVT in setting of malignancy. If pain worsens, lesion changes in size, or worsening anemia would consider holding lovenox, repeat imaging, and consideration of biopsy with radiology  2. Continue medical management   3.  General Surgery to sign off, please call with any questions or concerns      HISTORY:   History of Chief Complaint:    Maggie Hernandez is a 66 y.o. female with lisinopril (PRINIVIL;ZESTRIL) 20 MG tablet Take 1 tablet by mouth daily 3/14/18  Yes Radha Hurst MD   enoxaparin (LOVENOX) 100 MG/ML injection Inject 1 mL into the skin daily 3/9/18  Yes Radha Eid MD   sodium bicarbonate 650 MG tablet Take 1 tablet by mouth daily for 14 days 3/9/18 3/23/18 Yes Radha Eid MD   furosemide (LASIX) 40 MG tablet Take 1 tablet by mouth daily 3/7/18  Yes Karyn Noyola MD   oxyCODONE HCl (OXY-IR) 10 MG immediate release tablet Take 1 tablet by mouth every 6 hours as needed for Pain for up to 30 days. 2/15/18 3/17/18  Lauren Shane MD   PAZOPanib HCl (VOTRIENT) 200 MG chemo tablet Take 4 tablets by mouth daily 2/15/18   Lauren Shane MD   Blood Glucose Monitoring Suppl (TRUE METRIX METER) w/Device KIT  2/2/18   Historical Provider, MD   Lancets [facility] Patient testing 3 times daily 2/2/18   Rodrigo Gallo MD   glucose blood VI test strips (ASCENSIA AUTODISC VI;ONE TOUCH ULTRA TEST VI) strip Use with associated glucose meter.  Patient testing three times daily 2/2/18   Rodrigo Gallo MD   docusate sodium (COLACE) 100 MG capsule Take 2 capsules by mouth 2 times daily 1/31/18   Rodrigo Gallo MD   polyethylene glycol [facility]) powder Take 17 g by mouth daily 1/31/18   Rodrigo Gallo MD   carvedilol (COREG) 6.25 MG tablet Take 1 tablet by mouth 2 times daily (with meals) 1/27/18   Devonte Berry MD   levothyroxine (SYNTHROID) 100 MCG tablet Take 1 tablet by mouth daily 11/16/17   Rodrigo Gallo MD   omeprazole (PRILOSEC) 20 MG delayed release capsule Take 1 capsule by mouth daily 9/7/17   Rodrigo Gallo MD   atorvastatin (LIPITOR) 20 MG tablet TAKE 1 TABLET BY MOUTH ONE TIME A DAY  7/27/17   Mrav Bagley MD   insulin aspart (NOVOLOG FLEXPEN) 100 UNIT/ML injection pen Inject into the skin 2 times daily (with meals) Taking 3 units at lunch and 6 units at supper    Historical Provider, MD   Insulin Pen Needle 32G X 4 MM MISC 2 each by Does not apply route 2 times !Yes       !Yes            ! None      ! +------------------------------------+----------+---------------+----------+ ! GSV Ankle                           ! Yes       ! Yes            ! None      ! +------------------------------------+----------+---------------+----------+ ! SSV                                 ! No        !               !          ! +------------------------------------+----------+---------------+----------+ Right Doppler Measurements +---------------------------+------+------+--------------------------------+ ! Location                   ! Signal!Reflux! Reflux (msec)                   ! +---------------------------+------+------+--------------------------------+ ! Common Femoral             !Phasic!      !                                ! +---------------------------+------+------+--------------------------------+ ! Prox Femoral               !Phasic!      !                                ! +---------------------------+------+------+--------------------------------+ ! Popliteal                  !Absent!      !                                ! +---------------------------+------+------+--------------------------------+ Left Lower Extremities DVT Study Measurements Left 2D Measurements +------------------------------------+----------+---------------+----------+ ! Location                            ! Visualized! Compressibility! Thrombosis! +------------------------------------+----------+---------------+----------+ ! Common Femoral                      !Yes       ! Yes            ! None      ! +------------------------------------+----------+---------------+----------+ ! Prox Femoral                        !Yes       ! Yes            ! None      ! +------------------------------------+----------+---------------+----------+ ! Mid Femoral                         !Yes       ! Yes            ! None      ! +------------------------------------+----------+---------------+----------+ ! Dist Femoral !Phasic!      !                                ! +---------------------------+------+------+--------------------------------+ ! Prox Femoral               !Phasic!      !                                ! +---------------------------+------+------+--------------------------------+ ! Popliteal                  !Phasic!      !                                ! +---------------------------+------+------+--------------------------------+    Us Retroperitoneal Complete    Result Date: 3/4/2018  EXAMINATION: RETROPERITONEAL ULTRASOUND OF THE KIDNEYS AND URINARY BLADDER 3/4/2018 COMPARISON: None HISTORY: ORDERING SYSTEM PROVIDED HISTORY: RENAL FAILURE, ACUTE (KIDNEY INJURY) FINDINGS: Kidneys: The right kidney measures 8.27 x 9.75 x 18.39 cm. The left kidney measures 11.07 x 5.38 x 5.87 cm. The right kidney is large and heterogeneous, with evaluation limited due to overlying bowel. There is suggestion of multiple large cysts in the right kidney particularly in the upper pole, largest estimated at 6.7 cm. A few punctate calcifications are present in the upper pole of the right kidney. Left kidney is of normal cortical echogenicity. Isoechoic area in the inferior pole of the left kidney of 2.26 x 2.14 x 2.43 cm is noted, with solid lesion not excluded. No hydronephrosis or nephrolithiasis left kidney. Bladder: Prevoid bladder is unremarkable in appearance without evidence of an intraluminal mass. Prevoid bladder volume is 401 mL. Negligible postvoid residual urine is noted. Only a left ureteral jet is present. 1.  Abnormal appearing right kidney, enlarged, with appearance of multiple cysts in the upper pole. Hydronephrosis is difficult to exclude. No right ureteral jet is noted. Right nephrolithiasis is seen. 2.   Isoechoic lesion left kidney with solid mass not excluded. 3.   Bladder is unremarkable but only a left ureteral jet is noted.  RECOMMENDATIONS: If patient's renal function will permit, would advise CT

## 2018-03-14 NOTE — PROGRESS NOTES
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)     Stage 4 chronic kidney disease ([address] 2018    Syncope and collapse 2018    Hyperkalemia 2018    Anemia due to chronic kidney disease 2018    Pathologic fracture of lumbar vertebra, initial encounter     Secondary malignant neoplasm of lumbar vertebral column ([address]     Hyperplastic polyp of intestine     Tubular adenoma     Osteoarthritis 11/10/2014    Migraine 11/10/2014    Obesity 11/10/2014    Renal insufficiency 2012    Diabetes mellitus ([address] 2012       Past Medical History:   Diagnosis Date    Cancer ([address]     kidney    Chronic kidney disease     Depression     Diabetes mellitus (HCC)     type 2    GERD (gastroesophageal reflux disease)     HA (headache)     HBP (high blood pressure)     History of blood transfusion     no reaction    Hyperlipidemia     Hyperplastic polyp of intestine     Hypothyroid 2016    Hypothyroidism     Left knee pain     Non-smoker     OA (osteoarthritis)     bilat knees    Tubular adenoma      Past Surgical History:   Procedure Laterality Date    BACK SURGERY N/A 2018    Post T11-L2 Fusion    COLONOSCOPY  2016    hyperplastic polyp and tubular adenoma     HYSTERECTOMY      MD LUMBAR SPINE FUSN,POST INTRBDY N/A 2018    T11-L3 POSTERIOR FIXATION AND FUSION, SPINE ABBY C-ARM, O-ARM WITH [facility],  [facility]- [id number] LJ performed by Tayler Dawkins DO at [facility]         Restrictions  Restrictions/Precautions  Restrictions/Precautions: Fall ;Decreased strength;Decreased endurance;Decreased balance  Prognosis: Good  Decision Making: Medium Complexity  Patient Education: PT POC  Barriers to Learning: none  REQUIRES PT FOLLOW UP: Yes  Activity Tolerance  Activity Tolerance: Patient limited by pain  PT Equipment Recommendations  Equipment Needed: No (has rw)     Goals  Short term goals  Time Frame for Short term goals: 14 visits  Short term goal 1: Pt will be I with bed mobility  Short term goal 2: Pt will be I with transfers  Short term goal 3: Pt will be Lyle with amb 150'  Short term goal 4: Pt will navigate 4 steps Lyle with rail    Plan    Plan  Times per week: 5-6x/wk  Times per day: Daily  Current Treatment Recommendations: Strengthening, ROM, Balance Training, Functional Mobility Training, Transfer Training, Endurance Training, Gait Training, Stair training, Pain Management, Home Exercise Program, Safety Education & Training, Patient/Caregiver Education & Training, Equipment Evaluation, Education, & procurement, Positioning  Safety Devices  Type of devices: Call light within reach, Gait belt, Patient at risk for falls, Left in bed  Restraints  Initially in place: No     Therapy Time   Individual Concurrent Group Co-treatment   Time In 0935         Time Out 1000         Minutes 25                 Darryle Nicholas, [address] [facility]

## 2018-03-14 NOTE — CARE COORDINATION
Called to check on referral status, no answer, will call after report this AM   0915: attempted to reach [facility] again, still no answer, not able to leave voicemail. 1045: Alize Cunha from [facility] [employer] called to inform us that they are sending for pre cert but are pretty sure that insurance will not cover placement. Also that if patient cannot bring in own script of chemo drug they would not be able to accept d/t high cost of Chemo drug. Checking with patients pharmacy and patient    [phone] patient's script is at [facility]. Spoke to Serene @ [phone] (fax [phone]). Pharmacy is to call patient to confirm that medication should be delivered to Hospital and what her out of pocket cost will be. Daughter Carina Blake notified.
Patient aware of discharge to [facility]  Discharge [facility] Case Management Department  Written by:  Magdaleno RN    Patient Name: Adarsh Miranda  Attending Provider: Arya Cruz MD  Admit Date: 3/3/2018  9:26 PM  MRN: [id number]  Account: [de-identified]                     : 1955  Discharge Date: 3/14/2018      Disposition: [facility] RN
Patient will discharged to [address] [place] [address].  Neurosurgery consulted, once they have seen ordered CT Scans if no intervention will se up transportation
Received call from Mannie at [facility], Baljeet Cowan has given authorization for admission. Mannie states again they are not able to accept the patient unless she has her own supply of Votrient. Called [facility] supplier of Votrient, they are awaiting approval of medication form [place],Per [place]ordo to [place] , she informs me that Radha is taking care of that [address] [facility]. Spoke to Radha who called [facility] and has given approval for chemo and will be delivered to her home. [place] and patient notified. Approval for Votrient  [id number]      Yamini Garcia at [facility] gave her updated information on Chem0 drug. It will be deliver to the house and then the daughter will need to pick it up and bring to patient. Gave approval number for chemo drug if there were any problems.
aides 3x weekly for 2 hours per visit.          Electronically signed by Brian Robin RN on 3/4/18 at 5:11 PM

## 2018-03-14 NOTE — PROGRESS NOTES
[facility]    Progress Note    3/14/2018    7:40 AM    Name:   Gaetano Cantor  MRN:     [id number]     Acct:      [de-identified]   Room:   [facility]  IP Day:  6  Admit Date:  3/3/2018  9:26 PM    PCP:   Jordan Messina MD  Code Status:  Full Code    Subjective:     C/C: Abnormal labs    Interval History Status: improved. NSGY was consulted yesterday and imaging of thoracolumbar spine was ordered. Found to have left gluteal mass. Discussed with Dr. Hercules , Oncologist. It appears likely to be a small hematoma. IT is unlikely to be soft tissue mets considering acuity of appearance. As Hgb and Vital signs are stable, patient will still be discharged today pending general surgery evaluation. Brief History:     The patient is a 66 y.o.  Non-/non  female who presents with Abnormal labs and muscle cramping and she is admitted to the hospital for the management of [address] [place] [address]. Patient was found to have a K+ of 5.6, a creat of 2.87. She was transferred for further care. 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 During the admission she was evaluated by oncology and nephrology , finished her course of radiotherapy during the admission. 3/7: MRI  Abdomen done, results as below. 3/8: Consults notes reviewed Urology requested vascular and cardiology consult  Patient was seen with cardiology, need a stress test for clearance. Discussed with Dr Yvonne Ferris.   Stress test  OP, per vascular might need IVC filter if Urology plan for surgery  3/9: change in plan, interested in SNF    Review of Systems:     Constitutional:  negative for chills, fevers, sweats  Respiratory:  negative for cough, dyspnea on exertion, hemoptysis, shortness of breath, wheezing  Cardiovascular:  negative for chest pain, chest pressure/discomfort, lower extremity edema, palpitations  Gastrointestinal:  negative for abdominal pain, constipation, diarrhea, nausea, vomiting  Neurological:  negative for dizziness, headache  Musculoskeletal: +Back pain    Medications: Allergies:  No Known Allergies    Current Meds:   Scheduled Meds:    insulin glargine  5 Units Subcutaneous Nightly    lisinopril  20 mg Oral Daily    enoxaparin  1 mg/kg Subcutaneous Daily    sodium bicarbonate  650 mg Oral Daily    rOPINIRole  0.5 mg Oral TID    furosemide  40 mg Oral Daily    insulin lispro  0-6 Units Subcutaneous TID WC    insulin lispro  0-3 Units Subcutaneous Nightly    acetaminophen  1,000 mg Oral TID    amitriptyline  75 mg Oral Nightly    docusate sodium  200 mg Oral BID    PAZOPanib HCl  800 mg Oral Daily    sodium chloride flush  10 mL Intravenous 2 times per day     Continuous Infusions:    dextrose       PRN Meds: oxyCODONE, promethazine, ondansetron, calcium carbonate, cyclobenzaprine, SUMAtriptan, sodium chloride flush, acetaminophen, nicotine, glucose, dextrose, glucagon (rDNA), dextrose    Data:     Past Medical History:   has a past medical history of Cancer ([facility] Chronic kidney disease; Depression; Diabetes mellitus ([facility] GERD (gastroesophageal reflux disease); HA (headache); HBP (high blood pressure); History of blood transfusion; Hyperlipidemia; Hyperplastic polyp of intestine; Hypothyroid; Hypothyroidism; Left knee pain; Non-smoker; OA (osteoarthritis); and Tubular adenoma. Social History:   reports that she has never smoked. She has never used smokeless tobacco. She reports that she does not drink alcohol or use drugs.      Family History:   Family History   Problem Relation Age of Onset    Heart Disease Mother     Diabetes Mother     Cancer Father      esophageal cancer    Cancer Maternal Grandfather      colon       Vitals:  /64   Pulse 93   Temp 98.1 °F (36.7 °C) (Oral)   Resp 16   Ht 5' 8\" (1.727 m)   Wt 195 lb 12.8 oz (88.8 kg)   SpO2 97%   BMI 29.77 kg/m²   Temp (24hrs), Av.9 °F (36.6 °C), Min:97.6 °F (36.4 °C), Max:98.2 °F (36.8 °C)    Recent Labs      18   0749  18   1210  18   1832  18   POCGLU  158*  185*  184*  222*       I/O (24Hr):   No intake or output data in the 24 hours ending 18 0740    Labs:    Hematology:  Recent Labs      18   0618  18   0615  18   0635   WBC  9.0  10.3  6.8   RBC  4.26  4.12  3.97   HGB  9.6*  9.3*  9.0*   HCT  34.6*  33.4*  31.9*   MCV  81.2*  81.1*  80.4*   MCH  22.5*  22.6*  22.7*   MCHC  27.7*  27.8*  28.2*   RDW  22.0*  21.6*  21.8*   PLT  286  289  250   MPV  9.5  9.4  9.3     Chemistry:  Recent Labs      18   0618  18   0615   NA  137  133*   K  4.1  4.5   CL  95*  95*   CO2  25  24   GLUCOSE  181*  158*   BUN  49*  47*   CREATININE  2.16*  2.01*   ANIONGAP  17  14   LABGLOM  23*  25*   GFRAA  28*  31*   CALCIUM  9.4  9.2     Recent Labs      18   1653  18   2159  18   0749  18   1210  18   1832  18   POCGLU  148*  188*  158*  185*  184*  222*         Lab Results   Component Value Date/Time    SPECIAL NOT REPORTED 2018 07:26 PM     Lab Results   Component Value Date/Time    CULTURE NO SIGNIFICANT GROWTH 2018 07:26 PM    CULTURE  2018 07:26 PM     [facility] [id number] [place] [address], [address] [place] [address] [phone]       Lab Results   Component Value Date    PHART 7.292 2018    OJK8APV 39.0 2018    PO2ART 159.0 2018    NXK3DCF 18.3 2018    NBEA 7.2 2018    PBEA NOT REPORTED 2018    X9KTRAAX 99.5 2018    FIO2 40 2018       Radiology:    VL DUP LOWER EXTREMITY VENOUS BILATERAL 3/6      Acute deep vein thrombosis of the right leg involving the popliteal,   peroneal and a deep perforating vein.   No evidence of superficial venous thrombosis in the right lower extremity.   No evidence of Type I vs progression of intrinsic renal disease: improving,  creatinine today has decreased to baseline of 2. Nephrology was following, but with improvement signed off. Per their recs, continue Lasix 40 PO QD. Outpatient BMP next week. On DC, will reduce lisinopril to QD. Will need to follow up with nephro in 3-4 weeks.      Hyperkalemia: Resolved.     Metastatic renal cell carcinoma with metastases to L1 with pathologic fracture and intraperitoneal  Mets/ direct extension th renal vein and IVC : Received radiotherapy, finished all her Sessions as per 3/5. Urology ordered MRI abdomen Without contrast did not show progression of the malignancy but showed suspicious lesion in the lower pole of left kidney, decision regarding nephrectomy is sensitive at this point as the patient might need to be on dialysis which she refuses, that with a suspicious new lesion in the left kidney. Urology will discuss the case again in tumor board, stress test as OP,  vascular recommend against IVC filter. Start chemotherapy outpatient per oncology (await precert)     Pain Mx, improved. Pain Mx consulted. Recommend continuing 10 mg oxycodone immediate release Q 6 hours PRN     Hypoglycemia 2/2 DM, resolved: This was initially due to poor oral intake. However, BG started to spike up again. Lantus was restarted at 5 U daily.  Continue sliding scale and Hypoglycemia protocol     Anemia of chronic disease: Hemoglobin stable     Hypoalbuminemia with protein calorie malnutrition: Boost shakes     HTN: continue home meds     DVT and GI prophylaxis     Patient refused to see palliative care this admission     SNF arrangement, [facility] , await precert:  DC order is in since 3/8    Discharge today    Krystin Jonas MD  3/14/2018  7:40 AM

## 2018-03-15 NOTE — DISCHARGE SUMMARY
Pepito Shelton [address]    Discharge Summary     Patient ID: James Amezcua  :  1955   MRN: [id number]     ACCOUNT:  [de-identified]   Patient's PCP: Francisco J Irene MD  Admit Date: 3/3/2018   Discharge Date: 3/14/18    Length of Stay: 11  Code Status:  Prior  Admitting Physician: Gautam Jeong MD  Discharge Physician: Gautam Jeong MD     Active Discharge Diagnoses:     Primary Problem  Acute kidney injury [facility])      [place] Problems    Diagnosis Date Noted    Gluteal pain [M79.1]     Acute kidney injury ([facility] [N17.9] 2018    Renal cell carcinoma ([facility] [facility] [C64.9]     Metastasis to spinal column ([facility] [address] [C79.51]     Hyperkalemia [E87.5] 2018    Anemia due to chronic kidney disease [N18.9, D63.1] 2018    Stage 4 chronic kidney disease ([facility] [facility] [N18.4] 2018    Diabetes mellitus ([facility] [facility] [E11.9] 2012       Admission Condition:  fair     Discharged Condition: good    Hospital Stay:     Hospital Course: James Amezcua is a 66 y.o. female who was admitted for the management of   Acute kidney injury [facility]) , presented to ER with Abnormal Labs. The patient is a 66 y.o.  Non-/non  female who presents with Abnormal labs and muscle cramping and she is admitted to the hospital for the management of  RASHMI.     Patient was found to have a K+ of 5.6, a creat of 2.87. She was transferred for further care.      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     During the admission she was evaluated by oncology and nephrology , finished her course of radiotherapy during the admission. 3/7: MRI  Abdomen done, results as below.    3/8: Consults notes reviewed Urology requested vascular and cardiology consult  Patient was seen with cardiology, need a stress [id number]  [phone]    Schedule an appointment as soon as possible for a visit in 3 weeks      Nara Velez MD  [address] [facility]  [address]  [address]  [phone]      as scheduled    Lauren Shane MD  [address] [facility]  [phone]    In 1 week      Lilia Castañeda, [address] [address]  [phone]      Office will contact you in am to arrange for the stress test    Nara Velez MD  [address] [facility] [phone]    In 4 weeks  Follow up for Renal mass    Carmel Colin, [address] [facility] [address] [facility]  [phone]    Go on 5/14/2018  to evaluate lumbar fusion, repeat lumbar XR (April 4 appt cancelled)    Kana Julio MD  [address]  [phone]             Requiring Further Evaluation/Follow Up POST HOSPITALIZATION/Incidental Findings: F/U CBC    Diet: cardiac diet, diabetic diet and renal diet    Activity: As tolerated    Instructions to Patient: F/U with PCP, Oncology, Urology    Discharge Medications:      Medication List      START taking these medications    enoxaparin 100 MG/ML injection  Commonly known as:  LOVENOX  Inject 1 mL into the skin daily     furosemide 40 MG tablet  Commonly known as:  LASIX  Take 1 tablet by mouth daily     ondansetron 4 MG tablet  Commonly known as:  ZOFRAN  Take 1 tablet by mouth every 6 hours as needed for Nausea or Vomiting     sodium bicarbonate 650 MG tablet  Take 1 tablet by mouth daily for 14 days        CHANGE how you take these medications    insulin glargine 100 UNIT/ML injection vial  Commonly known as:  LANTUS  Inject 5 Units into the skin nightly  What changed:  · how much to take  · when to take this     lisinopril 20 MG tablet  Commonly known as:  PRINIVIL;ZESTRIL  Take 1 tablet by mouth daily  What changed:  · medication strength  · how much to take  · when to take this     * oxyCODONE HCl 10 SYNTHROID  Take 1 tablet by mouth daily     NOVOLOG FLEXPEN 100 UNIT/ML injection pen  Generic drug:  insulin aspart     omeprazole 20 MG delayed release capsule  Commonly known as:  PRILOSEC  Take 1 capsule by mouth daily     PAZOPanib HCl 200 MG chemo tablet  Commonly known as:  VOTRIENT  Take 4 tablets by mouth daily     polyethylene glycol powder  Commonly known as:  MIRALAX  Take 17 g by mouth daily        STOP taking these medications    rizatriptan 10 MG tablet  Commonly known as:  MAXALT           Where to Get Your Medications      These medications were sent to [address] [place] [address] [phone] [facility] [phone]  [facility], [address] [place] [address]    Phone:  [phone]   · enoxaparin 100 MG/ML injection  · sodium bicarbonate 650 MG tablet     You can get these medications from any pharmacy    Bring a paper prescription for each of these medications  · furosemide 40 MG tablet  · oxyCODONE HCl 10 MG immediate release tablet     Information about where to get these medications is not yet available    Ask your nurse or doctor about these medications  · insulin glargine 100 UNIT/ML injection vial  · lisinopril 20 MG tablet  · ondansetron 4 MG tablet         Time Spent on discharge is  31 mins in patient examination, evaluation, counseling as well as medication reconciliation, prescriptions for required medications, discharge plan and follow up. Electronically signed by   Daniel Murphy MD  3/15/2018  9:13 AM      Thank you Dr. Berna Lopez MD for the opportunity to be involved in this patient's care.

## 2018-03-16 NOTE — TELEPHONE ENCOUNTER
PERRY WAS D/C FROM [facility] 03/14/18 AND IS CURRENTLY IN THE [facility] (P) [phone]. WRITER PHONED AND SPOKE WITH NURSE DIVINA. CONFIRMED PERRY HAS ZOFRAN AVAILABLE IF NEEDED FOR NAUSEA. REVIEWED SIDE EFFECTS/ PURPOSE OF VOTRIENT. FAXED [facility] SHEET  Danay Li. REVIEWED MD EXAM WITH DR Zainab Ramirez 03/21/18 @ 1500. DIVINA WILL CONFIRM WITH PT IF FAMILY IS TRANSPORT TO [facility] OR IF TRANSPORTATION WILL NEED ARRANGED. Daniel. Dhaval Deluna 60 CELL PHONE [phone]. CONFIRMED WITH PERRY HER FAMILY HAD BROUGHT HER ORAL VOTRIENT FROM HOME. SHE HAS TAKEN THE FIRST DOSE AT 11OO THIS AM.  WRITER REVIEWED THE [facility] SHEET WITH PT VIA PHONE. PERRY ALSO CONFIRMED SHE HAD RECEIVED EDUCATION THROUGH SPECIALTY PHARMACY AS WELL. SHE IS ABLE TO REPEAT COMMON SIDE EFFECTS AND AWARE OF ZOFRAN BEING AVAILABLE AS WELL AS NEED TO TAKE VOTRIENT ON EMPTY STOMACH. PERRY VOICED HER FAMILY WILL BE DOING THE TRANSPORTATION TO THE UPCOMING MD EXAM AS SHE IS ONLY IN [facility] FOR STRENGTH ING. WRITER REVIEWED OUR CONTACT INFORMATION WITH BOTH DIVINA AND PERRY / BOTH ABLE TO REPEAT NUMBER.

## 2018-03-21 NOTE — PROGRESS NOTES
Patient ID: Rob Perera, 1955, [id number], 66 y.o. Diagnosis:   Metastatic RCC  Started on Pazopanib on 3/16/18  Radiation  Therapy completed to spine  HISTORY OF PRESENT ILLNESS:    Oncologic History: This is a 62 YO female was admitted with back pain after a fall. On admission she had imaging studies which showed L2 metastatic lesion with compression fracture. Had MRI spine which showed right renal mass c/w RCC and inferior vena cava invasion. It also showed L1 metastasis, pathologic fracture, epidural invasion, and severe thecal sac stenosis. Seen by neurosurgery and input awaited. She reports wt loss possibly intentional over last year for about 50 lbs. Patient seen by urology. SHe had a biopsy of the Kidney which showed RCC. SHe was seen by Neurosurgery and she had spinal fixation with pedicle screws On 1/23/18. She is then discharged from hospital.   She still has pain in back and using Oxycodone 10 mg 2-3 times day. She is seen by radiation oncology and is planning to start palliative RT to spine next week. INTERVAL HISTORY  Pt was recently admitted to hospital with RASHMI. She also completed palliative RT to spine. Her back pain resolved. She was noted to have hematoma in buttock which is causing some pain. No Fever chills. During this visit patient's allergy, social, medical, surgical history and medications were reviewed and updated.         Past Medical History:   Diagnosis Date    Cancer [facility])     kidney    Chronic kidney disease     Depression     Diabetes mellitus (HCC)     type 2    GERD (gastroesophageal reflux disease)     HA (headache)     HBP (high blood pressure)     History of blood transfusion     no reaction    Hyperlipidemia     Hyperplastic polyp of intestine     Hypothyroid 11/26/2016    Hypothyroidism     Left knee pain     Non-smoker     OA (osteoarthritis)     bilat knees    Tubular adenoma        Past Surgical History:   Procedure Laterality Date lesions, bruises.    Hematologic/Lymphatic: negative for easy bruising, bleeding, lymphadenopathy, petechiae and swelling/edema   Endocrine: negative for heat or cold intolerance, tremor, weight changes, change in bowel habits and hair loss   Musculoskeletal: negative for myalgias, arthralgias, ++back pain, joint swelling,and bone pain   Neurological: negative for headaches, dizziness, seizures, weakness, numbness  OBJECTIVE:         Vitals:    03/21/18 1507   BP: 118/64   Pulse: 82   Resp: 18   Temp: 98.4 °F (36.9 °C)       PHYSICAL EXAM:   General appearance - well appearing, no in pain or distress   Mental status - alert and cooperative   Eyes - pupils equal and reactive, extraocular eye movements intact   Ears - bilateral TM's and external ear canals normal   Mouth - mucous membranes moist, pharynx normal without lesions   Neck - supple, no significant adenopathy   Lymphatics - no palpable lymphadenopathy, no hepatosplenomegaly   Chest - clear to auscultation, no wheezes, rales or rhonchi, symmetric air entry   Heart - normal rate, regular rhythm, normal S1, S2, no murmurs, rubs, clicks or gallops   Abdomen - soft, nontender, nondistended, no masses or organomegaly   Surgical scar noted on back healing well  Neurological - alert, oriented, normal speech, no focal findings or movement disorder noted   Musculoskeletal - no joint tenderness, deformity or swelling   Extremities - peripheral pulses normal, no pedal edema, no clubbing or cyanosis   Skin - normal coloration and turgor, no rashes, no suspicious skin lesions noted   LABORATORY DATA:     Lab Results   Component Value Date    WBC 6.8 03/14/2018    HGB 9.0 (L) 03/14/2018    HCT 31.9 (L) 03/14/2018    MCV 80.4 (L) 03/14/2018     03/14/2018    LYMPHOPCT 12 (L) 03/14/2018    RBC 3.97 03/14/2018    MCH 22.7 (L) 03/14/2018    MCHC 28.2 (L) 03/14/2018    RDW 21.8 (H) 03/14/2018    MONOPCT 9 03/14/2018    BASOPCT 1 03/14/2018    NEUTROABS 5.02 03/14/2018

## 2018-03-21 NOTE — TELEPHONE ENCOUNTER
Ana Paula Leija MD VISIT  DR Mariela John IN TO SEE PATIENT  ORDERS RECEIVED  RV 2-3 WEEKS W/ LABS ON EXIT TODAY 3/20/18  LABS CDP CMP TSH ON EXIT 3/21/18  MD VISIT 4/11/18 @ 3:20PM  AVS PRINTED AND GIVEN TO PATIENT W/ INSTRUCTIONS  PATIENT DISCHARGED AMBULATORY

## 2018-03-23 NOTE — TELEPHONE ENCOUNTER
LO None. Jammie Riedel Next Visit Date:  Future Appointments  Date Time Provider Department Center   4/11/2018 3:20 PM [facility]   5/14/2018 11:10 AM Elizabeth Husbands Natasha Rough Neuro [facility]       Health Maintenance   Topic Date Due    Cervical cancer screen  02/21/1978    Shingles Vaccine (1 of 2 - 2 Dose Series) 02/21/2007    Diabetic retinal exam  05/05/2017    Diabetic foot exam  07/25/2017    Breast cancer screen  12/08/2017    Lipid screen  08/21/2018    A1C test (Diabetic or Prediabetic)  01/31/2019    Potassium monitoring  03/22/2019    Creatinine monitoring  03/22/2019    Colon cancer screen colonoscopy  08/16/2019    Pneumococcal highest risk (3 of 3 - PPSV23) 07/25/2021    DTaP/Tdap/Td vaccine (2 - Td) 12/30/2025    Flu vaccine  Completed    Hepatitis C screen  Completed    HIV screen  Completed       Hemoglobin A1C (%)   Date Value   01/31/2018 6.2 (H)   01/19/2018 5.9   11/10/2017 5.4             ( goal A1C is < 7)   Microalb/Crt.  Ratio (mcg/mg creat)   Date Value   08/21/2017 232 (H)     LDL Cholesterol (mg/dL)   Date Value   08/21/2017 55       (goal LDL is <100)   AST (U/L)   Date Value   03/21/2018 9     ALT (U/L)   Date Value   03/21/2018 5     BUN (mg/dL)   Date Value   03/22/2018 40 (H)     BP Readings from Last 3 Encounters:   03/23/18 116/69   03/21/18 118/64   03/14/18 134/64          (goal 120/80)    All Future Testing planned in [facility]  Lab Frequency Next Occurrence   Vitamin B12 Once 03/30/2018   Iron And TIBC Once 44/77/9414   Basic Metabolic Panel Once 31/29/1160   XR LUMBAR SPINE (2-3 VIEWS) Once 05/14/2018   CBC With Auto Differential Once 03/16/2018               Patient Active Problem List:     Diabetes mellitus ([address]     Renal insufficiency     Osteoarthritis     Migraine     Obesity     Hyperplastic polyp of intestine     Tubular adenoma     Stage 4 chronic kidney disease (HCC)     Syncope and collapse     Hyperkalemia     Anemia due to chronic kidney

## 2018-03-23 NOTE — PROGRESS NOTES
difficulty controlling her glu and is scheduled to see endocrinology later today. Additional History:     Imaging Reviewed during this Office Visit:   (results were independently reviewed by physician and radiology report verified)    CT A/P  1. No acute fracture or evidence of osseous metastatic disease in the pelvis. 2. Isodense 3.0 x 2.8 x 2.9 cm mass in the lateral aspect of the left gluteus   medius muscle, which is nonspecific but concerning for soft tissue   metastasis, given clinical history.  Other possibility could include   intramuscular hematoma.  No other soft tissue masses are identified, but   visibility is limited due to isodensity and lack of IV contrast.   3. Partially visualized known right renal malignancy and peritoneal soft   tissue metastases. Urinalysis today:  No results found for this visit on 03/23/18.     Last BUN and creatinine:  Lab Results   Component Value Date    BUN 40 (H) 03/22/2018     Lab Results   Component Value Date    CREATININE 2.03 (H) 03/22/2018       PAST MEDICAL, FAMILY AND SOCIAL HISTORY UPDATE:  Past Medical History:   Diagnosis Date    Cancer ([facility] [address]     kidney    Chronic kidney disease     Depression     Diabetes mellitus ([facility] [address]     type 2    GERD (gastroesophageal reflux disease)     HA (headache)     HBP (high blood pressure)     History of blood transfusion     no reaction    Hyperlipidemia     Hyperplastic polyp of intestine     Hypothyroid 11/26/2016    Hypothyroidism     Left knee pain     Non-smoker     OA (osteoarthritis)     bilat knees    Tubular adenoma      Past Surgical History:   Procedure Laterality Date    BACK SURGERY N/A 01/23/2018    Post T11-L2 Fusion    COLONOSCOPY  08/16/2016    hyperplastic polyp and tubular adenoma     HYSTERECTOMY      DE LUMBAR SPINE FUSN,POST INTRBDY N/A 1/23/2018    T11-L3 POSTERIOR FIXATION AND FUSION, SPINE ABBY C-ARM, O-ARM WITH STEALTH,  EVOKES- [id number] LJ performed by Dinorah Miranda DO [facility] at [facility]       Family History   Problem Relation Age of Onset    Heart Disease Mother     Diabetes Mother     Cancer Father      esophageal cancer    Cancer Maternal Grandfather      colon     Outpatient Prescriptions Marked as Taking for the 3/23/18 encounter (Office Visit) with Carol Mckeon MD   Medication Sig Dispense Refill    enoxaparin (LOVENOX) 80 MG/0.8ML injection Inject 0.9 mLs into the skin 2 times daily for 3 days 5.4 mL 0    insulin glargine (LANTUS) 100 UNIT/ML injection vial Inject 5 Units into the skin nightly 1 vial 3    ondansetron (ZOFRAN) 4 MG tablet Take 1 tablet by mouth every 6 hours as needed for Nausea or Vomiting      lisinopril (PRINIVIL;ZESTRIL) 20 MG tablet Take 1 tablet by mouth daily 30 tablet 3    enoxaparin (LOVENOX) 100 MG/ML injection Inject 1 mL into the skin daily 7 Syringe 1    sodium bicarbonate 650 MG tablet Take 1 tablet by mouth daily for 14 days 14 tablet 0    furosemide (LASIX) 40 MG tablet Take 1 tablet by mouth daily 60 tablet 3    PAZOPanib HCl (VOTRIENT) 200 MG chemo tablet Take 4 tablets by mouth daily 120 tablet 3    Blood Glucose Monitoring Suppl (TRUE METRIX METER) w/Device KIT       Lancets MISC Patient testing 3 times daily 100 each 3    glucose blood VI test strips (ASCENSIA AUTODISC VI;ONE TOUCH ULTRA TEST VI) strip Use with associated glucose meter.  Patient testing three times daily 100 strip 3    docusate sodium (COLACE) 100 MG capsule Take 2 capsules by mouth 2 times daily 120 capsule 11    polyethylene glycol (MIRALAX) powder Take 17 g by mouth daily 510 g 11    carvedilol (COREG) 6.25 MG tablet Take 1 tablet by mouth 2 times daily (with meals) 60 tablet 3    levothyroxine (SYNTHROID) 100 MCG tablet Take 1 tablet by mouth daily 90 tablet 3    omeprazole (PRILOSEC) 20 MG delayed release capsule Take 1 capsule by mouth daily 90 capsule 3    atorvastatin (LIPITOR) 20 MG tablet TAKE 1 TABLET BY MOUTH ONE TIME A

## 2018-03-28 NOTE — TELEPHONE ENCOUNTER
Next Visit Date:  Future Appointments  Date Time Provider Braulio Mckeoni   4/11/2018 3:20 PM Megan Lao MD [facility]   5/14/2018 11:10 AM DO Larissa Rios Neuro [facility]   5/30/2018 2:00 PM Berna Lopez MD [facility]   6/29/2018 9:30 AM SCHEDULE, P [facility] [address] Maintenance   Topic Date Due    Cervical cancer screen  02/21/1978    Shingles Vaccine (1 of 2 - 2 Dose Series) 02/21/2007    Diabetic retinal exam  05/05/2017    Diabetic foot exam  07/25/2017    Breast cancer screen  12/08/2017    Lipid screen  08/21/2018    A1C test (Diabetic or Prediabetic)  01/31/2019    Potassium monitoring  03/22/2019    Creatinine monitoring  03/22/2019    Colon cancer screen colonoscopy  08/16/2019    Pneumococcal highest risk (3 of 3 - PPSV23) 07/25/2021    DTaP/Tdap/Td vaccine (2 - Td) 12/30/2025    Flu vaccine  Completed    Hepatitis C screen  Completed    HIV screen  Completed       Hemoglobin A1C (%)   Date Value   01/31/2018 6.2 (H)   01/19/2018 5.9   11/10/2017 5.4             ( goal A1C is < 7)   Microalb/Crt.  Ratio (mcg/mg creat)   Date Value   08/21/2017 232 (H)     LDL Cholesterol (mg/dL)   Date Value   08/21/2017 55       (goal LDL is <100)   AST (U/L)   Date Value   03/21/2018 9     ALT (U/L)   Date Value   03/21/2018 5     BUN (mg/dL)   Date Value   03/22/2018 40 (H)     BP Readings from Last 3 Encounters:   03/28/18 120/80   03/23/18 116/69   03/21/18 118/64          (goal 120/80)    All Future Testing planned in [facility]  Lab Frequency Next Occurrence   Vitamin B12 Once 03/30/2018   Iron And TIBC Once 52/01/0577   Basic Metabolic Panel Once 26/45/2570   XR LUMBAR SPINE (2-3 VIEWS) Once 05/14/2018   CBC With Auto Differential Once 03/16/2018               Patient Active Problem List:     Diabetes mellitus ([address]     Renal insufficiency     Osteoarthritis     Migraine     Obesity     Hyperplastic polyp of intestine     Tubular adenoma

## 2018-03-28 NOTE — PROGRESS NOTES
Subjective: Kalli Quesada presents for   Chief Complaint   Patient presents with    Medication Check       Was in [facility] with DVT, renal failure. Has known bony mets. Is not at home needs the pain meds refilled. They told her she would now be on lifetime anticoagulation    Not sure when and if they see renal.      Denies blood in stool or urine. 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)    Acute cystitis without hematuria    Renal mass    Metastasis to spinal column (HCC)    Renal cell carcinoma (HCC)    Acute kidney injury ([facility] [address]    Gluteal pain       Objective:  Physical Exam   Vitals:   Vitals:    03/28/18 1407   BP: 120/80   Pulse: 97   SpO2: 97%   Weight: 188 lb 3.2 oz (85.4 kg)     Wt Readings from Last 3 Encounters:   03/28/18 188 lb 3.2 oz (85.4 kg)   03/23/18 189 lb 6.4 oz (85.9 kg)   03/21/18 189 lb 11.2 oz (86 kg)     Ht Readings from Last 3 Encounters:   03/23/18 5' 8\" (1.727 m)   03/03/18 5' 8\" (1.727 m)   03/03/18 5' 8\" (1.727 m)     Body mass index is 28.62 kg/m². Constitutional: She is oriented to person, place, and time. She appears well-developed and well-nourished and in no acute distress. Answers all my questions appropriately. Head: Normocephalic and atraumatic. Eyes:conjunctiva appear normal.  Neck: Normal range of motion. Neck supple. No tracheal deviation present. No abnormal lymphadenopathy. No JVD noted. Carotids are clear bilaterally. No thyroid masses noted. Heart: RRR without murmur. No S3, S4, or gallop noted. Chest: Clear to auscultation bilaterally.   Good breath sounds

## 2018-03-29 NOTE — TELEPHONE ENCOUNTER
[id number] Crista Trujillo  She will be on anticoagulation for life. Next visit she has with heme onc/  They need to discuss long term med.

## 2018-03-29 NOTE — TELEPHONE ENCOUNTER
Per pharmacist regarding lovenox, ins will only cover 7 days of med, ins asking for a prior auth anything over 7 days, script was filled on 3/28/18

## 2018-06-07 PROBLEM — N17.9 ACUTE RENAL FAILURE (HCC): Status: ACTIVE | Noted: 2018-01-01

## 2018-06-07 PROBLEM — N17.9 ACUTE RENAL FAILURE (ARF) (HCC): Status: ACTIVE | Noted: 2018-01-01

## 2018-06-07 PROBLEM — N13.39 OTHER HYDRONEPHROSIS: Status: ACTIVE | Noted: 2018-01-01

## 2018-06-08 PROBLEM — E43 SEVERE MALNUTRITION (HCC): Status: ACTIVE | Noted: 2018-01-01

## 2018-06-09 PROBLEM — R33.9 URINARY RETENTION: Status: ACTIVE | Noted: 2018-01-01

## 2018-07-02 NOTE — TELEPHONE ENCOUNTER
Voicemail from [facility] was very [facility]ppy), who stated that her functioning level has been declining. Asking if patient should have OT, PT prior to surgery? I called to ask for more details, and left a message. If she calls back to clarify, I will add an addendum.

## 2018-07-02 NOTE — TELEPHONE ENCOUNTER
Daughter Bipin Holly called again and LM stating that Nabeel Chino has been feeling more neck pain and stiffness and wondered if doing PT prior to having surgery is something that Nabeel Chino should do.   Please advise

## 2018-07-02 NOTE — TELEPHONE ENCOUNTER
Patient and daughter called over the weekend, and once today to find out an update as to where they are on setting up a surgery. Have you by chance coordinated with oncology as well as urology to coordinate a potential lateral versus posterior approach with corpectomy and cage placement?     [phone] (M)

## 2018-07-10 NOTE — TELEPHONE ENCOUNTER
Patient is scheduled for surgery on 7/31 at 9:15AM and PAT on 7/24 at 3:00PM.  Patient confirmed and verbalized understanding. Letter mailed out today with date and time and instructions.

## 2018-07-11 NOTE — PROGRESS NOTES
[address]  [facility] # [address] [id number]  Dept: [phone]    Patient: Alex Solitario  YOB: 1955  Date: 7/11/18    The patient is a 66 y.o. female who presents today for consult of the following problems:     Chief Complaint   Patient presents with    Neck Pain    Back Pain             HPI:     Alex Solitario is a 66 y.o. 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 Patient has now been seen multiple times in this office after I initially saw her as an inpatient for an L1 pathologic fracture with retropulsion. Originally the patient underwent MIS posterior fixation for support regarding her axial pain as she was a very poor candidate for a corpectomy due to inability to embolize a highly vascular lesion as well as her beginning hemoglobin being very low. In the interim the patient has underwent multiple doses of radiation to the spine as well as chemotherapy. Today she actually states that her axial back pain is significantly improved with a combination of radiation, narcotics as well as bracing. Her primary complaint today is actually neck pain related to positional deformity which appears to be torticollis. She does not have any radiating numbness tingling or weakness of her upper or lower extremities. She does have some subjective fatigue of her lower term knees when she relates for long distances as well as complaints of intermittent urinary retention where she feels the inability to empty completely. This complaint is very intermittent it is not regular on a daily basis. Per her own report she had seen neurology and they have felt that this was more related to her urogenital system as opposed to spinal.  She denies any saddle anesthesia or bowel incontinence or retention. Lex Mac       History:     Past Medical History: Diagnosis Date    Cancer [facility])     kidney    Chronic kidney disease     Depression     Diabetes mellitus (HCC)     type 2    GERD (gastroesophageal reflux disease)     HA (headache)     HBP (high blood pressure)     History of blood transfusion     no reaction    Hyperlipidemia     Hyperplastic polyp of intestine     Hypothyroid 11/26/2016    Hypothyroidism     Left knee pain     Non-smoker     OA (osteoarthritis)     bilat knees    Tubular adenoma      Past Surgical History:   Procedure Laterality Date    BACK SURGERY N/A 01/23/2018    Post T11-L2 Fusion    COLONOSCOPY  08/16/2016    hyperplastic polyp and tubular adenoma     HYSTERECTOMY      [place] LUMBAR SPINE FUSN,POST INTRBDY N/A 1/23/2018    T11-L3 POSTERIOR FIXATION AND FUSION, SPINE ABBY C-ARM, O-ARM WITH STEALTH,  EVOKES- [id number] [id number] performed by [facility] [employer] at [facility]       Family History   Problem Relation Age of Onset    Heart Disease Mother     Diabetes Mother     Cancer Father         esophageal cancer    Cancer Maternal Grandfather         colon     Current Outpatient Prescriptions on File Prior to Visit   Medication Sig Dispense Refill    lisinopril-hydrochlorothiazide (PRINZIDE;ZESTORETIC) 20-12.5 MG per tablet Take 1 tablet by mouth 2 times daily 60 tablet 11    oxyCODONE (OXYCONTIN) 10 MG extended release tablet Take 1 tablet by mouth 2 times daily for 30 days. Intended supply: 30 days. 60 tablet 0    oxyCODONE HCl (OXY-IR) 10 MG immediate release tablet Take 1 tablet by mouth 2 times daily as needed for Pain for up to 30 days. . 60 tablet 0    PAZOPanib HCl (VOTRIENT) 200 MG chemo tablet Take 4 tablets by mouth daily 120 tablet 3    Insulin Disposable Pump (V-GO 20) KIT       HUMALOG 100 UNIT/ML injection vial With insulin pump      B-D ULTRAFINE III SHORT PEN 31G X 8 MM MISC       atorvastatin (LIPITOR) 20 MG tablet TAKE 1 TABLET BY MOUTH ONE TIME A DAY 90 tablet 3    ondansetron (ZOFRAN) 4 MG tablet Take 1 tablet by mouth daily as needed for Nausea or Vomiting 40 tablet 0    enoxaparin (LOVENOX) 100 MG/ML injection Inject 0.9 mLs into the skin daily 90 Syringe 1    omeprazole (PRILOSEC) 40 MG delayed release capsule Take 1 capsule by mouth daily 90 capsule 3    amitriptyline (ELAVIL) 75 MG tablet Take 1 tablet by mouth nightly 90 tablet 3    prochlorperazine (COMPAZINE) 5 MG tablet Take 5 mg by mouth every 8 hours as needed       Blood Glucose Monitoring Suppl (TRUE METRIX METER) w/Device KIT       Lancets MISC Patient testing 3 times daily 100 each 3    glucose blood VI test strips (ASCENSIA AUTODISC VI;ONE TOUCH ULTRA TEST VI) strip Use with associated glucose meter. Patient testing three times daily 100 strip 3    docusate sodium (COLACE) 100 MG capsule Take 2 capsules by mouth 2 times daily 120 capsule 11    Insulin Pen Needle 32G X 4 MM MISC 2 each by Does not apply route 2 times daily      amLODIPine (NORVASC) 10 MG tablet Take 1 tablet by mouth daily 90 tablet 3    acetaminophen (TYLENOL) 500 MG tablet Take 1,000 mg by mouth 3 times daily      gabapentin (NEURONTIN) 100 MG capsule Take 1 capsule by mouth 3 times daily for 30 days. . 90 capsule 0     No current facility-administered medications on file prior to visit. Social History   Substance Use Topics    Smoking status: Never Smoker    Smokeless tobacco: Never Used    Alcohol use No       No Known Allergies    Review of Systems  Constitutional: Negative for activity change and appetite change. HENT: Negative for ear pain and facial swelling. Eyes: Negative for discharge and itching. Respiratory: Negative for choking and chest tightness. Cardiovascular: Negative for chest pain and leg swelling. Gastrointestinal: Negative for nausea and abdominal pain. Endocrine: Negative for cold intolerance and heat intolerance. Genitourinary: Negative for frequency and flank pain.    Musculoskeletal: Negative for

## 2018-07-12 PROBLEM — M54.2 CERVICAL MUSCLE PAIN: Status: ACTIVE | Noted: 2018-01-01

## 2018-07-12 NOTE — PROGRESS NOTES
worse during the day. Stiffness is present in the morning. Associated symptoms include headaches and weakness. Pertinent negatives include no chest pain, fever or numbness. Associated symptoms comments: Decreased ROM  . She has tried ice, heat, muscle relaxants, acetaminophen and oral narcotics for the symptoms. The treatment provided no relief. Patient denies any new neurological symptoms. No bowel or bladder incontinence, no weakness, and no falling. Pill count: did not know to bring pills, will  Take to pharmacy for documentation    Morphine equivalent dose as reported on OARRS:75    Attestation: The Prescription Monitoring Report for this patient was reviewed today. ABDIEL Bhat CNP)  Documentation: Possible medication side effects, risk of tolerance/dependence & alternative treatments discussed. , Random urine drug screen sent today., Obtaining appropriate analgesic effect of treatment., No signs of potential drug abuse or diversion identified. ABDIEL Bhat CNP)  Medication Contracts: Existing medication contract. ABDIEL Bhat CNP)  Review of OARRS does not show any aberrant prescription behavior. Medication is helping the patient stay active. Patient denies any side effects and reports adequate analgesia. No sign of misuse/abuse.     Past Medical History:   Diagnosis Date    Cancer [facility])     kidney    Chronic kidney disease     Depression     Diabetes mellitus (HCC)     type 2    GERD (gastroesophageal reflux disease)     HA (headache)     HBP (high blood pressure)     History of blood transfusion     no reaction    Hyperlipidemia     Hyperplastic polyp of intestine     Hypothyroid 11/26/2016    Hypothyroidism     Left knee pain     Non-smoker     OA (osteoarthritis)     bilat knees    Tubular adenoma        Past Surgical History:   Procedure Laterality Date    BACK SURGERY N/A 01/23/2018    Post T11-L2 Fusion    COLONOSCOPY  08/16/2016    hyperplastic polyp Lancets MISC, Patient testing 3 times daily, Disp: 100 each, Rfl: 3    glucose blood VI test strips (ASCENSIA AUTODISC VI;ONE TOUCH ULTRA TEST VI) strip, Use with associated glucose meter. Patient testing three times daily, Disp: 100 strip, Rfl: 3    docusate sodium (COLACE) 100 MG capsule, Take 2 capsules by mouth 2 times daily, Disp: 120 capsule, Rfl: 11    Insulin Pen Needle 32G X 4 MM MISC, 2 each by Does not apply route 2 times daily, Disp: , Rfl:     amLODIPine (NORVASC) 10 MG tablet, Take 1 tablet by mouth daily, Disp: 90 tablet, Rfl: 3    acetaminophen (TYLENOL) 500 MG tablet, Take 1,000 mg by mouth 3 times daily, Disp: , Rfl:     Family History   Problem Relation Age of Onset    Heart Disease Mother     Diabetes Mother     Cancer Father         esophageal cancer    Cancer Maternal Grandfather         colon       Social History     Social History    Marital status:      Spouse name: N/A    Number of children: N/A    Years of education: N/A     Occupational History    Not on file. Social History Main Topics    Smoking status: Never Smoker    Smokeless tobacco: Never Used    Alcohol use No    Drug use: No    Sexual activity: Not on file     Other Topics Concern    Not on file     Social History Narrative    No narrative on file       Review of Systems:  Review of Systems   Constitution: Positive for weakness. Negative for chills and fever. Cardiovascular: Negative for chest pain. Respiratory: Negative for cough and shortness of breath. Musculoskeletal: Positive for back pain, muscle cramps, muscle weakness, myalgias, neck pain and stiffness. Negative for falls. Gastrointestinal: Negative for constipation. Neurological: Positive for headaches. Negative for numbness and paresthesias.        Physical Exam:  BP (!) 110/56   Pulse 87   Temp 98.4 °F (36.9 °C) (Oral)   Resp 16   Ht 5' 8\" (1.727 m)   Wt 182 lb (82.6 kg)   BMI 27.67 kg/m²     Physical Exam Constitutional: She is oriented to person, place, and time and well-developed, well-nourished, and in no distress. Cardiovascular: Normal rate. Pulmonary/Chest: Effort normal.   Musculoskeletal:        Cervical back: She exhibits decreased range of motion, tenderness and spasm. Back:    Neurological: She is alert and oriented to person, place, and time. Gait normal.   Skin: Skin is warm and dry. Psychiatric: Affect normal.       Record/Diagnostics Review:    COURTNEY obtained today     Cervical XR 7/12/18:  FINDINGS:   Radiograph is taken with cervical spine extension.  7 typical cervical   vertebra present maintain normal height and alignment.  Bony spinal canal and   paravertebral soft tissues appear unremarkable. The disc spaces and posterior   elements appear well maintained throughout.       The uncovertebral joints appear normally aligned on AP view.  The   atlantoaxial articulation appears normally aligned.  No discrete odontoid   fracture is evident.           Impression   No acute osseous abnormality of the cervical spine.       If pain persists or worsens, then additional evaluation with CT or MRI may be   indicated. Note that CT cervical spine is the study of choice for evaluation   of acute trauma. Lumbar MRI 6/2018: Impression   1. Status post previous thoracolumbar spinal fusion extending inferiorly to   the L3 vertebral level.       2. Pathologic compression fracture of L1 vertebral body with retropulsion   into spinal canal appears similar to CT examination accounting for   differences in technique. Moderate-to-severe spinal canal stenosis at level   of the L1 vertebral body with mild mass effect on the spinal cord/conus.  No   evidence of abnormal T2 signal within the spinal cord.       3.  Approximately 2.5 cm lesion within left iliac bone adjacent to sacroiliac   joint.  Approximately 1.9 cm lesion within right aspect of the S1 vertebral   body, which is only imaged on sagittal

## 2018-07-13 NOTE — TELEPHONE ENCOUNTER
Patient calls today and states she is having surgery 7/31/18 for kidney removal.  Wonders if she needs chemo. Has teaching, MD visit and treatment scheduled 7/25/18. Told her we will keep those appointments and MD to decide if treatment to be given or wait until after surgery. Patient voices understanding. Dr. Leo Cardona remains on vacation at this time.

## 2018-07-13 NOTE — PROGRESS NOTES
HPI:     Neck Pain    This is a chronic problem. The current episode started more than 1 month ago. The problem occurs constantly. The problem has been gradually worsening. The pain is associated with an unknown factor. The pain is present in the midline. The quality of the pain is described as aching. Pertinent negatives include no fever. Pain in the neck. Has been ongoing for the past few months. X-ray no acute abnormality. Physical history of chronic low back pain due to metastatic renal cell carcinoma. Does have a nephrectomy scheduled for later this month. She is opioid dependent for pain which will. MS Contin with oxycodone when necessary for breakthrough. Seizures today for trigger point injection for her neck. Patient denies any new neurological symptoms. No bowel or bladder incontinence, no weakness, and no falling. Review of OARRS does not show any aberrant prescription behavior. Medication is helping the patient stay active. Patient denies any side effects and reports adequate analgesia. No sign of misuse/abuse.     Past Medical History:   Diagnosis Date    Cancer [facility])     kidney    Chronic kidney disease     Depression     Diabetes mellitus (HCC)     type 2    GERD (gastroesophageal reflux disease)     HA (headache)     HBP (high blood pressure)     History of blood transfusion     no reaction    Hyperlipidemia     Hyperplastic polyp of intestine     Hypothyroid 11/26/2016    Hypothyroidism     Left knee pain     Non-smoker     OA (osteoarthritis)     bilat knees    Tubular adenoma        Past Surgical History:   Procedure Laterality Date    BACK SURGERY N/A 01/23/2018    Post T11-L2 Fusion    COLONOSCOPY  08/16/2016    hyperplastic polyp and tubular adenoma     HYSTERECTOMY      NERVE BLOCK  07/13/2018    aleta trigger point neck #1    [place] LUMBAR SPINE FUSN,POST INTRBDY N/A 1/23/2018    T11-L3 POSTERIOR FIXATION AND FUSION, SPINE ABBY C-ARM, O-ARM WITH STEALTH, each by Does not apply route 2 times daily, Disp: , Rfl:     amLODIPine (NORVASC) 10 MG tablet, Take 1 tablet by mouth daily, Disp: 90 tablet, Rfl: 3    acetaminophen (TYLENOL) 500 MG tablet, Take 1,000 mg by mouth 3 times daily, Disp: , Rfl:     gabapentin (NEURONTIN) 100 MG capsule, Take 1 capsule by mouth 3 times daily for 30 days. ., Disp: 90 capsule, Rfl: 0    enoxaparin (LOVENOX) 100 MG/ML injection, Inject 0.9 mLs into the skin daily, Disp: 90 Syringe, Rfl: 1    Family History   Problem Relation Age of Onset    Heart Disease Mother     Diabetes Mother     Cancer Father         esophageal cancer    Cancer Maternal Grandfather         colon       Social History     Social History    Marital status:      Spouse name: N/A    Number of children: N/A    Years of education: N/A     Occupational History    Not on file. Social History Main Topics    Smoking status: Never Smoker    Smokeless tobacco: Never Used    Alcohol use No    Drug use: No    Sexual activity: Not on file     Other Topics Concern    Not on file     Social History Narrative    No narrative on file       Review of Systems:  Review of Systems   Constitution: Negative for fever. Musculoskeletal: Positive for neck pain. Physical Exam:  /60   Pulse 85   Temp 97.1 °F (36.2 °C)   Resp 14   SpO2 96%     Physical Exam   Musculoskeletal:        Cervical back: She exhibits tenderness. Nursing note and vitals reviewed. Record/Diagnostics Review:    As above, I did review the imaging    Office Procedures: Trigger Point Injection. INFORMED CONSENT: The nature of the treatment and its benefits were discussed with the patient. Alternative treatments (or no treatment) were reviewed. Risks include, but are not limited to: reaction to medications (local and systemic), nerve injury, injury to blood vessels, tendon rupture, residual pain, infection, numbness, bleeding and recurrent symptoms.  Although the benefits

## 2018-07-17 NOTE — TELEPHONE ENCOUNTER
Case discussed at  tumor conference this am. Dr. Kelechi Franz (urology) instructed MRA needs completed asap and nephrectomy date to be moved back. Spoke with Fabiana Tena at [facility] urology clinic, she states she also spoke with Dr. Kelechi Franz and urology main office will reschedule the nephrectomy. Writer rescheduled MRA with Shoaib Rivera ([facility]) for 7/25/18. Discussed with Dr. Saeed Gtz, he directed to leave f/u with him on 7/25/18 and move teach/immunotherpay C.1 back 1 wk. He will try to obtain MRA results at the appt and discuss case with Dr. Ehsan Fragoso (urologist) and will hopefully be able to determine if pt will have surgery and when immunotherapy will begin. Message sent to Dr. Ehsan Fragoso to update him and Dr. tSef Jean-Baptiste phone number was provided as well. Ivette Ontiveros (scheduling) at [address] [facility] was updated. Spoke with pt. She was very tearful and overwhelmed during conversation. She was frustrated that MRA was cancelled (d/t elevated Cr) last week and is overwhelmed with appts. , support provided. Explained plan moving forward. New appt times and instructions given. Advised pt to reach out to writer at any time with questions or needs and explained that writer would stay in contact with her as much as she needs. Will continue to follow.

## 2018-07-17 NOTE — TELEPHONE ENCOUNTER
Received request from pharmacy for refill. Please note OxyContin and oxycodone have been added by another practitioner. Pended to md for review.

## 2018-07-18 NOTE — TELEPHONE ENCOUNTER
Rec'd notification from Dr. Nikhil Schmidt, states he spoke with Dr. Abner Jacobson and there are no plans for surgery and will proceed with immunotherapy. He will see pt next wk 7/25/18 and tx/teach will be the following wk 8/1/18. He advised to get pt back in with Dr. Abner Jacobson for discussion. Spoke with , Romina Quiroz at [facility] and pt will f/u at their main office instead of the [facility] for future appts. F/u scheduled for 7/26/18 at 11:45 with Dr. Abner Jacobson. Pt notified.

## 2018-07-19 NOTE — TELEPHONE ENCOUNTER
Pt had labs done today prior to scheduled MRA w/wo contrast. Noted elevated cr=4.6 today. [facility] message sent to Dr. Vesta Diaz questioning how to proceed.

## 2018-07-19 NOTE — TELEPHONE ENCOUNTER
Next Visit Date:  Future Appointments  Date Time Provider Braulio Cárdenas   7/23/2018 11:00 AM SCHEDULE, [facility] SV [facility]ER [facility]   7/24/2018 3:00 PM STVZ PAT RM 2 [facility] PAT [facility]   7/25/2018 7:45 AM STA MRI RM STAZ MRI STA Radiolog   7/25/2018 12:00 PM Sarita Garcia MD SV Cancer [facility]   8/1/2018 10:00 AM STV STA CHAIR 07 STVZ STA MED None   8/1/2018 10:30 AM Fredy Welsh RN SV Cancer [facility]   8/3/2018 1:30 PM Drew Laurent MD AFL Neph [facility] None   8/7/2018 2:00 PM Jennifer Antony MD Neuro Spec [facility]   8/8/2018 2:00 PM Ezequiel Castro, PhD [facility]   8/8/2018 2:40 PM Cornelio Acevedo, APRN - CNP ST[facility]Z PAIN [facility]   8/28/2018 1:00 PM Shivam Sparks MD [facility]   8/31/2018 11:30 AM Stella Graff Gain Neuro [address] Maintenance   Topic Date Due    Cervical cancer screen  02/21/1978    Shingles Vaccine (1 of 2 - 2 Dose Series) 02/21/2007    Diabetic retinal exam  05/05/2017    Diabetic foot exam  07/25/2017    Breast cancer screen  12/08/2017    Lipid screen  08/21/2018    Flu vaccine (1) 09/01/2018    A1C test (Diabetic or Prediabetic)  01/31/2019    Potassium monitoring  06/29/2019    Creatinine monitoring  06/29/2019    Colon cancer screen colonoscopy  08/16/2019    Pneumococcal highest risk (3 of 3 - PPSV23) 07/25/2021    DTaP/Tdap/Td vaccine (2 - Td) 12/30/2025    Hepatitis C screen  Completed    HIV screen  Completed       Hemoglobin A1C (%)   Date Value   01/31/2018 6.2 (H)   01/19/2018 5.9   11/10/2017 5.4             ( goal A1C is < 7)   Microalb/Crt.  Ratio (mcg/mg creat)   Date Value   08/21/2017 232 (H)     LDL Cholesterol (mg/dL)   Date Value   08/21/2017 55   10/28/2015 96       (goal LDL is <100)   AST (U/L)   Date Value   06/29/2018 12     ALT (U/L)   Date Value   06/29/2018 6     BUN (mg/dL)   Date Value   06/29/2018 34 (H)     BP Readings from Last 3 Encounters:   07/13/18 110/60   07/12/18 (!) 110/56 07/11/18 101/64          (goal 120/80)    All Future Testing planned in [facility]  Lab Frequency Next Occurrence   Vitamin B12 Once 03/30/2018   Iron And TIBC Once 91/45/4978   Basic Metabolic Panel Once 24/54/2571   CBC With Auto Differential Once 03/16/2018   TSH Once 05/02/2018   Magnesium Once 05/02/2018   CBC With Auto Differential Once 05/02/2018   Comprehensive Metabolic Panel Once 52/36/7449   Ferritin Once 05/02/2018   Iron And TIBC Once 05/02/2018   MRI LUMBAR SPINE WO CONTRAST Once 07/09/2018   Creatinine, Serum Once 06/06/2018   XR LUMBAR SPINE (2-3 VIEWS) Once 08/12/2018   Pain Management Drug Screen Once 07/12/2018   MRA RENAL W CONTRAST Once 07/17/2018   BUN Once 07/19/2018   Creatinine, Serum Once 07/19/2018   CBC With Auto Differential     Comprehensive Metabolic Panel     TSH without Reflex     T4     Cortisol     Amylase     Lipase                 Patient Active Problem List:     Diabetes mellitus ([address]     Renal insufficiency     Osteoarthritis     Migraine     Obesity     Hyperplastic polyp of intestine     Tubular adenoma     Stage 4 chronic kidney disease (HCC)     Syncope and collapse     Hyperkalemia     Anemia due to chronic kidney disease     Pathologic fracture of lumbar vertebra, initial encounter     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

## 2018-07-25 PROBLEM — N17.9 AKI (ACUTE KIDNEY INJURY) (HCC): Status: ACTIVE | Noted: 2018-01-01

## 2018-07-25 NOTE — ED PROVIDER NOTES
[facility]     Emergency Department     Faculty Attestation    I performed a history and physical examination of the patient and discussed management with the resident. I have reviewed and agree with the residents findings including all diagnostic interpretations, and treatment plans as written. Any areas of disagreement are noted on the chart. I was personally present for the key portions of any procedures. I have documented in the chart those procedures where I was not present during the key portions. I have reviewed the emergency nurses triage note. I agree with the chief complaint, past medical history, past surgical history, allergies, medications, social and family history as documented unless otherwise noted below. Documentation of the HPI, Physical Exam and Medical Decision Making performed by kanwal is based on my personal performance of the HPI, PE and MDM. For Physician Assistant/ Nurse Practitioner cases/documentation I have personally evaluated this patient and have completed at least one if not all key elements of the E/M (history, physical exam, and MDM). Additional findings are as noted. Primary Care Physician: Romero Ibarra MD    History: This is a 66 y.o. female who presents to the Emergency Department with complaint of hematuria. Patient has a history of renal cell cancer, with metastases to her spine and a known compression fracture of her lumbar spine. Was seen by her heme on doctor today, and encouraged to come to the ER for additional evaluation. Just reports decreased urination. And worsening lower back pain, more severe than what is stated in the past.  No numbness or tingling. She does report she has seen a neurosurgeon in the past.  Does get neck injections the last of which were 2 weeks ago.   He does follow with urology and his pulse to be getting an MRA but her creatinine has been getting worse and she is unable to do

## 2018-07-25 NOTE — ED PROVIDER NOTES
ST[facility] 4B STEPDOWN  Emergency Department Encounter  Emergency Medicine Resident     Pt Name: Jonathan Machado  MRN: [id number]  Germaingfsonny 1955  Date of evaluation: 7/25/18  PCP:  Terrence Spence MD    CHIEF COMPLAINT       Chief Complaint   Patient presents with    Hematuria     Sent by her oncologist for dysuria with hematuria and lower back pain. Has h/o renal carcinoma. Pt reports generalized weakness/fatigue. Denies N/V/D/C/fever/chills. HISTORY OF PRESENT ILLNESS  (Location/Symptom, Timing/Onset, Context/Setting, Quality, Duration, Modifying Factors, Severity.)      Jonathan Machado is a 66 y.o. female who presents with Complaints of hematuria. Patient states that she has history of renal cell carcinoma of a right kidney and has hematuria at baseline but she noticed it was worse today with her urine looking more red. Patient states that she went to her oncologist within center to the ER. Patient states she is taking votrient (pazopanib) and is scheduled for a nephrectomy soon. Patient also states that she's been having worsening low back pain with increased difficulty walking when using her walker. She does report a history of pathologic fracture secondary to metastatic disease to multiple areas including her lumbar spine. Patient states that she had no difficulty urinating at around 11 AM today. Patient denies eating much today. She otherwise denies chest pain, shortness of breath, abdominal pain, nausea, vomiting, fevers, chills, dysuria, urinary frequency or urgency, or changes in bowel habits, bowel or bladder incontinence, or saddle anesthesia, vaginal discharge, vaginal bleeding, numbness or weakness in the lower extremities. She reports her gait difficulty being secondary to her increasing low back pain.   pt sees Dr. Mickey Reed who is her oncologist, Dr. Jacquelin Valdez is her urologist, and Dr. Talat Spangler, nephrologist, has seen her during one of her admissions, and Dr. Cristal Mcgill with [facility] has seen her before as well. Patient is also on Lovenox daily. PAST MEDICAL / SURGICAL / SOCIAL / FAMILY HISTORY      has a past medical history of Cancer ([facility] [address] Chronic kidney disease; Depression; Diabetes mellitus ([facility] [address] GERD (gastroesophageal reflux disease); HA (headache); HBP (high blood pressure); History of blood transfusion; Hyperlipidemia; Hyperplastic polyp of intestine; Hypothyroid; Hypothyroidism; Left knee pain; Non-smoker; OA (osteoarthritis); and Tubular adenoma. has a past surgical history that includes Hysterectomy; Tonsillectomy; Colonoscopy (08/16/2016); back surgery (N/A, 01/23/2018); pr lumbar spine fusn,post intrbdy (N/A, 1/23/2018); and Nerve Block (07/13/2018). Social History     Social History    Marital status:      Spouse name: N/A    Number of children: N/A    Years of education: N/A     Occupational History    Not on file. Social History Main Topics    Smoking status: Never Smoker    Smokeless tobacco: Never Used    Alcohol use No    Drug use: No    Sexual activity: Not on file     Other Topics Concern    Not on file     Social History Narrative    No narrative on file       Family History   Problem Relation Age of Onset    Heart Disease Mother     Diabetes Mother     Cancer Father         esophageal cancer    Cancer Maternal Grandfather         colon       Allergies:  Patient has no known allergies. Home Medications:  Prior to Admission medications    Medication Sig Start Date End Date Taking? Authorizing Provider   lisinopril-hydrochlorothiazide (PRINZIDE;ZESTORETIC) 20-12.5 MG per tablet Take 1 tablet by mouth 2 times daily 7/19/18  Yes Yoni Davalos MD   cyclobenzaprine (FLEXERIL) 5 MG tablet TAKE 1 TABLET BY MOUTH THREE TIMES A DAY AS NEEDED FOR MUSCLE SPASMS 7/17/18  Yes Lala Sylvester MD   oxyCODONE (OXYCONTIN) 10 MG extended release tablet Take 1 tablet by mouth 2 times daily for 30 days. Intended supply: 30 days.  7/22/18 8/21/18 Yes ABDIEL Zazueta CNP   oxyCODONE HCl (OXY-IR) 10 MG immediate release tablet Take 1 tablet by mouth every 6 hours as needed for Pain for up to 30 days. . 7/17/18 8/16/18 Yes ABDIEL Zazueta CNP   PAZOPanib HCl (VOTRIENT) 200 MG chemo tablet Take 4 tablets by mouth daily 5/30/18  Yes Ricardo Marion MD   Insulin Disposable Pump (V-GO 20) KIT  4/26/18  Yes Historical Provider, MD   HUMALOG 100 UNIT/ML injection vial With insulin pump 5/16/18  Yes Historical Provider, MD CALDWELL ULTRAFINE III SHORT PEN 31G X 8 MM MISC  4/13/18  Yes Historical Provider, MD   atorvastatin (LIPITOR) 20 MG tablet TAKE 1 TABLET BY MOUTH ONE TIME A DAY 4/11/18  Yes Alexander Pizarro MD   ondansetron (ZOFRAN) 4 MG tablet Take 1 tablet by mouth daily as needed for Nausea or Vomiting 4/10/18  Yes Victoriano Metz MD   enoxaparin (LOVENOX) 100 MG/ML injection Inject 0.9 mLs into the skin daily 4/9/18  Yes Alexander Pizarro MD   omeprazole (PRILOSEC) 40 MG delayed release capsule Take 1 capsule by mouth daily 3/28/18  Yes Alexander Pizarro MD   amitriptyline (ELAVIL) 75 MG tablet Take 1 tablet by mouth nightly 3/28/18  Yes Alexander Pizarro MD   prochlorperazine (COMPAZINE) 5 MG tablet Take 5 mg by mouth every 8 hours as needed  2/8/18  Yes Historical Provider, MD   Blood Glucose Monitoring Suppl (TRUE METRIX METER) w/Device KIT  2/2/18  Yes Historical Provider, MD   [facility] testing 3 times daily 2/2/18  Yes Alexander Pizarro MD   glucose blood VI test strips (ASCENSIA AUTODISC VI;ONE TOUCH ULTRA TEST VI) strip Use with associated glucose meter.  Patient testing three times daily 2/2/18  Yes Alexander Pizarro MD   docusate sodium (COLACE) 100 MG capsule Take 2 capsules by mouth 2 times daily 1/31/18  Yes Alexander Pizarro MD   Insulin Pen Needle 32G X 4 MM MISC 2 each by Does not apply route 2 times daily   Yes Historical Provider, MD   amLODIPine (NORVASC) 10 MG tablet Take 1 tablet by mouth daily 4/13/17  Yes Quoc Parks MD acetaminophen (TYLENOL) 500 MG tablet Take 1,000 mg by mouth 3 times daily   Yes Historical Provider, MD   gabapentin (NEURONTIN) 100 MG capsule Take 1 capsule by mouth 3 times daily for 30 days. . 6/10/18 7/12/18  Norah Bennett MD       REVIEW OF SYSTEMS    (2-9 systems for level 4, 10 or more for level 5)      Review of Systems   Constitutional: Negative for activity change, appetite change, chills, fatigue and fever. HENT: Negative for congestion, rhinorrhea and sore throat. Eyes: Negative for visual disturbance. Respiratory: Negative for chest tightness and shortness of breath. Cardiovascular: Negative for chest pain. Gastrointestinal: Negative for abdominal pain, blood in stool, constipation, diarrhea, nausea and vomiting. Endocrine: Negative for polyuria. Genitourinary: Positive for hematuria. Negative for difficulty urinating, dysuria, frequency, menstrual problem, urgency, vaginal bleeding and vaginal discharge. Musculoskeletal: Positive for back pain and gait problem. Skin: Negative for rash. Neurological: Negative for weakness, numbness and headaches. PHYSICAL EXAM   (up to 7 for level 4, 8 or more for level 5)      INITIAL VITALS:   BP (!) 108/36   Pulse 81   Temp 98 °F (36.7 °C) (Oral)   Resp 16   Ht 5' 8\" (1.727 m)   Wt 180 lb (81.6 kg)   SpO2 99%   BMI 27.37 kg/m²     Physical Exam   Constitutional: She is oriented to person, place, and time. She appears well-developed and well-nourished. No distress. Patient is alert and oriented x4, does not appear to be in acute distress, lying comfortably in bed   HENT:   Head: Normocephalic and atraumatic. Eyes: Conjunctivae and EOM are normal. Pupils are equal, round, and reactive to light. Right eye exhibits no discharge. Left eye exhibits no discharge. Neck: Normal range of motion. Neck supple. Cardiovascular: Normal rate, regular rhythm, normal heart sounds and intact distal pulses.   Exam reveals no gallop and no friction rub. No murmur heard. Pulmonary/Chest: Effort normal and breath sounds normal. No respiratory distress. She has no wheezes. She has no rales. She exhibits no tenderness. Abdominal: Soft. Bowel sounds are normal. She exhibits no distension and no mass. There is no tenderness. There is no rebound and no guarding. Musculoskeletal: Normal range of motion. She exhibits no edema, tenderness or deformity. Significant tenderness to palpation in the lumbar spine midline, no obvious deformity, no signs of infection   Neurological: She is alert and oriented to person, place, and time. Patient has 5 out of 5 motor strength in bilateral lower extremities including hips, knees, and plantar and dorsiflexion, some difficulty with hip flexion secondary to low back pain, sensation intact, unable to palpate posterior tibial and dorsalis pedis pulses secondary to lower extremity edema which is at baseline for patient, feet are warm bilaterally with capillary refill less than 2 seconds   Skin: Skin is warm and dry. She is not diaphoretic.        DIFFERENTIAL  DIAGNOSIS     PLAN (LABS / IMAGING / EKG):  Orders Placed This Encounter   Procedures    URINE CULTURE CLEAN CATCH    CT Lumbar Spine WO Contrast    MRI Lumbar Spine WO Contrast    US RENAL COMPLETE    US RENAL COMPLETE    Urinalysis with Microscopic    CBC WITH AUTO DIFFERENTIAL    Basic Metabolic Panel    Comprehensive Metabolic Panel w/ Reflex to MG    Magnesium    CBC    Protime-INR    Urinalysis with microscopic    Sodium, urine, random    Protein, urine, random    Protein, urine, random    Sodium, urine, random    Creatinine, Random Urine    CBC Auto Differential    Basic Metabolic Panel    DIET RENAL; Carb Control: 4 carb choices (60 gms)/meal    Bladder scan    Insert indwelling urinary catheter    Vital signs per unit routine    Telemetry monitoring    Notify physician    Up with assistance    Daily weights    Intake and output    Place intermittent pneumatic compression device    Full Code    Inpatient consult to Urology    Inpatient consult to Nephrology    Inpatient consult to Neurosurgery    Inpatient consult to Hospitalist    Consult to Nephrology    Consult to Urology    Inpatient consult to Oncology    Inpatient consult to Neurosurgery    Initiate Oxygen Therapy Protocol    Pulse Oximetry Spot Check    POC Glucose Fingerstick    POC Glucose Fingerstick    PATIENT STATUS (FROM ED OR OR/PROCEDURAL) Inpatient       MEDICATIONS ORDERED:  Orders Placed This Encounter   Medications    morphine (PF) injection 4 mg    ondansetron (ZOFRAN) injection 4 mg    0.9 % sodium chloride bolus    sodium bicarbonate 75 mEq in sodium chloride 0.45 % 1,000 mL infusion    acetaminophen (TYLENOL) tablet 1,000 mg    amitriptyline (ELAVIL) tablet 75 mg    atorvastatin (LIPITOR) tablet 20 mg    cyclobenzaprine (FLEXERIL) tablet 5 mg    docusate sodium (COLACE) capsule 200 mg    insulin lispro (HUMALOG) injection vial 1 Units    pantoprazole (PROTONIX) tablet 40 mg    ondansetron (ZOFRAN) tablet 4 mg    oxyCODONE (OXYCONTIN) extended release tablet 10 mg    oxyCODONE (ROXICODONE) immediate release tablet 10 mg    DISCONTD: PAZOPanib HCl (VOTRIENT) chemo tablet 800 mg    prochlorperazine (COMPAZINE) tablet 5 mg    sodium chloride flush 0.9 % injection 10 mL    sodium chloride flush 0.9 % injection 10 mL    DISCONTD: docusate sodium (COLACE) capsule 100 mg    ondansetron (ZOFRAN) injection 4 mg    sodium polystyrene (KAYEXALATE) 15 GM/60ML suspension 15 g    sodium polystyrene (KAYEXALATE) 15 GM/60ML suspension 30 g       DDX: Cardiac creatinine syndrome, conus medullaris syndrome, urinary retention, UTI, new lumbar metastases,     DIAGNOSTIC RESULTS / EMERGENCY DEPARTMENT COURSE / MDM     LABS:  Results for orders placed or performed during the hospital encounter of 07/25/18   Urinalysis with Microscopic   Result Value Ref Range    Color, UA YELLOW YEL    Turbidity UA CLOUDY (A) CLEAR    Glucose, Ur NEGATIVE NEG    Bilirubin Urine NEGATIVE NEG    Ketones, Urine NEGATIVE NEG    Specific Gravity, UA 1.015 1.005 - 1.030    Urine Hgb NEGATIVE NEG    pH, UA 5.0 5.0 - 8.0    Protein, UA 2+ (A) NEG    Urobilinogen, Urine Normal NORM    Nitrite, Urine NEGATIVE NEG    Leukocyte Esterase, Urine MODERATE (A) NEG    -          WBC, UA 5 TO 10 0 - 5 /HPF    RBC, UA 2 TO 5 0 - 4 /HPF    Casts UA 2 TO 5 HYALINE 0 - 8 /LPF    Crystals UA NOT REPORTED NONE /HPF    Epithelial Cells UA 2 TO 5 0 - 5 /HPF    Renal Epithelial, Urine NOT REPORTED 0 /HPF    Bacteria, UA FEW (A) NONE    Mucus, UA NOT REPORTED NONE    Trichomonas, UA NOT REPORTED NONE    Amorphous, UA NOT REPORTED NONE    Other Observations UA NOT REPORTED NREQ    Yeast, UA NOT REPORTED NONE   CBC WITH AUTO DIFFERENTIAL   Result Value Ref Range    WBC 5.7 3.5 - 11.3 k/uL    RBC 3.01 (L) 3.95 - 5.11 m/uL    Hemoglobin 8.2 (L) 11.9 - 15.1 g/dL    Hematocrit 28.4 (L) 36.3 - 47.1 %    MCV 94.4 82.6 - 102.9 fL    MCH 27.2 25.2 - 33.5 pg    MCHC 28.9 28.4 - 34.8 g/dL    RDW 19.1 (H) 11.8 - 14.4 %    Platelets 308 249 - 414 k/uL    MPV 9.0 8.1 - 13.5 fL    NRBC Automated 0.4 (H) 0.0 per 100 WBC    Differential Type NOT REPORTED     WBC Morphology NOT REPORTED     RBC Morphology ANISOCYTOSIS PRESENT     Platelet Estimate NOT REPORTED     Seg Neutrophils 74 (H) 36 - 65 %    Lymphocytes 15 (L) 24 - 43 %    Monocytes 8 3 - 12 %    Eosinophils % 1 1 - 4 %    Basophils 1 0 - 2 %    Immature Granulocytes 1 (H) 0 %    Segs Absolute 4.23 1.50 - 8.10 k/uL    Absolute Lymph # 0.83 (L) 1.10 - 3.70 k/uL    Absolute Mono # 0.47 0.10 - 1.20 k/uL    Absolute Eos # 0.08 0.00 - 0.44 k/uL    Basophils # 0.03 0.00 - 0.20 k/uL    Absolute Immature Granulocyte 0.05 0.00 - 0.30 k/uL   Basic Metabolic Panel   Result Value Ref Range    Glucose 55 (L) 70 - 99 mg/dL     (HH) 8 - 23 mg/dL    CREATININE 9.62 (HH) 0.50 - 0.90 mg/dL    Bun/Cre Ratio NOT REPORTED 9 - 20    Calcium 9.1 8.6 - 10.4 mg/dL    Sodium 137 135 - 144 mmol/L    Potassium 4.6 3.7 - 5.3 mmol/L    Chloride 102 98 - 107 mmol/L    CO2 11 (L) 20 - 31 mmol/L    Anion Gap 24 (H) 9 - 17 mmol/L    GFR Non-African American 4 (L) >60 mL/min    GFR African American 5 (L) >60 mL/min    GFR Comment          GFR Staging NOT REPORTED    POC Glucose Fingerstick   Result Value Ref Range    POC Glucose 75 65 - 105 mg/dL   POC Glucose Fingerstick   Result Value Ref Range    POC Glucose 87 65 - 105 mg/dL       IMPRESSION: 81-year-old female with history of renal cell carcinoma currently on chemotherapy with scheduled nephrectomy presents with complaint of hematuria that has worsened as well as worsening low back pain with increased difficulty walking without bowel or bladder incontinence or complaints of saddle anesthesia, weakness, or numbness. Pt is on Lovenox daily    patient initially hypotensive with systolic in the 41L. Patient does not appear to be in acute distress but does have significant tenderness to palpation of the lumbar spine, reduced range of motion at bilateral hips secondary to low back pain, patient is otherwise neurovascular intact in the lower extremities. will obtain UA, urine culture, CBC, BMP, and CT lumbar spine. We'll give patient saline, Zofran, and morphine. Patient reevaluated with improvement in pain. Patient stating that she does not feel the urge to urinate at this time. Patient also given by mouth fluids. Results came back remarkable for hemoglobin of decreased 8.2, elevated BUN and creatinine of 101/9.62, hypoglycemia 55, anion gap of 24 with CO2 of 11, and CT lumbar spine showing pathologic fracture at L1 that was seen previously with retropulsion into the posterior aspect of the L1 vertebrae and further metastatic lesions seen in the iliac and sacral bones.   Patient's updated results and plans for consulting nephrology, urology, and neurosurgery as well as admission with [facility]. Pt and family agreed with plan. Patient reevaluated and still stating that she does not feel the urge to urinate. A bladder scan was obtained showing 361 ML's of urine in bladder. Patient stating that she feels hungry and would like to eat something. Patient given a box lunch given hypoglycemia 55.  4:08pm I spoke with Dr. Carolyn Corado who recommended starting the patient on half normal saline with 75 mEq of sodium bicarbonate going at 100 mL an hour. He also recommended placing a Navarrete for concern for urinary retention. These orders were placed. UA still pending  4:25pm I then spoke with [facility] was willing to admit patient for further care and workup.  4:32 pm I didn't spoke with urology who agrees with placing a Navarrete and they stated that they will see the patient. 5:05pm I didn't spoke with neurosurgery resident who will speak with her attending after seeing the patient. Patient was then signed out to Dr. Capo Webb. RADIOLOGY:  Ct Lumbar Spine Wo Contrast    Result Date: 7/25/2018  EXAMINATION: CT OF THE LUMBAR SPINE WITHOUT CONTRAST  7/25/2018 TECHNIQUE: CT of the lumbar spine was performed without the administration of intravenous contrast. Multiplanar reformatted images are provided for review. Dose modulation, iterative reconstruction, and/or weight based adjustment of the mA/kV was utilized to reduce the radiation dose to as low as reasonably achievable. COMPARISON: MRI lumbar spine from June 9, 2018 HISTORY: ORDERING SYSTEM PROVIDED HISTORY: r/o worsening fracture or increasing metastatic mass TECHNOLOGIST PROVIDED HISTORY: Reason for exam:->r/o worsening fracture or increasing metastatic mass FINDINGS: BONES/ALIGNMENT: Posterior transpedicular fusion hardware involving T11, T12, L2, and L3 is demonstrated. A pathologic compression fracture of L1 is again demonstrated with some retropulsion of fragments by approximately 0.4 cm.  There is ONCOLOGY  IP CONSULT TO NEUROSURGERY    CRITICAL CARE:  None    FINAL IMPRESSION      1. Hematuria, unspecified type    2. Renal cell cancer, right ([facility] [address]    3. Acute renal failure, unspecified acute renal failure type ([address]    4. Pathologic lumbar vertebral fracture, sequela    5. Metabolic acidosis    6. Hypoglycemia    7.  Anemia, unspecified type          DISPOSITION / PLAN     DISPOSITION Admitted 07/25/2018 04:28:15 PM      PATIENT REFERRED TO:  Romero Ibarra MD  [address] [place] [address]  . [facility] 22  [phone]            DISCHARGE MEDICATIONS:  Current Discharge Medication List          Eder Lemon MD  Emergency Medicine Resident    (Please note that portions of this note were completed with a voice recognition program.  Efforts were made to edit the dictations but occasionally words are mis-transcribed.)        Eder Lemon MD  Resident  07/25/18 [id number]

## 2018-07-25 NOTE — CONSULTS
[address]    Department of Neurosurgery  Resident Consult Note      Reason for Consult:  Concern for urinary retention  Requesting Physician:  Dr. Supriya Oden:   [x]Dr. Berta Nair  []Dr. Kelley Nath  []Dr. Rea Raymundo  []Dr. Kirsty Rizo  []Dr. Cinthia Rodriguez  []Dr. Tata Sinclair    History Obtained From:  patient, electronic medical record    CHIEF COMPLAINT:         hematuria    HISTORY OF PRESENT ILLNESS:       The patient is a 66 y.o. female who presents with chief complaint of hematuria. NS was consulted for concern for urinary retention. According to urology documentation, the patient has had this in the past, which required jensen catheter placement. Post void residual in the ED was 361cc, therefore a jensen was placed in the ED. Patient has metastatic renal cell carcinoma, and reports she has only had hematuria once previously. Patient denies recent trauma, falls, urinary or bowel incontinence, weakness, numbness, tingling, neck pain, or back pain. Patient had a prior T11-L3 fusion by Dr. Berta Nair January 23, 2018.     PAST MEDICAL HISTORY :       Past Medical History:        Diagnosis Date    Cancer [facility])     kidney    Chronic kidney disease     Depression     Diabetes mellitus ([facility] [address]     type 2    GERD (gastroesophageal reflux disease)     HA (headache)     HBP (high blood pressure)     History of blood transfusion     no reaction    Hyperlipidemia     Hyperplastic polyp of intestine     Hypothyroid 11/26/2016    Hypothyroidism     Left knee pain     Non-smoker     OA (osteoarthritis)     bilat knees    Tubular adenoma        Past Surgical History:        Procedure Laterality Date    BACK SURGERY N/A 01/23/2018    Post T11-L2 Fusion    COLONOSCOPY  08/16/2016    hyperplastic polyp and tubular adenoma     HYSTERECTOMY      NERVE BLOCK  07/13/2018    aleta trigger point neck #1    AK LUMBAR SPINE FUSN,POST INTRBDY N/A 1/23/2018    T11-L3 POSTERIOR FIXATION AND FUSION, SPINE ABBY Extremity:  normal    Deep Tendon Reflexes:    Right Bicep:  1+  Left Bicep:  1+  Right Knee:  1+  Left Knee:  1+    Plantar Response:    Right:  equivocal  Left:  equivocal    Clonus:  absent  Kinney's:  absent    Coordination/Dysmetria:  Heel to Shin: no tested 2/2 pain with lifting legs  Finger to Nose:   Right:  normal  Left:  normal   Dysdiadochokinesia:  N/A    Gait: not tested - patient walks with a walker at baseline   SKIN:  no rash, no lesions     LABS AND IMAGING:     Labs:  CBC with Differential:    Lab Results   Component Value Date    WBC 5.7 07/25/2018    RBC 3.01 07/25/2018    HGB 8.2 07/25/2018    HCT 28.4 07/25/2018     07/25/2018    MCV 94.4 07/25/2018    MCH 27.2 07/25/2018    MCHC 28.9 07/25/2018    RDW 19.1 07/25/2018    LYMPHOPCT 15 07/25/2018    MONOPCT 8 07/25/2018    BASOPCT 1 07/25/2018    MONOSABS 0.47 07/25/2018    LYMPHSABS 0.83 07/25/2018    EOSABS 0.08 07/25/2018    BASOSABS 0.03 07/25/2018    DIFFTYPE NOT REPORTED 07/25/2018     BMP:    Lab Results   Component Value Date     07/25/2018    K 4.6 07/25/2018     07/25/2018    CO2 11 07/25/2018     07/25/2018    LABALBU 3.3 06/29/2018    CREATININE 9.62 07/25/2018    CALCIUM 9.1 07/25/2018    GFRAA 5 07/25/2018    LABGLOM 4 07/25/2018    GLUCOSE 55 07/25/2018       Radiology Review:   Xr Cervical Spine (2-3 Views)    Result Date: 7/12/2018  EXAMINATION: TWO XRAY VIEWS OF THE CERVICAL SPINE 7/12/2018 3:29 pm COMPARISON: None. HISTORY: ORDERING SYSTEM PROVIDED HISTORY: Cervicalgia FINDINGS: Radiograph is taken with cervical spine extension. 7 typical cervical vertebra present maintain normal height and alignment. Bony spinal canal and paravertebral soft tissues appear unremarkable. The disc spaces and posterior elements appear well maintained throughout. The uncovertebral joints appear normally aligned on AP view. The atlantoaxial articulation appears normally aligned.   No discrete odontoid fracture is evident. No acute osseous abnormality of the cervical spine. If pain persists or worsens, then additional evaluation with CT or MRI may be indicated. Note that CT cervical spine is the study of choice for evaluation of acute trauma. Ct Lumbar Spine Wo Contrast    Result Date: 7/25/2018  EXAMINATION: CT OF THE LUMBAR SPINE WITHOUT CONTRAST  7/25/2018 TECHNIQUE: CT of the lumbar spine was performed without the administration of intravenous contrast. Multiplanar reformatted images are provided for review. Dose modulation, iterative reconstruction, and/or weight based adjustment of the mA/kV was utilized to reduce the radiation dose to as low as reasonably achievable. COMPARISON: MRI lumbar spine from June 9, 2018 HISTORY: ORDERING SYSTEM PROVIDED HISTORY: r/o worsening fracture or increasing metastatic mass TECHNOLOGIST PROVIDED HISTORY: Reason for exam:->r/o worsening fracture or increasing metastatic mass FINDINGS: BONES/ALIGNMENT: Posterior transpedicular fusion hardware involving T11, T12, L2, and L3 is demonstrated. A pathologic compression fracture of L1 is again demonstrated with some retropulsion of fragments by approximately 0.4 cm. There is a dominant lytic lesion in the right sacral body encroaching upon the right S1 neural foramen. This metastatic lesion measures approximately 3.1 x 2.5 cm in cross-section. Its inferior extent is not well demonstrated. In the left iliac wing, a metastatic lesion is also identified exerting some mass effect upon the left sacroiliac joint. The lesion measures approximately 3.2 x 3.0 cm and is partially imaged on the previous lumbar spine MRI. No additional lesions are identified. DEGENERATIVE CHANGES: Degenerative disc disease throughout the lumbar spine involving the discs and facets. SOFT TISSUES/RETROPERITONEUM: A dominant right renal mass is partially imaged. There is perinephric stranding and perinephric nodularity likely metastatic disease. Atherosclerotic changes of the abdominal aorta are present. Low-density along the medial dome of the liver (axial image 1) suspicious for metastatic involvement of the liver. 1. Pathologic compression fracture of L1 is again identified with some retropulsion of the posterior aspect of the L1 vertebral body into the spinal canal without significant spinal canal narrowing. 2. Metastatic lesions involving the left iliac wing, right S1 sacral body effacing the right S1 neural foramen, and the L1 vertebral body. No new osseous lesions are identified. 3. Heterogeneously enlarged mass largely replacing the right kidney with nodularity in the right renal fossa suspicious for metastatic disease. 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 female who presents with acute on chronic urinary retention. NS was consulted for evaluation given CT findings. Patient has a known L1 fracture with minimal retropulsion and no significant canal stenosis. MRI lumbar pending.     Patient care discussed with attending, will reevaluate patient along with attending     - No neurosurgical interventions planned for now   - Neuro checks per protocol   - Follow up urology recommendations   - Trend jensen output   - Follow up MRI lumbar spine      Additional recommendations may follow    Please contact neurosurgery with any changes in patient's neurologic status. Thank you for your consult.        Wilmer Zaidi DO    PGY-2 Emergency Medicine Resident Physician  Neurosurgery Team - pager [phone]  7/25/2018 4:48 PM

## 2018-07-25 NOTE — ED NOTES
Pt back from [address] [place] [address], [address] [place] [address]  07/25/18 0923

## 2018-07-25 NOTE — ED NOTES
Lab called with critical results  Creatinine=9.62  IMI=333  Glucose=55     Elia Jennings RN  07/25/18 [id number]

## 2018-07-25 NOTE — PROGRESS NOTES
Patient ID: Huseyin Villegas, 1955, [id number], 66 y.o. Diagnosis:   Metastatic RCC  Started on Pazopanib on 3/16/18  Radiation  Therapy completed to spine  Her recent MRI spine showing : Compression fracture and additional bone lesions in the iliac bone  HISTORY OF PRESENT ILLNESS:    Oncologic History: This is a 64 YO female was admitted with back pain after a fall. On admission she had imaging studies which showed L2 metastatic lesion with compression fracture. Had MRI spine which showed right renal mass c/w RCC and inferior vena cava invasion. It also showed L1 metastasis, pathologic fracture, epidural invasion, and severe thecal sac stenosis. Seen by neurosurgery and input awaited. She reports wt loss possibly intentional over last year for about 50 lbs. Patient seen by urology. SHe had a biopsy of the Kidney which showed RCC. SHe was seen by Neurosurgery and she had spinal fixation with pedicle screws On 1/23/18. She is then discharged from hospital.   She still has pain in back and using Oxycodone 10 mg 2-3 times day. She is seen by radiation oncology and is planning to start palliative RT to spine next week. INTERVAL HISTORY  Patient is returning for follow-up visit. Her lower back pain has worsened significantly. She was seen by neurosurgery and did not have the kyphoplasty. I had discussion with urologist and plan is to hold off nephrectomy at this point and start her on immunotherapy. She also reports blurred in the urine for past 2-3 days. During this visit patient's allergy, social, medical, surgical history and medications were reviewed and updated. No Known Allergies    No current facility-administered medications for this visit.       Current Outpatient Prescriptions   Medication Sig Dispense Refill    lisinopril-hydrochlorothiazide (PRINZIDE;ZESTORETIC) 20-12.5 MG per tablet Take 1 tablet by mouth 2 times daily 60 tablet 11    cyclobenzaprine (FLEXERIL) 5 MG tablet TAKE  No significant acquired spinal stenosis.  Mild neural foraminal stenosis. SOFT TISSUES/RETROPERITONEUM: Partially visualized heterogeneous large right renal mass measuring at least 8 cm with prominent vessels in the renal hilum. There are small lymph nodes and soft tissue stranding in the renal hilar region.  Apparent previous hysterectomy.  Heterogeneous 1 cm calcification in the deep pelvis is felt to be related to a colonic diverticulum and it appears to be outside of the bladder.      Pathologic compression fracture of L1 with tumor infiltration involving primarily the left half of the vertebral body extending to the posterior elements with a probable associated soft tissue component.  MRI is recommended for further evaluation. Partially visualized complex large right renal mass; renal cell carcinoma should be excluded. Findings were discussed with Alejandro Aquino NP, at 3:13 pm on 1/19/2018.      Mri Cervical Spine Wo Contrast     Result Date: 1/19/2018  EXAMINATION: MRI OF THE CERVICAL SPINE WITHOUT CONTRAST 1/19/2018 4:44 pm TECHNIQUE: Multiplanar multisequence MRI of the cervical spine was performed without the administration of intravenous contrast. COMPARISON: None. HISTORY: ORDERING SYSTEM PROVIDED HISTORY: back pain FINDINGS: BONES/ALIGNMENT: There is normal alignment of the spine. The vertebral body heights are maintained. The bone marrow signal appears unremarkable. SPINAL CORD: No abnormal cord signal is seen. SOFT TISSUES: No paraspinal mass identified. C2-C3: There is no significant disc protrusion, spinal canal stenosis or neural foraminal narrowing. C3-C4: There is no significant disc protrusion, spinal canal stenosis or neural foraminal narrowing. C4-C5: Moderate uncinate spondylosis and neural foraminal narrowing bilaterally.  Central canal is patent. C5-C6: There is no significant disc protrusion, spinal canal stenosis or neural foraminal narrowing.  C6-C7: There is no significant disc protrusion, spinal canal stenosis or neural foraminal narrowing. C7-T1: There is no significant disc protrusion, spinal canal stenosis or neural foraminal narrowing.      Moderate Uncinate spondylosis and neural foraminal narrowing at C4-5.      Mri Thoracic Spine Wo Contrast     Addendum Date: 1/19/2018    ADDENDUM: Right renal mass is incompletely imaged and suspicious for neoplasm.  Further evaluation recommended.      Result Date: 1/19/2018  EXAMINATION: MRI OF THE THORACIC SPINE WITHOUT CONTRAST  1/19/2018 4:44 pm TECHNIQUE: Multiplanar multisequence MRI of the thoracic spine was performed without the administration of intravenous contrast. COMPARISON: None. HISTORY: ORDERING SYSTEM PROVIDED HISTORY: back pain FINDINGS: BONES/ALIGNMENT: There is normal alignment of the spine. The vertebral body heights are maintained except for a burst fracture at L1 with loss of height and retropulsion.  This is incompletely imaged within the field of view.  . The bone marrow signal appears unremarkable except for T1 and T2 lengthening within the L1 vertebral body.  . SPINAL CORD: No abnormal cord signal is seen. SOFT TISSUES: Left paracentral well-circumscribed subcutaneous soft tissue densities noted at T12 on the left.  These measure 10 in 25 mm respectively. DEGENERATIVE CHANGES: No significant spinal canal stenosis or neural foraminal narrowing of the thoracic spine.      Acute Burst fracture L1 vertebral body incompletely imaged within the field of view.  Several mm retropulsion with encroachment upon the conus.  See also MRI lumbar spine report. Left paracentral subcutaneous nodules at T12.      Mri Lumbar Spine Wo Contrast     Result Date: 1/19/2018  EXAMINATION: MRI OF THE LUMBAR SPINE WITHOUT CONTRAST, 1/19/2018 4:44 pm TECHNIQUE: Multiplanar multisequence MRI of the lumbar spine was performed without the administration of intravenous contrast. COMPARISON: CT from earlier today.  HISTORY: ORDERING SYSTEM PROVIDED sound a like substitutions which may escape proof reading. It such instances, actual meaning can be extrapolated by contextual diversion.

## 2018-07-25 NOTE — PROGRESS NOTES
New admit from ER, oriented to room, telemetry monitor on. Dr. Fay Castle paged RE: lab values and blood pressure.  Electronically signed by Heidi Simons RN on 7/25/2018 at 6:52 PM

## 2018-07-25 NOTE — PROGRESS NOTES
Pt's  mg/dL and creatinine 9.62 mg/dL. Sodium bicarbonate drip started and running at 100/mLs an hour. Dr. Latonya meza. Stated he doesn't want to do a dialysis treatment just to run sodium bicarb drip. Will continue to monitor.      Electronically signed by Cindy Naranjo RN on 7/25/2018 at 8:22 PM

## 2018-07-25 NOTE — CONSULTS
Liz Lo, Pia Connolly, Barney, & Beau   Urology Consultation      Patient: Brian Sam  MRN: [id number]  YOB: 1955    CHIEF COMPLAINT:  Metastatic RCC, gross hematuria    HISTORY OF PRESENT ILLNESS:   The patient is a 66 y.o. female known to Liz Coats and Marine Seth with urology who presents with gross, painless hematuria for past three days. Urology consulted for hematuria and concern for urinary retention. Patient has history of R sided metastatic RCC with spread to Lumbar spine and peritoneum which was discovered in January 2018 during MRI for back pain due to L1 compression fracture. She previously had lumbar spine surgery for compression due to mets, as well as radiation to to spinal mets and was recently on pazopanib however she states she has not taken this in approximately one month. She states she is due for another chemotherapy agent but cannot recall the name. Right palliative nephrectomy had also been scheduled for July 31st but was cancelled. Patient reports blood in her urine since Sunday, no clots just pink color. She has only had hematuria once before, in February. Associated with left sided flank pain. No associated dysuria, urgency, frequency, fevers, chills. No recent UTI. No history of stones. Additional history significant for diabetes mellitus and CKD IV. She has been admitted for urinary retention in the past with jensen placement and outpatient void trial.  She currently has not urinated in the ED and was bladder scanned around 3p for 361cc. She last voided at 11:30am without difficulty. She does not feel bladder fullness or pain now. Her creatinine was 9.62 from baseline of 2.0. Per nurse, 200ml urine output immediately upon catheter placement. Patient's old records, notes and chart reviewed and summarized above.     Past Medical History:    Past Medical History:   Diagnosis Date    Cancer [facility])     kidney    Chronic kidney disease  Depression     Diabetes mellitus ([facility] [address]     type 2    GERD (gastroesophageal reflux disease)     HA (headache)     HBP (high blood pressure)     History of blood transfusion     no reaction    Hyperlipidemia     Hyperplastic polyp of intestine     Hypothyroid 11/26/2016    Hypothyroidism     Left knee pain     Non-smoker     OA (osteoarthritis)     bilat knees    Tubular adenoma        Past Surgical History:    Past Surgical History:   Procedure Laterality Date    BACK SURGERY N/A 01/23/2018    Post T11-L2 Fusion    COLONOSCOPY  08/16/2016    hyperplastic polyp and tubular adenoma     HYSTERECTOMY      NERVE BLOCK  07/13/2018    aleta trigger point neck #1    [place] LUMBAR SPINE FUSN,POST INTRBDY N/A 1/23/2018    T11-L3 POSTERIOR FIXATION AND FUSION, SPINE ABBY, C-ARM, O-ARM WITH STEALTH,  EVOKES- [id number] performed by Juan M Smith DO at [address] [facility]         Medications:      Current Facility-Administered Medications:     sodium bicarbonate 75 mEq in sodium chloride 0.45 % 1,000 mL infusion, , Intravenous, Continuous, Natalia Anton MD, Last Rate: 100 mL/hr at 07/25/18 1733    acetaminophen (TYLENOL) tablet 1,000 mg, 1,000 mg, Oral, TID, Jose L Bimler, APRN - CNS, 1,000 mg at 07/25/18 2115    amitriptyline (ELAVIL) tablet 75 mg, 75 mg, Oral, Nightly, Jose L Bimler, APRN - CNS, 75 mg at 07/25/18 2108    atorvastatin (LIPITOR) tablet 20 mg, 20 mg, Oral, Nightly, Jose L Bimler, APRN - CNS    cyclobenzaprine (FLEXERIL) tablet 5 mg, 5 mg, Oral, TID PRN, Jose L Bimler, APRN - CNS, 5 mg at 07/25/18 2108    docusate sodium (COLACE) capsule 200 mg, 200 mg, Oral, BID, Jose L Bimler, APRN - CNS, 200 mg at 07/25/18 2108    [START ON 7/26/2018] pantoprazole (PROTONIX) tablet 40 mg, 40 mg, Oral, QAM AC, Jose L Bimler, APRN - CNS    ondansetron (ZOFRAN) tablet 4 mg, 4 mg, Oral, Daily PRN, Jose L Bimler, APRN - CNS    oxyCODONE (OXYCONTIN) extended release tablet 10 mg, 10 mg, Oral, BID, Nozak Schmitz, APRN - CNS    oxyCODONE (ROXICODONE) immediate release tablet 10 mg, 10 mg, Oral, Q6H PRN, Tara Schmitz APRN - CNS    prochlorperazine (COMPAZINE) tablet 5 mg, 5 mg, Oral, Q8H PRN, Tara Schmitz, APRN - CNS    sodium chloride flush 0.9 % injection 10 mL, 10 mL, Intravenous, 2 times per day, ABDIEL Correia - CNS, 10 mL at 07/25/18 2114    sodium chloride flush 0.9 % injection 10 mL, 10 mL, Intravenous, PRN, [facility]it[facility] Hospital[facility] LimaHall, APRN - CNS    ondansetron (ZOFRAN) injection 4 mg, 4 mg, Intravenous, Q6H PRN, Tara Schmitz, APRN - CNS    sodium polystyrene (KAYEXALATE) 15 GM/60ML suspension 15 g, 15 g, Oral, Once PRN, Tara Limaford, APRN - CNS    sodium polystyrene (KAYEXALATE) 15 GM/60ML suspension 30 g, 30 g, Oral, Once PRN, Tara Schmitz, APRN - CNS    [START ON 7/26/2018] insulin lispro (HUMALOG) injection vial 0-12 Units, 0-12 Units, Subcutaneous, TID WC, Ameea Randy, APRN - CNP    insulin lispro (HUMALOG) injection vial 0-6 Units, 0-6 Units, Subcutaneous, Nightly, Zia Padilla APRN - CNP    glucose (GLUTOSE) 40 % oral gel 15 g, 15 g, Oral, PRN, Ameea Mires, APRN - CNP    dextrose 50 % solution 12.5 g, 12.5 g, Intravenous, PRN, Ameea Randy, APRN - CNP    glucagon (rDNA) injection 1 mg, 1 mg, Intramuscular, PRN, Ameea Randy, APRN - CNP    dextrose 5 % solution, 100 mL/hr, Intravenous, PRN, Ameea Mires, APRN - CNP    acetaminophen (TYLENOL) tablet 1,000 mg, 1,000 mg, Oral, Q6H PRN, Ameea Randy, APRN - CNP    Allergies:  No Known Allergies    Social History:   Social History     Social History    Marital status:      Spouse name: N/A    Number of children: N/A    Years of education: N/A     Occupational History    Not on file.      Social History Main Topics    Smoking status: Never Smoker    Smokeless tobacco: Never Used    Alcohol use No    Drug use: No    Sexual activity: Not on file     Other Topics Concern    Not on file     Social History Narrative    No narrative on file       Family History:    Family History   Problem Relation Age of Onset    Heart Disease Mother     Diabetes Mother     Cancer Father         esophageal cancer    Cancer Maternal Grandfather         colon       REVIEW OF SYSTEMS:  A comprehensive 14 point review of systems was obtained. Constitutional: No fatigue  Eyes: No blurry vision  Ears, nose, mouth, throat, face: No ringing in the ears; no facial droop. Respiratory: No cough or cold. Cardiovascular: No palpitations  Gastrointestinal: No diarrhea or constipation. Genitourinary: No burning with urination  Integument/Skin: No rashes  Hematologic/Lymphatic: No easy bruising  Musculoskeletal: No muscle pains  Neurologic: No weakness in the extremities. Psychiatric: No depression or suicidal thoughts. Endocrine: No heat or cold intolerances. Allergic/Immunologic: No current seasonal allergies; no skin hives. Physical Exam:      This a 66 y.o. female   Vitals:    07/25/18 2230   BP: (!) 96/41   Pulse:    Resp:    Temp:    SpO2:      Constitutional: Patient in no acute distress. Neuro: alert and oriented to person place and time. Head: Atraumatic and normocephalic. Neck: Trachea midline. Ext: 2+ radial pulses bilaterally. Psych: Mood and affect normal.  Skin: No rashes or bruising present. Lungs: Respiratory effort normal.  Cardiovascular:  Regular rhythm. Abdomen: Soft, non-tender, non-distended. Left side has mild CVA tenderness on exam.  Bladder non-tender and not distended. Navarrete catheter in when seen, pink tinged yellow urine  Lymphatics: no palpable lymphadenopathy  Pelvic exam: deferred. Rectal exam not indicated.     Labs:  Recent Labs      07/25/18   1407   WBC  5.7   HGB  8.2*   HCT  28.4*   MCV  94.4   PLT  316     Recent Labs      07/25/18   1407   NA  137   K  4.6   CL  102   CO2  11*   BUN  101*   CREATININE  9.62*       Recent Labs      07/25/18 2035   COLORU  YELLOW   PHUR  5.0   WBCUA  10 TO 20   RBCUA  5 TO 10   MUCUS  NOT REPORTED   TRICHOMONAS  NOT REPORTED   YEAST  NOT REPORTED   BACTERIA  FEW*   SPECGRAV  1.014   LEUKOCYTESUR  SMALL*   UROBILINOGEN  Normal   BILIRUBINUR  NEGATIVE           -----------------------------------------------------------------  Imaging Results:  Ct Lumbar Spine Wo Contrast    Result Date: 7/25/2018  EXAMINATION: CT OF THE LUMBAR SPINE WITHOUT CONTRAST  7/25/2018 TECHNIQUE: CT of the lumbar spine was performed without the administration of intravenous contrast. Multiplanar reformatted images are provided for review. Dose modulation, iterative reconstruction, and/or weight based adjustment of the mA/kV was utilized to reduce the radiation dose to as low as reasonably achievable. COMPARISON: MRI lumbar spine from June 9, 2018 HISTORY: ORDERING SYSTEM PROVIDED HISTORY: r/o worsening fracture or increasing metastatic mass TECHNOLOGIST PROVIDED HISTORY: Reason for exam:->r/o worsening fracture or increasing metastatic mass FINDINGS: BONES/ALIGNMENT: Posterior transpedicular fusion hardware involving T11, T12, L2, and L3 is demonstrated. A pathologic compression fracture of L1 is again demonstrated with some retropulsion of fragments by approximately 0.4 cm. There is a dominant lytic lesion in the right sacral body encroaching upon the right S1 neural foramen. This metastatic lesion measures approximately 3.1 x 2.5 cm in cross-section. Its inferior extent is not well demonstrated. In the left iliac wing, a metastatic lesion is also identified exerting some mass effect upon the left sacroiliac joint. The lesion measures approximately 3.2 x 3.0 cm and is partially imaged on the previous lumbar spine MRI. No additional lesions are identified. DEGENERATIVE CHANGES: Degenerative disc disease throughout the lumbar spine involving the discs and facets.  SOFT TISSUES/RETROPERITONEUM: A dominant right renal mass is partially imaged. There is perinephric stranding and perinephric nodularity likely metastatic disease. Atherosclerotic changes of the abdominal aorta are present. Low-density along the medial dome of the liver (axial image 1) suspicious for metastatic involvement of the liver. 1. Pathologic compression fracture of L1 is again identified with some retropulsion of the posterior aspect of the L1 vertebral body into the spinal canal without significant spinal canal narrowing. 2. Metastatic lesions involving the left iliac wing, right S1 sacral body effacing the right S1 neural foramen, and the L1 vertebral body. No new osseous lesions are identified. 3. Heterogeneously enlarged mass largely replacing the right kidney with nodularity in the right renal fossa suspicious for metastatic disease. Mri Lumbar Spine Wo Contrast    Result Date: 7/25/2018  EXAMINATION: MRI OF THE LUMBAR SPINE WITHOUT CONTRAST, 7/25/2018 5:09 pm TECHNIQUE: Multiplanar multisequence MRI of the lumbar spine was performed without the administration of intravenous contrast. COMPARISON: June 9, 2018 HISTORY: ORDERING SYSTEM PROVIDED HISTORY: pathologic fx in lumbar spine, difficulty urinating FINDINGS: BONES/ALIGNMENT: Thoracolumbar fusion is again seen. Hardware causes artifact limiting adjacent evaluation. Pathologic L1 compression fracture is again seen with retropulsion of fracture fragments. No acute fracture. Right sacral metastatic lesion is seen. SPINAL CORD: Retropulsion of fracture fragments is causing impingement upon the conus. Appearance is likely similar to the prior study. SOFT TISSUES: Complex right renal mass. L1-L2: L1 compression fracture with retropulsion of fracture fragments causes severe central canal stenosis and impingement on the distal aspect of the conus and the thecal sac. L2-L3: Mild broad-based disc bulge. No significant central canal or foraminal stenosis.  L3-L4: Broad-based disc bulge, facet trend in cr  Will discuss MR abdomen, venous phase to evaluate for IVC thrombus  Hematuria is mild, will monitor    Thank you for involving us in the care of [address] [place] [address]. Should you have any questions, please do not hesitate to contact us at any time.     Dontrell,  [address] [facility]  11:11 PM 7/25/2018

## 2018-07-25 NOTE — PROGRESS NOTES
MICHELLE received a referral from [facility] that patient's daughter, Anaid Hernandes #[phone], was trying to reach someone to assist questions regarding her mother. MICHELLE called Anaid Hernandes and spoke with her, she states her mother is not doing well in the home and cannot live alone currently. She had previously been at [employer] and both patient's daughters feel patient may need to go back to a SNF. Anaid Hernandes states that her mother has had blood in her urine for several days, so they plan to take her to the ED today. Questions answered about Medicaid as patient has a [place]. Guardianship also discussed and questions answered regarding this process as sister, Charly Shaw #[phone], has medical POA currently. MICHELLE was able to meet with patient and daughter Charly Shaw, while at appointment with Dr. Zack Cintron today, MICHELLE contact information provided and questions answered about the [facility] program, which patient is only eligible for if she has Medicaid. Family encouraged to apply for Medicaid for patient. MICHELLE contact provided and they were encouraged to call with any further issues/questions. Fili Bragg.  Indra Coffey

## 2018-07-25 NOTE — ED NOTES
Pt back from [address] [place] [address], [facility]  07/25/18 5243

## 2018-07-25 NOTE — ED NOTES
Pt Sent to the ER by her oncologist for dysuria with hematuria and lower back pain. Has h/o renal carcinoma. Pt reports generalized weakness/fatigue. Denies N/V/D/C/fever/chills. Placed on continuous monitor, no distress noted, rr even and NL. Family at bedside.  Will continue to monitor     Amadeo Guzman RN  07/25/18 5617

## 2018-07-26 PROBLEM — E87.29 HIGH ANION GAP METABOLIC ACIDOSIS: Status: ACTIVE | Noted: 2018-01-01

## 2018-07-26 NOTE — PLAN OF CARE
Problem: Pain:  Goal: Pain level will decrease  Pain level will decrease   Outcome: Ongoing  Pt able to communicate pain as needed, uses call light appropriately. Pt satisfied with pain interventions. Problem: Falls - Risk of:  Goal: Will remain free from falls  Will remain free from falls   Outcome: Ongoing  Pt remains free of falls at this time. Bed locked in lowest position, siderails x2, call light in reach. Non-skid footwear applied. Encouraged pt to call for assistance as needed for safety. Falling star posted outside of room. Will continue to monitor.

## 2018-07-26 NOTE — CONSULTS
CONSULT NOTE    PCP: Olivia Sanchez MD  Referring Provider: No ref. provider found  Patient ID: Hannah Knight, 1955, [id number], 66 y.o. Diagnosis:   Metastatic RCC  Started on Pazopanib on 3/16/18  Radiation  Therapy completed to spine  Her recent MRI spine showing : Compression fracture and additional bone lesions in the iliac bone  HISTORY OF PRESENT ILLNESS:    Oncologic History: This is a 64 YO female was admitted with back pain after a fall. On admission she had imaging studies which showed L2 metastatic lesion with compression fracture. Had MRI spine which showed right renal mass c/w RCC and inferior vena cava invasion. It also showed L1 metastasis, pathologic fracture, epidural invasion, and severe thecal sac stenosis. Seen by neurosurgery and input awaited. She reports wt loss possibly intentional over last year for about 50 lbs. Patient seen by urology. SHe had a biopsy of the Kidney which showed RCC. SHe was seen by Neurosurgery and she had spinal fixation with pedicle screws On 1/23/18. She had palliative radiation and was on pazopanib        SUBJECTIVE/HPI:  Silvia Velazco is a very pleasant 66 y.o. female who is has metastatic renal cell carcinoma, patient of Dr. Naina Baxter, was admitted for increase back pain, for one month, she was on pazopanib and already had palliative radiation to the back. She was scheduled for palliative nephrectomy 7/31/18 but was cancelled. Dr. Naina Baxter was planning to start immunotherapy outpatient. MRI of lumbar spine L1 compression fracture, neurosurgery not planning any intervention. She states her pain is better controlled but still does seem unfortable.      PAST MEDICAL HISTORY:      Diagnosis Date    Cancer [facility])     kidney    Chronic kidney disease     Depression     Diabetes mellitus (HCC)     type 2    GERD (gastroesophageal reflux disease)     HA (headache)     HBP (high blood pressure)     History of blood transfusion     no reaction  Hyperlipidemia     Hyperplastic polyp of intestine     Hypothyroid 11/26/2016    Hypothyroidism     Left knee pain     Non-smoker     OA (osteoarthritis)     bilat knees    Tubular adenoma        PAST SURGICAL HISTORY:      Procedure Laterality Date    BACK SURGERY N/A 01/23/2018    Post T11-L2 Fusion    COLONOSCOPY  08/16/2016    hyperplastic polyp and tubular adenoma     HYSTERECTOMY      NERVE BLOCK  07/13/2018    aleta trigger point neck #1    DE LUMBAR SPINE FUSN,POST INTRBDY N/A 1/23/2018    T11-L3 POSTERIOR FIXATION AND FUSION, SPINE ABBY C-ARM, O-ARM WITH STEALTH,  EVOKES- [id number] LJ performed by Enrike Soto DO at [address]:   Current Facility-Administered Medications   Medication Dose Route Frequency Provider Last Rate Last Dose    sodium bicarbonate 150 mEq in dextrose 5 % 1,000 mL infusion   Intravenous Continuous Drew Childers  mL/hr at 07/26/18 1417      acetaminophen (TYLENOL) tablet 1,000 mg  1,000 mg Oral TID Ok Limon, APRN - CNS   1,000 mg at 07/25/18 2115    amitriptyline (ELAVIL) tablet 75 mg  75 mg Oral Nightly Ok Limon, APRN - CNS   75 mg at 07/25/18 2108    atorvastatin (LIPITOR) tablet 20 mg  20 mg Oral Nightly Ok Limon, APRN - CNS        cyclobenzaprine (FLEXERIL) tablet 5 mg  5 mg Oral TID PRN Ok Limon, APRN - CNS   5 mg at 07/26/18 0425    docusate sodium (COLACE) capsule 200 mg  200 mg Oral BID Ok Limon, APRN - CNS   200 mg at 07/26/18 8996    pantoprazole (PROTONIX) tablet 40 mg  40 mg Oral QAM AC Ok Limon, APRN - CNS   40 mg at 07/26/18 0553    ondansetron (ZOFRAN) tablet 4 mg  4 mg Oral Daily PRN Ok Limon, APRN - CNS        oxyCODONE (OXYCONTIN) extended release tablet 10 mg  10 mg Oral BID Ok Limon, APRN - CNS   10 mg at 07/26/18 0834    oxyCODONE (ROXICODONE) immediate release tablet 10 mg  10 mg Oral Q6H PRN Ok Limon, APRN - CNS   10 mg Negative. Psychiatric/Behavioral: Negative. PHYSICAL EXAM:   Vitals:    07/26/18 1528   BP: (!) 128/46   Pulse: 82   Resp: 9   Temp: 98.6 °F (37 °C)   SpO2: 98%       Physical Exam   Constitutional: She is oriented to person, place, and time. She appears well-developed and well-nourished. HENT:   Head: Normocephalic. Mouth/Throat: Oropharynx is clear and moist.   Eyes: EOM are normal. Pupils are equal, round, and reactive to light. Neck: Normal range of motion. Neck supple. Cardiovascular: Normal rate and intact distal pulses. Pulmonary/Chest: Effort normal and breath sounds normal.   Abdominal: Soft. Bowel sounds are normal.   Musculoskeletal: Normal range of motion. Neurological: She is alert and oriented to person, place, and time. She has normal reflexes. Skin: Skin is warm and dry. Psychiatric: She has a normal mood and affect. Her behavior is normal. Judgment and thought content normal.       LABS:   Results for orders placed or performed during the hospital encounter of 07/25/18   URINE CULTURE CLEAN CATCH   Result Value Ref Range    Specimen Description . URINE     Special Requests NOT REPORTED     Culture CULTURE IN PROGRESS     Status Pending    Urinalysis with Microscopic   Result Value Ref Range    Color, UA YELLOW YEL    Turbidity UA CLOUDY (A) CLEAR    Glucose, Ur NEGATIVE NEG    Bilirubin Urine NEGATIVE NEG    Ketones, Urine NEGATIVE NEG    Specific Gravity, UA 1.015 1.005 - 1.030    Urine Hgb NEGATIVE NEG    pH, UA 5.0 5.0 - 8.0    Protein, UA 2+ (A) NEG    Urobilinogen, Urine Normal NORM    Nitrite, Urine NEGATIVE NEG    Leukocyte Esterase, Urine MODERATE (A) NEG    -          WBC, UA 5 TO 10 0 - 5 /HPF    RBC, UA 2 TO 5 0 - 4 /HPF    Casts UA 2 TO 5 HYALINE 0 - 8 /LPF    Crystals UA NOT REPORTED NONE /HPF    Epithelial Cells UA 2 TO 5 0 - 5 /HPF    Renal Epithelial, Urine NOT REPORTED 0 /HPF    Bacteria, UA FEW (A) NONE    Mucus, UA NOT REPORTED NONE    Trichomonas, UA NOT REPORTED NONE    Amorphous, UA NOT REPORTED NONE    Other Observations UA NOT REPORTED NREQ    Yeast, UA NOT REPORTED NONE   CBC WITH AUTO DIFFERENTIAL   Result Value Ref Range    WBC 5.7 3.5 - 11.3 k/uL    RBC 3.01 (L) 3.95 - 5.11 m/uL    Hemoglobin 8.2 (L) 11.9 - 15.1 g/dL    Hematocrit 28.4 (L) 36.3 - 47.1 %    MCV 94.4 82.6 - 102.9 fL    MCH 27.2 25.2 - 33.5 pg    MCHC 28.9 28.4 - 34.8 g/dL    RDW 19.1 (H) 11.8 - 14.4 %    Platelets 050 318 - 635 k/uL    MPV 9.0 8.1 - 13.5 fL    NRBC Automated 0.4 (H) 0.0 per 100 WBC    Differential Type NOT REPORTED     WBC Morphology NOT REPORTED     RBC Morphology ANISOCYTOSIS PRESENT     Platelet Estimate NOT REPORTED     Seg Neutrophils 74 (H) 36 - 65 %    Lymphocytes 15 (L) 24 - 43 %    Monocytes 8 3 - 12 %    Eosinophils % 1 1 - 4 %    Basophils 1 0 - 2 %    Immature Granulocytes 1 (H) 0 %    Segs Absolute 4.23 1.50 - 8.10 k/uL    Absolute Lymph # 0.83 (L) 1.10 - 3.70 k/uL    Absolute Mono # 0.47 0.10 - 1.20 k/uL    Absolute Eos # 0.08 0.00 - 0.44 k/uL    Basophils # 0.03 0.00 - 0.20 k/uL    Absolute Immature Granulocyte 0.05 0.00 - 0.30 k/uL   Basic Metabolic Panel   Result Value Ref Range    Glucose 55 (L) 70 - 99 mg/dL     (HH) 8 - 23 mg/dL    CREATININE 9.62 (HH) 0.50 - 0.90 mg/dL    Bun/Cre Ratio NOT REPORTED 9 - 20    Calcium 9.1 8.6 - 10.4 mg/dL    Sodium 137 135 - 144 mmol/L    Potassium 4.6 3.7 - 5.3 mmol/L    Chloride 102 98 - 107 mmol/L    CO2 11 (L) 20 - 31 mmol/L    Anion Gap 24 (H) 9 - 17 mmol/L    GFR Non-African American 4 (L) >60 mL/min    GFR African American 5 (L) >60 mL/min    GFR Comment          GFR Staging NOT REPORTED    Protein, urine, random   Result Value Ref Range    Total Protein, Urine 68 mg/dL   Sodium, urine, random   Result Value Ref Range    Sodium,Ur 44 mmol/L   Creatinine, Random Urine   Result Value Ref Range    Creatinine, Ur 118.0 28.0 - 217.0 mg/dL   CBC Auto Differential   Result Value Ref Range    WBC 4.1 3.5 - 11.3 k/uL (A) CLEAR    Glucose, Ur NEGATIVE NEG    Bilirubin Urine NEGATIVE NEG    Ketones, Urine NEGATIVE NEG    Specific Gravity, UA 1.014 1.005 - 1.030    Urine Hgb NEGATIVE NEG    pH, UA 5.0 5.0 - 8.0    Protein, UA 2+ (A) NEG    Urobilinogen, Urine Normal NORM    Nitrite, Urine NEGATIVE NEG    Leukocyte Esterase, Urine SMALL (A) NEG    -          WBC, UA 10 TO 20 0 - 5 /HPF    RBC, UA 5 TO 10 0 - 4 /HPF    Casts UA 2 TO 5 HYALINE 0 - 8 /LPF    Crystals UA NOT REPORTED NONE /HPF    Epithelial Cells UA 5 TO 10 0 - 5 /HPF    Renal Epithelial, Urine NOT REPORTED 0 /HPF    Bacteria, UA FEW (A) NONE    Mucus, UA NOT REPORTED NONE    Trichomonas, UA NOT REPORTED NONE    Amorphous, UA NOT REPORTED NONE    Other Observations UA NOT REPORTED NREQ    Yeast, UA NOT REPORTED NONE   POC Glucose Fingerstick   Result Value Ref Range    POC Glucose 75 65 - 105 mg/dL   POC Glucose Fingerstick   Result Value Ref Range    POC Glucose 87 65 - 105 mg/dL   POC Glucose Fingerstick   Result Value Ref Range    POC Glucose 80 65 - 105 mg/dL   POC Glucose Fingerstick   Result Value Ref Range    POC Glucose 102 65 - 105 mg/dL   POC Glucose Fingerstick   Result Value Ref Range    POC Glucose 148 (H) 65 - 105 mg/dL       MRI of spine  Pathologic L1 compression fracture is again seen with retropulsion of   fracture fragments causing impingement upon the distal aspect of the conus   and the proximal thecal sac.  Appearance is overall similar to the prior   study. IMPRESSION:     1. Hematuria, unspecified type    2. Renal cell cancer, right ([address]    3. Acute renal failure, unspecified acute renal failure type ([address]    4. Pathologic lumbar vertebral fracture, sequela    5. Metabolic acidosis    6. Hypoglycemia    7.  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 Metastatic Renal Cell Carcinoma to the bones  2. Already received palliative radiation but will consult rad/onc to see if patient can qualify for palliative radiation  3. Dr. James Mittal planning to start immunotherapy outpatient  4. Pain- pain control is her major issue, neurosurgery not planning any intervention at this time and Urology is already on board, it does not appear that they are planning palliative nephrectomy.       Electronically signed by Otf Keith MD on 7/26/2018 at 3:47 PM

## 2018-07-26 NOTE — CONSULTS
Nephrology Consult Note    Reason for Consult:  RASHMI secondary to decreased urinary output  Requesting Physician:  Efren Mathis MD    Chief Complaint:  Metastatic RCC, gross hematuria    History Obtained From:  Patient, electronic medical records    History of Present Illness:      66 y.o. female who presents with gross, painless hematuria for past three days. Urology consulted for hematuria and concern for urinary retention. Patient has history of R sided metastatic RCC with spread to Lumbar spine and peritoneum which was discovered in January 2018 during MRI for back pain due to L1 compression fracture. She previously had lumbar spine surgery for compression due to mets, as well as radiation to to spinal mets and was recently on pazopanib however she states she has not taken this in approximately one month. She states she is due for another chemotherapy agent but cannot recall the name. Right palliative nephrectomy had also been scheduled for July 31st but was cancelled. The patient has previous history is of admission to hospital due to RASHMI also. The patient has been consulted because of acute kidney injury secondary to decreased urinary output, owing to obstructive uropathy/urinary retention. Her baseline creatinine is in the range of 2-2.2, non oliguric. Today the creatinine is 9.34. The patient also has a known history of chronic kidney disease stage III, diabetes mellitus, hypertension. She does not see a nephrologist.    Patient reports blood in her urine since Sunday, no clots just pink color. Patient was seen and examined at bedside. Alert and oriented. Stable hemodynamically. No associated dysuria, urgency, frequency, fevers, chills. The patient complains of pain in the back. Otherwise she denies any complaints of nausea, vomiting, shortness of breath. In the emergency department, the largest scan showed a PVR of 3 61 mL. She was catheterized, and the urine output was 200ml.  renal ultrasound was performed yesterday. It revealed decompressed urinary bladder by Navarrete catheter. And a large complex mass within the superior pole of the right kidney, mild right hydronephrosis. Her Hb is low.     Past Medical History:        Diagnosis Date    [facility])     kidney    Chronic kidney disease     Depression     Diabetes mellitus (HCC)     type 2    GERD (gastroesophageal reflux disease)     HA (headache)     HBP (high blood pressure)     History of blood transfusion     no reaction    Hyperlipidemia     Hyperplastic polyp of intestine     Hypothyroid 11/26/2016    Hypothyroidism     Left knee pain     Non-smoker     OA (osteoarthritis)     bilat knees    Tubular adenoma        Past Surgical History:        Procedure Laterality Date    BACK SURGERY N/A 01/23/2018    Post T11-L2 Fusion    COLONOSCOPY  08/16/2016    hyperplastic polyp and tubular adenoma     HYSTERECTOMY      NERVE BLOCK  07/13/2018    aleta trigger point neck #1    [place] LUMBAR SPINE FUSN,POST INTRBDY N/A 1/23/2018    T11-L3 POSTERIOR FIXATION AND FUSION, SPINE ABBY, C-ARM, O-ARM WITH STEALTH,  EVOKES- [id number] LJ performed by Conway Carrel, DO at [facility]         Current Medications:      sodium bicarbonate 75 mEq in sodium chloride 0.45 % 1,000 mL infusion Continuous   acetaminophen (TYLENOL) tablet 1,000 mg TID   amitriptyline (ELAVIL) tablet 75 mg Nightly   atorvastatin (LIPITOR) tablet 20 mg Nightly   cyclobenzaprine (FLEXERIL) tablet 5 mg TID PRN   docusate sodium (COLACE) capsule 200 mg BID   pantoprazole (PROTONIX) tablet 40 mg QAM AC   ondansetron (ZOFRAN) tablet 4 mg Daily PRN   oxyCODONE (OXYCONTIN) extended release tablet 10 mg BID   oxyCODONE (ROXICODONE) immediate release tablet 10 mg Q6H PRN   prochlorperazine (COMPAZINE) tablet 5 mg Q8H PRN   sodium chloride flush 0.9 % injection 10 mL 2 times per day   sodium chloride flush 0.9 % injection 10 mL PRN   ondansetron (ZOFRAN) Diastolic (93IMF), MZI:57, Min:34, Max:48    24HR INTAKE/OUTPUT:    Intake/Output Summary (Last 24 hours) at 07/26/18 0859  Last data filed at 07/26/18 0556   Gross per 24 hour   Intake             3822 ml   Output              500 ml   Net             3322 ml     Patient Vitals for the past 96 hrs (Last 3 readings):   Weight   07/26/18 0559 198 lb 6.6 oz (90 kg)   07/25/18 1327 180 lb (81.6 kg)     Physical Exam:  General appearance:Awake, alert, in no acute distress  Skin: warm and dry, no rash or erythema  Eyes: conjunctivae normal and sclera anicteric  ENT: :no thrush no pharyngeal congestion    Neck: no carotid bruit ,no  JVD,no carotid Lymphadenopathy, noThyromegaly Pulmonary: no wheezing or rhonchi. No rales heard. Cardiovascular: Normal S1 & S2,  No S3 or  S4, no Pericardial Rub no Murmur   Abdomen: soft nontender, bowel sounds present, no organomegaly,  no ascites  Extremities:no cyanosis, clubbing or edema    Labs:   CBC:  Recent Labs      07/25/18   1407  07/26/18   0613   WBC  5.7  4.1   RBC  3.01*  2.72*   HGB  8.2*  7.3*   HCT  28.4*  26.5*   MCV  94.4  97.4   MCH  27.2  26.8   MCHC  28.9  27.5*   RDW  19.1*  18.9*   PLT  316  235   MPV  9.0  9.1      BMP: Recent Labs      07/25/18   1407  07/26/18   0613   NA  137  138   K  4.6  4.6   CL  102  106   CO2  11*  10*   BUN  101*  99*   CREATININE  9.62*  9.34*   GLUCOSE  55*  104*   CALCIUM  9.1  7.9*        Phosphorus:  No results for input(s): PHOS in the last 72 hours.   Magnesium:   Recent Labs      07/26/18   0613   MG  1.9     Albumin:   Recent Labs      07/26/18   0613   LABALBU  2.0*       IRON:    Lab Results   Component Value Date    IRON 36 05/02/2018     Iron Saturation:  No components found for: PERCENTFE  TIBC:    Lab Results   Component Value Date    TIBC 176 05/02/2018     FERRITIN:    Lab Results   Component Value Date    FERRITIN 187 05/02/2018     SPEP: Lab Results   Component Value Date    PROT 4.9 07/26/2018     UPEP: No results found for: TPU     C3:   Lab Results   Component Value Date    C3 117 01/24/2018     C4:   Lab Results   Component Value Date    C4 26 01/24/2018     MPO ANCA:  No results found for: MPO . PR3 ANCA:  No results found for: PR3  Urine Sodium:    Lab Results   Component Value Date    DEBBY 44 07/25/2018      Urine Creatinine:    Lab Results   Component Value Date    LABCREA 118.0 07/25/2018     Urine Eosinophils: No results found for: UREO  Urine Protein:  No results found for: TPU  Urinalysis:  U/A: Lab Results   Component Value Date    NITRU NEGATIVE 07/25/2018    COLORU YELLOW 07/25/2018    PHUR 5.0 07/25/2018    WBCUA 10 TO 20 07/25/2018    RBCUA 5 TO 10 07/25/2018    MUCUS NOT REPORTED 07/25/2018    TRICHOMONAS NOT REPORTED 07/25/2018    YEAST NOT REPORTED 07/25/2018    BACTERIA FEW 07/25/2018    CLARITYU clear 04/10/2018    SPECGRAV 1.014 07/25/2018    LEUKOCYTESUR SMALL 07/25/2018    UROBILINOGEN Normal 07/25/2018    BILIRUBINUR NEGATIVE 07/25/2018    BILIRUBINUR neg. 04/10/2018    BLOODU large 04/10/2018    GLUCOSEU NEGATIVE 07/25/2018    Marshall Sparrow NEGATIVE 07/25/2018    AMORPHOUS NOT REPORTED 07/25/2018         Radiology:  Reviewed as available. Assessment:  1. RASHMI secondary sec to obstructive uropathy/urinary retention and aggravated further by concomitant ACE inhibitor and diuretic use along with poor oral intake , non oliguric- evolving  -   VS Rapid progression of intrinsic renal disease  2. CKD Stage 3, baseline creatinine 2-2.2  3. High anion gap metabolic acidosis secondary to acute kidney injury  4. Hematuria   5. R sided metastatic RCC with spread to Lumbar spine and peritoneum which was discovered in January 2018 during MRI for back pain due to L1 compression fracture  6. Hypertension  7. Diabetes mellitus type 2     Plan:  1. Continue IV fluids with sodium bicarb and will increase the dose  2. Avoid nephrotoxic agents. Hold ACE/Diuretic  3.   Discussed with her the option of dialysis in case kidney function do not show signs of improvement. The procedure and all the complications including cardiac events explained. She verbalized understanding and is willing to proceed. 4. Will reassess AM for HD need  5. No BURT Contrast with MRI     Thank you for the consultation. Please do not hesitate to call with questions. Lissette Sue MD  PGY-1, Department of Internal Medicine  [place], [place]  7/26/2018 9:13 AM     Electronically signed by Lissette Sue MD on 7/26/2018 at 8:59 AM    Attending Physician Statement  I have discussed the care of Mishel Baum, including pertinent history and exam findings,  with the Fellow/Residentt. I have reviewed the key elements of all parts of the encounter with the Fellow/ Resident. I agree with the assessment, plan and orders as documented by the resident.     Lex Hammond MD, MRCP Refugio Maria, FACP   7/26/2018 1:07 PM    Nephrology [address] [place] [address]

## 2018-07-26 NOTE — PROGRESS NOTES
Elayne  24.    Neurosurgery Service  Resident Daily Progress Note   7/26/2018 10:26 AM     Subjective    No acute events overnight. Patient reports she was having back pain earlier but it has now resolved. Objective    Vitals:    07/25/18 2326 07/26/18 0304 07/26/18 0559 07/26/18 0800   BP: (!) 107/40 (!) 111/45  (!) 113/35   Pulse: 55 78  81   Resp: 12 11 19   Temp: 98.1 °F (36.7 °C) 98.9 °F (37.2 °C)  98.2 °F (36.8 °C)   TempSrc: Oral Oral  Oral   SpO2: 96% 99%  97%   Weight:   198 lb 6.6 oz (90 kg)    Height:           Physical Exam:  Gen: Resting comfortably, no acute distress  CV: RRR  Resp: CTAB  GI: Abdomen soft, non-tender  Skin: Cap refill< 2 seconds  Neuro:    A&Ox3   PERRL, EOMI   CNII-XII intact   Normal speech and mentation  Patient unable to participate in motor testing of lower extremities 2/2 leg and back pain.     Labs:  Lab Results   Component Value Date    WBC 4.1 07/26/2018    HGB 7.3 (L) 07/26/2018    HCT 26.5 (L) 07/26/2018     07/26/2018    CHOL 107 08/21/2017    TRIG 131 08/21/2017    HDL 26 (L) 08/21/2017    ALT <5 (L) 07/26/2018    AST 7 07/26/2018     07/26/2018    K 4.6 07/26/2018     07/26/2018    CREATININE 9.34 (HH) 07/26/2018    BUN 99 (HH) 07/26/2018    CO2 10 (L) 07/26/2018    TSH 11.10 (H) 06/29/2018    INR 1.0 07/26/2018    LABA1C 6.2 (H) 01/31/2018    LABMICR 232 (H) 08/21/2017       Radiology (last 24 hours):  MRI OF THE LUMBAR SPINE WITHOUT CONTRAST, 7/25/2018 5:09 pm       TECHNIQUE:   Multiplanar multisequence MRI of the lumbar spine was performed without the   administration of intravenous contrast.       COMPARISON:   June 9, 2018       HISTORY:   ORDERING SYSTEM PROVIDED HISTORY: pathologic fx in lumbar spine, difficulty   urinating       FINDINGS:   BONES/ALIGNMENT: Thoracolumbar fusion is again seen.  Hardware causes   artifact limiting adjacent evaluation.  Pathologic L1 compression fracture is   again seen with retropulsion of fracture fragments.  No acute fracture. Right sacral metastatic lesion is seen.       SPINAL CORD: Retropulsion of fracture fragments is causing impingement upon   the conus.  Appearance is likely similar to the prior study.       SOFT TISSUES: Complex right renal mass.       L1-L2: L1 compression fracture with retropulsion of fracture fragments causes   severe central canal stenosis and impingement on the distal aspect of the   conus and the thecal sac.       L2-L3: Mild broad-based disc bulge.  No significant central canal or   foraminal stenosis.       L3-L4: Broad-based disc bulge, facet degenerative changes, and hypertrophy of   the ligamentum flavum combine to create moderate central canal stenosis. Changes combine to create moderate bilateral foraminal stenosis.       L4-L5: Left paracentral disc bulge with extension into the left foramina and   facet degenerative changes combine to create mild central canal stenosis. Changes combine to create moderate-severe left foraminal stenosis and   moderate right foraminal stenosis.       L5-S1: Paracentral disc bulge with left foraminal component causes minimal   impingement upon the ventral aspect of the thecal sac.  Facet degenerative   changes are seen.  Changes combine to create mild left foraminal stenosis.         Impression   Pathologic L1 compression fracture is again seen with retropulsion of   fracture fragments causing impingement upon the distal aspect of the conus   and the proximal thecal sac.  Appearance is overall similar to the prior   study. ASSESSMENT & PLAN:    Billy Ortiz is a 66 y.o. female who presents with acute on chronic urinary retention. NS was consulted for evaluation given CT findings. Patient has a known L1 fracture with minimal retropulsion and no significant canal stenosis.  MRI lumbar pending.     Patient interviewed with attending physician.     - MRI lumbar spine reviewed with Dr. Harriet Gutierrez - No neurosurgical interventions planned for now    - Not a good surgical candidate at this time 2/2 anemia    - Will have patient participate in PT with her home back brace and assess pain              - Neuro checks per protocol              - Urology and nephrology recommendations appreciated. Please contact neurosurgery with any changes in patient's neurologic status.       Genesis Causey,   PGY-2 Emergency Medicine Resident Physician  Neurosurgery/Neuro Critical Care Team  Pager [phone]

## 2018-07-26 NOTE — PROGRESS NOTES
Urology Progress Note    Subjective: Makayla Hernandez is a 66 y.o. female. His/Her current Diet is: DIET RENAL; Carb Control: 4 carb choices (60 gms)/meal.  The patient is * No surgery found * from     250 cc urine output overnight, low  No chest pain, shortness of breath, nausea, vomiting, fevers, chills  Patient complains of significant back pain  Poorly ambulatory    Patient Vitals for the past 24 hrs:   BP Temp Temp src Pulse Resp SpO2 Height Weight   07/26/18 0559 - - - - - - - 198 lb 6.6 oz (90 kg)   07/26/18 0304 (!) 111/45 98.9 °F (37.2 °C) Oral 78 11 99 % - -   07/25/18 2326 (!) 107/40 98.1 °F (36.7 °C) Oral 55 12 96 % - -   07/25/18 2230 (!) 96/41 - - - - - - -   07/25/18 2140 (!) 107/43 - - - - - - -   07/25/18 2000 - 98.5 °F (36.9 °C) Oral 83 14 - - -   07/25/18 1845 (!) 108/36 98 °F (36.7 °C) Oral 81 16 99 % - -   07/25/18 1744 - - - 83 - - - -   07/25/18 1736 (!) 110/42 - - - - 98 % - -   07/25/18 1616 (!) 104/48 - - - - - - -   07/25/18 1608 - - - - - 99 % - -   07/25/18 1327 (!) 90/45 97.2 °F (36.2 °C) Oral 81 13 99 % 5' 8\" (1.727 m) 180 lb (81.6 kg)       Intake/Output Summary (Last 24 hours) at 07/26/18 0752  Last data filed at 07/26/18 0556   Gross per 24 hour   Intake             3822 ml   Output              500 ml   Net             3322 ml       Recent Labs      07/25/18   1407  07/26/18   0613   WBC  5.7  4.1   HGB  8.2*  7.3*   HCT  28.4*  26.5*   MCV  94.4  97.4   PLT  316  235     Recent Labs      07/25/18   1407  07/26/18   0613   NA  137  138   K  4.6  4.6   CL  102  106   CO2  11*  10*   BUN  101*  99*   CREATININE  9.62*  9.34*       Recent Labs      07/25/18   2035   COLORU  YELLOW   PHUR  5.0   WBCUA  10 TO 20   RBCUA  5 TO 10   MUCUS  NOT REPORTED   TRICHOMONAS  NOT REPORTED   YEAST  NOT REPORTED   BACTERIA  FEW*   SPECGRAV  1.014   LEUKOCYTESUR  SMALL*   UROBILINOGEN  Normal   BILIRUBINUR  NEGATIVE       Physical Exam:     NAD, AOx3  RRR.  Peripheral pulses palpable  Respirations nonlabored, symmetric chest rise bilaterally  Abdomen: soft, nontender, nondistended  Lower extremities: Mild edema, no calf tenderness bilaterally  Navarrete draining clear yellow urine        Impression:      66year old female with metastatic RCC with spread to lumbar spine and peritoneum. Plan:     - Nephrology recommendations appreciated for low urine output  - Navarrete catheter per primary. Urine clear. [id number] Crista Larsen for void trial  - Recommend MRI abdomen to assess renal vein and IVC tumor thrombus burden  - Recommend palliative care. Dimple Alejandro  Urology Resident, PGY3  7:52 AM 7/26/2018      I do not believe this patient is a surgical candidate. She has a very poor performance status and she has widely metastatic disease. I think palliative care is appropriate for her. We will discuss this with her at length. Please call for questions. SVITLANA DEAL [facility]      Attending Physician Statement  I have discussed the care of the patient, including pertinent history and exam findings, with the resident. I have seen and examined the patient and the key elements of all parts of the encounter have been performed by me. I have reviewed all laboratory findings and imaging reports/films. I agree with the assessment, plan and orders as documented by the resident.   (GC Modifier)

## 2018-07-26 NOTE — PLAN OF CARE
Problem: Nutrition  Goal: Optimal nutrition therapy  Outcome: Ongoing  Nutrition Problem: Inadequate oral intake  Intervention: Food and/or Nutrient Delivery: Modify current diet, Start ONS  Nutritional Goals: Oral intakes to meet at least 50% of estimated nutrition needs.

## 2018-07-26 NOTE — PROGRESS NOTES
[facility] Wound Ostomy  Nurse  Consult Note       NAME:  Julian Holbrook RECORD NUMBER:  [id number]  AGE: 66 y.o. GENDER: female  : 1955  TODAY'S DATE:  2018    Subjective   Reason for WOCN Evaluation and Assessment:   Pressure injury care and prevention. Admitted with deep tissue injuries x2 to the left plantar heel, one more laterally. Reportedly has remained in her recliner chair at home being unable to move due to extreme pain. No pressure related skin areas appreciated to other bony prominences; per nurses, has a greeenish discoloration to buttocks but not able to fully assess due to pain with turning and presence of zinc paste. Patient was admitted for hematuria and concern for urinary obstruction  Has a  history of Right sided metastatic renal cell carcinoma with spread to Lumbar spine and peritoneum which was discovered in 2018 during MRI for back pain due to L1 compression fracture. Right palliative nephrectomy had  been scheduled for  but was cancelled.      Elsie Osborn is a 66 y.o. female referred by:   [x] Physician  [] Nursing  [] Other:     Wound Identification:  Wound Type: pressure  Contributing Factors: chronic pressure, decreased mobility, shear force and extreme pain        PAST MEDICAL HISTORY        Diagnosis Date    Cancer [facility])     kidney    Chronic kidney disease     Depression     Diabetes mellitus ([facility] [address]     type 2    GERD (gastroesophageal reflux disease)     HA (headache)     HBP (high blood pressure)     History of blood transfusion     no reaction    Hyperlipidemia     Hyperplastic polyp of intestine     Hypothyroid 2016    Hypothyroidism     Left knee pain     Non-smoker     OA (osteoarthritis)     bilat knees    Tubular adenoma        PAST SURGICAL HISTORY    Past Surgical History:   Procedure Laterality Date    BACK SURGERY N/A 2018    Post T11-L2 Fusion    COLONOSCOPY  2016    hyperplastic polyp and tubular adenoma     HYSTERECTOMY      NERVE BLOCK  07/13/2018    aleta trigger point neck #1    MS LUMBAR SPINE FUSN,POST INTRBDY N/A 1/23/2018    T11-L3 POSTERIOR FIXATION AND FUSION, SPINE ABBY C-ARM, O-ARM WITH STEALTH,  EVOKES- [id number] LJ performed by Elizabeth Cole DO at [address]    Family History   Problem Relation Age of Onset    Heart Disease Mother     Diabetes Mother     Cancer Father         esophageal cancer    Cancer Maternal Grandfather         colon       SOCIAL HISTORY    Social History   Substance Use Topics    Smoking status: Never Smoker    Smokeless tobacco: Never Used    Alcohol use No       ALLERGIES    No Known Allergies    MEDICATIONS    No current facility-administered medications on file prior to encounter. Current Outpatient Prescriptions on File Prior to Encounter   Medication Sig Dispense Refill    lisinopril-hydrochlorothiazide (PRINZIDE;ZESTORETIC) 20-12.5 MG per tablet Take 1 tablet by mouth 2 times daily 60 tablet 11    cyclobenzaprine (FLEXERIL) 5 MG tablet TAKE 1 TABLET BY MOUTH THREE TIMES A DAY AS NEEDED FOR MUSCLE SPASMS 60 tablet 0    oxyCODONE (OXYCONTIN) 10 MG extended release tablet Take 1 tablet by mouth 2 times daily for 30 days. Intended supply: 30 days. 60 tablet 0    oxyCODONE HCl (OXY-IR) 10 MG immediate release tablet Take 1 tablet by mouth every 6 hours as needed for Pain for up to 30 days. . 88 tablet 0    Insulin Disposable Pump (V-GO 20) KIT       HUMALOG 100 UNIT/ML injection vial With insulin pump      B-D ULTRAFINE III SHORT PEN 31G X 8 MM MISC       atorvastatin (LIPITOR) 20 MG tablet TAKE 1 TABLET BY MOUTH ONE TIME A DAY 90 tablet 3    ondansetron (ZOFRAN) 4 MG tablet Take 1 tablet by mouth daily as needed for Nausea or Vomiting 40 tablet 0    enoxaparin (LOVENOX) 100 MG/ML injection Inject 0.9 mLs into the skin daily 90 Syringe 1    omeprazole (PRILOSEC) 40 MG delayed release capsule Take 1 capsule by mouth daily 90 capsule 3    amitriptyline (ELAVIL) 75 MG tablet Take 1 tablet by mouth nightly 90 tablet 3    prochlorperazine (COMPAZINE) 5 MG tablet Take 5 mg by mouth every 8 hours as needed       Blood Glucose Monitoring Suppl (TRUE METRIX METER) w/Device KIT       Lancets MISC Patient testing 3 times daily 100 each 3    glucose blood VI test strips (ASCENSIA AUTODISC VI;ONE TOUCH ULTRA TEST VI) strip Use with associated glucose meter. Patient testing three times daily 100 strip 3    docusate sodium (COLACE) 100 MG capsule Take 2 capsules by mouth 2 times daily 120 capsule 11    Insulin Pen Needle 32G X 4 MM MISC 2 each by Does not apply route 2 times daily      amLODIPine (NORVASC) 10 MG tablet Take 1 tablet by mouth daily 90 tablet 3    acetaminophen (TYLENOL) 500 MG tablet Take 1,000 mg by mouth 3 times daily      gabapentin (NEURONTIN) 100 MG capsule Take 1 capsule by mouth 3 times daily for 30 days. . 90 capsule 0    PAZOPanib HCl (VOTRIENT) 200 MG chemo tablet Take 4 tablets by mouth daily 120 tablet 3       Objective    BP (!) 113/35   Pulse 81   Temp 98.2 °F (36.8 °C) (Oral)   Resp 19   Ht 5' 8\" (1.727 m)   Wt 198 lb 6.6 oz (90 kg)   SpO2 97%   BMI 30.17 kg/m²     LABS:  WBC:    Lab Results   Component Value Date    WBC 4.1 07/26/2018     H/H:    Lab Results   Component Value Date    HGB 7.3 07/26/2018    HCT 26.5 07/26/2018     PTT:    Lab Results   Component Value Date    APTT 19.2 01/23/2018   [APTT}  PT/INR:    Lab Results   Component Value Date    PROTIME 10.7 07/26/2018    INR 1.0 07/26/2018     HgBA1c:    Lab Results   Component Value Date    LABA1C 6.2 01/31/2018       Assessment   Abe Risk Score: Abe Scale Score: 18    Patient Active Problem List   Diagnosis Code    Diabetes mellitus ([facility] [address] E11.9    Renal insufficiency N28.9    Osteoarthritis M19.90    Migraine G43.909    Obesity E66.9    Hyperplastic polyp of intestine K63.5    Tubular adenoma D36.9    Stage 4 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    Acute cystitis without hematuria N30.00    Renal mass N28.89    Metastasis to spinal column (HCC) C79.51    Renal cell carcinoma (HCC) C64.9    Acute kidney injury ([facility] [address] N17.9    Gluteal pain M79.1    Acute renal failure (ARF) (HCC) N17.9    Acute renal failure (HCC) N17.9    Other hydronephrosis N13.39    Severe malnutrition (HCC) E43    Urinary retention R33.9    Cervical muscle pain M54.2    Chronic right-sided low back pain without sciatica M54.5, G89.29    Cervicalgia M54.2    RASHMI (acute kidney injury) ([facility] [address] N17.9    High anion gap metabolic acidosis E45.6       Measurements:     07/26/18 1033   Wound 07/25/18 Other (Comment) Heel Left;Plantar   Date First Assessed/Time First Assessed: 07/25/18 2000   Wound Type: (c) Other (Comment)  Wound Event: Pressure  Location: Heel  Wound Location Orientation: Left;Plantar  Pre-existing: Yes  Photo Taken: No   Wound Type Wound   Wound Deep tissue/Injury   Dressing/Treatment Barrier Film   Wound Length (cm) 5 cm   Wound Width (cm) 2.5 cm   Calculated Wound Size (cm^2) (l*w) 12.5 cm^2   Wound Assessment Dry  (maroon/purple)   Wound 07/25/18 Other (Comment) Buttocks Right   Date First Assessed/Time First Assessed: 07/25/18 2000   Wound Type: (c) Other (Comment)  Wound Event: Pressure  Location: Buttocks  Wound Location Orientation: Right  Pre-existing: Yes   Dressing/Treatment Moisture barrier  (zinc oxide)   Wound 07/25/18 Heel Left;Plantar;Lateral blood filled blister   Date First Assessed: 07/25/18   Wound Event: Pressure  Location: Heel  Wound Location Orientation: Left;Plantar;Lateral  Wound Description (Comments): blood filled blister Pre-existing: Yes  Photo Taken: No   Wound Type Wound   Wound Deep tissue/Injury   Dressing/Treatment Barrier Film  (offload with pilloe support)   Wound Length (cm) 4 cm   Wound Width (cm) 2.5 cm   Calculated Wound Size (cm^2) (l*w) 10 cm^2   Wound Assessment Intact  (maroon/purple blood filled blister)         Response to treatment:  With complaints of pain. Pain Assessment:  Reports extreme pain with any movement. Plan   Plan of Care: Wound 07/25/18 Heel Left;Plantar;Lateral blood filled blister-Dressing/Treatment: (P) Barrier Film (offload with pilloe support)  Wound 07/25/18 Other (Comment) Heel Left;Plantar-Dressing/Treatment: (P) Barrier Film  Wound 07/25/18 Other (Comment) Buttocks Right-Dressing/Treatment: (P) Moisture barrier (zinc oxide)     Due to extreme pain with any movement and repositioning, recommend use of fluid immersion simulation surface. Turn every 2 hours    Float heels of of bed with pillows under calves  Need to offload heels; Add a sofcare boot if not able to adequately position off of mattress with pillow support. Apply protective wipe to heels BID. Use Hover danial / Maxi Air to reposition patient and for lateral transfers to minimize pain andpotential for shear injury. Foam sacrum dressing to sacrococcygeal area. Peel back dressing, inspect skin beneath, re-secure. Change every 72 hours and prn wrinkles, soilage. Discontinue Sacral dressing if repeatedly soiled by incontinence. Routine incontinence care with incontinence barrier cloths and zinc oxide cream. Apply zinc oxide cream BID and prn incontinence. Moisture wicking under pads; avoid briefs and cloth underpads.        Specialty Bed Required : Yes   [] Low Air Loss   [] Pressure Redistribution  [x] Fluid Immersion  [] Bariatric  [] Total Pressure Relief  [] Other:     Current Diet: DIET RENAL; Carb Control: 4 carb choices (60 gms)/meal    Patient/Caregiver Teaching:  Level of patient/caregiver understanding

## 2018-07-26 NOTE — PROGRESS NOTES
248 lb (112.5 kg)  · % Weight Change: 20.2%,   x past 7 months per EHR  · Ideal Body Wt: 140 lb (63.5 kg), % Ideal Body 141%  · BMI Classification: BMI 30.0 - 34.9 Obese Class I  · Comparative Standards (Estimated Nutrition Needs):  · Estimated Daily Total Kcal: 7529-3419 kcal  · Estimated Daily Protein (g): 60-85 gm protein    Estimated Intake vs Estimated Needs: Intake Less Than Needs    Nutrition Risk Level: High    Nutrition Interventions:   Modify current diet, Start ONS  Continued Inpatient Monitoring, Education Not Indicated    Nutrition Evaluation:   · Evaluation: Goals set   · Goals: Oral intakes to meet at least 50% of estimated nutrition needs. · Monitoring: Meal Intake, Supplement Intake, Diet Tolerance, Skin Integrity, Wound Healing, Fluid Balance, Weight, Comparative Standards, Pertinent Labs    See Adult Nutrition Doc Flowsheet for more detail.      Electronically signed by Jayne Canseco RD, LD on 7/26/18 at 3:51 PM    Contact Number: [phone]

## 2018-07-26 NOTE — ED PROVIDER NOTES
[facility]  Emergency Department  Emergency Medicine Resident Sign-out     Care of [place] was assumed from Dr. Carlos Harrington and is being seen for Hematuria (Sent by her oncologist for dysuria with hematuria and lower back pain. Has h/o renal carcinoma. Pt reports generalized weakness/fatigue. Denies N/V/D/C/fever/chills.)  . The patient's initial evaluation and plan have been discussed with the prior provider who initially evaluated the patient.      EMERGENCY DEPARTMENT COURSE / MEDICAL DECISION MAKING:       MEDICATIONS GIVEN:  Orders Placed This Encounter   Medications    morphine (PF) injection 4 mg    ondansetron (ZOFRAN) injection 4 mg    0.9 % sodium chloride bolus    sodium bicarbonate 75 mEq in sodium chloride 0.45 % 1,000 mL infusion    acetaminophen (TYLENOL) tablet 1,000 mg    amitriptyline (ELAVIL) tablet 75 mg    atorvastatin (LIPITOR) tablet 20 mg    cyclobenzaprine (FLEXERIL) tablet 5 mg    docusate sodium (COLACE) capsule 200 mg    DISCONTD: insulin lispro (HUMALOG) injection vial 1 Units    pantoprazole (PROTONIX) tablet 40 mg    ondansetron (ZOFRAN) tablet 4 mg    oxyCODONE (OXYCONTIN) extended release tablet 10 mg    oxyCODONE (ROXICODONE) immediate release tablet 10 mg    DISCONTD: PAZOPanib HCl (VOTRIENT) chemo tablet 800 mg    prochlorperazine (COMPAZINE) tablet 5 mg    sodium chloride flush 0.9 % injection 10 mL    sodium chloride flush 0.9 % injection 10 mL    DISCONTD: docusate sodium (COLACE) capsule 100 mg    ondansetron (ZOFRAN) injection 4 mg    sodium polystyrene (KAYEXALATE) 15 GM/60ML suspension 15 g    sodium polystyrene (KAYEXALATE) 15 GM/60ML suspension 30 g    insulin lispro (HUMALOG) injection vial 0-12 Units    insulin lispro (HUMALOG) injection vial 0-6 Units    glucose (GLUTOSE) 40 % oral gel 15 g    dextrose 50 % solution 12.5 g    glucagon (rDNA) injection 1 mg    dextrose 5 % solution    0.9 % sodium chloride bolus LABS / RADIOLOGY:     Labs Reviewed   URINALYSIS WITH MICROSCOPIC - Abnormal; Notable for the following:        Result Value    Turbidity UA CLOUDY (*)     Protein, UA 2+ (*)     Leukocyte Esterase, Urine MODERATE (*)     Bacteria, UA FEW (*)     All other components within normal limits   CBC WITH AUTO DIFFERENTIAL - Abnormal; Notable for the following:     RBC 3.01 (*)     Hemoglobin 8.2 (*)     Hematocrit 28.4 (*)     RDW 19.1 (*)     NRBC Automated 0.4 (*)     Seg Neutrophils 74 (*)     Lymphocytes 15 (*)     Immature Granulocytes 1 (*)     Absolute Lymph # 0.83 (*)     All other components within normal limits   BASIC METABOLIC PANEL - Abnormal; Notable for the following:     Glucose 55 (*)      (*)     CREATININE 9.62 (*)     CO2 11 (*)     Anion Gap 24 (*)     GFR Non- 4 (*)     GFR  5 (*)     All other components within normal limits   URINALYSIS WITH MICROSCOPIC - Abnormal; Notable for the following:     Turbidity UA CLOUDY (*)     Protein, UA 2+ (*)     Leukocyte Esterase, Urine SMALL (*)     Bacteria, UA FEW (*)     All other components within normal limits   URINE CULTURE CLEAN CATCH   PROTEIN, URINE, RANDOM   SODIUM, URINE, RANDOM   CREATININE, RANDOM URINE   CBC WITH AUTO DIFFERENTIAL   COMPREHENSIVE METABOLIC PANEL W/ REFLEX TO MG FOR LOW K   MAGNESIUM   CBC   PROTIME-INR   POC GLUCOSE FINGERSTICK   POC GLUCOSE FINGERSTICK   POCT GLUCOSE       Xr Cervical Spine (2-3 Views)    Result Date: 7/12/2018  EXAMINATION: TWO XRAY VIEWS OF THE CERVICAL SPINE 7/12/2018 3:29 pm COMPARISON: None. HISTORY: ORDERING SYSTEM PROVIDED HISTORY: Cervicalgia FINDINGS: Radiograph is taken with cervical spine extension. 7 typical cervical vertebra present maintain normal height and alignment. Bony spinal canal and paravertebral soft tissues appear unremarkable. The disc spaces and posterior elements appear well maintained throughout.  The uncovertebral joints appear normally aligned on AP view. The atlantoaxial articulation appears normally aligned. No discrete odontoid fracture is evident. No acute osseous abnormality of the cervical spine. If pain persists or worsens, then additional evaluation with CT or MRI may be indicated. Note that CT cervical spine is the study of choice for evaluation of acute trauma. Ct Lumbar Spine Wo Contrast    Result Date: 7/25/2018  EXAMINATION: CT OF THE LUMBAR SPINE WITHOUT CONTRAST  7/25/2018 TECHNIQUE: CT of the lumbar spine was performed without the administration of intravenous contrast. Multiplanar reformatted images are provided for review. Dose modulation, iterative reconstruction, and/or weight based adjustment of the mA/kV was utilized to reduce the radiation dose to as low as reasonably achievable. COMPARISON: MRI lumbar spine from June 9, 2018 HISTORY: ORDERING SYSTEM PROVIDED HISTORY: r/o worsening fracture or increasing metastatic mass TECHNOLOGIST PROVIDED HISTORY: Reason for exam:->r/o worsening fracture or increasing metastatic mass FINDINGS: BONES/ALIGNMENT: Posterior transpedicular fusion hardware involving T11, T12, L2, and L3 is demonstrated. A pathologic compression fracture of L1 is again demonstrated with some retropulsion of fragments by approximately 0.4 cm. There is a dominant lytic lesion in the right sacral body encroaching upon the right S1 neural foramen. This metastatic lesion measures approximately 3.1 x 2.5 cm in cross-section. Its inferior extent is not well demonstrated. In the left iliac wing, a metastatic lesion is also identified exerting some mass effect upon the left sacroiliac joint. The lesion measures approximately 3.2 x 3.0 cm and is partially imaged on the previous lumbar spine MRI. No additional lesions are identified. DEGENERATIVE CHANGES: Degenerative disc disease throughout the lumbar spine involving the discs and facets.  SOFT TISSUES/RETROPERITONEUM: A dominant right renal mass is partially imaged. There is perinephric stranding and perinephric nodularity likely metastatic disease. Atherosclerotic changes of the abdominal aorta are present. Low-density along the medial dome of the liver (axial image 1) suspicious for metastatic involvement of the liver. 1. Pathologic compression fracture of L1 is again identified with some retropulsion of the posterior aspect of the L1 vertebral body into the spinal canal without significant spinal canal narrowing. 2. Metastatic lesions involving the left iliac wing, right S1 sacral body effacing the right S1 neural foramen, and the L1 vertebral body. No new osseous lesions are identified. 3. Heterogeneously enlarged mass largely replacing the right kidney with nodularity in the right renal fossa suspicious for metastatic disease. Mri Lumbar Spine Wo Contrast    Result Date: 7/25/2018  EXAMINATION: MRI OF THE LUMBAR SPINE WITHOUT CONTRAST, 7/25/2018 5:09 pm TECHNIQUE: Multiplanar multisequence MRI of the lumbar spine was performed without the administration of intravenous contrast. COMPARISON: June 9, 2018 HISTORY: ORDERING SYSTEM PROVIDED HISTORY: pathologic fx in lumbar spine, difficulty urinating FINDINGS: BONES/ALIGNMENT: Thoracolumbar fusion is again seen. Hardware causes artifact limiting adjacent evaluation. Pathologic L1 compression fracture is again seen with retropulsion of fracture fragments. No acute fracture. Right sacral metastatic lesion is seen. SPINAL CORD: Retropulsion of fracture fragments is causing impingement upon the conus. Appearance is likely similar to the prior study. SOFT TISSUES: Complex right renal mass. L1-L2: L1 compression fracture with retropulsion of fracture fragments causes severe central canal stenosis and impingement on the distal aspect of the conus and the thecal sac. L2-L3: Mild broad-based disc bulge. No significant central canal or foraminal stenosis.  L3-L4: Broad-based disc bulge, facet degenerative changes, and hypertrophy of the ligamentum flavum combine to create moderate central canal stenosis. Changes combine to create moderate bilateral foraminal stenosis. L4-L5: Left paracentral disc bulge with extension into the left foramina and facet degenerative changes combine to create mild central canal stenosis. Changes combine to create moderate-severe left foraminal stenosis and moderate right foraminal stenosis. L5-S1: Paracentral disc bulge with left foraminal component causes minimal impingement upon the ventral aspect of the thecal sac. Facet degenerative changes are seen. Changes combine to create mild left foraminal stenosis. Pathologic L1 compression fracture is again seen with retropulsion of fracture fragments causing impingement upon the distal aspect of the conus and the proximal thecal sac. Appearance is overall similar to the prior study. RECENT VITALS:     Temp: 98.5 °F (36.9 °C),  Pulse: 83, Resp: 14, BP: (!) 108/36, SpO2: 99 %    This patient is a 66 y.o. Female with worsening of hematuria. Patient has a history of renal cell carcinoma and is currently on chemotherapy. Patient's also complaining of worsening lower back pain and increased difficulty with walking. Workup in progress. Plan is to follow-up with neurosurgery recommendations as well as MRI of lumbar spine. Patient's hemodynamically stable. OUTSTANDING TASKS / RECOMMENDATIONS:    1. Follow up with neurosurgery recommendations  2. Follow up with MRI study     FINAL IMPRESSION:     1. Hematuria, unspecified type    2. Renal cell cancer, right ([facility] [address]    3. Acute renal failure, unspecified acute renal failure type ([address]    4. Pathologic lumbar vertebral fracture, sequela    5. Metabolic acidosis    6. Hypoglycemia    7.  Anemia, unspecified type        DISPOSITION:         DISPOSITION:  []  Discharge   []  Transfer -    [x]  Admission -  [facility]   []  Against Medical Advice   []  Eloped   FOLLOW-UP: Terrence Spence MD  [address] [place] [address]  [phone]           DISCHARGE MEDICATIONS: Current Discharge Medication List              Ayanna Martin MD  Emergency Medicine Resident  Carmel Martin MD  Resident  07/25/18 3091

## 2018-07-26 NOTE — H&P
drip given her acidosis  As well as CT lumbar spine showed her known L1 pathologic fracture as well as further metastatic lesion invading the iliac sac and sacral bones, neurosurgery evaluated the patient as well. Patient was admitted for further management      Past Medical History:     Past Medical History:   Diagnosis Date    Cancer [facility])     kidney    Chronic kidney disease     Depression     Diabetes mellitus ([facility] [address]     type 2    GERD (gastroesophageal reflux disease)     HA (headache)     HBP (high blood pressure)     History of blood transfusion     no reaction    Hyperlipidemia     Hyperplastic polyp of intestine     Hypothyroid 11/26/2016    Hypothyroidism     Left knee pain     Non-smoker     OA (osteoarthritis)     bilat knees    Tubular adenoma         Past Surgical History:     Past Surgical History:   Procedure Laterality Date    BACK SURGERY N/A 01/23/2018    Post T11-L2 Fusion    COLONOSCOPY  08/16/2016    hyperplastic polyp and tubular adenoma     HYSTERECTOMY      NERVE BLOCK  07/13/2018    aleta trigger point neck #1    OH LUMBAR SPINE FUSN,POST INTRBDY N/A 1/23/2018    T11-L3 POSTERIOR FIXATION AND FUSION, SPINE ABBY C-ARM, O-ARM WITH STEALTH,  [facility]- [id number] performed by Laurita Augustin DO at [address] [place] [address]          Medications Prior to Admission:     Prior to Admission medications    Medication Sig Start Date End Date Taking? Authorizing Provider   lisinopril-hydrochlorothiazide (PRINZIDE;ZESTORETIC) 20-12.5 MG per tablet Take 1 tablet by mouth 2 times daily 7/19/18  Yes aMgi Booth MD   cyclobenzaprine (FLEXERIL) 5 MG tablet TAKE 1 TABLET BY MOUTH THREE TIMES A DAY AS NEEDED FOR MUSCLE SPASMS 7/17/18  Yes Vern Williamson MD   oxyCODONE (OXYCONTIN) 10 MG extended release tablet Take 1 tablet by mouth 2 times daily for 30 days. Intended supply: 30 days.  7/22/18 8/21/18 Yes ABDIEL Phipps - CNP   oxyCODONE HCl (OXY-IR) 10 MG immediate release tablet Take 1 tablet by mouth every 6 hours as needed for Pain for up to 30 days. . 7/17/18 8/16/18 Yes ABDIEL Ramesh - CNP   Insulin Disposable Pump (V-GO 20) KIT  4/26/18  Yes Historical Provider, MD   HUMALOG 100 UNIT/ML injection vial With insulin pump 5/16/18  Yes Historical Provider, MD CALDWELL ULTRAFINE III SHORT PEN 31G X 8 MM MISC  4/13/18  Yes Historical Provider, MD   atorvastatin (LIPITOR) 20 MG tablet TAKE 1 TABLET BY MOUTH ONE TIME A DAY 4/11/18  Yes Nesha Mackey MD   ondansetron (ZOFRAN) 4 MG tablet Take 1 tablet by mouth daily as needed for Nausea or Vomiting 4/10/18  Yes Victoriano Metz MD   enoxaparin (LOVENOX) 100 MG/ML injection Inject 0.9 mLs into the skin daily 4/9/18  Yes Nesha Mackey MD   omeprazole (PRILOSEC) 40 MG delayed release capsule Take 1 capsule by mouth daily 3/28/18  Yes Nesha Mackey MD   amitriptyline (ELAVIL) 75 MG tablet Take 1 tablet by mouth nightly 3/28/18  Yes Nesha Mackey MD   prochlorperazine (COMPAZINE) 5 MG tablet Take 5 mg by mouth every 8 hours as needed  2/8/18  Yes Historical Provider, MD   Blood Glucose Monitoring Suppl (TRUE METRIX METER) w/Device KIT  2/2/18  Yes Historical Provider, MD   Lancets MISC Patient testing 3 times daily 2/2/18  Yes Nesha Mackey MD   glucose blood VI test strips (ASCENSIA AUTODISC VI;ONE TOUCH ULTRA TEST VI) strip Use with associated glucose meter.  Patient testing three times daily 2/2/18  Yes Nesha Mackey MD   docusate sodium (COLACE) 100 MG capsule Take 2 capsules by mouth 2 times daily 1/31/18  Yes Nesha Mackey MD   Insulin Pen Needle 32G X 4 MM MISC 2 each by Does not apply route 2 times daily   Yes Historical Provider, MD   amLODIPine (NORVASC) 10 MG tablet Take 1 tablet by mouth daily 4/13/17  Yes Lavonne Gerber MD   acetaminophen (TYLENOL) 500 MG tablet Take 1,000 mg by mouth 3 times daily   Yes Historical Provider, MD   gabapentin (NEURONTIN) 100 MG capsule Take 1 capsule by mouth 3 times daily for 30 days.. 6/10/18 7/12/18  Lady Juan Daniel MD   PAZOPanib HCl (VOTRIENT) 200 MG chemo tablet Take 4 tablets by mouth daily 18   Cha Coronado MD        Allergies:     Patient has no known allergies. Social History:     Tobacco:    reports that she has never smoked. She has never used smokeless tobacco.  Alcohol:      reports that she does not drink alcohol. Drug Use:  reports that she does not use drugs. Family History:     Family History   Problem Relation Age of Onset    Heart Disease Mother     Diabetes Mother     Cancer Father         esophageal cancer    Cancer Maternal Grandfather         colon       Review of Systems:     Positive and Negative as described in HPI. CONSTITUTIONAL:  Positive for fatigue , weakness and weight loss  HEENT:  negative for vision, hearing changes, runny nose, throat pain  RESPIRATORY:  negative for shortness of breath, cough, congestion, wheezing.   CARDIOVASCULAR:  negative for chest pain, palpitations  GASTROINTESTINAL:  negative for nausea, vomiting, diarrhea, constipation, change in bowel habits, abdominal pain   GENITOURINARY:  negative for difficulty of urination, burning with urination, frequency   INTEGUMENT:  negative for rash, skin lesions, easy bruising   HEMATOLOGIC/LYMPHATIC:  negative for swelling/edema   ALLERGIC/IMMUNOLOGIC:  negative for urticaria , itching  ENDOCRINE:  negative increase in drinking, increase in urination, hot or cold intolerance  MUSCULOSKELETAL: Severe back pain [chronic]  NEUROLOGICAL:  Increasing lower extremity weakness and balance problem  BEHAVIOR/PSYCH:  negative for depression, anxiety    Physical Exam:   BP (!) 128/46   Pulse 82   Temp 98.6 °F (37 °C) (Oral)   Resp 9   Ht 5' 8\" (1.727 m)   Wt 198 lb 6.6 oz (90 kg)   SpO2 98%   BMI 30.17 kg/m²   Temp (24hrs), Av.4 °F (36.9 °C), Min:98 °F (36.7 °C), Max:98.9 °F (37.2 °C)    Recent Labs      18   0303  18   0713  18   1150  18   1512   POCGLU 80  102  148*  165*       Intake/Output Summary (Last 24 hours) at 07/26/18 1717  Last data filed at 07/26/18 1418   Gross per 24 hour   Intake             3608 ml   Output              500 ml   Net             3108 ml       General Appearance:  alert, appears pale and chronically sick and in no acute distress  Mental status: oriented to person, place, and time with normal affect  Head:  normocephalic, atraumatic. Eye: no icterus, redness, pupils equal and reactive, extraocular eye movements intact, conjunctiva clear  Ear: normal external ear, no discharge, hearing intact  Nose:  no drainage noted  Mouth: mucous membranes moist  Neck: supple, no carotid bruits, thyroid not palpable  Lungs: Bilateral equal air entry, clear to ausculation, no wheezing, rales or rhonchi, normal effort  Cardiovascular: normal rate, regular rhythm, no murmur, gallop, rub.   Abdomen: Soft, nontender, nondistended, normal bowel sounds, no hepatomegaly or splenomegaly  Neurologic: intact strength but very weak to raise them or resist, cranial nerves II through XII grossly intact  Skin: No gross lesions, rashes, bruising or bleeding on exposed skin area  Spine: TTP the lower lumbar spine  Extremities:  peripheral pulses palpable, no pedal edema or calf pain with palpation  Psych:  depressed    Investigations:      Laboratory Testing:  Recent Results (from the past 24 hour(s))   Urinalysis with Microscopic    Collection Time: 07/25/18  5:53 PM   Result Value Ref Range    Color, UA YELLOW YEL    Turbidity UA CLOUDY (A) CLEAR    Glucose, Ur NEGATIVE NEG    Bilirubin Urine NEGATIVE NEG    Ketones, Urine NEGATIVE NEG    Specific Gravity, UA 1.015 1.005 - 1.030    Urine Hgb NEGATIVE NEG    pH, UA 5.0 5.0 - 8.0    Protein, UA 2+ (A) NEG    Urobilinogen, Urine Normal NORM    Nitrite, Urine NEGATIVE NEG    Leukocyte Esterase, Urine MODERATE (A) NEG    -          WBC, UA 5 TO 10 0 - 5 /HPF    RBC, UA 2 TO 5 0 - 4 /HPF    Casts UA 2 TO 5 HYALINE 0 - 8 /LPF    Crystals UA NOT REPORTED NONE /HPF    Epithelial Cells UA 2 TO 5 0 - 5 /HPF    Renal Epithelial, Urine NOT REPORTED 0 /HPF    Bacteria, UA FEW (A) NONE    Mucus, UA NOT REPORTED NONE    Trichomonas, UA NOT REPORTED NONE    Amorphous, UA NOT REPORTED NONE    Other Observations UA NOT REPORTED NREQ    Yeast, UA NOT REPORTED NONE   URINE CULTURE CLEAN CATCH    Collection Time: 07/25/18  5:53 PM   Result Value Ref Range    Specimen Description . URINE     Special Requests NOT REPORTED     Culture NO SIGNIFICANT GROWTH     Status FINAL 07/26/2018    POC Glucose Fingerstick    Collection Time: 07/25/18  6:27 PM   Result Value Ref Range    POC Glucose 75 65 - 105 mg/dL   POC Glucose Fingerstick    Collection Time: 07/25/18  7:15 PM   Result Value Ref Range    POC Glucose 87 65 - 105 mg/dL   Protein, urine, random    Collection Time: 07/25/18  8:35 PM   Result Value Ref Range    Total Protein, Urine 68 mg/dL   Sodium, urine, random    Collection Time: 07/25/18  8:35 PM   Result Value Ref Range    Sodium,Ur 44 mmol/L   Creatinine, Random Urine    Collection Time: 07/25/18  8:35 PM   Result Value Ref Range    Creatinine, Ur 118.0 28.0 - 217.0 mg/dL   Urinalysis with microscopic    Collection Time: 07/25/18  8:35 PM   Result Value Ref Range    Color, UA YELLOW YEL    Turbidity UA CLOUDY (A) CLEAR    Glucose, Ur NEGATIVE NEG    Bilirubin Urine NEGATIVE NEG    Ketones, Urine NEGATIVE NEG    Specific Gravity, UA 1.014 1.005 - 1.030    Urine Hgb NEGATIVE NEG    pH, UA 5.0 5.0 - 8.0    Protein, UA 2+ (A) NEG    Urobilinogen, Urine Normal NORM    Nitrite, Urine NEGATIVE NEG    Leukocyte Esterase, Urine SMALL (A) NEG    -          WBC, UA 10 TO 20 0 - 5 /HPF    RBC, UA 5 TO 10 0 - 4 /HPF    Casts UA 2 TO 5 HYALINE 0 - 8 /LPF    Crystals UA NOT REPORTED NONE /HPF    Epithelial Cells UA 5 TO 10 0 - 5 /HPF    Renal Epithelial, Urine NOT REPORTED 0 /HPF    Bacteria, UA FEW (A) NONE    Mucus, UA NOT REPORTED NONE    Trichomonas, UA modulation, iterative reconstruction, and/or weight based adjustment of the mA/kV was utilized to reduce the radiation dose to as low as reasonably achievable. COMPARISON: MRI lumbar spine from June 9, 2018 HISTORY: ORDERING SYSTEM PROVIDED HISTORY: r/o worsening fracture or increasing metastatic mass TECHNOLOGIST PROVIDED HISTORY: Reason for exam:->r/o worsening fracture or increasing metastatic mass FINDINGS: BONES/ALIGNMENT: Posterior transpedicular fusion hardware involving T11, T12, L2, and L3 is demonstrated. A pathologic compression fracture of L1 is again demonstrated with some retropulsion of fragments by approximately 0.4 cm. There is a dominant lytic lesion in the right sacral body encroaching upon the right S1 neural foramen. This metastatic lesion measures approximately 3.1 x 2.5 cm in cross-section. Its inferior extent is not well demonstrated. In the left iliac wing, a metastatic lesion is also identified exerting some mass effect upon the left sacroiliac joint. The lesion measures approximately 3.2 x 3.0 cm and is partially imaged on the previous lumbar spine MRI. No additional lesions are identified. DEGENERATIVE CHANGES: Degenerative disc disease throughout the lumbar spine involving the discs and facets. SOFT TISSUES/RETROPERITONEUM: A dominant right renal mass is partially imaged. There is perinephric stranding and perinephric nodularity likely metastatic disease. Atherosclerotic changes of the abdominal aorta are present. Low-density along the medial dome of the liver (axial image 1) suspicious for metastatic involvement of the liver.      1. Pathologic compression fracture of L1 is again identified with some retropulsion of the posterior aspect of the L1 vertebral body into the spinal canal without significant spinal canal narrowing. 2. Metastatic lesions involving the left iliac wing, right S1 sacral body effacing the right S1 neural foramen, and the L1 vertebral body.   No new osseous

## 2018-07-27 PROBLEM — E87.20 METABOLIC ACIDOSIS: Status: ACTIVE | Noted: 2018-01-01

## 2018-07-27 NOTE — PLAN OF CARE
Problem: Pain:  Goal: Pain level will decrease  Pain level will decrease   Outcome: Ongoing  Pt able to communicate pain as needed, uses call light appropriately. Pt satisfied with pain interventions. Problem: Falls - Risk of:  Goal: Will remain free from falls  Will remain free from falls   Outcome: Ongoing  Pt remains free of falls at this time. Bed locked in lowest position, siderails x2, call light in reach. Non-skid footwear applied. Encouraged pt to call for assistance as needed for safety. Falling star posted outside of room. Will continue to monitor. Problem: Risk for Impaired Skin Integrity  Goal: Tissue integrity - skin and mucous membranes  Structural intactness and normal physiological function of skin and  mucous membranes. Outcome: Ongoing  Pts skin maintains structural intactness and physiologic function. Pt able to reposition independently in bed/ Assisting pt with turns every 2 hours and heels elevated off bed. Linens remain clean and dry. Will continue to monitor.

## 2018-07-27 NOTE — PROGRESS NOTES
Neurosurgery Resident    Daily Progress Note     7/27/2018  9:42 AM    No acute events overnight. No new complaints. Patient still having some back pain, left lower extremity pain. Vitals:    07/27/18 0356 07/27/18 0613 07/27/18 0745 07/27/18 0800   BP: (!) 127/52  (!) 100/58    Pulse: 84  83 81   Resp: 9  11    Temp: 98 °F (36.7 °C)  98.3 °F (36.8 °C)    TempSrc: Oral  Oral    SpO2: 97%  97%    Weight:  185 lb 3 oz (84 kg)     Height:           Exam  Gen: Resting comfortably, no acute distress  CV: RRR  Resp: CTAB  GI: Abdomen soft, non-tender  Skin: Cap refill< 2 seconds  Extremities: Left lower extremity swollen when compared with the right, contracture of the left lower extremity. Peripheral pulses 2+  Neuro:    A&Ox3   PERRL, EOMI   CNII-XII intact   Normal speech and mentation  Left lower extremity weakness with dorsiflexion, motor exam limited in lower extremity secondary to pain but otherwise symmetrical.  Sensation intact throughout      Lab Results   Component Value Date    WBC 4.5 07/27/2018    HGB 7.1 (L) 07/27/2018    HCT 23.5 (L) 07/27/2018     07/27/2018    CHOL 107 08/21/2017    TRIG 131 08/21/2017    HDL 26 (L) 08/21/2017    ALT <5 (L) 07/26/2018    AST 7 07/26/2018     07/27/2018    K 4.3 07/27/2018    CL 97 (L) 07/27/2018    CREATININE 9.06 (HH) 07/27/2018     (HH) 07/27/2018    CO2 17 (L) 07/27/2018    TSH 11.10 (H) 06/29/2018    INR 1.0 07/27/2018    LABA1C 6.2 (H) 01/31/2018    LABMICR 232 (H) 08/21/2017       66 y.o. female who presents with acute on chronic urinary retention. NS was consulted for evaluation given CT findings. Patient has a known L1 fracture with minimal retropulsion and no significant canal stenosis. MRI lumbar shows L1 fracture with impingement of the distal aspect of the conus and proximal thecal sac.     Labs and imaging were reviewed with Dr. Teresa Mclain     - PT/OT evaluation with brace on   - No neurosurgical interventions planned for now: Recommend physical therapy with  back brace   - Neuro checks per floor protocol  - Additional recommendations may follow      Please contact neurosurgery with any changes in patients neurologic status      CARMEN Hayes  Neurosurgery/Neuro Critical Care Service  Pager [phone]

## 2018-07-27 NOTE — PROGRESS NOTES
Tx completed. Pt tolerated treatment well. Pt c/o leg pain upon arrival (see MAR for intervention). All blood returned. Arterial and Venous ports flushed, heparin instilled and capped. Pt stable and transported back to room. TFR 1000 LP 26.6 .  Pre weight 86.6 Post weight 86.1

## 2018-07-27 NOTE — PROGRESS NOTES
[place], [place]  Urology Progress Note    Subjective: Still with oliguria per nurse, 375ml total overnight, nonbloody. No other overnight events. Denies f/c/n/v/cp/sob. Patient Vitals for the past 24 hrs:   BP Temp Temp src Pulse Resp SpO2 Height   07/27/18 0356 (!) 127/52 98 °F (36.7 °C) Oral 84 9 97 % -   07/26/18 2330 (!) 129/48 97.9 °F (36.6 °C) Oral 85 12 - -   07/26/18 2000 (!) 121/50 98.9 °F (37.2 °C) Oral 90 9 96 % -   07/26/18 1528 (!) 128/46 98.6 °F (37 °C) Oral 82 9 98 % -   07/26/18 1224 - - - - - - 5' 8\" (1.727 m)   07/26/18 1130 (!) 109/42 - - 83 12 97 % -   07/26/18 0800 (!) 113/35 98.2 °F (36.8 °C) Oral 81 19 97 % -       Intake/Output Summary (Last 24 hours) at 07/27/18 0608  Last data filed at 07/26/18 1808   Gross per 24 hour   Intake              786 ml   Output              325 ml   Net              461 ml       Recent Labs      07/25/18   1407  07/26/18   0613   WBC  5.7  4.1   HGB  8.2*  7.3*   HCT  28.4*  26.5*   MCV  94.4  97.4   PLT  316  235     Recent Labs      07/25/18   1407  07/26/18   0613  07/26/18   1840   NA  137  138  138   K  4.6  4.6  4.6   CL  102  106  104   CO2  11*  10*  13*   BUN  101*  99*  98*   CREATININE  9.62*  9.34*  9.09*       Recent Labs      07/25/18   2035   COLORU  YELLOW   PHUR  5.0   WBCUA  10 TO 20   RBCUA  5 TO 10   MUCUS  NOT REPORTED   TRICHOMONAS  NOT REPORTED   YEAST  NOT REPORTED   BACTERIA  FEW*   SPECGRAV  1.014   LEUKOCYTESUR  SMALL*   UROBILINOGEN  Normal   BILIRUBINUR  NEGATIVE       Additional Lab/culture results:    Physical Exam:   NAD, A/Ox3  RRR, palpable radial pulses  Equal chest rise, normal inspiratory effort  Soft, NT/ND. No peritoneal signs. No flank pain.   No bladder distension/tenderness noted  Navarrete catheter with clear yellow urine  Warm extremities, no calf tenderness, LE edema      Interval Imaging Findings:    Impression:    Acute renal failure on CKD  Metastatic RCC  Gross hematuria - resolved  Flank pain - improving    Plan:   Supportive care  Renal function per nephrology - recommendations appreciated  Maintain catheter for accurate I/Os  Heme Onc recs appreciated  Will discuss further management with attending      Radames Solano  6:08 AM 7/27/2018

## 2018-07-27 NOTE — ONCOLOGY
An inpatient consult was requested by Dr. Josef Faith. The patient is well known to radiation oncology. She has a diagnosis of a renal cell carcinoma. She received a prior course of radiation therapy to T10 through L4 completed in February 2018. The patient was admitted with lower back pain and an MRI from 7/25/2018 of the L-spine did show a compression fracture at L1 with retropulsion onto the conus which was similar to prior studies. The patient was seen by neurosurgery and no intervention is planned. Assessment #1. renal cell carcinoma with bone metastases                         #2.  Prior radiation therapy to T10 through L4. #3.  Lower back pain with L1 lesion which is stable. Plan #1. To be seen by radiation oncology as an outpatient . #2 . Okay for discharge . #3. To assess whether further radiation therapy can be delivered.

## 2018-07-27 NOTE — PROGRESS NOTES
[facility]    Progress Note    7/27/2018    11:21 AM    Name:   Nara Espino  MRN:     [id number]     Acct:      [de-identified]   Room:   [address] [place] [address] Day:  2  Admit Date:  7/25/2018  1:16 PM    PCP:   Bernie Shaw MD  Code Status:  Full Code    Subjective:     C/C:   Chief Complaint   Patient presents with    Hematuria     Sent by her oncologist for dysuria with hematuria and lower back pain. Has h/o renal carcinoma. Pt reports generalized weakness/fatigue. Denies N/V/D/C/fever/chills. Interval History Status: not changed. Continued severe back pain with out much change  Also feels very tired and fatigued  No fevers or chills overnight  NPO for possible catheter placement today    Brief History:     The patient is a 66 y.o. Non-/non  female who presents with Hematuria (Sent by her oncologist for dysuria with hematuria and lower back pain. Has h/o renal carcinoma. Pt reports generalized weakness/fatigue. Denies N/V/D/C/fever/chills.)   and she is admitted to the hospital for the management of  Acute renal failure and gross hematuria. Patient was known right sided renal cell carcinoma with spread to The lumbar spine and peritoneum that was diagnosed in January 2018 and had previously went through lumbar spine fixation for her compression fracture as well as radiation, seem like she is currently not on chemo for the last 1 month, she will be scheduled for nephrectomy with another possible lumbar fixation next month according to her. She  was sent to ER from her oncologist office after noticing gross hematuria and increasing difficulty voiding.   In ER she was found to have elevated creatinine of 9.6 and BUN of 101, ( average  baseline creatinine  was 2- 2.8) and found to have high anion gap metabolic acidosis, potassium was fine, bladder scan showed 361 mL in ER and Navarrete catheter was placed given the retention in the history. Patient is well was assessed by nephrology who started her on bicarb drip given her acidosis  As well as CT lumbar spine showed her known L1 pathologic fracture as well as further metastatic lesion invading the iliac sac and sacral bones, neurosurgery evaluated the patient as well. Patient was admitted for further management    Review of Systems:     Constitutional:  negative for chills, fevers, sweats  Respiratory:  negative for cough, dyspnea on exertion, hemoptysis, shortness of breath, wheezing  Cardiovascular:  negative for chest pain, chest pressure/discomfort, lower extremity edema  Gastrointestinal:  negative for abdominal pain, constipation, diarrhea, nausea, vomiting  Neurological:  negative for dizziness, headache    Medications: Allergies:  No Known Allergies    Current Meds:   Scheduled Meds:    ceFAZolin  1 g Intravenous Once    acetaminophen  1,000 mg Oral TID    amitriptyline  75 mg Oral Nightly    atorvastatin  20 mg Oral Nightly    docusate sodium  200 mg Oral BID    pantoprazole  40 mg Oral QAM AC    oxyCODONE  10 mg Oral BID    sodium chloride flush  10 mL Intravenous 2 times per day    insulin lispro  0-12 Units Subcutaneous TID WC    insulin lispro  0-6 Units Subcutaneous Nightly     Continuous Infusions:    sodium chloride 50 mL/hr at 07/27/18 1107    dextrose       PRN Meds: sodium chloride, sodium chloride, cyclobenzaprine, ondansetron, oxyCODONE HCl, prochlorperazine, sodium chloride flush, ondansetron, glucose, dextrose, glucagon (rDNA), dextrose, acetaminophen    Data:     Past Medical History:   has a past medical history of Cancer ([facility] [address] Chronic kidney disease; Depression; Diabetes mellitus ([facility] [address] GERD (gastroesophageal reflux disease); HA (headache); HBP (high blood pressure); History of blood transfusion; Hyperlipidemia; Hyperplastic polyp of intestine; Hypothyroid; Hypothyroidism;  Left knee pain; Non-smoker; OA (osteoarthritis); and Tubular adenoma. Social History:   reports that she has never smoked. She has never used smokeless tobacco. She reports that she does not drink alcohol or use drugs. Family History:   Family History   Problem Relation Age of Onset    Heart Disease Mother     Diabetes Mother     Cancer Father         esophageal cancer    Cancer Maternal Grandfather         colon       Vitals:  BP (!) 100/58   Pulse 81   Temp 98.3 °F (36.8 °C) (Oral)   Resp 11   Ht 5' 8\" (1.727 m)   Wt 185 lb 3 oz (84 kg)   SpO2 97%   BMI 28.16 kg/m²   Temp (24hrs), Av.3 °F (36.8 °C), Min:97.9 °F (36.6 °C), Max:98.9 °F (37.2 °C)    Recent Labs      18   1150  18   1512  18   1948  18   0631   POCGLU  148*  165*  206*  202*       I/O (24Hr):     Intake/Output Summary (Last 24 hours) at 18 1121  Last data filed at 18 0405   Gross per 24 hour   Intake             2745 ml   Output              700 ml   Net             2045 ml       Labs:    [unfilled]    Lab Results   Component Value Date/Time    SPECIAL NOT REPORTED 2018 05:53 PM     Lab Results   Component Value Date/Time    CULTURE NO SIGNIFICANT GROWTH 2018 05:53 PM       Lab Results   Component Value Date    PHART 7.292 2018    TOR4DFT 39.0 2018    PO2ART 159.0 2018    YZI0PLO 18.3 2018    NBEA 7.2 2018    PBEA NOT REPORTED 2018    B3TNMFHL 99.5 2018    FIO2 40 2018     Physical Examination:        General appearance:  alert, cooperative and no distress  Mental Status:  oriented to person, place and time and normal affect  Lungs:  clear to auscultation bilaterally, normal effort  Heart:  regular rate and rhythm, no murmur  Abdomen:  soft, nontender, nondistended, normal bowel sounds, no masses, hepatomegaly, splenomegaly  Extremities:  no edema, redness, tenderness in the calves  Skin:  no gross lesions, rashes, induration    Assessment:        Primary Problem  Acute renal failure (ARF)

## 2018-07-27 NOTE — CARE COORDINATION
Dialysis social worker attempted to meet with pt while in dialysis this day to discuss possible OP dialysis placement. Pt crying out in pain. Dialysis RNs at bedside assisting pt. Informed by charge RN that pt appears to be a little confused at this time. Will follow up with pt at another time.

## 2018-07-27 NOTE — BRIEF OP NOTE
Brief Postoperative Note    Adron Bree  YOB: 1955  [id number]    Pre-operative Diagnosis: ESRD    Post-operative Diagnosis: Same    Procedure: Right IJ Tunneled dialysis catheter placement    Anesthesia: Local    Surgeons/Assistants: Steve    Estimated Blood Loss: less than 50     Complications: None    Specimens: Was Not Obtained      Electronically signed by Angelika Richter MD on 7/27/2018 at 5:09 PM

## 2018-07-27 NOTE — FLOWSHEET NOTE
SPIRITUAL CARE DEPARTMENT - [facility]  PROGRESS NOTE    Shift date: 7/27/2018  Shift day: Friday   Shift # 1    Room # 5920/2806-96   Name: Hannah Knight            Age: 66 y.o. Gender: female          [facility]: [address] of [facility]: unknown    Referral: Routine Visit    Admit Date & Time: 7/25/2018  1:16 PM    PATIENT/EVENT DESCRIPTION:  Hannah Knight is a 66 y.o. female struggling with cancer. Patient seems to be depressed and anxious about her condition and the possibility of death    Afraid to talk to the doctor about what is next and how long she may have     Seems to want to talk with family but facilitation may be needed      SPIRITUAL ASSESSMENT/INTERVENTION:  Patient spoke about blanket she is making for her grandchild soon to be born  Has two other grandchildren, one of which is five and seems dearest to her  Patient is afraid that she will not live long enough to see her grandchild grow up  Patient afraid to find out that she is dying but is convinced she is   encouraged patient to have that conversation with her doctor  Patient wants to talk with family but does not know where to begin or what to say it seems  Possible facilitation of conversation may be needed   prayed with the patient      SPIRITUAL CARE FOLLOW-UP PLAN:  [facility] will remain available to offer spiritual and emotional support as needed. Electronically signed by Adelina Rodrigues on 7/27/2018 at 12:29 PM.  Sudarshan Arredondo  [phone]       07/27/18 1227   Encounter Summary   Services provided to: Patient   Referral/Consult From: Physician   Support System Family members   Continue Visiting (7/27/2018)   Complexity of Encounter High   Length of Encounter 45 minutes   Spiritual Assessment Completed Yes   Spiritual/Congregation   Type Spiritual struggle   Assessment Anxious; Tearful;Helplessness; Anticipatory grief   Intervention Active listening;Explored feelings, thoughts, concerns;Explored coping resources;Prayer;Discussed death;Discussed afterlife; Discussed relationship with God   Outcome Expressed gratitude; Shared life review;Expressed feelings/needs/concerns     Electronically signed by Padmaja Gordon on 7/27/2018 at 12:30 PM

## 2018-07-27 NOTE — PROGRESS NOTES
Recent Labs      07/26/18   0613  07/26/18   1840  07/27/18   0648   NA  138  138  135   K  4.6  4.6  4.3   CL  106  104  97*   CO2  10*  13*  17*   BUN  99*  98*  101*   CREATININE  9.34*  9.09*  9.06*   GLUCOSE  104*  188*  215*     Hepatic:   Recent Labs      07/26/18   0613   AST  7   ALT  <5*   BILITOT  0.19*   ALKPHOS  63     INR:   Recent Labs      07/26/18   0613  07/27/18   0818   INR  1.0  1.0       Objective:   Vitals: BP (!) 115/58   Pulse 81   Temp 97.7 °F (36.5 °C) (Oral)   Resp 13   Ht 5' 8\" (1.727 m)   Wt 185 lb 3 oz (84 kg)   SpO2 97%   BMI 28.16 kg/m²   General appearance: alert and cooperative with exam  HEENT: Head: Normocephalic, no lesions, without obvious abnormality. 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 *      1. Metastatic Renal Cell Carcinoma to the bones, states pain is controlled  2. Already received palliative radiation and discussed with Dr. Rubbie Siemens unable to receive additional RXT to lumbar area  3. Dr. Uriel Scott planning to start immunotherapy outpatient, however, clinically needs to improve,discussed that goal is palliative  4.  Cr 9, will get hemodialysis today      Lanette Cortes MD

## 2018-07-27 NOTE — CONSULTS
caregiver understand diagnosis/treatment? not asked      Assessment        REVIEW OF SYSTEMS  Constitutional: no fever, no chills or weight loss  Eyes: no eye pain or blurred vision  ENT: no hearing loss, congestion, or difficulty swallowing   Respiratory: no wheezing, chest tightness, or shortness of breath   Cardiovascular: no chest pain or pressure, no palpitations, no diaphoresis   Gastrointestinal: no nausea, vomiting,abdominal pain, diarrhea or constipation, no melena   Genitourinary: + dysuria, frequency, + hematuria , or nocturia   Musculoskeletal: no myalgias or arthralgias, + back pain   Skin: no rashes or sores   Neurological: no focal weakness, numbness, tingling, or headache, no seizures    PHYSICAL ASSESSMENT:  Constitutional: Oriented to person, place, and time. Alert And in no acute distress   Head: Normocephalic and atraumatic. Eyes: EOM are normal. Pupils are equal, round   Neck: Normal range of motion. Neck supple. No tracheal deviation present. Cardiovascular: Normal rate and regular rhythm, S1, S2, no murmur   Pulmonary/Chest: Effort normal and breath sounds normal. No rales or wheezes. Abdomen: Soft. No tenderness, not distended, no ascites, no organomegaly   Musculoskeletal: Normal range of motion. No edema lower ext. Neurological: Alert and oriented ×3, following all commands   Skin: Normal turgor, no bleeding, no bruising     Palliative Performance Scale:  ___60%  Ambulation reduced; Significant disease; Can't do hobbies/housework; intake normal or reduced; occasional assist; LOC full/confusion  ___50%  Mainly sit/lie; Extensive disease; Can't do any work; Considerable assist; intake normal or reduced; LOC full/confusion  __x_40%  Mainly in bed; Extensive disease; Mainly assist; intake normal or reduced; LOC full/confusion   ___30%  Bed Bound; Extensive disease; Total care; intake reduced; LOCfull/confusion  ___20%  Bed Bound; Extensive disease;  Total care; intake minimal;

## 2018-07-27 NOTE — PROGRESS NOTES
lymphadenopathy . PULMONARY: lungs are clear to auscultation. No Wheezing, no ronchi . CADRDIOVASCULAR: S1 and S2 normal NO S3 and NO S4 . No rubs , no murmur. ABDOMEN: soft nontender, bowel sounds present, no organomegaly, no ascites.      EXTREMITIES: no cyanosis, clubbing or edema     CURRENT MEDICATIONS        0.9 % sodium chloride bolus PRN   0.9 % sodium chloride bolus PRN   ceFAZolin (ANCEF) 1 g in dextrose 5 % 50 mL IVPB (premix) Once   sodium bicarbonate 150 mEq in dextrose 5 % 1,000 mL infusion Continuous   acetaminophen (TYLENOL) tablet 1,000 mg TID   amitriptyline (ELAVIL) tablet 75 mg Nightly   atorvastatin (LIPITOR) tablet 20 mg Nightly   cyclobenzaprine (FLEXERIL) tablet 5 mg TID PRN   docusate sodium (COLACE) capsule 200 mg BID   pantoprazole (PROTONIX) tablet 40 mg QAM AC   ondansetron (ZOFRAN) tablet 4 mg Daily PRN   oxyCODONE (OXYCONTIN) extended release tablet 10 mg BID   oxyCODONE (ROXICODONE) immediate release tablet 10 mg Q6H PRN   prochlorperazine (COMPAZINE) tablet 5 mg Q8H PRN   sodium chloride flush 0.9 % injection 10 mL 2 times per day   sodium chloride flush 0.9 % injection 10 mL PRN   ondansetron (ZOFRAN) injection 4 mg Q6H PRN   insulin lispro (HUMALOG) injection vial 0-12 Units TID WC   insulin lispro (HUMALOG) injection vial 0-6 Units Nightly   glucose (GLUTOSE) 40 % oral gel 15 g PRN   dextrose 50 % solution 12.5 g PRN   glucagon (rDNA) injection 1 mg PRN   dextrose 5 % solution PRN   acetaminophen (TYLENOL) tablet 1,000 mg Q6H PRN         LABS      CBC: Recent Labs      07/25/18   1407  07/26/18   0613  07/27/18   0818   WBC  5.7  4.1  4.5   RBC  3.01*  2.72*  2.60*   HGB  8.2*  7.3*  7.1*   HCT  28.4*  26.5*  23.5*   MCV  94.4  97.4  90.4   MCH  27.2  26.8  27.3   MCHC  28.9  27.5*  30.2   RDW  19.1*  18.9*  19.1*   PLT  316  235  283   MPV  9.0  9.1  10.0      BMP: Recent Labs      07/26/18   0613  07/26/18   1840  07/27/18   0648   NA  138  138  135   K  4.6  4.6  4.3   CL 106  104  97*   CO2  10*  13*  17*   BUN  99*  98*  101*   CREATININE  9.34*  9.09*  9.06*   GLUCOSE  104*  188*  215*   CALCIUM  7.9*  8.0*  8.0*      BNP:No results found for: BNP  PHOSPHORUS:  No results for input(s): PHOS in the last 72 hours. MAGNESIUM:   Recent Labs      07/26/18   0613   MG  1.9     ALBUMIN:   Recent Labs      07/26/18   0613  07/27/18   0818   LABALBU  2.0*  2.1*     IRON:    Lab Results   Component Value Date    IRON 36 05/02/2018     IRON SATURATION:  Lab Results   Component Value Date    LABIRON 20 05/02/2018     TIBC:    Lab Results   Component Value Date    TIBC 176 05/02/2018     FERRITIN:    Lab Results   Component Value Date    FERRITIN 187 05/02/2018     IESHA:   Lab Results   Component Value Date    IESHA NEGATIVE 01/24/2018       SPEP: Lab Results   Component Value Date    PROT 4.9 07/26/2018     UPEP: No results found for: TPU   HEPBSAG:  Lab Results   Component Value Date    HEPBSAG NONREACTIVE 07/27/2018     HEPCAB:  Lab Results   Component Value Date    HEPCAB NONREACTIVE 08/21/2017     C3:   Lab Results   Component Value Date    C3 117 01/24/2018     C4:   Lab Results   Component Value Date    C4 26 01/24/2018     MPO ANCA: No results found for: MPO .   PR3 ANCA:  No results found for: PR3  URINE SODIUM:    Lab Results   Component Value Date    DEBBY 44 07/25/2018      URINE CREATININE:  Lab Results   Component Value Date    LABCREA 118.0 07/25/2018     URINE EOSINOPHILS: No results found for: UREO  URINE PROTEIN:  No results found for: TPU  URINALYSIS:  U/A: Lab Results   Component Value Date    NITRU NEGATIVE 07/25/2018    COLORU YELLOW 07/25/2018    PHUR 5.0 07/25/2018    WBCUA 10 TO 20 07/25/2018    RBCUA 5 TO 10 07/25/2018    MUCUS NOT REPORTED 07/25/2018    TRICHOMONAS NOT REPORTED 07/25/2018    YEAST NOT REPORTED 07/25/2018    BACTERIA FEW 07/25/2018    CLARITYU clear 04/10/2018    SPECGRAV 1.014 07/25/2018    LEUKOCYTESUR SMALL 07/25/2018    UROBILINOGEN Normal 07/25/2018 BILIRUBINUR NEGATIVE 07/25/2018    BILIRUBINUR neg. 04/10/2018    BLOODU large 04/10/2018    GLUCOSEU NEGATIVE 07/25/2018    KETUA NEGATIVE 07/25/2018    AMORPHOUS NOT REPORTED 07/25/2018     ANTIGBM:No results found for: Ayesha Sheehan 135 as available. ASSESSMENT      1. RASHMI contributed to obstructive uropathy, urinary retention and aggravated further by concomitant ACE inhibitor use and diuretic use. Oral intake poor. EVOLVING  2. CKD Stage 3, baseline creatinine 2-2.2  3. High anion gap metabolic acidosis secondary to acute kidney injury  4. Hematuria   5. R sided metastatic RCC with spread to Lumbar spine and peritoneum which was discovered in January 2018 during MRI for back pain due to L1 compression fracture  6. Hypertension  7. Diabetes mellitus type 2     PLAN      1. Discontinue sodium bicarb and dextrose 5%. 2.  IV normal saline at 50 mL per hour  3. One dose of Lasix IV 40 mg.   4.  Avoid nephrotoxic agents. Hold Ace inhibitors. 5.  No improvement in renal function. Is feeling tired and lagging in clearances. Discussed with her the option of 4 initiation of dialysis. The procedure and all the complications including cardiac events again re-explained. She verbalized understanding and is willing to proceed. 6.Hemodialysis today and orders reviewed with nursing staff. Will plan HD am also    Please do not hesitate to call with questions. Ger Limon MD  PGY-1, Department of Internal Medicine  [place], [place]  7/27/2018 9:57 AM    Electronically signed by Ger Limon MD on 7/27/2018 at 9:48 AM      Attending Physician Statement  I have discussed the care of Sandra Valdez, including pertinent history and exam findings,  with the Fellow/Residentt. I have reviewed the key elements of all parts of the encounter with the Fellow/ Resident. I agree with the assessment, plan and orders as documented by the resident.     Drew Carolyn Corado MD, [facility] FACP   7/27/2018 10:11 AM    Nephrology [address] [place] [address] Drive

## 2018-07-28 NOTE — PLAN OF CARE
Problem: Falls - Risk of:  Goal: Will remain free from falls  Will remain free from falls   Outcome: Ongoing  No falls this shift. Precautions include posted falling star sign, nonskid footwear on, bed locked in lowest position, siderails up x2, table and call light within reach. Bed alarm on. Patient calls out appropriately for assistance. Hourly rounding to assess for needs.

## 2018-07-28 NOTE — PROGRESS NOTES
Nephrology Progress Note      SUBJECTIVE      59-year-old female presents to the gross, painless hematuria for the past 3 days. Has history of right-sided metastatic RCC which spread to lumber spine and peritoneum which was discovered in 2018. The patient has previous history of admission to the hospital due to a RASHMI. The patient was consulted because of acute kidney injury secondary to decreased urine output, owing to obstructive uropathy/urinary retention. Her baseline creatinine is in the range of 2-2.2, nonoliguric. The patient also has a known history of chronic kidney disease stage III, diabetes mellitus, hypertension. Seen on HD. Today second session. Has perm cath. Tolerating well  Hematuria cleared  Still anuric    OBJECTIVE      CURRENT TEMPERATURE:  Temp: 98.4 °F (36.9 °C)  MAXIMUM TEMPERATURE OVER 24HRS:  Temp (24hrs), Av.2 °F (36.8 °C), Min:98 °F (36.7 °C), Max:98.4 °F (36.9 °C)    CURRENT RESPIRATORY RATE:  Resp: 14  CURRENT PULSE:  Pulse: 81  CURRENT BLOOD PRESSURE:  BP: (!) 127/56  24HR BLOOD PRESSURE RANGE:  Systolic (63GZV), FRANCHESKA:668 , Min:104 , JFA:635   ; Diastolic (67LJJ), MCT:23, Min:42, Max:67    24HR INTAKE/OUTPUT:      Intake/Output Summary (Last 24 hours) at 18 1127  Last data filed at 18 0551   Gross per 24 hour   Intake             1371 ml   Output             1200 ml   Net              171 ml     WEIGHT :  Patient Vitals for the past 96 hrs (Last 3 readings):   Weight   18 1016 192 lb 0.3 oz (87.1 kg)   18 0550 185 lb 3 oz (84 kg)   18 1758 189 lb 13.1 oz (86.1 kg)     PHYSICAL EXAM      GENERAL APPEARANCE:Awake, alert, in no acute distress  SKIN: warm and dry, no rash or erythema  EYES: conjunctivae normal and sclera anicteric  ENT: no thrush no pharyngeal congestion   NECK:   No JVD. No carotid bruits and no carotid lymphadenopathy . PULMONARY: lungs are clear to auscultation. No Wheezing, no ronchi .    CADRDIOVASCULAR: S1 and S2 normal NO S3 and NO S4 . No rubs , no murmur. ABDOMEN: soft nontender, bowel sounds present, no organomegaly, no ascites.      EXTREMITIES: no cyanosis, clubbing or PRESENT edema     CURRENT MEDICATIONS        heparin (porcine) injection 6,970 Units Once   heparin (porcine) injection 6,970 Units PRN   heparin (porcine) injection 3,480 Units PRN   heparin 25,000 units in dextrose 5% 250 mL infusion Continuous   0.9 % sodium chloride bolus PRN   0.9 % sodium chloride bolus PRN   0.9 % sodium chloride infusion Continuous   heparin (porcine) injection 1,600 Units PRN   heparin (porcine) injection 1,600 Units PRN   acetaminophen (TYLENOL) tablet 1,000 mg TID   amitriptyline (ELAVIL) tablet 75 mg Nightly   atorvastatin (LIPITOR) tablet 20 mg Nightly   cyclobenzaprine (FLEXERIL) tablet 5 mg TID PRN   docusate sodium (COLACE) capsule 200 mg BID   pantoprazole (PROTONIX) tablet 40 mg QAM AC   ondansetron (ZOFRAN) tablet 4 mg Daily PRN   oxyCODONE (OXYCONTIN) extended release tablet 10 mg BID   oxyCODONE (ROXICODONE) immediate release tablet 10 mg Q6H PRN   prochlorperazine (COMPAZINE) tablet 5 mg Q8H PRN   sodium chloride flush 0.9 % injection 10 mL 2 times per day   sodium chloride flush 0.9 % injection 10 mL PRN   ondansetron (ZOFRAN) injection 4 mg Q6H PRN   insulin lispro (HUMALOG) injection vial 0-12 Units TID WC   insulin lispro (HUMALOG) injection vial 0-6 Units Nightly   glucose (GLUTOSE) 40 % oral gel 15 g PRN   dextrose 50 % solution 12.5 g PRN   glucagon (rDNA) injection 1 mg PRN   dextrose 5 % solution PRN   acetaminophen (TYLENOL) tablet 1,000 mg Q6H PRN         LABS      CBC:   Recent Labs      07/25/18   1407  07/26/18   0613  07/27/18   0818   WBC  5.7  4.1  4.5   RBC  3.01*  2.72*  2.60*   HGB  8.2*  7.3*  7.1*   HCT  28.4*  26.5*  23.5*   MCV  94.4  97.4  90.4   MCH  27.2  26.8  27.3   MCHC  28.9  27.5*  30.2   RDW  19.1*  18.9*  19.1*   PLT  316  235  283   MPV  9.0  9.1  10.0      BMP:   Recent Labs 07/26/18   1840  07/27/18   0648  07/28/18   0530   NA  138  135  139   K  4.6  4.3  3.9   CL  104  97*  101   CO2  13*  17*  20   BUN  98*  101*  72*   CREATININE  9.09*  9.06*  6.79*   GLUCOSE  188*  215*  122*   CALCIUM  8.0*  8.0*  7.5*      BNP:No results found for: BNP  PHOSPHORUS:  No results for input(s): PHOS in the last 72 hours. MAGNESIUM:   Recent Labs      07/26/18   0613   MG  1.9     ALBUMIN:   Recent Labs      07/26/18   0613  07/27/18   0818   LABALBU  2.0*  2.1*     IRON:    Lab Results   Component Value Date    IRON 36 05/02/2018     IRON SATURATION:    Lab Results   Component Value Date    LABIRON 20 05/02/2018     TIBC:    Lab Results   Component Value Date    TIBC 176 05/02/2018     FERRITIN:    Lab Results   Component Value Date    FERRITIN 187 05/02/2018     IESHA:   Lab Results   Component Value Date    IESHA NEGATIVE 01/24/2018       SPEP:   Lab Results   Component Value Date    PROT 4.9 07/26/2018     UPEP: No results found for: TPU   HEPBSAG:  Lab Results   Component Value Date    HEPBSAG NONREACTIVE 07/27/2018     HEPCAB:  Lab Results   Component Value Date    HEPCAB NONREACTIVE 07/27/2018     C3:   Lab Results   Component Value Date    C3 117 01/24/2018     C4:   Lab Results   Component Value Date    C4 26 01/24/2018     MPO ANCA: No results found for: MPO .   PR3 ANCA:  No results found for: PR3  URINE SODIUM:    Lab Results   Component Value Date    DEBBY 44 07/25/2018      URINE CREATININE:    Lab Results   Component Value Date    LABCREA 118.0 07/25/2018     URINE EOSINOPHILS: No results found for: UREO  URINE PROTEIN:  No results found for: TPU  URINALYSIS:  U/A:   Lab Results   Component Value Date    NITRU NEGATIVE 07/25/2018    COLORU YELLOW 07/25/2018    PHUR 5.0 07/25/2018    WBCUA 10 TO 20 07/25/2018    RBCUA 5 TO 10 07/25/2018    MUCUS NOT REPORTED 07/25/2018    TRICHOMONAS NOT REPORTED 07/25/2018    YEAST NOT REPORTED 07/25/2018    BACTERIA FEW 07/25/2018    CLARITYU clear 04/10/2018    SPECGRAV 1.014 07/25/2018    LEUKOCYTESUR SMALL 07/25/2018    UROBILINOGEN Normal 07/25/2018    BILIRUBINUR NEGATIVE 07/25/2018    BILIRUBINUR neg. 04/10/2018    BLOODU large 04/10/2018    GLUCOSEU NEGATIVE 07/25/2018    KETUA NEGATIVE 07/25/2018    AMORPHOUS NOT REPORTED 07/25/2018     ANTIGBM:No results found for: GBMABIGG      RADIOLOGY      Reviewed as available. ASSESSMENT      1. RASHMI contributed to obstructive uropathy, urinary retention and aggravated further by concomitant ACE inhibitor use and diuretic use. Oral intake poor. - HD dependent 7/27  2. CKD Stage 3, baseline creatinine 2-2.2  3. High anion gap metabolic acidosis secondary to acute kidney injury  4. Hematuria   5. R sided metastatic RCC with spread to Lumbar spine and peritoneum which was discovered in January 2018 during MRI for back pain due to L1 compression fracture  6. Hypertension  7. Diabetes mellitus type 2     PLAN      1. Seen on HD  Tolerating well  2. Will arrange for outpatient HD placement    Please do not hesitate to call with questions.         Khoa Gordon MD, [facility]), FACP   7/28/2018 11:27 AM    Nephrology [address] [place] [address]

## 2018-07-28 NOTE — PROGRESS NOTES
[facility] Oncology Progress Note  7/28/2018 1:39 PM  Subjective:   Admit Date: 7/25/2018  PCP: Yumiko Chu MD   CC: Metastatic Renal Cancer    Oncological History:  Diagnosis:   Metastatic RCC  Started on Pazopanib on 3/16/18  Radiation  Therapy completed to spine  Her recent MRI spine showing : Compression fracture and additional bone lesions in the iliac bone  HISTORY OF PRESENT ILLNESS:    Oncologic History: This is a 64 YO female was admitted with back pain after a fall. On admission she had imaging studies which showed L2 metastatic lesion with compression fracture. Had MRI spine which showed right renal mass c/w RCC and inferior vena cava invasion. It also showed L1 metastasis, pathologic fracture, epidural invasion, and severe thecal sac stenosis. Seen by neurosurgery and input awaited. She reports wt loss possibly intentional over last year for about 50 lbs. Patient seen by urology. SHe had a biopsy of the Kidney which showed RCC. SHe was seen by Neurosurgery and she had spinal fixation with pedicle screws On 1/23/18. She had palliative radiation and was on pazopanib    Interval History:   Patient seen and examined. Labs in vitals reviewed. Patient very fatigued and not able to give detailed history. Patient received hemodialysis today. Low urine output so far. Complains of pain. Denies fever chills.       Diet: Diet NPO, After Midnight  Medications:   Scheduled Meds:   heparin (porcine)  80 Units/kg Intravenous Once    acetaminophen  1,000 mg Oral TID    amitriptyline  75 mg Oral Nightly    atorvastatin  20 mg Oral Nightly    docusate sodium  200 mg Oral BID    pantoprazole  40 mg Oral QAM AC    oxyCODONE  10 mg Oral BID    sodium chloride flush  10 mL Intravenous 2 times per day    insulin lispro  0-12 Units Subcutaneous TID WC    insulin lispro  0-6 Units Subcutaneous Nightly     Continuous Infusions:   heparin (porcine)      sodium chloride 50 mL/hr at 07/27/18 1107  dextrose       CBC:   Recent Labs      07/25/18   1407  07/26/18   0613  07/27/18   0818   WBC  5.7  4.1  4.5   HGB  8.2*  7.3*  7.1*   PLT  316  235  283     BMP:    Recent Labs      07/26/18   1840  07/27/18   0648  07/28/18   0530   NA  138  135  139   K  4.6  4.3  3.9   CL  104  97*  101   CO2  13*  17*  20   BUN  98*  101*  72*   CREATININE  9.09*  9.06*  6.79*   GLUCOSE  188*  215*  122*     Hepatic:   Recent Labs      07/26/18   0613   AST  7   ALT  <5*   BILITOT  0.19*   ALKPHOS  63     INR:   Recent Labs      07/26/18   4948  07/27/18 0818   INR  1.0  1.0     Results for orders placed or performed during the hospital encounter of 07/25/18   URINE CULTURE CLEAN CATCH   Result Value Ref Range    Specimen Description . URINE     Special Requests NOT REPORTED     Culture NO SIGNIFICANT GROWTH     Status FINAL 07/26/2018    Urinalysis with Microscopic   Result Value Ref Range    Color, UA YELLOW YEL    Turbidity UA CLOUDY (A) CLEAR    Glucose, Ur NEGATIVE NEG    Bilirubin Urine NEGATIVE NEG    Ketones, Urine NEGATIVE NEG    Specific Gravity, UA 1.015 1.005 - 1.030    Urine Hgb NEGATIVE NEG    pH, UA 5.0 5.0 - 8.0    Protein, UA 2+ (A) NEG    Urobilinogen, Urine Normal NORM    Nitrite, Urine NEGATIVE NEG    Leukocyte Esterase, Urine MODERATE (A) NEG    -          WBC, UA 5 TO 10 0 - 5 /HPF    RBC, UA 2 TO 5 0 - 4 /HPF    Casts UA 2 TO 5 HYALINE 0 - 8 /LPF    Crystals UA NOT REPORTED NONE /HPF    Epithelial Cells UA 2 TO 5 0 - 5 /HPF    Renal Epithelial, Urine NOT REPORTED 0 /HPF    Bacteria, UA FEW (A) NONE    Mucus, UA NOT REPORTED NONE    Trichomonas, UA NOT REPORTED NONE    Amorphous, UA NOT REPORTED NONE    Other Observations UA NOT REPORTED NREQ    Yeast, UA NOT REPORTED NONE   CBC WITH AUTO DIFFERENTIAL   Result Value Ref Range    WBC 5.7 3.5 - 11.3 k/uL    RBC 3.01 (L) 3.95 - 5.11 m/uL    Hemoglobin 8.2 (L) 11.9 - 15.1 g/dL    Hematocrit 28.4 (L) 36.3 - 47.1 %    MCV 94.4 82.6 - 102.9 fL    MCH 27.2 25.2 - 33.5 pg    MCHC 28.9 28.4 - 34.8 g/dL    RDW 19.1 (H) 11.8 - 14.4 %    Platelets 494 836 - 008 k/uL    MPV 9.0 8.1 - 13.5 fL    NRBC Automated 0.4 (H) 0.0 per 100 WBC    Differential Type NOT REPORTED     WBC Morphology NOT REPORTED     RBC Morphology ANISOCYTOSIS PRESENT     Platelet Estimate NOT REPORTED     Seg Neutrophils 74 (H) 36 - 65 %    Lymphocytes 15 (L) 24 - 43 %    Monocytes 8 3 - 12 %    Eosinophils % 1 1 - 4 %    Basophils 1 0 - 2 %    Immature Granulocytes 1 (H) 0 %    Segs Absolute 4.23 1.50 - 8.10 k/uL    Absolute Lymph # 0.83 (L) 1.10 - 3.70 k/uL    Absolute Mono # 0.47 0.10 - 1.20 k/uL    Absolute Eos # 0.08 0.00 - 0.44 k/uL    Basophils # 0.03 0.00 - 0.20 k/uL    Absolute Immature Granulocyte 0.05 0.00 - 0.30 k/uL   Basic Metabolic Panel   Result Value Ref Range    Glucose 55 (L) 70 - 99 mg/dL     (HH) 8 - 23 mg/dL    CREATININE 9.62 (HH) 0.50 - 0.90 mg/dL    Bun/Cre Ratio NOT REPORTED 9 - 20    Calcium 9.1 8.6 - 10.4 mg/dL    Sodium 137 135 - 144 mmol/L    Potassium 4.6 3.7 - 5.3 mmol/L    Chloride 102 98 - 107 mmol/L    CO2 11 (L) 20 - 31 mmol/L    Anion Gap 24 (H) 9 - 17 mmol/L    GFR Non-African American 4 (L) >60 mL/min    GFR African American 5 (L) >60 mL/min    GFR Comment          GFR Staging NOT REPORTED    Protein, urine, random   Result Value Ref Range    Total Protein, Urine 68 mg/dL   Sodium, urine, random   Result Value Ref Range    Sodium,Ur 44 mmol/L   Creatinine, Random Urine   Result Value Ref Range    Creatinine, Ur 118.0 28.0 - 217.0 mg/dL   CBC Auto Differential   Result Value Ref Range    WBC 4.1 3.5 - 11.3 k/uL    RBC 2.72 (L) 3.95 - 5.11 m/uL    Hemoglobin 7.3 (L) 11.9 - 15.1 g/dL    Hematocrit 26.5 (L) 36.3 - 47.1 %    MCV 97.4 82.6 - 102.9 fL    MCH 26.8 25.2 - 33.5 pg    MCHC 27.5 (L) 28.4 - 34.8 g/dL    RDW 18.9 (H) 11.8 - 14.4 %    Platelets 784 415 - 630 k/uL    MPV 9.1 8.1 - 13.5 fL    NRBC Automated 0.0 0.0 per 100 WBC    Differential Type NOT REPORTED     WBC Morphology NOT REPORTED     RBC Morphology NOT REPORTED     Platelet Estimate NOT REPORTED     Immature Granulocytes 0 0 %    Seg Neutrophils 71 (H) 36 - 66 %    Lymphocytes 22 (L) 24 - 44 %    Monocytes 3 1 - 7 %    Eosinophils % 4 1 - 4 %    Basophils 0 0 - 2 %    Absolute Immature Granulocyte 0.00 0.00 - 0.30 k/uL    Segs Absolute 2.92 1.8 - 7.7 k/uL    Absolute Lymph # 0.90 (L) 1.0 - 4.8 k/uL    Absolute Mono # 0.12 0.1 - 0.8 k/uL    Absolute Eos # 0.16 0.0 - 0.4 k/uL    Basophils # 0.00 0.0 - 0.2 k/uL    Morphology ANISOCYTOSIS PRESENT    Comprehensive Metabolic Panel w/ Reflex to MG   Result Value Ref Range    Glucose 104 (H) 70 - 99 mg/dL    BUN 99 (HH) 8 - 23 mg/dL    CREATININE 9.34 (HH) 0.50 - 0.90 mg/dL    Bun/Cre Ratio NOT REPORTED 9 - 20    Calcium 7.9 (L) 8.6 - 10.4 mg/dL    Sodium 138 135 - 144 mmol/L    Potassium 4.6 3.7 - 5.3 mmol/L    Chloride 106 98 - 107 mmol/L    CO2 10 (L) 20 - 31 mmol/L    Anion Gap 22 (H) 9 - 17 mmol/L    Alkaline Phosphatase 63 35 - 104 U/L    ALT <5 (L) 5 - 33 U/L    AST 7 <32 U/L    Total Bilirubin 0.19 (L) 0.3 - 1.2 mg/dL    Total Protein 4.9 (L) 6.4 - 8.3 g/dL    Alb 2.0 (L) 3.5 - 5.2 g/dL    Albumin/Globulin Ratio 0.7 (L) 1.0 - 2.5    GFR Non-African American 4 (L) >60 mL/min    GFR African American 5 (L) >60 mL/min    GFR Comment          GFR Staging NOT REPORTED    Magnesium   Result Value Ref Range    Magnesium 1.9 1.6 - 2.6 mg/dL   Protime-INR   Result Value Ref Range    Protime 10.7 9.0 - 12.0 sec    INR 1.0    Urinalysis with microscopic   Result Value Ref Range    Color, UA YELLOW YEL    Turbidity UA CLOUDY (A) CLEAR    Glucose, Ur NEGATIVE NEG    Bilirubin Urine NEGATIVE NEG    Ketones, Urine NEGATIVE NEG    Specific Gravity, UA 1.014 1.005 - 1.030    Urine Hgb NEGATIVE NEG    pH, UA 5.0 5.0 - 8.0    Protein, UA 2+ (A) NEG    Urobilinogen, Urine Normal NORM    Nitrite, Urine NEGATIVE NEG    Leukocyte Esterase, Urine SMALL (A) NEG    - WBC, UA 10 TO 20 0 - 5 /HPF    RBC, UA 5 TO 10 0 - 4 /HPF    Casts UA 2 TO 5 HYALINE 0 - 8 /LPF    Crystals UA NOT REPORTED NONE /HPF    Epithelial Cells UA 5 TO 10 0 - 5 /HPF    Renal Epithelial, Urine NOT REPORTED 0 /HPF    Bacteria, UA FEW (A) NONE    Mucus, UA NOT REPORTED NONE    Trichomonas, UA NOT REPORTED NONE    Amorphous, UA NOT REPORTED NONE    Other Observations UA NOT REPORTED NREQ    Yeast, UA NOT REPORTED NONE   Basic Metabolic Panel   Result Value Ref Range    Glucose 188 (H) 70 - 99 mg/dL    BUN 98 (HH) 8 - 23 mg/dL    CREATININE 9.09 (HH) 0.50 - 0.90 mg/dL    Bun/Cre Ratio NOT REPORTED 9 - 20    Calcium 8.0 (L) 8.6 - 10.4 mg/dL    Sodium 138 135 - 144 mmol/L    Potassium 4.6 3.7 - 5.3 mmol/L    Chloride 104 98 - 107 mmol/L    CO2 13 (L) 20 - 31 mmol/L    Anion Gap 21 (H) 9 - 17 mmol/L    GFR Non-African American 4 (L) >60 mL/min    GFR African American 5 (L) >60 mL/min    GFR Comment          GFR Staging NOT REPORTED    Basic Metabolic Panel   Result Value Ref Range    Glucose 215 (H) 70 - 99 mg/dL     (HH) 8 - 23 mg/dL    CREATININE 9.06 (HH) 0.50 - 0.90 mg/dL    Bun/Cre Ratio NOT REPORTED 9 - 20    Calcium 8.0 (L) 8.6 - 10.4 mg/dL    Sodium 135 135 - 144 mmol/L    Potassium 4.3 3.7 - 5.3 mmol/L    Chloride 97 (L) 98 - 107 mmol/L    CO2 17 (L) 20 - 31 mmol/L    Anion Gap 21 (H) 9 - 17 mmol/L    GFR Non-African American 4 (L) >60 mL/min    GFR African American 5 (L) >60 mL/min    GFR Comment          GFR Staging NOT REPORTED    APTT   Result Value Ref Range    PTT 26.3 20.5 - 30.5 sec   Protime-INR   Result Value Ref Range    Protime 11.1 9.0 - 12.0 sec    INR 1.0    CBC Auto Differential   Result Value Ref Range    WBC 4.5 3.5 - 11.3 k/uL    RBC 2.60 (L) 3.95 - 5.11 m/uL    Hemoglobin 7.1 (L) 11.9 - 15.1 g/dL    Hematocrit 23.5 (L) 36.3 - 47.1 %    MCV 90.4 82.6 - 102.9 fL    MCH 27.3 25.2 - 33.5 pg    MCHC 30.2 28.4 - 34.8 g/dL    RDW 19.1 (H) 11.8 - 14.4 %    Platelets 145 796 - 721 k/uL MPV 10.0 8.1 - 13.5 fL    NRBC Automated 0.0 0.0 per 100 WBC    Differential Type NOT REPORTED     WBC Morphology NOT REPORTED     RBC Morphology ANISOCYTOSIS PRESENT     Platelet Estimate NOT REPORTED     Seg Neutrophils 62 36 - 65 %    Lymphocytes 21 (L) 24 - 43 %    Monocytes 10 3 - 12 %    Eosinophils % 5 (H) 1 - 4 %    Basophils 0 0 - 2 %    Immature Granulocytes 1 (H) 0 %    Segs Absolute 2.77 1.50 - 8.10 k/uL    Absolute Lymph # 0.94 (L) 1.10 - 3.70 k/uL    Absolute Mono # 0.45 0.10 - 1.20 k/uL    Absolute Eos # 0.24 0.00 - 0.44 k/uL    Basophils # <0.03 0.00 - 0.20 k/uL    Absolute Immature Granulocyte 0.05 0.00 - 0.30 k/uL   Albumin   Result Value Ref Range    Alb 2.1 (L) 3.5 - 5.2 g/dL   Hepatitis B surface antibody   Result Value Ref Range    Hep B S Ab <3.50 <10 mIU/mL   Hepatitis B surface antigen   Result Value Ref Range    Hepatitis B Surface Ag NONREACTIVE NR   Hepatitis B core antibody, total   Result Value Ref Range    Hep B Core Total Ab NONREACTIVE NR   Lactate Dehydrogenase   Result Value Ref Range     135 - 214 U/L   Basic Metabolic Panel   Result Value Ref Range    Glucose 122 (H) 70 - 99 mg/dL    BUN 72 (H) 8 - 23 mg/dL    CREATININE 6.79 (HH) 0.50 - 0.90 mg/dL    Bun/Cre Ratio NOT REPORTED 9 - 20    Calcium 7.5 (L) 8.6 - 10.4 mg/dL    Sodium 139 135 - 144 mmol/L    Potassium 3.9 3.7 - 5.3 mmol/L    Chloride 101 98 - 107 mmol/L    CO2 20 20 - 31 mmol/L    Anion Gap 18 (H) 9 - 17 mmol/L    GFR Non-African American 6 (L) >60 mL/min    GFR  7 (L) >60 mL/min    GFR Comment          GFR Staging NOT REPORTED    HEPATITIS C ANTIBODY   Result Value Ref Range    Hepatitis C Ab NONREACTIVE NR   APTT   Result Value Ref Range    PTT 27.4 20.5 - 30.5 sec   POC Glucose Fingerstick   Result Value Ref Range    POC Glucose 75 65 - 105 mg/dL   POC Glucose Fingerstick   Result Value Ref Range    POC Glucose 87 65 - 105 mg/dL   POC Glucose Fingerstick   Result Value Ref Range    POC reasonably achievable. COMPARISON: MRI lumbar spine from June 9, 2018 HISTORY: ORDERING SYSTEM PROVIDED HISTORY: r/o worsening fracture or increasing metastatic mass TECHNOLOGIST PROVIDED HISTORY: Reason for exam:->r/o worsening fracture or increasing metastatic mass FINDINGS: BONES/ALIGNMENT: Posterior transpedicular fusion hardware involving T11, T12, L2, and L3 is demonstrated. A pathologic compression fracture of L1 is again demonstrated with some retropulsion of fragments by approximately 0.4 cm. There is a dominant lytic lesion in the right sacral body encroaching upon the right S1 neural foramen. This metastatic lesion measures approximately 3.1 x 2.5 cm in cross-section. Its inferior extent is not well demonstrated. In the left iliac wing, a metastatic lesion is also identified exerting some mass effect upon the left sacroiliac joint. The lesion measures approximately 3.2 x 3.0 cm and is partially imaged on the previous lumbar spine MRI. No additional lesions are identified. DEGENERATIVE CHANGES: Degenerative disc disease throughout the lumbar spine involving the discs and facets. SOFT TISSUES/RETROPERITONEUM: A dominant right renal mass is partially imaged. There is perinephric stranding and perinephric nodularity likely metastatic disease. Atherosclerotic changes of the abdominal aorta are present. Low-density along the medial dome of the liver (axial image 1) suspicious for metastatic involvement of the liver. 1. Pathologic compression fracture of L1 is again identified with some retropulsion of the posterior aspect of the L1 vertebral body into the spinal canal without significant spinal canal narrowing. 2. Metastatic lesions involving the left iliac wing, right S1 sacral body effacing the right S1 neural foramen, and the L1 vertebral body. No new osseous lesions are identified.  3. Heterogeneously enlarged mass largely replacing the right kidney with nodularity in the right renal fossa suspicious for metastatic disease. Mri Lumbar Spine Wo Contrast    Result Date: 7/25/2018  EXAMINATION: MRI OF THE LUMBAR SPINE WITHOUT CONTRAST, 7/25/2018 5:09 pm TECHNIQUE: Multiplanar multisequence MRI of the lumbar spine was performed without the administration of intravenous contrast. COMPARISON: June 9, 2018 HISTORY: ORDERING SYSTEM PROVIDED HISTORY: pathologic fx in lumbar spine, difficulty urinating FINDINGS: BONES/ALIGNMENT: Thoracolumbar fusion is again seen. Hardware causes artifact limiting adjacent evaluation. Pathologic L1 compression fracture is again seen with retropulsion of fracture fragments. No acute fracture. Right sacral metastatic lesion is seen. SPINAL CORD: Retropulsion of fracture fragments is causing impingement upon the conus. Appearance is likely similar to the prior study. SOFT TISSUES: Complex right renal mass. L1-L2: L1 compression fracture with retropulsion of fracture fragments causes severe central canal stenosis and impingement on the distal aspect of the conus and the thecal sac. L2-L3: Mild broad-based disc bulge. No significant central canal or foraminal stenosis. L3-L4: Broad-based disc bulge, facet degenerative changes, and hypertrophy of the ligamentum flavum combine to create moderate central canal stenosis. Changes combine to create moderate bilateral foraminal stenosis. L4-L5: Left paracentral disc bulge with extension into the left foramina and facet degenerative changes combine to create mild central canal stenosis. Changes combine to create moderate-severe left foraminal stenosis and moderate right foraminal stenosis. L5-S1: Paracentral disc bulge with left foraminal component causes minimal impingement upon the ventral aspect of the thecal sac. Facet degenerative changes are seen. Changes combine to create mild left foraminal stenosis.      Pathologic L1 compression fracture is again seen with retropulsion of fracture fragments causing impingement upon the distal aspect of the conus and the proximal thecal sac. Appearance is overall similar to the prior study. Us Renal Complete    Result Date: 7/25/2018  EXAMINATION: RETROPERITONEAL ULTRASOUND OF THE KIDNEYS AND URINARY BLADDER 7/25/2018 COMPARISON: None HISTORY: ORDERING SYSTEM PROVIDED HISTORY: RENAL FAILURE, ACUTE (KIDNEY INJURY) FINDINGS: Kidneys: The right kidney measures 15.3 cm in length and the left kidney measures 11.8 cm in length. There is a complex mass lesion involving the superior pole the right kidney measuring 9.7 x 8.7 cm. Mild dilatation of the proximal right ureter. No evidence for left-sided hydronephrosis. Bladder: There bladder is decompressed by Navarrete catheter. Large complex mass within the superior pole the right kidney. Mild right hydronephrosis. Urinary bladder is decompressed by Navarrete catheter. Ir Tunneled Cvc Place Wo Sq Port/pump > 5 Years    Result Date: 7/27/2018  PROCEDURE: ULTRASOUND GUIDED VASCULAR ACCESS. FLUOROSCOPY GUIDED PLACEMENT OF A TUNNELED CATHETER. 7/27/2018. HISTORY: ORDERING SYSTEM PROVIDED HISTORY: RASHMI TECHNOLOGIST PROVIDED HISTORY: Reason for exam:->RASHMI Anticipated long-term dialysis requirement. SEDATION: None FLUOROSCOPY DOSE AND TYPE OR TIME AND EXPOSURES: 0.3 minute; 68 cGy cm squared TECHNIQUE: Informed consent was obtained after a detailed explanation of the procedure including risks, benefits, and alternatives. Universal protocol was observed. The right neck and chest were prepped and draped in sterile fashion using maximum sterile barrier technique. Local anesthesia was achieved with lidocaine. A micropuncture needle was used to access the right internal jugular vein using ultrasound guidance. An ultrasound image demonstrating patency of the vein with needle tip located within it. An image was obtained and stored in PACs. A 0.035 guidewire was used to place a peel-away sheath.  A subcutaneous tunnel was created to the infraclavicular region and a tunneled 14.5 [place] by 19 cm tip to cuff dialysis catheter was pulled through the subcutaneous tunnel to the venotomy site and advanced through the peel-away sheath under fluoroscopic guidance to the right atrium. The catheter flushed easily and there was a good blood return. The catheter was sutured to the skin. The catheter was locked with heparinized saline. The patient tolerated the procedure well and there were no immediate complications. FINDINGS: Fluoroscopic image demonstrates the tip of the catheter in the right atrium. Successful ultrasound and fluoroscopy guided right internal jugular vein 14.5 [place] by 19 cm tip to cuff tunneled catheter placement. Ready for use.  Renal Arterial Duplex Complete    Result Date: 7/28/2018    [facility]  Vascular Renal Procedure   Patient Name  [facility]    Date of Study           07/28/2018                Margarette Perea   Date of Birth 1955  Gender                  Female   Age           66 year(s)  Race                       Room Number   1912   Corporate ID  [id number]  #   Patient Lara  [de-identified]  #   MR #          [id number]     Sonographer             Belén Jeong   Accession #   [id number]   Interpreting Physician  Ambar Keane   Referring                 Referring Physician     Mitchell Case, *  Nurse  Practitioner  Procedure Type of Study:   Abdominal: Renal, Renal Artery Scan Bilateral.  Indications for Study:Renal failure, acute. Patient Status: In Patient. Technical Quality:Limited visualization. Limitation reason:depth-- patient positioning--pain. - Critical Result:Fernanda WAITE RN 9:30 am.  Conclusions   Summary   Simultaneous real time imaging utilizing B-Mode, color doppler and  spectral waveform analysis was performed on the abdominal aorta and  bilateral renal arteries. Study demonstrates:   Right:  No evidence of renal artery stenosis.   Abnormally large kidney length with possible mass at inferior pole,  correlate clinically with patient history of renal cell carcinoma. Left:  No evidence of renal artery stenosis. Signature   ----------------------------------------------------------------  Electronically signed by Belén Jeong(Sonographer) on  07/28/2018 09:40 AM  ----------------------------------------------------------------   ----------------------------------------------------------------  Electronically signed by Phuong Farias(Interpreting  physician) on 07/28/2018 10:31 AM  ----------------------------------------------------------------  Findings:   Right Impression:        Left Impression:  Renal / Aortic Ratio     Renal / Aortic Ratio within normal limits  within normal limits     (<3.5cm/s).  (<3.5mc/s). Kidney length is normal.  Kidney length abnormal,  possible mass at         Renal vein has absent color flow mid to distal,  inferior pole.           however there is continuous doppler flow                           proximally which would suggest patent renal vein. Renal vein is patent. Risk Factors History +---------+----------+-----------------------------------------------------+ ! Diagnosis! Date      ! Comments                                             ! +---------+----------+-----------------------------------------------------+ ! Other    ! ! Right Renal Cell Carcinoma with mets                 ! +---------+----------+-----------------------------------------------------+ ! DVT      !03/16/2018! RT popliteal, peroneal, deep calf vein               ! +---------+----------+-----------------------------------------------------+   - The patient's risk factor(s) include: diabetes mellitus and obesity.   - The patient has kidney cancer. Velocities are measured in cm/s ; Diameters are measured in cm Abdominal Aortic Flow +--------------------------------+---+---+------------+--------------------+ ! Location                        ! PSV! EDV! AP Diam     !Trans Diam          ! Physician  Poppy Ramires   Referring                 Referring Physician     Nilesh Hamilton, *  Nurse  Practitioner  Procedure Type of Study:   Veins: Lower Extremities DVT Study, Venous Scan Lower Left. Indications for Study:Hx of DVT. Patient Status: In Patient. Technical Quality:Limited visualization. Limitation reason:Pt in pain. - Critical Result:Fernanda WAITE RN at 9:30 am.  Conclusions   Summary   Simultaneous real time imaging utilizing B-Mode, color doppler and  spectral waveform analysis was performed on the left lower extremity for  venous examination of the deep and superficial systems. Findings are:   **Abnormal Study**   Extensive acute deep venous thrombosis extending from the gastrocnemius  vein into the external iliac vein. Acute superficial venous thrombosis identified in the small saphenous  vein. Signature   ----------------------------------------------------------------  Electronically signed by Belén Jeong(Sonographer) on  07/28/2018 09:47 AM  ----------------------------------------------------------------   ----------------------------------------------------------------  Electronically signed by Phuong Preston(Interpreting  physician) on 07/28/2018 10:17 AM  ----------------------------------------------------------------  Findings:   Right Impression:                 Left Impression:  The common femoral vein           The gatrocnemius to the external iliac  demonstrates normal augmentation. veins are non-compressible. Thrombus appears acute based on B-Mode                                    image evaluation. Normal compressibility of the great                                    saphenous vein. The small saphenous vein demonstrates no                                    compressibility.                                     Thrombus appears acute based on B-Mode !Yes       !No             !Acute     ! +------------------------------------+----------+---------------+----------+ ! Prox Femoral                        !Yes       ! Partial        !Acute     ! +------------------------------------+----------+---------------+----------+ ! Mid Femoral                         !Yes       ! No             !Acute     ! +------------------------------------+----------+---------------+----------+ ! Dist Femoral                        !Yes       ! No             !Acute     ! +------------------------------------+----------+---------------+----------+ ! Popliteal                           !Yes       ! No             !Acute     ! +------------------------------------+----------+---------------+----------+ ! Sapheno Femoral Junction            ! Yes       ! Yes            ! None      ! +------------------------------------+----------+---------------+----------+ ! PTV                                 ! Yes       ! Yes            ! None      ! +------------------------------------+----------+---------------+----------+ ! Peroneal                            !No        !               !          ! +------------------------------------+----------+---------------+----------+ ! Gastroc                             ! Yes       ! No             !Acute     ! +------------------------------------+----------+---------------+----------+ ! GSV Thigh                           ! Yes       ! Yes            ! None      ! +------------------------------------+----------+---------------+----------+ ! GSV Knee                            ! Yes       ! Yes            ! None      ! +------------------------------------+----------+---------------+----------+ ! GSV Ankle                           ! Yes       ! Yes            ! None      ! +------------------------------------+----------+---------------+----------+ ! SSV                                 ! Yes       ! No             !Acute     ! +------------------------------------+----------+---------------+----------+ Left Doppler Measurements +-------------------------+----------+------+------------------------------+ ! Location                 ! Signal    !Reflux! Reflux (msec)                 ! +-------------------------+----------+------+------------------------------+ ! Common Femoral           !Diminished!      !                              ! +-------------------------+----------+------+------------------------------+ ! Prox Femoral             !Continuous!      !                              ! +-------------------------+----------+------+------------------------------+ ! Popliteal                !Continuous!      !                              ! +-------------------------+----------+------+------------------------------+    Objective:   Vitals: BP (!) 147/59   Pulse 90   Temp 98 °F (36.7 °C)   Resp 14   Ht 5' 8\" (1.727 m)   Wt 191 lb 9.3 oz (86.9 kg)   SpO2 97%   BMI 29.13 kg/m²   General appearance: follows commands  HEENT: Head: Normocephalic, no lesions, without obvious abnormality. 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 *      1.  Metastatic Renal Cell Carcinoma to the bones status post stabilization of spine by neurosurgery and palliative radiation  2. Already received palliative radiation and discussed with Dr. Lindsey Dial unable to receive additional RXT to lumbar area  3. Dr. Meghann Cintron planning to start immunotherapy using Ipilimumab and Nivolumab outpatient, however, clinically needs to improve,discussed that goal is palliative  4. Cr 9, will get hemodialysis today  5. Continue supportive care. 6. Urology to evaluate patient for palliative nephrectomy      Awakarina Dan MD    This note is created with the assistance of a speech recognition program.  While intending to generate a document that actually reflects the content of the visit, the document can still have some errors including those of syntax and sound a like substitutions which may escape proof reading. It such instances, actual meaning can be extrapolated by contextual diversion.

## 2018-07-28 NOTE — PROGRESS NOTES
DATE: 2018    NAME: Love Campbell  MRN: [id number]   : 1955    Patient not seen this date for Physical Therapy due to:  [] Blood transfusion in progress  [x] Hemodialysis  [] Patient Declined  [] Spine Precautions   [] Strict Bedrest  [] Surgery/ Procedure  [] Testing      [] Other        [] PT being discontinued at this time. Patient independent. No further needs. [] PT being discontinued at this time as the patient has been transferred to palliative care. No further needs.     Roberta Alcala, PT, DPT, CMPT

## 2018-07-28 NOTE — PROGRESS NOTES
Pt received  2.90 hrs of tx today. Labs reviewed and orders received from Dr Lalitha Head. Lines flushed and aspirated w/o difficulty. Tx initiated. Tolerated tx well, except for pain and cramping; addressed during tx. Total fluid removed 1160   Rinse back 370  Net removal 200     Liters processed 52.7  Pre-weight -   87.1 kg, Post-weight - 86.9 Kg.   Left tx area in stable condition.

## 2018-07-28 NOTE — PLAN OF CARE
Problem: Pain:  Goal: Pain level will decrease  Pain level will decrease   Outcome: Ongoing  Pt able to communicate pain as needed, uses call light appropriately. Pt satisfied with pain interventions. Problem: Falls - Risk of:  Goal: Will remain free from falls  Will remain free from falls   Outcome: Ongoing  Pt remains free of falls at this time. Bed locked in lowest position, siderails x2, call light in reach. Non-skid footwear applied. Encouraged pt to call for assistance as needed for safety. Falling star posted outside of room.

## 2018-07-28 NOTE — PROGRESS NOTES
started her on bicarb drip given her acidosis  As well as CT lumbar spine showed her known L1 pathologic fracture as well as further metastatic lesion invading the iliac sac and sacral bones, neurosurgery evaluated the patient as well. Patient was admitted for further management    Review of Systems:     Constitutional:  negative for chills, fevers, sweats  Respiratory:  negative for cough, dyspnea on exertion, hemoptysis, shortness of breath, wheezing  Cardiovascular:  negative for chest pain, chest pressure/discomfort, lower extremity edema  Gastrointestinal:  negative for abdominal pain, constipation, diarrhea, nausea, vomiting  Neurological:  negative for dizziness, headache    Medications: Allergies:  No Known Allergies    Current Meds:   Scheduled Meds:    acetaminophen  1,000 mg Oral TID    amitriptyline  75 mg Oral Nightly    atorvastatin  20 mg Oral Nightly    docusate sodium  200 mg Oral BID    pantoprazole  40 mg Oral QAM AC    oxyCODONE  10 mg Oral BID    sodium chloride flush  10 mL Intravenous 2 times per day    insulin lispro  0-12 Units Subcutaneous TID WC    insulin lispro  0-6 Units Subcutaneous Nightly     Continuous Infusions:    heparin (porcine) 18 Units/kg/hr (07/28/18 1443)    sodium chloride 50 mL/hr at 07/27/18 1107    dextrose       PRN Meds: heparin (porcine), heparin (porcine), sodium chloride, sodium chloride, heparin (porcine), heparin (porcine), cyclobenzaprine, ondansetron, oxyCODONE HCl, prochlorperazine, sodium chloride flush, ondansetron, glucose, dextrose, glucagon (rDNA), dextrose, acetaminophen    Data:     Past Medical History:   has a past medical history of Cancer ([facility] Chronic kidney disease; Depression; [facility] GERD (gastroesophageal reflux disease); HA (headache); HBP (high blood pressure); History of blood transfusion; Hyperlipidemia; Hyperplastic polyp of intestine; Hypothyroid; Hypothyroidism;  Left knee pain; Non-smoker; OA (osteoarthritis); and Tubular adenoma. Social History:   reports that she has never smoked. She has never used smokeless tobacco. She reports that she does not drink alcohol or use drugs. Family History:   Family History   Problem Relation Age of Onset    Heart Disease Mother     Diabetes Mother     Cancer Father         esophageal cancer    Cancer Maternal Grandfather         colon       Vitals:  BP (!) 147/59   Pulse 90   Temp 98 °F (36.7 °C)   Resp 14   Ht 5' 8\" (1.727 m)   Wt 191 lb 9.3 oz (86.9 kg)   SpO2 97%   BMI 29.13 kg/m²   Temp (24hrs), Av.2 °F (36.8 °C), Min:98 °F (36.7 °C), Max:98.4 °F (36.9 °C)    Recent Labs      18   0631  18   1122  18   2028  18   1526   POCGLU  202*  131*  171*  166*       I/O (24Hr):     Intake/Output Summary (Last 24 hours) at 18 1703  Last data filed at 18 1330   Gross per 24 hour   Intake             1371 ml   Output             2360 ml   Net             -989 ml     Labs:    Lab Results   Component Value Date/Time    SPECIAL NOT REPORTED 2018 05:53 PM     Lab Results   Component Value Date/Time    CULTURE NO SIGNIFICANT GROWTH 2018 05:53 PM       Lab Results   Component Value Date    PHART 7.292 2018    DOC8YFG 39.0 2018    PO2ART 159.0 2018    MIV5CNY 18.3 2018    NBEA 7.2 2018    PBEA NOT REPORTED 2018    H2CCUCGM 99.5 2018    FIO2 40 2018     Physical Examination:        General appearance:  alert, cooperative and no distress  Mental Status:  oriented to person, place and time and normal affect  Lungs:  clear to auscultation bilaterally, normal effort  Heart:  regular rate and rhythm, no murmur  Abdomen:  soft, nontender, nondistended, normal bowel sounds, no masses, hepatomegaly, splenomegaly  Extremities:  no edema, redness, tenderness in the calves  Skin:  no gross lesions, rashes, induration    Assessment:        Primary Problem  Acute renal failure (ARF) [facility])    Active Hospital Problems    Diagnosis Date Noted    Encounter for palliative care [Z51.5]     Hypoglycemia [P68.7]     Metabolic acidosis [Z76.3] 07/26/2018    Pathologic compression fracture of spine with delayed healing [M48.50XG]     Hematuria [R31.9]     Anemia [D64.9]     Renal cell cancer, right ([facility] [C64.1]     Acute renal failure (ARF) (HCC) [N17.9] 06/07/2018    Renal cell carcinoma (HCC) [C64.9]     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 Sudden progression of creatinine last few weeks. Possible post renal aetiology from hematuria. VL arterial scan showed no stenosis. Getting dialyzed now, continued close follow up  - DVT, recurrent, left leg, extensive: Heparin drip.  Follow up of hemoglobin closely  - Metastatic renal cancer; Discussed with palliative care team  - Anemia  - Acute on chronic pain    Discussed with family at bedside      Bernie Michael MD  7/28/2018  5:03 PM

## 2018-07-29 PROBLEM — I82.412 ACUTE DEEP VEIN THROMBOSIS (DVT) OF FEMORAL VEIN OF LEFT LOWER EXTREMITY (HCC): Status: ACTIVE | Noted: 2018-01-01

## 2018-07-29 NOTE — PROGRESS NOTES
[address]    Progress Note    7/29/2018    10:44 AM    Name:   Kim Santo  MRN:     [id number]     Acct:      [de-identified]   Room:   [address] [place] [address] Day:  4  Admit Date:  7/25/2018  1:16 PM    PCP:   Monique Hairston MD  Code Status:  Full Code    Subjective:     C/C:   Chief Complaint   Patient presents with    Hematuria     Sent by her oncologist for dysuria with hematuria and lower back pain. Has h/o renal carcinoma. Pt reports generalized weakness/fatigue. Denies N/V/D/C/fever/chills. Interval History Status: not changed. Able to tolerate dialysis well  Loss of appetite reported  Continued back pain, but well controlled with current pain medication regimen    Brief History:     The patient is a 66 y.o. Non-/non  female who presents with Hematuria (Sent by her oncologist for dysuria with hematuria and lower back pain. Has h/o renal carcinoma. Pt reports generalized weakness/fatigue. Denies N/V/D/C/fever/chills.)   and she is admitted to the hospital for the management of  Acute renal failure and gross hematuria. Patient was known right sided renal cell carcinoma with spread to The lumbar spine and peritoneum that was diagnosed in January 2018 and had previously went through lumbar spine fixation for her compression fracture as well as radiation, seem like she is currently not on chemo for the last 1 month, she will be scheduled for nephrectomy with another possible lumbar fixation next month according to her. She  was sent to ER from her oncologist office after noticing gross hematuria and increasing difficulty voiding. In ER she was found to have elevated creatinine of 9.6 and BUN of 101, ( average  baseline creatinine  was 2- 2.8) and found to have high anion gap metabolic acidosis, potassium was fine, bladder scan showed 361 mL in ER and Navarrete catheter was placed given the retention in the history.   Patient is well was assessed by nephrology who started her on bicarb drip given her acidosis. Thought to be likely hematuria along with ACE and diuretic use. Needing dialysis with tunnel catheter placement. Also noted to have left leg extensive DVT from gastrocnemius to external iliac vein, started on heparin with addition of coumadin. Given continued worsened back pain, CT lumbar spine showed her known L1 pathologic fracture as well as further metastatic lesion invading the iliac sac and sacral bones, neurosurgery evaluated the patient as well along with radiation oncology team, advised continued outpatient follow up    Review of Systems:     Constitutional:  negative for chills, fevers, sweats  Respiratory:  negative for cough, dyspnea on exertion, hemoptysis  Cardiovascular:  negative for chest pain, chest pressure/discomfort, lower extremity edema  Gastrointestinal:  negative for abdominal pain, constipation, diarrhea, nausea, vomiting  Neurological:  negative for dizziness, headache    Medications:      Allergies:  No Known Allergies    Current Meds:   Scheduled Meds:    megestrol  400 mg Oral Daily    warfarin  5 mg Oral Daily    acetaminophen  1,000 mg Oral TID    amitriptyline  75 mg Oral Nightly    atorvastatin  20 mg Oral Nightly    docusate sodium  200 mg Oral BID    pantoprazole  40 mg Oral QAM AC    oxyCODONE  10 mg Oral BID    sodium chloride flush  10 mL Intravenous 2 times per day    insulin lispro  0-12 Units Subcutaneous TID WC    insulin lispro  0-6 Units Subcutaneous Nightly     Continuous Infusions:    heparin (porcine) 20 Units/kg/hr (07/29/18 0512)    sodium chloride 50 mL/hr at 07/29/18 0516    dextrose       PRN Meds: heparin (porcine), heparin (porcine), sodium chloride, sodium chloride, heparin (porcine), heparin (porcine), cyclobenzaprine, ondansetron, oxyCODONE HCl, prochlorperazine, sodium chloride flush, ondansetron, glucose, dextrose, glucagon (rDNA), dextrose, acetaminophen    Data:     Past Medical History:   has a past medical history of Cancer ([facility] Chronic kidney disease; Depression; Diabetes mellitus ([facility] GERD (gastroesophageal reflux disease); HA (headache); HBP (high blood pressure); History of blood transfusion; Hyperlipidemia; Hyperplastic polyp of intestine; Hypothyroid; Hypothyroidism; Left knee pain; Non-smoker; OA (osteoarthritis); and Tubular adenoma. Social History:   reports that she has never smoked. She has never used smokeless tobacco. She reports that she does not drink alcohol or use drugs. Family History:   Family History   Problem Relation Age of Onset    Heart Disease Mother     Diabetes Mother     Cancer Father         esophageal cancer    Cancer Maternal Grandfather         colon       Vitals:  BP (!) 131/57   Pulse 75   Temp 98.5 °F (36.9 °C) (Oral)   Resp 11   Ht 5' 8\" (1.727 m)   Wt 181 lb 3.5 oz (82.2 kg)   SpO2 99%   BMI 27.55 kg/m²   Temp (24hrs), Av.5 °F (36.9 °C), Min:98 °F (36.7 °C), Max:99 °F (37.2 °C)    Recent Labs      18   1526  18   1939  18   2133  18   0808   POCGLU  166*  143*  144*  133*       I/O (24Hr):     Intake/Output Summary (Last 24 hours) at 18 1044  Last data filed at 18 0519   Gross per 24 hour   Intake             1411 ml   Output             1410 ml   Net                1 ml     Labs:    Lab Results   Component Value Date/Time    SPECIAL NOT REPORTED 2018 05:53 PM     Lab Results   Component Value Date/Time    CULTURE NO SIGNIFICANT GROWTH 2018 05:53 PM       Lab Results   Component Value Date    PHART 7.292 2018    HVD7XGV 39.0 2018    PO2ART 159.0 2018    ESN1MWG 18.3 2018    NBEA 7.2 2018    PBEA NOT REPORTED 2018    F4IKETJL 99.5 2018    FIO2 40 2018     Physical Examination:        General appearance:  alert, cooperative and no distress  Mental Status:  oriented to person, place and time and normal affect  Lungs:  clear to auscultation bilaterally, normal effort  Heart:  regular rate and rhythm, no murmur  Abdomen:  soft, nontender, nondistended, normal bowel sounds, no masses, hepatomegaly, splenomegaly  Extremities:  no edema, redness, tenderness in the calves  Skin:  no gross lesions, rashes, induration    Assessment:        Primary Problem  Acute renal failure (ARF) ([facility]    Active Hospital Problems    Diagnosis Date Noted    Acute deep vein thrombosis (DVT) of femoral vein of left lower extremity (HCC) [I82.412] 07/29/2018    Encounter for palliative care [Z51.5]     Hypoglycemia [V19.3]     Metabolic acidosis [U89.9] 07/26/2018    Pathologic compression fracture of spine with delayed healing [M48.50XG]     Hematuria [R31.9]     Anemia [D64.9]     Renal cell cancer, right (HCC) [C64.1]     Acute renal failure (ARF) (HCC) [N17.9] 06/07/2018    Renal cell carcinoma (HCC) [C64.9]     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 Sudden progression of creatinine last few weeks. Possible post renal aetiology from hematuria. VL arterial scan showed no stenosis. Getting dialyzed now, continued close follow up, poor chance of renal recovery  - DVT, recurrent, left leg, extensive: Heparin drip continued, add coumadin today 5 mg daily.  INR daily  - Severe protein calorie malnutrition  - Metastatic renal cancer; Discussed with palliative care team  - Anemia, continued close follow up  - Acute on chronic pain    Discussed with patient and nephrology team      Livia Chacon MD  7/29/2018  10:44 AM

## 2018-07-29 NOTE — PROGRESS NOTES
[facility] Progress Note  7/29/2018 3:04 PM  Subjective:   Admit Date: 7/25/2018  PCP: Rachelle Melvin MD   CC: Metastatic Renal Cancer    Oncological History:  Diagnosis:   Metastatic RCC  Started on Pazopanib on 3/16/18  Radiation  Therapy completed to spine  Her recent MRI spine showing : Compression fracture and additional bone lesions in the iliac bone  HISTORY OF PRESENT ILLNESS:    Oncologic History: This is a 64 YO female was admitted with back pain after a fall. On admission she had imaging studies which showed L2 metastatic lesion with compression fracture. Had MRI spine which showed right renal mass c/w RCC and inferior vena cava invasion. It also showed L1 metastasis, pathologic fracture, epidural invasion, and severe thecal sac stenosis. Seen by neurosurgery and input awaited. She reports wt loss possibly intentional over last year for about 50 lbs. Patient seen by urology. SHe had a biopsy of the Kidney which showed RCC. SHe was seen by Neurosurgery and she had spinal fixation with pedicle screws On 1/23/18. She had palliative radiation and was on pazopanib    Interval History:   Patient seen and examined. Labs in vitals reviewed. Patient not able to give any meaningful history. She was unable to tell me that before coming to the hospital whether she lived at her nursing home or at her home. Pain seems to be under control      Diet: DIET NO SALT ADDED (3-4 GM);  Carb Control: 4 carb choices (60 gms)/meal  Medications:   Scheduled Meds:   megestrol  400 mg Oral Daily    warfarin  5 mg Oral Daily    warfarin (COUMADIN) daily dosing (placeholder)   Other RX Placeholder    acetaminophen  1,000 mg Oral TID    amitriptyline  75 mg Oral Nightly    atorvastatin  20 mg Oral Nightly    docusate sodium  200 mg Oral BID    pantoprazole  40 mg Oral QAM AC    oxyCODONE  10 mg Oral BID    sodium chloride flush  10 mL Intravenous 2 times per day    insulin lispro  0-12 Units Subcutaneous TID WC    insulin lispro  0-6 Units Subcutaneous Nightly     Continuous Infusions:   heparin (porcine) 16 Units/kg/hr (07/29/18 1230)    sodium chloride 50 mL/hr at 07/29/18 0516    dextrose       CBC:   Recent Labs      07/27/18   0818  07/29/18   0813   WBC  4.5  5.1   HGB  7.1*  7.1*   PLT  283  236     BMP:    Recent Labs      07/26/18   1840  07/27/18   0648  07/28/18   0530   NA  138  135  139   K  4.6  4.3  3.9   CL  104  97*  101   CO2  13*  17*  20   BUN  98*  101*  72*   CREATININE  9.09*  9.06*  6.79*   GLUCOSE  188*  215*  122*     Hepatic:   No results for input(s): AST, ALT, ALB, BILITOT, ALKPHOS in the last 72 hours. INR:   Recent Labs      07/27/18   0818  07/29/18   1030   INR  1.0  1.2     Results for orders placed or performed during the hospital encounter of 07/25/18   URINE CULTURE CLEAN CATCH   Result Value Ref Range    Specimen Description . URINE     Special Requests NOT REPORTED     Culture NO SIGNIFICANT GROWTH     Status FINAL 07/26/2018    Urinalysis with Microscopic   Result Value Ref Range    Color, UA YELLOW YEL    Turbidity UA CLOUDY (A) CLEAR    Glucose, Ur NEGATIVE NEG    Bilirubin Urine NEGATIVE NEG    Ketones, Urine NEGATIVE NEG    Specific Gravity, UA 1.015 1.005 - 1.030    Urine Hgb NEGATIVE NEG    pH, UA 5.0 5.0 - 8.0    Protein, UA 2+ (A) NEG    Urobilinogen, Urine Normal NORM    Nitrite, Urine NEGATIVE NEG    Leukocyte Esterase, Urine MODERATE (A) NEG    -          WBC, UA 5 TO 10 0 - 5 /HPF    RBC, UA 2 TO 5 0 - 4 /HPF    Casts UA 2 TO 5 HYALINE 0 - 8 /LPF    Crystals UA NOT REPORTED NONE /HPF    Epithelial Cells UA 2 TO 5 0 - 5 /HPF    Renal Epithelial, Urine NOT REPORTED 0 /HPF    Bacteria, UA FEW (A) NONE    Mucus, UA NOT REPORTED NONE    Trichomonas, UA NOT REPORTED NONE    Amorphous, UA NOT REPORTED NONE    Other Observations UA NOT REPORTED NREQ    Yeast, UA NOT REPORTED NONE   CBC WITH AUTO DIFFERENTIAL   Result Value Ref Range    WBC 5.7 3.5 - 11.3 k/uL    RBC 3.01 (L) 3.95 - 5.11 m/uL    Hemoglobin 8.2 (L) 11.9 - 15.1 g/dL    Hematocrit 28.4 (L) 36.3 - 47.1 %    MCV 94.4 82.6 - 102.9 fL    MCH 27.2 25.2 - 33.5 pg    MCHC 28.9 28.4 - 34.8 g/dL    RDW 19.1 (H) 11.8 - 14.4 %    Platelets 068 643 - 811 k/uL    MPV 9.0 8.1 - 13.5 fL    NRBC Automated 0.4 (H) 0.0 per 100 WBC    Differential Type NOT REPORTED     WBC Morphology NOT REPORTED     RBC Morphology ANISOCYTOSIS PRESENT     Platelet Estimate NOT REPORTED     Seg Neutrophils 74 (H) 36 - 65 %    Lymphocytes 15 (L) 24 - 43 %    Monocytes 8 3 - 12 %    Eosinophils % 1 1 - 4 %    Basophils 1 0 - 2 %    Immature Granulocytes 1 (H) 0 %    Segs Absolute 4.23 1.50 - 8.10 k/uL    Absolute Lymph # 0.83 (L) 1.10 - 3.70 k/uL    Absolute Mono # 0.47 0.10 - 1.20 k/uL    Absolute Eos # 0.08 0.00 - 0.44 k/uL    Basophils # 0.03 0.00 - 0.20 k/uL    Absolute Immature Granulocyte 0.05 0.00 - 0.30 k/uL   Basic Metabolic Panel   Result Value Ref Range    Glucose 55 (L) 70 - 99 mg/dL     (HH) 8 - 23 mg/dL    CREATININE 9.62 (HH) 0.50 - 0.90 mg/dL    Bun/Cre Ratio NOT REPORTED 9 - 20    Calcium 9.1 8.6 - 10.4 mg/dL    Sodium 137 135 - 144 mmol/L    Potassium 4.6 3.7 - 5.3 mmol/L    Chloride 102 98 - 107 mmol/L    CO2 11 (L) 20 - 31 mmol/L    Anion Gap 24 (H) 9 - 17 mmol/L    GFR Non-African American 4 (L) >60 mL/min    GFR African American 5 (L) >60 mL/min    GFR Comment          GFR Staging NOT REPORTED    Protein, urine, random   Result Value Ref Range    Total Protein, Urine 68 mg/dL   Sodium, urine, random   Result Value Ref Range    Sodium,Ur 44 mmol/L   Creatinine, Random Urine   Result Value Ref Range    Creatinine, Ur 118.0 28.0 - 217.0 mg/dL   CBC Auto Differential   Result Value Ref Range    WBC 4.1 3.5 - 11.3 k/uL    RBC 2.72 (L) 3.95 - 5.11 m/uL    Hemoglobin 7.3 (L) 11.9 - 15.1 g/dL    Hematocrit 26.5 (L) 36.3 - 47.1 %    MCV 97.4 82.6 - 102.9 fL    MCH 26.8 25.2 - 33.5 pg    MCHC 27.5 (L) 28.4 - 34.8 g/dL RDW 18.9 (H) 11.8 - 14.4 %    Platelets 334 374 - 287 k/uL    MPV 9.1 8.1 - 13.5 fL    NRBC Automated 0.0 0.0 per 100 WBC    Differential Type NOT REPORTED     WBC Morphology NOT REPORTED     RBC Morphology NOT REPORTED     Platelet Estimate NOT REPORTED     Immature Granulocytes 0 0 %    Seg Neutrophils 71 (H) 36 - 66 %    Lymphocytes 22 (L) 24 - 44 %    Monocytes 3 1 - 7 %    Eosinophils % 4 1 - 4 %    Basophils 0 0 - 2 %    Absolute Immature Granulocyte 0.00 0.00 - 0.30 k/uL    Segs Absolute 2.92 1.8 - 7.7 k/uL    Absolute Lymph # 0.90 (L) 1.0 - 4.8 k/uL    Absolute Mono # 0.12 0.1 - 0.8 k/uL    Absolute Eos # 0.16 0.0 - 0.4 k/uL    Basophils # 0.00 0.0 - 0.2 k/uL    Morphology ANISOCYTOSIS PRESENT    Comprehensive Metabolic Panel w/ Reflex to MG   Result Value Ref Range    Glucose 104 (H) 70 - 99 mg/dL    BUN 99 (HH) 8 - 23 mg/dL    CREATININE 9.34 (HH) 0.50 - 0.90 mg/dL    Bun/Cre Ratio NOT REPORTED 9 - 20    Calcium 7.9 (L) 8.6 - 10.4 mg/dL    Sodium 138 135 - 144 mmol/L    Potassium 4.6 3.7 - 5.3 mmol/L    Chloride 106 98 - 107 mmol/L    CO2 10 (L) 20 - 31 mmol/L    Anion Gap 22 (H) 9 - 17 mmol/L    Alkaline Phosphatase 63 35 - 104 U/L    ALT <5 (L) 5 - 33 U/L    AST 7 <32 U/L    Total Bilirubin 0.19 (L) 0.3 - 1.2 mg/dL    Total Protein 4.9 (L) 6.4 - 8.3 g/dL    Alb 2.0 (L) 3.5 - 5.2 g/dL    Albumin/Globulin Ratio 0.7 (L) 1.0 - 2.5    GFR Non-African American 4 (L) >60 mL/min    GFR African American 5 (L) >60 mL/min    GFR Comment          GFR Staging NOT REPORTED    Magnesium   Result Value Ref Range    Magnesium 1.9 1.6 - 2.6 mg/dL   Protime-INR   Result Value Ref Range    Protime 10.7 9.0 - 12.0 sec    INR 1.0    Urinalysis with microscopic   Result Value Ref Range    Color, UA YELLOW YEL    Turbidity UA CLOUDY (A) CLEAR    Glucose, Ur NEGATIVE NEG    Bilirubin Urine NEGATIVE NEG    Ketones, Urine NEGATIVE NEG    Specific Gravity, UA 1.014 1.005 - 1.030    Urine Hgb NEGATIVE NEG    pH, UA 5.0 5.0 - 8.0 Protein, UA 2+ (A) NEG    Urobilinogen, Urine Normal NORM    Nitrite, Urine NEGATIVE NEG    Leukocyte Esterase, Urine SMALL (A) NEG    -          WBC, UA 10 TO 20 0 - 5 /HPF    RBC, UA 5 TO 10 0 - 4 /HPF    Casts UA 2 TO 5 HYALINE 0 - 8 /LPF    Crystals UA NOT REPORTED NONE /HPF    Epithelial Cells UA 5 TO 10 0 - 5 /HPF    Renal Epithelial, Urine NOT REPORTED 0 /HPF    Bacteria, UA FEW (A) NONE    Mucus, UA NOT REPORTED NONE    Trichomonas, UA NOT REPORTED NONE    Amorphous, UA NOT REPORTED NONE    Other Observations UA NOT REPORTED NREQ    Yeast, UA NOT REPORTED NONE   Basic Metabolic Panel   Result Value Ref Range    Glucose 188 (H) 70 - 99 mg/dL    BUN 98 (HH) 8 - 23 mg/dL    CREATININE 9.09 (HH) 0.50 - 0.90 mg/dL    Bun/Cre Ratio NOT REPORTED 9 - 20    Calcium 8.0 (L) 8.6 - 10.4 mg/dL    Sodium 138 135 - 144 mmol/L    Potassium 4.6 3.7 - 5.3 mmol/L    Chloride 104 98 - 107 mmol/L    CO2 13 (L) 20 - 31 mmol/L    Anion Gap 21 (H) 9 - 17 mmol/L    GFR Non-African American 4 (L) >60 mL/min    GFR African American 5 (L) >60 mL/min    GFR Comment          GFR Staging NOT REPORTED    Basic Metabolic Panel   Result Value Ref Range    Glucose 215 (H) 70 - 99 mg/dL     (HH) 8 - 23 mg/dL    CREATININE 9.06 (HH) 0.50 - 0.90 mg/dL    Bun/Cre Ratio NOT REPORTED 9 - 20    Calcium 8.0 (L) 8.6 - 10.4 mg/dL    Sodium 135 135 - 144 mmol/L    Potassium 4.3 3.7 - 5.3 mmol/L    Chloride 97 (L) 98 - 107 mmol/L    CO2 17 (L) 20 - 31 mmol/L    Anion Gap 21 (H) 9 - 17 mmol/L    GFR Non-African American 4 (L) >60 mL/min    GFR African American 5 (L) >60 mL/min    GFR Comment          GFR Staging NOT REPORTED    APTT   Result Value Ref Range    PTT 26.3 20.5 - 30.5 sec   Protime-INR   Result Value Ref Range    Protime 11.1 9.0 - 12.0 sec    INR 1.0    CBC Auto Differential   Result Value Ref Range    WBC 4.5 3.5 - 11.3 k/uL    RBC 2.60 (L) 3.95 - 5.11 m/uL    Hemoglobin 7.1 (L) 11.9 - 15.1 g/dL    Hematocrit 23.5 (L) 36.3 - 47.1 % MCV 90.4 82.6 - 102.9 fL    MCH 27.3 25.2 - 33.5 pg    MCHC 30.2 28.4 - 34.8 g/dL    RDW 19.1 (H) 11.8 - 14.4 %    Platelets 586 710 - 583 k/uL    MPV 10.0 8.1 - 13.5 fL    NRBC Automated 0.0 0.0 per 100 WBC    Differential Type NOT REPORTED     WBC Morphology NOT REPORTED     RBC Morphology ANISOCYTOSIS PRESENT     Platelet Estimate NOT REPORTED     Seg Neutrophils 62 36 - 65 %    Lymphocytes 21 (L) 24 - 43 %    Monocytes 10 3 - 12 %    Eosinophils % 5 (H) 1 - 4 %    Basophils 0 0 - 2 %    Immature Granulocytes 1 (H) 0 %    Segs Absolute 2.77 1.50 - 8.10 k/uL    Absolute Lymph # 0.94 (L) 1.10 - 3.70 k/uL    Absolute Mono # 0.45 0.10 - 1.20 k/uL    Absolute Eos # 0.24 0.00 - 0.44 k/uL    Basophils # <0.03 0.00 - 0.20 k/uL    Absolute Immature Granulocyte 0.05 0.00 - 0.30 k/uL   Albumin   Result Value Ref Range    Alb 2.1 (L) 3.5 - 5.2 g/dL   Hepatitis B surface antibody   Result Value Ref Range    Hep B S Ab <3.50 <10 mIU/mL   Hepatitis B surface antigen   Result Value Ref Range    Hepatitis B Surface Ag NONREACTIVE NR   Hepatitis B core antibody, total   Result Value Ref Range    Hep B Core Total Ab NONREACTIVE NR   Lactate Dehydrogenase   Result Value Ref Range     135 - 214 U/L   Basic Metabolic Panel   Result Value Ref Range    Glucose 122 (H) 70 - 99 mg/dL    BUN 72 (H) 8 - 23 mg/dL    CREATININE 6.79 (HH) 0.50 - 0.90 mg/dL    Bun/Cre Ratio NOT REPORTED 9 - 20    Calcium 7.5 (L) 8.6 - 10.4 mg/dL    Sodium 139 135 - 144 mmol/L    Potassium 3.9 3.7 - 5.3 mmol/L    Chloride 101 98 - 107 mmol/L    CO2 20 20 - 31 mmol/L    Anion Gap 18 (H) 9 - 17 mmol/L    GFR Non-African American 6 (L) >60 mL/min    GFR  7 (L) >60 mL/min    GFR Comment          GFR Staging NOT REPORTED    HEPATITIS C ANTIBODY   Result Value Ref Range    Hepatitis C Ab NONREACTIVE NR   APTT   Result Value Ref Range    PTT 27.4 20.5 - 30.5 sec   APTT   Result Value Ref Range    PTT 59.8 (H) 20.5 - 30.5 sec   APTT   Result extent is not well demonstrated. In the left iliac wing, a metastatic lesion is also identified exerting some mass effect upon the left sacroiliac joint. The lesion measures approximately 3.2 x 3.0 cm and is partially imaged on the previous lumbar spine MRI. No additional lesions are identified. DEGENERATIVE CHANGES: Degenerative disc disease throughout the lumbar spine involving the discs and facets. SOFT TISSUES/RETROPERITONEUM: A dominant right renal mass is partially imaged. There is perinephric stranding and perinephric nodularity likely metastatic disease. Atherosclerotic changes of the abdominal aorta are present. Low-density along the medial dome of the liver (axial image 1) suspicious for metastatic involvement of the liver. 1. Pathologic compression fracture of L1 is again identified with some retropulsion of the posterior aspect of the L1 vertebral body into the spinal canal without significant spinal canal narrowing. 2. Metastatic lesions involving the left iliac wing, right S1 sacral body effacing the right S1 neural foramen, and the L1 vertebral body. No new osseous lesions are identified. 3. Heterogeneously enlarged mass largely replacing the right kidney with nodularity in the right renal fossa suspicious for metastatic disease. Mri Lumbar Spine Wo Contrast    Result Date: 7/25/2018  EXAMINATION: MRI OF THE LUMBAR SPINE WITHOUT CONTRAST, 7/25/2018 5:09 pm TECHNIQUE: Multiplanar multisequence MRI of the lumbar spine was performed without the administration of intravenous contrast. COMPARISON: June 9, 2018 HISTORY: ORDERING SYSTEM PROVIDED HISTORY: pathologic fx in lumbar spine, difficulty urinating FINDINGS: BONES/ALIGNMENT: Thoracolumbar fusion is again seen. Hardware causes artifact limiting adjacent evaluation. Pathologic L1 compression fracture is again seen with retropulsion of fracture fragments. No acute fracture. Right sacral metastatic lesion is seen.  SPINAL CORD: Large complex mass within the superior pole the right kidney. Mild right hydronephrosis. Urinary bladder is decompressed by Navarrete catheter. Ir Tunneled Cvc Place Wo Sq Port/pump > 5 Years    Result Date: 7/27/2018  PROCEDURE: ULTRASOUND GUIDED VASCULAR ACCESS. FLUOROSCOPY GUIDED PLACEMENT OF A TUNNELED CATHETER. 7/27/2018. HISTORY: ORDERING SYSTEM PROVIDED HISTORY: RASHMI TECHNOLOGIST PROVIDED HISTORY: Reason for exam:->RASHMI Anticipated long-term dialysis requirement. SEDATION: None FLUOROSCOPY DOSE AND TYPE OR TIME AND EXPOSURES: 0.3 minute; 68 cGy cm squared TECHNIQUE: Informed consent was obtained after a detailed explanation of the procedure including risks, benefits, and alternatives. Universal protocol was observed. The right neck and chest were prepped and draped in sterile fashion using maximum sterile barrier technique. Local anesthesia was achieved with lidocaine. A micropuncture needle was used to access the right internal jugular vein using ultrasound guidance. An ultrasound image demonstrating patency of the vein with needle tip located within it. An image was obtained and stored in PACs. A 0.035 guidewire was used to place a peel-away sheath. A subcutaneous tunnel was created to the infraclavicular region and a tunneled 14.5 [place] by 19 cm tip to cuff dialysis catheter was pulled through the subcutaneous tunnel to the venotomy site and advanced through the peel-away sheath under fluoroscopic guidance to the right atrium. The catheter flushed easily and there was a good blood return. The catheter was sutured to the skin. The catheter was locked with heparinized saline. The patient tolerated the procedure well and there were no immediate complications. FINDINGS: Fluoroscopic image demonstrates the tip of the catheter in the right atrium. Successful ultrasound and fluoroscopy guided right internal jugular vein 14.5 [place] by 19 cm tip to cuff tunneled catheter placement. Ready for use. (<3.5cm/s).  (<3.5mc/s). Kidney length is normal.  Kidney length abnormal,  possible mass at         Renal vein has absent color flow mid to distal,  inferior pole.           however there is continuous doppler flow                           proximally which would suggest patent renal vein. Renal vein is patent. Risk Factors History +---------+----------+-----------------------------------------------------+ ! Diagnosis! Date      ! Comments                                             ! +---------+----------+-----------------------------------------------------+ ! Other    ! ! Right Renal Cell Carcinoma with mets                 ! +---------+----------+-----------------------------------------------------+ ! DVT      !03/16/2018! RT popliteal, peroneal, deep calf vein               ! +---------+----------+-----------------------------------------------------+   - The patient's risk factor(s) include: diabetes mellitus and obesity.   - The patient has kidney cancer. Velocities are measured in cm/s ; Diameters are measured in cm Abdominal Aortic Flow +--------------------------------+---+---+------------+--------------------+ ! Location                        ! PSV! EDV! AP Diam     !Trans Diam          ! +--------------------------------+---+---+------------+--------------------+ ! Aorta Juxta Renal               !109!   !            !                    ! +--------------------------------+---+---+------------+--------------------+ Renal Duplex Measurements +------------------------------------++-----+---+----+----++---+---+--+----+ ! Renal Artery A                      !!Right! ! Left!    !!   !   !  !    ! +------------------------------------++-----+---+----+----++---+---+--+----+ ! Location                            ! !PSV  ! EDV! RI  !RAR !!PSV! EDV!RI!RAR ! +------------------------------------++-----+---+----+----++---+---+--+----+ ! Ostial Renal                        !!157  !   ! ! 5.90!!312!   !  !1.13! +------------------------------------++-----+---+----+----++---+---+--+----+ ! Prox Renal                          !!139  !   !    !1.28!!136!   !  !1.25! +------------------------------------++-----+---+----+----++---+---+--+----+ ! Mid Renal                           !!173  !   !    !1.59!!157!   !  !1.44! +------------------------------------++-----+---+----+----++---+---+--+----+ ! Dist Renal                          !!130  !   !    !1.19!!123!   !  !1.13! +------------------------------------++-----+---+----+----++---+---+--+----+ Right Miscellaneous Measurements   - The average kidney length is 15.15 cm. Left Miscellaneous Measurements   - The average kidney length is 10.45 cm. Vl Dup Lower Extremity Venous Left    Result Date: 7/28/2018    [facility]  Vascular Lower Extremities DVT Study Procedure   Patient Name  [facility]    Date of Study           07/28/2018                Arvind Enriquez   Date of Birth 1955  Gender                  Female   Age           66 year(s)  Race                       Room Number   6929   Corporate ID  [id number]  #   Patient Lara  [de-identified]  #   MR #          [id number]     Sonographer             Belén Jeong   Accession #   [id number]   Interpreting Physician  Ailyn Moyer   Referring                 Referring Physician     Elvin Green, *  Nurse  Practitioner  Procedure Type of Study:   Veins: Lower Extremities DVT Study, Venous Scan Lower Left. Indications for Study:Hx of DVT. Patient Status: In Patient. Technical Quality:Limited visualization. Limitation reason:Pt in pain. - Critical Result:Fernanda WAITE RN at 9:30 am.  Conclusions   Summary   Simultaneous real time imaging utilizing B-Mode, color doppler and  spectral waveform analysis was performed on the left lower extremity for  venous examination of the deep and superficial systems.  Findings are:   **Abnormal Study**   Extensive acute deep venous thrombosis extending from the gastrocnemius  vein into the external iliac vein. Acute superficial venous thrombosis identified in the small saphenous  vein. Signature   ----------------------------------------------------------------  Electronically signed by Belén Jeong(Sonographer) on  07/28/2018 09:47 AM  ----------------------------------------------------------------   ----------------------------------------------------------------  Electronically signed by Phuong Espana(Interpreting  physician) on 07/28/2018 10:17 AM  ----------------------------------------------------------------  Findings:   Right Impression:                 Left Impression:  The common femoral vein           The gatrocnemius to the external iliac  demonstrates normal augmentation. veins are non-compressible. Thrombus appears acute based on B-Mode                                    image evaluation. Normal compressibility of the great                                    saphenous vein. The small saphenous vein demonstrates no                                    compressibility. Thrombus appears acute based on B-Mode                                    image evaluation. Risk Factors History +---------+----------+-----------------------------------------------------+ ! Diagnosis! Date      ! Comments                                             ! +---------+----------+-----------------------------------------------------+ ! Other    ! ! Right Renal Cell Carcinoma with mets                 ! +---------+----------+-----------------------------------------------------+ ! DVT      !03/16/2018! RT popliteal, peroneal, deep calf vein               !  +---------+----------+-----------------------------------------------------+   - The patient's risk factor(s) include: diabetes mellitus and obesity.   - The patient has kidney +------------------------------------+----------+---------------+----------+ ! Sapheno Femoral Junction            ! Yes       ! Yes            ! None      ! +------------------------------------+----------+---------------+----------+ ! PTV                                 ! Yes       ! Yes            ! None      ! +------------------------------------+----------+---------------+----------+ ! Peroneal                            !No        !               !          ! +------------------------------------+----------+---------------+----------+ ! Gastroc                             ! Yes       ! No             !Acute     ! +------------------------------------+----------+---------------+----------+ ! GSV Thigh                           ! Yes       ! Yes            ! None      ! +------------------------------------+----------+---------------+----------+ ! GSV Knee                            ! Yes       ! Yes            ! None      ! +------------------------------------+----------+---------------+----------+ ! GSV Ankle                           ! Yes       ! Yes            ! None      ! +------------------------------------+----------+---------------+----------+ ! SSV                                 ! Yes       ! No             !Acute     ! +------------------------------------+----------+---------------+----------+ Left Doppler Measurements +-------------------------+----------+------+------------------------------+ ! Location                 ! Signal    !Reflux! Reflux (msec)                 ! +-------------------------+----------+------+------------------------------+ ! Common Femoral           !Diminished!      !                              ! +-------------------------+----------+------+------------------------------+ ! Prox Femoral             !Continuous!      !                              ! +-------------------------+----------+------+------------------------------+ ! Popliteal                !Continuous!      ! ! +-------------------------+----------+------+------------------------------+    Objective:   Vitals: BP (!) 140/59   Pulse 86   Temp 98.3 °F (36.8 °C)   Resp 10   Ht 5' 8\" (1.727 m)   Wt 181 lb 3.5 oz (82.2 kg)   SpO2 99%   BMI 27.55 kg/m²   General appearance: follows commands but lethargic  HEENT: Head: Normocephalic, no lesions, without obvious abnormality. Neck: no adenopathy  Lungs: clear to auscultation bilaterally  Heart: regular rate and rhythm, S1, S2 normal, no murmur, click, rub or gallop  Abdomen: soft, non-tender; bowel sounds normal; no masses,  no organomegaly  Extremities: extremities normal, atraumatic, no cyanosis or edema  Neurologic: Mental status: Alert,  but very lethargic. 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 *      1. Metastatic Renal Cell Carcinoma to the bones status post stabilization of spine by neurosurgery and palliative radiation  2. Already received palliative radiation and discussed with  Brain May unable to receive additional RXT to lumbar area  3. Dr. Ania Woodard planning to start immunotherapy using Ipilimumab and Nivolumab outpatient, however, clinically needs to improve,discussed that goal is palliative  4. End-stage renal disease started on hemodialysis  5. If hemoglobin continues to drop consider blood transfusion at time of hemodialysis  6. Continue supportive care.   7. Urology to evaluate patient for palliative nephrectomy  8. Palliative care team on board. 9. Prognosis is guarded      Lori Cervantes MD    This note is created with the assistance of a speech recognition program.  While intending to generate a document that actually reflects the content of the visit, the document can still have some errors including those of syntax and sound a like substitutions which may escape proof reading. It such instances, actual meaning can be extrapolated by contextual diversion.

## 2018-07-29 NOTE — PROGRESS NOTES
Palliative Care Progress Note    NAME:  Julian Holbrook RECORD NUMBER:  [id number]  AGE: 66 y.o. GENDER: female  : 1955  TODAY'S DATE:  2018    Reason for Consult:  goals of care  History of Present Illness     The patient is a 66 y.o. Non-/non  female who presents with Hematuria (Sent by her oncologist for dysuria with hematuria and lower back pain. Has h/o renal carcinoma. Pt reports generalized weakness/fatigue. Denies N/V/D/C/fever/chills.)      Referred to Palliative Care by  [x] Physician   [] Nursing  [] Family Request   [] Other:       She was admitted to the Internal medicine service for RASHMI (acute kidney injury) ([facility] [address] [N17.9]. Her hospital course has been associated with acute renal failure.  The patient has a complicated medical history and has been hospitalized since 2018  1:16 PM.    OVERNIGHT EVENTS:  - No acute events overnight  - Patient started on hemodialysis, tolerating well  - Complains of loss of appetite  - Continues to have back pain but well controlled with current pain medications       BP (!) 140/59   Pulse 86   Temp 98.3 °F (36.8 °C)   Resp 10   Ht 5' 8\" (1.727 m)   Wt 181 lb 3.5 oz (82.2 kg)   SpO2 99%   BMI 27.55 kg/m²     Assessment        REVIEW OF SYSTEMS    []   UNABLE TO OBTAIN:     Constitutional:  []   Chills   [x]  Fatigue   []  Fevers   []  Malaise   []  Weight loss   [] Other:  Decreased appetite    Respiratory:   []  Cough    []  Shortness of breath    []  Chest pain    [] Other:     Cardiovascular:   []  Chest pain  []  Dyspnea    []  Exertional chest pressure/discomfort     [x] Fatigue      []  Palpitations    []  Syncope   [] Other:     Gastrointestinal:   []  Abdominal pain   []  Constipation    []  Diarrhea    []   Dysphagia   []  Reflux             []  Vomiting   [] Other:     Genitourinary:  []  Dysuria     []  Frequency   []  Hematuria   [] Nocturia   []  Urinary incontinence   [] Other:     Musculoskeletal:   [x] Back pain    []  Muscle weakness   []  Myalgias    []  Neck pain   []  Stiff joints   []  Other:     Behavioral/Psych:   [] Anxiety    []  Depression     []  Mood swings   [] Other:     PHYSICAL ASSESSMENT:     General: [x]  Oriented x3      [] well appearing      [] Intubated      [x] ill appearing      [] Other:    Mental Status: [x] normal mental status exam      [] drowsy      [] Confused      [] Other:     Cardiovascular: [x]  Regular rate/rhythm      [] Arrhythmia      [] Other:     Chest: [x] Effort normal      [x] lungs clear      [] respiratory distress      [] Tachypnea      []  Other:    Abdomen: [x] Soft/non-tender      [x]  Normal appearance      [] Distended      [] Ascites      [] Other:    Neurological: [x] Normal Speech      [x] Normal Sensation      []  Deficits present:      Extremity:  [x] normal skin color/temp      [] clubbing/cyanosis      []  No edema      [] Other:     Palliative Performance Scale:  ___60%  Ambulation reduced; Significant disease; Can't do hobbies/housework; intake normal or reduced; occasional assist; LOC full/confusion  ___50%  Mainly sit/lie; Extensive disease; Can't do any work; Considerable assist; intake normal or reduced; LOC full/confusion  ___40%  Mainly in bed; Extensive disease; Mainly assist; intake normal or reduced; LOC full/confusion   __x_30%  Bed Bound; Extensive disease; Total care; intake reduced; LOCfull/confusion  ___20%  Bed Bound; Extensive disease; Total care; intake minimal; Drowsy/coma  ___10%  Bed Bound; Extensive disease;  Total care; Mouth care only; Drowsy/coma  ___0       Death      Plan      Palliative Interaction:    - The patient was seen today to reassess and to maintain continuity of patient care    - The patient's current medical conditions were discussed with her    - I again discussed the significance of POA paperwork for health with the patient as patient had previously stated that she wanted her older daughter Nigel Jean to be her POA for Code    4- Other recommendations  - We will continue to discuss goals of care/code status with the patient  - We will continue to provide comfort and support to the patient and family  Please call with any palliative questions or concerns. Palliative Care Team is available via perfect serve or via phone. Patient was seen with total face-to-face time of 25 minutes. More than 50% of this visit was counseling and/or education regarding PC as well as care coordination. Palliative Care will continue to follow [facility] care as needed. The note has been dictated by dragon, typing errors may be a possibility. Thank you for allowing Palliative Care to participate in the care of Ms. Kalen Ledesma .        Electronically signed by   Hamlet Back MD  Palliative Care Team  on 7/29/2018 at 12:07 PM    Palliative care office: [phone]

## 2018-07-29 NOTE — PROGRESS NOTES
Nephrology Progress Note      SUBJECTIVE      42-year-old female presents with complaints of gross painless hematuria for the last 3 days. History of right-sided metastatic RCC which spread to lumbar spine and peritoneum which was discovered in 2018. The patient has previous histories of admission to the hospital due to acute kidney injury. The patient was consulted because of acute kidney injury secondary to decreased urine output, obstructive uropathy/urinary retention. Based on creatinine is in the range of 2-2.0, nonoliguric. The patient also has a known history of chronic renal disease stage III, diabetes mellitus, hypertension. Patient seen and examined at bedside. Alert and oriented. Stable hemodynamically. Tolerating her diet well. Her urine output is still low. She has DVT in her legs bilaterally. OBJECTIVE      CURRENT TEMPERATURE:  Temp: 98.5 °F (36.9 °C)  MAXIMUM TEMPERATURE OVER 24HRS:  Temp (24hrs), Av.5 °F (36.9 °C), Min:98 °F (36.7 °C), Max:99 °F (37.2 °C)    CURRENT RESPIRATORY RATE:  Resp: 11  CURRENT PULSE:  Pulse: 75  CURRENT BLOOD PRESSURE:  BP: (!) 131/57  24HR BLOOD PRESSURE RANGE:  Systolic (99OMS), VNW:295 , Min:117 , BHU:859   ; Diastolic (14EUY), BYH:18, Min:40, Max:75    24HR INTAKE/OUTPUT:    Intake/Output Summary (Last 24 hours) at 18 1034  Last data filed at 18 0519   Gross per 24 hour   Intake             1411 ml   Output             1410 ml   Net                1 ml     WEIGHT :Patient Vitals for the past 96 hrs (Last 3 readings):   Weight   18 0520 181 lb 3.5 oz (82.2 kg)   18 1330 191 lb 9.3 oz (86.9 kg)   18 1016 192 lb 0.3 oz (87.1 kg)     PHYSICAL EXAM      GENERAL APPEARANCE:Awake, alert, in no acute distress  SKIN: warm and dry, no rash or erythema  EYES: conjunctivae normal and sclera anicteric  ENT: no thrush no pharyngeal congestion   NECK:   No JVD. No carotid bruits and no carotid lymphadenopathy .   PULMONARY: lungs are clear to auscultation. No Wheezing, no ronchi . CADRDIOVASCULAR: S1 and S2 normal NO S3 and NO S4 . No rubs , no murmur. ABDOMEN: soft nontender, bowel sounds present, no organomegaly, no ascites. EXTREMITIES: mild swelling in lower legs bilaterally, no edema.     CURRENT MEDICATIONS        megestrol (MEGACE) 40 MG/ML suspension 400 mg Daily   heparin (porcine) injection 6,970 Units PRN   heparin (porcine) injection 3,480 Units PRN   heparin 25,000 units in dextrose 5% 250 mL infusion Continuous   0.9 % sodium chloride bolus PRN   0.9 % sodium chloride bolus PRN   0.9 % sodium chloride infusion Continuous   heparin (porcine) injection 1,600 Units PRN   heparin (porcine) injection 1,600 Units PRN   acetaminophen (TYLENOL) tablet 1,000 mg TID   amitriptyline (ELAVIL) tablet 75 mg Nightly   atorvastatin (LIPITOR) tablet 20 mg Nightly   cyclobenzaprine (FLEXERIL) tablet 5 mg TID PRN   docusate sodium (COLACE) capsule 200 mg BID   pantoprazole (PROTONIX) tablet 40 mg QAM AC   ondansetron (ZOFRAN) tablet 4 mg Daily PRN   oxyCODONE (OXYCONTIN) extended release tablet 10 mg BID   oxyCODONE (ROXICODONE) immediate release tablet 10 mg Q6H PRN   prochlorperazine (COMPAZINE) tablet 5 mg Q8H PRN   sodium chloride flush 0.9 % injection 10 mL 2 times per day   sodium chloride flush 0.9 % injection 10 mL PRN   ondansetron (ZOFRAN) injection 4 mg Q6H PRN   insulin lispro (HUMALOG) injection vial 0-12 Units TID WC   insulin lispro (HUMALOG) injection vial 0-6 Units Nightly   glucose (GLUTOSE) 40 % oral gel 15 g PRN   dextrose 50 % solution 12.5 g PRN   glucagon (rDNA) injection 1 mg PRN   dextrose 5 % solution PRN   acetaminophen (TYLENOL) tablet 1,000 mg Q6H PRN         LABS      CBC: Recent Labs      07/27/18   0818  07/29/18   0813   WBC  4.5  5.1   RBC  2.60*  2.68*   HGB  7.1*  7.1*   HCT  23.5*  24.0*   MCV  90.4  89.6   MCH  27.3  26.5   MCHC  30.2  29.6   RDW  19.1*  18.5*   PLT  283  236   MPV  10.0  9.5 BMP: Recent Labs      07/26/18   1840  07/27/18   0648  07/28/18   0530   NA  138  135  139   K  4.6  4.3  3.9   CL  104  97*  101   CO2  13*  17*  20   BUN  98*  101*  72*   CREATININE  9.09*  9.06*  6.79*   GLUCOSE  188*  215*  122*   CALCIUM  8.0*  8.0*  7.5*      BNP:No results found for: BNP  PHOSPHORUS:  No results for input(s): PHOS in the last 72 hours. MAGNESIUM: No results for input(s): MG in the last 72 hours. ALBUMIN:   Recent Labs      07/27/18   0818   LABALBU  2.1*     IRON:    Lab Results   Component Value Date    IRON 36 05/02/2018     IRON SATURATION:  Lab Results   Component Value Date    LABIRON 20 05/02/2018     TIBC:    Lab Results   Component Value Date    TIBC 176 05/02/2018     FERRITIN:    Lab Results   Component Value Date    FERRITIN 187 05/02/2018     IESHA:   Lab Results   Component Value Date    IESHA NEGATIVE 01/24/2018       SPEP: Lab Results   Component Value Date    PROT 4.9 07/26/2018     UPEP: No results found for: TPU   HEPBSAG:  Lab Results   Component Value Date    HEPBSAG NONREACTIVE 07/27/2018     HEPCAB:  Lab Results   Component Value Date    HEPCAB NONREACTIVE 07/27/2018     C3:   Lab Results   Component Value Date    C3 117 01/24/2018     C4:   Lab Results   Component Value Date    C4 26 01/24/2018     MPO ANCA: No results found for: MPO .   PR3 ANCA:  No results found for: PR3  URINE SODIUM:    Lab Results   Component Value Date    DEBBY 44 07/25/2018      URINE CREATININE:  Lab Results   Component Value Date    LABCREA 118.0 07/25/2018     URINE EOSINOPHILS: No results found for: UREO  URINE PROTEIN:  No results found for: TPU  URINALYSIS:  U/A: Lab Results   Component Value Date    NITRU NEGATIVE 07/25/2018    COLORU YELLOW 07/25/2018    PHUR 5.0 07/25/2018    WBCUA 10 TO 20 07/25/2018    RBCUA 5 TO 10 07/25/2018    MUCUS NOT REPORTED 07/25/2018    TRICHOMONAS NOT REPORTED 07/25/2018    YEAST NOT REPORTED 07/25/2018    BACTERIA FEW 07/25/2018    CLARITYU clear 04/10/2018 SPECGRAV 1.014 07/25/2018    LEUKOCYTESUR SMALL 07/25/2018    UROBILINOGEN Normal 07/25/2018    BILIRUBINUR NEGATIVE 07/25/2018    BILIRUBINUR neg. 04/10/2018    BLOODU large 04/10/2018    GLUCOSEU NEGATIVE 07/25/2018    KETUA NEGATIVE 07/25/2018    AMORPHOUS NOT REPORTED 07/25/2018     ANTIGBM:No results found for: GBMABIGG      RADIOLOGY      Reviewed as available. ASSESSMENT      1. RASHMI contributed to obstructive uropathy, urinary retention and aggravated further by concomitant ACE inhibitor use and diuretic use. Oral intake poor. - HD dependent 7/27 - perm cath  2. CKD Stage 3, baseline creatinine 2-2.2  3. DVT on anticoagulation  4. Hematuria   5. R sided metastatic RCC with spread to Lumbar spine and peritoneum which was discovered in January 2018 during MRI for back pain due to L1 compression fracture  6.  Hypertension  7.  Diabetes mellitus type 2     PLAN      1. We'll follow the renal functions of the patient  2. Hemodialysis as per kidney function results AM  3. Megace 400mg suspension for appetite  4. Will arrange for outpatient HD placement    Please do not hesitate to call with questions. Víctor Fay MD  PGY-1, Department of Internal Medicine  [place], [place]  7/29/2018 10:39 AM    Electronically signed by Víctor Fay MD on 7/29/2018 at 10:34 AM  Attending Physician Statement  I have discussed the care of Nara Espino, including pertinent history and exam findings,  with the Fellow/Residentt. I have reviewed the key elements of all parts of the encounter with the Fellow/ Resident. I agree with the assessment, plan and orders as documented by the resident.     Carlos Alberto Sears MD, MRCP Margie Quintanilla, FACP   7/29/2018 10:44 AM    Nephrology [address] [place] [address]

## 2018-07-29 NOTE — PROGRESS NOTES
Physical Therapy    Facility/Department: [facility] 4B STEPDOWN  Initial Assessment    NAME: Evelyn Hidalgo  : 1955  MRN: [id number]    Date of Service: 2018     The patient is a 66 y.o.  Non-/non  female who presents with Hematuria (Sent by her oncologist for dysuria with hematuria and lower back pain. Has h/o renal carcinoma. Pt reports generalized weakness/fatigue. Denies N/V/D/C/fever/chills.)   and she is admitted to the hospital for the management of  Acute renal failure and gross hematuria. Patient was known right sided renal cell carcinoma with spread to The lumbar spine and peritoneum that was diagnosed in 2018 and had previously went through lumbar spine fixation for her compression fracture as well as radiation, seem like she is currently not on chemo for the last 1 month, she will be scheduled for nephrectomy with another possible lumbar fixation next month according to her. Larissa Dawkins sent to ER from her oncologist office after noticing gross hematuria and increasing difficulty voiding.  In ER she was found to have elevated creatinine of 9.6 and BUN of 101, ( average  baseline creatinine  was 2- 2.8) and found to have high anion gap metabolic acidosis, potassium was fine, bladder scan showed 361 mL in ER and Navarrete catheter was placed given the retention in the history. Patient is well was assessed by nephrology who started her on bicarb drip given her acidosis. Thought to be likely hematuria along with ACE and diuretic use. Needing dialysis with tunnel catheter placement. Also noted to have left leg extensive DVT from gastrocnemius to external iliac vein, started on heparin with addition of coumadin.  Given continued worsened back pain, CT lumbar spine showed her known L1 pathologic fracture as well as further metastatic lesion invading the iliac sac and sacral bones, neurosurgery evaluated the patient as well along with radiation oncology team, advised continued outpatient follow up. Discharge Recommendations:  Continue to assess pending progress, Subacute/Skilled Nursing Facility        Patient Diagnosis(es): The primary encounter diagnosis was Hematuria, unspecified type. Diagnoses of Renal cell cancer, right ([facility] [address] Acute renal failure, unspecified acute renal failure type [facility]), Pathologic lumbar vertebral fracture, sequela, Metabolic acidosis, Hypoglycemia, and Anemia, unspecified type were also pertinent to this visit. has a past medical history of Cancer ([facility] [address] Chronic kidney disease; Depression; Diabetes mellitus ([facility] GERD (gastroesophageal reflux disease); HA (headache); HBP (high blood pressure); History of blood transfusion; Hyperlipidemia; Hyperplastic polyp of intestine; Hypothyroid; Hypothyroidism; Left knee pain; Non-smoker; OA (osteoarthritis); and Tubular adenoma. has a past surgical history that includes Hysterectomy; Tonsillectomy; Colonoscopy (08/16/2016); back surgery (N/A, 01/23/2018); pr lumbar spine fusn,post intrbdy (N/A, 1/23/2018); and Nerve Block (07/13/2018). Restrictions  Restrictions/Precautions  Restrictions/Precautions: Fall Risk  Vision/Hearing  Vision: Impaired  Vision Exceptions: Wears glasses at all times  Hearing: Within functional limits     Subjective  General  Family / Caregiver Present: No  Follows Commands: Within Functional Limits  Pain Screening  Patient Currently in Pain: Denies  Pain Assessment  Pain Assessment: 0-10  Pain Level: 0  Vital Signs  Patient Currently in Pain: Denies  Pre Treatment Pain Screening  Pain at present: 8  Scale Used: Faces  Comments / Details: at time of assessment with hip flexion, attempts at LE ROM    Orientation  Orientation  Overall Orientation Status: Impaired  Orientation Level: Oriented to person;Oriented to place; Disoriented to time;Disoriented to situation    Social/Functional History  Social/Functional History  Additional Comments: pt unable to answer questions regarding PTA history/function, see orientation  Objective          PROM RLE (degrees)  RLE PROM: WFL  RLE General PROM: pain at end ranges of hip and knee flexion  PROM LLE (degrees)  LLE PROM: WFL  LLE General PROM: pain at end ranges of hip and knee flexion  AROM RUE (degrees)  RUE AROM : WFL  AROM LUE (degrees)  LUE AROM : WFL  Strength RLE  Strength RLE: Exception  Comment: 3-/5-limited by pain  Strength LLE  Strength LLE: Exception  Comment: 3-/5 limited by pain  Strength RUE  Comment: 4-/5 overall  Strength LUE  Comment: 4-/5 overall        Bed mobility  Rolling to Left: Maximum assistance  Rolling to Right: Maximum assistance  Comment: patient with pain for attempted rolling per nurse and for this assessment  Transfers  Comment: deferred attempted edge of bed-discussed with Jeana Ramos RN-pt with complaints of pain, decreased orientation, refusing to assist           Other exercises  Other exercises?: Yes  Other exercises 1: pt agreeable to AAROM within pain tolerance-B U/LE x15 reps-noted left UE/LE with increased edema vs. right side-nurse notified      Assessment   Body structures, Functions, Activity limitations: Decreased functional mobility ; Decreased safe awareness;Decreased cognition;Decreased endurance;Decreased strength;Decreased ROM  Assessment: generalized pain, weakness, confusion/memory issues, refusing initially-agreeable to participate in MANDI arce within pain tolerance  Prognosis: Good  Decision Making: Low Complexity  REQUIRES PT FOLLOW UP: Yes  Activity Tolerance  Activity Tolerance: Patient limited by pain; Patient limited by fatigue;Patient limited by endurance; Patient limited by cognitive status         Plan   Plan  Times per week: 5-6x/wk  Times per day: Daily  Current Treatment Recommendations: Strengthening, ROM, Functional Mobility Training, Transfer Training, Cognitive Reorientation, Patient/Caregiver Education & Training, Positioning  Safety Devices  Type of devices: Call light within reach, Left in bed, Patient at risk for falls, Nurse notified  Restraints  Initially in place: No    G-Code  PT G-Codes  Functional Assessment Tool Used: Harman  Score: 6  Functional Limitation: Mobility: Walking and moving around  Mobility: Walking and Moving Around Current Status (): At least 80 percent but less than 100 percent impaired, limited or restricted  Mobility: Walking and Moving Around Goal Status ():  At least 1 percent but less than 20 percent impaired, limited or restricted  Mobility: Walking and Moving Around Discharge Status (): 0 percent impaired, limited or restricted  OutComes Score                                           AM-PAC Score     AM-PAC Inpatient Mobility without Stair Climbing Raw Score : 6  AM-PAC Inpatient without Stair Climbing T-Scale Score : 26.48  Mobility Inpatient CMS 0-100% Score: 92.18  Mobility Inpatient without Stair CMS G-Code Modifier : CM       Goals  Short term goals  Time Frame for Short term goals: 10  Short term goal 1: pt able to tolerate AROM B U/LE x15 reps without pain   Short term goal 2: pt able to perform bed mobility with MinAx1  Short term goal 3: progress functional mobility as pain/medical status allows        Therapy Time   Individual Concurrent Group Co-treatment   Time In 0928         Time Out 0953         Minutes 25                 KATHIA OTT, PT

## 2018-07-29 NOTE — FLOWSHEET NOTE
Visited and prayed with patient at her bedside. Family was not present at the time. However, patient did make mention of family. When asked how she was feeling, patient responded; \"ok. \" Lauren Torres maintained listening presence and offered support. Patient said she was raised Religion but not affiliated with any parish. Patient received sacrament of anointing of the sick. Follow up visits recommended for more spiritual and emotional support.     07/29/18 2306   Encounter Summary   Services provided to: Patient   Support System Family members   Place of Zoroastrianism None   Continue Visiting (07/29/2018)   Complexity of Encounter Moderate   Length of Encounter 30 minutes   Spiritual Assessment Completed Yes   Routine   Type Follow up   Spiritual/Gnosticist   Type Spiritual support   Assessment Calm; Approachable; Hopeful   Intervention Active listening;Nurtured hope;Prayer; Anointing   Outcome Expressed gratitude   Sacraments   Sacrament of Sick-Anointing Anointed

## 2018-07-30 NOTE — FLOWSHEET NOTE
SPIRITUAL CARE DEPARTMENT - [facility] 85  PROGRESS NOTE    Shift date: 7/29/2018  Shift day: Sunday   Shift # 2    Room # 6906/8665-16   Name: Sara Sloan            Age: 66 y.o. Gender: female          Restorationism: [address] of Congregation: unknown    Referral: Routine Visit    Admit Date & Time: 7/25/2018  1:16 PM    PATIENT/EVENT DESCRIPTION:  Sara Sloan is a 66 y.o. female struggling with cancer and possible depression. Not ready to talk with family or medical status about current health and future care. Family Contacts:  Estill Phalen (Niece) - [phone]  Arteaga Fuad (Daughter) - [phone]  Iman Conley (Daughter) - [phone]      SPIRITUAL ASSESSMENT/INTERVENTION:   followed up with the patient regarding her conversation with the physician  Patient seemed to not have a good handle on what is going on  Patient appears to be suffering from depression and not engaging in conversation with family  Nurse reported that she is sleeping all the time  Family has tried repeatedly to contact her but no reply   spoke with patient about talking with her medical care team and family about her struggles, concerns, worries and what she would like to do about her future care  Patient asked  to reach out to her family to see if they would be willing to talk to her about her condition   contacted family and encouraged them to call patient and provide emotional support        SPIRITUAL CARE FOLLOW-UP PLAN:  Chaplains will remain available to offer spiritual and emotional support as needed.   will follow up with patient regarding conversation with family and patient's wishes regarding healthcare       Electronically signed by Krystle Rodrigues on 7/29/2018 at 10:43 PM.  Sudarshan Arredondo  [phone]       07/29/18 2000   Encounter Summary   Services provided to: Patient   Referral/Consult From: [facility] Family members   Continue Visiting (7/29/2018)   Complexity of Encounter High   Length of Encounter 45 minutes   Spiritual Assessment Completed Yes   Routine   Type Follow up   Crisis   Type Emotional distress   Assessment Tearful;Loneliness;Grieving; Anticipatory grief;Denial;Despair   Intervention Active listening;Explored feelings, thoughts, concerns;Explored coping resources;Prayer;Contacted support as requested per patient/family request   Who? Daughters   Why?  Code Status   At Request Of Patient   Outcome Expressed gratitude     Electronically signed by Ledy Rincon on 7/29/2018 at 10:44 PM

## 2018-07-30 NOTE — PROGRESS NOTES
Nephrology Progress Note      SUBJECTIVE      43-year-old female presents with complaint of gross painless hematuria for the last 3 days prior to admission. History of right sided metastatic RCC with spread to lumbar spine and peroneum which was discovered in 2018. The patient has previous histories of admission to the hospital due to acute kidney injury. The patient was consulted because of acute kidney injury secondary to decreased urine output, obstructive uropathy/urinary retention. Baseline creatinine is in the range of 2-2.2, nonoliguric. The patient also has a history of chronic kidney disease stage III, hypertension, diabetes mellitus. The patient was seen and examined at bedside today. Alert and oriented. Stable hemodynamically. Tolerated her diet well. Her urine output in the last 24 hours has been 475ml. The patient complains of pain in the lower limbs especially on the right side. The patient has DVT bilaterally in the lower limbs. The patient denies any complaints of nausea, vomiting, shortness of breath.   OBJECTIVE      CURRENT TEMPERATURE:  Temp: 98.4 °F (36.9 °C)  MAXIMUM TEMPERATURE OVER 24HRS:  Temp (24hrs), Av.7 °F (37.1 °C), Min:98.3 °F (36.8 °C), Max:99 °F (37.2 °C)    CURRENT RESPIRATORY RATE:  Resp: 14  CURRENT PULSE:  Pulse: 80  CURRENT BLOOD PRESSURE:  BP: (!) 129/48  24HR BLOOD PRESSURE RANGE:  Systolic (54LXN), PUV:251 , Min:126 , THL:045   ; Diastolic (13ABM), ZQP:39, Min:48, Max:59    24HR INTAKE/OUTPUT:    Intake/Output Summary (Last 24 hours) at 18 0849  Last data filed at 18 4302   Gross per 24 hour   Intake             1731 ml   Output              475 ml   Net             1256 ml     WEIGHT :Patient Vitals for the past 96 hrs (Last 3 readings):   Weight   18 0625 187 lb 2.7 oz (84.9 kg)   18 0520 181 lb 3.5 oz (82.2 kg)   18 1330 191 lb 9.3 oz (86.9 kg)     PHYSICAL EXAM      GENERAL APPEARANCE:Awake, alert, in no acute distress  SKIN: warm and dry, no rash or erythema  EYES: conjunctivae normal and sclera anicteric  ENT: no thrush, moist mucus membranes   NECK:   No JVD. Trachea is midline, no accessory muscle use  PULMONARY: lungs are clear to auscultation. No Wheezing, no rales and no rfonchi . CADRDIOVASCULAR: regular rate and rhythm with positive S1 and S2 normal NO S3 and NO S4 . No rubs, and no murmur. ABDOMEN: soft nontender, bowel sounds present, no ascites.      EXTREMITIES: no cyanosis, clubbing or edema     CURRENT MEDICATIONS        megestrol (MEGACE) 40 MG/ML suspension 400 mg Daily   warfarin (COUMADIN) tablet 5 mg Daily   warfarin (COUMADIN) daily dosing (placeholder) RX Placeholder   morphine injection 2 mg Q3H PRN   heparin (porcine) injection 6,970 Units PRN   heparin (porcine) injection 3,480 Units PRN   heparin 25,000 units in dextrose 5% 250 mL infusion Continuous   0.9 % sodium chloride bolus PRN   0.9 % sodium chloride bolus PRN   0.9 % sodium chloride infusion Continuous   heparin (porcine) injection 1,600 Units PRN   heparin (porcine) injection 1,600 Units PRN   acetaminophen (TYLENOL) tablet 1,000 mg TID   amitriptyline (ELAVIL) tablet 75 mg Nightly   atorvastatin (LIPITOR) tablet 20 mg Nightly   cyclobenzaprine (FLEXERIL) tablet 5 mg TID PRN   docusate sodium (COLACE) capsule 200 mg BID   pantoprazole (PROTONIX) tablet 40 mg QAM AC   ondansetron (ZOFRAN) tablet 4 mg Daily PRN   oxyCODONE (OXYCONTIN) extended release tablet 10 mg BID   oxyCODONE (ROXICODONE) immediate release tablet 10 mg Q6H PRN   prochlorperazine (COMPAZINE) tablet 5 mg Q8H PRN   sodium chloride flush 0.9 % injection 10 mL 2 times per day   sodium chloride flush 0.9 % injection 10 mL PRN   ondansetron (ZOFRAN) injection 4 mg Q6H PRN   insulin lispro (HUMALOG) injection vial 0-12 Units TID WC   insulin lispro (HUMALOG) injection vial 0-6 Units Nightly   glucose (GLUTOSE) 40 % oral gel 15 g PRN   dextrose 50 % solution 12.5 g PRN

## 2018-07-30 NOTE — PROGRESS NOTES
[address] [address] Continence Nurse  Consult Note       NAME:  Samanta Harden  MEDICAL RECORD NUMBER:  [id number]  AGE: 66 y.o.    GENDER: female  : 1955  TODAY'S DATE:  2018    Subjective:     Reason for WOCN Evaluation and Assessment: bilateral heel evolving deep tissue injuries, buttock pressure injury present upon admission to the hospital.      Samanta Harden is a 66 y.o. female referred by:   [x] Physician  [] Nursing  [] Other:     Wound Identification:  Wound Type: pressure  Contributing Factors: edema, diabetes, chronic pressure, decreased mobility, shear force, obesity, decreased tissue oxygenation, immunosuppression, incontinence of stool, incontinence of urine and non-adherence        PAST MEDICAL HISTORY        Diagnosis Date    Cancer [facility])     kidney    Chronic kidney disease     Depression     Diabetes mellitus ([facility]     type 2    GERD (gastroesophageal reflux disease)     HA (headache)     HBP (high blood pressure)     History of blood transfusion     no reaction    Hyperlipidemia     Hyperplastic polyp of intestine     Hypothyroid 2016    Hypothyroidism     Left knee pain     Non-smoker     OA (osteoarthritis)     bilat knees    Tubular adenoma        PAST SURGICAL HISTORY    Past Surgical History:   Procedure Laterality Date    BACK SURGERY N/A 2018    Post T11-L2 Fusion    COLONOSCOPY  2016    hyperplastic polyp and tubular adenoma     HYSTERECTOMY      NERVE BLOCK  2018    aleta trigger point neck #1    NC LUMBAR SPINE FUSN,POST INTRBDY N/A 2018    T11-L3 POSTERIOR FIXATION AND FUSION, SPINE ABBY C-ARM, [id number] Genesis [facility]erio DO at [facility]    Family History   Problem Relation Age of Onset    Heart Disease Mother     Diabetes Mother     Cancer Father         esophageal cancer    Cancer Maternal Grandfather         colon       SOCIAL HISTORY    Social History   Substance Use Topics    Smoking status: Never Smoker    Smokeless tobacco: Never Used    Alcohol use No       ALLERGIES    No Known Allergies        Objective:      BP (!) 131/55   Pulse 83   Temp 98 °F (36.7 °C) (Oral)   Resp 16   Ht 5' 8\" (1.727 m)   Wt 187 lb 2.7 oz (84.9 kg)   SpO2 93%   BMI 28.46 kg/m²   Abe Risk Score: Abe Scale Score: 16    LABS    CBC:   Lab Results   Component Value Date    WBC 5.1 07/29/2018    RBC 2.68 07/29/2018    HGB 7.1 07/29/2018     CMP:  Albumin:    Lab Results   Component Value Date    LABALBU 2.1 07/27/2018     PT/INR:    Lab Results   Component Value Date    PROTIME 20.1 07/30/2018    INR 2.0 07/30/2018     HgBA1c:    Lab Results   Component Value Date    LABA1C 6.2 01/31/2018     PTT: No components found for: LABPTT      Assessment:     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)    Acute cystitis without hematuria    Renal mass    Metastasis to spinal column (HCC)    Renal cell carcinoma (HCC)    Acute kidney injury ([address]    Gluteal pain    Acute renal failure (ARF) ([address]    Acute renal failure ([address]    Other hydronephrosis    Severe malnutrition (HCC)    Urinary retention    Cervical muscle pain    Chronic right-sided low back pain without sciatica    Cervicalgia    RASHMI (acute kidney injury) ([address]    High anion gap metabolic acidosis    Pathologic compression fracture of spine with delayed healing    Hematuria    Anemia    Renal cell cancer, right ([address]    Encounter for palliative care    Hypoglycemia    Acute deep vein wipe \"Sureprep\"   Foam sacrum dressing to sacrococcygeal area. Peel back dressing, inspect skin beneath, re-secure. Change every 72 hours and prn wrinkles, soilage. Discontinue sacral dressing if repeatedly soiled by incontinence. Routine incontinence care with incontinence barrier cloths and zinc oxide cream.  Apply zinc oxide cream BID and prn incontinence to the buttocks.    Moisture wicking under pads vs cloth backed briefs    Specialty Bed Required : Yes   [x] Low Air Loss   [x] Pressure Redistribution  [] Fluid Immersion  [] Bariatric  [] Total Pressure Relief  [] Other:     Discharge Plan:  TBD    Patient/Caregiver Teaching:    [] Indicates understanding       [x] Needs reinforcement  [] Unsuccessful      [] Verbal Understanding  [] Demonstrated understanding       [] No evidence of learning  [] Refused teaching         [] N/A       Electronically signed by Cheyanne Hayes RN, CWON on 7/30/2018 at 1:04 PM

## 2018-07-30 NOTE — PLAN OF CARE
Problem: Pain:  Goal: Control of acute pain  Control of acute pain   Outcome: Ongoing  Patient able to communicate presence of pain and needs for medication. See MAR. Problem: Falls - Risk of:  Goal: Will remain free from falls  Will remain free from falls   Outcome: Met This Shift  Patient assessed for fall risk and fall precautions initiated. Patient and family instructed about safety devices. Environment kept free of clutter and adequate lighting provided. Bed in lowest position with brakes locked. Call light in reach. Patient ID band correct and in place. Patient transferred with appropriate methods. Will continue to monitor. Problem: Risk for Impaired Skin Integrity  Goal: Tissue integrity - skin and mucous membranes  Structural intactness and normal physiological function of skin and  mucous membranes. Outcome: Ongoing  Patient free of new skin breakdown throughout the shift. Skin breakdown noted on bilateral heels and coccyx. Mepilex and barrier cream applied as needed. Patient repositioned Q2, remains dry, and linens changed as needed. Will continue to monitor.   Kimberly Mae RN

## 2018-07-30 NOTE — CARE COORDINATION
Dialysis social worker returned to pt's room with literature on dialysis tx, per pt's request. Pt stated that she will look at the information and speak to her dtr about dialysis options. SW offered to call pt's dtr with pt, to discuss options and dialysis tx. Pt stated that she would like to speak with her dtr prior to making a decision and that she will let writer know her decision. Informed pt that the process of referral can be lengthy so it is important that we start the process as soon as possible. Pt voices understanding and states the she will speak with her dtr. SW will follow up with pt for decision and will be available for support.

## 2018-07-30 NOTE — CARE COORDINATION
Followed up with pt this afternoon to see if she had spoken to her dtr and/or looked over any of the literature for dialysis. Pt stated that her family will be here to visit her this evening around 4:30 pm and she will discuss it with them. Pt stated that she will have an answer for writer tomorrow. Provided pt with more dialysis information and writer's contact information if she or family have questions. Will f/u with pt in the morning for choice and send referral for OP dialysis placement.

## 2018-07-30 NOTE — PROGRESS NOTES
I saw on examine the patient at the bedside today. She appeared in better spirits than previous and stated that she is still having significant amount of axial pain but is able to work with therapy as far sitting at the bedside and dangling. Vital signs are stable and she is awake alert oriented ×3. Appropriate affect. Cranial nerves II through XII are intact. 1 out of 5 left dorsiflexion 4+ plantar flexion otherwise 5 out of 5 right lower extremity and proximal left lower ext.  5 out of 5 in upper extremities. No sensory deficits. \    Metastatic renal cell to the lumbar spine. Urinary retention  Biomechanical back pain    This unfortunate patient still has a significant amount of axial back pain related metastatic lesion. This is despite her having had fixation. I do not think this patient is a candidate for any large invasive reoperation considering multiple comorbid conditions including anemia, acute renal failure, active DVT, general debility. She is currently on Coumadin with a heparin bridge. I have advised the primary team and bedside nursing as well as the patient to use the brace the time she works with therapy to try to minimize the amount of axial pain that she experiences. His minor Elmyra Goldmann is currently not being done. I would strictly advise use of the brace anytime she is set up for raised from a 30° position mainly to help her with symptoms. This is not a hard and fast requirement but more so to help her with symptoms. Unfortunately I do not see any good options surgically at this time considering all of the patient's comorbidities as well as anticoagulated status. Would recommend maximization of her function with the use of the brace as an adjunct at this time.   Available as necessary

## 2018-07-30 NOTE — CARE COORDINATION
Dialysis social worker met with pt in room to discuss outpatient dialysis placement and to receive choice for referral. Educated pt on dialysis, as pt is unfamiliar with dialysis tx. Provided and explained to pt options for dialysis tx schedules and facilities. Pt repeatedly stated that she is unsure. Inquired as to if pt would like writer to contact her dtr to discuss options. Pt stated that she will speak to dtr and get back to writer. Pt did not give permission for writer to contact family. Pt stated that she would like more information about dialysis tx.  SW to provide pt with literature on dialysis and will speak to pt further about choice for referral.

## 2018-07-30 NOTE — PROGRESS NOTES
Nutrition Assessment    Type and Reason for Visit: Reassess    Nutrition Recommendations:   - Continue current diet w/ the addition of ONS (Magic cups)  - Continue to monitor/assess po intakes for adequacy    Malnutrition Assessment:  · Malnutrition Status: Meets the criteria for severe malnutrition  · Context: Chronic illness  · Findings of the 6 clinical characteristics of malnutrition (Minimum of 2 out of 6 clinical characteristics is required to make the diagnosis of moderate or severe Protein Calorie Malnutrition based on AND/ASPEN Guidelines):  1. Energy Intake-Less than or equal to 75%, greater than or equal to 3 months    2. Weight Loss-20% loss or greater,  (in 7 months)  3. Fat Loss-Mild subcutaneous fat loss, Orbital  4. Muscle Loss-Mild muscle mass loss, Temples (temporalis muscle), Clavicles (pectoralis and deltoids)  5. Fluid Accumulation-Mild fluid accumulation, Extremities  6.  Strength-Not measured    Nutrition Diagnosis:   · Problem: Inadequate oral intake  · Etiology: related to  (current condition/appetite)     Signs and symptoms:  as evidenced by Diet history of poor intake, Weight loss greater than or equal to 10% in 6 months    Nutrition Assessment:  · Subjective Assessment: Pt reports no issues w/ appetite but is reporting very little po consumption 7/30. For breakfast pt reports eating a few bites of eggs. When asked about lunch- pt reports eating a few grapes. Asked pt about ONS. Pt states she would be willing to try ONS at this time. · Nutrition-Focused Physical Findings: +1 non-pitting BLE edema.   · Wound Type: Deep Tissue Injury (to left heel)  · Current Nutrition Therapies:  · Oral Diet Orders: No Added Salt (3-4gm), Carb Control 4 Carbs/Meal   · Oral Diet intake: 1-25%  · Oral Nutrition Supplement (ONS) Orders: None  · Anthropometric Measures:  · Ht: 5' 8\" (172.7 cm)   · Current Body Wt: 187 lb 2.7 oz (84.9 kg)  · Usual Body Wt: 248 lb (112.5 kg)  · % Weight Change: 20.2%, x past 7 months per EHR  · Ideal Body Wt: 140 lb (63.5 kg)   · % Ideal Body 133%  · BMI Classification: BMI 25.0 - 29.9 Overweight  · Comparative Standards (Estimated Nutrition Needs):  · Estimated Daily Total Kcal: 1255-4696 kcal  · Estimated Daily Protein (g): 60-85 gm protein    Estimated Intake vs Estimated Needs: Intake Less Than Needs    Nutrition Risk Level: High    Nutrition Interventions:   Continue current diet, Start ONS  Continued Inpatient Monitoring, Education Not Indicated, Coordination of Care    Nutrition Evaluation:   · Evaluation: Progressing toward goals   · Goals: Oral intakes to meet at least 50% of estimated nutrition needs. · Monitoring: Meal Intake, Supplement Intake, Skin Integrity, Wound Healing, Weight, Pertinent Labs    See Adult Nutrition Doc Flowsheet for more detail.      Electronically signed by Oneida Diez DTR on 7/30/18 at 2:42 PM    Contact Number: [phone]

## 2018-07-30 NOTE — PROGRESS NOTES
Physical Therapy  Facility/Department: [facility] 4B STEPDOWN  Daily Treatment Note  NAME: Nara Espino  : 1955  MRN: [id number]    Date of Service: 2018    Discharge Recommendations:  Subacute/Skilled Nursing Facility   PT Equipment Recommendations  Equipment Needed:  (TBD)    Patient Diagnosis(es): The primary encounter diagnosis was Hematuria, unspecified type. Diagnoses of Renal cell cancer, right ([facility] Acute renal failure, unspecified acute renal failure type [facility]), Pathologic lumbar vertebral fracture, sequela, Metabolic acidosis, Hypoglycemia, and Anemia, unspecified type were also pertinent to this visit. has a past medical history of Cancer ([facility] Chronic kidney disease; Depression; Diabetes mellitus ([facility] GERD (gastroesophageal reflux disease); HA (headache); HBP (high blood pressure); History of blood transfusion; Hyperlipidemia; Hyperplastic polyp of intestine; Hypothyroid; Hypothyroidism; Left knee pain; Non-smoker; OA (osteoarthritis); and Tubular adenoma. has a past surgical history that includes Hysterectomy; Tonsillectomy; Colonoscopy (2016); back surgery (N/A, 2018); pr lumbar spine fusn,post intrbdy (N/A, 2018); and Nerve Block (2018). Restrictions  Restrictions/Precautions  Restrictions/Precautions: Fall Risk  Required Braces or Orthoses?: No  Subjective   General  Response To Previous Treatment: Patient with no complaints from previous session. Family / Caregiver Present: No  Pain Screening  Patient Currently in Pain: Yes  Pain Assessment  Pain Assessment: 0-10  Pain Level: 8  Pain Type: Acute pain  Pain Location: Back  Pain Orientation: Lower  Pain Descriptors: Aching; Sharp  Pain Frequency: Continuous  Pain Onset: On-going  Clinical Progression: Not changed  Vital Signs  Patient Currently in Pain: Yes       Orientation  Orientation  Orientation Level: Oriented to place;Oriented to time;Oriented to situation;Oriented to person  Objective   Bed Positioning  Safety Devices  Type of devices: Call light within reach, Patient at risk for falls, Left in bed, Nurse notified  Restraints  Initially in place: No     Therapy Time   Individual Concurrent Group Co-treatment   Time In [address]         Time Out [address] [facility]         Minutes 27                 [place], [place]

## 2018-07-30 NOTE — PROGRESS NOTES
Writer contacted urology in regard to discontinuing jensen catheter. Dr. Norma Valerio said that it is okay to pull catheter as long as it is okay with other services. Nephrology perfect served and Dr. Ranjit Beckman said to keep jensen and reassess in morning.   Oralia Henry RN

## 2018-07-30 NOTE — PLAN OF CARE
[address]    Second Visit Note  For more detailed information please refer to the progress note of the day      7/30/2018    5:58 PM    Name:   Olga Bernardo  MRN:     [id number]     [place]:      [de-identified]   Room:   169/7962-08   Day:  5  Admit Date:  7/25/2018  1:16 PM    PCP:   Romero Ibarra MD  Code Status:  Full Code        Pt vitals were reviewed   New labs were reviewed   Patient was seen    Updated plan :     1. Pending hemodialysis.   Darline Lambert MD  7/30/2018  5:58 PM

## 2018-07-30 NOTE — PROGRESS NOTES
 acetaminophen  1,000 mg Oral TID    amitriptyline  75 mg Oral Nightly    atorvastatin  20 mg Oral Nightly    docusate sodium  200 mg Oral BID    pantoprazole  40 mg Oral QAM AC    oxyCODONE  10 mg Oral BID    sodium chloride flush  10 mL Intravenous 2 times per day    insulin lispro  0-12 Units Subcutaneous TID WC    insulin lispro  0-6 Units Subcutaneous Nightly     Continuous Infusions:    heparin (porcine) 10 Units/kg/hr (07/30/18 0622)    sodium chloride 50 mL/hr at 07/30/18 0107    dextrose       PRN Meds: morphine, heparin (porcine), heparin (porcine), sodium chloride, sodium chloride, heparin (porcine), heparin (porcine), cyclobenzaprine, ondansetron, oxyCODONE HCl, prochlorperazine, sodium chloride flush, ondansetron, glucose, dextrose, glucagon (rDNA), dextrose, acetaminophen    Data:     Past Medical History:   has a past medical history of Cancer ([facility] [address] Chronic kidney disease; Depression; [facility] GERD (gastroesophageal reflux disease); HA (headache); HBP (high blood pressure); History of blood transfusion; Hyperlipidemia; Hyperplastic polyp of intestine; Hypothyroid; Hypothyroidism; Left knee pain; Non-smoker; OA (osteoarthritis); and Tubular adenoma. Social History:   reports that she has never smoked. She has never used smokeless tobacco. She reports that she does not drink alcohol or use drugs.      Family History:   Family History   Problem Relation Age of Onset    Heart Disease Mother     Diabetes Mother     Cancer Father         esophageal cancer    Cancer Maternal Grandfather         colon       Vitals:  Patient Vitals for the past 24 hrs:   BP Temp Temp src Pulse Resp SpO2 Weight   07/30/18 0625 - - - - - - 187 lb 2.7 oz (84.9 kg)   07/30/18 0325 (!) 129/48 98.4 °F (36.9 °C) Oral 80 14 99 % -   07/30/18 0000 (!) 126/57 99 °F (37.2 °C) Oral 85 11 98 % -   07/29/18 2310 (!) 126/57 - - 90 12 - -   07/29/18 2000 (!) 144/55 99 °F (37.2 °C) Oral 93 18 96 % - 18 1600 (!) 130/50 98.7 °F (37.1 °C) Oral 84 14 98 % -   18 1133 (!) 140/59 98.3 °F (36.8 °C) - 86 10 99 % -     Temp (24hrs), Av.7 °F (37.1 °C), Min:98.3 °F (36.8 °C), Max:99 °F (37.2 °C)    Recent Labs      18   1129  18   1525  18   1931  18   0724   POCGLU  138*  157*  160*  116*       I/O (24Hr):     Intake/Output Summary (Last 24 hours) at 18 0819  Last data filed at 18 8682   Gross per 24 hour   Intake             1731 ml   Output              475 ml   Net             1256 ml       Labs:  Recent Results (from the past 24 hour(s))   APTT    Collection Time: 18 10:30 AM   Result Value Ref Range    PTT >120.0 (HH) 20.5 - 30.5 sec   Protime-INR    Collection Time: 18 10:30 AM   Result Value Ref Range    Protime 13.1 (H) 9.0 - 12.0 sec    INR 1.2    POC Glucose Fingerstick    Collection Time: 18 11:29 AM   Result Value Ref Range    POC Glucose 138 (H) 65 - 105 mg/dL   APTT    Collection Time: 18  2:06 PM   Result Value Ref Range    PTT >120.0 (HH) 20.5 - 30.5 sec   POC Glucose Fingerstick    Collection Time: 18  3:25 PM   Result Value Ref Range    POC Glucose 157 (H) 65 - 105 mg/dL   POC Glucose Fingerstick    Collection Time: 18  7:31 PM   Result Value Ref Range    POC Glucose 160 (H) 65 - 105 mg/dL   APTT    Collection Time: 18 12:03 AM   Result Value Ref Range    PTT 62.0 (H) 20.5 - 30.5 sec   Basic Metabolic Panel    Collection Time: 18  5:58 AM   Result Value Ref Range    Glucose 115 (H) 70 - 99 mg/dL    BUN 47 (H) 8 - 23 mg/dL    CREATININE 5.11 (HH) 0.50 - 0.90 mg/dL    Bun/Cre Ratio NOT REPORTED 9 - 20    Calcium 7.9 (L) 8.6 - 10.4 mg/dL    Sodium 137 135 - 144 mmol/L    Potassium 3.8 3.7 - 5.3 mmol/L    Chloride 101 98 - 107 mmol/L    CO2 23 20 - 31 mmol/L    Anion Gap 13 9 - 17 mmol/L    GFR Non-African American 9 (L) >60 mL/min    GFR  10 (L) >60 mL/min    GFR Comment          GFR Staging NOT REPORTED    Platelet count    Collection Time: 07/30/18  5:58 AM   Result Value Ref Range    Platelets 801 258 - 329 k/uL   PROTIME-INR    Collection Time: 07/30/18  5:58 AM   Result Value Ref Range    Protime 20.1 (H) 9.0 - 12.0 sec    INR 2.0    APTT    Collection Time: 07/30/18  5:58 AM   Result Value Ref Range    PTT 89.3 (HH) 20.5 - 30.5 sec   POC Glucose Fingerstick    Collection Time: 07/30/18  7:24 AM   Result Value Ref Range    POC Glucose 116 (H) 65 - 105 mg/dL     Lab Results   Component Value Date/Time    SPECIAL NOT REPORTED 07/25/2018 05:53 PM     Lab Results   Component Value Date/Time    CULTURE NO SIGNIFICANT GROWTH 07/25/2018 05:53 PM       Radiology:  Ct Lumbar Spine Wo Contrast  Result Date: 7/25/2018  1. Pathologic compression fracture of L1 is again identified with some retropulsion of the posterior aspect of the L1 vertebral body into the spinal canal without significant spinal canal narrowing. 2. Metastatic lesions involving the left iliac wing, right S1 sacral body effacing the right S1 neural foramen, and the L1 vertebral body. No new osseous lesions are identified. 3. Heterogeneously enlarged mass largely replacing the right kidney with nodularity in the right renal fossa suspicious for metastatic disease. Mri Lumbar Spine Wo Contrast  Result Date: 7/25/2018  Pathologic L1 compression fracture is again seen with retropulsion of fracture fragments causing impingement upon the distal aspect of the conus and the proximal thecal sac. Appearance is overall similar to the prior study. Us Renal Complete  Result Date: 7/25/2018  Large complex mass within the superior pole the right kidney. Mild right hydronephrosis. Urinary bladder is decompressed by Navarrete catheter. Ir Tunneled Cvc Place Wo Sq Port/pump > 5 Years  Result Date: 7/27/2018  Successful ultrasound and fluoroscopy guided right internal jugular vein 14.5 Iranian by 19 cm tip to cuff tunneled catheter placement.   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 *      Plan:          1. Acute renal failure (ARF) : Contributed to obstructive uropathy with concomitant use of ace inhibitors and diuretics and poor oral intake, started on hemodialysis on 7/27, Maxine, nephrology is following, pending Plan for hemodialysis as outpatient. Discussed with nephrology resident,  follow-up electrolytes and BMP daily. Creatinine Not improving. 2. Anemia due to chronic kidney disease: Repeat CBC for today, monitor CBC daily, transfuse if Hgb less than 7.0. Prepare PRBC. 3. 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 Ordered brace to be used with activity. 4. Renal cell carcinoma : Oncology on board and to continue chemotherapy as outpatient with radiation therapy as outpatient. 5. Metabolic acidosis: Improving  6. Acute deep vein thrombosis (DVT) of femoral vein of left lower extremity : Treated with Coumadin and heparin bridge, INR 2.0 today, stop heparin and continue Coumadin. Order INR daily, stop APTT, Discussed with pharmacy, adjusted dose of Coumadin. 7. DM: Insulin sliding scale, Hypoglycemia protocol, Resume home doses of antidiabetic medications, diabetic diet once patient is not NPO.,  Avoid hypoglycemia. Recheck blood sugars. 8. Megace for appetite  9. HTN: stable,resume home meds  10. Check Electrolytes and Electrolytes replacement as needed   11.  Palliative care consulted. 12. Urology input for palliative nephrectomy. No current plan for surgery  13. Poor prognosis  14. DVT prophylaxis: already anticoagulated with Coumadin  15. PT, OT as needed. IV Fluids: sodium chloride Last Rate: 50 mL/hr at 07/30/18 0107    dextrose,    Nutrition:  DIET NO SALT ADDED (3-4 GM); Carb Control: 4 carb choices (60 gms)/meal  Dietary Nutrition Supplements: Frozen Oral Supplement      Consultations:   IP CONSULT TO UROLOGY  IP CONSULT TO NEPHROLOGY  IP CONSULT TO NEUROSURGERY  IP CONSULT TO HOSPITALIST  IP CONSULT TO NEPHROLOGY  IP CONSULT TO UROLOGY  IP CONSULT TO ONCOLOGY  IP CONSULT TO NEUROSURGERY  IP CONSULT TO RADIATION ONCOLOGY  IP CONSULT TO PALLIATIVE CARE  IP CONSULT TO SPIRITUAL SERVICES  IP CONSULT TO SPIRITUAL SERVICES      Time Spent on patient care is  39 mins more than 50% of this time spent in patient counseling Directly related to the complexity of the case. additional time was spent on pt examination as well as coordination of care and contacting consultants. Discussed care plan with nurse after getting input from the nurse.     Above plan discussed with the patient in room, who agreed to the above plan     Please call if any questions    Ivis Banerjee MD  [facility] Hospitalist  7/30/2018  8:19 AM     (Please note that this chart was generated using voice recognition Dragon dictation software program. Although every effort was made to ensure the accuracy of this automated transcription, some errors in transcription may have occurred.)

## 2018-07-30 NOTE — PROGRESS NOTES
[facility] Oncology Progress Note  7/30/2018 7:44 AM  Subjective:   Admit Date: 7/25/2018  PCP: Ilda Menchaca MD   CC: Metastatic Renal Cancer    Oncological History:  Diagnosis:   Metastatic RCC  Started on Pazopanib on 3/16/18  Radiation  Therapy completed to spine  Her recent MRI spine showing : Compression fracture and additional bone lesions in the iliac bone  HISTORY OF PRESENT ILLNESS:    Oncologic History: This is a 64 YO female was admitted with back pain after a fall. On admission she had imaging studies which showed L2 metastatic lesion with compression fracture. Had MRI spine which showed right renal mass c/w RCC and inferior vena cava invasion. It also showed L1 metastasis, pathologic fracture, epidural invasion, and severe thecal sac stenosis. Seen by neurosurgery and input awaited. She reports wt loss possibly intentional over last year for about 50 lbs. Patient seen by urology. SHe had a biopsy of the Kidney which showed RCC. SHe was seen by Neurosurgery and she had spinal fixation with pedicle screws On 1/23/18. She had palliative radiation and was on pazopanib    Interval History:   Patient seen and examined. Labs in vitals reviewed. Patient's mentation better today. See his pain is controlled. Tolerating anticoagulation. Denies noticing any bleeding. Does not know when her next appointment with oncology is. Denies chest pain or shortness of breath at rest.    Diet: DIET NO SALT ADDED (3-4 GM);  Carb Control: 4 carb choices (60 gms)/meal  Medications:   Scheduled Meds:   megestrol  400 mg Oral Daily    warfarin  5 mg Oral Daily    warfarin (COUMADIN) daily dosing (placeholder)   Other RX Placeholder    acetaminophen  1,000 mg Oral TID    amitriptyline  75 mg Oral Nightly    atorvastatin  20 mg Oral Nightly    docusate sodium  200 mg Oral BID    pantoprazole  40 mg Oral QAM AC    oxyCODONE  10 mg Oral BID    sodium chloride flush  10 mL Intravenous 2 times per day  insulin lispro  0-12 Units Subcutaneous TID     insulin lispro  0-6 Units Subcutaneous Nightly     Continuous Infusions:   heparin (porcine) 10 Units/kg/hr (07/30/18 0622)    sodium chloride 50 mL/hr at 07/30/18 0107    dextrose       CBC:   Recent Labs      07/27/18   0818  07/29/18   0813  07/30/18   0558   WBC  4.5  5.1   --    HGB  7.1*  7.1*   --    PLT  283  236  251     BMP:    Recent Labs      07/28/18   0530  07/30/18   0558   NA  139  137   K  3.9  3.8   CL  101  101   CO2  20  23   BUN  72*  47*   CREATININE  6.79*  5.11*   GLUCOSE  122*  115*     Hepatic:   No results for input(s): AST, ALT, ALB, BILITOT, ALKPHOS in the last 72 hours. INR:   Recent Labs      07/27/18   0818  07/29/18   1030  07/30/18   0558   INR  1.0  1.2  2.0     Results for orders placed or performed during the hospital encounter of 07/25/18   URINE CULTURE CLEAN CATCH   Result Value Ref Range    Specimen Description . URINE     Special Requests NOT REPORTED     Culture NO SIGNIFICANT GROWTH     Status FINAL 07/26/2018    Urinalysis with Microscopic   Result Value Ref Range    Color, UA YELLOW YEL    Turbidity UA CLOUDY (A) CLEAR    Glucose, Ur NEGATIVE NEG    Bilirubin Urine NEGATIVE NEG    Ketones, Urine NEGATIVE NEG    Specific Gravity, UA 1.015 1.005 - 1.030    Urine Hgb NEGATIVE NEG    pH, UA 5.0 5.0 - 8.0    Protein, UA 2+ (A) NEG    Urobilinogen, Urine Normal NORM    Nitrite, Urine NEGATIVE NEG    Leukocyte Esterase, Urine MODERATE (A) NEG    -          WBC, UA 5 TO 10 0 - 5 /HPF    RBC, UA 2 TO 5 0 - 4 /HPF    Casts UA 2 TO 5 HYALINE 0 - 8 /LPF    Crystals UA NOT REPORTED NONE /HPF    Epithelial Cells UA 2 TO 5 0 - 5 /HPF    Renal Epithelial, Urine NOT REPORTED 0 /HPF    Bacteria, UA FEW (A) NONE    Mucus, UA NOT REPORTED NONE    Trichomonas, UA NOT REPORTED NONE    Amorphous, UA NOT REPORTED NONE    Other Observations UA NOT REPORTED NREQ    Yeast, UA NOT REPORTED NONE   CBC WITH AUTO DIFFERENTIAL   Result Value Ref Range    WBC 5.7 3.5 - 11.3 k/uL    RBC 3.01 (L) 3.95 - 5.11 m/uL    Hemoglobin 8.2 (L) 11.9 - 15.1 g/dL    Hematocrit 28.4 (L) 36.3 - 47.1 %    MCV 94.4 82.6 - 102.9 fL    MCH 27.2 25.2 - 33.5 pg    MCHC 28.9 28.4 - 34.8 g/dL    RDW 19.1 (H) 11.8 - 14.4 %    Platelets 477 038 - 989 k/uL    MPV 9.0 8.1 - 13.5 fL    NRBC Automated 0.4 (H) 0.0 per 100 WBC    Differential Type NOT REPORTED     WBC Morphology NOT REPORTED     RBC Morphology ANISOCYTOSIS PRESENT     Platelet Estimate NOT REPORTED     Seg Neutrophils 74 (H) 36 - 65 %    Lymphocytes 15 (L) 24 - 43 %    Monocytes 8 3 - 12 %    Eosinophils % 1 1 - 4 %    Basophils 1 0 - 2 %    Immature Granulocytes 1 (H) 0 %    Segs Absolute 4.23 1.50 - 8.10 k/uL    Absolute Lymph # 0.83 (L) 1.10 - 3.70 k/uL    Absolute Mono # 0.47 0.10 - 1.20 k/uL    Absolute Eos # 0.08 0.00 - 0.44 k/uL    Basophils # 0.03 0.00 - 0.20 k/uL    Absolute Immature Granulocyte 0.05 0.00 - 0.30 k/uL   Basic Metabolic Panel   Result Value Ref Range    Glucose 55 (L) 70 - 99 mg/dL     (HH) 8 - 23 mg/dL    CREATININE 9.62 (HH) 0.50 - 0.90 mg/dL    Bun/Cre Ratio NOT REPORTED 9 - 20    Calcium 9.1 8.6 - 10.4 mg/dL    Sodium 137 135 - 144 mmol/L    Potassium 4.6 3.7 - 5.3 mmol/L    Chloride 102 98 - 107 mmol/L    CO2 11 (L) 20 - 31 mmol/L    Anion Gap 24 (H) 9 - 17 mmol/L    GFR Non-African American 4 (L) >60 mL/min    GFR African American 5 (L) >60 mL/min    GFR Comment          GFR Staging NOT REPORTED    Protein, urine, random   Result Value Ref Range    Total Protein, Urine 68 mg/dL   Sodium, urine, random   Result Value Ref Range    Sodium,Ur 44 mmol/L   Creatinine, Random Urine   Result Value Ref Range    Creatinine, Ur 118.0 28.0 - 217.0 mg/dL   CBC Auto Differential   Result Value Ref Range    WBC 4.1 3.5 - 11.3 k/uL    RBC 2.72 (L) 3.95 - 5.11 m/uL    Hemoglobin 7.3 (L) 11.9 - 15.1 g/dL    Hematocrit 26.5 (L) 36.3 - 47.1 %    MCV 97.4 82.6 - 102.9 fL    MCH 26.8 25.2 - 33.5 pg    MCHC 27.5 (L) 28.4 - 34.8 g/dL    RDW 18.9 (H) 11.8 - 14.4 %    Platelets 532 857 - 627 k/uL    MPV 9.1 8.1 - 13.5 fL    NRBC Automated 0.0 0.0 per 100 WBC    Differential Type NOT REPORTED     WBC Morphology NOT REPORTED     RBC Morphology NOT REPORTED     Platelet Estimate NOT REPORTED     Immature Granulocytes 0 0 %    Seg Neutrophils 71 (H) 36 - 66 %    Lymphocytes 22 (L) 24 - 44 %    Monocytes 3 1 - 7 %    Eosinophils % 4 1 - 4 %    Basophils 0 0 - 2 %    Absolute Immature Granulocyte 0.00 0.00 - 0.30 k/uL    Segs Absolute 2.92 1.8 - 7.7 k/uL    Absolute Lymph # 0.90 (L) 1.0 - 4.8 k/uL    Absolute Mono # 0.12 0.1 - 0.8 k/uL    Absolute Eos # 0.16 0.0 - 0.4 k/uL    Basophils # 0.00 0.0 - 0.2 k/uL    Morphology ANISOCYTOSIS PRESENT    Comprehensive Metabolic Panel w/ Reflex to MG   Result Value Ref Range    Glucose 104 (H) 70 - 99 mg/dL    BUN 99 (HH) 8 - 23 mg/dL    CREATININE 9.34 (HH) 0.50 - 0.90 mg/dL    Bun/Cre Ratio NOT REPORTED 9 - 20    Calcium 7.9 (L) 8.6 - 10.4 mg/dL    Sodium 138 135 - 144 mmol/L    Potassium 4.6 3.7 - 5.3 mmol/L    Chloride 106 98 - 107 mmol/L    CO2 10 (L) 20 - 31 mmol/L    Anion Gap 22 (H) 9 - 17 mmol/L    Alkaline Phosphatase 63 35 - 104 U/L    ALT <5 (L) 5 - 33 U/L    AST 7 <32 U/L    Total Bilirubin 0.19 (L) 0.3 - 1.2 mg/dL    Total Protein 4.9 (L) 6.4 - 8.3 g/dL    Alb 2.0 (L) 3.5 - 5.2 g/dL    Albumin/Globulin Ratio 0.7 (L) 1.0 - 2.5    GFR Non-African American 4 (L) >60 mL/min    GFR African American 5 (L) >60 mL/min    GFR Comment          GFR Staging NOT REPORTED    Magnesium   Result Value Ref Range    Magnesium 1.9 1.6 - 2.6 mg/dL   Protime-INR   Result Value Ref Range    Protime 10.7 9.0 - 12.0 sec    INR 1.0    Urinalysis with microscopic   Result Value Ref Range    Color, UA YELLOW YEL    Turbidity UA CLOUDY (A) CLEAR    Glucose, Ur NEGATIVE NEG    Bilirubin Urine NEGATIVE NEG    Ketones, Urine NEGATIVE NEG    Specific Gravity, UA 1.014 1.005 - 1.030    Urine Hgb NEGATIVE NEG    pH, UA 5.0 5.0 - 8.0    Protein, UA 2+ (A) NEG    Urobilinogen, Urine Normal NORM    Nitrite, Urine NEGATIVE NEG    Leukocyte Esterase, Urine SMALL (A) NEG    -          WBC, UA 10 TO 20 0 - 5 /HPF    RBC, UA 5 TO 10 0 - 4 /HPF    Casts UA 2 TO 5 HYALINE 0 - 8 /LPF    Crystals UA NOT REPORTED NONE /HPF    Epithelial Cells UA 5 TO 10 0 - 5 /HPF    Renal Epithelial, Urine NOT REPORTED 0 /HPF    Bacteria, UA FEW (A) NONE    Mucus, UA NOT REPORTED NONE    Trichomonas, UA NOT REPORTED NONE    Amorphous, UA NOT REPORTED NONE    Other Observations UA NOT REPORTED NREQ    Yeast, UA NOT REPORTED NONE   Basic Metabolic Panel   Result Value Ref Range    Glucose 188 (H) 70 - 99 mg/dL    BUN 98 (HH) 8 - 23 mg/dL    CREATININE 9.09 (HH) 0.50 - 0.90 mg/dL    Bun/Cre Ratio NOT REPORTED 9 - 20    Calcium 8.0 (L) 8.6 - 10.4 mg/dL    Sodium 138 135 - 144 mmol/L    Potassium 4.6 3.7 - 5.3 mmol/L    Chloride 104 98 - 107 mmol/L    CO2 13 (L) 20 - 31 mmol/L    Anion Gap 21 (H) 9 - 17 mmol/L    GFR Non-African American 4 (L) >60 mL/min    GFR African American 5 (L) >60 mL/min    GFR Comment          GFR Staging NOT REPORTED    Basic Metabolic Panel   Result Value Ref Range    Glucose 215 (H) 70 - 99 mg/dL     (HH) 8 - 23 mg/dL    CREATININE 9.06 (HH) 0.50 - 0.90 mg/dL    Bun/Cre Ratio NOT REPORTED 9 - 20    Calcium 8.0 (L) 8.6 - 10.4 mg/dL    Sodium 135 135 - 144 mmol/L    Potassium 4.3 3.7 - 5.3 mmol/L    Chloride 97 (L) 98 - 107 mmol/L    CO2 17 (L) 20 - 31 mmol/L    Anion Gap 21 (H) 9 - 17 mmol/L    GFR Non-African American 4 (L) >60 mL/min    GFR African American 5 (L) >60 mL/min    GFR Comment          GFR Staging NOT REPORTED    APTT   Result Value Ref Range    PTT 26.3 20.5 - 30.5 sec   Protime-INR   Result Value Ref Range    Protime 11.1 9.0 - 12.0 sec    INR 1.0    CBC Auto Differential   Result Value Ref Range    WBC 4.5 3.5 - 11.3 k/uL    RBC 2.60 (L) 3.95 - 5.11 m/uL    Hemoglobin 7.1 (L) 11.9 - 15.1 g/dL Hematocrit 23.5 (L) 36.3 - 47.1 %    MCV 90.4 82.6 - 102.9 fL    MCH 27.3 25.2 - 33.5 pg    MCHC 30.2 28.4 - 34.8 g/dL    RDW 19.1 (H) 11.8 - 14.4 %    Platelets 473 915 - 852 k/uL    MPV 10.0 8.1 - 13.5 fL    NRBC Automated 0.0 0.0 per 100 WBC    Differential Type NOT REPORTED     WBC Morphology NOT REPORTED     RBC Morphology ANISOCYTOSIS PRESENT     Platelet Estimate NOT REPORTED     Seg Neutrophils 62 36 - 65 %    Lymphocytes 21 (L) 24 - 43 %    Monocytes 10 3 - 12 %    Eosinophils % 5 (H) 1 - 4 %    Basophils 0 0 - 2 %    Immature Granulocytes 1 (H) 0 %    Segs Absolute 2.77 1.50 - 8.10 k/uL    Absolute Lymph # 0.94 (L) 1.10 - 3.70 k/uL    Absolute Mono # 0.45 0.10 - 1.20 k/uL    Absolute Eos # 0.24 0.00 - 0.44 k/uL    Basophils # <0.03 0.00 - 0.20 k/uL    Absolute Immature Granulocyte 0.05 0.00 - 0.30 k/uL   Albumin   Result Value Ref Range    Alb 2.1 (L) 3.5 - 5.2 g/dL   Hepatitis B surface antibody   Result Value Ref Range    Hep B S Ab <3.50 <10 mIU/mL   Hepatitis B surface antigen   Result Value Ref Range    Hepatitis B Surface Ag NONREACTIVE NR   Hepatitis B core antibody, total   Result Value Ref Range    Hep B Core Total Ab NONREACTIVE NR   Lactate Dehydrogenase   Result Value Ref Range     135 - 214 U/L   Basic Metabolic Panel   Result Value Ref Range    Glucose 122 (H) 70 - 99 mg/dL    BUN 72 (H) 8 - 23 mg/dL    CREATININE 6.79 (HH) 0.50 - 0.90 mg/dL    Bun/Cre Ratio NOT REPORTED 9 - 20    Calcium 7.5 (L) 8.6 - 10.4 mg/dL    Sodium 139 135 - 144 mmol/L    Potassium 3.9 3.7 - 5.3 mmol/L    Chloride 101 98 - 107 mmol/L    CO2 20 20 - 31 mmol/L    Anion Gap 18 (H) 9 - 17 mmol/L    GFR Non-African American 6 (L) >60 mL/min    GFR  7 (L) >60 mL/min    GFR Comment          GFR Staging NOT REPORTED    HEPATITIS C ANTIBODY   Result Value Ref Range    Hepatitis C Ab NONREACTIVE NR   APTT   Result Value Ref Range    PTT 27.4 20.5 - 30.5 sec   APTT   Result Value Ref Range    PTT 59.8 (H) 20.5 - 30.5 sec   APTT   Result Value Ref Range    PTT 41.5 (H) 20.5 - 30.5 sec   APTT   Result Value Ref Range    PTT >120.0 (HH) 20.5 - 30.5 sec   CBC   Result Value Ref Range    WBC 5.1 3.5 - 11.3 k/uL    RBC 2.68 (L) 3.95 - 5.11 m/uL    Hemoglobin 7.1 (L) 11.9 - 15.1 g/dL    Hematocrit 24.0 (L) 36.3 - 47.1 %    MCV 89.6 82.6 - 102.9 fL    MCH 26.5 25.2 - 33.5 pg    MCHC 29.6 28.4 - 34.8 g/dL    RDW 18.5 (H) 11.8 - 14.4 %    Platelets 758 015 - 125 k/uL    MPV 9.5 8.1 - 13.5 fL    NRBC Automated 0.0 0.0 per 100 WBC   Protime-INR   Result Value Ref Range    Protime 13.1 (H) 9.0 - 12.0 sec    INR 1.2    APTT   Result Value Ref Range    PTT >120.0 (HH) 20.5 - 30.5 sec   Basic Metabolic Panel   Result Value Ref Range    Glucose 115 (H) 70 - 99 mg/dL    BUN 47 (H) 8 - 23 mg/dL    CREATININE 5.11 (HH) 0.50 - 0.90 mg/dL    Bun/Cre Ratio NOT REPORTED 9 - 20    Calcium 7.9 (L) 8.6 - 10.4 mg/dL    Sodium 137 135 - 144 mmol/L    Potassium 3.8 3.7 - 5.3 mmol/L    Chloride 101 98 - 107 mmol/L    CO2 23 20 - 31 mmol/L    Anion Gap 13 9 - 17 mmol/L    GFR Non-African American 9 (L) >60 mL/min    GFR  10 (L) >60 mL/min    GFR Comment          GFR Staging NOT REPORTED    Platelet count   Result Value Ref Range    Platelets 727 391 - 404 k/uL   PROTIME-INR   Result Value Ref Range    Protime 20.1 (H) 9.0 - 12.0 sec    INR 2.0    APTT   Result Value Ref Range    PTT 62.0 (H) 20.5 - 30.5 sec   APTT   Result Value Ref Range    PTT 89.3 (HH) 20.5 - 30.5 sec   POC Glucose Fingerstick   Result Value Ref Range    POC Glucose 75 65 - 105 mg/dL   POC Glucose Fingerstick   Result Value Ref Range    POC Glucose 87 65 - 105 mg/dL   POC Glucose Fingerstick   Result Value Ref Range    POC Glucose 80 65 - 105 mg/dL   POC Glucose Fingerstick   Result Value Ref Range    POC Glucose 102 65 - 105 mg/dL   POC Glucose Fingerstick   Result Value Ref Range    POC Glucose 148 (H) 65 - 105 mg/dL   POC Glucose Fingerstick   Result Value Ref Range    POC Glucose 165 (H) 65 - 105 mg/dL   POC Glucose Fingerstick   Result Value Ref Range    POC Glucose 206 (H) 65 - 105 mg/dL   POC Glucose Fingerstick   Result Value Ref Range    POC Glucose 202 (H) 65 - 105 mg/dL   POC Glucose Fingerstick   Result Value Ref Range    POC Glucose 131 (H) 65 - 105 mg/dL   POC Glucose Fingerstick   Result Value Ref Range    POC Glucose 171 (H) 65 - 105 mg/dL   POC Glucose Fingerstick   Result Value Ref Range    POC Glucose 166 (H) 65 - 105 mg/dL   POC Glucose Fingerstick   Result Value Ref Range    POC Glucose 143 (H) 65 - 105 mg/dL   POC Glucose Fingerstick   Result Value Ref Range    POC Glucose 144 (H) 65 - 105 mg/dL   POC Glucose Fingerstick   Result Value Ref Range    POC Glucose 133 (H) 65 - 105 mg/dL   POC Glucose Fingerstick   Result Value Ref Range    POC Glucose 138 (H) 65 - 105 mg/dL   POC Glucose Fingerstick   Result Value Ref Range    POC Glucose 160 (H) 65 - 105 mg/dL   POC Glucose Fingerstick   Result Value Ref Range    POC Glucose 157 (H) 65 - 105 mg/dL         Xr Cervical Spine (2-3 Views)    Result Date: 7/12/2018  EXAMINATION: TWO XRAY VIEWS OF THE CERVICAL SPINE 7/12/2018 3:29 pm COMPARISON: None. HISTORY: ORDERING SYSTEM PROVIDED HISTORY: Cervicalgia FINDINGS: Radiograph is taken with cervical spine extension. 7 typical cervical vertebra present maintain normal height and alignment. Bony spinal canal and paravertebral soft tissues appear unremarkable. The disc spaces and posterior elements appear well maintained throughout. The uncovertebral joints appear normally aligned on AP view. The atlantoaxial articulation appears normally aligned. No discrete odontoid fracture is evident. No acute osseous abnormality of the cervical spine. If pain persists or worsens, then additional evaluation with CT or MRI may be indicated. Note that CT cervical spine is the study of choice for evaluation of acute trauma.      Ct Lumbar Spine Wo Contrast    Result Date: 7/25/2018  EXAMINATION: CT OF THE LUMBAR SPINE WITHOUT CONTRAST  7/25/2018 TECHNIQUE: CT of the lumbar spine was performed without the administration of intravenous contrast. Multiplanar reformatted images are provided for review. Dose modulation, iterative reconstruction, and/or weight based adjustment of the mA/kV was utilized to reduce the radiation dose to as low as reasonably achievable. COMPARISON: MRI lumbar spine from June 9, 2018 HISTORY: ORDERING SYSTEM PROVIDED HISTORY: r/o worsening fracture or increasing metastatic mass TECHNOLOGIST PROVIDED HISTORY: Reason for exam:->r/o worsening fracture or increasing metastatic mass FINDINGS: BONES/ALIGNMENT: Posterior transpedicular fusion hardware involving T11, T12, L2, and L3 is demonstrated. A pathologic compression fracture of L1 is again demonstrated with some retropulsion of fragments by approximately 0.4 cm. There is a dominant lytic lesion in the right sacral body encroaching upon the right S1 neural foramen. This metastatic lesion measures approximately 3.1 x 2.5 cm in cross-section. Its inferior extent is not well demonstrated. In the left iliac wing, a metastatic lesion is also identified exerting some mass effect upon the left sacroiliac joint. The lesion measures approximately 3.2 x 3.0 cm and is partially imaged on the previous lumbar spine MRI. No additional lesions are identified. DEGENERATIVE CHANGES: Degenerative disc disease throughout the lumbar spine involving the discs and facets. SOFT TISSUES/RETROPERITONEUM: A dominant right renal mass is partially imaged. There is perinephric stranding and perinephric nodularity likely metastatic disease. Atherosclerotic changes of the abdominal aorta are present. Low-density along the medial dome of the liver (axial image 1) suspicious for metastatic involvement of the liver.      1. Pathologic compression fracture of L1 is again identified with some retropulsion of the posterior aspect of the L1 vertebral body into the spinal canal without significant spinal canal narrowing. 2. Metastatic lesions involving the left iliac wing, right S1 sacral body effacing the right S1 neural foramen, and the L1 vertebral body. No new osseous lesions are identified. 3. Heterogeneously enlarged mass largely replacing the right kidney with nodularity in the right renal fossa suspicious for metastatic disease. Mri Lumbar Spine Wo Contrast    Result Date: 7/25/2018  EXAMINATION: MRI OF THE LUMBAR SPINE WITHOUT CONTRAST, 7/25/2018 5:09 pm TECHNIQUE: Multiplanar multisequence MRI of the lumbar spine was performed without the administration of intravenous contrast. COMPARISON: June 9, 2018 HISTORY: ORDERING SYSTEM PROVIDED HISTORY: pathologic fx in lumbar spine, difficulty urinating FINDINGS: BONES/ALIGNMENT: Thoracolumbar fusion is again seen. Hardware causes artifact limiting adjacent evaluation. Pathologic L1 compression fracture is again seen with retropulsion of fracture fragments. No acute fracture. Right sacral metastatic lesion is seen. SPINAL CORD: Retropulsion of fracture fragments is causing impingement upon the conus. Appearance is likely similar to the prior study. SOFT TISSUES: Complex right renal mass. L1-L2: L1 compression fracture with retropulsion of fracture fragments causes severe central canal stenosis and impingement on the distal aspect of the conus and the thecal sac. L2-L3: Mild broad-based disc bulge. No significant central canal or foraminal stenosis. L3-L4: Broad-based disc bulge, facet degenerative changes, and hypertrophy of the ligamentum flavum combine to create moderate central canal stenosis. Changes combine to create moderate bilateral foraminal stenosis. L4-L5: Left paracentral disc bulge with extension into the left foramina and facet degenerative changes combine to create mild central canal stenosis.  Changes combine to create moderate-severe left foraminal stenosis and moderate right lidocaine. A micropuncture needle was used to access the right internal jugular vein using ultrasound guidance. An ultrasound image demonstrating patency of the vein with needle tip located within it. An image was obtained and stored in PACs. A 0.035 guidewire was used to place a peel-away sheath. A subcutaneous tunnel was created to the infraclavicular region and a tunneled 14.5 [place] by 19 cm tip to cuff dialysis catheter was pulled through the subcutaneous tunnel to the venotomy site and advanced through the peel-away sheath under fluoroscopic guidance to the right atrium. The catheter flushed easily and there was a good blood return. The catheter was sutured to the skin. The catheter was locked with heparinized saline. The patient tolerated the procedure well and there were no immediate complications. FINDINGS: Fluoroscopic image demonstrates the tip of the catheter in the right atrium. Successful ultrasound and fluoroscopy guided right internal jugular vein 14.5 [place] by 19 cm tip to cuff tunneled catheter placement. Ready for use. Vl Renal Arterial Duplex Complete    Result Date: 7/28/2018    [facility]  Vascular Renal Procedure   Patient Name  MS ELENA [facility]    Date of Study           07/28/2018                Rodney Aida   Date of Birth 1955  Gender                  Female   Age           66 year(s)  Race                       Room Number   9818   Corporate ID  [id number]  #   Patient Naldo Ribeiro  [de-identified]  #   MR #          [id number]     Sonographer             Belén Jeong   Accession #   [id number]   Interpreting Physician  Lambert Yates   Referring                 Referring Physician     Shant Hathaway, *  Nurse  Practitioner  Procedure Type of Study:   Abdominal: Renal, Renal Artery Scan Bilateral.  Indications for Study:Renal failure, acute. Patient Status: In Patient. Technical Quality:Limited visualization. Limitation reason:depth-- patient positioning--pain.    - +---------+----------+-----------------------------------------------------+   - The patient's risk factor(s) include: diabetes mellitus and obesity.   - The patient has kidney cancer. Velocities are measured in cm/s ; Diameters are measured in cm Abdominal Aortic Flow +--------------------------------+---+---+------------+--------------------+ ! Location                        ! PSV! EDV! AP Diam     !Trans Diam          ! +--------------------------------+---+---+------------+--------------------+ ! Aorta Juxta Renal               !109!   !            !                    ! +--------------------------------+---+---+------------+--------------------+ Renal Duplex Measurements +------------------------------------++-----+---+----+----++---+---+--+----+ ! Renal Artery A                      !!Right! ! Left!    !!   !   !  !    ! +------------------------------------++-----+---+----+----++---+---+--+----+ ! Location                            ! !PSV  ! EDV! RI  !RAR !!PSV! EDV!RI!RAR ! +------------------------------------++-----+---+----+----++---+---+--+----+ ! Ostial Renal                        !!157  !   !    !1.44!!123!   !  !1.13! +------------------------------------++-----+---+----+----++---+---+--+----+ ! Prox Renal                          !!139  !   !    !1.28!!136!   !  !1.25! +------------------------------------++-----+---+----+----++---+---+--+----+ ! Mid Renal                           !!173  !   !    !1.59!!157!   !  !1.44! +------------------------------------++-----+---+----+----++---+---+--+----+ ! Dist Renal                          !!130  !   !    !1.19!!123!   !  !1.13! +------------------------------------++-----+---+----+----++---+---+--+----+ Right Miscellaneous Measurements   - The average kidney length is 15.15 cm. Left Miscellaneous Measurements   - The average kidney length is 10.45 cm.     Vl Dup Lower Extremity Venous Left    Result Date: 7/28/2018    [facility]  Vascular Lower Extremities DVT Study Procedure   Patient Name  MS ELENA [facility]    Date of Study           07/28/2018                Rodney Aida   Date of Birth 1955  Gender                  Female   Age           66 year(s)  Race                       Room Number   3625   Corporate ID  [id number]  #   Patient Lara  [de-identified]  #   MR #          [id number]     Sonographer             Belén Jeong   Accession #   [id number]   Interpreting Physician  Lambert Yates   Referring                 Referring Physician     Shant Hathaway, *  Nurse  Practitioner  Procedure Type of Study:   Veins: Lower Extremities DVT Study, Venous Scan Lower Left. Indications for Study:Hx of DVT. Patient Status: In Patient. Technical Quality:Limited visualization. Limitation reason:Pt in pain. - Critical Result:Fernanda WAITE RN at 9:30 am.  Conclusions   Summary   Simultaneous real time imaging utilizing B-Mode, color doppler and  spectral waveform analysis was performed on the left lower extremity for  venous examination of the deep and superficial systems. Findings are:   **Abnormal Study**   Extensive acute deep venous thrombosis extending from the gastrocnemius  vein into the external iliac vein. Acute superficial venous thrombosis identified in the small saphenous  vein. Signature   ----------------------------------------------------------------  Electronically signed by Belén Jeong(Sonographer) on  07/28/2018 09:47 AM  ----------------------------------------------------------------   ----------------------------------------------------------------  Electronically signed by Phuong Snowden(Interpreting  physician) on 07/28/2018 10:17 AM  ----------------------------------------------------------------  Findings:   Right Impression:                 Left Impression:  The common femoral vein           The gatrocnemius to the external iliac  demonstrates normal augmentation. veins are non-compressible.                                     Thrombus appears acute based on B-Mode                                    image evaluation. Normal compressibility of the great                                    saphenous vein. The small saphenous vein demonstrates no                                    compressibility. Thrombus appears acute based on B-Mode                                    image evaluation. Risk Factors History +---------+----------+-----------------------------------------------------+ ! Diagnosis! Date      ! Comments                                             ! +---------+----------+-----------------------------------------------------+ ! Other    ! ! Right Renal Cell Carcinoma with mets                 ! +---------+----------+-----------------------------------------------------+ ! DVT      !03/16/2018! RT popliteal, peroneal, deep calf vein               ! +---------+----------+-----------------------------------------------------+   - The patient's risk factor(s) include: diabetes mellitus and obesity.   - The patient has kidney cancer. Velocities are measured in cm/s ; Diameters are measured in cm Right Lower Extremities DVT Study Measurements Right 2D Measurements +------------------------------------+----------+---------------+----------+ ! Location                            ! Visualized! Compressibility! Thrombosis! +------------------------------------+----------+---------------+----------+ ! Common Femoral                      !Yes       !               !          ! +------------------------------------+----------+---------------+----------+ Right Doppler Measurements +----------------------------+------+------+-------------------------------+ ! Location                    ! Signal!Reflux! Reflux (msec)                  ! +----------------------------+------+------+-------------------------------+ ! Common Femoral              !Phasic! !                               ! +----------------------------+------+------+-------------------------------+ Left Lower Extremities DVT Study Measurements Left 2D Measurements +------------------------------------+----------+---------------+----------+ ! Location                            ! Visualized! Compressibility! Thrombosis! +------------------------------------+----------+---------------+----------+ ! Common Femoral                      !Yes       ! No             !Acute     ! +------------------------------------+----------+---------------+----------+ ! Prox Femoral                        !Yes       ! Partial        !Acute     ! +------------------------------------+----------+---------------+----------+ ! Mid Femoral                         !Yes       ! No             !Acute     ! +------------------------------------+----------+---------------+----------+ ! Dist Femoral                        !Yes       ! No             !Acute     ! +------------------------------------+----------+---------------+----------+ ! Popliteal                           !Yes       ! No             !Acute     ! +------------------------------------+----------+---------------+----------+ ! Sapheno Femoral Junction            ! Yes       ! Yes            ! None      ! +------------------------------------+----------+---------------+----------+ ! PTV                                 ! Yes       ! Yes            ! None      ! +------------------------------------+----------+---------------+----------+ ! Peroneal                            !No        !               !          ! +------------------------------------+----------+---------------+----------+ ! Gastroc                             ! Yes       ! No             !Acute     ! +------------------------------------+----------+---------------+----------+ ! GSV Thigh                           ! Yes       ! Yes            ! None      ! 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 *      1. Metastatic Renal Cell Carcinoma to the bones status post stabilization of spine by neurosurgery and palliative radiation  2. Already received palliative radiation and discussed with Dr. Rubbie Siemens unable to receive additional RXT to lumbar area  3. Plan is to treat patient with palliative combination immunotherapy as an outpatient. I will touch base with patient's oncologist for further treatment plan  4. Continue anticoagulation for DVT  5. End-stage renal disease started on hemodialysis  6. If hemoglobin continues to drop consider blood transfusion at time of hemodialysis  7. Continue supportive care. 8. Urology to evaluate patient for palliative nephrectomy  9. Palliative care team on board. 10. Prognosis is guarded      Radha Griffith MD    This note is created with the assistance of a speech recognition program.  While intending to generate a document that actually reflects the content of the visit, the document can still have some errors including those of syntax and sound a like substitutions which may escape proof reading. It such instances, actual meaning can be extrapolated by contextual diversion.

## 2018-07-30 NOTE — FLOWSHEET NOTE
SPIRITUAL CARE DEPARTMENT - [facility] [address]  PROGRESS NOTE    Shift date: 7/29/2018  Shift day: Sunday   Shift # 2    Room # 2784/9664-19   Name: Giuseppe Sanchez            Age: 66 y.o. Gender: female          [facility]: [address] of [employer]: none    Referral: Routine Visit    Admit Date & Time: 7/25/2018  1:16 PM    PATIENT/EVENT DESCRIPTION:  Giuseppe Sanchez is a 66 y.o. female struggle with cancer. Patient requested to talk about her conversation with family and healthcare wishes. SPIRITUAL ASSESSMENT/INTERVENTION:  Patient expressed her desire to not continue suffering like this. She wants quality of life and if that is not possible she seems to think it is better to not drag the suffering out  Patient wants to talk with her physician about her condition and what is next   also spoke with family and they are going to provide emotional support to patient      SPIRITUAL CARE FOLLOW-UP PLAN:  [facility]plains will remain available to offer spiritual and emotional support as needed. Electronically signed by Rimma Rodrigues, on 7/29/2018 at 10:50 PM.  [facility]  [phone]       07/29/18 3932   Encounter Summary   Services provided to: Patient   Referral/Consult From: Patient   Support System Family members   Continue Visiting (7/29/2018)   Complexity of Encounter High   Length of Encounter 30 minutes   Routine   Type Follow up   Crisis   Type Emotional distress   Assessment Anxious   Intervention Active listening;Explored feelings, thoughts, concerns;Nurtured hope;Explored coping resources; End of life care; Discussed death;Grief care; Empowerment   Outcome Comfort; Connection/belonging; Acceptance;Expressed gratitude;Expressed feelings of kaushal, peace, and/or awe;Engaged in conversation; Shared life review;Coping;Less anxious, less agitated; Shared reminiscences;New perspective;Encouraged     Electronically signed by Ericka Juan on 7/29/2018 at 10:51 PM

## 2018-07-31 NOTE — PROGRESS NOTES
Pharmacy Medication Counseling Note    Warfarin counseling provided to Dayo Connell. Handouts provided to patient include: warfarin patient information    Counseling points included:  1. Indication for warfarin: DVT history. 2. Explanation of warfarin (blood thinner)  3. Frequency of blood test and goal INR: 2-3.  4. Dose and directions, including missed doses and timing of medication  5. Side effects: bleeding/bruising  6. Drug-food interactions:   A. Vitamin K and warfarin   B. Avoid grapefruit and cranberry  7. Potential drug interactions     8. Signs and symptoms of VTE and stroke reviewed  9. Contact prescriber when:   A. Changes made to diet, exercise or medications   B. Signs or symptoms of VTE or stroke   C. Uncontrolled bleeding or unusual bruising    Answered all medication-related questions and patient verbalized understanding.   Johnna Brush [facility]  7/31/2018  3:39 PM

## 2018-07-31 NOTE — PROGRESS NOTES
Prescriptions Prior to Admission: lisinopril-hydrochlorothiazide (PRINZIDE;ZESTORETIC) 20-12.5 MG per tablet, Take 1 tablet by mouth 2 times daily  cyclobenzaprine (FLEXERIL) 5 MG tablet, TAKE 1 TABLET BY MOUTH THREE TIMES A DAY AS NEEDED FOR MUSCLE SPASMS  oxyCODONE (OXYCONTIN) 10 MG extended release tablet, Take 1 tablet by mouth 2 times daily for 30 days. Intended supply: 30 days. oxyCODONE HCl (OXY-IR) 10 MG immediate release tablet, Take 1 tablet by mouth every 6 hours as needed for Pain for up to 30 days. .  Insulin Disposable Pump (V-GO 20) KIT,   HUMALOG 100 UNIT/ML injection vial, With insulin pump  B-D ULTRAFINE III SHORT PEN 31G X 8 MM MISC,   atorvastatin (LIPITOR) 20 MG tablet, TAKE 1 TABLET BY MOUTH ONE TIME A DAY  ondansetron (ZOFRAN) 4 MG tablet, Take 1 tablet by mouth daily as needed for Nausea or Vomiting  enoxaparin (LOVENOX) 100 MG/ML injection, Inject 0.9 mLs into the skin daily  omeprazole (PRILOSEC) 40 MG delayed release capsule, Take 1 capsule by mouth daily  amitriptyline (ELAVIL) 75 MG tablet, Take 1 tablet by mouth nightly  prochlorperazine (COMPAZINE) 5 MG tablet, Take 5 mg by mouth every 8 hours as needed   Blood Glucose Monitoring Suppl (TRUE METRIX METER) w/Device KIT,   Lancets MISC, Patient testing 3 times daily  glucose blood VI test strips (ASCENSIA AUTODISC VI;ONE TOUCH ULTRA TEST VI) strip, Use with associated glucose meter. Patient testing three times daily  docusate sodium (COLACE) 100 MG capsule, Take 2 capsules by mouth 2 times daily  Insulin Pen Needle 32G X 4 MM MISC, 2 each by Does not apply route 2 times daily  amLODIPine (NORVASC) 10 MG tablet, Take 1 tablet by mouth daily  acetaminophen (TYLENOL) 500 MG tablet, Take 1,000 mg by mouth 3 times daily  gabapentin (NEURONTIN) 100 MG capsule, Take 1 capsule by mouth 3 times daily for 30 days. Noa Brenden   PAZOPanib HCl (VOTRIENT) 200 MG chemo tablet, Take 4 tablets by mouth daily    INVESTIGATIONS     Last 3 CMP:  Recent Labs

## 2018-07-31 NOTE — PROGRESS NOTES
[facility]  Occupational Therapy Not Seen Note    DATE: 2018  Name: Nara Espino  : 1955  MRN: [id number]    Patient not available for Occupational Therapy due to:    [] Testing:    [x] Hemodialysis-Pts INR 7.6    [] Blood Transfusion in Progress    []Refusal by Patient:    [] Surgery/Procedure:    [] Strict Bedrest    [] Sedation    [] Spine Precautions     [] Pt being transferred to palliative care at this time. Spoke with pt/family and OT services to be defered. [] Pt independent with functional mobility and functional tasks.  Pt with no OT acute care needs at this time, will defer OT eval.    [] Other    Next Scheduled Treatment: 18    Signature: JESSENIA Fisher/GILDA

## 2018-07-31 NOTE — PROGRESS NOTES
Pt received 3 hrs of tx today. Lines flushed and aspirated w/o difficulty. Upon resumption of tx, high arterial alarm noted, line reversed high arterial pressure resolved, tx was completed with reversed lines, Dr Latonya Burnett notified. Pt tolerated tx well. Pt seen during tx by Dr Latonya Burnett. Total fluid removed = 1810, 1 unit of blood transfused during tx, Rinse back = 360, Net removal = 1140. Pre-weight = 87 kg, Post-weight = 85.8kg.

## 2018-07-31 NOTE — PROGRESS NOTES
[facility]          Progress Note    7/31/2018    4:29 PM    Name:   Sandra Valdez  MRN:     [id number]     Acct:      [de-identified]   Room:   [address] [place] [address] Day:  6  Admit Date:  7/25/2018  1:16 PM    PCP:   Jarred Paez MD  Code Status:  Full Code    Subjective:     C/C:   Chief Complaint   Patient presents with    Hematuria     Sent by her oncologist for dysuria with hematuria and lower back pain. Has h/o renal carcinoma. Pt reports generalized weakness/fatigue. Denies N/V/D/C/fever/chills. Interval History Status: not Improving. The patient is a 66 y.o.  female who is admitted to the hospital for the management of acute kidney failure with acute DVT. 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 INR is 7.6   Hemoglobin level is 6.8  Remained to have weakness in her left foot. No fevers overnight. Poor appetite. No hypoglycemia    Pt denies any Chest pain , new pain, vomiting. Notes from nursing staff and Consults had been reviewed, and the overnight progress had been checked with the nursing staff as well. Brief History: Sandra Valdez is a 66 y.o. Non-/non  female who presents with Hematuria (Sent by her oncologist for dysuria with hematuria and lower back pain. Has h/o renal carcinoma. Pt reports generalized weakness/fatigue. Denies N/V/D/C/fever/chills.)   and she is admitted to the hospital for the management of  Acute renal failure and gross hematuria.   Patient was known right sided renal cell carcinoma with spread to The lumbar spine and peritoneum that was diagnosed in January 2018 and had previously went through lumbar spine fixation for her compression fracture as well as radiation, seem like she is currently not on chemo for the last 1 month, she will Placeholder    acetaminophen  1,000 mg Oral TID    amitriptyline  75 mg Oral Nightly    atorvastatin  20 mg Oral Nightly    docusate sodium  200 mg Oral BID    pantoprazole  40 mg Oral QAM AC    oxyCODONE  10 mg Oral BID    sodium chloride flush  10 mL Intravenous 2 times per day    insulin lispro  0-12 Units Subcutaneous TID WC    insulin lispro  0-6 Units Subcutaneous Nightly     Continuous Infusions:    dextrose       PRN Meds: anticoagulant sodium citrate, anticoagulant sodium citrate, morphine, sodium chloride, sodium chloride, cyclobenzaprine, ondansetron, oxyCODONE HCl, prochlorperazine, sodium chloride flush, ondansetron, glucose, dextrose, glucagon (rDNA), dextrose, acetaminophen    Data:     Past Medical History:   has a past medical history of Cancer ([facility] [address] Chronic kidney disease; Depression; Diabetes mellitus ([facility] [address] GERD (gastroesophageal reflux disease); HA (headache); HBP (high blood pressure); History of blood transfusion; Hyperlipidemia; Hyperplastic polyp of intestine; Hypothyroid; Hypothyroidism; Left knee pain; Non-smoker; OA (osteoarthritis); and Tubular adenoma. Social History:   reports that she has never smoked. She has never used smokeless tobacco. She reports that she does not drink alcohol or use drugs.      Family History:   Family History   Problem Relation Age of Onset    Heart Disease Mother     Diabetes Mother     Cancer Father         esophageal cancer    Cancer Maternal Grandfather         colon       Vitals:  Patient Vitals for the past 24 hrs:   BP Temp Temp src Pulse Resp SpO2 Weight   07/31/18 1616 (!) 125/44 98.6 °F (37 °C) Oral - - - -   07/31/18 1159 (!) 133/58 98.6 °F (37 °C) - 81 - - 189 lb 2.5 oz (85.8 kg)   07/31/18 1129 125/66 - - 84 - - -   07/31/18 1059 (!) 120/58 - - 58 - - -   07/31/18 1041 (!) 119/5 98.7 °F (37.1 °C) Oral 86 18 - -   07/31/18 1029 (!) 119/59 98.6 °F (37 °C) - 86 - - -   07/31/18 1010 (!) 110/52 98.6 °F (37 °C) Oral 88 18 - - 18 1005 (!) 112/53 98.5 °F (36.9 °C) Oral 88 18 - -   18 1000 (!) 116/59 98.3 °F (36.8 °C) Oral 90 18 - -   18 0951 (!) 119/59 98.2 °F (36.8 °C) Oral 89 18 - -   18 0929 127/61 - - 89 - - -   18 0859 (!) 143/60 - - 83 - - -   18 0855 (!) 140/61 98.7 °F (37.1 °C) - 86 18 - 191 lb 12.8 oz (87 kg)   18 0437 (!) 148/54 98.6 °F (37 °C) Oral 89 18 96 % -   18 0056 (!) 123/55 98.6 °F (37 °C) Oral 83 11 98 % -   18 1939 (!) 122/55 98.3 °F (36.8 °C) Oral 86 16 98 % -     Temp (24hrs), Av.5 °F (36.9 °C), Min:98.2 °F (36.8 °C), Max:98.7 °F (37.1 °C)    Recent Labs      18   1123  18   1644  18   1918  18   0631   POCGLU  144*  142*  181*  111*       I/O (24Hr):     Intake/Output Summary (Last 24 hours) at 18 1629  Last data filed at 18 1159   Gross per 24 hour   Intake             1787 ml   Output             2350 ml   Net             -563 ml       Labs:  Recent Results (from the past 24 hour(s))   POC Glucose Fingerstick    Collection Time: 18  4:44 PM   Result Value Ref Range    POC Glucose 142 (H) 65 - 105 mg/dL   POC Glucose Fingerstick    Collection Time: 18  7:18 PM   Result Value Ref Range    POC Glucose 181 (H) 65 - 105 mg/dL   Basic Metabolic Panel    Collection Time: 18  5:59 AM   Result Value Ref Range    Glucose 113 (H) 70 - 99 mg/dL    BUN 54 (H) 8 - 23 mg/dL    CREATININE 5.54 (HH) 0.50 - 0.90 mg/dL    Bun/Cre Ratio NOT REPORTED 9 - 20    Calcium 7.7 (L) 8.6 - 10.4 mg/dL    Sodium 135 135 - 144 mmol/L    Potassium 3.8 3.7 - 5.3 mmol/L    Chloride 101 98 - 107 mmol/L    CO2 19 (L) 20 - 31 mmol/L    Anion Gap 15 9 - 17 mmol/L    GFR Non-African American 8 (L) >60 mL/min    GFR  9 (L) >60 mL/min    GFR Comment          GFR Staging NOT REPORTED    PROTIME-INR    Collection Time: 18  5:59 AM   Result Value Ref Range    Protime 60.2 (H) 9.0 - 12.0 sec    INR 6.3 (HH)    CBC Auto Differential    Collection Time: 07/31/18  5:59 AM   Result Value Ref Range    WBC 5.1 3.5 - 11.3 k/uL    RBC 2.36 (L) 3.95 - 5.11 m/uL    Hemoglobin 6.4 (LL) 11.9 - 15.1 g/dL    Hematocrit 21.1 (L) 36.3 - 47.1 %    MCV 89.4 82.6 - 102.9 fL    MCH 27.1 25.2 - 33.5 pg    MCHC 30.3 28.4 - 34.8 g/dL    RDW 19.2 (H) 11.8 - 14.4 %    Platelets 651 866 - 038 k/uL    MPV 10.1 8.1 - 13.5 fL    NRBC Automated 0.0 0.0 per 100 WBC    Differential Type NOT REPORTED     WBC Morphology NOT REPORTED     RBC Morphology ANISOCYTOSIS PRESENT     Platelet Estimate NOT REPORTED     Seg Neutrophils 65 36 - 65 %    Lymphocytes 20 (L) 24 - 43 %    Monocytes 8 3 - 12 %    Eosinophils % 5 (H) 1 - 4 %    Basophils 0 0 - 2 %    Immature Granulocytes 2 (H) 0 %    Segs Absolute 3.30 1.50 - 8.10 k/uL    Absolute Lymph # 1.03 (L) 1.10 - 3.70 k/uL    Absolute Mono # 0.43 0.10 - 1.20 k/uL    Absolute Eos # 0.23 0.00 - 0.44 k/uL    Basophils # <0.03 0.00 - 0.20 k/uL    Absolute Immature Granulocyte 0.08 0.00 - 0.30 k/uL   POC Glucose Fingerstick    Collection Time: 07/31/18  6:31 AM   Result Value Ref Range    POC Glucose 111 (H) 65 - 105 mg/dL   TYPE AND SCREEN    Collection Time: 07/31/18  8:18 AM   Result Value Ref Range    Expiration Date 08/03/2018     Arm Band Number [id number]     ABO/Rh A POSITIVE     Antibody Screen NEGATIVE     Unit Number [id number]     Product Code Leukocyte Reduced Red Cell     Unit Divison 0     Dispense Status ALLOCATED     Transfusion Status OK TO TRANSFUSE     Crossmatch Result COMPATIBLE    Protime-INR    Collection Time: 07/31/18  8:18 AM   Result Value Ref Range    Protime 71.0 (H) 9.0 - 12.0 sec    INR 7.6 (HH)    Blood Bank Specimen    Collection Time: 07/31/18  8:18 AM   Result Value Ref Range    Blood Bank Specimen NOT REPORTED    HEMOGLOBIN AND HEMATOCRIT, BLOOD    Collection Time: 07/31/18 11:17 AM   Result Value Ref Range    Hemoglobin 8.3 (L) 11.9 - 15.1 g/dL    Hematocrit 27.1 (L) 36.3 - 47.1 % PROTIME-INR    Collection Time: 07/31/18  1:42 PM   Result Value Ref Range    Protime 65.0 (H) 9.0 - 12.0 sec    INR 6.9 [facility] TERM [facility]UTE [facility]ARE AT [facility])      Lab Results   Component Value Date/Time    SPECIAL NOT REPORTED 07/25/2018 05:53 PM     Lab Results   Component Value Date/Time    CULTURE NO SIGNIFICANT GROWTH 07/25/2018 05:53 PM       Radiology:  Ct Lumbar Spine Wo Contrast  Result Date: 7/25/2018  1. Pathologic compression fracture of L1 is again identified with some retropulsion of the posterior aspect of the L1 vertebral body into the spinal canal without significant spinal canal narrowing. 2. Metastatic lesions involving the left iliac wing, right S1 sacral body effacing the right S1 neural foramen, and the L1 vertebral body. No new osseous lesions are identified. 3. Heterogeneously enlarged mass largely replacing the right kidney with nodularity in the right renal fossa suspicious for metastatic disease. Mri Lumbar Spine Wo Contrast  Result Date: 7/25/2018  Pathologic L1 compression fracture is again seen with retropulsion of fracture fragments causing impingement upon the distal aspect of the conus and the proximal thecal sac. Appearance is overall similar to the prior study. Us Renal Complete  Result Date: 7/25/2018  Large complex mass within the superior pole the right kidney. Mild right hydronephrosis. Urinary bladder is decompressed by Navarrete catheter. Ir Tunneled Cvc Place Wo Sq Port/pump > 5 Years  Result Date: 7/27/2018  Successful ultrasound and fluoroscopy guided right internal jugular vein 14.5 [place] by 19 cm tip to cuff tunneled catheter placement. Ready for use. Vl Renal Arterial Duplex Complete  Result Date: 7/28/2018   Conclusions   Summary   Right:  No evidence of renal artery stenosis. Abnormally large kidney length with possible mass at inferior pole,  correlate clinically with patient history of renal cell carcinoma. Left:  No evidence of renal artery stenosis.        Vl Dup Lower left lower extremity ([address]  Resolved Problems:    * No resolved hospital problems. *      Plan:        1. Acute renal failure (ARF) : Contributed to obstructive uropathy with concomitant use of ace inhibitors and diuretics and poor oral intake, started on hemodialysis on 7/27, Maxine, nephrology is following, pending Plan for hemodialysis as outpatient. follow-up electrolytes and BMP daily. Creatinine Not improving. Hemodialysis today ,. Navarrete catheter has been removed after confirmation from Urology, per nephrology recommendation, when necessary straight cath for retention  2. Anemia due to chronic kidney disease: Repeat CBC for today, monitor CBC daily, transfuse if Hgb less than 7.0. Prepare PRBC. ,  Transfusion of one PRBC on 7/31/2018, recheck hemoglobin  3. 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 Ordered brace to be used with activity. 4. Renal cell carcinoma : Oncology on board and to continue chemotherapy as outpatient with radiation therapy as outpatient. 5. Metabolic acidosis: Improving  6. Acute deep vein thrombosis (DVT) of femoral vein of left lower extremity : Treated with Coumadin and heparin bridge, INR 2.0 today, stop heparin and continue Coumadin. Order INR daily, stop APTT, Discussed with pharmacy, adjusted dose of Coumadin. 7. Patient has subtherapeutic INR of 7. This morning, recheck INR, received 5 mg of vitamin K, hold the Coumadin for tonight  8. DM: Insulin sliding scale, Hypoglycemia protocol, Resume home doses of antidiabetic medications, diabetic diet once patient is not NPO.,  Avoid hypoglycemia. Recheck blood sugars. 9. Megace for appetite  10. HTN: stable,resume home meds  11. Check Electrolytes and Electrolytes replacement as needed   12. Palliative care consulted.   13. Urology input for palliative nephrectomy. No current plan for surgery  14. Poor prognosis  15. DVT prophylaxis: already anticoagulated with Coumadin  16. PT, OT as needed. IV Fluids: dextrose,    Nutrition:  DIET NO SALT ADDED (3-4 GM); Carb Control: 4 carb choices (60 gms)/meal  Dietary Nutrition Supplements: Frozen Oral Supplement      Consultations:   IP CONSULT TO UROLOGY  IP CONSULT TO NEPHROLOGY  IP CONSULT TO NEUROSURGERY  IP CONSULT TO HOSPITALIST  IP CONSULT TO NEPHROLOGY  IP CONSULT TO UROLOGY  IP CONSULT TO ONCOLOGY  IP CONSULT TO NEUROSURGERY  IP CONSULT TO RADIATION ONCOLOGY  IP CONSULT TO PALLIATIVE CARE  IP CONSULT TO SPIRITUAL SERVICES  IP CONSULT TO SPIRITUAL SERVICES  IP CONSULT TO IV TEAM      Time Spent on patient care is  39 mins more than 50% of this time spent in patient counseling Directly related to the complexity of the case. additional time was spent on pt examination as well as coordination of care and contacting consultants. Discussed care plan with nurse after getting input from the nurse.     Above plan discussed with the patient in room, who agreed to the above plan     Please call if any questions    Chi Gilmore MD  [facility] Hospitalist  7/31/2018  4:29 PM     (Please note that this chart was generated using voice recognition Dragon dictation software program. Although every effort was made to ensure the accuracy of this automated transcription, some errors in transcription may have occurred.)

## 2018-07-31 NOTE — PROGRESS NOTES
The patient is a 63-year-old female who has stage IV renal cell carcinoma with metastatic disease in the bones. She was treated previously to the spine from T10 through L4 which was completed in February 2018. She received 3000 cGy in 10 fractions. She was treated with surgical stabilization of the spine from T11 to L3 prior to the radiation treatment. She was admitted to the hospital with back pain more recently. MRI scan of the lumbar spine on 7/25/18 revealed a pathological compression fracture at L1. The patient was seen by neurosurgery. She has seen the neurosurgeon previously on multiple occasions. The risk of surgery has been discussed. There is no plan for surgical intervention at this point in time. The patient has elevation of her creatinine and is being followed by nephrology. A dialysis catheter was placed recently. The patient has a history of hypertension, high cholesterol, diabetes, and DVT. She is currently on anticoagulation. She complains of pain in the lumbar spine region. She notes that this pain did resolve temporarily after receiving radiation therapy in February. It has been present for the last few months. She denies focal loss of strength/sensation or incontinence. She did go for hemodialysis today. She has been under the care of Dr. Leonel Navarrete with regards to systemic therapy. She has received votrient. Her last treatment was approximately 1 month ago by her report. She rates her appetite is fair. She has lost over 50 pounds within the last year. Of note, the patient also didn't receive a blood transfusion for low hemoglobin this admission. Systems review is otherwise negative. Physical examination: The patient is in no acute distress. Head is atraumatic and normocephalic. EOMI. PERRLA. Visual examination of the oral cavity and oropharynx is unremarkable.   There is no palpable cervical, supra clavicular, infraclavicular, axillary, or inguinal/femoral lymphadenopathy. Thyroid is nontender with no palpable masses. S1 and S2 are present. Regular, rate, and rhythm. Lungs are clear to auscultation. No wheezing or stridor supple. The abdomen is soft. It is nontender. There are no abnormal masses. There is no rebound tenderness or guarding noted. Alert and oriented ×3. Sensation is intact. Strength is intact for all extremities. The patient does have a left sided foot drop of uncertain duration which is minimal.  Gait is not tested. Joints are unremarkable. Skin is unremarkable. The patient is a 81-year-old female who has stage IV renal cell carcinoma. Her last CT scan of the chest, abdomen, and pelvis revealed a large right renal mass measuring 11 cm. There was intraperitoneal metastasis. As noted, she also had spinal metastasis at that time. I reviewed the MRI images. The compression fracture at L1 was treated previously to a palliative dose. The metastasis in this area is bony fragments as well as soft tissue tumor. Without decompression surgical, radiation therapy will not decompress the mass significantly around the spinal cord. This area is adjacent to the spinal cord and giving further radiation treatment with SBRT would carry a risk of damage to the spinal cord with little benefit to control the disease in this region. There is no plan to give further radiation therapy to this region at this time. I will check a CT scan of the chest, abdomen, pelvis without contrast for restaging purposes since the patient has not had any staging studies in approximately 4 months. There is no plan for radiation therapy and less the restaging studies show anything different from what is known. Will follow.

## 2018-07-31 NOTE — FLOWSHEET NOTE
DATE: 2018    NAME: Kim Santo  MRN: [id number]   : 1955    Patient not seen this date for Physical Therapy due to:  [] Blood transfusion in progress  [] Hemodialysis  []  Patient Declined  [] Spine Precautions   [] Strict Bedrest  [] Surgery/ Procedure  [] Testing      [x] Other: INR 7.6, will check back         [] PT being discontinued at this time. Patient independent. No further needs. [] PT being discontinued at this time as the patient has been transferred to palliative care. No further needs.     Marlorenzo Squibb, PTA

## 2018-07-31 NOTE — PROGRESS NOTES
[facility] Progress Note  7/31/2018 3:24 PM  Subjective:   Admit Date: 7/25/2018  PCP: Yumiko Chu MD   CC: Metastatic Renal Cancer    Oncological History:  Diagnosis:   Metastatic RCC  Started on Pazopanib on 3/16/18  Radiation  Therapy completed to spine  Her recent MRI spine showing : Compression fracture and additional bone lesions in the iliac bone  HISTORY OF PRESENT ILLNESS:    Oncologic History: This is a 62 YO female was admitted with back pain after a fall. On admission she had imaging studies which showed L2 metastatic lesion with compression fracture. Had MRI spine which showed right renal mass c/w RCC and inferior vena cava invasion. It also showed L1 metastasis, pathologic fracture, epidural invasion, and severe thecal sac stenosis. Seen by neurosurgery and input awaited. She reports wt loss possibly intentional over last year for about 50 lbs. Patient seen by urology. SHe had a biopsy of the Kidney which showed RCC. SHe was seen by Neurosurgery and she had spinal fixation with pedicle screws On 1/23/18. She had palliative radiation and was on pazopanib    Interval History:   Patient seen and examined. Labs in vitals reviewed. Pain is controlled. I know noted to be elevated. Hemoglobin dropped. Patient received 1 unit of packed RBC during hemodialysis. No new complaints. Diet: DIET NO SALT ADDED (3-4 GM);  Carb Control: 4 carb choices (60 gms)/meal  Dietary Nutrition Supplements: Frozen Oral Supplement  Medications:   Scheduled Meds:   0.9 % sodium chloride  250 mL Intravenous Once    megestrol  400 mg Oral Daily    warfarin (COUMADIN) daily dosing (placeholder)   Other RX Placeholder    acetaminophen  1,000 mg Oral TID    amitriptyline  75 mg Oral Nightly    atorvastatin  20 mg Oral Nightly    docusate sodium  200 mg Oral BID    pantoprazole  40 mg Oral QAM AC    oxyCODONE  10 mg Oral BID    sodium chloride flush  10 mL Intravenous 2 times per day    insulin lispro  0-12 Units Subcutaneous TID     insulin lispro  0-6 Units Subcutaneous Nightly     Continuous Infusions:   dextrose       CBC:   Recent Labs      07/29/18   0813  07/30/18   0558  07/30/18   1539  07/31/18   0559  07/31/18   1117   WBC  5.1   --   5.5  5.1   --    HGB  7.1*   --   7.2*  6.4*  8.3*   PLT  236  251  240  331   --      BMP:    Recent Labs      07/30/18   0558  07/31/18   0559   NA  137  135   K  3.8  3.8   CL  101  101   CO2  23  19*   BUN  47*  54*   CREATININE  5.11*  5.54*   GLUCOSE  115*  113*     Hepatic:   No results for input(s): AST, ALT, ALB, BILITOT, ALKPHOS in the last 72 hours. INR:   Recent Labs      07/31/18   0559  07/31/18   0818  07/31/18   1342   INR  6.3*  7.6*  6.9*     Results for orders placed or performed during the hospital encounter of 07/25/18   URINE CULTURE CLEAN CATCH   Result Value Ref Range    Specimen Description . URINE     Special Requests NOT REPORTED     Culture NO SIGNIFICANT GROWTH     Status FINAL 07/26/2018    Urinalysis with Microscopic   Result Value Ref Range    Color, UA YELLOW YEL    Turbidity UA CLOUDY (A) CLEAR    Glucose, Ur NEGATIVE NEG    Bilirubin Urine NEGATIVE NEG    Ketones, Urine NEGATIVE NEG    Specific Gravity, UA 1.015 1.005 - 1.030    Urine Hgb NEGATIVE NEG    pH, UA 5.0 5.0 - 8.0    Protein, UA 2+ (A) NEG    Urobilinogen, Urine Normal NORM    Nitrite, Urine NEGATIVE NEG    Leukocyte Esterase, Urine MODERATE (A) NEG    -          WBC, UA 5 TO 10 0 - 5 /HPF    RBC, UA 2 TO 5 0 - 4 /HPF    Casts UA 2 TO 5 HYALINE 0 - 8 /LPF    Crystals UA NOT REPORTED NONE /HPF    Epithelial Cells UA 2 TO 5 0 - 5 /HPF    Renal Epithelial, Urine NOT REPORTED 0 /HPF    Bacteria, UA FEW (A) NONE    Mucus, UA NOT REPORTED NONE    Trichomonas, UA NOT REPORTED NONE    Amorphous, UA NOT REPORTED NONE    Other Observations UA NOT REPORTED NREQ    Yeast, UA NOT REPORTED NONE   CBC WITH AUTO DIFFERENTIAL   Result Value Ref Range    WBC 5.7 3.5 - 11.3 k/uL    RBC 3.01 (L) 3.95 - 5.11 m/uL    Hemoglobin 8.2 (L) 11.9 - 15.1 g/dL    Hematocrit 28.4 (L) 36.3 - 47.1 %    MCV 94.4 82.6 - 102.9 fL    MCH 27.2 25.2 - 33.5 pg    MCHC 28.9 28.4 - 34.8 g/dL    RDW 19.1 (H) 11.8 - 14.4 %    Platelets 279 785 - 210 k/uL    MPV 9.0 8.1 - 13.5 fL    NRBC Automated 0.4 (H) 0.0 per 100 WBC    Differential Type NOT REPORTED     WBC Morphology NOT REPORTED     RBC Morphology ANISOCYTOSIS PRESENT     Platelet Estimate NOT REPORTED     Seg Neutrophils 74 (H) 36 - 65 %    Lymphocytes 15 (L) 24 - 43 %    Monocytes 8 3 - 12 %    Eosinophils % 1 1 - 4 %    Basophils 1 0 - 2 %    Immature Granulocytes 1 (H) 0 %    Segs Absolute 4.23 1.50 - 8.10 k/uL    Absolute Lymph # 0.83 (L) 1.10 - 3.70 k/uL    Absolute Mono # 0.47 0.10 - 1.20 k/uL    Absolute Eos # 0.08 0.00 - 0.44 k/uL    Basophils # 0.03 0.00 - 0.20 k/uL    Absolute Immature Granulocyte 0.05 0.00 - 0.30 k/uL   Basic Metabolic Panel   Result Value Ref Range    Glucose 55 (L) 70 - 99 mg/dL     (HH) 8 - 23 mg/dL    CREATININE 9.62 (HH) 0.50 - 0.90 mg/dL    Bun/Cre Ratio NOT REPORTED 9 - 20    Calcium 9.1 8.6 - 10.4 mg/dL    Sodium 137 135 - 144 mmol/L    Potassium 4.6 3.7 - 5.3 mmol/L    Chloride 102 98 - 107 mmol/L    CO2 11 (L) 20 - 31 mmol/L    Anion Gap 24 (H) 9 - 17 mmol/L    GFR Non-African American 4 (L) >60 mL/min    GFR African American 5 (L) >60 mL/min    GFR Comment          GFR Staging NOT REPORTED    Protein, urine, random   Result Value Ref Range    Total Protein, Urine 68 mg/dL   Sodium, urine, random   Result Value Ref Range    Sodium,Ur 44 mmol/L   Creatinine, Random Urine   Result Value Ref Range    Creatinine, Ur 118.0 28.0 - 217.0 mg/dL   CBC Auto Differential   Result Value Ref Range    WBC 4.1 3.5 - 11.3 k/uL    RBC 2.72 (L) 3.95 - 5.11 m/uL    Hemoglobin 7.3 (L) 11.9 - 15.1 g/dL    Hematocrit 26.5 (L) 36.3 - 47.1 %    MCV 97.4 82.6 - 102.9 fL    MCH 26.8 25.2 - 33.5 pg    MCHC 27.5 (L) 28.4 - 34.8 g/dL RDW 18.9 (H) 11.8 - 14.4 %    Platelets 800 272 - 494 k/uL    MPV 9.1 8.1 - 13.5 fL    NRBC Automated 0.0 0.0 per 100 WBC    Differential Type NOT REPORTED     WBC Morphology NOT REPORTED     RBC Morphology NOT REPORTED     Platelet Estimate NOT REPORTED     Immature Granulocytes 0 0 %    Seg Neutrophils 71 (H) 36 - 66 %    Lymphocytes 22 (L) 24 - 44 %    Monocytes 3 1 - 7 %    Eosinophils % 4 1 - 4 %    Basophils 0 0 - 2 %    Absolute Immature Granulocyte 0.00 0.00 - 0.30 k/uL    Segs Absolute 2.92 1.8 - 7.7 k/uL    Absolute Lymph # 0.90 (L) 1.0 - 4.8 k/uL    Absolute Mono # 0.12 0.1 - 0.8 k/uL    Absolute Eos # 0.16 0.0 - 0.4 k/uL    Basophils # 0.00 0.0 - 0.2 k/uL    Morphology ANISOCYTOSIS PRESENT    Comprehensive Metabolic Panel w/ Reflex to MG   Result Value Ref Range    Glucose 104 (H) 70 - 99 mg/dL    BUN 99 (HH) 8 - 23 mg/dL    CREATININE 9.34 (HH) 0.50 - 0.90 mg/dL    Bun/Cre Ratio NOT REPORTED 9 - 20    Calcium 7.9 (L) 8.6 - 10.4 mg/dL    Sodium 138 135 - 144 mmol/L    Potassium 4.6 3.7 - 5.3 mmol/L    Chloride 106 98 - 107 mmol/L    CO2 10 (L) 20 - 31 mmol/L    Anion Gap 22 (H) 9 - 17 mmol/L    Alkaline Phosphatase 63 35 - 104 U/L    ALT <5 (L) 5 - 33 U/L    AST 7 <32 U/L    Total Bilirubin 0.19 (L) 0.3 - 1.2 mg/dL    Total Protein 4.9 (L) 6.4 - 8.3 g/dL    Alb 2.0 (L) 3.5 - 5.2 g/dL    Albumin/Globulin Ratio 0.7 (L) 1.0 - 2.5    GFR Non-African American 4 (L) >60 mL/min    GFR African American 5 (L) >60 mL/min    GFR Comment          GFR Staging NOT REPORTED    Magnesium   Result Value Ref Range    Magnesium 1.9 1.6 - 2.6 mg/dL   Protime-INR   Result Value Ref Range    Protime 10.7 9.0 - 12.0 sec    INR 1.0    Urinalysis with microscopic   Result Value Ref Range    Color, UA YELLOW YEL    Turbidity UA CLOUDY (A) CLEAR    Glucose, Ur NEGATIVE NEG    Bilirubin Urine NEGATIVE NEG    Ketones, Urine NEGATIVE NEG    Specific Gravity, UA 1.014 1.005 - 1.030    Urine Hgb NEGATIVE NEG    pH, UA 5.0 5.0 - 8.0 Protein, UA 2+ (A) NEG    Urobilinogen, Urine Normal NORM    Nitrite, Urine NEGATIVE NEG    Leukocyte Esterase, Urine SMALL (A) NEG    -          WBC, UA 10 TO 20 0 - 5 /HPF    RBC, UA 5 TO 10 0 - 4 /HPF    Casts UA 2 TO 5 HYALINE 0 - 8 /LPF    Crystals UA NOT REPORTED NONE /HPF    Epithelial Cells UA 5 TO 10 0 - 5 /HPF    Renal Epithelial, Urine NOT REPORTED 0 /HPF    Bacteria, UA FEW (A) NONE    Mucus, UA NOT REPORTED NONE    Trichomonas, UA NOT REPORTED NONE    Amorphous, UA NOT REPORTED NONE    Other Observations UA NOT REPORTED NREQ    Yeast, UA NOT REPORTED NONE   Basic Metabolic Panel   Result Value Ref Range    Glucose 188 (H) 70 - 99 mg/dL    BUN 98 (HH) 8 - 23 mg/dL    CREATININE 9.09 (HH) 0.50 - 0.90 mg/dL    Bun/Cre Ratio NOT REPORTED 9 - 20    Calcium 8.0 (L) 8.6 - 10.4 mg/dL    Sodium 138 135 - 144 mmol/L    Potassium 4.6 3.7 - 5.3 mmol/L    Chloride 104 98 - 107 mmol/L    CO2 13 (L) 20 - 31 mmol/L    Anion Gap 21 (H) 9 - 17 mmol/L    GFR Non-African American 4 (L) >60 mL/min    GFR African American 5 (L) >60 mL/min    GFR Comment          GFR Staging NOT REPORTED    Basic Metabolic Panel   Result Value Ref Range    Glucose 215 (H) 70 - 99 mg/dL     (HH) 8 - 23 mg/dL    CREATININE 9.06 (HH) 0.50 - 0.90 mg/dL    Bun/Cre Ratio NOT REPORTED 9 - 20    Calcium 8.0 (L) 8.6 - 10.4 mg/dL    Sodium 135 135 - 144 mmol/L    Potassium 4.3 3.7 - 5.3 mmol/L    Chloride 97 (L) 98 - 107 mmol/L    CO2 17 (L) 20 - 31 mmol/L    Anion Gap 21 (H) 9 - 17 mmol/L    GFR Non-African American 4 (L) >60 mL/min    GFR African American 5 (L) >60 mL/min    GFR Comment          GFR Staging NOT REPORTED    APTT   Result Value Ref Range    PTT 26.3 20.5 - 30.5 sec   Protime-INR   Result Value Ref Range    Protime 11.1 9.0 - 12.0 sec    INR 1.0    CBC Auto Differential   Result Value Ref Range    WBC 4.5 3.5 - 11.3 k/uL    RBC 2.60 (L) 3.95 - 5.11 m/uL    Hemoglobin 7.1 (L) 11.9 - 15.1 g/dL    Hematocrit 23.5 (L) 36.3 - 47.1 % MCV 90.4 82.6 - 102.9 fL    MCH 27.3 25.2 - 33.5 pg    MCHC 30.2 28.4 - 34.8 g/dL    RDW 19.1 (H) 11.8 - 14.4 %    Platelets 252 512 - 675 k/uL    MPV 10.0 8.1 - 13.5 fL    NRBC Automated 0.0 0.0 per 100 WBC    Differential Type NOT REPORTED     WBC Morphology NOT REPORTED     RBC Morphology ANISOCYTOSIS PRESENT     Platelet Estimate NOT REPORTED     Seg Neutrophils 62 36 - 65 %    Lymphocytes 21 (L) 24 - 43 %    Monocytes 10 3 - 12 %    Eosinophils % 5 (H) 1 - 4 %    Basophils 0 0 - 2 %    Immature Granulocytes 1 (H) 0 %    Segs Absolute 2.77 1.50 - 8.10 k/uL    Absolute Lymph # 0.94 (L) 1.10 - 3.70 k/uL    Absolute Mono # 0.45 0.10 - 1.20 k/uL    Absolute Eos # 0.24 0.00 - 0.44 k/uL    Basophils # <0.03 0.00 - 0.20 k/uL    Absolute Immature Granulocyte 0.05 0.00 - 0.30 k/uL   Albumin   Result Value Ref Range    Alb 2.1 (L) 3.5 - 5.2 g/dL   Hepatitis B surface antibody   Result Value Ref Range    Hep B S Ab <3.50 <10 mIU/mL   Hepatitis B surface antigen   Result Value Ref Range    Hepatitis B Surface Ag NONREACTIVE NR   Hepatitis B core antibody, total   Result Value Ref Range    Hep B Core Total Ab NONREACTIVE NR   Lactate Dehydrogenase   Result Value Ref Range     135 - 214 U/L   Basic Metabolic Panel   Result Value Ref Range    Glucose 122 (H) 70 - 99 mg/dL    BUN 72 (H) 8 - 23 mg/dL    CREATININE 6.79 (HH) 0.50 - 0.90 mg/dL    Bun/Cre Ratio NOT REPORTED 9 - 20    Calcium 7.5 (L) 8.6 - 10.4 mg/dL    Sodium 139 135 - 144 mmol/L    Potassium 3.9 3.7 - 5.3 mmol/L    Chloride 101 98 - 107 mmol/L    CO2 20 20 - 31 mmol/L    Anion Gap 18 (H) 9 - 17 mmol/L    GFR Non-African American 6 (L) >60 mL/min    GFR  7 (L) >60 mL/min    GFR Comment          GFR Staging NOT REPORTED    HEPATITIS C ANTIBODY   Result Value Ref Range    Hepatitis C Ab NONREACTIVE NR   APTT   Result Value Ref Range    PTT 27.4 20.5 - 30.5 sec   APTT   Result Value Ref Range    PTT 59.8 (H) 20.5 - 30.5 sec   APTT   Result Value Ref Range    PTT 41.5 (H) 20.5 - 30.5 sec   APTT   Result Value Ref Range    PTT >120.0 (HH) 20.5 - 30.5 sec   CBC   Result Value Ref Range    WBC 5.1 3.5 - 11.3 k/uL    RBC 2.68 (L) 3.95 - 5.11 m/uL    Hemoglobin 7.1 (L) 11.9 - 15.1 g/dL    Hematocrit 24.0 (L) 36.3 - 47.1 %    MCV 89.6 82.6 - 102.9 fL    MCH 26.5 25.2 - 33.5 pg    MCHC 29.6 28.4 - 34.8 g/dL    RDW 18.5 (H) 11.8 - 14.4 %    Platelets 366 849 - 509 k/uL    MPV 9.5 8.1 - 13.5 fL    NRBC Automated 0.0 0.0 per 100 WBC   Protime-INR   Result Value Ref Range    Protime 13.1 (H) 9.0 - 12.0 sec    INR 1.2    APTT   Result Value Ref Range    PTT >120.0 (HH) 20.5 - 30.5 sec   Basic Metabolic Panel   Result Value Ref Range    Glucose 115 (H) 70 - 99 mg/dL    BUN 47 (H) 8 - 23 mg/dL    CREATININE 5.11 (HH) 0.50 - 0.90 mg/dL    Bun/Cre Ratio NOT REPORTED 9 - 20    Calcium 7.9 (L) 8.6 - 10.4 mg/dL    Sodium 137 135 - 144 mmol/L    Potassium 3.8 3.7 - 5.3 mmol/L    Chloride 101 98 - 107 mmol/L    CO2 23 20 - 31 mmol/L    Anion Gap 13 9 - 17 mmol/L    GFR Non-African American 9 (L) >60 mL/min    GFR  10 (L) >60 mL/min    GFR Comment          GFR Staging NOT REPORTED    Platelet count   Result Value Ref Range    Platelets 985 008 - 129 k/uL   PROTIME-INR   Result Value Ref Range    Protime 20.1 (H) 9.0 - 12.0 sec    INR 2.0    APTT   Result Value Ref Range    PTT 62.0 (H) 20.5 - 30.5 sec   APTT   Result Value Ref Range    PTT 89.3 (HH) 20.5 - 30.5 sec   APTT   Result Value Ref Range    PTT 61.0 (H) 20.5 - 30.5 sec   CBC   Result Value Ref Range    WBC 5.5 3.5 - 11.3 k/uL    RBC 2.72 (L) 3.95 - 5.11 m/uL    Hemoglobin 7.2 (L) 11.9 - 15.1 g/dL    Hematocrit 25.0 (L) 36.3 - 47.1 %    MCV 91.9 82.6 - 102.9 fL    MCH 26.5 25.2 - 33.5 pg    MCHC 28.8 28.4 - 34.8 g/dL    RDW 18.6 (H) 11.8 - 14.4 %    Platelets 556 223 - 507 k/uL    MPV 9.2 8.1 - 13.5 fL    NRBC Automated 0.0 0.0 per 100 WBC   Basic Metabolic Panel   Result Value Ref Range    Glucose Result Value Ref Range    POC Glucose 87 65 - 105 mg/dL   POC Glucose Fingerstick   Result Value Ref Range    POC Glucose 80 65 - 105 mg/dL   POC Glucose Fingerstick   Result Value Ref Range    POC Glucose 102 65 - 105 mg/dL   POC Glucose Fingerstick   Result Value Ref Range    POC Glucose 148 (H) 65 - 105 mg/dL   POC Glucose Fingerstick   Result Value Ref Range    POC Glucose 165 (H) 65 - 105 mg/dL   POC Glucose Fingerstick   Result Value Ref Range    POC Glucose 206 (H) 65 - 105 mg/dL   POC Glucose Fingerstick   Result Value Ref Range    POC Glucose 202 (H) 65 - 105 mg/dL   POC Glucose Fingerstick   Result Value Ref Range    POC Glucose 131 (H) 65 - 105 mg/dL   POC Glucose Fingerstick   Result Value Ref Range    POC Glucose 171 (H) 65 - 105 mg/dL   POC Glucose Fingerstick   Result Value Ref Range    POC Glucose 166 (H) 65 - 105 mg/dL   POC Glucose Fingerstick   Result Value Ref Range    POC Glucose 143 (H) 65 - 105 mg/dL   POC Glucose Fingerstick   Result Value Ref Range    POC Glucose 144 (H) 65 - 105 mg/dL   POC Glucose Fingerstick   Result Value Ref Range    POC Glucose 133 (H) 65 - 105 mg/dL   POC Glucose Fingerstick   Result Value Ref Range    POC Glucose 138 (H) 65 - 105 mg/dL   POC Glucose Fingerstick   Result Value Ref Range    POC Glucose 160 (H) 65 - 105 mg/dL   POC Glucose Fingerstick   Result Value Ref Range    POC Glucose 157 (H) 65 - 105 mg/dL   POC Glucose Fingerstick   Result Value Ref Range    POC Glucose 116 (H) 65 - 105 mg/dL   POC Glucose Fingerstick   Result Value Ref Range    POC Glucose 144 (H) 65 - 105 mg/dL   POC Glucose Fingerstick   Result Value Ref Range    POC Glucose 142 (H) 65 - 105 mg/dL   POC Glucose Fingerstick   Result Value Ref Range    POC Glucose 181 (H) 65 - 105 mg/dL   POC Glucose Fingerstick   Result Value Ref Range    POC Glucose 111 (H) 65 - 105 mg/dL   TYPE AND SCREEN   Result Value Ref Range    Expiration Date 08/03/2018     Arm Band Number BE [id number]     ABO/Rh A hardware involving T11, T12, L2, and L3 is demonstrated. A pathologic compression fracture of L1 is again demonstrated with some retropulsion of fragments by approximately 0.4 cm. There is a dominant lytic lesion in the right sacral body encroaching upon the right S1 neural foramen. This metastatic lesion measures approximately 3.1 x 2.5 cm in cross-section. Its inferior extent is not well demonstrated. In the left iliac wing, a metastatic lesion is also identified exerting some mass effect upon the left sacroiliac joint. The lesion measures approximately 3.2 x 3.0 cm and is partially imaged on the previous lumbar spine MRI. No additional lesions are identified. DEGENERATIVE CHANGES: Degenerative disc disease throughout the lumbar spine involving the discs and facets. SOFT TISSUES/RETROPERITONEUM: A dominant right renal mass is partially imaged. There is perinephric stranding and perinephric nodularity likely metastatic disease. Atherosclerotic changes of the abdominal aorta are present. Low-density along the medial dome of the liver (axial image 1) suspicious for metastatic involvement of the liver. 1. Pathologic compression fracture of L1 is again identified with some retropulsion of the posterior aspect of the L1 vertebral body into the spinal canal without significant spinal canal narrowing. 2. Metastatic lesions involving the left iliac wing, right S1 sacral body effacing the right S1 neural foramen, and the L1 vertebral body. No new osseous lesions are identified. 3. Heterogeneously enlarged mass largely replacing the right kidney with nodularity in the right renal fossa suspicious for metastatic disease.      Mri Lumbar Spine Wo Contrast    Result Date: 7/25/2018  EXAMINATION: MRI OF THE LUMBAR SPINE WITHOUT CONTRAST, 7/25/2018 5:09 pm TECHNIQUE: Multiplanar multisequence MRI of the lumbar spine was performed without the administration of intravenous contrast. COMPARISON: June 9, 2018 HISTORY: ORDERING SYSTEM PROVIDED HISTORY: pathologic fx in lumbar spine, difficulty urinating FINDINGS: BONES/ALIGNMENT: Thoracolumbar fusion is again seen. Hardware causes artifact limiting adjacent evaluation. Pathologic L1 compression fracture is again seen with retropulsion of fracture fragments. No acute fracture. Right sacral metastatic lesion is seen. SPINAL CORD: Retropulsion of fracture fragments is causing impingement upon the conus. Appearance is likely similar to the prior study. SOFT TISSUES: Complex right renal mass. L1-L2: L1 compression fracture with retropulsion of fracture fragments causes severe central canal stenosis and impingement on the distal aspect of the conus and the thecal sac. L2-L3: Mild broad-based disc bulge. No significant central canal or foraminal stenosis. L3-L4: Broad-based disc bulge, facet degenerative changes, and hypertrophy of the ligamentum flavum combine to create moderate central canal stenosis. Changes combine to create moderate bilateral foraminal stenosis. L4-L5: Left paracentral disc bulge with extension into the left foramina and facet degenerative changes combine to create mild central canal stenosis. Changes combine to create moderate-severe left foraminal stenosis and moderate right foraminal stenosis. L5-S1: Paracentral disc bulge with left foraminal component causes minimal impingement upon the ventral aspect of the thecal sac. Facet degenerative changes are seen. Changes combine to create mild left foraminal stenosis. Pathologic L1 compression fracture is again seen with retropulsion of fracture fragments causing impingement upon the distal aspect of the conus and the proximal thecal sac. Appearance is overall similar to the prior study.      Us Renal Complete    Result Date: 7/25/2018  EXAMINATION: RETROPERITONEAL ULTRASOUND OF THE KIDNEYS AND URINARY BLADDER 7/25/2018 COMPARISON: None HISTORY: ORDERING SYSTEM PROVIDED HISTORY: RENAL FAILURE, ACUTE (KIDNEY INJURY) FINDINGS: Kidneys: The right kidney measures 15.3 cm in length and the left kidney measures 11.8 cm in length. There is a complex mass lesion involving the superior pole the right kidney measuring 9.7 x 8.7 cm. Mild dilatation of the proximal right ureter. No evidence for left-sided hydronephrosis. Bladder: There bladder is decompressed by Navarrete catheter. Large complex mass within the superior pole the right kidney. Mild right hydronephrosis. Urinary bladder is decompressed by Navarrete catheter. Ir Tunneled Cvc Place Wo Sq Port/pump > 5 Years    Result Date: 7/27/2018  PROCEDURE: ULTRASOUND GUIDED VASCULAR ACCESS. FLUOROSCOPY GUIDED PLACEMENT OF A TUNNELED CATHETER. 7/27/2018. HISTORY: ORDERING SYSTEM PROVIDED HISTORY: RASHMI TECHNOLOGIST PROVIDED HISTORY: Reason for exam:->RASHMI Anticipated long-term dialysis requirement. SEDATION: None FLUOROSCOPY DOSE AND TYPE OR TIME AND EXPOSURES: 0.3 minute; 68 cGy cm squared TECHNIQUE: Informed consent was obtained after a detailed explanation of the procedure including risks, benefits, and alternatives. Universal protocol was observed. The right neck and chest were prepped and draped in sterile fashion using maximum sterile barrier technique. Local anesthesia was achieved with lidocaine. A micropuncture needle was used to access the right internal jugular vein using ultrasound guidance. An ultrasound image demonstrating patency of the vein with needle tip located within it. An image was obtained and stored in PACs. A 0.035 guidewire was used to place a peel-away sheath. A subcutaneous tunnel was created to the infraclavicular region and a tunneled 14.5 [place] by 19 cm tip to cuff dialysis catheter was pulled through the subcutaneous tunnel to the venotomy site and advanced through the peel-away sheath under fluoroscopic guidance to the right atrium. The catheter flushed easily and there was a good blood return. The catheter was sutured to the skin.   The catheter was locked with heparinized saline. The patient tolerated the procedure well and there were no immediate complications. FINDINGS: Fluoroscopic image demonstrates the tip of the catheter in the right atrium. Successful ultrasound and fluoroscopy guided right internal jugular vein 14.5 [place] by 19 cm tip to cuff tunneled catheter placement. Ready for use.  Renal Arterial Duplex Complete    Result Date: 7/28/2018    [facility]  Vascular Renal Procedure   Patient Name  [facility]    Date of Study           07/28/2018                Joellen Morris   Date of Birth 1955  Gender                  Female   Age           66 year(s)  Race                       Room Number   5440   Corporate ID  [id number]  #   Patient Lara  [de-identified]  #   MR #          [id number]     Sonographer             Belén Jeong   Accession #   [id number]   Interpreting Physician  Whit Fernandes   Referring                 Referring Physician     Matt Lewis, *  Nurse  Practitioner  Procedure Type of Study:   Abdominal: Renal, Renal Artery Scan Bilateral.  Indications for Study:Renal failure, acute. Patient Status: In Patient. Technical Quality:Limited visualization. Limitation reason:depth-- patient positioning--pain. - Critical Result:Fernanda WAITE RN 9:30 am.  Conclusions   Summary   Simultaneous real time imaging utilizing B-Mode, color doppler and  spectral waveform analysis was performed on the abdominal aorta and  bilateral renal arteries. Study demonstrates:   Right:  No evidence of renal artery stenosis. Abnormally large kidney length with possible mass at inferior pole,  correlate clinically with patient history of renal cell carcinoma. Left:  No evidence of renal artery stenosis.    Signature   ----------------------------------------------------------------  Electronically signed by Belén Jeong(Sonographer) on  07/28/2018 09:40 AM ----------------------------------------------------------------   ----------------------------------------------------------------  Electronically signed by Phuong Snowden(Interpreting  physician) on 07/28/2018 10:31 AM  ----------------------------------------------------------------  Findings:   Right Impression:        Left Impression:  Renal / Aortic Ratio     Renal / Aortic Ratio within normal limits  within normal limits     (<3.5cm/s).  (<3.5mc/s). Kidney length is normal.  Kidney length abnormal,  possible mass at         Renal vein has absent color flow mid to distal,  inferior pole.           however there is continuous doppler flow                           proximally which would suggest patent renal vein. Renal vein is patent. Risk Factors History +---------+----------+-----------------------------------------------------+ ! Diagnosis! Date      ! Comments                                             ! +---------+----------+-----------------------------------------------------+ ! Other    ! ! Right Renal Cell Carcinoma with mets                 ! +---------+----------+-----------------------------------------------------+ ! DVT      !03/16/2018! RT popliteal, peroneal, deep calf vein               ! +---------+----------+-----------------------------------------------------+   - The patient's risk factor(s) include: diabetes mellitus and obesity.   - The patient has kidney cancer. Velocities are measured in cm/s ; Diameters are measured in cm Abdominal Aortic Flow +--------------------------------+---+---+------------+--------------------+ ! Location                        ! PSV! EDV! AP Diam     !Trans Diam          ! +--------------------------------+---+---+------------+--------------------+ ! Aorta Juxta Renal               !109!   !            !                    ! +--------------------------------+---+---+------------+--------------------+ Renal Duplex Measurements +------------------------------------++-----+---+----+----++---+---+--+----+ ! Renal Artery A                      !!Right! ! Left!    !!   !   !  !    ! +------------------------------------++-----+---+----+----++---+---+--+----+ ! Location                            ! !PSV  ! EDV! RI  !RAR !!PSV! EDV!RI!RAR ! +------------------------------------++-----+---+----+----++---+---+--+----+ ! Ostial Renal                        !!157  !   !    !1.44!!123!   !  !1.13! +------------------------------------++-----+---+----+----++---+---+--+----+ ! Prox Renal                          !!139  !   !    !1.28!!136!   !  !1.25! +------------------------------------++-----+---+----+----++---+---+--+----+ ! Mid Renal                           !!173  !   !    !1.59!!157!   !  !1.44! +------------------------------------++-----+---+----+----++---+---+--+----+ ! Dist Renal                          !!130  !   !    !1.19!!123!   !  !1.13! +------------------------------------++-----+---+----+----++---+---+--+----+ Right Miscellaneous Measurements   - The average kidney length is 15.15 cm. Left Miscellaneous Measurements   - The average kidney length is 10.45 cm. Vl Dup Lower Extremity Venous Left    Result Date: 7/28/2018    [facility]  Vascular Lower Extremities DVT Study Procedure   Patient Name  MS [facility]    Date of Study           07/28/2018                Abdon Majors   Date of Birth 1955  Gender                  Female   Age           66 year(s)  Race                       Room Number   3516   Corporate ID  [id number]  #   Patient Lara  [de-identified]  #   MR #          [id number]     Sonographer             Belén Jeong   Accession #   [id number]   Interpreting Physician  Meaghan Dai   Referring                 Referring Physician     Eden Peters, *  Nurse  Practitioner  Procedure Type of Study:   Veins: Lower Extremities DVT Study, Venous Scan Lower Left. Indications for Study:Hx of DVT. Patient Status: In Patient. Technical Quality:Limited visualization. Limitation reason:Pt in pain. - Critical Result:Fernanda WAITE RN at 9:30 am.  Conclusions   Summary   Simultaneous real time imaging utilizing B-Mode, color doppler and  spectral waveform analysis was performed on the left lower extremity for  venous examination of the deep and superficial systems. Findings are:   **Abnormal Study**   Extensive acute deep venous thrombosis extending from the gastrocnemius  vein into the external iliac vein. Acute superficial venous thrombosis identified in the small saphenous  vein. Signature   ----------------------------------------------------------------  Electronically signed by Belén Jeong(Sonographer) on  07/28/2018 09:47 AM  ----------------------------------------------------------------   ----------------------------------------------------------------  Electronically signed by Phuong Mathis(Interpreting  physician) on 07/28/2018 10:17 AM  ----------------------------------------------------------------  Findings:   Right Impression:                 Left Impression:  The common femoral vein           The gatrocnemius to the external iliac  demonstrates normal augmentation. veins are non-compressible. Thrombus appears acute based on B-Mode                                    image evaluation. Normal compressibility of the great                                    saphenous vein. The small saphenous vein demonstrates no                                    compressibility. Thrombus appears acute based on B-Mode                                    image evaluation. Risk Factors History +---------+----------+-----------------------------------------------------+ ! Diagnosis! Date      ! Comments                                             ! +---------+----------+-----------------------------------------------------+ ! Other    ! ! Right Renal Cell Carcinoma with mets                 ! +---------+----------+-----------------------------------------------------+ ! DVT      !03/16/2018! RT popliteal, peroneal, deep calf vein               ! +---------+----------+-----------------------------------------------------+   - The patient's risk factor(s) include: diabetes mellitus and obesity.   - The patient has kidney cancer. Velocities are measured in cm/s ; Diameters are measured in cm Right Lower Extremities DVT Study Measurements Right 2D Measurements +------------------------------------+----------+---------------+----------+ ! Location                            ! Visualized! Compressibility! Thrombosis! +------------------------------------+----------+---------------+----------+ ! Common Femoral                      !Yes       !               !          ! +------------------------------------+----------+---------------+----------+ Right Doppler Measurements +----------------------------+------+------+-------------------------------+ ! Location                    ! Signal!Reflux! Reflux (msec)                  ! +----------------------------+------+------+-------------------------------+ ! Common Femoral              !Phasic!      !                               ! +----------------------------+------+------+-------------------------------+ Left Lower Extremities DVT Study Measurements Left 2D Measurements +------------------------------------+----------+---------------+----------+ ! Location                            ! Visualized! Compressibility! Thrombosis! +------------------------------------+----------+---------------+----------+ ! Common Femoral                      !Yes       ! No             !Acute     ! +------------------------------------+----------+---------------+----------+ ! Prox Femoral                        !Yes       ! Partial        !Acute ! +------------------------------------+----------+---------------+----------+ ! Mid Femoral                         !Yes       ! No             !Acute     ! +------------------------------------+----------+---------------+----------+ ! Dist Femoral                        !Yes       ! No             !Acute     ! +------------------------------------+----------+---------------+----------+ ! Popliteal                           !Yes       ! No             !Acute     ! +------------------------------------+----------+---------------+----------+ ! Sapheno Femoral Junction            ! Yes       ! Yes            ! None      ! +------------------------------------+----------+---------------+----------+ ! PTV                                 ! Yes       ! Yes            ! None      ! +------------------------------------+----------+---------------+----------+ ! Peroneal                            !No        !               !          ! +------------------------------------+----------+---------------+----------+ ! Gastroc                             ! Yes       ! No             !Acute     ! +------------------------------------+----------+---------------+----------+ ! GSV Thigh                           ! Yes       ! Yes            ! None      ! +------------------------------------+----------+---------------+----------+ ! GSV Knee                            ! Yes       ! Yes            ! None      ! +------------------------------------+----------+---------------+----------+ ! GSV Ankle                           ! Yes       ! Yes            ! None      ! +------------------------------------+----------+---------------+----------+ ! SSV                                 ! Yes       ! No             !Acute     ! +------------------------------------+----------+---------------+----------+ Left Doppler Measurements +-------------------------+----------+------+------------------------------+ ! Location                 ! Signal    !Reflux! Reflux (msec) ! +-------------------------+----------+------+------------------------------+ ! Common Femoral           !Diminished!      !                              ! +-------------------------+----------+------+------------------------------+ ! Prox Femoral             !Continuous!      !                              ! +-------------------------+----------+------+------------------------------+ ! Popliteal                !Continuous!      !                              ! +-------------------------+----------+------+------------------------------+    Objective:   Vitals: BP (!) 133/58   Pulse 81   Temp 98.6 °F (37 °C)   Resp 18   Ht 5' 8\" (1.727 m)   Wt 189 lb 2.5 oz (85.8 kg)   SpO2 96%   BMI 28.76 kg/m²   General appearance: follows commands but lethargic  HEENT: Head: Normocephalic, no lesions, without obvious abnormality. Neck: no adenopathy  Lungs: clear to auscultation bilaterally  Heart: regular rate and rhythm, S1, S2 normal, no murmur, click, rub or gallop  Abdomen: soft, non-tender; bowel sounds normal; no masses,  no organomegaly  Extremities: extremities normal, atraumatic, no cyanosis or edema  Neurologic: Mental status: Alert,  but very lethargic. 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 *      1. Metastatic Renal Cell Carcinoma to the bones status post stabilization of spine by neurosurgery and palliative radiation  2.  Already received palliative radiation and discussed with Dr. Silver Burnett unable to receive additional RXT to lumbar area  3. Plan is to treat patient with palliative combination immunotherapy as an outpatient. 4. No plan to restart pazopanib  5. On anticoagulation for DVT. Hold anticoagulation for supratherapeutic INR. 6. End-stage renal disease started on hemodialysis  7. If hemoglobin continues to drop consider blood transfusion at time of hemodialysis  8. Continue supportive care. 9. Urology to evaluate patient for palliative nephrectomy  10. Palliative care team on board. 11. Prognosis is guarded          \      Arabella Hardy MD    This note is created with the assistance of a speech recognition program.  While intending to generate a document that actually reflects the content of the visit, the document can still have some errors including those of syntax and sound a like substitutions which may escape proof reading. It such instances, actual meaning can be extrapolated by contextual diversion.

## 2018-07-31 NOTE — PROGRESS NOTES
Writer perfect serve message Dr. Allegra Beckman with Urology and notified Dr. Patel Seaman with Nephrology order to remove jensen catheter. Writer wanted to clarify it was ok. Adriana Rodriguez from Urology to remove jensen catheter.

## 2018-07-31 NOTE — CARE COORDINATION
Received call from Claudetta Blacker with [address] Admissions. Claudetta Blacker stated that he called unit and spoke with Desire. Per chart case management is looking into snf, [facility]. Claudetta Blacker stated that closest dialysis center to snf, is [facility] Claudetta Blacker stated that he faxed records to [facility] for review, as they have availability. Received voicemail from [facility] with [facility], stating that she will be the coordinator working on pt's referral. Delmer Morejon back and she stated that she will call back writer as soon as possible, as she had another call. Pt had spoken to writer and Dr. Carolyn Corado while in dialysis this morning and stated that she was agreeable to [facility]. Awaiting call back from Admissions and pt's dtr. Addendum 2:10 pm.   Called pt's dtr, Ne lemus. Explained dialysis and the options of facilities to pt's dtr. Agnes Redding stated that she is a paramedic and has seen the different dialysis facilities, and does not have a preference. Informed Agnes Martinezpaola of conversation with pt and Dr. Carolyn Corado in regards to [facility]. Agnes Redding stated that she is okay with [facility], as it is close to pt's home. Discussed dialysis schedules and Agnes Redding stated that she believes a MWF in the afternoons will work better for her mom. Will inform Rick from [facility] of preference. Confirmed with pt that [facility] is okay. Addendum 3:15 pm.   Called [facility]missions and spoke with Rick. Informed Rick of pt and family's preference for OP dialysis facility of [facility]. Rick stated that it appears that facility has openings. Rick stated that she will submit records for financial and medical clearance. Rcik to f/u with writer when updates available. Writer will continue to f/u with Admissions.

## 2018-07-31 NOTE — CARE COORDINATION
Pt presented to dialysis unit in pain while being transferred from stretcher to bed. Met with pt bedside, in dialysis to get choice of OP dialysis facility for referral. Writer provided pt with literature and OP dialysis facility options yesterday, and pt had stated that her family was going to talk with her about it last night. Pt did not respond much to writer while discussing options this morning. Pt did give writer permission to call her dtr, Blaine Cartagena to see what choice they had made yesterday. Called and left voicemail for pt's dtr, Blaine Cartagena requesting a return call in regards to OP dialysis placement. Await return call. Faxed referrals to [facility] and [facility], so that they can start working on verifying insurance. Will update Admissions on facility choice when received from pt and family. Will f/u with pt later this day. Addendum 10:41 am.   Met with pt bedside while in dialysis this day with Dr. Genna Saleh. Spoke to pt about OP dialysis placement and facility location. Dr. Genna Saleh spoke to pt about [facility]. Pt agreeable with [facility]. Will update Admissions. Await response on referrals. SW will continue to follow pt.

## 2018-08-01 PROBLEM — E87.20 METABOLIC ACIDOSIS: Status: RESOLVED | Noted: 2018-01-01 | Resolved: 2018-01-01

## 2018-08-01 PROBLEM — Z99.2 ESRD ON HEMODIALYSIS (HCC): Status: ACTIVE | Noted: 2018-01-01

## 2018-08-01 PROBLEM — N18.6 ESRD ON HEMODIALYSIS (HCC): Status: ACTIVE | Noted: 2018-01-01

## 2018-08-01 NOTE — CARE COORDINATION
Called [facility] and left a voicemail for coordinator, Rick requesting a return call with an update on financial clearance for pt. Will f/u with Rick.

## 2018-08-01 NOTE — PROGRESS NOTES
[facility]          Progress Note    8/1/2018    7:58 AM    Name:   Sara Sloan  MRN:     [id number]     Acct:      [de-identified]   Room:   [address] [place] [address] Day:  7  Admit Date:  7/25/2018  1:16 PM    PCP:   Sharlene Vazquez MD  Code Status:  Full Code    Subjective:     C/C:   Chief Complaint   Patient presents with    Hematuria     Sent by her oncologist for dysuria with hematuria and lower back pain. Has h/o renal carcinoma. Pt reports generalized weakness/fatigue. Denies N/V/D/C/fever/chills. Interval History Status: not Improving. The patient is a 66 y.o.  female who is admitted to the hospital for the management of acute kidney failure with acute DVT. 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 INR is 2.1  Hemoglobin level is >7  Remained to have weakness in her left foot. No fevers overnight. Poor appetite. No hypoglycemia    Pt denies any Chest pain , new pain, vomiting. Notes from nursing staff and Consults had been reviewed, and the overnight progress had been checked with the nursing staff as well. Brief History: Sara Sloan is a 66 y.o. Non-/non  female who presents with Hematuria (Sent by her oncologist for dysuria with hematuria and lower back pain. Has h/o renal carcinoma. Pt reports generalized weakness/fatigue. Denies N/V/D/C/fever/chills.)   and she is admitted to the hospital for the management of  Acute renal failure and gross hematuria.   Patient was known right sided renal cell carcinoma with spread to The lumbar spine and peritoneum that was diagnosed in January 2018 and had previously went through lumbar spine fixation for her compression fracture as well as radiation, seem like she is currently not on chemo for the last 1 month, she will be (placeholder)   Other RX Placeholder    acetaminophen  1,000 mg Oral TID    amitriptyline  75 mg Oral Nightly    atorvastatin  20 mg Oral Nightly    docusate sodium  200 mg Oral BID    pantoprazole  40 mg Oral QAM AC    oxyCODONE  10 mg Oral BID    sodium chloride flush  10 mL Intravenous 2 times per day    insulin lispro  0-12 Units Subcutaneous TID WC    insulin lispro  0-6 Units Subcutaneous Nightly     Continuous Infusions:    dextrose       PRN Meds: anticoagulant sodium citrate, anticoagulant sodium citrate, morphine, sodium chloride, sodium chloride, cyclobenzaprine, ondansetron, oxyCODONE HCl, prochlorperazine, sodium chloride flush, ondansetron, glucose, dextrose, glucagon (rDNA), dextrose, acetaminophen    Data:     Past Medical History:   has a past medical history of Cancer ([facility] [address] Chronic kidney disease; Depression; [facility] GERD (gastroesophageal reflux disease); HA (headache); HBP (high blood pressure); History of blood transfusion; Hyperlipidemia; Hyperplastic polyp of intestine; Hypothyroid; Hypothyroidism; Left knee pain; Non-smoker; OA (osteoarthritis); and Tubular adenoma. Social History:   reports that she has never smoked. She has never used smokeless tobacco. She reports that she does not drink alcohol or use drugs.      Family History:   Family History   Problem Relation Age of Onset    Heart Disease Mother     Diabetes Mother     Cancer Father         esophageal cancer    Cancer Maternal Grandfather         colon       Vitals:  Patient Vitals for the past 24 hrs:   BP Temp Temp src Pulse Resp SpO2 Weight   08/01/18 0516 (!) 133/53 99 °F (37.2 °C) Oral 83 17 98 % -   08/01/18 0132 (!) 140/51 98.8 °F (37.1 °C) Oral 86 14 98 % -   07/31/18 2227 (!) 144/55 98.6 °F (37 °C) Oral 87 14 100 % -   07/31/18 2000 - - - 76 12 - -   07/31/18 1616 (!) 125/44 98.6 °F (37 °C) Oral - - - -   07/31/18 1159 (!) 133/58 98.6 °F (37 °C) - 81 - - 189 lb 2.5 oz (85.8 kg)   07/31/18 1129 125/66 - - 84 - - -   18 1059 (!) 120/58 - - 58 - - -   18 1041 (!) 119/5 98.7 °F (37.1 °C) Oral 86 18 - -   18 1029 (!) 119/59 98.6 °F (37 °C) - 86 - - -   18 1010 (!) 110/52 98.6 °F (37 °C) Oral 88 18 - -   18 1005 (!) 112/53 98.5 °F (36.9 °C) Oral 88 18 - -   18 1000 (!) 116/59 98.3 °F (36.8 °C) Oral 90 18 - -   18 0951 (!) 119/59 98.2 °F (36.8 °C) Oral 89 18 - -   18 0929 127/61 - - 89 - - -   18 0859 (!) 143/60 - - 83 - - -   18 0855 (!) 140/61 98.7 °F (37.1 °C) - 86 18 - 191 lb 12.8 oz (87 kg)     Temp (24hrs), Av.6 °F (37 °C), Min:98.2 °F (36.8 °C), Max:99 °F (37.2 °C)    Recent Labs      18   0631  18   1623  18   2218  18   0620   POCGLU  111*  136*  119*  112*       I/O (24Hr):     Intake/Output Summary (Last 24 hours) at 18 0758  Last data filed at 18 0310   Gross per 24 hour   Intake              650 ml   Output             2500 ml   Net            -1850 ml       Labs:  Recent Results (from the past 24 hour(s))   TYPE AND SCREEN    Collection Time: 18  8:18 AM   Result Value Ref Range    Expiration Date 2018     Arm Band Number [id number]     ABO/Rh A POSITIVE     Antibody Screen NEGATIVE     Unit Number [id number]     Product Code Leukocyte Reduced Red Cell     Unit Divison 0     Dispense Status TRANSFUSED     Transfusion Status OK TO TRANSFUSE     Crossmatch Result COMPATIBLE    Protime-INR    Collection Time: 18  8:18 AM   Result Value Ref Range    Protime 71.0 (H) 9.0 - 12.0 sec    INR 7.6 (HH)    Blood Bank Specimen    Collection Time: 18  8:18 AM   Result Value Ref Range    Blood Bank Specimen NOT REPORTED    HEMOGLOBIN AND HEMATOCRIT, BLOOD    Collection Time: 18 11:17 AM   Result Value Ref Range    Hemoglobin 8.3 (L) 11.9 - 15.1 g/dL    Hematocrit 27.1 (L) 36.3 - 47.1 %   PROTIME-INR    Collection Time: 18  1:42 PM   Result Value Ref Range    Protime 65.0 (H) 9.0 - 12.0 sec    INR 6.9 (HH)    POC Glucose Fingerstick    Collection Time: 07/31/18  4:23 PM   Result Value Ref Range    POC Glucose 136 (H) 65 - 105 mg/dL   POC Glucose Fingerstick    Collection Time: 07/31/18 10:18 PM   Result Value Ref Range    POC Glucose 119 (H) 65 - 105 mg/dL   Basic Metabolic Panel    Collection Time: 08/01/18  6:13 AM   Result Value Ref Range    Glucose 113 (H) 70 - 99 mg/dL    BUN 31 (H) 8 - 23 mg/dL    CREATININE 3.68 (H) 0.50 - 0.90 mg/dL    Bun/Cre Ratio NOT REPORTED 9 - 20    Calcium 7.6 (L) 8.6 - 10.4 mg/dL    Sodium 136 135 - 144 mmol/L    Potassium 3.9 3.7 - 5.3 mmol/L    Chloride 98 98 - 107 mmol/L    CO2 24 20 - 31 mmol/L    Anion Gap 14 9 - 17 mmol/L    GFR Non-African American 13 (L) >60 mL/min    GFR African American 15 (L) >60 mL/min    GFR Comment          GFR Staging NOT REPORTED    PROTIME-INR    Collection Time: 08/01/18  6:13 AM   Result Value Ref Range    Protime 21.4 (H) 9.0 - 12.0 sec    INR 2.1    CBC Auto Differential    Collection Time: 08/01/18  6:13 AM   Result Value Ref Range    WBC 6.2 3.5 - 11.3 k/uL    RBC 2.81 (L) 3.95 - 5.11 m/uL    Hemoglobin 7.7 (L) 11.9 - 15.1 g/dL    Hematocrit 25.1 (L) 36.3 - 47.1 %    MCV 89.3 82.6 - 102.9 fL    MCH 27.4 25.2 - 33.5 pg    MCHC 30.7 28.4 - 34.8 g/dL    RDW 19.0 (H) 11.8 - 14.4 %    Platelets 760 083 - 031 k/uL    MPV 10.3 8.1 - 13.5 fL    NRBC Automated 0.0 0.0 per 100 WBC    Differential Type NOT REPORTED     WBC Morphology NOT REPORTED     RBC Morphology ANISOCYTOSIS PRESENT     Platelet Estimate NOT REPORTED     Seg Neutrophils 68 (H) 36 - 65 %    Lymphocytes 17 (L) 24 - 43 %    Monocytes 9 3 - 12 %    Eosinophils % 4 1 - 4 %    Basophils 0 0 - 2 %    Immature Granulocytes 2 (H) 0 %    Segs Absolute 4.22 1.50 - 8.10 k/uL    Absolute Lymph # 1.08 (L) 1.10 - 3.70 k/uL    Absolute Mono # 0.57 0.10 - 1.20 k/uL    Absolute Eos # 0.22 0.00 - 0.44 k/uL    Basophils # <0.03 0.00 - 0.20 k/uL    Absolute Immature Granulocyte 0.09 0.00 - 0.30 k/uL   POC Glucose Fingerstick    Collection Time: 08/01/18  6:20 AM   Result Value Ref Range    POC Glucose 112 (H) 65 - 105 mg/dL     Lab Results   Component Value Date/Time    SPECIAL NOT REPORTED 07/25/2018 05:53 PM     Lab Results   Component Value Date/Time    CULTURE NO SIGNIFICANT GROWTH 07/25/2018 05:53 PM       Radiology:  Ct Lumbar Spine Wo Contrast  Result Date: 7/25/2018  1. Pathologic compression fracture of L1 is again identified with some retropulsion of the posterior aspect of the L1 vertebral body into the spinal canal without significant spinal canal narrowing. 2. Metastatic lesions involving the left iliac wing, right S1 sacral body effacing the right S1 neural foramen, and the L1 vertebral body. No new osseous lesions are identified. 3. Heterogeneously enlarged mass largely replacing the right kidney with nodularity in the right renal fossa suspicious for metastatic disease. Mri Lumbar Spine Wo Contrast  Result Date: 7/25/2018  Pathologic L1 compression fracture is again seen with retropulsion of fracture fragments causing impingement upon the distal aspect of the conus and the proximal thecal sac. Appearance is overall similar to the prior study. Us Renal Complete  Result Date: 7/25/2018  Large complex mass within the superior pole the right kidney. Mild right hydronephrosis. Urinary bladder is decompressed by Navarrete catheter. Ir Tunneled Cvc Place Wo Sq Port/pump > 5 Years  Result Date: 7/27/2018  Successful ultrasound and fluoroscopy guided right internal jugular vein 14.5 Mauritanian by 19 cm tip to cuff tunneled catheter placement. Ready for use. Vl Renal Arterial Duplex Complete  Result Date: 7/28/2018   Conclusions   Summary   Right:  No evidence of renal artery stenosis. Abnormally large kidney length with possible mass at inferior pole,  correlate clinically with patient history of renal cell carcinoma.      Left:  No evidence of renal artery Effort normal and breath sounds normal. She has no wheezes. Abdominal: Soft. Bowel sounds are normal. She exhibits no distension and no mass. There is no tenderness. Musculoskeletal: She exhibits edema (Bilateral lower limb). She exhibits no tenderness. Lymphadenopathy:     She has no cervical adenopathy. Neurological: She is alert and oriented to person, place, and time. She exhibits abnormal muscle tone (Left foot weakness. Otherwise, moves all 4 extremities with no restriction). Skin: Skin is warm and dry. No rash noted. Psychiatric: She has a normal mood and affect. Her behavior is normal.   Nursing note and vitals reviewed. 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 *      Plan:        1. Acute renal failure (ARF) : Contributed to obstructive uropathy with concomitant use of ace inhibitors and diuretics and poor oral intake, started on hemodialysis on 7/27, [facility], nephrology is following, pending Plan for hemodialysis as outpatient. follow-up electrolytes and BMP daily. Creatinine Not improving. Hemodialysis  ,. Navarrete catheter has been removed after confirmation from Urology, per nephrology recommendation, when necessary straight cath for retention. Ok to discharge once HD spot was secured for pt. 2. Anemia due to chronic kidney disease: Repeat CBC for today, monitor CBC daily, transfuse if Hgb less than 7.0.   Prepare PRBC. ,  Transfusion of one PRBC on 7/31/2018, recheck Spent on patient care is  39 mins more than 50% of this time spent in patient counseling Directly related to the complexity of the case. additional time was spent on pt examination as well as coordination of care and contacting consultants. Discussed care plan with nurse after getting input from the nurse.     Above plan discussed with the patient in room, who agreed to the above plan     Please call if any questions    Matt Arambula MD  [facility]  8/1/2018  7:58 AM     (Please note that this chart was generated using voice recognition Dragon dictation software program. Although every effort was made to ensure the accuracy of this automated transcription, some errors in transcription may have occurred.)

## 2018-08-01 NOTE — DISCHARGE SUMMARY
[address]    Discharge Summary     Patient ID: Amira Moralez  :  1955   MRN: [id number]     ACCOUNT:  [de-identified]   Patient's PCP: Maximilian Dominique MD  Admit Date: 2018   Discharge Date: 8/3/2018     Length of Stay: 9  Code Status:  Full Code  Admitting Physician: Cali Marie MD  Discharge Physician: Robin Ponce MD     Active Discharge Diagnoses:     Primary Problem  Acute renal failure (ARF) [facility])      Hospital Problems  Principal Problem:    Acute renal failure (ARF) ([facility] [address]  Active Problems:    Diabetes mellitus ([facility] [address]    Anemia due to chronic kidney disease    Metastasis to spinal column ([facility] [address]    Renal cell carcinoma ([facility] [address]    Pathologic compression fracture of spine with delayed healing    Hematuria    Absolute anemia    Renal cell cancer, right ([facility] [address]    Encounter for palliative care    Acute deep vein thrombosis (DVT) of femoral vein of left lower extremity ([facility] [address]    ESRD on hemodialysis ([facility] [address]  Resolved Problems:    Metabolic acidosis    Hypoglycemia        Admission Condition:  poor     Discharged Condition: fair    Hospital Stay:     Hospital Course: Amira Moralez is a 66 y.o. Non-/non  female who presents with Hematuria (Sent by her oncologist for dysuria with hematuria and lower back pain. Has h/o renal carcinoma. Pt reports generalized weakness/fatigue. Denies N/V/D/C/fever/chills.)   and she is admitted to the hospital for the management of  Acute renal failure and gross hematuria. Patient was known right sided renal cell carcinoma with spread to The lumbar spine and peritoneum that was diagnosed in 2018 and had previously went through lumbar spine fixation for her compression fracture as well as radiation, seem like she is currently not on chemo for the last 1 month, she will be scheduled for nephrectomy with another possible lumbar fixation next month according to her.   She was sent to ER from her oncologist office after noticing gross hematuria and increasing difficulty voiding. In ER she was found to have elevated creatinine of 9.6 and BUN of 101, ( average  baseline creatinine  was 2- 2.8) and found to have high anion gap metabolic acidosis, potassium was fine, bladder scan showed 361 mL in ER and Navarrete catheter was placed given the retention in the history. Patient is well was assessed by nephrology who started her on bicarb drip given her acidosis. Thought to be likely hematuria along with ACE and diuretic use. Needing dialysis with tunnel catheter placement. Also noted to have left leg extensive DVT from gastrocnemius to external iliac vein, started on heparin with addition of coumadin. Given continued worsened back pain, CT lumbar spine showed her known L1 pathologic fracture as well as further metastatic lesion invading the iliac sac and sacral bones, neurosurgery evaluated the patient as well along with radiation oncology team, advised continued outpatient follow up  Seen by neurosurgery due to above comorbidities. No acute surgical intervention and encouragement of wearing the brace during any activity. Palliative radiation as outpatient. In addition to oncology follow-up. Poor prognosis  Hemodialysis was arranged as outpatient, MWF    Significant therapeutic interventions:   1. Acute renal failure (ARF) : Contributed to obstructive uropathy with concomitant use of ace inhibitors and diuretics and poor oral intake, started on hemodialysis on 7/27, [facility], nephrology is following, pending Plan for hemodialysis as outpatient. follow-up electrolytes and BMP daily. Creatinine Not improving. Hemodialysis  ,. Navarrete catheter has been removed after confirmation from Urology, per nephrology recommendation, when necessary straight cath for retention.  Ok to discharge once HD spot was secured for pt. which was done before discharge, new schedule, MWF.   2. Anemia due to chronic kidney disease: Repeat CBC for today, monitor CBC daily, transfuse if Hgb less than 7.0. Prepare PRBC. ,  Transfusion of one PRBC on 7/31/2018, recheck hemoglobin stable. Keep monitoring while inpatient, stable, around 7.1  3. 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 Ordered brace to be used with activity. , radiation oncologist input remarkable  Possible palliative XRT as outpatient. Increase  Cancer burden on repeat ct chest and abdomen . Oncology is following. 4. Renal cell carcinoma : Oncology on board and to continue chemotherapy as outpatient with radiation therapy as outpatient. 5. Metabolic acidosis: Improving  6. Acute deep vein thrombosis (DVT) of femoral vein of left lower extremity : Treated with Coumadin and heparin bridge, INR 2.5 today, resume coumadin. 7. Patient had subtherapeutic INR of 7 7/31/2018 received 5 mg of vitamin K, hold the Coumadin, , recheck INR, 2.1 on 8/1/2018, same of 2.1 8/2/2018  , 2.5 8/3/2018  8. DM: Insulin sliding scale, Hypoglycemia protocol, Resume home doses of antidiabetic medications, diabetic diet once patient is not NPO.,  Avoid hypoglycemia. Recheck blood sugars. Stable  9. Megace for appetite  10. HTN: stable,resume home meds  11. Palliative care consulted. 12. Urology input for palliative nephrectomy. No current plan for surgery  13.  Poor prognosis      Significant Diagnostic Studies:   Labs / Micro:  Recent Results (from the past 168 hour(s))   HEPATITIS C ANTIBODY    Collection Time: 07/27/18  6:57 PM   Result Value Ref Range    Hepatitis C Ab NONREACTIVE NR   POC Glucose Fingerstick    Collection Time: 07/27/18  8:28 PM   Result Value Ref Range    POC Glucose 171 (H) 65 - 105 mg/dL   Basic Metabolic Panel    Collection Time: 07/28/18  5:30 AM   Result Value Ref Range Glucose 122 (H) 70 - 99 mg/dL    BUN 72 (H) 8 - 23 mg/dL    CREATININE 6.79 (HH) 0.50 - 0.90 mg/dL    Bun/Cre Ratio NOT REPORTED 9 - 20    Calcium 7.5 (L) 8.6 - 10.4 mg/dL    Sodium 139 135 - 144 mmol/L    Potassium 3.9 3.7 - 5.3 mmol/L    Chloride 101 98 - 107 mmol/L    CO2 20 20 - 31 mmol/L    Anion Gap 18 (H) 9 - 17 mmol/L    GFR Non-African American 6 (L) >60 mL/min    GFR  7 (L) >60 mL/min    GFR Comment          GFR Staging NOT REPORTED    APTT    Collection Time: 07/28/18 11:09 AM   Result Value Ref Range    PTT 27.4 20.5 - 30.5 sec   POC Glucose Fingerstick    Collection Time: 07/28/18  3:26 PM   Result Value Ref Range    POC Glucose 166 (H) 65 - 105 mg/dL   POC Glucose Fingerstick    Collection Time: 07/28/18  7:39 PM   Result Value Ref Range    POC Glucose 143 (H) 65 - 105 mg/dL   POC Glucose Fingerstick    Collection Time: 07/28/18  9:33 PM   Result Value Ref Range    POC Glucose 144 (H) 65 - 105 mg/dL   APTT    Collection Time: 07/28/18 10:21 PM   Result Value Ref Range    PTT 59.8 (H) 20.5 - 30.5 sec   APTT    Collection Time: 07/29/18  4:34 AM   Result Value Ref Range    PTT 41.5 (H) 20.5 - 30.5 sec   POC Glucose Fingerstick    Collection Time: 07/29/18  8:08 AM   Result Value Ref Range    POC Glucose 133 (H) 65 - 105 mg/dL   CBC    Collection Time: 07/29/18  8:13 AM   Result Value Ref Range    WBC 5.1 3.5 - 11.3 k/uL    RBC 2.68 (L) 3.95 - 5.11 m/uL    Hemoglobin 7.1 (L) 11.9 - 15.1 g/dL    Hematocrit 24.0 (L) 36.3 - 47.1 %    MCV 89.6 82.6 - 102.9 fL    MCH 26.5 25.2 - 33.5 pg    MCHC 29.6 28.4 - 34.8 g/dL    RDW 18.5 (H) 11.8 - 14.4 %    Platelets 632 142 - 262 k/uL    MPV 9.5 8.1 - 13.5 fL    NRBC Automated 0.0 0.0 per 100 WBC   APTT    Collection Time: 07/29/18 10:30 AM   Result Value Ref Range    PTT >120.0 (HH) 20.5 - 30.5 sec   Protime-INR    Collection Time: 07/29/18 10:30 AM   Result Value Ref Range    Protime 13.1 (H) 9.0 - 12.0 sec    INR 1.2    POC Glucose Fingerstick Collection Time: 07/29/18 11:29 AM   Result Value Ref Range    POC Glucose 138 (H) 65 - 105 mg/dL   APTT    Collection Time: 07/29/18  2:06 PM   Result Value Ref Range    PTT >120.0 (HH) 20.5 - 30.5 sec   POC Glucose Fingerstick    Collection Time: 07/29/18  3:25 PM   Result Value Ref Range    POC Glucose 157 (H) 65 - 105 mg/dL   POC Glucose Fingerstick    Collection Time: 07/29/18  7:31 PM   Result Value Ref Range    POC Glucose 160 (H) 65 - 105 mg/dL   APTT    Collection Time: 07/30/18 12:03 AM   Result Value Ref Range    PTT 62.0 (H) 20.5 - 30.5 sec   Basic Metabolic Panel    Collection Time: 07/30/18  5:58 AM   Result Value Ref Range    Glucose 115 (H) 70 - 99 mg/dL    BUN 47 (H) 8 - 23 mg/dL    CREATININE 5.11 (HH) 0.50 - 0.90 mg/dL    Bun/Cre Ratio NOT REPORTED 9 - 20    Calcium 7.9 (L) 8.6 - 10.4 mg/dL    Sodium 137 135 - 144 mmol/L    Potassium 3.8 3.7 - 5.3 mmol/L    Chloride 101 98 - 107 mmol/L    CO2 23 20 - 31 mmol/L    Anion Gap 13 9 - 17 mmol/L    GFR Non-African American 9 (L) >60 mL/min    GFR  10 (L) >60 mL/min    GFR Comment          GFR Staging NOT REPORTED    Platelet count    Collection Time: 07/30/18  5:58 AM   Result Value Ref Range    Platelets 066 199 - 793 k/uL   PROTIME-INR    Collection Time: 07/30/18  5:58 AM   Result Value Ref Range    Protime 20.1 (H) 9.0 - 12.0 sec    INR 2.0    APTT    Collection Time: 07/30/18  5:58 AM   Result Value Ref Range    PTT 89.3 (HH) 20.5 - 30.5 sec   POC Glucose Fingerstick    Collection Time: 07/30/18  7:24 AM   Result Value Ref Range    POC Glucose 116 (H) 65 - 105 mg/dL   POC Glucose Fingerstick    Collection Time: 07/30/18 11:23 AM   Result Value Ref Range    POC Glucose 144 (H) 65 - 105 mg/dL   APTT    Collection Time: 07/30/18 12:40 PM   Result Value Ref Range    PTT 61.0 (H) 20.5 - 30.5 sec   CBC    Collection Time: 07/30/18  3:39 PM   Result Value Ref Range    WBC 5.5 3.5 - 11.3 k/uL    RBC 2.72 (L) 3.95 - 5.11 m/uL    Hemoglobin 7.2 (L) 11.9 - 15.1 g/dL    Hematocrit 25.0 (L) 36.3 - 47.1 %    MCV 91.9 82.6 - 102.9 fL    MCH 26.5 25.2 - 33.5 pg    MCHC 28.8 28.4 - 34.8 g/dL    RDW 18.6 (H) 11.8 - 14.4 %    Platelets 102 734 - 550 k/uL    MPV 9.2 8.1 - 13.5 fL    NRBC Automated 0.0 0.0 per 100 WBC   POC Glucose Fingerstick    Collection Time: 07/30/18  4:44 PM   Result Value Ref Range    POC Glucose 142 (H) 65 - 105 mg/dL   POC Glucose Fingerstick    Collection Time: 07/30/18  7:18 PM   Result Value Ref Range    POC Glucose 181 (H) 65 - 105 mg/dL   Basic Metabolic Panel    Collection Time: 07/31/18  5:59 AM   Result Value Ref Range    Glucose 113 (H) 70 - 99 mg/dL    BUN 54 (H) 8 - 23 mg/dL    CREATININE 5.54 (HH) 0.50 - 0.90 mg/dL    Bun/Cre Ratio NOT REPORTED 9 - 20    Calcium 7.7 (L) 8.6 - 10.4 mg/dL    Sodium 135 135 - 144 mmol/L    Potassium 3.8 3.7 - 5.3 mmol/L    Chloride 101 98 - 107 mmol/L    CO2 19 (L) 20 - 31 mmol/L    Anion Gap 15 9 - 17 mmol/L    GFR Non-African American 8 (L) >60 mL/min    GFR  9 (L) >60 mL/min    GFR Comment          GFR Staging NOT REPORTED    PROTIME-INR    Collection Time: 07/31/18  5:59 AM   Result Value Ref Range    Protime 60.2 (H) 9.0 - 12.0 sec    INR 6.3 (HH)    CBC Auto Differential    Collection Time: 07/31/18  5:59 AM   Result Value Ref Range    WBC 5.1 3.5 - 11.3 k/uL    RBC 2.36 (L) 3.95 - 5.11 m/uL    Hemoglobin 6.4 (LL) 11.9 - 15.1 g/dL    Hematocrit 21.1 (L) 36.3 - 47.1 %    MCV 89.4 82.6 - 102.9 fL    MCH 27.1 25.2 - 33.5 pg    MCHC 30.3 28.4 - 34.8 g/dL    RDW 19.2 (H) 11.8 - 14.4 %    Platelets 228 457 - 268 k/uL    MPV 10.1 8.1 - 13.5 fL    NRBC Automated 0.0 0.0 per 100 WBC    Differential Type NOT REPORTED     WBC Morphology NOT REPORTED     RBC Morphology ANISOCYTOSIS PRESENT     Platelet Estimate NOT REPORTED     Seg Neutrophils 65 36 - 65 %    Lymphocytes 20 (L) 24 - 43 %    Monocytes 8 3 - 12 %    Eosinophils % 5 (H) 1 - 4 %    Basophils 0 0 - 2 %    Immature - 107 mmol/L    CO2 24 20 - 31 mmol/L    Anion Gap 14 9 - 17 mmol/L    GFR Non-African American 13 (L) >60 mL/min    GFR African American 15 (L) >60 mL/min    GFR Comment          GFR Staging NOT REPORTED    PROTIME-INR    Collection Time: 08/01/18  6:13 AM   Result Value Ref Range    Protime 21.4 (H) 9.0 - 12.0 sec    INR 2.1    CBC Auto Differential    Collection Time: 08/01/18  6:13 AM   Result Value Ref Range    WBC 6.2 3.5 - 11.3 k/uL    RBC 2.81 (L) 3.95 - 5.11 m/uL    Hemoglobin 7.7 (L) 11.9 - 15.1 g/dL    Hematocrit 25.1 (L) 36.3 - 47.1 %    MCV 89.3 82.6 - 102.9 fL    MCH 27.4 25.2 - 33.5 pg    MCHC 30.7 28.4 - 34.8 g/dL    RDW 19.0 (H) 11.8 - 14.4 %    Platelets 536 478 - 546 k/uL    MPV 10.3 8.1 - 13.5 fL    NRBC Automated 0.0 0.0 per 100 WBC    Differential Type NOT REPORTED     WBC Morphology NOT REPORTED     RBC Morphology ANISOCYTOSIS PRESENT     Platelet Estimate NOT REPORTED     Seg Neutrophils 68 (H) 36 - 65 %    Lymphocytes 17 (L) 24 - 43 %    Monocytes 9 3 - 12 %    Eosinophils % 4 1 - 4 %    Basophils 0 0 - 2 %    Immature Granulocytes 2 (H) 0 %    Segs Absolute 4.22 1.50 - 8.10 k/uL    Absolute Lymph # 1.08 (L) 1.10 - 3.70 k/uL    Absolute Mono # 0.57 0.10 - 1.20 k/uL    Absolute Eos # 0.22 0.00 - 0.44 k/uL    Basophils # <0.03 0.00 - 0.20 k/uL    Absolute Immature Granulocyte 0.09 0.00 - 0.30 k/uL   POC Glucose Fingerstick    Collection Time: 08/01/18  6:20 AM   Result Value Ref Range    POC Glucose 112 (H) 65 - 105 mg/dL   POC Glucose Fingerstick    Collection Time: 08/01/18 11:52 AM   Result Value Ref Range    POC Glucose 132 (H) 65 - 105 mg/dL   EKG 12 Lead    Collection Time: 08/01/18 12:28 PM   Result Value Ref Range    Ventricular Rate 78 BPM    Atrial Rate 78 BPM    P-R Interval 128 ms    QRS Duration 86 ms    Q-T Interval 398 ms    QTc Calculation (Bazett) 453 ms    P Axis -4 degrees    R Axis 11 degrees    T Axis 3 degrees   POC Glucose Fingerstick    Collection Time: 08/01/18  4:02 PM   Result Value Ref Range    POC Glucose 129 (H) 65 - 105 mg/dL   POC Glucose Fingerstick    Collection Time: 08/01/18  7:16 PM   Result Value Ref Range    POC Glucose 131 (H) 65 - 105 mg/dL   Hemoglobin and Hematocrit, Blood    Collection Time: 08/01/18  8:07 PM   Result Value Ref Range    Hemoglobin 8.1 (L) 11.9 - 15.1 g/dL    Hematocrit 25.5 (L) 36.3 - 47.1 %   Basic Metabolic Panel    Collection Time: 08/02/18  6:04 AM   Result Value Ref Range    Glucose 123 (H) 70 - 99 mg/dL    BUN 40 (H) 8 - 23 mg/dL    CREATININE 4.54 (H) 0.50 - 0.90 mg/dL    Bun/Cre Ratio NOT REPORTED 9 - 20    Calcium 7.9 (L) 8.6 - 10.4 mg/dL    Sodium 136 135 - 144 mmol/L    Potassium 3.5 (L) 3.7 - 5.3 mmol/L    Chloride 98 98 - 107 mmol/L    CO2 24 20 - 31 mmol/L    Anion Gap 14 9 - 17 mmol/L    GFR Non-African American 10 (L) >60 mL/min    GFR  12 (L) >60 mL/min    GFR Comment          GFR Staging NOT REPORTED    PROTIME-INR    Collection Time: 08/02/18  6:04 AM   Result Value Ref Range    Protime 21.0 (H) 9.0 - 12.0 sec    INR 2.1    CBC Auto Differential    Collection Time: 08/02/18  6:04 AM   Result Value Ref Range    WBC 6.0 3.5 - 11.3 k/uL    RBC 2.70 (L) 3.95 - 5.11 m/uL    Hemoglobin 7.2 (L) 11.9 - 15.1 g/dL    Hematocrit 24.4 (L) 36.3 - 47.1 %    MCV 90.4 82.6 - 102.9 fL    MCH 26.7 25.2 - 33.5 pg    MCHC 29.5 28.4 - 34.8 g/dL    RDW 18.6 (H) 11.8 - 14.4 %    Platelets 898 029 - 960 k/uL    MPV 9.7 8.1 - 13.5 fL    NRBC Automated 0.0 0.0 per 100 WBC    Differential Type NOT REPORTED     WBC Morphology NOT REPORTED     RBC Morphology ANISOCYTOSIS PRESENT     Platelet Estimate NOT REPORTED     Seg Neutrophils 66 (H) 36 - 65 %    Lymphocytes 19 (L) 24 - 43 %    Monocytes 9 3 - 12 %    Eosinophils % 5 (H) 1 - 4 %    Basophils 1 0 - 2 %    Immature Granulocytes 1 (H) 0 %    Segs Absolute 4.00 1.50 - 8.10 k/uL    Absolute Lymph # 1.13 1.10 - 3.70 k/uL    Absolute Mono # 0.52 0.10 - 1.20 k/uL    Absolute Eos # 0.27 0.00 - 0.44 k/uL    Basophils # 0.03 0.00 - 0.20 k/uL    Absolute Immature Granulocyte 0.08 0.00 - 0.30 k/uL   POC Glucose Fingerstick    Collection Time: 08/02/18  6:47 AM   Result Value Ref Range    POC Glucose 108 (H) 65 - 105 mg/dL   POC Glucose Fingerstick    Collection Time: 08/02/18 12:33 PM   Result Value Ref Range    POC Glucose 118 (H) 65 - 105 mg/dL   POC Glucose Fingerstick    Collection Time: 08/02/18  3:22 PM   Result Value Ref Range    POC Glucose 129 (H) 65 - 105 mg/dL   POC Glucose Fingerstick    Collection Time: 08/02/18  7:33 PM   Result Value Ref Range    POC Glucose 156 (H) 65 - 105 mg/dL   Hemoglobin and Hematocrit, Blood    Collection Time: 08/02/18  8:38 PM   Result Value Ref Range    Hemoglobin 7.8 (L) 11.9 - 15.1 g/dL    Hematocrit 26.7 (L) 36.3 - 47.1 %   Basic Metabolic Panel    Collection Time: 08/03/18  6:33 AM   Result Value Ref Range    Glucose 128 (H) 70 - 99 mg/dL    BUN 24 (H) 8 - 23 mg/dL    CREATININE 3.31 (H) 0.50 - 0.90 mg/dL    Bun/Cre Ratio NOT REPORTED 9 - 20    Calcium 7.9 (L) 8.6 - 10.4 mg/dL    Sodium 137 135 - 144 mmol/L    Potassium 4.3 3.7 - 5.3 mmol/L    Chloride 99 98 - 107 mmol/L    CO2 27 20 - 31 mmol/L    Anion Gap 11 9 - 17 mmol/L    GFR Non-African American 14 (L) >60 mL/min    GFR  17 (L) >60 mL/min    GFR Comment          GFR Staging NOT REPORTED    PROTIME-INR    Collection Time: 08/03/18  6:33 AM   Result Value Ref Range    Protime 25.2 (H) 9.0 - 12.0 sec    INR 2.5    CBC Auto Differential    Collection Time: 08/03/18  6:33 AM   Result Value Ref Range    WBC 5.8 3.5 - 11.3 k/uL    RBC 2.62 (L) 3.95 - 5.11 m/uL    Hemoglobin 7.1 (L) 11.9 - 15.1 g/dL    Hematocrit 24.2 (L) 36.3 - 47.1 %    MCV 92.4 82.6 - 102.9 fL    MCH 27.1 25.2 - 33.5 pg    MCHC 29.3 28.4 - 34.8 g/dL    RDW 18.4 (H) 11.8 - 14.4 %    Platelets 756 228 - 945 k/uL    MPV 10.1 8.1 - 13.5 fL    NRBC Automated 0.0 0.0 per 100 WBC    Differential Type NOT REPORTED     WBC Morphology NOT REPORTED     RBC Morphology ANISOCYTOSIS PRESENT     Platelet Estimate NOT REPORTED     Seg Neutrophils 67 (H) 36 - 65 %    Lymphocytes 19 (L) 24 - 43 %    Monocytes 9 3 - 12 %    Eosinophils % 4 1 - 4 %    Basophils 0 0 - 2 %    Immature Granulocytes 1 (H) 0 %    Segs Absolute 3.90 1.50 - 8.10 k/uL    Absolute Lymph # 1.12 1.10 - 3.70 k/uL    Absolute Mono # 0.51 0.10 - 1.20 k/uL    Absolute Eos # 0.21 0.00 - 0.44 k/uL    Basophils # <0.03 0.00 - 0.20 k/uL    Absolute Immature Granulocyte 0.07 0.00 - 0.30 k/uL   POC Glucose Fingerstick    Collection Time: 08/03/18  6:48 AM   Result Value Ref Range    POC Glucose 118 (H) 65 - 105 mg/dL   POC Glucose Fingerstick    Collection Time: 08/03/18 11:41 AM   Result Value Ref Range    POC Glucose 129 (H) 65 - 105 mg/dL       Radiology:  Ct Lumbar Spine Wo Contrast  Result Date: 7/25/2018  1. Pathologic compression fracture of L1 is again identified with some retropulsion of the posterior aspect of the L1 vertebral body into the spinal canal without significant spinal canal narrowing. 2. Metastatic lesions involving the left iliac wing, right S1 sacral body effacing the right S1 neural foramen, and the L1 vertebral body. No new osseous lesions are identified. 3. Heterogeneously enlarged mass largely replacing the right kidney with nodularity in the right renal fossa suspicious for metastatic disease. Mri Lumbar Spine Wo Contrast  Result Date: 7/25/2018  Pathologic L1 compression fracture is again seen with retropulsion of fracture fragments causing impingement upon the distal aspect of the conus and the proximal thecal sac. Appearance is overall similar to the prior study. Us Renal Complete  Result Date: 7/25/2018  Large complex mass within the superior pole the right kidney. Mild right hydronephrosis. Urinary bladder is decompressed by Navarrete catheter.      Ir Tunneled Cvc Place Wo Sq Port/pump > 5 Years  Result Date: 7/27/2018  Successful ultrasound NEPHROLOGY  IP CONSULT TO NEUROSURGERY  IP CONSULT TO HOSPITALIST  IP CONSULT TO NEPHROLOGY  IP CONSULT TO UROLOGY  IP CONSULT TO ONCOLOGY  IP CONSULT TO NEUROSURGERY  IP CONSULT TO RADIATION ONCOLOGY  IP CONSULT TO PALLIATIVE CARE  IP CONSULT TO SPIRITUAL SERVICES  IP CONSULT TO SPIRITUAL SERVICES  IP CONSULT TO IV TEAM Radiation oncology input   patient that she may benefit from palliative radiation therapy to the new areas of metastasis within the right and left iliac wing regions    Oncology  Plan is to treat patient with palliative combination immunotherapy as an outpatient. No plan to restart pazopanib  On anticoagulation for DVT. Okay to anticoagulate as long as there is no active bleeding     The patient was seen and examined on day of discharge and this discharge summary is in conjunction with any daily progress note from day of discharge. Discharge plan:     Disposition: Skilled Facility    Physician Follow Up:   Imani Augustin MD  [facility], [address] [place] [address] [address] [facility] Jake    Schedule an appointment as soon as possible for a visit in 1 week  Post Hospitalization [address] ProcMyla Berman Santos 1 [facility] [address] [facility]  [phone]    In 4 weeks  [facility] Hospitalization [address] [place]  [address] [facility] [id number] [phone]    Schedule an appointment as soon as possible for a visit in 1 week  [address] [place] [address] Hospitalization FU    Lynnette Kim, [address] [address] [address] [phone]    Schedule an appointment as soon as possible for a visit in 1 week  Post Hospitalization FU       Requiring Further Evaluation/Follow Up POST HOSPITALIZATION/Incidental Findings: Follow-up as above. Diet:     DIET NO SALT ADDED (3-4 GM);  Carb Control: 4 carb choices (60 gms)/meal  Dietary Nutrition Supplements: Clear Liquid Oral Supplement    Activity: As tolerated    Discharge Medications:   Current Discharge Medication List      START taking these medications    Details   warfarin (COUMADIN) 2.5 MG tablet Take 1 tablet by mouth daily  Qty: 30 tablet, Refills: 3      megestrol (MEGACE) 40 MG/ML suspension Take 10 mLs by mouth daily  Qty: 240 mL, Refills: 3             Current Discharge Medication List      CONTINUE these medications which have CHANGED    Details   oxyCODONE (OXYCONTIN) 10 MG extended release tablet Take 1 tablet by mouth 2 times daily for 14 days. Dofco Gauze: 28 tablet, Refills: 0    Associated Diagnoses: Metastasis to spinal column ([address] Pathologic compression fracture of spine with delayed healing      oxyCODONE (OXY-IR) 10 MG immediate release tablet Take 1 tablet by mouth every 6 hours as needed for Pain for up to 14 days. Dofco Gauze: 40 tablet, Refills: 0    Associated Diagnoses: Metastasis to spinal column ([address] Pathologic compression fracture of spine with delayed healing      gabapentin (NEURONTIN) 100 MG capsule Take 1 capsule by mouth 3 times daily for 30 days. Josef Gauze: 90 capsule, Refills: 0             Current Discharge Medication List      CONTINUE these medications which have NOT CHANGED    Details   cyclobenzaprine (FLEXERIL) 5 MG tablet TAKE 1 TABLET BY MOUTH THREE TIMES A DAY AS NEEDED FOR MUSCLE SPASMS  Qty: 60 tablet, Refills: 0      Insulin Disposable Pump (V-GO 20) KIT       HUMALOG 100 UNIT/ML injection vial With insulin pump      !! B-D ULTRAFINE III SHORT PEN 31G X 8 MM MISC       atorvastatin (LIPITOR) 20 MG tablet TAKE 1 TABLET BY MOUTH ONE TIME A DAY  Qty: 90 tablet, Refills: 3      ondansetron (ZOFRAN) 4 MG tablet Take 1 tablet by mouth daily as needed for Nausea or Vomiting  Qty: 40 tablet, Refills: 0      omeprazole (PRILOSEC) 40 MG delayed release capsule Take 1 capsule by mouth daily  Qty: 90 capsule, Refills: 3      amitriptyline (ELAVIL) 75 MG tablet Take 1 tablet by mouth nightly  Qty: 90 tablet, Refills: 3      prochlorperazine (COMPAZINE) 5 MG tablet Take 5 mg by mouth every 8 hours as needed       Blood Glucose Monitoring Suppl (TRUE METRIX METER) w/Device KIT     Associated Diagnoses: Renal cell carcinoma of right kidney (HCC)      Lancets MISC Patient testing 3 times daily  Qty: 100 each, Refills: 3      glucose blood VI test strips (ASCENSIA AUTODISC VI;ONE TOUCH ULTRA TEST VI) strip Use with associated glucose meter. Patient testing three times daily  Qty: 100 strip, Refills: 3      docusate sodium (COLACE) 100 MG capsule Take 2 capsules by mouth 2 times daily  Qty: 120 capsule, Refills: 11      !! Insulin Pen Needle 32G X 4 MM MISC 2 each by Does not apply route 2 times daily      acetaminophen (TYLENOL) 500 MG tablet Take 1,000 mg by mouth 3 times daily       ! ! - Potential duplicate medications found. Please discuss with provider. Current Discharge Medication List      STOP taking these medications       lisinopril-hydrochlorothiazide (PRINZIDE;ZESTORETIC) 20-12.5 MG per tablet Comments:   Reason for Stopping:         PAZOPanib HCl (VOTRIENT) 200 MG chemo tablet Comments:   Reason for Stopping:         enoxaparin (LOVENOX) 100 MG/ML injection Comments:   Reason for Stopping:         amLODIPine (NORVASC) 10 MG tablet Comments:   Reason for Stopping:                 Time Spent on discharge is  39 mins in patient examination, evaluation, counseling as well as medication reconciliation, prescriptions for required medications, discharge plan and follow up.     Electronically signed by   Gustavo Johnson MD  8/3/2018  1:27 PM      (Please note that this chart was generated using voice recognition Dragon dictation software program. Although every effort was made to ensure the accuracy of this automated transcription, some errors in transcription may have occurred.)

## 2018-08-01 NOTE — PROGRESS NOTES
Adult Nutrition Doc Flowsheet for more detail.      Electronically signed by Agustin Tomas MS, RD, LD on 8/1/18 at 1:28 PM    Contact Number: [phone]

## 2018-08-01 NOTE — PLAN OF CARE
Problem: Pain:  Goal: Pain level will decrease  Pain level will decrease   Outcome: Ongoing  Pain level decreases with intervention.

## 2018-08-01 NOTE — PROGRESS NOTES
Repositioned;Distraction  Response to Pain Intervention: Patient Satisfied  Vital Signs  Patient Currently in Pain: Yes       Orientation  Orientation  Overall Orientation Status: Impaired  Orientation Level: Disoriented to time  Objective   Bed mobility  Rolling to Right: Maximum assistance  Transfers  Comment: Deferred due to significant increase in pain with any attempted mobiltiy. Ambulation  Ambulation?: No   Exercise  Supine ankle pumps, quad sets, glut sets, heel slides, hip abd/add x 10, unable to tolerate due to pain  Upper extremity exercises: Bicep curl, shoulder flexion/extension, punches, tricep curl, shoulder abduction/adduction. Reps: 10x  Frequent rest breaks due to fatigue and occasional c/o nausea                              Assessment   Body structures, Functions, Activity limitations: Decreased functional mobility ; Decreased ROM; Decreased strength;Decreased endurance;Decreased balance  Assessment: Pt limited by significant pain. Likely most appropriate for SNF setting upon d/c.    Prognosis: Fair  REQUIRES PT FOLLOW UP: Yes  Activity Tolerance  Activity Tolerance: Patient limited by pain       Goals  Short term goals  Time Frame for Short term goals: 10  Short term goal 1: pt able to tolerate AROM B U/LE x15 reps without pain   Short term goal 2: pt able to perform bed mobility with MinAx1  Short term goal 3: progress functional mobility as pain/medical status allows     Plan    Plan  Times per week: 5 visits weekly  Times per day: Daily  Current Treatment Recommendations: Strengthening, ROM, Balance Training, Functional Mobility Training, Transfer Training, Endurance Training, Gait Training, Pain Management, Home Exercise Program, Safety Education & Training, Patient/Caregiver Education & Training, Equipment Evaluation, Education, & procurement, Positioning  Safety Devices  Type of devices: Call light within reach, Patient at risk for falls, Left in bed, Nurse notified  Restraints  Initially

## 2018-08-01 NOTE — PROGRESS NOTES
Discomfort, Sharp  Pain Frequency: Continuous  Clinical Progression: Not changed  Patient's Stated Pain Goal: No pain  Pain Intervention(s): Repositioned, Ambulation/Increased activity, Distraction  Response to Pain Intervention: Patient Satisfied     Social/Functional History  Social/Functional History  Lives With: Alone  Type of Home: House  Home Layout: One level  Home Access: Stairs to enter without rails  Entrance Stairs - Number of Steps: one step  Bathroom Shower/Tub: Walk-in shower  Bathroom Toilet: Standard  Bathroom Equipment: Toilet raiser, Grab bars around toilet, Shower chair  Bathroom Accessibility: Accessible, Walker accessible  Home Equipment: Rolling walker  Receives Help From: Family, Friend(s) (Pt reports multiple supportive family members and friends)  ADL Assistance: Independent  Homemaking Assistance: Needs assistance  Homemaking Responsibilities: Yes  Meal Prep Responsibility: Primary (for simple meal prep- making sandwiches or heating up microwave meals)  Laundry Responsibility: Secondary  Cleaning Responsibility: Secondary  Bill Paying/Finance Responsibility: Primary  Shopping Responsibility: Secondary  Health Care Management: Primary  Ambulation Assistance: Independent (with use of RW)  Transfer Assistance: Independent  Active : No  Patient's  Info: family/friends drive  Mode of Transportation: Car, Family  Occupation: Retired  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        Objective   Vision: Impaired  Vision Exceptions: Wears glasses at all times  Hearing: Within functional limits    Orientation  Overall Orientation Status: Within Functional Limits     Balance  Sitting Balance: Minimal assistance (unsupported seated EOB ~2 3+/5)     Assessment   Performance deficits / Impairments: Decreased functional mobility ; Decreased ADL status; Decreased strength;Decreased endurance;Decreased cognition;Decreased balance;Decreased high-level IADLs;Decreased coordination;Decreased fine motor control  Prognosis: Good  Decision Making: Medium Complexity  Patient Education: OT Services/OT POC, Safety Awareness/Fall Prevention, Importance of EOB/OOB Activity and Active Participation in ADLs, Pursed Lip Breathing Techniques, good return  REQUIRES OT FOLLOW UP: Yes  Activity Tolerance  Activity Tolerance: Patient limited by pain         Plan   Plan  Times per week: 3-4x/wk    G-Code  OT G-codes  Functional Assessment Tool Used: [facility]  Score: 14/24  Functional Limitation: Self care  Self Care Current Status (): At least 40 percent but less than 60 percent impaired, limited or restricted  Self Care Goal Status (): At least 20 percent but less than 40 percent impaired, limited or restricted    Goals  Short term goals  Time Frame for Short term goals: Pt will, by discharge:  Short term goal 1: perform bed mobility with min A for increased independence with functional transfers  Short term goal 2: demo 10+ minutes unsupported sitting tolerance for increased participation in ADLs  Short term goal 3: perform grooming/UB ADL tasks with setup and CGA   Short term goal 4: perform toileting/LB ADL tasks with mod A using AE/DME PRN     Therapy Time   Individual Concurrent Group Co-treatment   Time In 6971         Time Out 1159         Minutes 33            Discharge recommendations discussed with patient during initial evaluation.     Obie Crouch, OTR/L

## 2018-08-01 NOTE — PLAN OF CARE
Problem: Nutrition  Goal: Optimal nutrition therapy  Outcome: Ongoing  Nutrition Problem: Inadequate oral intake  Intervention: Food and/or Nutrient Delivery: Continue current diet, Modify current ONS  Nutritional Goals: Oral intakes to meet at least 50% of estimated nutrition needs.

## 2018-08-01 NOTE — PROGRESS NOTES
NEPHROLOGY PROGRESS NOTE      SUBJECTIVE     Hemo Onco and RT notes noted. Had HD yesterday. Uneventful. [place] dc. Appetite improving. BP stable. No SOB. OBJECTIVE     Vitals:    07/31/18 2000 07/31/18 2227 08/01/18 0132 08/01/18 0516   BP:  (!) 144/55 (!) 140/51 (!) 133/53   Pulse: 76 87 86 83   Resp: 12 14 14 17   Temp:  98.6 °F (37 °C) 98.8 °F (37.1 °C) 99 °F (37.2 °C)   TempSrc:  Oral Oral Oral   SpO2:  100% 98% 98%   Weight:       Height:         24HR INTAKE/OUTPUT:      Intake/Output Summary (Last 24 hours) at 08/01/18 0824  Last data filed at 08/01/18 0310   Gross per 24 hour   Intake              650 ml   Output             2500 ml   Net            -1850 ml       General appearance:Awake, alert, in no acute distress  HEENT: PERRLA  Respiratory::vesicular breath sounds,no wheeze/crackles  Cardiovascular:S1 S2 normal,no gallop or organic murmur. Abdomen:Non tender/non distended. Bowel sounds present  Extremities: No Cyanosis or Clubbing,present Lower extremity edema  Neurological:Alert and oriented. No abnormalities of mood, affect, memory, mentation, or behavior are noted      MEDICATIONS     Scheduled Meds:    warfarin  2.5 mg Oral Daily    0.9 % sodium chloride  250 mL Intravenous Once    megestrol  400 mg Oral Daily    warfarin (COUMADIN) daily dosing (placeholder)   Other RX Placeholder    acetaminophen  1,000 mg Oral TID    amitriptyline  75 mg Oral Nightly    atorvastatin  20 mg Oral Nightly    docusate sodium  200 mg Oral BID    pantoprazole  40 mg Oral QAM AC    oxyCODONE  10 mg Oral BID    sodium chloride flush  10 mL Intravenous 2 times per day    insulin lispro  0-12 Units Subcutaneous TID WC    insulin lispro  0-6 Units Subcutaneous Nightly     Continuous Infusions:    dextrose       PRN Meds:  anticoagulant sodium citrate, anticoagulant sodium citrate, morphine, sodium chloride, sodium chloride, cyclobenzaprine, ondansetron, oxyCODONE HCl, prochlorperazine, sodium chloride flush, chemo tablet, Take 4 tablets by mouth daily    INVESTIGATIONS     Last 3 CMP:    Recent Labs      07/30/18   0558  07/31/18   0559  08/01/18   0613   NA  137  135  136   K  3.8  3.8  3.9   CL  101  101  98   CO2  23  19*  24   BUN  47*  54*  31*   CREATININE  5.11*  5.54*  3.68*   CALCIUM  7.9*  7.7*  7.6*       Last 3 CBC:  Recent Labs      07/30/18   1539  07/31/18   0559  07/31/18   1117  08/01/18   0613   WBC  5.5  5.1   --   6.2   RBC  2.72*  2.36*   --   2.81*   HGB  7.2*  6.4*  8.3*  7.7*   HCT  25.0*  21.1*  27.1*  25.1*   MCV  91.9  89.4   --   89.3   MCH  26.5  27.1   --   27.4   MCHC  28.8  30.3   --   30.7   RDW  18.6*  19.2*   --   19.0*   PLT  240  331   --   286   MPV  9.2  10.1   --   10.3       ASSESSMENT     1.  RASHMI contributed to obstructive uropathy, urinary retention and aggravated further by concomitant ACE inhibitor use and diuretic use.  Oral intake poor. - HD dependent 7/27 - perm cath   2. CKD Stage 3, baseline creatinine 2-2.2  3. DVT on anticoagulation  4. Hematuria - improved  5. R sided metastatic RCC with spread to Lumbar spine and peritoneum which was discovered in January 2018 during MRI for back pain due to L1 compression fracture -  OUTPATIENT IMMUNOTHERAPY  6.  Hypertension  7.  Diabetes mellitus type 2     PLAN     1. HD am  2. Arranging for outpatient HD placement  3. Ok for dc once # 2 achieved  4.  Follow up urology and Hem Onco review    Please do not hesitate to call with questions    This note is created with the assistance of a speech-recognition program. While intending to generate a document that actually reflects the content of the visit, no guarantees can be provided that every mistake has been identified and corrected by editing    Ha Evangelista MD, [facility] Jillian Sanchez, [address]   8/1/2018 8:24 AM  [facility] [place]

## 2018-08-02 NOTE — PLAN OF CARE
Problem: Falls - Risk of:  Goal: Will remain free from falls  Will remain free from falls   Outcome: Met This Shift  Patient remained free from falls this shift. Bed locked and in lowest position, call light within reach, and bed alarm on. Problem: Risk for Impaired Skin Integrity  Goal: Tissue integrity - skin and mucous membranes  Structural intactness and normal physiological function of skin and  mucous membranes. Outcome: Ongoing  No new skin breakdown during this shift. Will continue to monitor.

## 2018-08-02 NOTE — FLOWSHEET NOTE
DATE: 2018    NAME: Dal Dakins  MRN: [id number]   : 1955    Patient not seen this date for Physical Therapy due to:  [] Blood transfusion in progress  [x] Hemodialysis  []  Patient Declined  [] Spine Precautions   [] Strict Bedrest  [] Surgery/ Procedure  [] Testing      [] Other        [] PT being discontinued at this time. Patient independent. No further needs. [] PT being discontinued at this time as the patient has been transferred to palliative care. No further needs.     Kwasi Ryan, PTA

## 2018-08-02 NOTE — PROGRESS NOTES
NEPHROLOGY PROGRESS NOTE    Pt seen in dialysis  SUBJECTIVE     Hemo Onco and RT notes noted. Had HD yesterday. Uneventful. Rosalba marshall. Appetite improving. BP stable. No SOB. Tolerating treatment well. 1-2 liters of ultrafiltration planned. Outpatient spot being arranged. Plan [facility] Tuesday Thursday and Saturday. OBJECTIVE     Vitals:    08/02/18 0823 08/02/18 0853 08/02/18 0923 08/02/18 0953   BP: (!) 155/69 (!) 143/63 (!) 150/66 (!) 149/66   Pulse: 84 80 85 85   Resp:       Temp:       TempSrc:       SpO2:       Weight:       Height:         24HR INTAKE/OUTPUT:      Intake/Output Summary (Last 24 hours) at 08/02/18 1128  Last data filed at 08/01/18 2240   Gross per 24 hour   Intake              550 ml   Output              682 ml   Net             -132 ml       General appearance:Awake, alert, in no acute distress  HEENT: PERRLA  Respiratory::vesicular breath sounds,no wheeze/crackles  Cardiovascular:S1 S2 normal,no gallop or organic murmur. Abdomen:Non tender/non distended. Bowel sounds present  Extremities: No Cyanosis or Clubbing,present Lower extremity edema  Neurological:Alert and oriented. No abnormalities of mood, affect, memory, mentation, or behavior are noted      MEDICATIONS     Scheduled Meds:    warfarin  2.5 mg Oral Daily    megestrol  400 mg Oral Daily    0.9 % sodium chloride  250 mL Intravenous Once    warfarin (COUMADIN) daily dosing (placeholder)   Other RX Placeholder    acetaminophen  1,000 mg Oral TID    amitriptyline  75 mg Oral Nightly    atorvastatin  20 mg Oral Nightly    docusate sodium  200 mg Oral BID    pantoprazole  40 mg Oral QAM AC    oxyCODONE  10 mg Oral BID    sodium chloride flush  10 mL Intravenous 2 times per day    insulin lispro  0-12 Units Subcutaneous TID WC    insulin lispro  0-6 Units Subcutaneous Nightly     Continuous Infusions:    dextrose       PRN Meds:  anticoagulant sodium citrate, anticoagulant sodium citrate, sodium capsule by mouth 3 times daily for 30 days. Lo Amos PAZOPanib HCl (VOTRIENT) 200 MG chemo tablet, Take 4 tablets by mouth daily  [DISCONTINUED] amLODIPine (NORVASC) 10 MG tablet, Take 1 tablet by mouth daily    INVESTIGATIONS     Last 3 CMP:    Recent Labs      07/31/18   0559  08/01/18 0613 08/02/18   0604   NA  135  136  136   K  3.8  3.9  3.5*   CL  101  98  98   CO2  19*  24  24   BUN  54*  31*  40*   CREATININE  5.54*  3.68*  4.54*   CALCIUM  7.7*  7.6*  7.9*       Last 3 CBC:  Recent Labs      07/31/18   0559   08/01/18 0613 08/01/18 2007 08/02/18   0604   WBC  5.1   --   6.2   --   6.0   RBC  2.36*   --   2.81*   --   2.70*   HGB  6.4*   < >  7.7*  8.1*  7.2*   HCT  21.1*   < >  25.1*  25.5*  24.4*   MCV  89.4   --   89.3   --   90.4   MCH  27.1   --   27.4   --   26.7   MCHC  30.3   --   30.7   --   29.5   RDW  19.2*   --   19.0*   --   18.6*   PLT  331   --   286   --   258   MPV  10.1   --   10.3   --   9.7    < > = values in this interval not displayed. ASSESSMENT     1. Angella Led contributed to obstructive uropathy, urinary retention and aggravated further by concomitant ACE inhibitor use and diuretic use.  Oral intake poor. - HD dependent 7/27 - perm cath, recovery renal function less likely. Outpatient dialysis being arranged at the [facility] Tuesday Thursday Saturday  2. CKD Stage 4, baseline creatinine 2.5-2.8  3. DVT on anticoagulation  4. Hematuria - improved  5. R sided metastatic RCC with spread to Lumbar spine and peritoneum which was discovered in January 2018 during MRI for back pain due to L1 compression fracture -  OUTPATIENT IMMUNOTHERAPY  6.  Hypertension  7.  Diabetes mellitus type 2     PLAN     1. HD today   2. Arranging for outpatient HD placement at the [facility]  3. Ok for dc once # 2 achieved  4. Follow up urology and Hem Onco review  5.   Stable for discharge once outpatient spot arranged next dialysis on Saturday    Please do not hesitate to call with questions    This note is created with the assistance of a speech-recognition program. While intending to generate a document that actually reflects the content of the visit, no guarantees can be provided that every mistake has been identified and corrected by editing    Santos Millan MD, [facility]   8/2/2018 11:28 AM  [facility] [place]

## 2018-08-02 NOTE — PROGRESS NOTES
[facility]          Progress Note    8/2/2018    1:14 PM    Name:   Magi Pedro  MRN:     [id number]     Acct:      [de-identified]   Room:   [address] [place] [address] Day:  8  Admit Date:  7/25/2018  1:16 PM    PCP:   Michael Coffey MD  Code Status:  Full Code    Subjective:     C/C:   Chief Complaint   Patient presents with    Hematuria     Sent by her oncologist for dysuria with hematuria and lower back pain. Has h/o renal carcinoma. Pt reports generalized weakness/fatigue. Denies N/V/D/C/fever/chills. Interval History Status:  Stable, no change    The patient is a 66 y.o.  female who is admitted to the hospital for the management of acute kidney failure with acute DVT. 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 INR is 2.1  Hemoglobin level is >7  Remained to have weakness in her left foot. No fevers overnight. Poor appetite. No hypoglycemia    Pt denies any Chest pain , new pain, vomiting. Notes from nursing staff and Consults had been reviewed, and the overnight progress had been checked with the nursing staff as well. Brief History: Magi Pedro is a 66 y.o. Non-/non  female who presents with Hematuria (Sent by her oncologist for dysuria with hematuria and lower back pain. Has h/o renal carcinoma. Pt reports generalized weakness/fatigue. Denies N/V/D/C/fever/chills.)   and she is admitted to the hospital for the management of  Acute renal failure and gross hematuria.   Patient was known right sided renal cell carcinoma with spread to The lumbar spine and peritoneum that was diagnosed in January 2018 and had previously went through lumbar spine fixation for her compression fracture as well as radiation, seem like she is currently not on chemo for the last 1 month, she will be scheduled for nephrectomy with another possible lumbar fixation next month according to her. She  was sent to ER from her oncologist office after noticing gross hematuria and increasing difficulty voiding. In ER she was found to have elevated creatinine of 9.6 and BUN of 101, ( average  baseline creatinine  was 2- 2.8) and found to have high anion gap metabolic acidosis, potassium was fine, bladder scan showed 361 mL in ER and Navarrete catheter was placed given the retention in the history. Patient is well was assessed by nephrology who started her on bicarb drip given her acidosis. Thought to be likely hematuria along with ACE and diuretic use. Needing dialysis with tunnel catheter placement. Also noted to have left leg extensive DVT from gastrocnemius to external iliac vein, started on heparin with addition of coumadin. Given continued worsened back pain, CT lumbar spine showed her known L1 pathologic fracture as well as further metastatic lesion invading the iliac sac and sacral bones, neurosurgery evaluated the patient as well along with radiation oncology team, advised continued outpatient follow up  Seen by neurosurgery due to above comorbidities. No acute surgical intervention and encouragement of wearing the brace during any activity. Review of Systems:   Review of Systems   Constitutional: Positive for activity change, appetite change and fatigue. Negative for fever. Respiratory: Negative for shortness of breath and wheezing. Cardiovascular: Positive for leg swelling. Negative for chest pain. Gastrointestinal: Negative for abdominal pain, diarrhea, nausea and vomiting. Neurological: Positive for weakness (Left foot). Negative for syncope and headaches. Medications:      Allergies:  No Known Allergies    Current Meds:   Scheduled Meds:    warfarin  2.5 mg Oral Daily    megestrol  400 mg Oral Daily    0.9 % sodium chloride  250 mL Intravenous Once    warfarin (COUMADIN) daily dosing (placeholder)   Other RX Placeholder    acetaminophen  1,000 mg Oral TID    amitriptyline  75 mg Oral Nightly    atorvastatin  20 mg Oral Nightly    docusate sodium  200 mg Oral BID    pantoprazole  40 mg Oral QAM AC    oxyCODONE  10 mg Oral BID    sodium chloride flush  10 mL Intravenous 2 times per day    insulin lispro  0-12 Units Subcutaneous TID WC    insulin lispro  0-6 Units Subcutaneous Nightly     Continuous Infusions:    dextrose       PRN Meds: anticoagulant sodium citrate, anticoagulant sodium citrate, sodium chloride, sodium chloride, cyclobenzaprine, ondansetron, oxyCODONE HCl, prochlorperazine, sodium chloride flush, ondansetron, glucose, dextrose, glucagon (rDNA), dextrose, acetaminophen    Data:     Past Medical History:   has a past medical history of Cancer ([facility] [address] Chronic kidney disease; Depression; [facility] GERD (gastroesophageal reflux disease); HA (headache); HBP (high blood pressure); History of blood transfusion; Hyperlipidemia; Hyperplastic polyp of intestine; Hypothyroid; Hypothyroidism; Left knee pain; Non-smoker; OA (osteoarthritis); and Tubular adenoma. Social History:   reports that she has never smoked. She has never used smokeless tobacco. She reports that she does not drink alcohol or use drugs.      Family History:   Family History   Problem Relation Age of Onset    Heart Disease Mother     Diabetes Mother     Cancer Father         esophageal cancer    Cancer Maternal Grandfather         colon       Vitals:  Patient Vitals for the past 24 hrs:   BP Temp Temp src Pulse Resp SpO2 Weight   08/02/18 1132 (!) 142/65 98.5 °F (36.9 °C) - 84 - - 189 lb 9.5 oz (86 kg)   08/02/18 1123 136/63 - - 88 - - -   08/02/18 1052 (!) 132/59 - - 87 - - -   08/02/18 1023 122/62 - - 84 - - -   08/02/18 0953 (!) 149/66 - - 85 - - -   08/02/18 0923 (!) 150/66 - - 85 - - -   08/02/18 0853 (!) 143/63 - - 80 - - -   08/02/18 0823 (!) 155/69 - - 84 - - -   08/02/18 0820 (!) 168/74 98.6 °F (37 °C) - 93 18 - 189 lb 13.1 oz (86.1 kg)   18 0745 - - - 85 - - -   18 0404 (!) 139/52 99.2 °F (37.3 °C) Oral 80 13 97 % -   18 0000 (!) 130/44 98.9 °F (37.2 °C) Oral 82 14 99 % -   18 2000 (!) 130/57 98.7 °F (37.1 °C) Oral 85 15 100 % -   18 1915 (!) 130/57 - - 83 18 98 % -   18 1629 (!) 139/51 98.4 °F (36.9 °C) Oral 88 17 100 % -     Temp (24hrs), Av.7 °F (37.1 °C), Min:98.4 °F (36.9 °C), Max:99.2 °F (37.3 °C)    Recent Labs      18   1602  18   1916  18   0647  18   1233   POCGLU  129*  131*  108*  118*       I/O (24Hr):     Intake/Output Summary (Last 24 hours) at 18 1314  Last data filed at 18 2240   Gross per 24 hour   Intake              550 ml   Output              682 ml   Net             -132 ml       Labs:  Recent Results (from the past 24 hour(s))   POC Glucose Fingerstick    Collection Time: 18  4:02 PM   Result Value Ref Range    POC Glucose 129 (H) 65 - 105 mg/dL   POC Glucose Fingerstick    Collection Time: 18  7:16 PM   Result Value Ref Range    POC Glucose 131 (H) 65 - 105 mg/dL   Hemoglobin and Hematocrit, Blood    Collection Time: 18  8:07 PM   Result Value Ref Range    Hemoglobin 8.1 (L) 11.9 - 15.1 g/dL    Hematocrit 25.5 (L) 36.3 - 47.1 %   Basic Metabolic Panel    Collection Time: 18  6:04 AM   Result Value Ref Range    Glucose 123 (H) 70 - 99 mg/dL    BUN 40 (H) 8 - 23 mg/dL    CREATININE 4.54 (H) 0.50 - 0.90 mg/dL    Bun/Cre Ratio NOT REPORTED 9 - 20    Calcium 7.9 (L) 8.6 - 10.4 mg/dL    Sodium 136 135 - 144 mmol/L    Potassium 3.5 (L) 3.7 - 5.3 mmol/L    Chloride 98 98 - 107 mmol/L    CO2 24 20 - 31 mmol/L    Anion Gap 14 9 - 17 mmol/L    GFR Non-African American 10 (L) >60 mL/min    GFR  12 (L) >60 mL/min    GFR Comment          GFR Staging NOT REPORTED    PROTIME-INR    Collection Time: 18  6:04 AM   Result Value Ref Range    Protime 21.0 (H) 9.0 - 12.0 sec    INR 2.1    CBC Auto Differential    Collection Time: 08/02/18  6:04 AM   Result Value Ref Range    WBC 6.0 3.5 - 11.3 k/uL    RBC 2.70 (L) 3.95 - 5.11 m/uL    Hemoglobin 7.2 (L) 11.9 - 15.1 g/dL    Hematocrit 24.4 (L) 36.3 - 47.1 %    MCV 90.4 82.6 - 102.9 fL    MCH 26.7 25.2 - 33.5 pg    MCHC 29.5 28.4 - 34.8 g/dL    RDW 18.6 (H) 11.8 - 14.4 %    Platelets 628 318 - 620 k/uL    MPV 9.7 8.1 - 13.5 fL    NRBC Automated 0.0 0.0 per 100 WBC    Differential Type NOT REPORTED     WBC Morphology NOT REPORTED     RBC Morphology ANISOCYTOSIS PRESENT     Platelet Estimate NOT REPORTED     Seg Neutrophils 66 (H) 36 - 65 %    Lymphocytes 19 (L) 24 - 43 %    Monocytes 9 3 - 12 %    Eosinophils % 5 (H) 1 - 4 %    Basophils 1 0 - 2 %    Immature Granulocytes 1 (H) 0 %    Segs Absolute 4.00 1.50 - 8.10 k/uL    Absolute Lymph # 1.13 1.10 - 3.70 k/uL    Absolute Mono # 0.52 0.10 - 1.20 k/uL    Absolute Eos # 0.27 0.00 - 0.44 k/uL    Basophils # 0.03 0.00 - 0.20 k/uL    Absolute Immature Granulocyte 0.08 0.00 - 0.30 k/uL   POC Glucose Fingerstick    Collection Time: 08/02/18  6:47 AM   Result Value Ref Range    POC Glucose 108 (H) 65 - 105 mg/dL   POC Glucose Fingerstick    Collection Time: 08/02/18 12:33 PM   Result Value Ref Range    POC Glucose 118 (H) 65 - 105 mg/dL     Lab Results   Component Value Date/Time    SPECIAL NOT REPORTED 07/25/2018 05:53 PM     Lab Results   Component Value Date/Time    CULTURE NO SIGNIFICANT GROWTH 07/25/2018 05:53 PM       Radiology:  Ct Lumbar Spine Wo Contrast  Result Date: 7/25/2018  1. Pathologic compression fracture of L1 is again identified with some retropulsion of the posterior aspect of the L1 vertebral body into the spinal canal without significant spinal canal narrowing. 2. Metastatic lesions involving the left iliac wing, right S1 sacral body effacing the right S1 neural foramen, and the L1 vertebral body. No new osseous lesions are identified.  3. Heterogeneously enlarged mass largely replacing the right kidney with nodularity in the right renal fossa suspicious for metastatic disease. Mri Lumbar Spine Wo Contrast  Result Date: 7/25/2018  Pathologic L1 compression fracture is again seen with retropulsion of fracture fragments causing impingement upon the distal aspect of the conus and the proximal thecal sac. Appearance is overall similar to the prior study. Us Renal Complete  Result Date: 7/25/2018  Large complex mass within the superior pole the right kidney. Mild right hydronephrosis. Urinary bladder is decompressed by Navarrete catheter. Ir Tunneled Cvc Place Wo Sq Port/pump > 5 Years  Result Date: 7/27/2018  Successful ultrasound and fluoroscopy guided right internal jugular vein 14.5 Sw[place] by 19 cm tip to cuff tunneled catheter placement. Ready for use.  Renal Arterial Duplex Complete  Result Date: 7/28/2018   Conclusions   Summary   Right:  No evidence of renal artery stenosis. Abnormally large kidney length with possible mass at inferior pole,  correlate clinically with patient history of renal cell carcinoma. Left:  No evidence of renal artery stenosis. Vl Dup Lower Extremity Venous Left  Result Date: 7/28/2018  Abnormal Study**   Extensive acute deep venous thrombosis extending from the gastrocnemius  vein into the external iliac vein. Acute superficial venous thrombosis identified in the small saphenous  vein. Ct Abdomen Pelvis Wo Contrast Additional Contrast? None  Result Date: 8/1/2018  Increasing metastatic disease along the surface of the liver Decreasing mesenteric adenopathy Stable heterogeneous enlargement of the right kidney Ascites Developing metastatic disease involving the bony pelvis as described. Incomplete distention of the ascending colon raising the question of mild colitis. Correlate clinically. Fat stranding throughout the abdomen pelvis raising the question of third-spacing of fluid.       Ct Chest Wo Contrast  Result Date: 8/1/2018  Right larger than left pleural effusions with associated right greater than left basilar consolidation. This may represent passive atelectasis from the effusions. Pneumonia not excluded No discrete pulmonary nodules Rounded lucency along the inferior endplate of T8. This may represent a developing Schmorl's node deformity. A small metastatic lesion would be difficult to exclude given that this is a new finding. Upper abdominal findings discussed separately. Small right breast nodular density. Correlate with mammographic history        Physical Examination:      Physical Exam   Constitutional: She is oriented to person, place, and time. She appears well-developed and well-nourished. She has a sickly appearance. HENT:   Head: Normocephalic and atraumatic. Right Ear: External ear normal.   Left Ear: External ear normal.   Eyes: Conjunctivae and EOM are normal. Right eye exhibits no discharge. Left eye exhibits no discharge. No scleral icterus. Neck: Neck supple. No tracheal deviation present. Cardiovascular: Normal rate, regular rhythm and normal heart sounds. Pulmonary/Chest: Effort normal and breath sounds normal. She has no wheezes. Abdominal: Soft. Bowel sounds are normal. She exhibits no distension and no mass. There is no tenderness. Musculoskeletal: She exhibits edema (Bilateral lower limb). She exhibits no tenderness. Lymphadenopathy:     She has no cervical adenopathy. Neurological: She is alert and oriented to person, place, and time. She exhibits abnormal muscle tone (Left foot weakness. Otherwise, moves all 4 extremities with no restriction). Skin: Skin is warm and dry. No rash noted. Psychiatric: She has a normal mood and affect. Her behavior is normal.   Nursing note and vitals reviewed.      Assessment:        Principal Problem:    Acute renal failure (ARF) ([facility])  Active Problems:    Diabetes mellitus ([facility] [address]    Anemia due to chronic kidney disease

## 2018-08-02 NOTE — PROGRESS NOTES
CBC WITH AUTO DIFFERENTIAL   Result Value Ref Range    WBC 5.7 3.5 - 11.3 k/uL    RBC 3.01 (L) 3.95 - 5.11 m/uL    Hemoglobin 8.2 (L) 11.9 - 15.1 g/dL    Hematocrit 28.4 (L) 36.3 - 47.1 %    MCV 94.4 82.6 - 102.9 fL    MCH 27.2 25.2 - 33.5 pg    MCHC 28.9 28.4 - 34.8 g/dL    RDW 19.1 (H) 11.8 - 14.4 %    Platelets 202 734 - 597 k/uL    MPV 9.0 8.1 - 13.5 fL    NRBC Automated 0.4 (H) 0.0 per 100 WBC    Differential Type NOT REPORTED     WBC Morphology NOT REPORTED     RBC Morphology ANISOCYTOSIS PRESENT     Platelet Estimate NOT REPORTED     Seg Neutrophils 74 (H) 36 - 65 %    Lymphocytes 15 (L) 24 - 43 %    Monocytes 8 3 - 12 %    Eosinophils % 1 1 - 4 %    Basophils 1 0 - 2 %    Immature Granulocytes 1 (H) 0 %    Segs Absolute 4.23 1.50 - 8.10 k/uL    Absolute Lymph # 0.83 (L) 1.10 - 3.70 k/uL    Absolute Mono # 0.47 0.10 - 1.20 k/uL    Absolute Eos # 0.08 0.00 - 0.44 k/uL    Basophils # 0.03 0.00 - 0.20 k/uL    Absolute Immature Granulocyte 0.05 0.00 - 0.30 k/uL   Basic Metabolic Panel   Result Value Ref Range    Glucose 55 (L) 70 - 99 mg/dL     (HH) 8 - 23 mg/dL    CREATININE 9.62 (HH) 0.50 - 0.90 mg/dL    Bun/Cre Ratio NOT REPORTED 9 - 20    Calcium 9.1 8.6 - 10.4 mg/dL    Sodium 137 135 - 144 mmol/L    Potassium 4.6 3.7 - 5.3 mmol/L    Chloride 102 98 - 107 mmol/L    CO2 11 (L) 20 - 31 mmol/L    Anion Gap 24 (H) 9 - 17 mmol/L    GFR Non-African American 4 (L) >60 mL/min    GFR African American 5 (L) >60 mL/min    GFR Comment          GFR Staging NOT REPORTED    Protein, urine, random   Result Value Ref Range    Total Protein, Urine 68 mg/dL   Sodium, urine, random   Result Value Ref Range    Sodium,Ur 44 mmol/L   Creatinine, Random Urine   Result Value Ref Range    Creatinine, Ur 118.0 28.0 - 217.0 mg/dL   CBC Auto Differential   Result Value Ref Range    WBC 4.1 3.5 - 11.3 k/uL    RBC 2.72 (L) 3.95 - 5.11 m/uL    Hemoglobin 7.3 (L) 11.9 - 15.1 g/dL    Hematocrit 26.5 (L) 36.3 - 47.1 %    MCV 97.4 82.6 - 102.9 fL    MCH 26.8 25.2 - 33.5 pg    MCHC 27.5 (L) 28.4 - 34.8 g/dL    RDW 18.9 (H) 11.8 - 14.4 %    Platelets 386 565 - 070 k/uL    MPV 9.1 8.1 - 13.5 fL    NRBC Automated 0.0 0.0 per 100 WBC    Differential Type NOT REPORTED     WBC Morphology NOT REPORTED     RBC Morphology NOT REPORTED     Platelet Estimate NOT REPORTED     Immature Granulocytes 0 0 %    Seg Neutrophils 71 (H) 36 - 66 %    Lymphocytes 22 (L) 24 - 44 %    Monocytes 3 1 - 7 %    Eosinophils % 4 1 - 4 %    Basophils 0 0 - 2 %    Absolute Immature Granulocyte 0.00 0.00 - 0.30 k/uL    Segs Absolute 2.92 1.8 - 7.7 k/uL    Absolute Lymph # 0.90 (L) 1.0 - 4.8 k/uL    Absolute Mono # 0.12 0.1 - 0.8 k/uL    Absolute Eos # 0.16 0.0 - 0.4 k/uL    Basophils # 0.00 0.0 - 0.2 k/uL    Morphology ANISOCYTOSIS PRESENT    Comprehensive Metabolic Panel w/ Reflex to MG   Result Value Ref Range    Glucose 104 (H) 70 - 99 mg/dL    BUN 99 (HH) 8 - 23 mg/dL    CREATININE 9.34 (HH) 0.50 - 0.90 mg/dL    Bun/Cre Ratio NOT REPORTED 9 - 20    Calcium 7.9 (L) 8.6 - 10.4 mg/dL    Sodium 138 135 - 144 mmol/L    Potassium 4.6 3.7 - 5.3 mmol/L    Chloride 106 98 - 107 mmol/L    CO2 10 (L) 20 - 31 mmol/L    Anion Gap 22 (H) 9 - 17 mmol/L    Alkaline Phosphatase 63 35 - 104 U/L    ALT <5 (L) 5 - 33 U/L    AST 7 <32 U/L    Total Bilirubin 0.19 (L) 0.3 - 1.2 mg/dL    Total Protein 4.9 (L) 6.4 - 8.3 g/dL    Alb 2.0 (L) 3.5 - 5.2 g/dL    Albumin/Globulin Ratio 0.7 (L) 1.0 - 2.5    GFR Non-African American 4 (L) >60 mL/min    GFR African American 5 (L) >60 mL/min    GFR Comment          GFR Staging NOT REPORTED    Magnesium   Result Value Ref Range    Magnesium 1.9 1.6 - 2.6 mg/dL   Protime-INR   Result Value Ref Range    Protime 10.7 9.0 - 12.0 sec    INR 1.0    Urinalysis with microscopic   Result Value Ref Range    Color, UA YELLOW YEL    Turbidity UA CLOUDY (A) CLEAR    Glucose, Ur NEGATIVE NEG    Bilirubin Urine NEGATIVE NEG    Ketones, Urine NEGATIVE NEG    Specific Gravity, UA 1.014 1.005 - 1.030    Urine Hgb NEGATIVE NEG    pH, UA 5.0 5.0 - 8.0    Protein, UA 2+ (A) NEG    Urobilinogen, Urine Normal NORM    Nitrite, Urine NEGATIVE NEG    Leukocyte Esterase, Urine SMALL (A) NEG    -          WBC, UA 10 TO 20 0 - 5 /HPF    RBC, UA 5 TO 10 0 - 4 /HPF    Casts UA 2 TO 5 HYALINE 0 - 8 /LPF    Crystals UA NOT REPORTED NONE /HPF    Epithelial Cells UA 5 TO 10 0 - 5 /HPF    Renal Epithelial, Urine NOT REPORTED 0 /HPF    Bacteria, UA FEW (A) NONE    Mucus, UA NOT REPORTED NONE    Trichomonas, UA NOT REPORTED NONE    Amorphous, UA NOT REPORTED NONE    Other Observations UA NOT REPORTED NREQ    Yeast, UA NOT REPORTED NONE   Basic Metabolic Panel   Result Value Ref Range    Glucose 188 (H) 70 - 99 mg/dL    BUN 98 (HH) 8 - 23 mg/dL    CREATININE 9.09 (HH) 0.50 - 0.90 mg/dL    Bun/Cre Ratio NOT REPORTED 9 - 20    Calcium 8.0 (L) 8.6 - 10.4 mg/dL    Sodium 138 135 - 144 mmol/L    Potassium 4.6 3.7 - 5.3 mmol/L    Chloride 104 98 - 107 mmol/L    CO2 13 (L) 20 - 31 mmol/L    Anion Gap 21 (H) 9 - 17 mmol/L    GFR Non-African American 4 (L) >60 mL/min    GFR African American 5 (L) >60 mL/min    GFR Comment          GFR Staging NOT REPORTED    Basic Metabolic Panel   Result Value Ref Range    Glucose 215 (H) 70 - 99 mg/dL     (HH) 8 - 23 mg/dL    CREATININE 9.06 (HH) 0.50 - 0.90 mg/dL    Bun/Cre Ratio NOT REPORTED 9 - 20    Calcium 8.0 (L) 8.6 - 10.4 mg/dL    Sodium 135 135 - 144 mmol/L    Potassium 4.3 3.7 - 5.3 mmol/L    Chloride 97 (L) 98 - 107 mmol/L    CO2 17 (L) 20 - 31 mmol/L    Anion Gap 21 (H) 9 - 17 mmol/L    GFR Non-African American 4 (L) >60 mL/min    GFR African American 5 (L) >60 mL/min    GFR Comment          GFR Staging NOT REPORTED    APTT   Result Value Ref Range    PTT 26.3 20.5 - 30.5 sec   Protime-INR   Result Value Ref Range    Protime 11.1 9.0 - 12.0 sec    INR 1.0    CBC Auto Differential   Result Value Ref Range    WBC 4.5 3.5 - 11.3 k/uL    RBC 2.60 (L) 3.95 - 5.11 m/uL Hemoglobin 7.1 (L) 11.9 - 15.1 g/dL    Hematocrit 23.5 (L) 36.3 - 47.1 %    MCV 90.4 82.6 - 102.9 fL    MCH 27.3 25.2 - 33.5 pg    MCHC 30.2 28.4 - 34.8 g/dL    RDW 19.1 (H) 11.8 - 14.4 %    Platelets 236 765 - 456 k/uL    MPV 10.0 8.1 - 13.5 fL    NRBC Automated 0.0 0.0 per 100 WBC    Differential Type NOT REPORTED     WBC Morphology NOT REPORTED     RBC Morphology ANISOCYTOSIS PRESENT     Platelet Estimate NOT REPORTED     Seg Neutrophils 62 36 - 65 %    Lymphocytes 21 (L) 24 - 43 %    Monocytes 10 3 - 12 %    Eosinophils % 5 (H) 1 - 4 %    Basophils 0 0 - 2 %    Immature Granulocytes 1 (H) 0 %    Segs Absolute 2.77 1.50 - 8.10 k/uL    Absolute Lymph # 0.94 (L) 1.10 - 3.70 k/uL    Absolute Mono # 0.45 0.10 - 1.20 k/uL    Absolute Eos # 0.24 0.00 - 0.44 k/uL    Basophils # <0.03 0.00 - 0.20 k/uL    Absolute Immature Granulocyte 0.05 0.00 - 0.30 k/uL   Albumin   Result Value Ref Range    Alb 2.1 (L) 3.5 - 5.2 g/dL   Hepatitis B surface antibody   Result Value Ref Range    Hep B S Ab <3.50 <10 mIU/mL   Hepatitis B surface antigen   Result Value Ref Range    Hepatitis B Surface Ag NONREACTIVE NR   Hepatitis B core antibody, total   Result Value Ref Range    Hep B Core Total Ab NONREACTIVE NR   Lactate Dehydrogenase   Result Value Ref Range     135 - 214 U/L   Basic Metabolic Panel   Result Value Ref Range    Glucose 122 (H) 70 - 99 mg/dL    BUN 72 (H) 8 - 23 mg/dL    CREATININE 6.79 (HH) 0.50 - 0.90 mg/dL    Bun/Cre Ratio NOT REPORTED 9 - 20    Calcium 7.5 (L) 8.6 - 10.4 mg/dL    Sodium 139 135 - 144 mmol/L    Potassium 3.9 3.7 - 5.3 mmol/L    Chloride 101 98 - 107 mmol/L    CO2 20 20 - 31 mmol/L    Anion Gap 18 (H) 9 - 17 mmol/L    GFR Non-African American 6 (L) >60 mL/min    GFR  7 (L) >60 mL/min    GFR Comment          GFR Staging NOT REPORTED    HEPATITIS C ANTIBODY   Result Value Ref Range    Hepatitis C Ab NONREACTIVE NR   APTT   Result Value Ref Range    PTT 27.4 20.5 - 30.5 sec   APTT per 100 WBC   Basic Metabolic Panel   Result Value Ref Range    Glucose 113 (H) 70 - 99 mg/dL    BUN 54 (H) 8 - 23 mg/dL    CREATININE 5.54 (HH) 0.50 - 0.90 mg/dL    Bun/Cre Ratio NOT REPORTED 9 - 20    Calcium 7.7 (L) 8.6 - 10.4 mg/dL    Sodium 135 135 - 144 mmol/L    Potassium 3.8 3.7 - 5.3 mmol/L    Chloride 101 98 - 107 mmol/L    CO2 19 (L) 20 - 31 mmol/L    Anion Gap 15 9 - 17 mmol/L    GFR Non-African American 8 (L) >60 mL/min    GFR  9 (L) >60 mL/min    GFR Comment          GFR Staging NOT REPORTED    PROTIME-INR   Result Value Ref Range    Protime 60.2 (H) 9.0 - 12.0 sec    INR 6.3 (HH)    CBC Auto Differential   Result Value Ref Range    WBC 5.1 3.5 - 11.3 k/uL    RBC 2.36 (L) 3.95 - 5.11 m/uL    Hemoglobin 6.4 (LL) 11.9 - 15.1 g/dL    Hematocrit 21.1 (L) 36.3 - 47.1 %    MCV 89.4 82.6 - 102.9 fL    MCH 27.1 25.2 - 33.5 pg    MCHC 30.3 28.4 - 34.8 g/dL    RDW 19.2 (H) 11.8 - 14.4 %    Platelets 127 659 - 520 k/uL    MPV 10.1 8.1 - 13.5 fL    NRBC Automated 0.0 0.0 per 100 WBC    Differential Type NOT REPORTED     WBC Morphology NOT REPORTED     RBC Morphology ANISOCYTOSIS PRESENT     Platelet Estimate NOT REPORTED     Seg Neutrophils 65 36 - 65 %    Lymphocytes 20 (L) 24 - 43 %    Monocytes 8 3 - 12 %    Eosinophils % 5 (H) 1 - 4 %    Basophils 0 0 - 2 %    Immature Granulocytes 2 (H) 0 %    Segs Absolute 3.30 1.50 - 8.10 k/uL    Absolute Lymph # 1.03 (L) 1.10 - 3.70 k/uL    Absolute Mono # 0.43 0.10 - 1.20 k/uL    Absolute Eos # 0.23 0.00 - 0.44 k/uL    Basophils # <0.03 0.00 - 0.20 k/uL    Absolute Immature Granulocyte 0.08 0.00 - 0.30 k/uL   Protime-INR   Result Value Ref Range    Protime 71.0 (H) 9.0 - 12.0 sec    INR 7.6 (HH)    HEMOGLOBIN AND HEMATOCRIT, BLOOD   Result Value Ref Range    Hemoglobin 8.3 (L) 11.9 - 15.1 g/dL    Hematocrit 27.1 (L) 36.3 - 47.1 %   PROTIME-INR   Result Value Ref Range    Protime 65.0 (H) 9.0 - 12.0 sec    INR 6.9 [facility])    Basic Metabolic Panel   Result Value Ref Range    Glucose 113 (H) 70 - 99 mg/dL    BUN 31 (H) 8 - 23 mg/dL    CREATININE 3.68 (H) 0.50 - 0.90 mg/dL    Bun/Cre Ratio NOT REPORTED 9 - 20    Calcium 7.6 (L) 8.6 - 10.4 mg/dL    Sodium 136 135 - 144 mmol/L    Potassium 3.9 3.7 - 5.3 mmol/L    Chloride 98 98 - 107 mmol/L    CO2 24 20 - 31 mmol/L    Anion Gap 14 9 - 17 mmol/L    GFR Non-African American 13 (L) >60 mL/min    GFR African American 15 (L) >60 mL/min    GFR Comment          GFR Staging NOT REPORTED    PROTIME-INR   Result Value Ref Range    Protime 21.4 (H) 9.0 - 12.0 sec    INR 2.1    CBC Auto Differential   Result Value Ref Range    WBC 6.2 3.5 - 11.3 k/uL    RBC 2.81 (L) 3.95 - 5.11 m/uL    Hemoglobin 7.7 (L) 11.9 - 15.1 g/dL    Hematocrit 25.1 (L) 36.3 - 47.1 %    MCV 89.3 82.6 - 102.9 fL    MCH 27.4 25.2 - 33.5 pg    MCHC 30.7 28.4 - 34.8 g/dL    RDW 19.0 (H) 11.8 - 14.4 %    Platelets 074 460 - 471 k/uL    MPV 10.3 8.1 - 13.5 fL    NRBC Automated 0.0 0.0 per 100 WBC    Differential Type NOT REPORTED     WBC Morphology NOT REPORTED     RBC Morphology ANISOCYTOSIS PRESENT     Platelet Estimate NOT REPORTED     Seg Neutrophils 68 (H) 36 - 65 %    Lymphocytes 17 (L) 24 - 43 %    Monocytes 9 3 - 12 %    Eosinophils % 4 1 - 4 %    Basophils 0 0 - 2 %    Immature Granulocytes 2 (H) 0 %    Segs Absolute 4.22 1.50 - 8.10 k/uL    Absolute Lymph # 1.08 (L) 1.10 - 3.70 k/uL    Absolute Mono # 0.57 0.10 - 1.20 k/uL    Absolute Eos # 0.22 0.00 - 0.44 k/uL    Basophils # <0.03 0.00 - 0.20 k/uL    Absolute Immature Granulocyte 0.09 0.00 - 0.30 k/uL   Hemoglobin and Hematocrit, Blood   Result Value Ref Range    Hemoglobin 8.1 (L) 11.9 - 15.1 g/dL    Hematocrit 25.5 (L) 36.3 - 47.1 %   Basic Metabolic Panel   Result Value Ref Range    Glucose 123 (H) 70 - 99 mg/dL    BUN 40 (H) 8 - 23 mg/dL    CREATININE 4.54 (H) 0.50 - 0.90 mg/dL    Bun/Cre Ratio NOT REPORTED 9 - 20    Calcium 7.9 (L) 8.6 - 10.4 mg/dL    Sodium 136 135 - 144 mmol/L    Potassium 3.5 (L) Range    POC Glucose 202 (H) 65 - 105 mg/dL   POC Glucose Fingerstick   Result Value Ref Range    POC Glucose 131 (H) 65 - 105 mg/dL   POC Glucose Fingerstick   Result Value Ref Range    POC Glucose 171 (H) 65 - 105 mg/dL   POC Glucose Fingerstick   Result Value Ref Range    POC Glucose 166 (H) 65 - 105 mg/dL   POC Glucose Fingerstick   Result Value Ref Range    POC Glucose 143 (H) 65 - 105 mg/dL   POC Glucose Fingerstick   Result Value Ref Range    POC Glucose 144 (H) 65 - 105 mg/dL   POC Glucose Fingerstick   Result Value Ref Range    POC Glucose 133 (H) 65 - 105 mg/dL   POC Glucose Fingerstick   Result Value Ref Range    POC Glucose 138 (H) 65 - 105 mg/dL   POC Glucose Fingerstick   Result Value Ref Range    POC Glucose 160 (H) 65 - 105 mg/dL   POC Glucose Fingerstick   Result Value Ref Range    POC Glucose 157 (H) 65 - 105 mg/dL   POC Glucose Fingerstick   Result Value Ref Range    POC Glucose 116 (H) 65 - 105 mg/dL   POC Glucose Fingerstick   Result Value Ref Range    POC Glucose 144 (H) 65 - 105 mg/dL   POC Glucose Fingerstick   Result Value Ref Range    POC Glucose 142 (H) 65 - 105 mg/dL   POC Glucose Fingerstick   Result Value Ref Range    POC Glucose 181 (H) 65 - 105 mg/dL   POC Glucose Fingerstick   Result Value Ref Range    POC Glucose 111 (H) 65 - 105 mg/dL   POC Glucose Fingerstick   Result Value Ref Range    POC Glucose 136 (H) 65 - 105 mg/dL   POC Glucose Fingerstick   Result Value Ref Range    POC Glucose 119 (H) 65 - 105 mg/dL   POC Glucose Fingerstick   Result Value Ref Range    POC Glucose 112 (H) 65 - 105 mg/dL   POC Glucose Fingerstick   Result Value Ref Range    POC Glucose 132 (H) 65 - 105 mg/dL   POC Glucose Fingerstick   Result Value Ref Range    POC Glucose 129 (H) 65 - 105 mg/dL   POC Glucose Fingerstick   Result Value Ref Range    POC Glucose 131 (H) 65 - 105 mg/dL   POC Glucose Fingerstick   Result Value Ref Range    POC Glucose 108 (H) 65 - 105 mg/dL   POC Glucose Fingerstick   Result Value Ref Range    POC Glucose 118 (H) 65 - 105 mg/dL   POC Glucose Fingerstick   Result Value Ref Range    POC Glucose 129 (H) 65 - 105 mg/dL   EKG 12 Lead   Result Value Ref Range    Ventricular Rate 78 BPM    Atrial Rate 78 BPM    P-R Interval 128 ms    QRS Duration 86 ms    Q-T Interval 398 ms    QTc Calculation (Bazett) 453 ms    P Axis -4 degrees    R Axis 11 degrees    T Axis 3 degrees   TYPE AND SCREEN   Result Value Ref Range    Expiration Date 08/03/2018     Arm Band Number [id number]     ABO/Rh A POSITIVE     Antibody Screen NEGATIVE     Unit Number [id number]     Product Code Leukocyte Reduced Red Cell     Unit Divison 0     Dispense Status TRANSFUSED     Transfusion Status OK TO TRANSFUSE     Crossmatch Result COMPATIBLE    Blood Bank Specimen   Result Value Ref Range    Blood Bank Specimen NOT REPORTED          Xr Cervical Spine (2-3 Views)    Result Date: 7/12/2018  EXAMINATION: TWO XRAY VIEWS OF THE CERVICAL SPINE 7/12/2018 3:29 pm COMPARISON: None. HISTORY: ORDERING SYSTEM PROVIDED HISTORY: Cervicalgia FINDINGS: Radiograph is taken with cervical spine extension. 7 typical cervical vertebra present maintain normal height and alignment. Bony spinal canal and paravertebral soft tissues appear unremarkable. The disc spaces and posterior elements appear well maintained throughout. The uncovertebral joints appear normally aligned on AP view. The atlantoaxial articulation appears normally aligned. No discrete odontoid fracture is evident. No acute osseous abnormality of the cervical spine. If pain persists or worsens, then additional evaluation with CT or MRI may be indicated. Note that CT cervical spine is the study of choice for evaluation of acute trauma.      Ct Lumbar Spine Wo Contrast    Result Date: 7/25/2018  EXAMINATION: CT OF THE LUMBAR SPINE WITHOUT CONTRAST  7/25/2018 TECHNIQUE: CT of the lumbar spine was performed without the administration of intravenous contrast. Multiplanar reformatted images are provided for review. Dose modulation, iterative reconstruction, and/or weight based adjustment of the mA/kV was utilized to reduce the radiation dose to as low as reasonably achievable. COMPARISON: MRI lumbar spine from June 9, 2018 HISTORY: ORDERING SYSTEM PROVIDED HISTORY: r/o worsening fracture or increasing metastatic mass TECHNOLOGIST PROVIDED HISTORY: Reason for exam:->r/o worsening fracture or increasing metastatic mass FINDINGS: BONES/ALIGNMENT: Posterior transpedicular fusion hardware involving T11, T12, L2, and L3 is demonstrated. A pathologic compression fracture of L1 is again demonstrated with some retropulsion of fragments by approximately 0.4 cm. There is a dominant lytic lesion in the right sacral body encroaching upon the right S1 neural foramen. This metastatic lesion measures approximately 3.1 x 2.5 cm in cross-section. Its inferior extent is not well demonstrated. In the left iliac wing, a metastatic lesion is also identified exerting some mass effect upon the left sacroiliac joint. The lesion measures approximately 3.2 x 3.0 cm and is partially imaged on the previous lumbar spine MRI. No additional lesions are identified. DEGENERATIVE CHANGES: Degenerative disc disease throughout the lumbar spine involving the discs and facets. SOFT TISSUES/RETROPERITONEUM: A dominant right renal mass is partially imaged. There is perinephric stranding and perinephric nodularity likely metastatic disease. Atherosclerotic changes of the abdominal aorta are present. Low-density along the medial dome of the liver (axial image 1) suspicious for metastatic involvement of the liver. 1. Pathologic compression fracture of L1 is again identified with some retropulsion of the posterior aspect of the L1 vertebral body into the spinal canal without significant spinal canal narrowing.  2. Metastatic lesions involving the left iliac wing, right S1 sacral body effacing the right S1 neural foramen, and the L1 vertebral body. No new osseous lesions are identified. 3. Heterogeneously enlarged mass largely replacing the right kidney with nodularity in the right renal fossa suspicious for metastatic disease. Mri Lumbar Spine Wo Contrast    Result Date: 7/25/2018  EXAMINATION: MRI OF THE LUMBAR SPINE WITHOUT CONTRAST, 7/25/2018 5:09 pm TECHNIQUE: Multiplanar multisequence MRI of the lumbar spine was performed without the administration of intravenous contrast. COMPARISON: June 9, 2018 HISTORY: ORDERING SYSTEM PROVIDED HISTORY: pathologic fx in lumbar spine, difficulty urinating FINDINGS: BONES/ALIGNMENT: Thoracolumbar fusion is again seen. Hardware causes artifact limiting adjacent evaluation. Pathologic L1 compression fracture is again seen with retropulsion of fracture fragments. No acute fracture. Right sacral metastatic lesion is seen. SPINAL CORD: Retropulsion of fracture fragments is causing impingement upon the conus. Appearance is likely similar to the prior study. SOFT TISSUES: Complex right renal mass. L1-L2: L1 compression fracture with retropulsion of fracture fragments causes severe central canal stenosis and impingement on the distal aspect of the conus and the thecal sac. L2-L3: Mild broad-based disc bulge. No significant central canal or foraminal stenosis. L3-L4: Broad-based disc bulge, facet degenerative changes, and hypertrophy of the ligamentum flavum combine to create moderate central canal stenosis. Changes combine to create moderate bilateral foraminal stenosis. L4-L5: Left paracentral disc bulge with extension into the left foramina and facet degenerative changes combine to create mild central canal stenosis. Changes combine to create moderate-severe left foraminal stenosis and moderate right foraminal stenosis. L5-S1: Paracentral disc bulge with left foraminal component causes minimal impingement upon the ventral aspect of the thecal sac.   Facet degenerative changes are seen.  Changes combine to create mild left foraminal stenosis. Pathologic L1 compression fracture is again seen with retropulsion of fracture fragments causing impingement upon the distal aspect of the conus and the proximal thecal sac. Appearance is overall similar to the prior study. Us Renal Complete    Result Date: 7/25/2018  EXAMINATION: RETROPERITONEAL ULTRASOUND OF THE KIDNEYS AND URINARY BLADDER 7/25/2018 COMPARISON: None HISTORY: ORDERING SYSTEM PROVIDED HISTORY: RENAL FAILURE, ACUTE (KIDNEY INJURY) FINDINGS: Kidneys: The right kidney measures 15.3 cm in length and the left kidney measures 11.8 cm in length. There is a complex mass lesion involving the superior pole the right kidney measuring 9.7 x 8.7 cm. Mild dilatation of the proximal right ureter. No evidence for left-sided hydronephrosis. Bladder: There bladder is decompressed by Navarrete catheter. Large complex mass within the superior pole the right kidney. Mild right hydronephrosis. Urinary bladder is decompressed by Navarrete catheter. Ir Tunneled Cvc Place Wo Sq Port/pump > 5 Years    Result Date: 7/27/2018  PROCEDURE: ULTRASOUND GUIDED VASCULAR ACCESS. FLUOROSCOPY GUIDED PLACEMENT OF A TUNNELED CATHETER. 7/27/2018. HISTORY: ORDERING SYSTEM PROVIDED HISTORY: RASHMI TECHNOLOGIST PROVIDED HISTORY: Reason for exam:->RASHMI Anticipated long-term dialysis requirement. SEDATION: None FLUOROSCOPY DOSE AND TYPE OR TIME AND EXPOSURES: 0.3 minute; 68 cGy cm squared TECHNIQUE: Informed consent was obtained after a detailed explanation of the procedure including risks, benefits, and alternatives. Universal protocol was observed. The right neck and chest were prepped and draped in sterile fashion using maximum sterile barrier technique. Local anesthesia was achieved with lidocaine. A micropuncture needle was used to access the right internal jugular vein using ultrasound guidance.   An ultrasound image demonstrating patency of the vein with needle tip located within it. An image was obtained and stored in PACs. A 0.035 guidewire was used to place a peel-away sheath. A subcutaneous tunnel was created to the infraclavicular region and a tunneled 14.5 [place] by 19 cm tip to cuff dialysis catheter was pulled through the subcutaneous tunnel to the venotomy site and advanced through the peel-away sheath under fluoroscopic guidance to the right atrium. The catheter flushed easily and there was a good blood return. The catheter was sutured to the skin. The catheter was locked with heparinized saline. The patient tolerated the procedure well and there were no immediate complications. FINDINGS: Fluoroscopic image demonstrates the tip of the catheter in the right atrium. Successful ultrasound and fluoroscopy guided right internal jugular vein 14.5 [place] by 19 cm tip to cuff tunneled catheter placement. Ready for use.  Renal Arterial Duplex Complete    Result Date: 7/28/2018    [facility]  Vascular Renal Procedure   Patient Name  [facility]    Date of Study           07/28/2018                [facility]   Date of Birth 1955  Gender                  Female   Age           66 year(s)  Race                       Room Number   8350   Corporate ID  [id number]  #   Patient Lara  [de-identified]  #   MR #          [id number]     Sonographer             Belén Jeong   Accession #   [id number]   Interpreting Physician  Tony Gardner   Referring                 Referring Physician     Trisha Troy, *  Nurse  Practitioner  Procedure Type of Study:   Abdominal: Renal, Renal Artery Scan Bilateral.  Indications for Study:Renal failure, acute. Patient Status: In Patient. Technical Quality:Limited visualization. Limitation reason:depth-- patient positioning--pain.    - Critical Result:Fernanda WAITE RN 9:30 am.  Conclusions   Summary   Simultaneous real time imaging utilizing B-Mode, color doppler and  spectral waveform analysis was performed on the measured in cm Abdominal Aortic Flow +--------------------------------+---+---+------------+--------------------+ ! Location                        ! PSV! EDV! AP Diam     !Trans Diam          ! +--------------------------------+---+---+------------+--------------------+ ! Aorta Juxta Renal               !109!   !            !                    ! +--------------------------------+---+---+------------+--------------------+ Renal Duplex Measurements +------------------------------------++-----+---+----+----++---+---+--+----+ ! Renal Artery A                      !!Right! ! Left!    !!   !   !  !    ! +------------------------------------++-----+---+----+----++---+---+--+----+ ! Location                            ! !PSV  ! EDV! RI  !RAR !!PSV! EDV!RI!RAR ! +------------------------------------++-----+---+----+----++---+---+--+----+ ! Ostial Renal                        !!157  !   !    !1.44!!123!   !  !1.13! +------------------------------------++-----+---+----+----++---+---+--+----+ ! Prox Renal                          !!139  !   !    !1.28!!136!   !  !1.25! +------------------------------------++-----+---+----+----++---+---+--+----+ ! Mid Renal                           !!173  !   !    !1.59!!157!   !  !1.44! +------------------------------------++-----+---+----+----++---+---+--+----+ ! Dist Renal                          !!130  !   !    !1.19!!123!   !  !1.13! +------------------------------------++-----+---+----+----++---+---+--+----+ Right Miscellaneous Measurements   - The average kidney length is 15.15 cm. Left Miscellaneous Measurements   - The average kidney length is 10.45 cm.     Vl Dup Lower Extremity Venous Left    Result Date: 7/28/2018    [facility]  Vascular Lower Extremities DVT Study Procedure   Patient Name  MS [facility]    Date of Study           07/28/2018                Rodney Aida   Date of Birth 1955  Gender                  Female   Age           66 year(s)  Race                     Room Number   9107   Corporate ID  [id number]  #   Jodi Bermudez  [de-identified]  #   MR #          [id number]     Sonographer             Belén Jeong   Accession #   [id number]   Interpreting Physician  Gilbert Casas   Referring                 Referring Physician     Cadence Tony, *  Nurse  Practitioner  Procedure Type of Study:   Veins: Lower Extremities DVT Study, Venous Scan Lower Left. Indications for Study:Hx of DVT. Patient Status: In Patient. Technical Quality:Limited visualization. Limitation reason:Pt in pain. - Critical Result:Fernanda WAITE RN at 9:30 am.  Conclusions   Summary   Simultaneous real time imaging utilizing B-Mode, color doppler and  spectral waveform analysis was performed on the left lower extremity for  venous examination of the deep and superficial systems. Findings are:   **Abnormal Study**   Extensive acute deep venous thrombosis extending from the gastrocnemius  vein into the external iliac vein. Acute superficial venous thrombosis identified in the small saphenous  vein. Signature   ----------------------------------------------------------------  Electronically signed by Belén Jeong(Sonographer) on  07/28/2018 09:47 AM  ----------------------------------------------------------------   ----------------------------------------------------------------  Electronically signed by Phuong Flores(Interpreting  physician) on 07/28/2018 10:17 AM  ----------------------------------------------------------------  Findings:   Right Impression:                 Left Impression:  The common femoral vein           The gatrocnemius to the external iliac  demonstrates normal augmentation. veins are non-compressible. Thrombus appears acute based on B-Mode                                    image evaluation. Normal compressibility of the great                                    saphenous vein. The small saphenous vein demonstrates no                                    compressibility. Thrombus appears acute based on B-Mode                                    image evaluation. Risk Factors History +---------+----------+-----------------------------------------------------+ ! Diagnosis! Date      ! Comments                                             ! +---------+----------+-----------------------------------------------------+ ! Other    ! ! Right Renal Cell Carcinoma with mets                 ! +---------+----------+-----------------------------------------------------+ ! DVT      !03/16/2018! RT popliteal, peroneal, deep calf vein               ! +---------+----------+-----------------------------------------------------+   - The patient's risk factor(s) include: diabetes mellitus and obesity.   - The patient has kidney cancer. Velocities are measured in cm/s ; Diameters are measured in cm Right Lower Extremities DVT Study Measurements Right 2D Measurements +------------------------------------+----------+---------------+----------+ ! Location                            ! Visualized! Compressibility! Thrombosis! +------------------------------------+----------+---------------+----------+ ! Common Femoral                      !Yes       !               !          ! +------------------------------------+----------+---------------+----------+ Right Doppler Measurements +----------------------------+------+------+-------------------------------+ ! Location                    ! Signal!Reflux! Reflux (msec)                  ! +----------------------------+------+------+-------------------------------+ ! Common Femoral              !Phasic!      !                               ! +----------------------------+------+------+-------------------------------+ Left Lower Extremities DVT Study Measurements Left 2D Measurements +------------------------------------+----------+---------------+----------+ ! Location                            ! Visualized! Compressibility! Thrombosis! +------------------------------------+----------+---------------+----------+ ! Common Femoral                      !Yes       ! No             !Acute     ! +------------------------------------+----------+---------------+----------+ ! Prox Femoral                        !Yes       ! Partial        !Acute     ! +------------------------------------+----------+---------------+----------+ ! Mid Femoral                         !Yes       ! No             !Acute     ! +------------------------------------+----------+---------------+----------+ ! Dist Femoral                        !Yes       ! No             !Acute     ! +------------------------------------+----------+---------------+----------+ ! Popliteal                           !Yes       ! No             !Acute     ! +------------------------------------+----------+---------------+----------+ ! Sapheno Femoral Junction            ! Yes       ! Yes            ! None      ! +------------------------------------+----------+---------------+----------+ ! PTV                                 ! Yes       ! Yes            ! None      ! +------------------------------------+----------+---------------+----------+ ! Peroneal                            !No        !               !          ! +------------------------------------+----------+---------------+----------+ ! Gastroc                             ! Yes       ! No             !Acute     ! +------------------------------------+----------+---------------+----------+ ! GSV Thigh                           ! Yes       ! Yes            ! None      ! +------------------------------------+----------+---------------+----------+ ! GSV Knee                            ! Yes       ! Yes            ! None      ! +------------------------------------+----------+---------------+----------+ ! GSV Ankle !Yes       !Yes            ! None      ! +------------------------------------+----------+---------------+----------+ ! SSV                                 ! Yes       ! No             !Acute     ! +------------------------------------+----------+---------------+----------+ Left Doppler Measurements +-------------------------+----------+------+------------------------------+ ! Location                 ! Signal    !Reflux! Reflux (msec)                 ! +-------------------------+----------+------+------------------------------+ ! Common Femoral           !Diminished!      !                              ! +-------------------------+----------+------+------------------------------+ ! Prox Femoral             !Continuous!      !                              ! +-------------------------+----------+------+------------------------------+ ! Popliteal                !Continuous!      !                              ! +-------------------------+----------+------+------------------------------+    Objective:   Vitals: BP (!) 130/55   Pulse 80   Temp 98.6 °F (37 °C) (Oral)   Resp 15   Ht 5' 8\" (1.727 m)   Wt 189 lb 9.5 oz (86 kg)   SpO2 97%   BMI 28.83 kg/m²   General appearance: follows commands but lethargic  HEENT: Head: Normocephalic, no lesions, without obvious abnormality. Neck: no adenopathy  Lungs: clear to auscultation bilaterally  Heart: regular rate and rhythm, S1, S2 normal, no murmur, click, rub or gallop  Abdomen: soft, non-tender; bowel sounds normal; no masses,  no organomegaly  Extremities: extremities normal, atraumatic, no cyanosis or edema  Neurologic: Mental status: Alert,  but very lethargic.   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 Metastatic Renal Cell Carcinoma to the bones status post stabilization of spine by neurosurgery and palliative radiation  2. Discussed case with radiation oncology in light of new CT findings. 3. Plan is to treat patient with palliative combination immunotherapy as an outpatient. 4. No plan to restart pazopanib  5. On anticoagulation for DVT. Okay to anticoagulate as long as there is no active bleeding  6. End-stage renal disease started on hemodialysis  7. Continue supportive care. 8. Pain medication prescriptions written and left in patient's chart  9. Urology to evaluate patient for palliative nephrectomy  10. Palliative care team on board        Landy Meneses MD    This note is created with the assistance of a speech recognition program.  While intending to generate a document that actually reflects the content of the visit, the document can still have some errors including those of syntax and sound a like substitutions which may escape proof reading. It such instances, actual meaning can be extrapolated by contextual diversion.

## 2018-08-02 NOTE — CARE COORDINATION
Received call from Dalila, liaison for [employer]. Dalila stated that she spoke with the financial dept at [facility] and was told that pt's insurance; which is Mally Yepez is out of for the [place] location. Dalila stated that she is working on getting pt accepted at either the [email or web address] or [facility]. Dalila stated that she will call writer back with any updates. Dalila stated if she does not get back to writer this afternoon she should have an answer by morning. [facility] also working on placement as backup, in case pt cannot be accepted at a [facility] location. [facility] working on placement at [facility] location. SW will continue to follow up and update pt and family as answers come in. Called and left voicemail with update for CM.

## 2018-08-02 NOTE — PLAN OF CARE
Problem: Pain:  Goal: Control of acute pain  Control of acute pain   Outcome: Ongoing  Patient able to communicate presence of pain and needs for medication. See MAR. Problem: Falls - Risk of:  Goal: Will remain free from falls  Will remain free from falls   Outcome: Met This Shift  Patient assessed for fall risk and fall precautions initiated. Patient and family instructed about safety devices. Environment kept free of clutter and adequate lighting provided. Bed in lowest position with brakes locked. Call light in reach. Patient ID band correct and in place. Patient transferred with appropriate methods. Will continue to monitor. Problem: Risk for Impaired Skin Integrity  Goal: Tissue integrity - skin and mucous membranes  Structural intactness and normal physiological function of skin and  mucous membranes. Outcome: Ongoing  Patient free of new skin breakdown throughout the shift. Breakdown noted on heels and coccyx. Patient repositioned Q2, remains dry, and linens changed as needed. Will continue to monitor.   Finn Stephens RN

## 2018-08-02 NOTE — PROGRESS NOTES
Tx completed, all blood returned. Pt a&o, vitals stable.   CVC dressing changed, CD&I. TFR:2.04  LP:68.9      RB: 370mL

## 2018-08-02 NOTE — CARE COORDINATION
Care Transition    Ariel and per Marielena Andrea we are still waiting on precert. Brianda dtr called checking on precert and hd placement, informed her still waiting on insurance. Call Iman Conley dtr who lives in [place] when approved and transitioned to [address] [place] [address].    Enriqueta Caban Dr from [facility] says they have prior auth but cannot accept until tomorrow 8/3. [facility] social work states in her note do not have an accepting outpatient clinic. Cannot transfer until we have a clinic. [address] with Iraida Schaeffer SW hemodialysis and she is hoping to hear back from [facility] today if not she has reached out to [place].

## 2018-08-03 NOTE — DISCHARGE INSTR - DIET

## 2018-08-03 NOTE — DISCHARGE INSTR - ACTIVITY
Up as tolerated. May shower protect dialysis site from getting wet or soiled. No baths or submerging in water.

## 2018-08-03 NOTE — PROGRESS NOTES
Physical Therapy  Facility/Department: [facility] 4B STEPDOWN  Daily Treatment Note  NAME: Nara Espino  : 1955  MRN: [id number]    Date of Service: 8/3/2018    Discharge Recommendations:  [address]        Patient Diagnosis(es): The primary encounter diagnosis was Metastasis to spinal column ([facility] Diagnoses of Hematuria, unspecified type, Renal cell cancer, right ([facility] Acute renal failure, unspecified acute renal failure type [facility]), Pathologic lumbar vertebral fracture, sequela, Metabolic acidosis, Hypoglycemia, Anemia, unspecified type, Pathologic compression fracture of spine with delayed healing, and Acute deep vein thrombosis (DVT) of femoral vein of left lower extremity ([facility] were also pertinent to this visit. has a past medical history of Cancer ([facility] Chronic kidney disease; Depression; Diabetes mellitus ([facility] GERD (gastroesophageal reflux disease); HA (headache); HBP (high blood pressure); History of blood transfusion; Hyperlipidemia; Hyperplastic polyp of intestine; Hypothyroid; Hypothyroidism; Left knee pain; Non-smoker; OA (osteoarthritis); and Tubular adenoma. has a past surgical history that includes Hysterectomy; Tonsillectomy; Colonoscopy (2016); back surgery (N/A, 2018); pr lumbar spine fusn,post intrbdy (N/A, 2018); and Nerve Block (2018). Restrictions  Restrictions/Precautions  Restrictions/Precautions: Fall Risk  Required Braces or Orthoses?: Yes  Required Braces or Orthoses  Spinal: Thoracic Lumbar Sacral Orthotics (Neurosurg recommends brace donned when HOB >30. )  Position Activity Restriction  Other position/activity restrictions: Up with assistance. Subjective   General  Response To Previous Treatment: Patient with no complaints from previous session.   Family / Caregiver Present: No  Subjective  Subjective: RN and pt agreed to PT, pt awake in bed upon arrival and agreeable to attempt sitting EOB; pt c/o 3/10 pain at rest, RN gave pain meds and muscle relaxer piror to mobility  General Comment  Comments: TLSO donned in supine prior to mobility  Pain Screening  Patient Currently in Pain: Yes  Pain Assessment  Pain Level: 3  Vital Signs  Patient Currently in Pain: Yes       Orientation  Orientation  Overall Orientation Status: Impaired  Orientation Level: Disoriented to time;Oriented to person;Oriented to place; Disoriented to situation  Objective   Bed mobility  Rolling to Left: Maximum assistance  Rolling to Right: Maximum assistance  Supine to Sit: Maximum assistance (x2)  Sit to Supine: Maximum assistance (x2)  Scooting: Maximal assistance (x2)  Comment: Pt requiring extended time to complete all bed mob and frequent asking to \"wait a minute\"  Transfers  Sit to Stand:  (not appropriate to attempt at this time)        Balance  Posture: Fair  Sitting - Static: Poor;+  Comments: pt sat EOB approx 8' with modA d/t posterior lean; pt rigid and resisting forward trunk flexion; pt c/o increased pain in BLE to 5/10, once pt situated at EOB she notes pain improved and it felt good to sit up         Assessment   Body structures, Functions, Activity limitations: Decreased functional mobility ; Decreased ROM; Decreased strength;Decreased endurance;Decreased balance  Assessment: Pt limited by significant pain but able to tolerate dangling EOB x 8' with modA; maxA x 2 required for bed mob; Pt yelling out in pain with any movement but once situated reports pain improves; Likely most appropriate for SNF setting upon d/c.    Specific instructions for Next Treatment: WORK AROUND PAIN MEDS  Prognosis: Fair  Patient Education: Importance of mobility and sitting upright  REQUIRES PT FOLLOW UP: Yes  Activity Tolerance  Activity Tolerance: Patient limited by pain                Goals  Short term goals  Time Frame for Short term goals: 10  Short term goal 1: pt able to tolerate AROM B U/LE x15 reps without pain   Short term goal 2: pt able to perform bed mobility with MinAx1  Short term goal 3: progress functional mobility as pain/medical status allows     Plan    Plan  Times per week: 5 visits weekly  Times per day: Daily  Specific instructions for Next Treatment: WORK AROUND PAIN MEDS  Current Treatment Recommendations: Strengthening, ROM, Balance Training, Functional Mobility Training, Transfer Training, Endurance Training, Gait Training, Pain Management, Home Exercise Program, Safety Education & Training, Patient/Caregiver Education & Training, Equipment Evaluation, Education, & procurement, Positioning  Safety Devices  Type of devices: Call light within reach, Patient at risk for falls, Nurse notified, Left in bed, Bed alarm in place  Restraints  Initially in place: No     Therapy Time   Individual Concurrent Group Co-treatment   Time In 1127         Time Out 1207         Minutes 40                 Cassie Hinda Heimlich, PTA

## 2018-08-03 NOTE — CARE COORDINATION
Called and left voicemail for [facility] [employer], liaison for [employer]. [facility] is assisting with OP HD placement. Yesterday Ekta informed writer that [facility] is not accepted at the [place] location. Ekta looking into [facility] on [address] on [place] to see if either facility can accommodate. [facility] also working on placement as backup. Records have been sent to [facility] for review. Awaiting response from Admissions. SW will continue to f/u throughout morning. Addendum 8:43 am.   Received call back from [facility] [employer], liaison with [facility]. [facility] [employer] stated that she has spoken with supervisor, and they are working on placement at [place], they have escalated request. [facility] does take pt's insurance. Awaiting verification for insurance from [place]. [facility] is hopeful we will hear by this afternoon. Await response. Addendum 9:32 am.   Called [facility] and spoke with Jonathan, as coordinator, Helga Martin is currently not in the office. Jonathan stated that records were sent to the [facility] facility yesterday and is hopeful that a response should be given this afternoon. Will f/u with Admissions. Faxed updated notes to both [facility]missions and [address] [place] [address] Admissions. Addendum 11:45 am.   Received call from Norman Mason at [facility]. Norman Mason stated that they are working on getting a schedule for pt. However; she needs to speak with the manager and await clearance. Will f/u.

## 2018-08-03 NOTE — PROGRESS NOTES
scheduled for nephrectomy with another possible lumbar fixation next month according to her. She  was sent to ER from her oncologist office after noticing gross hematuria and increasing difficulty voiding. In ER she was found to have elevated creatinine of 9.6 and BUN of 101, ( average  baseline creatinine  was 2- 2.8) and found to have high anion gap metabolic acidosis, potassium was fine, bladder scan showed 361 mL in ER and Navarrete catheter was placed given the retention in the history. Patient is well was assessed by nephrology who started her on bicarb drip given her acidosis. Thought to be likely hematuria along with ACE and diuretic use. Needing dialysis with tunnel catheter placement. Also noted to have left leg extensive DVT from gastrocnemius to external iliac vein, started on heparin with addition of coumadin. Given continued worsened back pain, CT lumbar spine showed her known L1 pathologic fracture as well as further metastatic lesion invading the iliac sac and sacral bones, neurosurgery evaluated the patient as well along with radiation oncology team, advised continued outpatient follow up  Seen by neurosurgery due to above comorbidities. No acute surgical intervention and encouragement of wearing the brace during any activity. Review of Systems:   Review of Systems   Constitutional: Positive for activity change, appetite change and fatigue. Negative for fever. Respiratory: Negative for shortness of breath and wheezing. Cardiovascular: Positive for leg swelling. Negative for chest pain. Gastrointestinal: Negative for abdominal pain, diarrhea, nausea and vomiting. Neurological: Positive for weakness (Left foot). Negative for syncope and headaches. Medications:      Allergies:  No Known Allergies    Current Meds:   Scheduled Meds:    warfarin  2.5 mg Oral Daily    megestrol  400 mg Oral Daily    0.9 % sodium chloride  250 mL Intravenous Once    warfarin (COUMADIN) daily dosing (placeholder)   Other RX Placeholder    acetaminophen  1,000 mg Oral TID    amitriptyline  75 mg Oral Nightly    atorvastatin  20 mg Oral Nightly    docusate sodium  200 mg Oral BID    pantoprazole  40 mg Oral QAM AC    oxyCODONE  10 mg Oral BID    sodium chloride flush  10 mL Intravenous 2 times per day    insulin lispro  0-12 Units Subcutaneous TID WC    insulin lispro  0-6 Units Subcutaneous Nightly     Continuous Infusions:    dextrose       PRN Meds: anticoagulant sodium citrate, anticoagulant sodium citrate, sodium chloride, sodium chloride, cyclobenzaprine, ondansetron, oxyCODONE HCl, prochlorperazine, sodium chloride flush, ondansetron, glucose, dextrose, glucagon (rDNA), dextrose, acetaminophen    Data:     Past Medical History:   has a past medical history of Cancer ([facility] [address] Chronic kidney disease; Depression; [facility] GERD (gastroesophageal reflux disease); HA (headache); HBP (high blood pressure); History of blood transfusion; Hyperlipidemia; Hyperplastic polyp of intestine; Hypothyroid; Hypothyroidism; Left knee pain; Non-smoker; OA (osteoarthritis); and Tubular adenoma. Social History:   reports that she has never smoked. She has never used smokeless tobacco. She reports that she does not drink alcohol or use drugs.      Family History:   Family History   Problem Relation Age of Onset    Heart Disease Mother     Diabetes Mother     Cancer Father         esophageal cancer    Cancer Maternal Grandfather         colon       Vitals:  Patient Vitals for the past 24 hrs:   BP Temp Temp src Pulse Resp SpO2 Weight   08/03/18 0600 - - - - - - 190 lb 0.6 oz (86.2 kg)   08/03/18 0400 (!) 127/44 99.5 °F (37.5 °C) Oral 77 15 98 % -   08/02/18 2300 (!) 133/52 98.8 °F (37.1 °C) Oral 77 15 97 % -   08/02/18 2000 99/66 99.3 °F (37.4 °C) Oral 87 16 93 % -   08/02/18 1500 (!) 130/55 98.6 °F (37 °C) Oral 80 15 - -   08/02/18 1132 (!) 142/65 98.5 °F (36.9 °C) - 84 - - 189 lb 9.5 oz (86 kg) Collection Time: 08/03/18  6:33 AM   Result Value Ref Range    Protime 25.2 (H) 9.0 - 12.0 sec    INR 2.5    CBC Auto Differential    Collection Time: 08/03/18  6:33 AM   Result Value Ref Range    WBC 5.8 3.5 - 11.3 k/uL    RBC 2.62 (L) 3.95 - 5.11 m/uL    Hemoglobin 7.1 (L) 11.9 - 15.1 g/dL    Hematocrit 24.2 (L) 36.3 - 47.1 %    MCV 92.4 82.6 - 102.9 fL    MCH 27.1 25.2 - 33.5 pg    MCHC 29.3 28.4 - 34.8 g/dL    RDW 18.4 (H) 11.8 - 14.4 %    Platelets 394 088 - 217 k/uL    MPV 10.1 8.1 - 13.5 fL    NRBC Automated 0.0 0.0 per 100 WBC    Differential Type NOT REPORTED     WBC Morphology NOT REPORTED     RBC Morphology ANISOCYTOSIS PRESENT     Platelet Estimate NOT REPORTED     Seg Neutrophils 67 (H) 36 - 65 %    Lymphocytes 19 (L) 24 - 43 %    Monocytes 9 3 - 12 %    Eosinophils % 4 1 - 4 %    Basophils 0 0 - 2 %    Immature Granulocytes 1 (H) 0 %    Segs Absolute 3.90 1.50 - 8.10 k/uL    Absolute Lymph # 1.12 1.10 - 3.70 k/uL    Absolute Mono # 0.51 0.10 - 1.20 k/uL    Absolute Eos # 0.21 0.00 - 0.44 k/uL    Basophils # <0.03 0.00 - 0.20 k/uL    Absolute Immature Granulocyte 0.07 0.00 - 0.30 k/uL   POC Glucose Fingerstick    Collection Time: 08/03/18  6:48 AM   Result Value Ref Range    POC Glucose 118 (H) 65 - 105 mg/dL     Lab Results   Component Value Date/Time    SPECIAL NOT REPORTED 07/25/2018 05:53 PM     Lab Results   Component Value Date/Time    CULTURE NO SIGNIFICANT GROWTH 07/25/2018 05:53 PM       Radiology:  Ct Lumbar Spine Wo Contrast  Result Date: 7/25/2018  1. Pathologic compression fracture of L1 is again identified with some retropulsion of the posterior aspect of the L1 vertebral body into the spinal canal without significant spinal canal narrowing. 2. Metastatic lesions involving the left iliac wing, right S1 sacral body effacing the right S1 neural foramen, and the L1 vertebral body. No new osseous lesions are identified.  3. Heterogeneously enlarged mass largely replacing the right kidney with nodularity in the right renal fossa suspicious for metastatic disease. Mri Lumbar Spine Wo Contrast  Result Date: 7/25/2018  Pathologic L1 compression fracture is again seen with retropulsion of fracture fragments causing impingement upon the distal aspect of the conus and the proximal thecal sac. Appearance is overall similar to the prior study. Us Renal Complete  Result Date: 7/25/2018  Large complex mass within the superior pole the right kidney. Mild right hydronephrosis. Urinary bladder is decompressed by Navarrete catheter. Ir Tunneled Cvc Place Wo Sq Port/pump > 5 Years  Result Date: 7/27/2018  Successful ultrasound and fluoroscopy guided right internal jugular vein 14.5 [place] by 19 cm tip to cuff tunneled catheter placement. Ready for use.  Renal Arterial Duplex Complete  Result Date: 7/28/2018   Conclusions   Summary   Right:  No evidence of renal artery stenosis. Abnormally large kidney length with possible mass at inferior pole,  correlate clinically with patient history of renal cell carcinoma. Left:  No evidence of renal artery stenosis. Vl Dup Lower Extremity Venous Left  Result Date: 7/28/2018  Abnormal Study**   Extensive acute deep venous thrombosis extending from the gastrocnemius  vein into the external iliac vein. Acute superficial venous thrombosis identified in the small saphenous  vein. Ct Abdomen Pelvis Wo Contrast Additional Contrast? None  Result Date: 8/1/2018  Increasing metastatic disease along the surface of the liver Decreasing mesenteric adenopathy Stable heterogeneous enlargement of the right kidney Ascites Developing metastatic disease involving the bony pelvis as described. Incomplete distention of the ascending colon raising the question of mild colitis. Correlate clinically. Fat stranding throughout the abdomen pelvis raising the question of third-spacing of fluid.       Ct Chest Wo Contrast  Result Date: 8/1/2018  Right larger than left pleural effusions with associated right greater than left basilar consolidation. This may represent passive atelectasis from the effusions. Pneumonia not excluded No discrete pulmonary nodules Rounded lucency along the inferior endplate of T8. This may represent a developing Schmorl's node deformity. A small metastatic lesion would be difficult to exclude given that this is a new finding. Upper abdominal findings discussed separately. Small right breast nodular density. Correlate with mammographic history        Physical Examination:      Physical Exam   Constitutional: She is oriented to person, place, and time. She appears well-developed and well-nourished. She has a sickly appearance. HENT:   Head: Normocephalic and atraumatic. Right Ear: External ear normal.   Left Ear: External ear normal.   Eyes: Conjunctivae and EOM are normal. Right eye exhibits no discharge. Left eye exhibits no discharge. No scleral icterus. Neck: Neck supple. No tracheal deviation present. Cardiovascular: Normal rate, regular rhythm and normal heart sounds. Pulmonary/Chest: Effort normal and breath sounds normal. She has no wheezes. Abdominal: Soft. Bowel sounds are normal. She exhibits no distension and no mass. There is no tenderness. Musculoskeletal: She exhibits edema (Bilateral lower limb). She exhibits no tenderness. Lymphadenopathy:     She has no cervical adenopathy. Neurological: She is alert and oriented to person, place, and time. She exhibits abnormal muscle tone (Left foot weakness. Otherwise, moves all 4 extremities with no restriction). Skin: Skin is warm and dry. No rash noted. Psychiatric: She has a normal mood and affect. Her behavior is normal.   Nursing note and vitals reviewed.      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 Acute renal failure (ARF) : Contributed to obstructive uropathy with concomitant use of ace inhibitors and diuretics and poor oral intake, started on hemodialysis on 7/27, Maxine, nephrology is following, pending Plan for hemodialysis as outpatient. follow-up electrolytes and BMP daily. Creatinine Not improving. Hemodialysis  ,. Navarrete catheter has been removed after confirmation from Urology, per nephrology recommendation, when necessary straight cath for retention. Ok to discharge once HD spot was secured for pt. which was done before discharge, new schedule, MW.   2. Anemia due to chronic kidney disease: Repeat CBC for today, monitor CBC daily, transfuse if Hgb less than 7.0. Prepare PRBC. ,  Transfusion of one PRBC on 7/31/2018, recheck hemoglobin stable. Keep monitoring while inpatient, stable, around 7.1  3. 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 Ordered brace to be used with activity. , radiation oncologist input remarkable  Possible palliative XRT as outpatient. Increase  Cancer burden on repeat ct chest and abdomen . Oncology is following. 4. Renal cell carcinoma : Oncology on board and to continue chemotherapy as outpatient with radiation therapy as outpatient. 5. Metabolic acidosis: Improving  6. Acute deep vein thrombosis (DVT) of femoral vein of left lower extremity : Treated with Coumadin and heparin bridge, INR 2.5 today, resume coumadin.    7. Patient had subtherapeutic INR of 7 7/31/2018 received 5 mg of vitamin K, hold the Coumadin, , recheck INR, 2.1 on 8/1/2018, same of 2.1 8/2/2018  , 2.5 8/3/2018  8. DM: Insulin sliding scale, Hypoglycemia protocol, Resume home doses of antidiabetic medications, diabetic diet once patient is not NPO.,  Avoid hypoglycemia. Recheck blood sugars. Stable  9. Megace for appetite  10. HTN: stable,resume home meds  11. Palliative care consulted. 12. Urology input for palliative nephrectomy. No current plan for surgery  13. Poor prognosis  14. DVT prophylaxis: already anticoagulated with Coumadin  15. PT, OT as needed. 16. Discharge planning, pending outpatient spot for dialysis , discharge  today. 17. Discharge orders in place since 8/1/2018    IV Fluids: dextrose,    Nutrition:  DIET NO SALT ADDED (3-4 GM); Carb Control: 4 carb choices (60 gms)/meal  Dietary Nutrition Supplements: Clear Liquid Oral Supplement      Consultations:   IP CONSULT TO UROLOGY  IP CONSULT TO NEPHROLOGY  IP CONSULT TO NEUROSURGERY  IP CONSULT TO HOSPITALIST  IP CONSULT TO NEPHROLOGY  IP CONSULT TO UROLOGY  IP CONSULT TO ONCOLOGY  IP CONSULT TO NEUROSURGERY  IP CONSULT TO RADIATION ONCOLOGY  IP CONSULT TO PALLIATIVE CARE  IP CONSULT TO SPIRITUAL SERVICES  IP CONSULT TO SPIRITUAL SERVICES  IP CONSULT TO IV TEAM       Discussed care plan with nurse after getting input from the nurse.     Above plan discussed with the patient in room, who agreed to the above plan     Please call if any questions    Connor Teixeira MD  [facility] Hospitalist  8/3/2018  7:59 AM     (Please note that this chart was generated using voice recognition Dragon dictation software program. Although every effort was made to ensure the accuracy of this automated transcription, some errors in transcription may have occurred.)

## 2018-08-03 NOTE — PLAN OF CARE
Problem: Pain:  Goal: Pain level will decrease  Pain level will decrease   Outcome: Ongoing  Patient educated on available pain control measures including non-pharmacologic and medications. Pain goal discussed. Whiteboard updated for available time of next administration PRN. Will continue ordered interventions & assess pain. Problem: Falls - Risk of:  Goal: Will remain free from falls  Will remain free from falls   Outcome: Ongoing  No falls this shift. Precautions include posted falling star sign, nonskid footwear on, bed locked in lowest position, siderails up x2, table and call light within reach. Bed alarm on. Patient calls out appropriately for assistance. Hourly rounding to assess for needs. Problem: Risk for Impaired Skin Integrity  Goal: Tissue integrity - skin and mucous membranes  Structural intactness and normal physiological function of skin and  mucous membranes. Outcome: Ongoing  Skin assessment complete. Patient repositioned with pillow support & checked for incontinence Q2H. Covidien dry pad in place. Nancy care PRN with incontinence cleanser. Zinc barrier cream applied to open areas on buttocks.

## 2018-08-03 NOTE — PROGRESS NOTES
[facility] Oncology Progress Note  8/3/2018 4:49 PM  Subjective:   Admit Date: 7/25/2018  PCP: Terrence Spence MD   CC: Metastatic Renal Cancer    Oncological History:  Diagnosis:   Metastatic RCC  Started on Pazopanib on 3/16/18  Radiation  Therapy completed to spine  Her recent MRI spine showing : Compression fracture and additional bone lesions in the iliac bone  HISTORY OF PRESENT ILLNESS:    Oncologic History: This is a 62 YO female was admitted with back pain after a fall. On admission she had imaging studies which showed L2 metastatic lesion with compression fracture. Had MRI spine which showed right renal mass c/w RCC and inferior vena cava invasion. It also showed L1 metastasis, pathologic fracture, epidural invasion, and severe thecal sac stenosis. Seen by neurosurgery and input awaited. She reports wt loss possibly intentional over last year for about 50 lbs. Patient seen by urology. SHe had a biopsy of the Kidney which showed RCC. SHe was seen by Neurosurgery and she had spinal fixation with pedicle screws On 1/23/18. She had palliative radiation and was on pazopanib    Interval History:   Patient seen and examined. Labs in vitals reviewed. Pain is controlled. Denies new complaint or interval event. Anticipating to be discharged later today. Patient to follow with Dr. Hien Umanzor as an outpatient  No new complaint or interval event    Diet: DIET NO SALT ADDED (3-4 GM);  Carb Control: 4 carb choices (60 gms)/meal  Dietary Nutrition Supplements: Clear Liquid Oral Supplement  Medications:   Scheduled Meds:   warfarin  2.5 mg Oral Daily    megestrol  400 mg Oral Daily    0.9 % sodium chloride  250 mL Intravenous Once    warfarin (COUMADIN) daily dosing (placeholder)   Other RX Placeholder    acetaminophen  1,000 mg Oral TID    amitriptyline  75 mg Oral Nightly    atorvastatin  20 mg Oral Nightly    docusate sodium  200 mg Oral BID    pantoprazole  40 mg Oral QAM AC    oxyCODONE  10 mg Oral BID    sodium chloride flush  10 mL Intravenous 2 times per day    insulin lispro  0-12 Units Subcutaneous TID     insulin lispro  0-6 Units Subcutaneous Nightly     Continuous Infusions:   dextrose       CBC:   Recent Labs      08/01/18 0613 08/02/18 0604 08/02/18 2038 08/03/18 0633   WBC  6.2   --   6.0   --   5.8   HGB  7.7*   < >  7.2*  7.8*  7.1*   PLT  286   --   258   --   236    < > = values in this interval not displayed. BMP:    Recent Labs      08/01/18 0613 08/02/18 0604 08/03/18 0633   NA  136  136  137   K  3.9  3.5*  4.3   CL  98  98  99   CO2  24  24  27   BUN  31*  40*  24*   CREATININE  3.68*  4.54*  3.31*   GLUCOSE  113*  123*  128*     Hepatic:   No results for input(s): AST, ALT, ALB, BILITOT, ALKPHOS in the last 72 hours. INR:   Recent Labs      08/01/18 0613 08/02/18 0604 08/03/18 0633   INR  2.1  2.1  2.5     Results for orders placed or performed during the hospital encounter of 07/25/18   URINE CULTURE CLEAN CATCH   Result Value Ref Range    Specimen Description . URINE     Special Requests NOT REPORTED     Culture NO SIGNIFICANT GROWTH     Status FINAL 07/26/2018    Urinalysis with Microscopic   Result Value Ref Range    Color, UA YELLOW YEL    Turbidity UA CLOUDY (A) CLEAR    Glucose, Ur NEGATIVE NEG    Bilirubin Urine NEGATIVE NEG    Ketones, Urine NEGATIVE NEG    Specific Gravity, UA 1.015 1.005 - 1.030    Urine Hgb NEGATIVE NEG    pH, UA 5.0 5.0 - 8.0    Protein, UA 2+ (A) NEG    Urobilinogen, Urine Normal NORM    Nitrite, Urine NEGATIVE NEG    Leukocyte Esterase, Urine MODERATE (A) NEG    -          WBC, UA 5 TO 10 0 - 5 /HPF    RBC, UA 2 TO 5 0 - 4 /HPF    Casts UA 2 TO 5 HYALINE 0 - 8 /LPF    Crystals UA NOT REPORTED NONE /HPF    Epithelial Cells UA 2 TO 5 0 - 5 /HPF    Renal Epithelial, Urine NOT REPORTED 0 /HPF    Bacteria, UA FEW (A) NONE    Mucus, UA NOT REPORTED NONE    Trichomonas, UA NOT REPORTED NONE    Amorphous, UA NOT REPORTED NONE    Other Observations UA NOT REPORTED NREQ    Yeast, UA NOT REPORTED NONE   CBC WITH AUTO DIFFERENTIAL   Result Value Ref Range    WBC 5.7 3.5 - 11.3 k/uL    RBC 3.01 (L) 3.95 - 5.11 m/uL    Hemoglobin 8.2 (L) 11.9 - 15.1 g/dL    Hematocrit 28.4 (L) 36.3 - 47.1 %    MCV 94.4 82.6 - 102.9 fL    MCH 27.2 25.2 - 33.5 pg    MCHC 28.9 28.4 - 34.8 g/dL    RDW 19.1 (H) 11.8 - 14.4 %    Platelets 908 897 - 171 k/uL    MPV 9.0 8.1 - 13.5 fL    NRBC Automated 0.4 (H) 0.0 per 100 WBC    Differential Type NOT REPORTED     WBC Morphology NOT REPORTED     RBC Morphology ANISOCYTOSIS PRESENT     Platelet Estimate NOT REPORTED     Seg Neutrophils 74 (H) 36 - 65 %    Lymphocytes 15 (L) 24 - 43 %    Monocytes 8 3 - 12 %    Eosinophils % 1 1 - 4 %    Basophils 1 0 - 2 %    Immature Granulocytes 1 (H) 0 %    Segs Absolute 4.23 1.50 - 8.10 k/uL    Absolute Lymph # 0.83 (L) 1.10 - 3.70 k/uL    Absolute Mono # 0.47 0.10 - 1.20 k/uL    Absolute Eos # 0.08 0.00 - 0.44 k/uL    Basophils # 0.03 0.00 - 0.20 k/uL    Absolute Immature Granulocyte 0.05 0.00 - 0.30 k/uL   Basic Metabolic Panel   Result Value Ref Range    Glucose 55 (L) 70 - 99 mg/dL     (HH) 8 - 23 mg/dL    CREATININE 9.62 (HH) 0.50 - 0.90 mg/dL    Bun/Cre Ratio NOT REPORTED 9 - 20    Calcium 9.1 8.6 - 10.4 mg/dL    Sodium 137 135 - 144 mmol/L    Potassium 4.6 3.7 - 5.3 mmol/L    Chloride 102 98 - 107 mmol/L    CO2 11 (L) 20 - 31 mmol/L    Anion Gap 24 (H) 9 - 17 mmol/L    GFR Non-African American 4 (L) >60 mL/min    GFR African American 5 (L) >60 mL/min    GFR Comment          GFR Staging NOT REPORTED    Protein, urine, random   Result Value Ref Range    Total Protein, Urine 68 mg/dL   Sodium, urine, random   Result Value Ref Range    Sodium,Ur 44 mmol/L   Creatinine, Random Urine   Result Value Ref Range    Creatinine, Ur 118.0 28.0 - 217.0 mg/dL   CBC Auto Differential   Result Value Ref Range    WBC 4.1 3.5 - 11.3 k/uL    RBC 2.72 (L) 3.95 - 5.11 m/uL    Hemoglobin 7.3 (L) 11.9 - 15.1 g/dL    Hematocrit 26.5 (L) 36.3 - 47.1 %    MCV 97.4 82.6 - 102.9 fL    MCH 26.8 25.2 - 33.5 pg    MCHC 27.5 (L) 28.4 - 34.8 g/dL    RDW 18.9 (H) 11.8 - 14.4 %    Platelets 977 050 - 106 k/uL    MPV 9.1 8.1 - 13.5 fL    NRBC Automated 0.0 0.0 per 100 WBC    Differential Type NOT REPORTED     WBC Morphology NOT REPORTED     RBC Morphology NOT REPORTED     Platelet Estimate NOT REPORTED     Immature Granulocytes 0 0 %    Seg Neutrophils 71 (H) 36 - 66 %    Lymphocytes 22 (L) 24 - 44 %    Monocytes 3 1 - 7 %    Eosinophils % 4 1 - 4 %    Basophils 0 0 - 2 %    Absolute Immature Granulocyte 0.00 0.00 - 0.30 k/uL    Segs Absolute 2.92 1.8 - 7.7 k/uL    Absolute Lymph # 0.90 (L) 1.0 - 4.8 k/uL    Absolute Mono # 0.12 0.1 - 0.8 k/uL    Absolute Eos # 0.16 0.0 - 0.4 k/uL    Basophils # 0.00 0.0 - 0.2 k/uL    Morphology ANISOCYTOSIS PRESENT    Comprehensive Metabolic Panel w/ Reflex to MG   Result Value Ref Range    Glucose 104 (H) 70 - 99 mg/dL    BUN 99 (HH) 8 - 23 mg/dL    CREATININE 9.34 (HH) 0.50 - 0.90 mg/dL    Bun/Cre Ratio NOT REPORTED 9 - 20    Calcium 7.9 (L) 8.6 - 10.4 mg/dL    Sodium 138 135 - 144 mmol/L    Potassium 4.6 3.7 - 5.3 mmol/L    Chloride 106 98 - 107 mmol/L    CO2 10 (L) 20 - 31 mmol/L    Anion Gap 22 (H) 9 - 17 mmol/L    Alkaline Phosphatase 63 35 - 104 U/L    ALT <5 (L) 5 - 33 U/L    AST 7 <32 U/L    Total Bilirubin 0.19 (L) 0.3 - 1.2 mg/dL    Total Protein 4.9 (L) 6.4 - 8.3 g/dL    Alb 2.0 (L) 3.5 - 5.2 g/dL    Albumin/Globulin Ratio 0.7 (L) 1.0 - 2.5    GFR Non-African American 4 (L) >60 mL/min    GFR African American 5 (L) >60 mL/min    GFR Comment          GFR Staging NOT REPORTED    Magnesium   Result Value Ref Range    Magnesium 1.9 1.6 - 2.6 mg/dL   Protime-INR   Result Value Ref Range    Protime 10.7 9.0 - 12.0 sec    INR 1.0    Urinalysis with microscopic   Result Value Ref Range    Color, UA YELLOW YEL    Turbidity UA CLOUDY (A) CLEAR    Glucose, Ur NEGATIVE NEG Bilirubin Urine NEGATIVE NEG    Ketones, Urine NEGATIVE NEG    Specific Gravity, UA 1.014 1.005 - 1.030    Urine Hgb NEGATIVE NEG    pH, UA 5.0 5.0 - 8.0    Protein, UA 2+ (A) NEG    Urobilinogen, Urine Normal NORM    Nitrite, Urine NEGATIVE NEG    Leukocyte Esterase, Urine SMALL (A) NEG    -          WBC, UA 10 TO 20 0 - 5 /HPF    RBC, UA 5 TO 10 0 - 4 /HPF    Casts UA 2 TO 5 HYALINE 0 - 8 /LPF    Crystals UA NOT REPORTED NONE /HPF    Epithelial Cells UA 5 TO 10 0 - 5 /HPF    Renal Epithelial, Urine NOT REPORTED 0 /HPF    Bacteria, UA FEW (A) NONE    Mucus, UA NOT REPORTED NONE    Trichomonas, UA NOT REPORTED NONE    Amorphous, UA NOT REPORTED NONE    Other Observations UA NOT REPORTED NREQ    Yeast, UA NOT REPORTED NONE   Basic Metabolic Panel   Result Value Ref Range    Glucose 188 (H) 70 - 99 mg/dL    BUN 98 (HH) 8 - 23 mg/dL    CREATININE 9.09 (HH) 0.50 - 0.90 mg/dL    Bun/Cre Ratio NOT REPORTED 9 - 20    Calcium 8.0 (L) 8.6 - 10.4 mg/dL    Sodium 138 135 - 144 mmol/L    Potassium 4.6 3.7 - 5.3 mmol/L    Chloride 104 98 - 107 mmol/L    CO2 13 (L) 20 - 31 mmol/L    Anion Gap 21 (H) 9 - 17 mmol/L    GFR Non-African American 4 (L) >60 mL/min    GFR African American 5 (L) >60 mL/min    GFR Comment          GFR Staging NOT REPORTED    Basic Metabolic Panel   Result Value Ref Range    Glucose 215 (H) 70 - 99 mg/dL     (HH) 8 - 23 mg/dL    CREATININE 9.06 (HH) 0.50 - 0.90 mg/dL    Bun/Cre Ratio NOT REPORTED 9 - 20    Calcium 8.0 (L) 8.6 - 10.4 mg/dL    Sodium 135 135 - 144 mmol/L    Potassium 4.3 3.7 - 5.3 mmol/L    Chloride 97 (L) 98 - 107 mmol/L    CO2 17 (L) 20 - 31 mmol/L    Anion Gap 21 (H) 9 - 17 mmol/L    GFR Non-African American 4 (L) >60 mL/min    GFR African American 5 (L) >60 mL/min    GFR Comment          GFR Staging NOT REPORTED    APTT   Result Value Ref Range    PTT 26.3 20.5 - 30.5 sec   Protime-INR   Result Value Ref Range    Protime 11.1 9.0 - 12.0 sec    INR 1.0    CBC Auto Differential Result Value Ref Range    WBC 4.5 3.5 - 11.3 k/uL    RBC 2.60 (L) 3.95 - 5.11 m/uL    Hemoglobin 7.1 (L) 11.9 - 15.1 g/dL    Hematocrit 23.5 (L) 36.3 - 47.1 %    MCV 90.4 82.6 - 102.9 fL    MCH 27.3 25.2 - 33.5 pg    MCHC 30.2 28.4 - 34.8 g/dL    RDW 19.1 (H) 11.8 - 14.4 %    Platelets 598 633 - 621 k/uL    MPV 10.0 8.1 - 13.5 fL    NRBC Automated 0.0 0.0 per 100 WBC    Differential Type NOT REPORTED     WBC Morphology NOT REPORTED     RBC Morphology ANISOCYTOSIS PRESENT     Platelet Estimate NOT REPORTED     Seg Neutrophils 62 36 - 65 %    Lymphocytes 21 (L) 24 - 43 %    Monocytes 10 3 - 12 %    Eosinophils % 5 (H) 1 - 4 %    Basophils 0 0 - 2 %    Immature Granulocytes 1 (H) 0 %    Segs Absolute 2.77 1.50 - 8.10 k/uL    Absolute Lymph # 0.94 (L) 1.10 - 3.70 k/uL    Absolute Mono # 0.45 0.10 - 1.20 k/uL    Absolute Eos # 0.24 0.00 - 0.44 k/uL    Basophils # <0.03 0.00 - 0.20 k/uL    Absolute Immature Granulocyte 0.05 0.00 - 0.30 k/uL   Albumin   Result Value Ref Range    Alb 2.1 (L) 3.5 - 5.2 g/dL   Hepatitis B surface antibody   Result Value Ref Range    Hep B S Ab <3.50 <10 mIU/mL   Hepatitis B surface antigen   Result Value Ref Range    Hepatitis B Surface Ag NONREACTIVE NR   Hepatitis B core antibody, total   Result Value Ref Range    Hep B Core Total Ab NONREACTIVE NR   Lactate Dehydrogenase   Result Value Ref Range     135 - 214 U/L   Basic Metabolic Panel   Result Value Ref Range    Glucose 122 (H) 70 - 99 mg/dL    BUN 72 (H) 8 - 23 mg/dL    CREATININE 6.79 (HH) 0.50 - 0.90 mg/dL    Bun/Cre Ratio NOT REPORTED 9 - 20    Calcium 7.5 (L) 8.6 - 10.4 mg/dL    Sodium 139 135 - 144 mmol/L    Potassium 3.9 3.7 - 5.3 mmol/L    Chloride 101 98 - 107 mmol/L    CO2 20 20 - 31 mmol/L    Anion Gap 18 (H) 9 - 17 mmol/L    GFR Non-African American 6 (L) >60 mL/min    GFR  7 (L) >60 mL/min    GFR Comment          GFR Staging NOT REPORTED    HEPATITIS C ANTIBODY   Result Value Ref Range    Hepatitis C %    Platelets 467 830 - 189 k/uL    MPV 9.2 8.1 - 13.5 fL    NRBC Automated 0.0 0.0 per 100 WBC   Basic Metabolic Panel   Result Value Ref Range    Glucose 113 (H) 70 - 99 mg/dL    BUN 54 (H) 8 - 23 mg/dL    CREATININE 5.54 (HH) 0.50 - 0.90 mg/dL    Bun/Cre Ratio NOT REPORTED 9 - 20    Calcium 7.7 (L) 8.6 - 10.4 mg/dL    Sodium 135 135 - 144 mmol/L    Potassium 3.8 3.7 - 5.3 mmol/L    Chloride 101 98 - 107 mmol/L    CO2 19 (L) 20 - 31 mmol/L    Anion Gap 15 9 - 17 mmol/L    GFR Non-African American 8 (L) >60 mL/min    GFR  9 (L) >60 mL/min    GFR Comment          GFR Staging NOT REPORTED    PROTIME-INR   Result Value Ref Range    Protime 60.2 (H) 9.0 - 12.0 sec    INR 6.3 (HH)    CBC Auto Differential   Result Value Ref Range    WBC 5.1 3.5 - 11.3 k/uL    RBC 2.36 (L) 3.95 - 5.11 m/uL    Hemoglobin 6.4 (LL) 11.9 - 15.1 g/dL    Hematocrit 21.1 (L) 36.3 - 47.1 %    MCV 89.4 82.6 - 102.9 fL    MCH 27.1 25.2 - 33.5 pg    MCHC 30.3 28.4 - 34.8 g/dL    RDW 19.2 (H) 11.8 - 14.4 %    Platelets 253 011 - 186 k/uL    MPV 10.1 8.1 - 13.5 fL    NRBC Automated 0.0 0.0 per 100 WBC    Differential Type NOT REPORTED     WBC Morphology NOT REPORTED     RBC Morphology ANISOCYTOSIS PRESENT     Platelet Estimate NOT REPORTED     Seg Neutrophils 65 36 - 65 %    Lymphocytes 20 (L) 24 - 43 %    Monocytes 8 3 - 12 %    Eosinophils % 5 (H) 1 - 4 %    Basophils 0 0 - 2 %    Immature Granulocytes 2 (H) 0 %    Segs Absolute 3.30 1.50 - 8.10 k/uL    Absolute Lymph # 1.03 (L) 1.10 - 3.70 k/uL    Absolute Mono # 0.43 0.10 - 1.20 k/uL    Absolute Eos # 0.23 0.00 - 0.44 k/uL    Basophils # <0.03 0.00 - 0.20 k/uL    Absolute Immature Granulocyte 0.08 0.00 - 0.30 k/uL   Protime-INR   Result Value Ref Range    Protime 71.0 (H) 9.0 - 12.0 sec    INR 7.6 (HH)    HEMOGLOBIN AND HEMATOCRIT, BLOOD   Result Value Ref Range    Hemoglobin 8.3 (L) 11.9 - 15.1 g/dL    Hematocrit 27.1 (L) 36.3 - 47.1 %   PROTIME-INR   Result Value Ref Range Protime 65.0 (H) 9.0 - 12.0 sec    INR 6.9 (HH)    Basic Metabolic Panel   Result Value Ref Range    Glucose 113 (H) 70 - 99 mg/dL    BUN 31 (H) 8 - 23 mg/dL    CREATININE 3.68 (H) 0.50 - 0.90 mg/dL    Bun/Cre Ratio NOT REPORTED 9 - 20    Calcium 7.6 (L) 8.6 - 10.4 mg/dL    Sodium 136 135 - 144 mmol/L    Potassium 3.9 3.7 - 5.3 mmol/L    Chloride 98 98 - 107 mmol/L    CO2 24 20 - 31 mmol/L    Anion Gap 14 9 - 17 mmol/L    GFR Non-African American 13 (L) >60 mL/min    GFR African American 15 (L) >60 mL/min    GFR Comment          GFR Staging NOT REPORTED    PROTIME-INR   Result Value Ref Range    Protime 21.4 (H) 9.0 - 12.0 sec    INR 2.1    CBC Auto Differential   Result Value Ref Range    WBC 6.2 3.5 - 11.3 k/uL    RBC 2.81 (L) 3.95 - 5.11 m/uL    Hemoglobin 7.7 (L) 11.9 - 15.1 g/dL    Hematocrit 25.1 (L) 36.3 - 47.1 %    MCV 89.3 82.6 - 102.9 fL    MCH 27.4 25.2 - 33.5 pg    MCHC 30.7 28.4 - 34.8 g/dL    RDW 19.0 (H) 11.8 - 14.4 %    Platelets 581 475 - 751 k/uL    MPV 10.3 8.1 - 13.5 fL    NRBC Automated 0.0 0.0 per 100 WBC    Differential Type NOT REPORTED     WBC Morphology NOT REPORTED     RBC Morphology ANISOCYTOSIS PRESENT     Platelet Estimate NOT REPORTED     Seg Neutrophils 68 (H) 36 - 65 %    Lymphocytes 17 (L) 24 - 43 %    Monocytes 9 3 - 12 %    Eosinophils % 4 1 - 4 %    Basophils 0 0 - 2 %    Immature Granulocytes 2 (H) 0 %    Segs Absolute 4.22 1.50 - 8.10 k/uL    Absolute Lymph # 1.08 (L) 1.10 - 3.70 k/uL    Absolute Mono # 0.57 0.10 - 1.20 k/uL    Absolute Eos # 0.22 0.00 - 0.44 k/uL    Basophils # <0.03 0.00 - 0.20 k/uL    Absolute Immature Granulocyte 0.09 0.00 - 0.30 k/uL   Hemoglobin and Hematocrit, Blood   Result Value Ref Range    Hemoglobin 8.1 (L) 11.9 - 15.1 g/dL    Hematocrit 25.5 (L) 36.3 - 47.1 %   Basic Metabolic Panel   Result Value Ref Range    Glucose 123 (H) 70 - 99 mg/dL    BUN 40 (H) 8 - 23 mg/dL    CREATININE 4.54 (H) 0.50 - 0.90 mg/dL    Bun/Cre Ratio NOT REPORTED 9 - 20 Calcium 7.9 (L) 8.6 - 10.4 mg/dL    Sodium 136 135 - 144 mmol/L    Potassium 3.5 (L) 3.7 - 5.3 mmol/L    Chloride 98 98 - 107 mmol/L    CO2 24 20 - 31 mmol/L    Anion Gap 14 9 - 17 mmol/L    GFR Non-African American 10 (L) >60 mL/min    GFR  12 (L) >60 mL/min    GFR Comment          GFR Staging NOT REPORTED    PROTIME-INR   Result Value Ref Range    Protime 21.0 (H) 9.0 - 12.0 sec    INR 2.1    CBC Auto Differential   Result Value Ref Range    WBC 6.0 3.5 - 11.3 k/uL    RBC 2.70 (L) 3.95 - 5.11 m/uL    Hemoglobin 7.2 (L) 11.9 - 15.1 g/dL    Hematocrit 24.4 (L) 36.3 - 47.1 %    MCV 90.4 82.6 - 102.9 fL    MCH 26.7 25.2 - 33.5 pg    MCHC 29.5 28.4 - 34.8 g/dL    RDW 18.6 (H) 11.8 - 14.4 %    Platelets 231 157 - 593 k/uL    MPV 9.7 8.1 - 13.5 fL    NRBC Automated 0.0 0.0 per 100 WBC    Differential Type NOT REPORTED     WBC Morphology NOT REPORTED     RBC Morphology ANISOCYTOSIS PRESENT     Platelet Estimate NOT REPORTED     Seg Neutrophils 66 (H) 36 - 65 %    Lymphocytes 19 (L) 24 - 43 %    Monocytes 9 3 - 12 %    Eosinophils % 5 (H) 1 - 4 %    Basophils 1 0 - 2 %    Immature Granulocytes 1 (H) 0 %    Segs Absolute 4.00 1.50 - 8.10 k/uL    Absolute Lymph # 1.13 1.10 - 3.70 k/uL    Absolute Mono # 0.52 0.10 - 1.20 k/uL    Absolute Eos # 0.27 0.00 - 0.44 k/uL    Basophils # 0.03 0.00 - 0.20 k/uL    Absolute Immature Granulocyte 0.08 0.00 - 0.30 k/uL   Hemoglobin and Hematocrit, Blood   Result Value Ref Range    Hemoglobin 7.8 (L) 11.9 - 15.1 g/dL    Hematocrit 26.7 (L) 36.3 - 47.1 %   Basic Metabolic Panel   Result Value Ref Range    Glucose 128 (H) 70 - 99 mg/dL    BUN 24 (H) 8 - 23 mg/dL    CREATININE 3.31 (H) 0.50 - 0.90 mg/dL    Bun/Cre Ratio NOT REPORTED 9 - 20    Calcium 7.9 (L) 8.6 - 10.4 mg/dL    Sodium 137 135 - 144 mmol/L    Potassium 4.3 3.7 - 5.3 mmol/L    Chloride 99 98 - 107 mmol/L    CO2 27 20 - 31 mmol/L    Anion Gap 11 9 - 17 mmol/L    GFR Non-African American 14 (L) >60 mL/min    GFR  17 (L) >60 mL/min    GFR Comment          GFR Staging NOT REPORTED    PROTIME-INR   Result Value Ref Range    Protime 25.2 (H) 9.0 - 12.0 sec    INR 2.5    CBC Auto Differential   Result Value Ref Range    WBC 5.8 3.5 - 11.3 k/uL    RBC 2.62 (L) 3.95 - 5.11 m/uL    Hemoglobin 7.1 (L) 11.9 - 15.1 g/dL    Hematocrit 24.2 (L) 36.3 - 47.1 %    MCV 92.4 82.6 - 102.9 fL    MCH 27.1 25.2 - 33.5 pg    MCHC 29.3 28.4 - 34.8 g/dL    RDW 18.4 (H) 11.8 - 14.4 %    Platelets 317 367 - 772 k/uL    MPV 10.1 8.1 - 13.5 fL    NRBC Automated 0.0 0.0 per 100 WBC    Differential Type NOT REPORTED     WBC Morphology NOT REPORTED     RBC Morphology ANISOCYTOSIS PRESENT     Platelet Estimate NOT REPORTED     Seg Neutrophils 67 (H) 36 - 65 %    Lymphocytes 19 (L) 24 - 43 %    Monocytes 9 3 - 12 %    Eosinophils % 4 1 - 4 %    Basophils 0 0 - 2 %    Immature Granulocytes 1 (H) 0 %    Segs Absolute 3.90 1.50 - 8.10 k/uL    Absolute Lymph # 1.12 1.10 - 3.70 k/uL    Absolute Mono # 0.51 0.10 - 1.20 k/uL    Absolute Eos # 0.21 0.00 - 0.44 k/uL    Basophils # <0.03 0.00 - 0.20 k/uL    Absolute Immature Granulocyte 0.07 0.00 - 0.30 k/uL   POC Glucose Fingerstick   Result Value Ref Range    POC Glucose 75 65 - 105 mg/dL   POC Glucose Fingerstick   Result Value Ref Range    POC Glucose 87 65 - 105 mg/dL   POC Glucose Fingerstick   Result Value Ref Range    POC Glucose 80 65 - 105 mg/dL   POC Glucose Fingerstick   Result Value Ref Range    POC Glucose 102 65 - 105 mg/dL   POC Glucose Fingerstick   Result Value Ref Range    POC Glucose 148 (H) 65 - 105 mg/dL   POC Glucose Fingerstick   Result Value Ref Range    POC Glucose 165 (H) 65 - 105 mg/dL   POC Glucose Fingerstick   Result Value Ref Range    POC Glucose 206 (H) 65 - 105 mg/dL   POC Glucose Fingerstick   Result Value Ref Range    POC Glucose 202 (H) 65 - 105 mg/dL   POC Glucose Fingerstick   Result Value Ref Range    POC Glucose 131 (H) 65 - 105 mg/dL   POC Glucose Fingerstick Result Value Ref Range    POC Glucose 171 (H) 65 - 105 mg/dL   POC Glucose Fingerstick   Result Value Ref Range    POC Glucose 166 (H) 65 - 105 mg/dL   POC Glucose Fingerstick   Result Value Ref Range    POC Glucose 143 (H) 65 - 105 mg/dL   POC Glucose Fingerstick   Result Value Ref Range    POC Glucose 144 (H) 65 - 105 mg/dL   POC Glucose Fingerstick   Result Value Ref Range    POC Glucose 133 (H) 65 - 105 mg/dL   POC Glucose Fingerstick   Result Value Ref Range    POC Glucose 138 (H) 65 - 105 mg/dL   POC Glucose Fingerstick   Result Value Ref Range    POC Glucose 160 (H) 65 - 105 mg/dL   POC Glucose Fingerstick   Result Value Ref Range    POC Glucose 157 (H) 65 - 105 mg/dL   POC Glucose Fingerstick   Result Value Ref Range    POC Glucose 116 (H) 65 - 105 mg/dL   POC Glucose Fingerstick   Result Value Ref Range    POC Glucose 144 (H) 65 - 105 mg/dL   POC Glucose Fingerstick   Result Value Ref Range    POC Glucose 142 (H) 65 - 105 mg/dL   POC Glucose Fingerstick   Result Value Ref Range    POC Glucose 181 (H) 65 - 105 mg/dL   POC Glucose Fingerstick   Result Value Ref Range    POC Glucose 111 (H) 65 - 105 mg/dL   POC Glucose Fingerstick   Result Value Ref Range    POC Glucose 136 (H) 65 - 105 mg/dL   POC Glucose Fingerstick   Result Value Ref Range    POC Glucose 119 (H) 65 - 105 mg/dL   POC Glucose Fingerstick   Result Value Ref Range    POC Glucose 112 (H) 65 - 105 mg/dL   POC Glucose Fingerstick   Result Value Ref Range    POC Glucose 132 (H) 65 - 105 mg/dL   POC Glucose Fingerstick   Result Value Ref Range    POC Glucose 129 (H) 65 - 105 mg/dL   POC Glucose Fingerstick   Result Value Ref Range    POC Glucose 131 (H) 65 - 105 mg/dL   POC Glucose Fingerstick   Result Value Ref Range    POC Glucose 108 (H) 65 - 105 mg/dL   POC Glucose Fingerstick   Result Value Ref Range    POC Glucose 118 (H) 65 - 105 mg/dL   POC Glucose Fingerstick   Result Value Ref Range    POC Glucose 129 (H) 65 - 105 mg/dL   POC Glucose Fingerstick   Result Value Ref Range    POC Glucose 156 (H) 65 - 105 mg/dL   POC Glucose Fingerstick   Result Value Ref Range    POC Glucose 118 (H) 65 - 105 mg/dL   POC Glucose Fingerstick   Result Value Ref Range    POC Glucose 129 (H) 65 - 105 mg/dL   EKG 12 Lead   Result Value Ref Range    Ventricular Rate 78 BPM    Atrial Rate 78 BPM    P-R Interval 128 ms    QRS Duration 86 ms    Q-T Interval 398 ms    QTc Calculation (Bazett) 453 ms    P Axis -4 degrees    R Axis 11 degrees    T Axis 3 degrees   TYPE AND SCREEN   Result Value Ref Range    Expiration Date 08/03/2018     Arm Band Number [id number]     ABO/Rh A POSITIVE     Antibody Screen NEGATIVE     Unit Number [id number]     Product Code Leukocyte Reduced Red Cell     Unit Divison 0     Dispense Status TRANSFUSED     Transfusion Status OK TO TRANSFUSE     Crossmatch Result COMPATIBLE    Blood Bank Specimen   Result Value Ref Range    Blood Bank Specimen NOT REPORTED          Xr Cervical Spine (2-3 Views)    Result Date: 7/12/2018  EXAMINATION: TWO XRAY VIEWS OF THE CERVICAL SPINE 7/12/2018 3:29 pm COMPARISON: None. HISTORY: ORDERING SYSTEM PROVIDED HISTORY: Cervicalgia FINDINGS: Radiograph is taken with cervical spine extension. 7 typical cervical vertebra present maintain normal height and alignment. Bony spinal canal and paravertebral soft tissues appear unremarkable. The disc spaces and posterior elements appear well maintained throughout. The uncovertebral joints appear normally aligned on AP view. The atlantoaxial articulation appears normally aligned. No discrete odontoid fracture is evident. No acute osseous abnormality of the cervical spine. If pain persists or worsens, then additional evaluation with CT or MRI may be indicated. Note that CT cervical spine is the study of choice for evaluation of acute trauma.      Ct Lumbar Spine Wo Contrast    Result Date: 7/25/2018  EXAMINATION: CT OF THE LUMBAR SPINE WITHOUT CONTRAST  7/25/2018 TECHNIQUE: CT of the lumbar spine was performed without the administration of intravenous contrast. Multiplanar reformatted images are provided for review. Dose modulation, iterative reconstruction, and/or weight based adjustment of the mA/kV was utilized to reduce the radiation dose to as low as reasonably achievable. COMPARISON: MRI lumbar spine from June 9, 2018 HISTORY: ORDERING SYSTEM PROVIDED HISTORY: r/o worsening fracture or increasing metastatic mass TECHNOLOGIST PROVIDED HISTORY: Reason for exam:->r/o worsening fracture or increasing metastatic mass FINDINGS: BONES/ALIGNMENT: Posterior transpedicular fusion hardware involving T11, T12, L2, and L3 is demonstrated. A pathologic compression fracture of L1 is again demonstrated with some retropulsion of fragments by approximately 0.4 cm. There is a dominant lytic lesion in the right sacral body encroaching upon the right S1 neural foramen. This metastatic lesion measures approximately 3.1 x 2.5 cm in cross-section. Its inferior extent is not well demonstrated. In the left iliac wing, a metastatic lesion is also identified exerting some mass effect upon the left sacroiliac joint. The lesion measures approximately 3.2 x 3.0 cm and is partially imaged on the previous lumbar spine MRI. No additional lesions are identified. DEGENERATIVE CHANGES: Degenerative disc disease throughout the lumbar spine involving the discs and facets. SOFT TISSUES/RETROPERITONEUM: A dominant right renal mass is partially imaged. There is perinephric stranding and perinephric nodularity likely metastatic disease. Atherosclerotic changes of the abdominal aorta are present. Low-density along the medial dome of the liver (axial image 1) suspicious for metastatic involvement of the liver.      1. Pathologic compression fracture of L1 is again identified with some retropulsion of the posterior aspect of the L1 vertebral body into the spinal canal without significant spinal canal narrowing. 2. Metastatic lesions involving the left iliac wing, right S1 sacral body effacing the right S1 neural foramen, and the L1 vertebral body. No new osseous lesions are identified. 3. Heterogeneously enlarged mass largely replacing the right kidney with nodularity in the right renal fossa suspicious for metastatic disease. Mri Lumbar Spine Wo Contrast    Result Date: 7/25/2018  EXAMINATION: MRI OF THE LUMBAR SPINE WITHOUT CONTRAST, 7/25/2018 5:09 pm TECHNIQUE: Multiplanar multisequence MRI of the lumbar spine was performed without the administration of intravenous contrast. COMPARISON: June 9, 2018 HISTORY: ORDERING SYSTEM PROVIDED HISTORY: pathologic fx in lumbar spine, difficulty urinating FINDINGS: BONES/ALIGNMENT: Thoracolumbar fusion is again seen. Hardware causes artifact limiting adjacent evaluation. Pathologic L1 compression fracture is again seen with retropulsion of fracture fragments. No acute fracture. Right sacral metastatic lesion is seen. SPINAL CORD: Retropulsion of fracture fragments is causing impingement upon the conus. Appearance is likely similar to the prior study. SOFT TISSUES: Complex right renal mass. L1-L2: L1 compression fracture with retropulsion of fracture fragments causes severe central canal stenosis and impingement on the distal aspect of the conus and the thecal sac. L2-L3: Mild broad-based disc bulge. No significant central canal or foraminal stenosis. L3-L4: Broad-based disc bulge, facet degenerative changes, and hypertrophy of the ligamentum flavum combine to create moderate central canal stenosis. Changes combine to create moderate bilateral foraminal stenosis. L4-L5: Left paracentral disc bulge with extension into the left foramina and facet degenerative changes combine to create mild central canal stenosis. Changes combine to create moderate-severe left foraminal stenosis and moderate right foraminal stenosis.  L5-S1: Paracentral disc bulge with left foraminal component causes minimal impingement upon the ventral aspect of the thecal sac. Facet degenerative changes are seen. Changes combine to create mild left foraminal stenosis. Pathologic L1 compression fracture is again seen with retropulsion of fracture fragments causing impingement upon the distal aspect of the conus and the proximal thecal sac. Appearance is overall similar to the prior study. Us Renal Complete    Result Date: 7/25/2018  EXAMINATION: RETROPERITONEAL ULTRASOUND OF THE KIDNEYS AND URINARY BLADDER 7/25/2018 COMPARISON: None HISTORY: ORDERING SYSTEM PROVIDED HISTORY: RENAL FAILURE, ACUTE (KIDNEY INJURY) FINDINGS: Kidneys: The right kidney measures 15.3 cm in length and the left kidney measures 11.8 cm in length. There is a complex mass lesion involving the superior pole the right kidney measuring 9.7 x 8.7 cm. Mild dilatation of the proximal right ureter. No evidence for left-sided hydronephrosis. Bladder: There bladder is decompressed by Navarrete catheter. Large complex mass within the superior pole the right kidney. Mild right hydronephrosis. Urinary bladder is decompressed by Navarrete catheter. Ir Tunneled Cvc Place Wo Sq Port/pump > 5 Years    Result Date: 7/27/2018  PROCEDURE: ULTRASOUND GUIDED VASCULAR ACCESS. FLUOROSCOPY GUIDED PLACEMENT OF A TUNNELED CATHETER. 7/27/2018. HISTORY: ORDERING SYSTEM PROVIDED HISTORY: RASHMI TECHNOLOGIST PROVIDED HISTORY: Reason for exam:->RASHMI Anticipated long-term dialysis requirement. SEDATION: None FLUOROSCOPY DOSE AND TYPE OR TIME AND EXPOSURES: 0.3 minute; 68 cGy cm squared TECHNIQUE: Informed consent was obtained after a detailed explanation of the procedure including risks, benefits, and alternatives. Universal protocol was observed. The right neck and chest were prepped and draped in sterile fashion using maximum sterile barrier technique. Local anesthesia was achieved with lidocaine.  A micropuncture needle was used to access the right internal jugular vein using ultrasound guidance. An ultrasound image demonstrating patency of the vein with needle tip located within it. An image was obtained and stored in PACs. A 0.035 guidewire was used to place a peel-away sheath. A subcutaneous tunnel was created to the infraclavicular region and a tunneled 14.5 [place] by 19 cm tip to cuff dialysis catheter was pulled through the subcutaneous tunnel to the venotomy site and advanced through the peel-away sheath under fluoroscopic guidance to the right atrium. The catheter flushed easily and there was a good blood return. The catheter was sutured to the skin. The catheter was locked with heparinized saline. The patient tolerated the procedure well and there were no immediate complications. FINDINGS: Fluoroscopic image demonstrates the tip of the catheter in the right atrium. Successful ultrasound and fluoroscopy guided right internal jugular vein 14.5 [place] by 19 cm tip to cuff tunneled catheter placement. Ready for use. Vl Renal Arterial Duplex Complete    Result Date: 7/28/2018    [facility]  Vascular Renal Procedure   Patient Name  MS ELENA [facility]    Date of Study           07/28/2018                David Ortiz   Date of Birth 1955  Gender                  Female   Age           66 year(s)  Race                       Room Number   9896   Corporate ID  [id number]  #   Patient [place]  [de-identified]  #   MR #          [id number]     Sonographer             Belén Jeong   Accession #   [id number]   Interpreting Physician  Pepper Mandujano   Referring                 Referring Physician     Christine Torrez, *  Nurse  Practitioner  Procedure Type of Study:   Abdominal: Renal, Renal Artery Scan Bilateral.  Indications for Study:Renal failure, acute. Patient Status: In Patient. Technical Quality:Limited visualization. Limitation reason:depth-- patient positioning--pain.    - Critical Result:Fernanda WAITE RN 9:30 am.  Conclusions   Summary Simultaneous real time imaging utilizing B-Mode, color doppler and  spectral waveform analysis was performed on the abdominal aorta and  bilateral renal arteries. Study demonstrates:   Right:  No evidence of renal artery stenosis. Abnormally large kidney length with possible mass at inferior pole,  correlate clinically with patient history of renal cell carcinoma. Left:  No evidence of renal artery stenosis. Signature   ----------------------------------------------------------------  Electronically signed by Belén Jeong(Sonographer) on  07/28/2018 09:40 AM  ----------------------------------------------------------------   ----------------------------------------------------------------  Electronically signed by Phuong Kelly(Interpreting  physician) on 07/28/2018 10:31 AM  ----------------------------------------------------------------  Findings:   Right Impression:        Left Impression:  Renal / Aortic Ratio     Renal / Aortic Ratio within normal limits  within normal limits     (<3.5cm/s).  (<3.5mc/s). Kidney length is normal.  Kidney length abnormal,  possible mass at         Renal vein has absent color flow mid to distal,  inferior pole.           however there is continuous doppler flow                           proximally which would suggest patent renal vein. Renal vein is patent. Risk Factors History +---------+----------+-----------------------------------------------------+ ! Diagnosis! Date      ! Comments                                             ! +---------+----------+-----------------------------------------------------+ ! Other    ! ! Right Renal Cell Carcinoma with mets                 ! +---------+----------+-----------------------------------------------------+ ! DVT      !03/16/2018! RT popliteal, peroneal, deep calf vein               !  +---------+----------+-----------------------------------------------------+   - The patient's risk factor(s) include: diabetes mellitus and obesity.   - The patient has kidney cancer. Velocities are measured in cm/s ; Diameters are measured in cm Abdominal Aortic Flow +--------------------------------+---+---+------------+--------------------+ ! Location                        ! PSV! EDV! AP Diam     !Trans Diam          ! +--------------------------------+---+---+------------+--------------------+ ! Aorta Juxta Renal               !109!   !            !                    ! +--------------------------------+---+---+------------+--------------------+ Renal Duplex Measurements +------------------------------------++-----+---+----+----++---+---+--+----+ ! Renal Artery A                      !!Right! ! Left!    !!   !   !  !    ! +------------------------------------++-----+---+----+----++---+---+--+----+ ! Location                            ! !PSV  ! EDV! RI  !RAR !!PSV! EDV!RI!RAR ! +------------------------------------++-----+---+----+----++---+---+--+----+ ! Ostial Renal                        !!157  !   !    !1.44!!123!   !  !1.13! +------------------------------------++-----+---+----+----++---+---+--+----+ ! Prox Renal                          !!139  !   !    !1.28!!136!   !  !1.25! +------------------------------------++-----+---+----+----++---+---+--+----+ ! Mid Renal                           !!173  !   !    !1.59!!157!   !  !1.44! +------------------------------------++-----+---+----+----++---+---+--+----+ ! Dist Renal                          !!130  !   !    !1.19!!123!   !  !1.13! +------------------------------------++-----+---+----+----++---+---+--+----+ Right Miscellaneous Measurements   - The average kidney length is 15.15 cm. Left Miscellaneous Measurements   - The average kidney length is 10.45 cm.     Vl Dup Lower Extremity Venous Left    Result Date: 7/28/2018    [facility]  Vascular Lower Extremities DVT Study Procedure   Patient Name  [facility]    Date of Study           07/28/2018                [facility]   Date of Birth 1955  Gender                  Female   Age           66 year(s)  Race                       Room Number   9170   Corporate ID  [id number]  #   Jodi Bermudez  [de-identified]  #   MR #          [id number]     Sonographer             Belén Jeong   Accession #   [id number]   Interpreting Physician  Nicole Arambula   Referring                 Referring Physician     Dorothea Montez, *  Nurse  Practitioner  Procedure Type of Study:   Veins: Lower Extremities DVT Study, Venous Scan Lower Left. Indications for Study:Hx of DVT. Patient Status: In Patient. Technical Quality:Limited visualization. Limitation reason:Pt in pain. - Critical Result:Fernanda WAITE RN at 9:30 am.  Conclusions   Summary   Simultaneous real time imaging utilizing B-Mode, color doppler and  spectral waveform analysis was performed on the left lower extremity for  venous examination of the deep and superficial systems. Findings are:   **Abnormal Study**   Extensive acute deep venous thrombosis extending from the gastrocnemius  vein into the external iliac vein. Acute superficial venous thrombosis identified in the small saphenous  vein. Signature   ----------------------------------------------------------------  Electronically signed by Belén Jeong(Sonographer) on  07/28/2018 09:47 AM  ----------------------------------------------------------------   ----------------------------------------------------------------  Electronically signed by Phuong Perkins(Interpreting  physician) on 07/28/2018 10:17 AM  ----------------------------------------------------------------  Findings:   Right Impression:                 Left Impression:  The common femoral vein           The gatrocnemius to the external iliac  demonstrates normal augmentation. veins are non-compressible. Thrombus appears acute based on B-Mode                                    image evaluation. Normal compressibility of the great                                    saphenous vein. The small saphenous vein demonstrates no                                    compressibility. Thrombus appears acute based on B-Mode                                    image evaluation. Risk Factors History +---------+----------+-----------------------------------------------------+ ! Diagnosis! Date      ! Comments                                             ! +---------+----------+-----------------------------------------------------+ ! Other    ! ! Right Renal Cell Carcinoma with mets                 ! +---------+----------+-----------------------------------------------------+ ! DVT      !03/16/2018! RT popliteal, peroneal, deep calf vein               ! +---------+----------+-----------------------------------------------------+   - The patient's risk factor(s) include: diabetes mellitus and obesity.   - The patient has kidney cancer. Velocities are measured in cm/s ; Diameters are measured in cm Right Lower Extremities DVT Study Measurements Right 2D Measurements +------------------------------------+----------+---------------+----------+ ! Location                            ! Visualized! Compressibility! Thrombosis! +------------------------------------+----------+---------------+----------+ ! Common Femoral                      !Yes       !               !          ! +------------------------------------+----------+---------------+----------+ Right Doppler Measurements +----------------------------+------+------+-------------------------------+ ! Location                    ! Signal!Reflux! Reflux (msec)                  ! +----------------------------+------+------+-------------------------------+ ! Common Femoral              !Phasic!      !                               ! +----------------------------+------+------+-------------------------------+ Left Lower Extremities DVT Study Measurements Left 2D Measurements +------------------------------------+----------+---------------+----------+ ! Location                            ! Visualized! Compressibility! Thrombosis! +------------------------------------+----------+---------------+----------+ ! Common Femoral                      !Yes       ! No             !Acute     ! +------------------------------------+----------+---------------+----------+ ! Prox Femoral                        !Yes       ! Partial        !Acute     ! +------------------------------------+----------+---------------+----------+ ! Mid Femoral                         !Yes       ! No             !Acute     ! +------------------------------------+----------+---------------+----------+ ! Dist Femoral                        !Yes       ! No             !Acute     ! +------------------------------------+----------+---------------+----------+ ! Popliteal                           !Yes       ! No             !Acute     ! +------------------------------------+----------+---------------+----------+ ! Sapheno Femoral Junction            ! Yes       ! Yes            ! None      ! +------------------------------------+----------+---------------+----------+ ! PTV                                 ! Yes       ! Yes            ! None      ! +------------------------------------+----------+---------------+----------+ ! Peroneal                            !No        !               !          ! +------------------------------------+----------+---------------+----------+ ! Gastroc                             ! Yes       ! No             !Acute     ! +------------------------------------+----------+---------------+----------+ ! GSV Thigh                           ! Yes       ! Yes            ! None      ! +------------------------------------+----------+---------------+----------+ ! GSV Knee                            ! Yes       ! Yes            ! None      ! +------------------------------------+----------+---------------+----------+ ! GSV Ankle                           ! Yes       ! Yes            ! None      ! +------------------------------------+----------+---------------+----------+ ! SSV                                 ! Yes       ! No             !Acute     ! +------------------------------------+----------+---------------+----------+ Left Doppler Measurements +-------------------------+----------+------+------------------------------+ ! Location                 ! Signal    !Reflux! Reflux (msec)                 ! +-------------------------+----------+------+------------------------------+ ! Common Femoral           !Diminished!      !                              ! +-------------------------+----------+------+------------------------------+ ! Prox Femoral             !Continuous!      !                              ! +-------------------------+----------+------+------------------------------+ ! Popliteal                !Continuous!      !                              ! +-------------------------+----------+------+------------------------------+    Objective:   Vitals: BP (!) 126/53   Pulse 79   Temp 98.8 °F (37.1 °C) (Oral)   Resp 17   Ht 5' 8\" (1.727 m)   Wt 190 lb 0.6 oz (86.2 kg)   SpO2 99%   BMI 28.89 kg/m²   General appearance: follows commands but lethargic  HEENT: Head: Normocephalic, no lesions, without obvious abnormality. Neck: no adenopathy  Lungs: clear to auscultation bilaterally  Heart: regular rate and rhythm, S1, S2 normal, no murmur, click, rub or gallop  Abdomen: soft, non-tender; bowel sounds normal; no masses,  no organomegaly  Extremities: extremities normal, atraumatic, no cyanosis or edema  Neurologic: Mental status: Alert,  but very lethargic.   Not oriented to place    Assessment and Plan:   Principal Problem:    Acute renal failure (ARF) ([facility] [address]  Active Problems:    Diabetes mellitus ([facility] [address]    Anemia due to chronic kidney disease    Metastasis to spinal column ([facility] [address]    Renal cell carcinoma ([facility] [address]    Pathologic compression fracture of spine with delayed healing    Hematuria    Absolute anemia    Renal cell cancer, right ([address]    Encounter for palliative care    Acute deep vein thrombosis (DVT) of femoral vein of left lower extremity (HCC)    ESRD on hemodialysis ([facility] [address]  Resolved Problems:    Metabolic acidosis    Hypoglycemia      1. Metastatic Renal Cell Carcinoma to the bones status post stabilization of spine by neurosurgery and palliative radiation  2. Discussed case with radiation oncology in light of new CT findings. 3. Anticipating to treat patient with combination of Opdivo and Ipilimumab as an outpatient  4. On anticoagulation for DVT. Okay to anticoagulate as long as there is no active bleeding and platelet count percent stable 50,000  5. End-stage renal disease started on hemodialysis  6. Continue supportive care. 7. Pain medication prescriptions written and left in patient's chart  8. No plans for palliative nephrectomy  9. Palliative care team on board        Priyank Bryant MD    This note is created with the assistance of a speech recognition program.  While intending to generate a document that actually reflects the content of the visit, the document can still have some errors including those of syntax and sound a like substitutions which may escape proof reading. It such instances, actual meaning can be extrapolated by contextual diversion.

## 2018-08-03 NOTE — PROGRESS NOTES
The patient is a 59-year-old who has stage IV renal cell carcinoma. She was treated previously for bone metastasis. The patient had restaging studies consisting of a CT scan of the chest, abdomen, and pelvis revealing a right pleural effusion, abdominal ascites, the enlarged abnormal right kidney, enlarged mesenteric lymph nodes, an metastasis within the right and left iliac wing. Postoperative changes are seen in the spine as noted from previous treatment. The patient continues of pain in the lower spine/pelvic region. I discussed with the patient that she may benefit from palliative radiation therapy to the new areas of metastasis within the right and left iliac wing regions. I recommend she receive treatment to these regions. I reviewed the radiation to be technique, logistics, simulation, and side effects. The patient's questions were answered. The patient consented for treatment. This is palliative treatment to control her bone pain. She will be set up for treatment. She will likely be discharged today. This treatment will be set up as an outpatient.

## 2018-08-03 NOTE — CARE COORDINATION
Received schedule letter from [facility] Admissions. Pt has been accepted to [facility] unit for OP HD. Pt will have a chair schedule of MWF at 5:30 pm. Pt will be able to start OP dialysis on Mon. 8/6/18 at 5 pm.     Page called for Nephrology to see if pt will need to run today before discharge awaiting a response. Will provide pt with copy of schedule and facility information and will update pt's dtr. Called and cancelled referral with [facility]. Faxed updated notes to [facility] on [employer]. Addendum 1:41 pm.   Spoke with Dr. Giselle Chavez. Dr. Giselle Chavez stated that pt is okay for discharge and to start OP dialysis on Monday, pt does not need to run today. Provided and explained OP dialysis schedule and facility information to pt. Pt voiced no questions/concerns at this time. With pt's permission called pt's dtr with [facility] and updated her on snf and OP HD placement. Dtr voiced no questions/concerns at this time.

## 2018-08-03 NOTE — PROGRESS NOTES
citrate, sodium chloride, sodium chloride, cyclobenzaprine, ondansetron, oxyCODONE HCl, prochlorperazine, sodium chloride flush, ondansetron, glucose, dextrose, glucagon (rDNA), dextrose, acetaminophen  Home Meds:                Prescriptions Prior to Admission: cyclobenzaprine (FLEXERIL) 5 MG tablet, TAKE 1 TABLET BY MOUTH THREE TIMES A DAY AS NEEDED FOR MUSCLE SPASMS  Insulin Disposable Pump (V-GO 20) KIT,   HUMALOG 100 UNIT/ML injection vial, With insulin pump  B-D ULTRAFINE III SHORT PEN 31G X 8 MM MISC,   atorvastatin (LIPITOR) 20 MG tablet, TAKE 1 TABLET BY MOUTH ONE TIME A DAY  ondansetron (ZOFRAN) 4 MG tablet, Take 1 tablet by mouth daily as needed for Nausea or Vomiting  omeprazole (PRILOSEC) 40 MG delayed release capsule, Take 1 capsule by mouth daily  amitriptyline (ELAVIL) 75 MG tablet, Take 1 tablet by mouth nightly  prochlorperazine (COMPAZINE) 5 MG tablet, Take 5 mg by mouth every 8 hours as needed   Blood Glucose Monitoring Suppl (TRUE METRIX METER) w/Device KIT,   Lancets MISC, Patient testing 3 times daily  glucose blood VI test strips (ASCENSIA AUTODISC VI;ONE TOUCH ULTRA TEST VI) strip, Use with associated glucose meter. Patient testing three times daily  docusate sodium (COLACE) 100 MG capsule, Take 2 capsules by mouth 2 times daily  Insulin Pen Needle 32G X 4 MM MISC, 2 each by Does not apply route 2 times daily  acetaminophen (TYLENOL) 500 MG tablet, Take 1,000 mg by mouth 3 times daily  [DISCONTINUED] lisinopril-hydrochlorothiazide (PRINZIDE;ZESTORETIC) 20-12.5 MG per tablet, Take 1 tablet by mouth 2 times daily  [DISCONTINUED] oxyCODONE (OXYCONTIN) 10 MG extended release tablet, Take 1 tablet by mouth 2 times daily for 30 days. Intended supply: 30 days. [DISCONTINUED] oxyCODONE HCl (OXY-IR) 10 MG immediate release tablet, Take 1 tablet by mouth every 6 hours as needed for Pain for up to 30 days. .  [DISCONTINUED] gabapentin (NEURONTIN) 100 MG capsule, Take 1 capsule by mouth 3 times daily for 30 days. .  [DISCONTINUED] PAZOPanib HCl (VOTRIENT) 200 MG chemo tablet, Take 4 tablets by mouth daily  [DISCONTINUED] enoxaparin (LOVENOX) 100 MG/ML injection, Inject 0.9 mLs into the skin daily  [DISCONTINUED] amLODIPine (NORVASC) 10 MG tablet, Take 1 tablet by mouth daily    INVESTIGATIONS     Last 3 CMP:    Recent Labs      08/01/18 0613 08/02/18 0604 08/03/18   0633   NA  136  136  137   K  3.9  3.5*  4.3   CL  98  98  99   CO2  24  24  27   BUN  31*  40*  24*   CREATININE  3.68*  4.54*  3.31*   CALCIUM  7.6*  7.9*  7.9*       Last 3 CBC:  Recent Labs      08/01/18 0613 08/02/18 0604 08/02/18 2038 08/03/18   0633   WBC  6.2   --   6.0   --   5.8   RBC  2.81*   --   2.70*   --   2.62*   HGB  7.7*   < >  7.2*  7.8*  7.1*   HCT  25.1*   < >  24.4*  26.7*  24.2*   MCV  89.3   --   90.4   --   92.4   MCH  27.4   --   26.7   --   27.1   MCHC  30.7   --   29.5   --   29.3   RDW  19.0*   --   18.6*   --   18.4*   PLT  286   --   258   --   236   MPV  10.3   --   9.7   --   10.1    < > = values in this interval not displayed. ASSESSMENT     1. Lamberto Rivas contributed to obstructive uropathy, urinary retention and aggravated further by concomitant ACE inhibitor use and diuretic use.  Oral intake poor. - HD dependent 7/27 - perm cath, recovery renal function less likely. Outpatient dialysis arranged at the [facility] unit Tuesday Thursday Saturday  2. CKD Stage 4, baseline creatinine 2.5-2.8  3. DVT on anticoagulation  4. Hematuria - improved  5. R sided metastatic RCC with spread to Lumbar spine and peritoneum which was discovered in January 2018 during MRI for back pain due to L1 compression fracture -  OUTPATIENT IMMUNOTHERAPY  6.  Hypertension  7.  Diabetes mellitus type 2     PLAN     1. HD in am as out pt  2. Arranging for outpatient HD placement at the [facility] unit  3. Ok for dc once # 2 achieved  4. Follow up urology and Hem Onco review  5.   Stable for discharge once outpatient spot arranged next dialysis on Saturday    Please do not hesitate to call with questions    This note is created with the assistance of a speech-recognition program. While intending to generate a document that actually reflects the content of the visit, no guarantees can be provided that every mistake has been identified and corrected by editing    Bridget Lobato MD, [address]   8/3/2018 12:33 PM  [facility]

## 2018-08-03 NOTE — CARE COORDINATION
MATTHEW Santos 116 / Lauri Myers intake that patient will be coming today. Transportation requested for 1600. DERRICK completed. Brisa Standing onsite clinical liaison here and updated on patient discharge. 1300 spoke with patient, daughters Mili Paez and Elia Mao with updates on discharge disposition and time. To [place] at [place] at [address] [facility] [address]. Bowen Haynes updated on dialysis plan and clearance form renal for discharge today.

## 2018-08-07 NOTE — PROGRESS NOTES
[facility]  [facility], [address], [address] [place] [address]  Ph: [phone] or [phone]  FAX: [phone]    Vani Hicks M.D.  Lorenzo Carlos M.D. Joslyn Aaron M.D. Brayan Aragon M.D. Dear Dr. Juaquin Monsivais MD      I had the pleasure of seeing your patient today in neurology consultation for her symptoms. As you would recall, Peter Andres is a 66 y.o.  female. The patient has a past medical history of renal cell carcinoma diagnosed in January 2018 status post chemotherapy and metastasis to L1 and L2 vertebra. She states that about 2 months ago, she had to lay down with head tilting to the left side as she was having a conditioning issues at her home and she had to fixate conditioning on the left side to sleep. After about 2-3 weeks, she has noticed that she has head tilted to the left side and she has to reposition her head to center. She also reports having neck pain  Her MRI brain, cervical and thoracic spine were negative for any metastases lesion in January 2018.                               REVIEW OF SYSTEMS    Constitutional Weight: present, Appetite: absent, Fatigue: present   HEENT Ears: normal, Eyes: glasses, Visual disturbance: absent   Reespiratory Shortness of breath: absent, Cough: absent   Cardivascular Chest pain: absent, Leg swelling :present   GI Constipation: absent, Diarrhea: absent, Swallowing change: absent    Urinary frequency: absent, Urinary urgency: absent, Urinary incontinence: absent   Musculoskeletal Neck pain: present, Back pain: absent, Stiffness: present, Muscle pain: present, Joint pain: absent   Dermatological Hair loss: absent, Skin changes: absent   Neurological Memory loss: absent, Confusion: absent, Seizures: absent, Trouble walking or imbalance: abesent, Dizziness: absent, Weakness: absent, Numbness: absent Tremor: absent, Spasm: absent, Speech difficulty: absent, Headache: absent, Light

## 2018-08-14 PROBLEM — I95.9 HYPOTENSION: Status: ACTIVE | Noted: 2018-01-01

## 2018-08-14 PROBLEM — K92.2 GI BLEED: Status: ACTIVE | Noted: 2018-01-01

## 2018-08-14 NOTE — ED PROVIDER NOTES
(*)     Seg Neutrophils 69 (*)     Lymphocytes 19 (*)     Immature Granulocytes 2 (*)     All other components within normal limits   BASIC METABOLIC PANEL - Abnormal; Notable for the following:     Glucose 153 (*)     BUN 35 (*)     CREATININE 3.44 (*)     Calcium 8.2 (*)     Chloride 97 (*)     GFR Non- 14 (*)     GFR  16 (*)     All other components within normal limits   PROTIME-INR - Abnormal; Notable for the following:     Protime 25.0 (*)     All other components within normal limits   APTT - Abnormal; Notable for the following:     PTT 38.1 (*)     All other components within normal limits   HGB/HCT - Abnormal; Notable for the following:     POC Hemoglobin 6.6 (*)     POC Hematocrit 19 (*)     All other components within normal limits   CALCIUM, IONIC (POC) - Abnormal; Notable for the following:     POC Ionized Calcium 1.02 (*)     All other components within normal limits   VENOUS BLOOD GAS, POINT OF CARE - Abnormal; Notable for the following:     pH, Edson 7.491 (*)     pCO2, Edson 35.5 (*)     pO2, Edson 18.7 (*)     Positive Base Excess, Edson 4 (*)     O2 Sat, Edson 33 (*)     All other components within normal limits   CREATININE W/GFR POINT OF CARE - Abnormal; Notable for the following:     POC Creatinine 2.90 (*)     GFR Comment 20 (*)     GFR Non- 16 (*)     All other components within normal limits   LACTIC ACID,POINT OF CARE - Abnormal; Notable for the following:     POC Lactic Acid 4.39 (*)     All other components within normal limits   POCT GLUCOSE - Abnormal; Notable for the following:     POC Glucose 145 (*)     All other components within normal limits   SODIUM (POC)   POTASSIUM (POC)   CHLORIDE (POC)   ANION GAP (CALC) POC   POCT TROPONIN   POCT TROPONIN   TYPE AND SCREEN   PREPARE RBC (CROSSMATCH)         EMERGENCY DEPARTMENT COURSE:     -------------------------  BP: (!) 120/50, Temp: 99.5 °F (37.5 °C), Pulse: 85, Resp:

## 2018-08-14 NOTE — ED PROVIDER NOTES
[facility] [address] [facility]  Emergency Department  Emergency Medicine Resident Sign-out     Care of Kaylen Alcaraz was assumed from Dr. Kasi Hernandez and is being seen for Abnormal Lab and Rectal Bleeding  . The patient's initial evaluation and plan have been discussed with the prior provider who initially evaluated the patient.      EMERGENCY DEPARTMENT COURSE / MEDICAL DECISION MAKING:       MEDICATIONS GIVEN:  Orders Placed This Encounter   Medications    0.9 % sodium chloride bolus       LABS / RADIOLOGY:     Labs Reviewed   CBC WITH AUTO DIFFERENTIAL - Abnormal; Notable for the following:        Result Value    RBC 2.36 (*)     Hemoglobin 6.6 (*)     Hematocrit 23.1 (*)     RDW 19.5 (*)     Seg Neutrophils 69 (*)     Lymphocytes 19 (*)     Immature Granulocytes 2 (*)     All other components within normal limits   BASIC METABOLIC PANEL - Abnormal; Notable for the following:     Glucose 153 (*)     BUN 35 (*)     CREATININE 3.44 (*)     Calcium 8.2 (*)     Chloride 97 (*)     GFR Non- 14 (*)     GFR  16 (*)     All other components within normal limits   PROTIME-INR - Abnormal; Notable for the following:     Protime 25.0 (*)     All other components within normal limits   APTT - Abnormal; Notable for the following:     PTT 38.1 (*)     All other components within normal limits   HGB/HCT - Abnormal; Notable for the following:     POC Hemoglobin 6.6 (*)     POC Hematocrit 19 (*)     All other components within normal limits   CALCIUM, IONIC (POC) - Abnormal; Notable for the following:     POC Ionized Calcium 1.02 (*)     All other components within normal limits   VENOUS BLOOD GAS, POINT OF CARE - Abnormal; Notable for the following:     pH, Edson 7.491 (*)     pCO2, Edson 35.5 (*)     pO2, Edson 18.7 (*)     Positive Base Excess, Edson 4 (*)     O2 Sat, Edson 33 (*)     All other components within normal limits   CREATININE W/GFR POINT OF CARE - Abnormal; Notable for the following: POC Creatinine 2.90 (*)     GFR Comment 20 (*)     GFR Non- 16 (*)     All other components within normal limits   LACTIC ACID,POINT OF CARE - Abnormal; Notable for the following:     POC Lactic Acid 4.39 (*)     All other components within normal limits   POCT GLUCOSE - Abnormal; Notable for the following:     POC Glucose 145 (*)     All other components within normal limits   SODIUM (POC)   POTASSIUM (POC)   CHLORIDE (POC)   POCT TROPONIN   ANION GAP (CALC) POC   POCT TROPONIN   POCT TROPONIN   POCT TROPONIN   TYPE AND SCREEN   PREPARE RBC (CROSSMATCH)       Ct Abdomen Pelvis Wo Contrast Additional Contrast? None    Result Date: 8/1/2018  EXAMINATION: CT OF THE ABDOMEN AND PELVIS WITHOUT CONTRAST 8/1/2018 9:26 am TECHNIQUE: CT of the abdomen and pelvis was performed without the administration of intravenous contrast. Multiplanar reformatted images are provided for review. Dose modulation, iterative reconstruction, and/or weight based adjustment of the mA/kV was utilized to reduce the radiation dose to as low as reasonably achievable. COMPARISON: January 2018 HISTORY: ORDERING SYSTEM PROVIDED HISTORY: renal cell carcinoma, restaging, no IV contrast TECHNOLOGIST PROVIDED HISTORY: Additional Contrast?->None FINDINGS: Lower Chest: Discussed separately Organs: Multifocal heterogeneous density is noted along the surface of the liver. This is increased. There is a developing high density within this area on axial image 44 lateral to the right lobe of the liver. Limited noncontrast evaluation of the spleen and pancreas demonstrate no focal abnormality. Gallbladder distention is noted. Limited noncontrast evaluation of the left adrenal gland and left kidney demonstrate no focal abnormality. Right adrenal gland is difficult characterize however is grossly stable in appearance. Heterogeneous density and enlargement is noted throughout the right kidney. This is similar compared to the prior.  Multiple Contrast    Result Date: 7/25/2018  EXAMINATION: CT OF THE LUMBAR SPINE WITHOUT CONTRAST  7/25/2018 TECHNIQUE: CT of the lumbar spine was performed without the administration of intravenous contrast. Multiplanar reformatted images are provided for review. Dose modulation, iterative reconstruction, and/or weight based adjustment of the mA/kV was utilized to reduce the radiation dose to as low as reasonably achievable. COMPARISON: MRI lumbar spine from June 9, 2018 HISTORY: ORDERING SYSTEM PROVIDED HISTORY: r/o worsening fracture or increasing metastatic mass TECHNOLOGIST PROVIDED HISTORY: Reason for exam:->r/o worsening fracture or increasing metastatic mass FINDINGS: BONES/ALIGNMENT: Posterior transpedicular fusion hardware involving T11, T12, L2, and L3 is demonstrated. A pathologic compression fracture of L1 is again demonstrated with some retropulsion of fragments by approximately 0.4 cm. There is a dominant lytic lesion in the right sacral body encroaching upon the right S1 neural foramen. This metastatic lesion measures approximately 3.1 x 2.5 cm in cross-section. Its inferior extent is not well demonstrated. In the left iliac wing, a metastatic lesion is also identified exerting some mass effect upon the left sacroiliac joint. The lesion measures approximately 3.2 x 3.0 cm and is partially imaged on the previous lumbar spine MRI. No additional lesions are identified. DEGENERATIVE CHANGES: Degenerative disc disease throughout the lumbar spine involving the discs and facets. SOFT TISSUES/RETROPERITONEUM: A dominant right renal mass is partially imaged. There is perinephric stranding and perinephric nodularity likely metastatic disease. Atherosclerotic changes of the abdominal aorta are present. Low-density along the medial dome of the liver (axial image 1) suspicious for metastatic involvement of the liver.      1. Pathologic compression fracture of L1 is again identified with some retropulsion of the posterior aspect of the L1 vertebral body into the spinal canal without significant spinal canal narrowing. 2. Metastatic lesions involving the left iliac wing, right S1 sacral body effacing the right S1 neural foramen, and the L1 vertebral body. No new osseous lesions are identified. 3. Heterogeneously enlarged mass largely replacing the right kidney with nodularity in the right renal fossa suspicious for metastatic disease. Mri Lumbar Spine Wo Contrast    Result Date: 7/25/2018  EXAMINATION: MRI OF THE LUMBAR SPINE WITHOUT CONTRAST, 7/25/2018 5:09 pm TECHNIQUE: Multiplanar multisequence MRI of the lumbar spine was performed without the administration of intravenous contrast. COMPARISON: June 9, 2018 HISTORY: ORDERING SYSTEM PROVIDED HISTORY: pathologic fx in lumbar spine, difficulty urinating FINDINGS: BONES/ALIGNMENT: Thoracolumbar fusion is again seen. Hardware causes artifact limiting adjacent evaluation. Pathologic L1 compression fracture is again seen with retropulsion of fracture fragments. No acute fracture. Right sacral metastatic lesion is seen. SPINAL CORD: Retropulsion of fracture fragments is causing impingement upon the conus. Appearance is likely similar to the prior study. SOFT TISSUES: Complex right renal mass. L1-L2: L1 compression fracture with retropulsion of fracture fragments causes severe central canal stenosis and impingement on the distal aspect of the conus and the thecal sac. L2-L3: Mild broad-based disc bulge. No significant central canal or foraminal stenosis. L3-L4: Broad-based disc bulge, facet degenerative changes, and hypertrophy of the ligamentum flavum combine to create moderate central canal stenosis. Changes combine to create moderate bilateral foraminal stenosis. L4-L5: Left paracentral disc bulge with extension into the left foramina and facet degenerative changes combine to create mild central canal stenosis.  Changes combine to create moderate-severe left foraminal stenosis and moderate right foraminal stenosis. L5-S1: Paracentral disc bulge with left foraminal component causes minimal impingement upon the ventral aspect of the thecal sac. Facet degenerative changes are seen. Changes combine to create mild left foraminal stenosis. Pathologic L1 compression fracture is again seen with retropulsion of fracture fragments causing impingement upon the distal aspect of the conus and the proximal thecal sac. Appearance is overall similar to the prior study. Us Renal Complete    Result Date: 7/25/2018  EXAMINATION: RETROPERITONEAL ULTRASOUND OF THE KIDNEYS AND URINARY BLADDER 7/25/2018 COMPARISON: None HISTORY: ORDERING SYSTEM PROVIDED HISTORY: RENAL FAILURE, ACUTE (KIDNEY INJURY) FINDINGS: Kidneys: The right kidney measures 15.3 cm in length and the left kidney measures 11.8 cm in length. There is a complex mass lesion involving the superior pole the right kidney measuring 9.7 x 8.7 cm. Mild dilatation of the proximal right ureter. No evidence for left-sided hydronephrosis. Bladder: There bladder is decompressed by Navarrete catheter. Large complex mass within the superior pole the right kidney. Mild right hydronephrosis. Urinary bladder is decompressed by Navarrete catheter. Ir Tunneled Cvc Place Wo Sq Port/pump > 5 Years    Result Date: 7/27/2018  PROCEDURE: ULTRASOUND GUIDED VASCULAR ACCESS. FLUOROSCOPY GUIDED PLACEMENT OF A TUNNELED CATHETER. 7/27/2018. HISTORY: ORDERING SYSTEM PROVIDED HISTORY: RASHMI TECHNOLOGIST PROVIDED HISTORY: Reason for exam:->RASHMI Anticipated long-term dialysis requirement. SEDATION: None FLUOROSCOPY DOSE AND TYPE OR TIME AND EXPOSURES: 0.3 minute; 68 cGy cm squared TECHNIQUE: Informed consent was obtained after a detailed explanation of the procedure including risks, benefits, and alternatives. Universal protocol was observed. The right neck and chest were prepped and draped in sterile fashion using maximum sterile barrier technique.  Local +---------+----------+-----------------------------------------------------+   - The patient's risk factor(s) include: diabetes mellitus and obesity.   - The patient has kidney cancer. Velocities are measured in cm/s ; Diameters are measured in cm Abdominal Aortic Flow +--------------------------------+---+---+------------+--------------------+ ! Location                        ! PSV! EDV! AP Diam     !Trans Diam          ! +--------------------------------+---+---+------------+--------------------+ ! Aorta Juxta Renal               !109!   !            !                    ! +--------------------------------+---+---+------------+--------------------+ Renal Duplex Measurements +------------------------------------++-----+---+----+----++---+---+--+----+ ! Renal Artery A                      !!Right! ! Left!    !!   !   !  !    ! +------------------------------------++-----+---+----+----++---+---+--+----+ ! Location                            ! !PSV  ! EDV! RI  !RAR !!PSV! EDV!RI!RAR ! +------------------------------------++-----+---+----+----++---+---+--+----+ ! Ostial Renal                        !!157  !   !    !1.44!!123!   !  !1.13! +------------------------------------++-----+---+----+----++---+---+--+----+ ! Prox Renal                          !!139  !   !    !1.28!!136!   !  !1.25! +------------------------------------++-----+---+----+----++---+---+--+----+ ! Mid Renal                           !!173  !   !    !1.59!!157!   !  !1.44! +------------------------------------++-----+---+----+----++---+---+--+----+ ! Dist Renal                          !!130  !   !    !1.19!!123!   !  !1.13! +------------------------------------++-----+---+----+----++---+---+--+----+ Right Miscellaneous Measurements   - The average kidney length is 15.15 cm. Left Miscellaneous Measurements   - The average kidney length is 10.45 cm.     Vl Dup Lower Extremity Venous Left    Result Date: 7/28/2018    [facility]  Vascular Lower appears acute based on B-Mode                                    image evaluation. Normal compressibility of the great                                    saphenous vein. The small saphenous vein demonstrates no                                    compressibility. Thrombus appears acute based on B-Mode                                    image evaluation. Risk Factors History +---------+----------+-----------------------------------------------------+ ! Diagnosis! Date      ! Comments                                             ! +---------+----------+-----------------------------------------------------+ ! Other    ! ! Right Renal Cell Carcinoma with mets                 ! +---------+----------+-----------------------------------------------------+ ! DVT      !03/16/2018! RT popliteal, peroneal, deep calf vein               ! +---------+----------+-----------------------------------------------------+   - The patient's risk factor(s) include: diabetes mellitus and obesity.   - The patient has kidney cancer. Velocities are measured in cm/s ; Diameters are measured in cm Right Lower Extremities DVT Study Measurements Right 2D Measurements +------------------------------------+----------+---------------+----------+ ! Location                            ! Visualized! Compressibility! Thrombosis! +------------------------------------+----------+---------------+----------+ ! Common Femoral                      !Yes       !               !          ! +------------------------------------+----------+---------------+----------+ Right Doppler Measurements +----------------------------+------+------+-------------------------------+ ! Location                    ! Signal!Reflux! Reflux (msec)                  ! +----------------------------+------+------+-------------------------------+ ! Common Femoral              !Phasic!

## 2018-08-14 NOTE — ED PROVIDER NOTES
mouth daily 3/28/18  Yes Polo Talamantes MD   amitriptyline (ELAVIL) 75 MG tablet Take 1 tablet by mouth nightly 3/28/18  Yes Polo Talamantes MD   prochlorperazine (COMPAZINE) 5 MG tablet Take 5 mg by mouth every 8 hours as needed  2/8/18  Yes Historical Provider, MD   docusate sodium (COLACE) 100 MG capsule Take 2 capsules by mouth 2 times daily 1/31/18  Yes Polo Talamantes MD   acetaminophen (TYLENOL) 500 MG tablet Take 1,000 mg by mouth 3 times daily   Yes Historical Provider, MD   megestrol (MEGACE) 40 MG/ML suspension Take 10 mLs by mouth daily 8/3/18   Pedro Cedeno MD   oxyCODONE HCl (OXY-IR) 10 MG immediate release tablet Take 1 tablet by mouth every 6 hours as needed for Pain for up to 30 days. Intended supply: 30 days. 8/3/18 9/2/18  Griffin Weinberg MD   oxyCODONE (OXYCONTIN) 10 MG extended release tablet Take 1 tablet by mouth 2 times daily for 14 days. . 8/2/18 8/16/18  Eldon Will MD   Insulin Disposable Pump (V-GO 20) KIT  4/26/18   Historical Provider, MD CALDWELL ULTRAFINE III SHORT PEN 31G X 8 MM MISC  4/13/18   Historical Provider, MD   ondansetron (ZOFRAN) 4 MG tablet Take 1 tablet by mouth daily as needed for Nausea or Vomiting 4/10/18   Mercie Lennox, MD   Blood Glucose Monitoring Suppl (TRUE METRIX METER) w/Device KIT  2/2/18   Historical Provider, MD   Lancets [facility] Patient testing 3 times daily 2/2/18   Polo Talamantes MD   glucose blood VI test strips (ASCENSIA AUTODISC VI;ONE TOUCH ULTRA TEST VI) strip Use with associated glucose meter. Patient testing three times daily 2/2/18   Polo Talamantes MD   Insulin Pen Needle 32G X 4 MM MISC 2 each by Does not apply route 2 times daily    Historical Provider, MD       REVIEW OF SYSTEMS    (2-9 systems for level 4, 10 or more for level 5)      Review of Systems   Constitutional: Negative for chills and fatigue. HENT: Negative. Respiratory: Negative for shortness of breath. Cardiovascular: Negative for chest pain. Result COMPATIBLE     Unit Number [id number]     Product Code Leukocyte Reduced Red Cell     Unit Divison 0     Dispense Status ALLOCATED     Transfusion Status OK TO TRANSFUSE     Crossmatch Result COMPATIBLE        IMPRESSION: 72-year-old female presents with low hemoglobin from care facility. Initially hypotensive on arrival to the emergency department but oriented, without complaint. We'll obtain CBC, BMP, type and crossmatch. Plan for admission. RADIOLOGY:  No results found. EKG  EKG Interpretation    Interpreted by me    Rhythm: normal sinus   Rate: normal  Axis: normal  Ectopy: none  Conduction: normal  ST Segments: no acute change  T Waves: no acute change  Q Waves: none    Clinical Impression: no acute changes     All EKG's are interpreted by the Emergency Department Physician who either signs or Co-signs this chart in the absence of a cardiologist.    EMERGENCY DEPARTMENT COURSE:  Discussed with Rae about admission for anemia and hypotension. Patient received 1 unit of PRBC    Patient signed out to Dr. Lyn Francisco. PROCEDURES:  None    CONSULTS:  IP CONSULT TO HOSPITALIST    CRITICAL CARE:  None    FINAL IMPRESSION      1. Chronic GI bleeding    2. Hypotension, unspecified hypotension type          DISPOSITION / PLAN     DISPOSITION   Admission      PATIENT REFERRED TO:  No follow-up provider specified.     DISCHARGE MEDICATIONS:  New Prescriptions    No medications on file       Yen Garcia DO  Emergency Medicine Resident    (Please note that portions of this note were completed with a voice recognition program.  Efforts were made to edit the dictations but occasionally words are mis-transcribed.)        Yen Garcia DO  08/14/18 [id number]

## 2018-08-14 NOTE — ED NOTES
Pt resting on cart, respirations are equal and nonlabored, no distress noted. Pt continues to deny any signs of tranfusion reaction. Pt updated on poc and duration, continue to monitor.       Alie Miguel RN  08/14/18 [id number]

## 2018-08-14 NOTE — ED NOTES
Pt resting on cart, respirations are equal and nonlabored, no distress noted. Pt updated on poc and duration, continue to monitor.       Karalee Schwab, RN  08/14/18 [id number]

## 2018-08-14 NOTE — Clinical Note
Patient Class: Inpatient [101]   REQUIRED: Diagnosis: Hypotension [810178]   Estimated Length of Stay: Estimated stay of less than 2 midnights   Future Attending Provider: Tate Troy [[id number]]   Admitting Provider: Tate Troy [[id number]]   Telemetry Bed Required?: Yes

## 2018-08-14 NOTE — ED NOTES
Pt resting on cart, respirations are equal and nonlabored, no distress noted. Daughter at bedside. Pt denies previous blood transfusion reaction. 1st unit PRBC started as ordered. Writer remains at bedside to monitor. Pt updated on poc and duration, continue to monitor.       Evelyn Hathaway, TL  08/14/18 [id number]

## 2018-08-15 PROBLEM — D62 ACUTE BLOOD LOSS ANEMIA: Status: ACTIVE | Noted: 2018-01-01

## 2018-08-15 NOTE — CONSULTS
kidney    Chronic kidney disease     Depression     Diabetes mellitus (HCC)     type 2    GERD (gastroesophageal reflux disease)     HA (headache)     HBP (high blood pressure)     History of blood transfusion     no reaction    Hyperlipidemia     Hyperplastic polyp of intestine     Hypothyroid 11/26/2016    Hypothyroidism     Left knee pain     Non-smoker     OA (osteoarthritis)     bilat knees    Tubular adenoma        No Known Allergies    Social History     Social History    Marital status:      Spouse name: N/A    Number of children: N/A    Years of education: N/A     Occupational History    Not on file. Social History Main Topics    Smoking status: Never Smoker    Smokeless tobacco: Never Used    Alcohol use No    Drug use: No    Sexual activity: Not on file     Other Topics Concern    Not on file     Social History Narrative    No narrative on file       Family History:        Family History   Problem Relation Age of Onset    Heart Disease Mother     Diabetes Mother     Cancer Father         esophageal cancer    Cancer Maternal Grandfather         colon       Outpatient Medications:     Prescriptions Prior to Admission: warfarin (COUMADIN) 2.5 MG tablet, Take 1 tablet by mouth daily  gabapentin (NEURONTIN) 100 MG capsule, Take 1 capsule by mouth 3 times daily for 30 days. .  cyclobenzaprine (FLEXERIL) 5 MG tablet, TAKE 1 TABLET BY MOUTH THREE TIMES A DAY AS NEEDED FOR MUSCLE SPASMS  HUMALOG 100 UNIT/ML injection vial, With insulin pump  atorvastatin (LIPITOR) 20 MG tablet, TAKE 1 TABLET BY MOUTH ONE TIME A DAY  omeprazole (PRILOSEC) 40 MG delayed release capsule, Take 1 capsule by mouth daily  amitriptyline (ELAVIL) 75 MG tablet, Take 1 tablet by mouth nightly  prochlorperazine (COMPAZINE) 5 MG tablet, Take 5 mg by mouth every 8 hours as needed   docusate sodium (COLACE) 100 MG capsule, Take 2 capsules by mouth 2 times daily  acetaminophen (TYLENOL) 500 MG tablet, Take 1,000 mg by mouth 3 times daily  megestrol (MEGACE) 40 MG/ML suspension, Take 10 mLs by mouth daily  oxyCODONE HCl (OXY-IR) 10 MG immediate release tablet, Take 1 tablet by mouth every 6 hours as needed for Pain for up to 30 days. Intended supply: 30 days. [DISCONTINUED] oxyCODONE (OXYCONTIN) 10 MG extended release tablet, Take 1 tablet by mouth every 12 hours for 30 days. Take 1 every 12 hours. [DISCONTINUED] oxyCODONE (OXYCONTIN) 10 MG extended release tablet, Take 1 tablet by mouth 2 times daily for 14 days. .  [DISCONTINUED] oxyCODONE (OXY-IR) 10 MG immediate release tablet, Take 1 tablet by mouth every 6 hours as needed for Pain for up to 14 days. .  Insulin Disposable Pump (V-GO 20) KIT,   B-D ULTRAFINE III SHORT PEN 31G X 8 MM MISC,   ondansetron (ZOFRAN) 4 MG tablet, Take 1 tablet by mouth daily as needed for Nausea or Vomiting  Blood Glucose Monitoring Suppl (TRUE METRIX METER) w/Device KIT,   Lancets MISC, Patient testing 3 times daily  glucose blood VI test strips (ASCENSIA AUTODISC VI;ONE TOUCH ULTRA TEST VI) strip, Use with associated glucose meter.  Patient testing three times daily  Insulin Pen Needle 32G X 4 MM MISC, 2 each by Does not apply route 2 times daily    Current Medications:     Scheduled Meds:    cefUROXime  250 mg Oral 2 times per day    sodium chloride  250 mL Intravenous Once    docusate sodium  200 mg Oral BID    amitriptyline  75 mg Oral Nightly    atorvastatin  20 mg Oral Nightly    gabapentin  100 mg Oral TID    oxyCODONE  10 mg Oral BID    megestrol  400 mg Oral Daily    sodium chloride flush  10 mL Intravenous 2 times per day    insulin lispro  0-12 Units Subcutaneous TID     insulin lispro  0-6 Units Subcutaneous Nightly     Continuous Infusions:    sodium chloride 50 mL/hr at 08/15/18 1235    pantoprozole (PROTONIX) infusion 8 mg/hr (08/15/18 1018)     PRN Meds:  prochlorperazine, ondansetron, oxyCODONE HCl, sodium chloride flush, ondansetron, acetaminophen, raising the question of mild   colitis.  Correlate clinically. Fat stranding throughout the abdomen pelvis raising the question of   third-spacing of fluid.           Assessment:     1. RASHMI dialysis dependent get it on Monday Wednesday Friday dialysis via right cath. She goes to [place], states used to follow up with Dr. Ada Franco? Initiated on 7/27/18. (RASHMI contributed to obstructive uropathy, urinary retention and aggravated further by concomitant ACE inhibitor use and diuretic use). 2. GI bleed  3. Renal cell carcinoma diagnosed in January 2018, with metastasis to L1 and L2,   4. Urinary retention  5. CKD Stage 4, baseline creatinine 2.5-2.8  6. Anemia secondary to GI bleed, received two units of packed RBCs, improving  7. DVT on Coumadin   8. DM 2  9. HTN    Plan:   1. Will dialyze her in am.   2. GI eval pending   3. Will also be receiving palliative radiation and immunotherapy/chemo  4. Agree with blood transfusion and Vascular consult for possible IVC filter. 5. Would recommend to decrease IVF to 50cc/hr if NPO otherwise can stop fluids if eating, as she is a HD patient and also got 2 units blood. 6. Daily bladder scan and straight cath as needed. She makes about 250cc of urine/day. 7. Will follow-up    Nutrition   Please ensure that patient is on a renal diet/TF. Avoid nephrotoxic drugs/contrast exposure. Thank you for the consultation. Please do not hesitate to contact us for any further questions/concerns. We will continue to follow along with you. Tammy Gross MD  Internal Medicine, PGY 1    Attending Physician Statement  I have discussed the care of Kevin Manrique, including pertinent history and exam findings,  with the Fellow/Residentt. I have reviewed the key elements of all parts of the encounter with the Fellow/ Resident. I agree with the assessment, plan and orders as documented by the resident. Juan M Soliman MD   Nephrology [address] [place] [address]

## 2018-08-15 NOTE — CONSULTS
[facility]  Consultation Note     . REASON FOR CONSULTATION: anemia      HISTORY OF PRESENT ILLNESS:     This is a 66 y.o. female w/ PMH of ESRD on dialysis, renal cell carcinoma with mets to bone on palliative chemo, HTN, HLD, DM2 presents from assisted living due to reports of +ve occult blood and Hgb reading of 5.8. Patient reports 2 liquid bowel movements of dark brown stools, but denies black tarry stools or bright red blood. Patient denies lightheadedness, dizziness, headaches and has history of Hgb around 7-8 as baseline. Patient previously had colonoscopy done 8/2016 which showed polyps in ascending colon which were removed. In ED patient given 2U blood and transferred to floor. We were consulted to manage potential GI bleed. PAST MEDICAL HISTORY:  Past Medical History:   Diagnosis Date    Cancer [facility])     kidney    Chronic kidney disease     Depression     Diabetes mellitus (HCC)     type 2    GERD (gastroesophageal reflux disease)     HA (headache)     HBP (high blood pressure)     History of blood transfusion     no reaction    Hyperlipidemia     Hyperplastic polyp of intestine     Hypothyroid 11/26/2016    Hypothyroidism     Left knee pain     Non-smoker     OA (osteoarthritis)     bilat knees    Tubular adenoma      Past Surgical History:   Procedure Laterality Date    BACK SURGERY N/A 01/23/2018    Post T11-L2 Fusion    COLONOSCOPY  08/16/2016    hyperplastic polyp and tubular adenoma     HYSTERECTOMY      NERVE BLOCK  07/13/2018    aleta trigger point neck #1    AL LUMBAR SPINE FUSN,POST INTRBDY N/A 1/23/2018    T11-L3 POSTERIOR FIXATION AND FUSION, SPINE ABBY, C-ARM, O-ARM WITH STEALTH,  EVOKES- [id number] JL performed by Radha Strange DO at [facility]:  No Known Allergies    HOME MEDICATIONS:  Prior to Admission medications    Medication Sig Start Date End Date Taking?  Authorizing Provider   warfarin (COUMADIN) No cough, phlegm or wheezing. Abdomen:  No abdominal pain, nausea or vomiting. Neuro:  No focal weakness, abnormal movements or seizure like activity. Skin:   No rashes, no itching. :   No hematuria, no pyuria, no dysuria, no flank pain. Extremities:  No swelling or joint pains. ROS was otherwise negative except as mentioned in the [address] PHYSICAL EXAM:    BP (!) 124/42   Pulse 83   Temp 97.3 °F (36.3 °C) (Oral)   Resp 12   Ht 5' 8\" (1.727 m)   Wt 187 lb (84.8 kg)   SpO2 96%   BMI 28.43 kg/m²   . TMAX[24]    General: Well developed, Well nourished, No apparent distress  Head:  Normocephalic, Atraumatic  EENT: EOMI, Sclera not icteric, Oropharynx moist  Neck:  Supple, Trachea midline  Lungs:CTA Bilaterally  Heart: RRR, No murmur, No rub, No gallop, PMI nondisplaced. Abdomen:Soft, Non tender, Not distended, BS WNL,  No masses. Ext:No clubbing. No cyanosis. No edema. Skin: No rashes. No jaundice. No stigmata of liver disease. Neuro:  A&O x Three, No focal neurological deficits    LABS and IMAGING:     CBC  Recent Labs      08/14/18   1436  08/15/18   0040  08/15/18   0544   WBC  7.3   --   5.0   HGB  6.6*  7.5*  7.9*   MCV  97.9   --   93.9   RDW  19.5*   --   18.8*   PLT  367   --   310       ANEMIA STUDIES  No results for input(s): LABIRON, TIBC, FERRITIN, ERTRAKVK74, FOLATE, OCCULTBLD in the last 72 hours. BMP  Recent Labs      08/14/18   1422  08/14/18   1436  08/15/18   0544   NA   --   140  139   K   --   4.1  4.4   CL   --   97*  99   CO2   --   26  27   BUN   --   35*  43*   CREATININE  2.90*  3.44*  4.03*   GLUCOSE   --   153*  116*   CALCIUM   --   8.2*  7.9*       LFTS  No results for input(s): ALKPHOS, ALT, AST, BILITOT, BILIDIR, LABALBU in the last 72 hours. AMYLASE/LIPASE/AMMONIA  No results for input(s): AMYLASE, LIPASE, AMMONIA in the last 72 hours.     PT/INR  Recent Labs      08/14/18   1436  08/15/18   0544   PROTIME  25.0*  23.1*   INR  2.5  2.3        ASSESSMENT Acute blood loss anemia: Potentially from GI source given ESRD, but absence of melena or hematochezia makes active bleed less likely. Recent colonoscopy identified no malignancy or other possible causes.    Renal cell cancer, right  ESRD on hemodialysis      PLAN:   Will plan for possible EGD and colonoscopy 8/17/2018 if signs of active bleeding  Monitor hgb and stool  Hold coumadin  Antibiotics per ID, dialysis per nephro, chemo per onc    Electronically signed by:  Alexi Guillen MD   PGY-1, [facility]  [phone]  8/15/2018    12:10 PM

## 2018-08-15 NOTE — H&P
found to be positive  She has been on Coumadin for DVT    Colonoscopy 8/2016:  Findings:  Terminal ileum: normal   Cecum/Ascending colon: abnormal: Rt. colon polyps, 3 polyps, one large one 1.2 cm, removed with snare.   Transverse colon: abnormal: t colon sessile polyp, 6 mm, hot bx forceps    Descending/Sigmoid colon: abnormal: Rare diverticula   sigmoid polyps, 2 of them larger is 8 mm, snared    Rectum/Anus: examined in normal and retroflexed positions and was normal    The patient does have a known history of metastatic renal cell cancer  She is receiving palliative radiation and immunotherapy / chemo    Data base has included:    INR 2.3    POC Lactic Acid 4.39 (H)    POC Ionized Calcium 1.02 (L)    Hemoglobin 6.6 (LL)    BUN 43 (H) mg/dL   CREATININE 4.03 (H)    Doppler from 7/28/18:   Extensive acute deep venous thrombosis extending from the gastrocnemius    vein into the external iliac vein.    Acute superficial venous thrombosis identified in the small saphenous    vein. Ct Abdomen Pelvis Wo Contrast Additional Contrast? None  Result Date: 8/1/2018  Increasing metastatic disease along the surface of the liver Decreasing mesenteric adenopathy Stable heterogeneous enlargement of the right kidney Ascites Developing metastatic disease involving the bony pelvis as described. Incomplete distention of the ascending colon raising the question of mild colitis. Correlate clinically. Fat stranding throughout the abdomen pelvis raising the question of third-spacing of fluid. Ct Chest Wo Contrast  Result Date: 8/1/2018  Right larger than left pleural effusions with associated right greater than left basilar consolidation. This may represent passive atelectasis from the effusions. Pneumonia not excluded No discrete pulmonary nodules Rounded lucency along the inferior endplate of T8. This may represent a developing Schmorl's node deformity.   A small metastatic lesion would be difficult to exclude given that this is a new finding. Upper abdominal findings discussed separately. Small right breast nodular density. Correlate with mammographic history         Past Medical History:     Past Medical History:   Diagnosis Date    Cancer ([address]     kidney    Chronic kidney disease     Depression     Diabetes mellitus (HCC)     type 2    GERD (gastroesophageal reflux disease)     HA (headache)     HBP (high blood pressure)     History of blood transfusion     no reaction    Hyperlipidemia     Hyperplastic polyp of intestine     Hypothyroid 11/26/2016    Hypothyroidism     Left knee pain     Non-smoker     OA (osteoarthritis)     bilat knees    Tubular adenoma         Past Surgical History:     Past Surgical History:   Procedure Laterality Date    BACK SURGERY N/A 01/23/2018    Post T11-L2 Fusion    COLONOSCOPY  08/16/2016    hyperplastic polyp and tubular adenoma     HYSTERECTOMY      NERVE BLOCK  07/13/2018    aleta trigger point neck #1    OR LUMBAR SPINE FUSN,POST INTRBDY N/A 1/23/2018    T11-L3 POSTERIOR FIXATION AND FUSION, SPINE ABBY C-ARM, O-ARM WITH STEALTH,  EVOKES- [id number] LJ performed by Shukri Rios, DO at [facility]          Medications Prior to Admission:     Prior to Admission medications    Medication Sig Start Date End Date Taking? Authorizing Provider   warfarin (COUMADIN) 2.5 MG tablet Take 1 tablet by mouth daily 8/3/18  Yes Anil Mc MD   gabapentin (NEURONTIN) 100 MG capsule Take 1 capsule by mouth 3 times daily for 30 days. . 8/1/18 8/31/18 Yes Anil Mc MD   cyclobenzaprine (FLEXERIL) 5 MG tablet TAKE 1 TABLET BY MOUTH THREE TIMES A DAY AS NEEDED FOR MUSCLE SPASMS 7/17/18  Yes Sarita Garcia MD   HUMALOG 100 UNIT/ML injection vial With insulin pump 5/16/18  Yes Historical Provider, MD   atorvastatin (LIPITOR) 20 MG tablet TAKE 1 TABLET BY MOUTH ONE TIME A DAY 4/11/18  Yes Shivam Sparks MD   omeprazole (PRILOSEC) 40 MG delayed release capsule Take 1 capsule by mouth daily 3/28/18  Yes Lloyd Dyer MD   amitriptyline (ELAVIL) 75 MG tablet Take 1 tablet by mouth nightly 3/28/18  Yes Lloyd yDer MD   prochlorperazine (COMPAZINE) 5 MG tablet Take 5 mg by mouth every 8 hours as needed  2/8/18  Yes Historical Provider, MD   docusate sodium (COLACE) 100 MG capsule Take 2 capsules by mouth 2 times daily 1/31/18  Yes Lloyd Dyre MD   acetaminophen (TYLENOL) 500 MG tablet Take 1,000 mg by mouth 3 times daily   Yes Historical Provider, MD   megestrol (MEGACE) 40 MG/ML suspension Take 10 mLs by mouth daily 8/3/18   Jose Mobley MD   oxyCODONE HCl (OXY-IR) 10 MG immediate release tablet Take 1 tablet by mouth every 6 hours as needed for Pain for up to 30 days. Intended supply: 30 days. 8/3/18 9/2/18  Griffin Weinberg MD   Insulin Disposable Pump (V-GO 20) KIT  4/26/18   Historical Provider, MD CALDWELL ULTRAFINE III SHORT PEN 31G X 8 MM MISC  4/13/18   Historical Provider, MD   ondansetron (ZOFRAN) 4 MG tablet Take 1 tablet by mouth daily as needed for Nausea or Vomiting 4/10/18   Stevenson Pretty MD   Blood Glucose Monitoring Suppl (TRUE METRIX METER) w/Device KIT  2/2/18   Historical Provider, MD   Lancets MISC Patient testing 3 times daily 2/2/18   Lloyd Dyer MD   glucose blood VI test strips (ASCENSIA AUTODISC VI;ONE TOUCH ULTRA TEST VI) strip Use with associated glucose meter. Patient testing three times daily 2/2/18   Lloyd Dyer MD   Insulin Pen Needle 32G X 4 MM MISC 2 each by Does not apply route 2 times daily    Historical Provider, MD        Allergies:     Patient has no known allergies. Social History:     Tobacco:    reports that she has never smoked. She has never used smokeless tobacco.  Alcohol:      reports that she does not drink alcohol. Drug Use:  reports that she does not use drugs.     Family History:     Family History   Problem Relation Age of Onset    Heart Disease Mother     Diabetes Mother     Cancer Father respiratory distress. She has no wheezes. She has no rales. Abdominal: Soft. Bowel sounds are normal. She exhibits no distension. There is no tenderness. There is no rebound and no guarding. Genitourinary:   Genitourinary Comments: Urine reported as cloudy and foul smelling   Musculoskeletal: She exhibits no edema or tenderness. Neurological: She is alert and oriented to person, place, and time. Skin: Skin is warm and dry. She is not diaphoretic. There is pallor. Investigations:      Laboratory Testing:  Recent Results (from the past 24 hour(s))   EKG 12 Lead    Collection Time: 08/14/18  2:13 PM   Result Value Ref Range    Ventricular Rate 89 BPM    Atrial Rate 89 BPM    P-R Interval 152 ms    QRS Duration 78 ms    Q-T Interval 368 ms    QTc Calculation (Bazett) 447 ms    R Axis -3 degrees    T Axis 22 degrees   POCT troponin    Collection Time: 08/14/18  2:20 PM   Result Value Ref Range    POC Troponin I 0.00 0.00 - 0.10 ng/mL    POC Troponin Interp       The Troponin-I (POC) results cannot be compared to the Troponin-T results.    Venous Blood Gas, POC    Collection Time: 08/14/18  2:22 PM   Result Value Ref Range    pH, Edson 7.491 (H) 7.320 - 7.430    pCO2, Edson 35.5 (L) 41.0 - 51.0 mm Hg    pO2, Edson 18.7 (L) 30.0 - 50.0 mm Hg    HCO3, Venous 27.1 22.0 - 29.0 mmol/L    Total CO2, Venous 28 23.0 - 30.0 mmol/L    Negative Base Excess, Edson NOT REPORTED 0.0 - 2.0    Positive Base Excess, Edson 4 (H) 0.0 - 3.0    O2 Sat, Edson 33 (L) 60.0 - 85.0 %    O2 Device/Flow/% NOT REPORTED     Marv Test NOT REPORTED     Sample Site NOT REPORTED     Mode NOT REPORTED     FIO2 NOT REPORTED     Pt Temp NOT REPORTED     POC pH Temp NOT REPORTED     POC pCO2 Temp NOT REPORTED mm Hg    POC pO2 Temp NOT REPORTED mm Hg   Hemoglobin and hematocrit, blood    Collection Time: 08/14/18  2:22 PM   Result Value Ref Range    POC Hemoglobin 6.6 (LL) 12.0 - 16.0 g/dL    POC Hematocrit 19 (L) 36 - 46 %   Creatinine W/GFR Point of

## 2018-08-15 NOTE — CARE COORDINATION
Case Management Initial Discharge Plan  Kevin Manrique,         Readmission Risk              Risk of Unplanned Readmission:        52               Met with:patient to discuss discharge plans. Information verified: address, contacts, phone number, , insurance Yes  PCP: Carissa Hightower MD  Date of last visit: 2018    Insurance Provider: Alejandra Germain    Discharge Planning    Living Arrangements:  Alone Prior to this hospitalization was at [facility]:  [id number] [address] has 1 stories  2 stairs to climb to get into front door, n/a stairs to climb to reach second floor  Location of bedroom/bathroom in home main    Patient able to perform ADL's:Assisted    Current Services (outpatient & in home) [facility]  DME equipment: raised toilet seat, shower bench, walker  DME provider: walker and shower chair personal toilet seat [address] [employer]: Niraj Warren on [employer] Medications:  No  Does patient want to participate in local refill/ meds to beds program?  No    Potential Assistance Needed:  Sudarshan Regalado    Patient agreeable to home care: Yes  Freedom of choice provided:  yes    Prior SNF/Rehab Placement and Facility: [address] [place] [address] Road to SNF/Rehab: Yes  [place] of choice provided: yes   Evaluation: n/a    Expected Discharge date:     Patient expects to be discharged to:  [facility]  Follow Up Appointment: Best Day/ Time:      Transportation provider: Jan Greco vs family  Transportation arrangements needed for discharge: Yes    Discharge Plan: Referral to [place].          Electronically signed by Aurora Munoz RN on 8/15/18 at 12:33 PM

## 2018-08-15 NOTE — PROGRESS NOTES
AND FUSION, SPINE ABBY C-ARM, O-ARM WITH STEALTH,  [facility]- [id number] Rich performed by Bell Hester DO at [address] [facility]    Family History   Problem Relation Age of Onset    Heart Disease Mother     Diabetes Mother     Cancer Father         esophageal cancer    Cancer Maternal Grandfather         colon       SOCIAL HISTORY    Social History   Substance Use Topics    Smoking status: Never Smoker    Smokeless tobacco: Never Used    Alcohol use No       ALLERGIES    No Known Allergies        Objective:      BP (!) 124/42   Pulse 83   Temp 97.3 °F (36.3 °C) (Oral)   Resp 12   Ht 5' 8\" (1.727 m)   Wt 187 lb (84.8 kg)   SpO2 96%   BMI 28.43 kg/m²   Bae Risk Score: Abe Scale Score: 17    LABS    CBC:   Lab Results   Component Value Date    WBC 5.0 08/15/2018    RBC 2.79 08/15/2018    HGB 8.3 08/15/2018     CMP:  Albumin:    Lab Results   Component Value Date    LABALBU 2.1 07/27/2018     PT/INR:    Lab Results   Component Value Date    PROTIME 23.1 08/15/2018    INR 2.3 08/15/2018     HgBA1c:    Lab Results   Component Value Date    LABA1C 6.2 01/31/2018     PTT: No components found for: LABPTT      Assessment:     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

## 2018-08-15 NOTE — PLAN OF CARE
Problem: Nutrition  Goal: Optimal nutrition therapy  Outcome: Ongoing  Nutrition Problem: Increased nutrient needs  Intervention: Food and/or Nutrient Delivery: Start oral diet/ONS as able. Nutritional Goals: Intake of 50% or greater of meals/ONS when diet is advanced.

## 2018-08-15 NOTE — PROGRESS NOTES
Multiple call attempt by writer for dietary to come up to get a diet order for patient. So far messages left, but no response to all.

## 2018-08-15 NOTE — PROGRESS NOTES
Overweight  · Comparative Standards (Estimated Nutrition Needs):  · Estimated Daily Total Kcal: 8306-5653 kcal per day  · Estimated Daily Protein (g):  grams per day    Estimated Intake vs Estimated Needs: Intake Less Than Needs    Nutrition Risk Level: High    Nutrition Interventions:    (Start oral diet/ONS as able.)  Continued Inpatient Monitoring, Education Not Indicated    Nutrition Evaluation:   · Evaluation: Goals set   · Goals: Intake of 50% or greater of meals/ONS when diet is advanced. · Monitoring: NPO Status, Wound Healing, Ascites/Edema, Weight, Comparative Standards, Pertinent Labs    See Adult Nutrition Doc Flowsheet for more detail. Electronically signed by Miguel Wagoner. [id number] on 8/15/18 at 2:41 PM    Contact Number: [phone]

## 2018-08-15 NOTE — CONSULTS
Division of Vascular Surgery        New Consult      Physician Requesting Consult:  Isis Shah DO    Reason for Consult:   Extensive DVT, GI bleed, Anemia    Chief Complaint:      Leg swelling    History of Present Illness: Adrián Santos is a 66 y. o.right handed woman who presents from her assisted living facility with anemia and positive guic of her stools. She was recently started on anticoagulation with coumadin for extensive left lower extremity DVT extending from her calf to her iliac veins. She has metastatic renal cell carcinoma and is undergoing palliative radiation and immunotherapy for this. This was discovered after she fell out of bed in January and was found to have a pathologic fracture and lytic lesion of her L1 spine and incidental finding of a large right renal mass. She also has developed acute on chronic renal failure and is undergoing hemodialysis MWF through a tunneled dialysis catheter for a few months now. Since her last admission in late July for leg pain and swelling she has been on anticoagulation. Due to her weakness and being in bed she did develop decubitus ulcer and blistering of her heels. She has gotten stronger since her last admission, but still feels a little weak when she ambulates.     Medical History:     Past Medical History:   Diagnosis Date    Cancer [facility])     kidney    Chronic kidney disease     Depression     Diabetes mellitus (HCC)     type 2    GERD (gastroesophageal reflux disease)     HA (headache)     HBP (high blood pressure)     History of blood transfusion     no reaction    Hyperlipidemia     Hyperplastic polyp of intestine     Hypothyroid 11/26/2016    Hypothyroidism     Left knee pain     Non-smoker     OA (osteoarthritis)     bilat knees    Tubular adenoma        Surgical History:     Past Surgical History:   Procedure Laterality Date    BACK SURGERY N/A 01/23/2018    Post T11-L2 Fusion    COLONOSCOPY  08/16/2016 hyperplastic polyp and tubular adenoma     HYSTERECTOMY      NERVE BLOCK  07/13/2018    aleta trigger point neck #1    [facility] LUMBAR SPINE FUSN,POST INTRBDY N/A 1/23/2018    T11-L3 POSTERIOR FIXATION AND FUSION, SPINE ABBY C-ARM, O-ARM WITH STEALTH,  [facility]- [id number] [facility]         Family History:     Family History   Problem Relation Age of Onset    Heart Disease Mother     Diabetes Mother     Cancer Father         esophageal cancer    Cancer Maternal Grandfather         colon       Allergies:       Patient has no known allergies.     Medications:      Current Facility-Administered Medications   Medication Dose Route Frequency Provider Last Rate Last Dose    cefUROXime (CEFTIN) tablet 250 mg  250 mg Oral 2 times per day Josselin Stinson DO        0.9 % sodium chloride bolus  250 mL Intravenous Once Arslan Eubanks DO        docusate sodium (COLACE) capsule 200 mg  200 mg Oral BID Josselin Stinson DO        prochlorperazine (COMPAZINE) tablet 5 mg  5 mg Oral Q8H PRN Josselin Stinson DO        amitriptyline (ELAVIL) tablet 75 mg  75 mg Oral Nightly Josselin Stinson DO   75 mg at 08/14/18 2259    ondansetron (ZOFRAN) tablet 4 mg  4 mg Oral Daily PRN Josselin Stinson DO        atorvastatin (LIPITOR) tablet 20 mg  20 mg Oral Nightly Josselin Stinson DO   20 mg at 08/14/18 2258    gabapentin (NEURONTIN) capsule 100 mg  100 mg Oral TID Josselin Stinson DO   100 mg at 08/15/18 1428    oxyCODONE (OXYCONTIN) extended release tablet 10 mg  10 mg Oral BID Josselin Stinson DO   10 mg at 08/15/18 0920    oxyCODONE (ROXICODONE) immediate release tablet 10 mg  10 mg Oral Q6H PRN Josselin Stinson DO   10 mg at 08/15/18 1433    megestrol (MEGACE) 40 MG/ML suspension 400 mg  400 mg Oral Daily Josselin Stinson DO        0.9 % sodium chloride infusion   Intravenous Continuous Josselin Stinson DO 50 mL/hr at 08/15/18 1235      sodium chloride flush 0.9 % injection 10 mL  10 mL Intravenous 2 times per day Sharene Anchors, DO        sodium chloride flush 0.9 % injection 10 mL  10 mL Intravenous PRN Sharene Anchors, DO        ondansetron [facility]) injection 4 mg  4 mg Intravenous Q6H PRN Sharene Anchors, DO        pantoprazole (PROTONIX) 80 mg in sodium chloride 0.9 % 100 mL infusion  8 mg/hr Intravenous Continuous Sharene Anchors, DO 10 mL/hr at 08/15/18 1018 8 mg/hr at 08/15/18 1018    insulin lispro (HUMALOG) injection vial 0-12 Units  0-12 Units Subcutaneous TID WC Sharene Anchors, DO        insulin lispro (HUMALOG) injection vial 0-6 Units  0-6 Units Subcutaneous Nightly Sharene Anchors, DO        acetaminophen (TYLENOL) tablet 650 mg  650 mg Oral TID PRN Judy Cuna, APRN - CNP        HYDROcodone-acetaminophen (NORCO) 5-325 MG per tablet 1 tablet  1 tablet Oral Q4H PRN Judy Cuna, APRN - CNP        Or    HYDROcodone-acetaminophen (NORCO) 5-325 MG per tablet 2 tablet  2 tablet Oral Q4H PRN Judy Cuna, APRN - CNP           Social History:     Tobacco:    reports that she has never smoked. She has never used smokeless tobacco.  Alcohol:      reports that she does not drink alcohol. Drug Use:  reports that she does not use drugs. Occupation:  Retired Book Keeper,  for engineering firm    Review of Systems:     Review of Systems   Constitutional: Positive for activity change (decreased), fatigue and unexpected weight change. Negative for chills and fever. HENT: Negative. Eyes: Negative. Respiratory: Negative for chest tightness and shortness of breath. Cardiovascular: Positive for leg swelling. Negative for chest pain. Gastrointestinal: Negative for abdominal pain. Endocrine: Negative. Genitourinary: Negative. Musculoskeletal: Positive for back pain and gait problem. Skin: Positive for wound. Allergic/Immunologic: Negative.     Neurological: Positive for

## 2018-08-15 NOTE — PROGRESS NOTES
Vascular MD here at bedside. States he sees that she has dialysis wed, tonight? Writer does not see order for. Writer calls dialysis unit to inquire. They see she is admitted, will call to see if she needs done tonight.

## 2018-08-16 NOTE — BRIEF OP NOTE
Brief Postoperative Note    Courtney Salgado  YOB: 1955  [id number]    Pre-operative Diagnosis: Metastatic renal cell carcinoma, Extensive left lower extremity DVT, GI bleeding, Anemia    Post-operative Diagnosis: Same    Procedure:   1) US guided access of right common femoral vein  2) Inferior vena cavogram  3) Placement of permanent Saundra inferior vena cava filter    Anesthesia: Local    Surgeons/Assistants: Jaime    Estimated Blood Loss: 5 ml    Complications: None    Findings: Renal cell carcinoma with extension into IVC, see picture below. Successful deployment of filter below renal veins.             Electronically signed by Dayo Santo MD on 8/16/2018 at 3:37 PM

## 2018-08-16 NOTE — PROGRESS NOTES
Dialysis Post Treatment Note  Patient tolerated treatment well. Denies complaints at time of discharge. Vitals:    08/16/18 1509   BP: (!) 125/43   Pulse: 86   Resp: 21   Temp: 99.3 °F (37.4 °C)   SpO2: 99%     Pre-Weight = 87.3kg  Post-weight = Weight: 190 lb 4.1 oz (86.3 kg)  Total Liters Processed =  107.9  Rinseback Volume (mL) = Rinseback Volume (ml): 370 ml  Net Removal (mL) = [phone]=4698 total removed. Pt completed 4 hours treatment, norco x2 tab given at beginning of treatment, pt c/o of pain. Pt to cath lab after dialysis for GFF.

## 2018-08-16 NOTE — PROGRESS NOTES
THE [facility] AT [place] [facility] Progress Note    Cleveland Walters is a 66 y.o. female patient. Hospitalization Day:2      Chief consult reason: anemia    Subjective:  Patient seen in dialysis, doing well unchanged from yesterday  Stool reported to be lighter  Denies headache, dyspnea, lightheadedness      VITALS:  BP (!) 129/55   Pulse 82   Temp 98 °F (36.7 °C)   Resp 18   Ht 5' 8\" (1.727 m)   Wt 192 lb 7.4 oz (87.3 kg)   SpO2 96%   BMI 29.26 kg/m²   TEMPERATURE:  Current - Temp: 98 °F (36.7 °C); Max - Temp  Av °F (36.7 °C)  Min: 97.3 °F (36.3 °C)  Max: 98.7 °F (37.1 °C)    Physical Assessment:  General appearance:  alert, cooperative and no distress  Mental Status:  oriented to person, place and time and normal affect  Lungs:  clear to auscultation bilaterally, normal effort  Heart:  regular rate and rhythm, no murmur  Abdomen:  soft, nontender, nondistended, normal bowel sounds, no masses, hepatomegaly, splenomegaly  Extremities:  no edema, redness, tenderness in the calves  Skin:  no gross lesions, rashes, induration    Data Review:  LABS and IMAGING:     CBC  Recent Labs      18   1436   08/15/18   0544  08/15/18   1200  08/15/18   1745  08/15/18   2335  18   0529   WBC  7.3   --   5.0   --    --    --   5.0   HGB  6.6*   < >  7.9*  8.3*  8.2*  8.1*  7.7*   MCV  97.9   --   93.9   --    --    --   95.9   RDW  19.5*   --   18.8*   --    --    --   18.9*   PLT  367   --   310   --    --    --   287    < > = values in this interval not displayed. ANEMIA STUDIES  No results for input(s): LABIRON, TIBC, FERRITIN, [id number], FOLATE, OCCULTBLD in the last 72 hours.     BMP  Recent Labs      18   1436  08/15/18   0544  18   0529   NA  140  139  139   K  4.1  4.4  3.9   CL  97*  99  100   CO2  26  27  24   BUN  35*  43*  52*   CREATININE  3.44*  4.03*  4.24*   GLUCOSE  153*  116*  97   CALCIUM  8.2*  7.9*  7.8*       LFTS  Recent Labs      18   0529   ALKPHOS  99   ALT  <5*

## 2018-08-16 NOTE — PROGRESS NOTES
Nephrology Progress Note        Subjective: patient evaluated on dialysis. Pt is stable on dialysis. Tunnel catheter works well has no problems. No new issues overnite. Stable hemodynamics. Plan for Greene Filter placement today, due to high risk of anti-coagulation which is required for extensive lower extremity DVT. Vascular surgery input noted. Objective:  CURRENT TEMPERATURE:  Temp: 98 °F (36.7 °C)  MAXIMUM TEMPERATURE OVER 24HRS:  Temp (24hrs), Av °F (36.7 °C), Min:97.3 °F (36.3 °C), Max:98.7 °F (37.1 °C)    CURRENT RESPIRATORY RATE:  Resp: 18  CURRENT PULSE:  Pulse: 82  CURRENT BLOOD PRESSURE:  BP: (!) 129/55  24HR BLOOD PRESSURE RANGE:  Systolic (27YFY), LNB:121 , Min:105 , LMA:882   ; Diastolic (47NJK), VDY:90, Min:42, Max:84    24HR INTAKE/OUTPUT:  No intake or output data in the 24 hours ending 18 1209  Patient Vitals for the past 96 hrs (Last 3 readings):   Weight   18 0800 192 lb 7.4 oz (87.3 kg)   18 1413 187 lb (84.8 kg)         Physical Exam:  General appearance:Awake, alert, in no acute distress  Skin: warm and dry, no rash or erythema  Eyes: conjunctivae normal and sclera anicteric  ENT:no thrush no pharyngeal congestion   Neck:  No JVD, No Thyromegaly  Pulmonary: clear to auscultation and no wheezing or rhonchi   Cardiovascular: normal S1 and S2. NO rubs and NO murmur.  No S3 OR S4   Abdomen: soft nontender, bowel sounds present, no organomegaly,  no ascites   Extremities: no cyanosis, clubbing or edema     Access:  previous permacath    Current Medications:      glucose (GLUTOSE) 40 % oral gel 15 g PRN   dextrose 50 % solution 12.5 g PRN   glucagon (rDNA) injection 1 mg PRN   dextrose 5 % solution PRN   heparin (porcine) injection 1,600 Units PRN   cefUROXime (CEFTIN) tablet 250 mg 2 times per day   cyclobenzaprine (FLEXERIL) tablet 5 mg TID PRN   0.9 % sodium chloride bolus Once   docusate sodium (COLACE) capsule 200 mg BID   prochlorperazine (COMPAZINE) tablet 5 mg Q8H PRN   amitriptyline (ELAVIL) tablet 75 mg Nightly   ondansetron (ZOFRAN) tablet 4 mg Daily PRN   atorvastatin (LIPITOR) tablet 20 mg Nightly   gabapentin (NEURONTIN) capsule 100 mg TID   oxyCODONE (OXYCONTIN) extended release tablet 10 mg BID   oxyCODONE (ROXICODONE) immediate release tablet 10 mg Q6H PRN   megestrol (MEGACE) 40 MG/ML suspension 400 mg Daily   0.9 % sodium chloride infusion Continuous   sodium chloride flush 0.9 % injection 10 mL 2 times per day   sodium chloride flush 0.9 % injection 10 mL PRN   ondansetron (ZOFRAN) injection 4 mg Q6H PRN   pantoprazole (PROTONIX) 80 mg in sodium chloride 0.9 % 100 mL infusion Continuous   insulin lispro (HUMALOG) injection vial 0-12 Units TID WC   insulin lispro (HUMALOG) injection vial 0-6 Units Nightly   acetaminophen (TYLENOL) tablet 650 mg TID PRN   HYDROcodone-acetaminophen (NORCO) 5-325 MG per tablet 1 tablet Q4H PRN   Or    HYDROcodone-acetaminophen (NORCO) 5-325 MG per tablet 2 tablet Q4H PRN     Labs:   CBC: Recent Labs      08/14/18   1436   08/15/18   0544   08/15/18   1745  08/15/18   2335  08/16/18   0529   WBC  7.3   --   5.0   --    --    --   5.0   RBC  2.36*   --   2.79*   --    --    --   2.69*   HGB  6.6*   < >  7.9*   < >  8.2*  8.1*  7.7*   HCT  23.1*   --   26.2*   --    --    --   25.8*   PLT  367   --   310   --    --    --   287   MPV  9.4   --   9.6   --    --    --   9.3    < > = values in this interval not displayed. BMP: Recent Labs      08/14/18   1436  08/15/18   0544  08/16/18   0529   NA  140  139  139   K  4.1  4.4  3.9   CL  97*  99  100   CO2  26  27  24   BUN  35*  43*  52*   CREATININE  3.44*  4.03*  4.24*   GLUCOSE  153*  116*  97   CALCIUM  8.2*  7.9*  7.8*        Phosphorus:  No results for input(s): PHOS in the last 72 hours. Magnesium: No results for input(s): MG in the last 72 hours.   Albumin:   Recent Labs      08/16/18   0529   LABALBU  1.6*       Dialysis bath: Dialysis Bath  K+ (Potassium): 3  Ca+

## 2018-08-16 NOTE — TELEPHONE ENCOUNTER
I received a request for Botox for this pt from Dr Richard Muhammad. I printed the required form, filled it out and faxed it along with clinicals to [facility].

## 2018-08-16 NOTE — PROGRESS NOTES
no distension. There is no tenderness. There is no rebound and no guarding. Musculoskeletal: She exhibits edema (Mild). She exhibits no tenderness. Neurological: She is alert and oriented to person, place, and time. Skin: Skin is warm and dry. She is not diaphoretic. Data:     I/O (24Hr): Intake/Output Summary (Last 24 hours) at 08/16/18 1814  Last data filed at 08/16/18 1521   Gross per 24 hour   Intake                0 ml   Output             2570 ml   Net            -2570 ml       Labs:    Hematology:  Recent Labs      08/14/18   1436   08/15/18   0544   08/15/18   2335  08/16/18   0529  08/16/18   1336   WBC  7.3   --   5.0   --    --   5.0   --    HGB  6.6*   < >  7.9*   < >  8.1*  7.7*  7.7*   HCT  23.1*   --   26.2*   --    --   25.8*   --    PLT  367   --   310   --    --   287   --    INR  2.5   --   2.3   --    --   2.8   --     < > = values in this interval not displayed.      Chemistry:  Recent Labs      08/14/18   1436  08/15/18   0544  08/16/18   0529   NA  140  139  139   K  4.1  4.4  3.9   CL  97*  99  100   CO2  26  27  24   GLUCOSE  153*  116*  97   BUN  35*  43*  52*   CREATININE  3.44*  4.03*  4.24*   CALCIUM  8.2*  7.9*  7.8*     Recent Labs      08/14/18   1420  08/14/18   1652  08/16/18   0529   PROT   --    --   4.3*   LABALBU   --    --   1.6*   AST   --    --   8   ALT   --    --   <5*   ALKPHOS   --    --   99   BILITOT   --    --   0.18*   BILIDIR   --    --   0.09   TROPONINI  0.00  0.00   --        Lab Results   Component Value Date/Time    SPECIAL NOT REPORTED 07/25/2018 05:53 PM     Lab Results   Component Value Date/Time    CULTURE NO SIGNIFICANT GROWTH 07/25/2018 05:53 PM       Lab Results   Component Value Date    PHART 7.292 01/23/2018    RWU3QRJ 39.0 01/23/2018    PO2ART 159.0 01/23/2018    YXB0MPA 18.3 01/23/2018    NBEA 7.2 01/23/2018    PBEA NOT REPORTED 01/23/2018    I4ICOTEA 99.5 01/23/2018    FIO2 NOT REPORTED 08/14/2018       Radiology:  See

## 2018-08-16 NOTE — PROGRESS NOTES
PT to unit at 1445. Vitals taken and stable. Rt groin assessed and no bleeding/dressing clean dry intact. PT understands to lie flat for 2 hours.

## 2018-08-17 NOTE — PROGRESS NOTES
[facility]  Occupational Therapy Not Seen Note    DATE: 2018  Name: Kevin Manrique  : 1955  MRN: [id number]    Patient not available for Occupational Therapy due to:    [] Testing:    [] Hemodialysis    [] Blood Transfusion in Progress    []Refusal by Patient:    [x] Surgery/Procedure: EGD     [] Strict Bedrest    [] Sedation    [] Spine Precautions     [] Pt being transferred to palliative care at this time. Spoke with pt/family and OT services to be defered. [] Pt independent with functional mobility and functional tasks.  Pt with no OT acute care needs at this time, will defer OT eval.    [] Other    Next Scheduled Treatment: Check PM (evaluation for placement)    Signature: JESSENIA Brush/L

## 2018-08-17 NOTE — OP NOTE
[facility]                                 OPERATIVE REPORT    PATIENT NAME: Noreen Enriquez                 :        1955  MED REC NO:   [id number]                             ROOM:         ACCOUNT NO:   [de-identified]                           ADMIT DATE: 2018  PROVIDER:     Clemente Sharpe MD    DATE OF PROCEDURE:  2018    PREOPERATIVE DIAGNOSES:  1. Metastatic renal cell carcinoma. 2.  Extensive left lower extremity DVT. 3.  GI bleeding. 4.  Anemia. 5.  End-stage renal disease, on hemodialysis. POSTOPERATIVE DIAGNOSES:  1. Metastatic renal cell carcinoma. 2.  Extensive left lower extremity DVT. 3.  GI bleeding. 4.  Anemia. 5.  End-stage renal disease, on hemodialysis. PROCEDURES:  1. Ultrasound-guided access of right common femoral vein. 2.  Inferior vena cavogram.  3.  Placement of permanent [place] inferior vena cava filter. ANESTHESIA:  Local.    SURGEON:  Clemente Sharpe MD    ESTIMATED BLOOD LOSS:  5 mL. FINDINGS:  Renal cell carcinoma with extension of tumor into the inferior  vena cava, please see images in the brief operative note; successful  deployment of filter below renal veins. INDICATION FOR PROCEDURE:  The patient is a 30-year-old woman with  metastatic renal cell carcinoma who had developed extensive left lower  extremity DVT several weeks ago and was treated with anticoagulation with  Coumadin. She presented with anemia with hemoglobin in the 5 range as well  as guaiac-positive stools. She was evaluated, and given the concern of  extensive DVT and contraindication now to anticoagulation, she was  recommended to undergo IVC filter placement.   I explained the risks and  benefits of the procedure including risk of caval thrombosis due to her  renal cell carcinoma and hypercoagulability state while not being  on anticoagulation; however, I do think that this would offset the  risk of a major pulmonary embolus. She understood and consented to  proceed. DESCRIPTION OF PROCEDURE:  The patient was brought to the catheterization  suite. The right groin area was prepped and draped in standard sterile  fashion. Time-out was performed and agreed upon. I evaluated the common  femoral vein on the right side. It was compressible. I gave local  anesthetic, and under ultrasound guidance using a micropuncture technique,  I accessed the common femoral vein and placed a micropuncture sheath in. I  then placed an Amplatz wire in. I performed fluoroscopy to make sure that  the wire went into the inferior vena cava without difficulty, which it did. I then dilated the track with the dilators and then placed the [place]  dilator and sheath and brought it into the inferior vena cava. Dilator was  removed, and an inferior vena cavogram was performed. This revealed a  large abutting lesion coming out of the right renal vein into the inferior  vena cava; otherwise, no thrombus was seen or other filling defects. At  this point, the [place] filter was then brought in through the sheath  and deployed successfully below the renal veins. The wire and sheath were  removed, and manual pressure was held for hemostasis. The patient  tolerated the procedure well. PLAN:  The patient will continue her GI workup for bleeding. When it is  safe to restart her anticoagulation, she should be on anticoagulation.         Anuradha Hicks MD    D: 08/16/2018 15:49:16       T: 08/16/2018 15:50:17     [id number]  Job#: [id number]     Doc#: [id number]    CC:

## 2018-08-17 NOTE — OP NOTE
[facility]     PROCEDURE DATE: 08/17/18    REFERRING PHYSICIAN: No ref. provider found     PRIMARY CARE PROVIDER: Adam Darnell MD    ATTENDING PHYSICIAN: Libby Villagran MD     HISTORY: Ms. Girma Edwards is a 66 y.o. female who presents to Endoscopy unit for upper endoscopy. The patient's clinical history is remarkable for . She is currently medically stable and appropriate for the planned procedure. PREOPERATIVE DIAGNOSIS: anemia and UGIB. PROCEDURES:   1) Transoral Upper Endoscopy    POSTOPERATIVE DIAGNOSIS: Diffuse antral erythema with some evidence of linear streaky pattern. 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 MEDICATIONS:   MAC per anesthesia     INSTRUMENT: Olympus GIF-H180  flexible Gastroscope. PREPARATION: The nature and character of the procedure as well as risks, benefits, and alternatives were discussed with the patient and informed consent was obtained. Complications were said to include, but were not limited to: medication allergy, medication reaction, cardiovascular and respiratory problems, bleeding, perforation, infection, and/or missed diagnosis. Following arrival in the endoscopy room, the patient was placed in the left lateral decubitus position and final time-out accomplished in the presence of the nursing staff. Baseline vital signs were obtained and reviewed, and IV sedation was subsequently initiated. FINDINGS:   Esophagus: The esophagus was inspected to the Z-line. The endoscopic exam showed grossly normal findings. Stomach: The stomach was inspected in both forward and retroflex fashion and was appropriately distensible. The cardia, fundus, incisura, antrum and pylorus were identified via direct visualization. The endoscopic exam showed areas of streaky erythema localized to the antrum. Findings suggestive of GAVE. No signs of active bleeding Bx taken of the area for histopath evaluation.      Duodenum: The proximal small bowel was inspected through the bulb, sweep, and second portion of the duodenum. The endoscopic exam showed unremarkable findings. RECOMMENDATIONS:   1) Avoid NSAIDS, smoking, and alcohol. 2) Continue patient on omeprazole 40mg daily, patient will remain at risk for bleeding given endoscopic findings  3) Continue to monitor Hg for 24 hrs, if stable, then patient is ok for disposition from GI standpoint. 4) Can restart patient on previous diet  5) Call GI for any further questions.      Lyn Walls MD

## 2018-08-17 NOTE — PROGRESS NOTES
Physical Therapy  DATE: 2018    NAME: Maria Esther Serna  MRN: [id number]   : 1955    Patient not seen this date for Physical Therapy due to:  [] Blood transfusion in progress  [] Hemodialysis  []  Patient Declined  [] Spine Precautions   [] Strict Bedrest  [x] Surgery/ Procedure--pt still at EGD 12:40 and 4419; ck AM 18  [] Testing      [] Other        [] PT being discontinued at this time. Patient independent. No further needs. [] PT being discontinued at this time as the patient has been transferred to palliative care. No further needs.     Jeevan De Leon, PT

## 2018-08-17 NOTE — PROGRESS NOTES
[facility]    Progress Note    8/17/2018    4:40 PM    Name:   Moreno Alejandra  MRN:     [id number]     [place]:      [de-identified]   Room:   2004/2004-01  IP Day:  3  Admit Date:  8/14/2018  2:12 PM    PCP:   Tavo Pineda MD  Code Status:  Full Code    Subjective:     C/C:   Chief Complaint   Patient presents with    Abnormal Lab    Rectal Bleeding     Interval History Status:      Back from endoscopy - \"gastritis\"  Doing well with liquids  No pain  No bleeding  Last BM yesterday - pa  Wants to go home - worried about losing her bed at [facility] updates:  INR 2.1  Hemoglobin 7.8 (L)    Awaiting urine culture    Brief History:     The patient is a 66 y.o. female who presents with:   Abnormal Lab and Rectal Bleeding      Patient was admitted thru ER with:  \"Greta Garza a 66 y.o. female with a past medical history of end-stage renal disease on dialysis, history of renal cell carcinoma, hypertension, hyperlipidemia diabetes who Isaias Jaramillo presented from her assisted living facility after she was found to have a hemoglobin of 5.8 with a FOBT positive. Deann Olguin note patient does have a history of chronic anemia looking through her chart.  Patient stable on arrival, initially hypotensive with a systolic in the 61A to 42P in the ER.  Patient appears pale on arrival.  Patient denies chest pain, shortness of breath, lightheadedness, dizziness.  Patient does note that she had several episodes of loose dark bowel movements yesterday, denies bright red blood per rectum.  Denies history of liver disease, denies epigastric abdominal pain, no prior history of peptic ulcers.  Patient does admit to urinary retention since she was reportedly discharged from the hospital 7 days ago, requiring straight cath at her facility.  Patient is on Coumadin. \"     53-year-old female admitted to the hospital from nursing facility  She was the inferior endplate of T8.  This may represent a developing Schmorl's node deformity.  A small metastatic lesion would be difficult to exclude given that this is a new finding. Upper abdominal findings discussed separately. Small right breast nodular density.  Correlate with mammographic history    On 8/16/18 the patient underwent:  Procedure:   1) US guided access of right common femoral vein  2) Inferior vena cavogram  3) Placement of permanent [place] inferior vena cava filter    Past Medical History:   has a past medical history of Cancer ([address] Chronic kidney disease; Depression; Diabetes mellitus ([address] GERD (gastroesophageal reflux disease); HA (headache); HBP (high blood pressure); History of blood transfusion; Hyperlipidemia; Hyperplastic polyp of intestine; Hypothyroid; Hypothyroidism; Left knee pain; Non-smoker; OA (osteoarthritis); and Tubular adenoma. Social History:   reports that she has never smoked. She has never used smokeless tobacco. She reports that she does not drink alcohol or use drugs. Family History:   Family History   Problem Relation Age of Onset    Heart Disease Mother     Diabetes Mother     Cancer Father         esophageal cancer    Cancer Maternal Grandfather         colon       Medications:      Allergies:  No Known Allergies    Current Meds:   Scheduled Meds:    [START ON 8/18/2018] pantoprazole  40 mg Oral QAM AC    cefUROXime  250 mg Oral 2 times per day    sodium chloride  250 mL Intravenous Once    docusate sodium  200 mg Oral BID    amitriptyline  75 mg Oral Nightly    atorvastatin  20 mg Oral Nightly    gabapentin  100 mg Oral TID    oxyCODONE  10 mg Oral BID    megestrol  400 mg Oral Daily    sodium chloride flush  10 mL Intravenous 2 times per day    insulin lispro  0-12 Units Subcutaneous TID     insulin lispro  0-6 Units Subcutaneous Nightly     Continuous Infusions:    dextrose      sodium chloride 50 mL/hr at 08/15/18 1235     PRN Meds: time.   Skin: Skin is warm and dry. She is not diaphoretic. Data:     I/O (24Hr): Intake/Output Summary (Last 24 hours) at 08/17/18 1640  Last data filed at 08/17/18 1120   Gross per 24 hour   Intake             1480 ml   Output              330 ml   Net             1150 ml       Labs:    Hematology:  Recent Labs      08/15/18   0544   08/16/18   0529  08/16/18   1336  08/17/18   0724   WBC  5.0   --   5.0   --   4.0   HGB  7.9*   < >  7.7*  7.7*  7.8*   HCT  26.2*   --   25.8*   --   26.1*   PLT  310   --   287   --   256   INR  2.3   --   2.8   --   2.1    < > = values in this interval not displayed.      Chemistry:  Recent Labs      08/15/18   0544  08/16/18   0529  08/17/18   0724   NA  139  139  138   K  4.4  3.9  3.6*   CL  99  100  101   CO2  27  24  22   GLUCOSE  116*  97  122*   BUN  43*  52*  19   CREATININE  4.03*  4.24*  2.28*   CALCIUM  7.9*  7.8*  7.5*     Recent Labs      08/14/18   1652  08/16/18   0529   PROT   --   4.3*   LABALBU   --   1.6*   AST   --   8   ALT   --   <5*   ALKPHOS   --   99   BILITOT   --   0.18*   BILIDIR   --   0.09   TROPONINI  0.00   --        Lab Results   Component Value Date/Time    SPECIAL NOT REPORTED 08/16/2018 11:37 PM     Lab Results   Component Value Date/Time    CULTURE CULTURE IN PROGRESS 08/16/2018 11:37 PM       Lab Results   Component Value Date    PHART 7.292 01/23/2018    XIT7HLN 39.0 01/23/2018    PO2ART 159.0 01/23/2018    ETV2PAO 18.3 01/23/2018    NBEA 7.2 01/23/2018    PBEA NOT REPORTED 01/23/2018    S4YZAADQ 99.5 01/23/2018    FIO2 NOT REPORTED 08/14/2018       Radiology:  See above    Assessment:        Primary Problem  Principal Problem:    Acute blood loss anemia  Active Problems:    Diabetes mellitus (HCC)    Hyperplastic polyp of intestine    Anemia due to chronic kidney disease    Pathologic lumbar vertebral fracture, sequela    Metastasis to spinal column (HCC)    Hematuria    Renal cell cancer, right (HCC)    Acute deep vein thrombosis (DVT) of femoral vein of left lower extremity (HCC)    ESRD on hemodialysis (HCC)    Hypotension    Chronic GI bleeding  Resolved Problems:    * No resolved hospital problems. *      Plan:        Discharge planning  May need outpatient colonoscopy  May need outpatient video capsule endoscopy  Wound care - heal wounds  Vascular eval / follow-up as scheduled:  S/P inferior vena cava filter placement 8/16  Transfuse as needed  Hold Coumadin  GI consultation / follow-up as scheduled  Oncology follow-up is scheduled - Dr Hollie Nixon radiation therapy to spine  Dialysis   Monitor and control blood pressure  Will monitor and control Diabetes   Check UA and C&S - pending  Antibiotics per C&S - Ceftin initiated  Will discharge when arrangements complete and ok with other services.   Follow-up with PCP in one week, Fadi Harmon MD  Notify PCP of discharge   She will [facility] rec done  ELLIE done  DCP 38 min+      IP CONSULT TO GI  IP CONSULT TO NEPHROLOGY  IP CONSULT TO IV TEAM  IP CONSULT TO [address], DO  8/17/2018  4:39 PM

## 2018-08-17 NOTE — ANESTHESIA PRE PROCEDURE
Department of Anesthesiology  Preprocedure Note       Name:  Adrienne Snow   Age:  66 y.o.  :  1955                                          MRN:  [id number]         Date:  2018      Surgeon: Abe Lindo):  Ender Ziegler MD    Procedure: Procedure(s):  EGD    Medications prior to admission:   Prior to Admission medications    Medication Sig Start Date End Date Taking? Authorizing Provider   warfarin (COUMADIN) 2.5 MG tablet Take 1 tablet by mouth daily 8/3/18  Yes Daysi Moe MD   gabapentin (NEURONTIN) 100 MG capsule Take 1 capsule by mouth 3 times daily for 30 days. . 18 Yes Daysi Moe MD   cyclobenzaprine (FLEXERIL) 5 MG tablet TAKE 1 TABLET BY MOUTH THREE TIMES A DAY AS NEEDED FOR MUSCLE SPASMS 18  Yes Kathleen Montano MD   HUMALOG 100 UNIT/ML injection vial With insulin pump 18  Yes Historical Provider, MD   atorvastatin (LIPITOR) 20 MG tablet TAKE 1 TABLET BY MOUTH ONE TIME A DAY 18  Yes Deb Capellan MD   omeprazole (PRILOSEC) 40 MG delayed release capsule Take 1 capsule by mouth daily 3/28/18  Yes Deb Capellan MD   amitriptyline (ELAVIL) 75 MG tablet Take 1 tablet by mouth nightly 3/28/18  Yes Deb Capellan MD   prochlorperazine (COMPAZINE) 5 MG tablet Take 5 mg by mouth every 8 hours as needed  18  Yes Historical Provider, MD   docusate sodium (COLACE) 100 MG capsule Take 2 capsules by mouth 2 times daily 18  Yes Deb Capellan MD   acetaminophen (TYLENOL) 500 MG tablet Take 1,000 mg by mouth 3 times daily   Yes Historical Provider, MD   megestrol (MEGACE) 40 MG/ML suspension Take 10 mLs by mouth daily 8/3/18   Daysi Moe MD   oxyCODONE HCl (OXY-IR) 10 MG immediate release tablet Take 1 tablet by mouth every 6 hours as needed for Pain for up to 30 days. Intended supply: 30 days.  8/3/18 9/2/18  Griffin Weinberg MD   Insulin Disposable Pump (V-GO 20) KIT  18   Historical Provider, MD   B-D ULTRAFINE III SHORT PEN 31G X 8 MM MISC ondansetron (ZOFRAN) tablet 4 mg  4 mg Oral Daily PRN Hildy Safe, DO        atorvastatin (LIPITOR) tablet 20 mg  20 mg Oral Nightly Hildy Safe, DO   20 mg at 08/16/18 2232    gabapentin (NEURONTIN) capsule 100 mg  100 mg Oral TID Hildy Safe, DO   100 mg at 08/16/18 2231    oxyCODONE (OXYCONTIN) extended release tablet 10 mg  10 mg Oral BID Hildy Safe, DO   10 mg at 08/16/18 2231    oxyCODONE (ROXICODONE) immediate release tablet 10 mg  10 mg Oral Q6H PRN Hildy Safe, DO   10 mg at 08/17/18 2411    megestrol (MEGACE) 40 MG/ML suspension 400 mg  400 mg Oral Daily Hildy Safe, DO        0.9 % sodium chloride infusion   Intravenous Continuous Hildy Safe, DO 50 mL/hr at 08/15/18 1235      sodium chloride flush 0.9 % injection 10 mL  10 mL Intravenous 2 times per day Hildy Safe, DO   10 mL at 08/16/18 2231    sodium chloride flush 0.9 % injection 10 mL  10 mL Intravenous PRN Hildy Safe, DO        ondansetron [facility]) injection 4 mg  4 mg Intravenous Q6H PRN Hildy Safe, DO        pantoprazole (PROTONIX) 80 mg in sodium chloride 0.9 % 100 mL infusion  8 mg/hr Intravenous Continuous Hildy Safe, DO 10 mL/hr at 08/17/18 0924 8 mg/hr at 08/17/18 0924    insulin lispro (HUMALOG) injection vial 0-12 Units  0-12 Units Subcutaneous TID WC Hildy Safe, DO        insulin lispro (HUMALOG) injection vial 0-6 Units  0-6 Units Subcutaneous Nightly Hildy Safe, DO   1 Units at 08/16/18 2231    acetaminophen (TYLENOL) tablet 650 mg  650 mg Oral TID PRN Loral Loss, APRN - CNP        HYDROcodone-acetaminophen (NORCO) 5-325 MG per tablet 1 tablet  1 tablet Oral Q4H PRN Loral Loss, APRN - CNP        Or    HYDROcodone-acetaminophen (NORCO) 5-325 MG per tablet 2 tablet  2 tablet Oral Q4H PRN Loral Loss, APRN - CNP   2 tablet at 08/16/18 0901       Allergies:  No Known Allergies    Problem List:    Patient (osteoarthritis)     bilat knees    Tubular adenoma        Past Surgical History:        Procedure Laterality Date    BACK SURGERY N/A 01/23/2018    Post T11-L2 Fusion    COLONOSCOPY  08/16/2016    hyperplastic polyp and tubular adenoma     HYSTERECTOMY      NERVE BLOCK  07/13/2018    aleta trigger point neck #1    CA LUMBAR SPINE FUSN,POST INTRBDY N/A 1/23/2018    T11-L3 POSTERIOR FIXATION AND FUSION, SPINE ABBY, C-ARM, [facility] WITH STEALTH[id number] performed by Magda Braun DO at [address] [place] [address] History:    Social History   Substance Use Topics    Smoking status: Never Smoker    Smokeless tobacco: Never Used    Alcohol use No                                Counseling given: Not Answered      Vital Signs (Current):   Vitals:    08/16/18 1509 08/16/18 1919 08/17/18 0743 08/17/18 0947   BP: (!) 125/43 (!) 128/49 (!) 109/49 (!) 118/41   Pulse: 86 94 78 83   Resp: 21 18 20 18   Temp: 37.4 °C (99.3 °F) 37.3 °C (99.2 °F) 36.7 °C (98 °F) 36.9 °C (98.5 °F)   TempSrc: Oral Oral Oral Oral   SpO2: 99% 95% 98%    Weight:       Height:                                                  BP Readings from Last 3 Encounters:   08/17/18 (!) 118/41   08/07/18 (!) 107/57   08/03/18 (!) 126/53       NPO Status:                                                                                 BMI:   Wt Readings from Last 3 Encounters:   08/16/18 190 lb 4.1 oz (86.3 kg)   08/07/18 186 lb (84.4 kg)   08/03/18 190 lb 0.6 oz (86.2 kg)     Body mass index is 28.93 kg/m².     CBC:   Lab Results   Component Value Date    WBC 4.0 08/17/2018    RBC 2.71 08/17/2018    HGB 7.8 08/17/2018    HCT 26.1 08/17/2018    MCV 96.3 08/17/2018    RDW 18.6 08/17/2018     08/17/2018       CMP:   Lab Results   Component Value Date     08/17/2018    K 3.6 08/17/2018     08/17/2018    CO2 22 08/17/2018    BUN 19 08/17/2018    CREATININE 2.28 08/17/2018    GFRAA 26 08/17/2018    LABGLOM 22 08/17/2018 GLUCOSE 122 08/17/2018    PROT 4.3 08/16/2018    CALCIUM 7.5 08/17/2018    BILITOT 0.18 08/16/2018    ALKPHOS 99 08/16/2018    AST 8 08/16/2018    ALT <5 08/16/2018       POC Tests:   Recent Labs      08/14/18   1422   08/17/18   0744   POCGLU  145*   < >  129*   POCNA  138   --    --    POCK  3.9   --    --    POCCL  99   --    --    POCHEMO  6.6*   --    --    POCHCT  19*   --    --     < > = values in this interval not displayed. Coags:   Lab Results   Component Value Date    PROTIME 21.5 08/17/2018    INR 2.1 08/17/2018    APTT 38.1 08/14/2018       HCG (If Applicable): No results found for: PREGTESTUR, PREGSERUM, HCG, HCGQUANT     ABGs:   Lab Results   Component Value Date    PHART 7.292 01/23/2018    PO2ART 159.0 01/23/2018    VHP9CXG 39.0 01/23/2018    HNV9BOK 18.3 01/23/2018    Z3WLGCWT 99.5 01/23/2018        Type & Screen (If Applicable):  No results found for: [facility]    Anesthesia Evaluation  Patient summary reviewed no history of anesthetic complications:   Airway: Mallampati: II  TM distance: >3 FB   Neck ROM: full  Mouth opening: > = 3 FB Dental: normal exam         Pulmonary:Negative Pulmonary ROS and normal exam                              ROS comment: SOB, NC 3 L : 93%  Left pl effusion and atelectasis. Acute pneumonia not ruled out   Cardiovascular:    (+) hypertension:, CHF:,       ECG reviewed      Echocardiogram reviewed                  Neuro/Psych:   (+) headaches:, psychiatric history:             ROS comment: Alert and appropriate GI/Hepatic/Renal:   (+) GERD:, liver disease (hepatic metastasis, ascitis):, renal disease: ESRD and dialysis,           Endo/Other:    (+) DiabetesType II DM, using insulin, hypothyroidism::., .                 Abdominal:   (+) obese (ascitis++),         Vascular:                               Echo 1-2018  Summary  Left ventricle is normal in size with normal systolic function. Estimated ejection fraction is 55-60%. Mild mitral regurgitation. Narrative     Normal sinus rhythm  Possible Inferior infarct , age undetermined  Cannot rule out Anterior infarct (cited on or before 01-AUG-2018)  Abnormal ECG  When compared with ECG of 01-AUG-2018 12:28,  Nonspecific T wave abnormality, improved in Inferior leads       Anesthesia Plan      general, MAC and TIVA     [facility], APRN - CRNA   8/17/2018

## 2018-08-17 NOTE — PROGRESS NOTES
Nutrition Assessment    Type and Reason for Visit: Reassess    Nutrition Recommendations:   Continue clear liquid diet and advance as able. Start Ensure clear ONS 2 times per day. Encourage/monitor adequacy of oral intakes. Malnutrition Assessment:  · Malnutrition Status: At risk for malnutrition  · Context: Chronic illness  · Findings of the 6 clinical characteristics of malnutrition (Minimum of 2 out of 6 clinical characteristics is required to make the diagnosis of moderate or severe Protein Calorie Malnutrition based on AND/ASPEN Guidelines):  1. Energy Intake-Not available, not able to assess    2. Weight Loss- (16%),  (7 months)  3. Fat Loss-Unable to assess,    4. Muscle Loss-Unable to assess,    5. Fluid Accumulation-Mild fluid accumulation, Extremities, Generalized  6.  Strength-Not measured    Nutrition Diagnosis:   · Problem: Increased nutrient needs  · Etiology: related to Renal dysfunction     Signs and symptoms:  as evidenced by Known losses from dialysis    Nutrition Assessment:  · Subjective Assessment: Pt on clear liquid diet at time of visit- pt appeared eager to have diet advanced. Pt reports tolerating clear liquids at this time and denies N/V. Pt states she likes the Ensure clear ONS. · Nutrition-Focused Physical Findings: Active bowel sounds. +1 BUE and facial edema. +2 BLE edema.    · Wound Type:  (L. heel unstageable wound, R heel blackened unstageable, R buttock helaing stage III)  · Current Nutrition Therapies:  · Oral Diet Orders: Clear Liquid   · Oral Diet intake: Unable to assess  · Oral Nutrition Supplement (ONS) Orders: None  · ONS intake: NPO  · Anthropometric Measures:  · Ht: 5' 8\" (172.7 cm)   · Current Body Wt: 190 lb 4.1 oz (86.3 kg)  · % Weight Change: 16%,  ~7 months  · Ideal Body Wt: 140 lb (63.5 kg), % Ideal Body 133%  · BMI Classification: BMI 25.0 - 29.9 Overweight  · Comparative Standards (Estimated Nutrition Needs):  · Estimated Daily Total Kcal: 5975-7673

## 2018-08-17 NOTE — PROGRESS NOTES
Physical Therapy  DATE: 2018    NAME: Tom Portillo  MRN: [id number]   : 1955    Patient not seen this date for Physical Therapy due to:  [] Blood transfusion in progress  [] Hemodialysis  []  Patient Declined  [] Spine Precautions   [] Strict Bedrest  [] Surgery/ Procedure  [x] Testing--pt just leaving for GI; ck back later (eval needed for placement)      [] Other        [] PT being discontinued at this time. Patient independent. No further needs. [] PT being discontinued at this time as the patient has been transferred to palliative care. No further needs.     Mike Evans, PT

## 2018-08-18 PROBLEM — K31.819 GASTRIC ANTRAL VASCULAR ECTASIA (WATERMELON STOMACH): Status: ACTIVE | Noted: 2018-01-01

## 2018-08-18 NOTE — PROGRESS NOTES
Physical Therapy  DATE: 2018    NAME: Cleveland Walters  MRN: [id number]   : 1955    Patient not seen this date for Physical Therapy due to:  [] Blood transfusion in progress  [x] Hemodialysis- second attempt this PM. PT will check back as time allows. []  Patient Declined  [] Spine Precautions   [] Strict Bedrest  [] Surgery/ Procedure  [] Testing      [] Other        [] PT being discontinued at this time. Patient independent. No further needs. [] PT being discontinued at this time as the patient has been transferred to palliative care. No further needs.     Christie Alvarez, PT

## 2018-08-18 NOTE — PROGRESS NOTES
found to have a hemoglobin of 5.8  She denies any bleeding including hematemesis, hematochezia, melena, hematuria  Her stools for occult blood were found to be positive  She has been on Coumadin for DVT     Colonoscopy 8/2016:  Findings:  Terminal ileum: normal   Cecum/Ascending colon: abnormal: Rt. colon polyps, 3 polyps, one large one 1.2 cm, removed with snare.   Transverse colon: abnormal: t colon sessile polyp, 6 mm, hot bx forceps    Descending/Sigmoid colon: abnormal: Rare diverticula   sigmoid polyps, 2 of them larger is 8 mm, snared    Rectum/Anus: examined in normal and retroflexed positions and was normal     The patient does have a known history of metastatic renal cell cancer  She is receiving palliative radiation and immunotherapy / chemo     Data base has included:     INR 2.3     POC Lactic Acid 4.39 (H)     POC Ionized Calcium 1.02 (L)     Hemoglobin 6.6 (LL)     BUN 43 (H) mg/dL   CREATININE 4.03 (H)     Doppler from 7/28/18:   Extensive acute deep venous thrombosis extending from the gastrocnemius    vein into the external iliac vein.    Acute superficial venous thrombosis identified in the small saphenous    vein.      Ct Abdomen Pelvis Wo Contrast Additional Contrast? None  Result Date: 8/1/2018  Increasing metastatic disease along the surface of the liver Decreasing mesenteric adenopathy Stable heterogeneous enlargement of the right kidney Ascites Developing metastatic disease involving the bony pelvis as described. Incomplete distention of the ascending colon raising the question of mild colitis.  Correlate clinically.  Fat stranding throughout the abdomen pelvis raising the question of third-spacing of fluid.      Ct Chest Wo Contrast  Result Date: 8/1/2018  Right larger than left pleural effusions with associated right greater than left basilar consolidation.  This may represent passive atelectasis from the effusions.  Pneumonia not excluded No discrete pulmonary nodules Rounded lucency along Lab Results   Component Value Date    PHART 7.292 01/23/2018    KNX0HOI 39.0 01/23/2018    PO2ART 159.0 01/23/2018    TFZ3LKD 18.3 01/23/2018    NBEA 7.2 01/23/2018    PBEA NOT REPORTED 01/23/2018    L8SQKIRW 99.5 01/23/2018    FIO2 NOT REPORTED 08/14/2018       Radiology:  See above    Assessment:        Primary Problem  Principal Problem:    Gastric antral vascular ectasia (watermelon stomach)  Active Problems:    Diabetes mellitus (HCC)    Hyperplastic polyp of intestine    Anemia due to chronic kidney disease    Pathologic lumbar vertebral fracture, sequela    Metastasis to spinal column (HCC)    Hematuria    Renal cell cancer, right (HCC)    Acute deep vein thrombosis (DVT) of femoral vein of left lower extremity ([address]    ESRD on hemodialysis (HCC)    Hypotension    Chronic GI bleeding    Acute blood loss anemia  Resolved Problems:    * No resolved hospital problems. *      Plan:        Discharge planning  Awaiting precertification for [facility]  May need colonoscopy  May need outpatient video capsule endoscopy  Wound care - heal wounds  Vascular eval / follow-up as scheduled:  S/P inferior vena cava filter placement 8/16  Transfuse as needed - hemoglobin less than 7.0  Hold Coumadin - status post inferior vena cava filter  GI consultation / follow-up as scheduled  Oncology follow-up is scheduled - Dr Jack Villegas radiation therapy to spine outpatient  Dialysis   Monitor and control blood pressure  Will monitor and control Diabetes   Check UA and C&S - pending  Antibiotics per C&S - Ceftin initiated  Will discharge when arrangements complete and ok with other services.   Follow-up with PCP in one week, Dwayne Tse MD  Notify PCP of discharge   She will return to [facility] when precertification done  Med rec done  ELLIE done  DCP 38 min+      IP CONSULT TO GI  IP CONSULT TO NEPHROLOGY  IP CONSULT TO IV TEAM  IP CONSULT TO [address], DO  8/18/2018  5:09 PM

## 2018-08-18 NOTE — PROGRESS NOTES
organomegaly, no ascites.        EXTREMITIES: no cyanosis, clubbing, edema 2+    CURRENT MEDICATIONS        pantoprazole (PROTONIX) tablet 40 mg QAM AC   glucose (GLUTOSE) 40 % oral gel 15 g PRN   dextrose 50 % solution 12.5 g PRN   glucagon (rDNA) injection 1 mg PRN   dextrose 5 % solution PRN   heparin (porcine) injection 1,600 Units PRN   heparin (porcine) injection 1,600 Units PRN   cefUROXime (CEFTIN) tablet 250 mg 2 times per day   cyclobenzaprine (FLEXERIL) tablet 5 mg TID PRN   0.9 % sodium chloride bolus Once   docusate sodium (COLACE) capsule 200 mg BID   prochlorperazine (COMPAZINE) tablet 5 mg Q8H PRN   amitriptyline (ELAVIL) tablet 75 mg Nightly   ondansetron (ZOFRAN) tablet 4 mg Daily PRN   atorvastatin (LIPITOR) tablet 20 mg Nightly   gabapentin (NEURONTIN) capsule 100 mg TID   oxyCODONE (OXYCONTIN) extended release tablet 10 mg BID   oxyCODONE (ROXICODONE) immediate release tablet 10 mg Q6H PRN   megestrol (MEGACE) 40 MG/ML suspension 400 mg Daily   0.9 % sodium chloride infusion Continuous   sodium chloride flush 0.9 % injection 10 mL 2 times per day   sodium chloride flush 0.9 % injection 10 mL PRN   ondansetron (ZOFRAN) injection 4 mg Q6H PRN   insulin lispro (HUMALOG) injection vial 0-12 Units TID WC   insulin lispro (HUMALOG) injection vial 0-6 Units Nightly   acetaminophen (TYLENOL) tablet 650 mg TID PRN   HYDROcodone-acetaminophen (NORCO) 5-325 MG per tablet 1 tablet Q4H PRN   Or    HYDROcodone-acetaminophen (NORCO) 5-325 MG per tablet 2 tablet Q4H PRN         LABS      CBC: Recent Labs      08/16/18   0529   08/17/18   0724  08/18/18   0640  08/18/18   1249   WBC  5.0   --   4.0  4.0   --    RBC  2.69*   --   2.71*  2.39*   --    HGB  7.7*   < >  7.8*  7.0*  6.9*   HCT  25.8*   --   26.1*  23.8*  24.4*   MCV  95.9   --   96.3  99.6   --    MCH  28.6   --   28.8  29.3   --    MCHC  29.8   --   29.9  29.4   --    RDW  18.9*   --   18.6*  18.1*   --    PLT  287   --   256  240   --    MPV  9.3 NOT REPORTED 08/16/2018    BACTERIA MANY 08/16/2018    CLARITYU clear 04/10/2018    SPECGRAV 1.014 08/16/2018    LEUKOCYTESUR LARGE 08/16/2018    UROBILINOGEN Normal 08/16/2018    BILIRUBINUR NEGATIVE 08/16/2018    BILIRUBINUR neg. 04/10/2018    BLOODU large 04/10/2018    GLUCOSEU NEGATIVE 08/16/2018    KETUA NEGATIVE 08/16/2018    AMORPHOUS NOT REPORTED 08/16/2018     ANTIGBM:No results found for: Ayesha Sheehan 135 as available. ASSESSMENT    1. ESRD, on hemodialysis Monday Wednesday Friday at the [address] [place] [address] unit via tunneled catheter. 2. Metastatic bladder cancer on palliative radiation therapy and immunotherapy   3. GI bleed felt to be induced by anticoagulation, status post transfusion. Hb dropped to 6.9 from 7.8 yesterday. Upper gi endoscopy showing GAVE  4. ANEMIA OF CHRONIC DISEASE: and  from GI blood loss  5 . SECONDARY HYPERPARATHYROIDISM: Stable    PLAN      1. Hemodialysis today, as she was not able to get it on Friday. Orders were reviewed with the HD nurse. Next hemodialysis on Monday. 2. Needs one unit PRBC, monitor for fluid overload   3. Follow up labs ordered. 4. Following along       Please do not hesitate to call with questions. Electronically signed by Ry Barger MD on 8/18/2018 at 1:31 PM        Attending Physician Statement  I have discussed the care of Adrián Santos, including pertinent history and exam findings,  with the Fellow/Resident/CNP. I have reviewed the key elements of all parts of the encounter with the Fellow/ Resident. I agree with the assessment, plan and orders as documented by the resident. Juan M Acuna MD   Nephrology [address] [place] [address]

## 2018-08-18 NOTE — PROGRESS NOTES
Occupational Therapy   Occupational Therapy Initial Assessment  Date: 2018   Patient Name: Mayelin Jaramillo  MRN: [id number]     : 1955    Chief Complaint   Patient presents with    Abnormal Lab    Rectal Bleeding     Date of Service: 2018    Discharge Recommendations:  Subacute/Skilled Nursing Facility  OT Equipment Recommendations  Equipment Needed: No    Patient Diagnosis(es): The primary encounter diagnosis was Chronic GI bleeding. A diagnosis of Hypotension, unspecified hypotension type was also pertinent to this visit. has a past medical history of Cancer ([facility] Chronic kidney disease; Depression; Diabetes mellitus ([facility] GERD (gastroesophageal reflux disease); HA (headache); HBP (high blood pressure); History of blood transfusion; Hyperlipidemia; Hyperplastic polyp of intestine; Hypothyroid; Hypothyroidism; Left knee pain; Non-smoker; OA (osteoarthritis); and Tubular adenoma. has a past surgical history that includes Hysterectomy; Tonsillectomy; Colonoscopy (2016); back surgery (N/A, 2018); pr lumbar spine fusn,post intrbdy (N/A, 2018); and Nerve Block (2018).      Restrictions  Restrictions/Precautions  Restrictions/Precautions: Fall Risk  Required Braces or Orthoses?: No  Position Activity Restriction  Other position/activity restrictions: Bedrest with bathroom privilages with assistance as tolerated     Subjective   General  Patient assessed for rehabilitation services?: Yes  Family / Caregiver Present: No  General Comment  Comments: Pt reports generalized fatigue following hemodialysis  Pain Assessment  Patient Currently in Pain: Yes  Pain Assessment: 0-10  [place] Pain Rating: No hurt  Pain Level: 8  Pain Type: Chronic pain  Pain Location: Back  Pain Orientation: Lower  Pain Descriptors: Aching, Discomfort  Pain Frequency: Continuous  Clinical Progression: Not changed  Patient's Stated Pain Goal: No pain  Pain Intervention(s): Ambulation/Increased activity, Repositioned  Response to Pain Intervention: Patient Satisfied     Social/Functional History  Social/Functional History  Lives With: Other (comment)  Type of Home: Facility  Bathroom Shower/Tub: Walk-in shower  Home Equipment: Rolling walker  ADL Assistance: Independent (for bathing/ dressing, pt reports being assisted in and out of shower )  Homemaking Responsibilities: No  Ambulation Assistance: Independent (SBA with use of RW)  Transfer Assistance: Independent  Active : Yes  Leisure & Hobbies: crosstitching  Additional Comments: Pt reports pt has been at a facility for approximately 2.5 weeks, pt was doing OT/PT, walking with use of RW. Prior to this, pt reports pt was IND and home alone     Objective   Vision: Impaired  Vision Exceptions: Wears glasses at all times  Hearing: Within functional limits    Orientation  Overall Orientation Status: Within Functional Limits  Observation/Palpation  Posture: Fair  Observation: Increased pain with activity   Balance  Sitting Balance: Contact guard assistance  Standing Balance: Minimal assistance (use of RW )  Standing Balance  Sit to stand: Moderate assistance (x2)  Stand to sit: Moderate assistance (x2)  Functional Mobility  Functional - Mobility Device: Rolling Walker  Activity: Other  Assist Level: Contact guard assistance  Functional Mobility Comments: NO LOB or SOB noted with activity  ADL  Feeding: Independent  Grooming: Contact guard assistance;Setup  UE Bathing: Minimal assistance;Setup  LE Bathing: Maximum assistance;Setup  UE Dressing: Minimal assistance;Setup  LE Dressing: Maximum assistance;Setup  Toileting: Moderate assistance  Additional Comments: Pt seated in chair on arrival. Pt reports increased fatigue following HD. Pt assisted to complete sit > stand transfer and complete few steps <> chair. Pt returned to chair, call light in reach and RN notified on therapist exit.    Tone RUE  RUE Tone: Normotonic  Tone LUE  LUE Tone: Normotonic  Coordination  Movements Are Fluid And Coordinated: Yes     Bed mobility  Comment: Pt up in chair on arrival, returned to chair on exit   Transfers  Stand Step Transfers: Contact guard assistance  Sit to stand: Moderate assistance (x2)  Stand to sit: Moderate assistance (x2)     Cognition  Overall Cognitive Status: WFL  Perception  Overall Perceptual Status: WFL     Sensation  Overall Sensation Status: WFL      LUE AROM (degrees)  LUE AROM : WFL  RUE AROM (degrees)  RUE AROM : WFL  LUE Strength  Gross LUE Strength: WFL  RUE Strength  Gross RUE Strength: WFL     Assessment   Performance deficits / Impairments: Decreased functional mobility ; Decreased endurance;Decreased ADL status; Decreased strength;Decreased high-level IADLs  Assessment: Pt would benefit from continued acute care and post acute care OT to address maximal deficits in ADL/ functional activities, endurance and functional transfers/ mobility. Pt required mod x2 to complete functional transfers and was unable to complete functional mobility for household distances   Prognosis: Good  Decision Making: Medium Complexity  Patient Education: OT POC, dishcarge planning   REQUIRES OT FOLLOW UP: Yes  Activity Tolerance  Activity Tolerance: Patient limited by fatigue  Activity Tolerance: evaluation following HD tx  Safety Devices  Safety Devices in place: Yes  Type of devices: Left in chair;Nurse notified;Call light within reach;Gait belt;Patient at risk for falls  Restraints  Initially in place: No         Plan   Plan  Times per week: 3-4x/wk   Current Treatment Recommendations: Functional Mobility Training, Endurance Training, Safety Education & Training, Self-Care / ADL, Patient/Caregiver Education & Training, Equipment Evaluation, Education, & procurement    G-Code  OT G-codes  Functional Assessment Tool Used: AMPA  Score: 16/24  Functional Limitation: Self care  Self Care Current Status ():  At least 40 percent but less than 60 percent impaired, limited or restricted  Self Care Goal Status (): At least 20 percent but less than 40 percent impaired, limited or restricted    AM-PAC Score  AM-[facility] Inpatient Daily Activity Raw Score: 16  AM-PAC Inpatient ADL T-Scale Score : 35.96  ADL Inpatient CMS 0-100% Score: 53.32  ADL Inpatient CMS G-Code Modifier : CK  How much help from another person does the pt currently need? Unable A Lot A Little None   1. Putting on and taking off regular lower body clothing? 1      2      3       4   2. Bathing (including washing, rinsing, drying)? 1      2      3      4   3. Toileting, which includes using toilet, bedpan, or urinal?      1      2        3      4   4. Putting on and taking off regular upper body clothing? 1      2      3       4   5. Taking care of personal grooming such as brushing teeth? 1      2      3      4   6. Eating meals? 1      2       3       4     1. Unable = Total/Dependent Assist  2. A Lot = Maximum/Moderate Assist  3. A Little = Minimum/Contact Guard Assist/Supervision  4.  None= Modified Reva/Independent    Raw Score Scale Score Scale Score Standard Error Approximate Degree of Functional Impairment     6 17.07 3.74 100%   7 20.13 3.68 92%   8 22.86 3.43 86%   9 25.33 3.17 80%   10 27.31 2.96 75%   11 29.04 2.79 70%   12 30.60 2.68 67%   13 32.03 2.62 63%   14 33.39 2.61 60%   15 34.69 2.65 56%   16 35.96 2.71 53%   17 37.26 2.82 50%   18 38.66 2.97 47%   19 40.22 3.20 43%   20 42.03 3.55 38%   21 44.27 4.08 33%   22 47.10 4.81 26%   23 51.12 5.88 16%   24 57.54 7.36 0%     Goals  Short term goals  Time Frame for Short term goals: by discharge, pt will  Short term goal 1: demo I in UE ADL activities   Short term goal 2: demo MI/min A in LE ADL activities with AD as needed  Short term goal 3: demo understanding and I use of safe transfer tech during functional activities with LRD  Short term goal 4: demo understanding and I use of EC/WS, fall prevention and proper pursed lipped breathing tech during functional activities   Short term goal 5: demo increased activity tolerance of 25+ min to assist with ADL/ functional activities   Short term goal 6: demo mod A x1 for functional transfers/ mobility with use of LRD to assist with ADL/ functional activities   Patient Goals   Patient goals : to get stronger      Therapy Time   Individual Concurrent Group Co-treatment   Time In 1421         Time Out 1439         Minutes 18          See above for LOF. RN reports patient is medically stable for therapy treatment this date. Chart reviewed prior to treatment and patient is agreeable for therapy. All lines intact and patient positioned comfortably at end of treatment. All patient needs addressed prior to ending therapy session.        Co-evaluation with PT  Gwen Keen, OTR/L

## 2018-08-18 NOTE — PROGRESS NOTES
Physical Therapy    Facility/Department: [facility]  Initial Assessment    NAME: Cleveland Walters  : 1955  MRN: [id number]    Date of Service: 2018    Discharge Recommendations:  Subacute/Skilled Nursing Facility   PT Equipment Recommendations  Equipment Needed: No    Patient Diagnosis(es): The primary encounter diagnosis was Chronic GI bleeding. A diagnosis of Hypotension, unspecified hypotension type was also pertinent to this visit. has a past medical history of Cancer ([facility] Chronic kidney disease; Depression; Diabetes mellitus ([facility] GERD (gastroesophageal reflux disease); HA (headache); HBP (high blood pressure); History of blood transfusion; Hyperlipidemia; Hyperplastic polyp of intestine; Hypothyroid; Hypothyroidism; Left knee pain; Non-smoker; OA (osteoarthritis); and Tubular adenoma. has a past surgical history that includes Hysterectomy; Tonsillectomy; Colonoscopy (2016); back surgery (N/A, 2018); pr lumbar spine fusn,post intrbdy (N/A, 2018); and Nerve Block (2018). Restrictions  Restrictions/Precautions  Restrictions/Precautions: Fall Risk  Required Braces or Orthoses?: No  Position Activity Restriction  Other position/activity restrictions: Bedrest with bathroom privilages with assistance as tolerated   Vision/Hearing  Vision: Impaired  Vision Exceptions: Wears glasses at all times  Hearing: Within functional limits     Subjective  General  Patient assessed for rehabilitation services?: Yes  Response To Previous Treatment: Not applicable  Family / Caregiver Present: No  Follows Commands: Within Functional Limits  Subjective  Subjective: RN and pt agreeable to PT. Pt sitting in bedside chair upon arrival, pleasant and cooperative throughout.   Pain Screening  Patient Currently in Pain: Yes  Pain Assessment  Pain Assessment: 0-10  Pain Level: 8  Pain Type: Chronic pain  Pain Location: Back  Pain Orientation: Lower  Pain Descriptors: Aching;Discomfort  Pain Mod I for bed mobility and functional transfers  Short term goal 2: Ambulate 200ft with RW and SBA  Short term goal 3: Demo Good dynamic standing balance to decrease risk of falls  Short term goal 4: Participate in 30 minutes of therapy to demo increased endurance       Therapy Time   Individual Concurrent Group Co-treatment   Time In 1421         Time Out 1439         Minutes 1112  [address] [place] [address]

## 2018-08-19 PROBLEM — E83.51 HYPOCALCEMIA: Status: ACTIVE | Noted: 2018-01-01

## 2018-08-19 NOTE — PROGRESS NOTES
[facility]    Progress Note    8/19/2018    1:28 PM    Name:   Huseyin Villegas  MRN:     [id number]     [place]:      [de-identified]   Room:   2004/2004-01  IP Day:  5  Admit Date:  8/14/2018  2:12 PM    PCP:   Devorah Moyer MD  Code Status:  Full Code    Subjective:     C/C:   Chief Complaint   Patient presents with    Abnormal Lab    Rectal Bleeding     Interval History Status:      No bowel movement since yesterday  Not Bleeding  Doing okay with diet  Hemoglobin up to 7.after 1 unit of blood last night  Back pain stable  The patient hopes to complete colonoscopy this admission if needed    Database updates:  POC Glucose 163 (H)    Hemoglobin 7.5 (L)    INR 1.1    CREATININE 2.48 (H)    Calcium 7.6 (L)    Brief History:     The patient is a 66 y.o. female who presents with:   Abnormal Lab and Rectal Bleeding      Patient was admitted thru ER with:  \"Greta John a 66 y.o. female with a past medical history of end-stage renal disease on dialysis, history of renal cell carcinoma, hypertension, hyperlipidemia diabetes who Elaine Ayala presented from her assisted living facility after she was found to have a hemoglobin of 5.8 with a FOBT positive. Deleyousuf Torres note patient does have a history of chronic anemia looking through her chart.  Patient stable on arrival, initially hypotensive with a systolic in the 37U to 91L in the ER.  Patient appears pale on arrival.  Patient denies chest pain, shortness of breath, lightheadedness, dizziness.  Patient does note that she had several episodes of loose dark bowel movements yesterday, denies bright red blood per rectum.  Denies history of liver disease, denies epigastric abdominal pain, no prior history of peptic ulcers.  Patient does admit to urinary retention since she was reportedly discharged from the hospital 7 days ago, requiring straight cath at her facility.  Patient is on Coumadin. \"     28-year-old female admitted to the hospital from nursing facility  She was found to have a hemoglobin of 5.8  She denies any bleeding including hematemesis, hematochezia, melena, hematuria  Her stools for occult blood were found to be positive  She has been on Coumadin for DVT     Colonoscopy 8/2016:  Findings:  Terminal ileum: normal   Cecum/Ascending colon: abnormal: Rt. colon polyps, 3 polyps, one large one 1.2 cm, removed with snare.   Transverse colon: abnormal: t colon sessile polyp, 6 mm, hot bx forceps    Descending/Sigmoid colon: abnormal: Rare diverticula   sigmoid polyps, 2 of them larger is 8 mm, snared    Rectum/Anus: examined in normal and retroflexed positions and was normal     The patient does have a known history of metastatic renal cell cancer  She is receiving palliative radiation and immunotherapy / chemo     Data base has included:     INR 2.3     POC Lactic Acid 4.39 (H)     POC Ionized Calcium 1.02 (L)     Hemoglobin 6.6 (LL)     BUN 43 (H) mg/dL   CREATININE 4.03 (H)     Doppler from 7/28/18:   Extensive acute deep venous thrombosis extending from the gastrocnemius    vein into the external iliac vein.    Acute superficial venous thrombosis identified in the small saphenous    vein.      Ct Abdomen Pelvis Wo Contrast Additional Contrast? None  Result Date: 8/1/2018  Increasing metastatic disease along the surface of the liver Decreasing mesenteric adenopathy Stable heterogeneous enlargement of the right kidney Ascites Developing metastatic disease involving the bony pelvis as described. Incomplete distention of the ascending colon raising the question of mild colitis.  Correlate clinically.  Fat stranding throughout the abdomen pelvis raising the question of third-spacing of fluid.      Ct Chest Wo Contrast  Result Date: 8/1/2018  Right larger than left pleural effusions with associated right greater than left basilar consolidation.  This may represent passive atelectasis from [address] situation would be helped by further testing. The patient was admitted  She was transfused as needed to maintain hemoglobin greater than 7.0  GI was consulted  Her back pain from her pathologic fracture was controlled  Her hemodialysis was continued  Electrolytes were replaced as needed  Her blood pressure was monitored and controlled  Her Coumadin was discontinued ( for DVT) - an inferior vena cava was placed  Wound care was ordered for her bilateral heel wounds which were found on admission    Past Medical History:   has a past medical history of Cancer ([facility] [address] Chronic kidney disease; Depression; Diabetes mellitus ([facility] [address] GERD (gastroesophageal reflux disease); HA (headache); HBP (high blood pressure); History of blood transfusion; Hyperlipidemia; Hyperplastic polyp of intestine; Hypothyroid; Hypothyroidism; Left knee pain; Non-smoker; OA (osteoarthritis); and Tubular adenoma. Social History:   reports that she has never smoked. She has never used smokeless tobacco. She reports that she does not drink alcohol or use drugs. Family History:   Family History   Problem Relation Age of Onset    Heart Disease Mother     Diabetes Mother     Cancer Father         esophageal cancer    Cancer Maternal Grandfather         colon       Medications:      Allergies:  No Known Allergies    Current Meds:   Scheduled Meds:    polyethylene glycol  4,000 mL Oral Once    pantoprazole  40 mg Oral QAM AC    cefUROXime  250 mg Oral 2 times per day    sodium chloride  250 mL Intravenous Once    docusate sodium  200 mg Oral BID    amitriptyline  75 mg Oral Nightly    atorvastatin  20 mg Oral Nightly    gabapentin  100 mg Oral TID    oxyCODONE  10 mg Oral BID    megestrol  400 mg Oral Daily    sodium chloride flush  10 mL Intravenous 2 times per day    insulin lispro  0-12 Units Subcutaneous TID WC    insulin lispro  0-6 Units Subcutaneous Nightly     Continuous

## 2018-08-19 NOTE — PLAN OF CARE
tablet Q4H PRN       VITALS:  BP (!) 112/47   Pulse 88   Temp 98.4 °F (36.9 °C) (Oral)   Resp 16   Ht 5' 8\" (1.727 m)   Wt 191 lb 2.2 oz (86.7 kg)   SpO2 98%   BMI 29.06 kg/m²     LABS:   Hematology:  Recent Labs      08/17/18   0724  08/18/18   0640  08/18/18   1249  08/19/18   0512   WBC  4.0  4.0   --   3.8   HGB  7.8*  7.0*  6.9*  7.5*   HCT  26.1*  23.8*  24.4*  25.5*   PLT  256  240   --   185   INR  2.1  1.3   --   1.1     Chemistry:  Recent Labs      08/17/18   0724  08/18/18   0640  08/19/18   0512  08/19/18   1359   NA  138  137  137   --    K  3.6*  3.7  3.5*   --    CL  101  102  103   --    CO2  22  20  23   --    GLUCOSE  122*  172*  120*   --    BUN  19  25*  18   --    CREATININE  2.28*  3.40*  2.48*   --    CALCIUM  7.5*  7.4*  7.6*   --    CAION   --    --    --   1.12*     No results for input(s): PROT, LABALBU, LABA1C, Q6RJJEU, I6WZTCW, FT4, TSH, AST, ALT, LDH, GGT, ALKPHOS, BILITOT, BILIDIR, AMMONIA, AMYLASE, LIPASE, LACTATE, CHOL, TRIG, HDL, LDLCALC, LDLDIRECT, LABVLDL, BNP, TROPONINI, CKTOTAL, CKMB, CKMBINDEX in the last 72 hours. PROBLEMS:  Principal Problem:    Gastric antral vascular ectasia (watermelon stomach)  Active Problems:    Diabetes mellitus (HCC)    Hyperplastic polyp of intestine    Anemia due to chronic kidney disease    Pathologic lumbar vertebral fracture, sequela    Metastasis to spinal column (HCC)    Hematuria    Renal cell cancer, right (HCC)    Acute deep vein thrombosis (DVT) of femoral vein of left lower extremity (HCC)    ESRD on hemodialysis (HCC)    Hypotension    Chronic GI bleeding    Acute blood loss anemia    Hypocalcemia    Hypokalemia  Resolved Problems:    * No resolved hospital problems.  *      UPDATED PLAN:  See current orders  Awaiting precertification for Mohsen Ford  Colonoscopy tomorrow  May need outpatient video capsule endoscopy  Wound care - heal wounds  Vascular eval / follow-up as scheduled:  S/P inferior vena cava filter placement 8/16  Transfuse as needed - hemoglobin less than 7.0  DC Coumadin - status post inferior vena cava filter  GI consultation / follow-up as scheduled  Oncology follow-up is scheduled - Dr Betsy Vigil radiation therapy to spine outpatient - palliative  Dialysis   Correct electrolyte abnormalities - potassium supplementation ordered  Calcium supplementation  Monitor and control blood pressure  Will monitor and control Diabetes   Check UA and C&S - multiple organisms found? Antibiotics per C&S - Ceftin initiated  Will discharge when arrangements complete and ok with other services.   Follow-up with PCP in one week, Juaquin Monsivais MD  Notify PCP of discharge   She will [facility] when precertification done  Med rec done  ELLIE done  DCP 38 min+    IP CONSULT TO GI  IP CONSULT TO NEPHROLOGY  IP CONSULT TO IV TEAM  IP CONSULT TO [facility] [address], DO  8/19/2018  3:33 PM

## 2018-08-19 NOTE — PLAN OF CARE
Problem: Falls - Risk of:  Goal: Will remain free from falls  Will remain free from falls   Outcome: Ongoing  Patient free from Falls. Call light within reach, bed locked, bedrails up, bedside table within reach.

## 2018-08-19 NOTE — PROGRESS NOTES
Nephrology Progress Note      SUBJECTIVE      Pt was seen and examined. No acute issues overnite   Denied any chest pain, SOB, lightheadedness, nausea, vomiting, abdominal pain. Had an episode of loose stool yesterday night and this morning the stool had blood, the patient is scheduled for colonoscopy tomorrow. Leg cramping+  Upper endoscopy done on 18 consistent with GAVE - diffuse antral erythema with some evidence of linear streaky pattern  Hemodynamically stable   Hb 7.5, K 3.5, CO2 23     HD on 18  Pre-Weight = 89.4 kg   Post-weight = Weight: 191 lb 2.2 oz (86.7 kg)  Total Liters Processed = Total Liters Processed (l/min): 84.8 l/min  Rinseback Volume (mL) = Rinseback Volume (ml): 370 ml  Net Removal (mL) = 2810 ml     OBJECTIVE      CURRENT TEMPERATURE:  Temp: 98.4 °F (36.9 °C)  MAXIMUM TEMPERATURE OVER 24HRS:  Temp (24hrs), Av.7 °F (37.1 °C), Min:98.4 °F (36.9 °C), Max:98.9 °F (37.2 °C)    CURRENT RESPIRATORY RATE:  Resp: 16  CURRENT PULSE:  Pulse: 88  CURRENT BLOOD PRESSURE:  BP: (!) 112/47  24HR BLOOD PRESSURE RANGE:  Systolic (95LRN), OOM:883 , Min:109 , JOHN:881   ; Diastolic (34ICH), MDV:01, Min:41, Max:86    24HR INTAKE/OUTPUT:      Intake/Output Summary (Last 24 hours) at 18 1327  Last data filed at 18 7751   Gross per 24 hour   Intake             1515 ml   Output                0 ml   Net             1515 ml     WEIGHT :  Patient Vitals for the past 96 hrs (Last 3 readings):   Weight   18 1240 191 lb 2.2 oz (86.7 kg)   18 0830 197 lb 1.5 oz (89.4 kg)   18 0530 200 lb 14.4 oz (91.1 kg)     PHYSICAL EXAM      GENERAL APPEARANCE:Awake, alert, in no acute distress  SKIN: warm and dry, no rash or erythema  EYES: conjunctivae normal and sclera anicteric  ENT: no thrush no pharyngeal congestion   NECK:   No JVD. No carotid bruits and no carotid lymphadenopathy . PULMONARY: lungs are clear to auscultation. No Wheezing, no ronchi .    CADRDIOVASCULAR: S1 and S2

## 2018-08-20 NOTE — ANESTHESIA PRE PROCEDURE
ULTRAFINE III SHORT PEN 31G X 8 MM MISC  4/13/18   Historical Provider, MD   ondansetron (ZOFRAN) 4 MG tablet Take 1 tablet by mouth daily as needed for Nausea or Vomiting 4/10/18   Felicia Talavera MD   Blood Glucose Monitoring Suppl (TRUE METRIX METER) w/Device KIT  2/2/18   Historical Provider, MD   Lancets MISC Patient testing 3 times daily 2/2/18   Sandee Siddiqui MD   glucose blood VI test strips (ASCENSIA AUTODISC VI;ONE TOUCH ULTRA TEST VI) strip Use with associated glucose meter.  Patient testing three times daily 2/2/18   Sandee Siddiqui MD   Insulin Pen Needle 32G X 4 MM MISC 2 each by Does not apply route 2 times daily    Historical Provider, MD       Current medications:    Current Facility-Administered Medications   Medication Dose Route Frequency Provider Last Rate Last Dose    [MAR Hold] 0.9 % sodium chloride bolus  250 mL Intravenous PRN Jf Ramachandran MD        [facility] Hold] 0.9 % sodium chloride bolus  150 mL Intravenous PRN Jf Ramachandran MD        [facility] Hold] pantoprazole (PROTONIX) tablet 40 mg  40 mg Oral QAM AC Brianna Andry, DO   40 mg at 08/20/18 0648    [MAR Hold] glucose (GLUTOSE) 40 % oral gel 15 g  15 g Oral PRN Brianna Anrdy, DO        [facility] Hold] dextrose 50 % solution 12.5 g  12.5 g Intravenous PRN Brianna Andry, DO        [MAR Hold] glucagon (rDNA) injection 1 mg  1 mg Intramuscular PRN Brianna Andry, DO        [facility] Hold] dextrose 5 % solution  100 mL/hr Intravenous PRN Brianna Andry, DO        [facility] Hold] heparin (porcine) injection 1,600 Units  1,600 Units Intercatheter PRN Daniel Larsen MD   1,600 Units at 08/18/18 1245    [MAR Hold] heparin (porcine) injection 1,600 Units  1,600 Units Intercatheter PRN Daniel Larsen MD   1,600 Units at 08/18/18 1245    [MAR Hold] cefUROXime (CEFTIN) tablet 250 mg  250 mg Oral 2 times per day Brianna Andry, DO   250 mg at 08/19/18 2045    [MAR Hold] cyclobenzaprine (FLEXERIL) tablet 5 mg  5 mg Oral TID PRN Andrea Spraks, DO   2 Units at 08/19/18 2046    [MAR Hold] acetaminophen (TYLENOL) tablet 650 mg  650 mg Oral TID PRN Lehr Canal F[place]on, APRN - CNP        [MAR Hold] HYDROcodone-acetaminophen (NORCO) 5-325 MG per tablet 1 tablet  1 tablet Oral Q4H PRN Lehr Canal F[place]on, APRN - CNP        Or    [MAR Hold] HYDROcodone-acetaminophen (NORCO) 5-325 MG per tablet 2 tablet  2 tablet Oral Q4H PRN Lehr Canal F[place]on, APRN - CNP   2 tablet at 08/17/18 2021       Allergies:  No Known Allergies    Problem List:    Patient Active Problem List   Diagnosis Code    Diabetes mellitus ([facility] [address] E11.9    Renal insufficiency N28.9    Osteoarthritis M19.90    Migraine G43.909    Obesity E66.9    Hyperplastic polyp of intestine K63.5    Tubular adenoma D36.9    Stage 4 chronic kidney disease ([facility] [address] N18.4    Syncope and collapse R55    Hyperkalemia E87.5    Anemia due to chronic kidney disease N18.9, D63.1    Pathologic lumbar vertebral fracture, sequela M84.48XS    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    Acute cystitis without hematuria N30.00    Renal mass N28.89    Metastasis to spinal column (HCC) C79.51    Renal cell carcinoma (HCC) C64.9    Acute kidney injury ([facility] [address] N17.9    Gluteal pain M79.1    Acute renal failure (ARF) (HCC) N17.9    Acute renal failure (HCC) N17.9    Other hydronephrosis N13.39    Severe malnutrition (HCC) E43    Urinary retention R33.9    Cervical muscle pain M54.2    Chronic right-sided low back pain without sciatica M54.5, G89.29    Cervicalgia M54.2    RASHMI (acute kidney injury) ([facility] [address] N17.9    Pathologic compression fracture of spine with delayed healing M48.50XG    Hematuria R31.9    Absolute anemia D64.9    Renal cell cancer, right (HCC) C64.1    Encounter for palliative care Z51.5    Acute deep vein thrombosis (DVT) of femoral vein of left lower extremity (HCC) I82.412    ESRD on hemodialysis (HCC) N18.6, Z99.2    Hypotension I95.9  Chronic GI bleeding K92.2    Acute blood loss anemia D62    Gastric antral vascular ectasia (watermelon stomach) K31.819    Hypocalcemia E83.51    Hypokalemia E87.6       Past Medical History:        Diagnosis Date    Cancer ([facility]     kidney    Chronic kidney disease     Depression     Diabetes mellitus ([facility] [address]     type 2    GERD (gastroesophageal reflux disease)     HA (headache)     HBP (high blood pressure)     History of blood transfusion     no reaction    Hyperlipidemia     Hyperplastic polyp of intestine     Hypothyroid 11/26/2016    Hypothyroidism     Left knee pain     Non-smoker     OA (osteoarthritis)     bilat knees    Tubular adenoma        Past Surgical History:        Procedure Laterality Date    BACK SURGERY N/A 01/23/2018    Post T11-L2 Fusion    COLONOSCOPY  08/16/2016    hyperplastic polyp and tubular adenoma     HYSTERECTOMY      NERVE BLOCK  07/13/2018    aleta trigger point neck #1    VT EGD TRANSORAL BIOPSY SINGLE/MULTIPLE N/A 8/17/2018    EGD performed by Mariela Sandoval MD at [address] N/A 1/23/2018    T11-L3 POSTERIOR FIXATION AND FUSION, SPINE ABBY C[facility]ARM, [facility]  [facility] [id number] [address] [facility] [address] performed by Ezekiel Renee DO at [address] [place] [address] History:    Social History   Substance Use Topics    Smoking status: Never Smoker    Smokeless tobacco: Never Used    Alcohol use No                                Counseling given: Not Answered      Vital Signs (Current):   Vitals:    08/18/18 2010 08/19/18 1225 08/19/18 2011 08/20/18 0648   BP: (!) 112/47 (!) 113/42 (!) 125/51 (!) 140/53   Pulse:  84 89 101   Resp: 16   16   Temp: 36.9 °C (98.4 °F)  37.1 °C (98.8 °F) 36.6 °C (97.9 °F)   TempSrc: Oral  Oral Oral   SpO2: 98%   97%   Weight:       Height:                                                  BP Readings from Last 3 Encounters:   08/20/18 (!) 140/53   08/17/18 (!) 113/41   08/07/18 (!)

## 2018-08-20 NOTE — BRIEF OP NOTE
Brief Postoperative Note  ______________________________________________________________    Patient: Courtney Salgado  YOB: 1955  MRN: [id number]  Date of Procedure: 8/20/2018    Pre-Op Diagnosis: ANEMIA     Post-Op Diagnosis: Same       Procedure(s):  COLONOSCOPY POLYPECTOMY SNARE/COLD BIOPSY    Anesthesia: Monitor Anesthesia Care    Surgeon(s):  Rose Marie Kumari MD    Staff:  First Assistant: Ivet Burger RN     Estimated Blood Loss: * No values recorded between 8/20/2018 11:41 AM and 8/20/2018 11:59 AM * None    Complications: None    Specimens:   ID Type Source Tests Collected by Time Destination   A :  Tissue Colon-Transverse SURGICAL PATHOLOGY Rose Marie Kumari MD 8/20/2018 1154        Implants:  * No implants in log *      Drains:   [REMOVED] Urethral Catheter Non-latex 16 fr (Removed)       [REMOVED] Urethral Catheter Non-latex 16 fr (Removed)   Catheter Indications Urology/Urologist seeing this patient or inserted indwelling catheter 7/30/2018 11:30 PM   Securement Device Date Changed 07/25/18 7/30/2018 11:30 PM   Site Assessment No urethral drainage 7/30/2018 11:30 PM   Urine Color Yellow 7/30/2018 11:30 PM   Urine Appearance Hazy 7/30/2018 11:30 PM   Output (mL) 200 mL 7/30/2018 11:30 PM   Provider Notified to Remove No 7/26/2018  4:00 PM       PROCEDURE DATE: 08/20/18    REFERRING PHYSICIAN: No ref. provider found     PRIMARY CARE PROVIDER: Imani Flynn MD    ATTENDING PHYSICIAN: Rose Marie Kumari MD     PROCEDURES:   Transanal Colonoscopy --diagnostic. POSTPROCEDURE DIAGNOSIS:  Sigmoid diverticulosis, internal hemorrhoids, transverse colon polyp s/p polypectomy    MEDICATIONS:     MAC per anesthesia    INSTRUMENT: Olympus CF-H180AL flexible Colonoscope. PREPARATION: The nature and character of the procedure as well as risks, benefits, and alternatives were discussed with the patient and informed consent was obtained.  Complications were said to include, but were not limited to: medication allergy, medication reaction, cardiovascular and respiratory problems, bleeding, perforation, infection, and/or missed diagnosis. Following arrival in the endoscopy room, the patient was placed in the left lateral decubitus position and final time-out accomplished in the presence of the nursing staff. Baseline vital signs were obtained and reviewed, and IV sedation was subsequently initiated. FINDINGS: Rectal examination demonstrated no significant visible external abnormality and digital palpation was unremarkable. Following adequate conscious sedation the colonoscope was introduced and advanced under direct visualization to the cecum, which was identified by the ileocecal valve and appendiceal orifice. The bowel preparation was felt to be fair. This included moderate amounts of green stool that was able to be adequately irrigated and aspirated. Cecal intubation time was 7 minutes. Once maximally inserted, the endoscope was withdrawn and the mucosa was carefully inspected. The mucosal exam showed moderate sigmoid diverticulosis, and 8 mm transverse colon polyp removed with jumbo cold forceps. Retroflexion was performed in the rectum and showed non-bleeding internal hemorrhoids. Withdrawal time was more than 6 minutes. IMPRESSION:   sigmoid diverticulosis  non-bleeding internal hemorrhoids  transverse colon polyp  S/p polypectomy  No evidence of ongoing GI bleeding. RECOMMENDATIONS:   1) Follow up with referring provider, as previously scheduled.    2) High fibre diet  3) F/U Biopsy results  4) Outpatient GI follow-up        Ksenia Andujar MD  Date: 8/20/2018  Time: 11:59 AM

## 2018-08-20 NOTE — PROGRESS NOTES
23.8*  24.4*  25.5*  25.1*   PLT  240   --   185  197   MPV  9.7   --   9.5  9.7      BMP: Recent Labs      08/18/18   0640  08/19/18   0512  08/20/18   0527   NA  137  137  137   K  3.7  3.5*  4.0   CL  102  103  106   CO2  20  23  22   BUN  25*  18  22   CREATININE  3.40*  2.48*  2.89*   GLUCOSE  172*  120*  143*   CALCIUM  7.4*  7.6*  7.9*        Phosphorus:  No results for input(s): PHOS in the last 72 hours. Magnesium: No results for input(s): MG in the last 72 hours. Albumin: No results for input(s): LABALBU in the last 72 hours. Dialysis bath: Dialysis Bath  K+ (Potassium): 3  Ca+ (Calcium): 2.25  Na+ (Sodium): 140  HCO3 (Bicarb): 35    Radiology:  Reviewed as available. Assessment:  1 ESRD:dialysis bath reviewed with nurse. On a Monday, Wednesday and Friday hemodialysis schedule   2. Essential hypertension with reasonable control  3. Metastatic bladder cancer with the patient on palliative radiation therapy and chemotherapy  4. GAVE noted by EGD with no active bleeding found  5. ANEMIA OF CHRONIC DISEASE and likely GI blood loss   6. Status post removal of transverse colonic polyp on colonoscopy with no active bleeding noted    Plan:  1. Weight removal and dialysis orders reviewed with the dialysis nurse in dialysis. 2. Labs reviewed with the dialysis nurse at the bedside in dialysis  3. OK to discharge the patient post dialysis any time from Nephrology stand point  4. Patient care and plan discussed with Dr. Yanet Saeed, Internal Medicine      Please do not hesitate to call with questions.     Electronically signed by Naeem Rodriguez MD on 8/20/2018 at 3:57 PM

## 2018-08-20 NOTE — PROGRESS NOTES
[address]  Occupational Therapy Not Seen Note    DATE: 2018  Name: Huseyin Villegas  : 1955  MRN: [id number]    Patient not available for Occupational Therapy due to:    [] Testing:    [x] Hemodialysis: per RN Sendy Fall    [] Blood Transfusion in Progress    []Refusal by Patient:    [] Surgery/Procedure:    [] Strict Bedrest    [] Sedation    [] Spine Precautions     [] Pt being transferred to palliative care at this time. Spoke with pt/family and OT services to be defered. [] Pt independent with functional mobility and functional tasks.  Pt with no OT acute care needs at this time, will defer OT eval.    [] Other    Next Scheduled Treatment: Re-check 2018    Signature: JESSENIA Vela/GILDA

## 2018-08-20 NOTE — PROGRESS NOTES
contact the Micro lab ([phone]) if you feel the management ofthis    CULTURE (A) 08/16/2018 11:37 PM      particular situation would be helped by further testing. [facility]    Radiology:    CT abd:  Increasing metastatic disease along the surface of the liver       Decreasing mesenteric adenopathy       Stable heterogeneous enlargement of the right kidney       Ascites       Developing metastatic disease involving the bony pelvis as described.       Incomplete distention of the ascending colon raising the question of mild   colitis.  Correlate clinically.       Fat stranding throughout the abdomen pelvis raising the question of   third-spacing of fluid.          Physical Examination:        General appearance:  alert, cooperative and no distress  Mental Status:  oriented to person, place and time and normal affect  Lungs:  clear to auscultation bilaterally, normal effort  Heart:  regular rate and rhythm, no murmur  Abdomen:  soft, nontender, nondistended, normal bowel sounds, no masses, hepatomegaly, splenomegaly  Extremities:  trace edema bilat, no redness, tenderness in the calves  Skin:  no gross lesions, rashes, induration    Assessment:        Primary Problem  Gastric antral vascular ectasia (watermelon stomach)    Active Hospital Problems    Diagnosis Date Noted    Hypocalcemia [E83.51] 08/19/2018    Hypokalemia [E87.6]     Gastric antral vascular ectasia (watermelon stomach) [K31.819] 08/18/2018    Acute blood loss anemia [D62] 08/15/2018    Hypotension [I95.9] 08/14/2018    Chronic GI bleeding [K92.2] 08/14/2018    ESRD on hemodialysis (HCC) [N18.6, Z99.2] 08/01/2018    Acute deep vein thrombosis (DVT) of femoral vein of left lower extremity ([facility] [address] [I82.412] 07/29/2018    Renal cell cancer, right (HCC) [C64.1]     Hematuria [R31.9]     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

## 2018-08-20 NOTE — PLAN OF CARE
Problem: Pain:  Goal: Pain level will decrease  Pain level will decrease   Outcome: Ongoing  See MAR

## 2018-08-21 NOTE — PROGRESS NOTES
Nephrology Progress Note        Subjective: The patient is seen and examined at bedside. She is status post EGD which shows GAVE and no active bleeding. She had a transverse colonic polyp removed with colonoscopy earlier yesterday. No active bleeding was found on colonoscopy. Stable hemodynamics. She had HD yesterday  Pre-Weight = 92.3kg  Post-weight = 89.9 kg  Total Liters Processed =  98.2 l/min  Rinseback Volume (mL) = Rinseback Volume (ml): 370 ml  Net Removal (mL) = 2090     Pt tolerated treatment well, no s/s of discomfort. Pt completed 4 hours treatment as ordered. She is quite weak and tired. Her functional status is relatively low at present, and she will require extensive rehabilitation and support as an outpatient.         Objective:  CURRENT TEMPERATURE:  Temp: 98.4 °F (36.9 °C)  MAXIMUM TEMPERATURE OVER 24HRS:  Temp (24hrs), Av.9 °F (36.6 °C), Min:97.1 °F (36.2 °C), Max:99 °F (37.2 °C)    CURRENT RESPIRATORY RATE:  Resp: 16  CURRENT PULSE:  Pulse: 80  CURRENT BLOOD PRESSURE:  BP: (!) 110/47  24HR BLOOD PRESSURE RANGE:  Systolic (05PVC), :692 , Min:90 , ETS:224   ; Diastolic (63OEX), YDP:38, Min:47, Max:63    24HR INTAKE/OUTPUT:      Intake/Output Summary (Last 24 hours) at 18 1113  Last data filed at 18 2130   Gross per 24 hour   Intake              150 ml   Output             3260 ml   Net            -3110 ml     Patient Vitals for the past 96 hrs (Last 3 readings):   Weight   18 1720 198 lb 3.1 oz (89.9 kg)   18 1240 191 lb 2.2 oz (86.7 kg)   18 0830 197 lb 1.5 oz (89.4 kg)         Physical Exam:  General appearance:Awake, alert, in no acute distress, on room air  Skin: warm and dry, no rash or erythema  Eyes: conjunctivae pale and sclera anicteric  ENT:no thrush appreciated, moist mucous mebranes   Neck:  No JVD, midline trachea, no accessory muscle use  Pulmonary: clear to auscultation and no wheezing, rales or rhonchi   Cardiovascular: normal S1 and

## 2018-08-21 NOTE — CARE COORDINATION
Discharge [facility] [place] [facility]  Written by: Cuong Knowles RN    Patient Name: Joe Laguna  Attending Provider: Richard Valdovinos MD  Admit Date: 2018  2:12 PM  MRN: [id number]  Account: [de-identified]                     : 1955  Discharge Date: 18      Disposition: [facility] of Kevin Flores RN

## 2018-08-21 NOTE — DISCHARGE SUMMARY
radiation dose to as low as reasonably achievable. COMPARISON: January 2018 HISTORY: ORDERING SYSTEM PROVIDED HISTORY: renal cell carcinoma, restaging, no IV contrast TECHNOLOGIST PROVIDED HISTORY: Additional Contrast?->None FINDINGS: Lower Chest: Discussed separately Organs: Multifocal heterogeneous density is noted along the surface of the liver. This is increased. There is a developing high density within this area on axial image 44 lateral to the right lobe of the liver. Limited noncontrast evaluation of the spleen and pancreas demonstrate no focal abnormality. Gallbladder distention is noted. Limited noncontrast evaluation of the left adrenal gland and left kidney demonstrate no focal abnormality. Right adrenal gland is difficult characterize however is grossly stable in appearance. Heterogeneous density and enlargement is noted throughout the right kidney. This is similar compared to the prior. Multiple associated calcifications are noted. Mild prominence of the right renal pelvis is noted. GI/Bowel: Incomplete distention of the ascending colon and transverse colon is noted. Descending colon and splenic flexure are normal in appearance. A few mildly dilated small bowel loops are noted Pelvis: Ascites is noted. Presacral fat stranding is noted. Urinary bladder is incompletely distended. Mild diffuse wall thickening is suspected. There is redundancy of the sigmoid colon. Segmental incomplete distention is noted. Small pelvic lymph nodes are present without pathologic enlargement. Nodule along the rightward aspect of the proximal colon is noted on axial image 143. This does not appear grossly changed. Peritoneum/Retroperitoneum: Vascular calcifications are noted without aneurysm. Multiple mesenteric lymph nodes are again noted. Several of the mesenteric nodes are decreased in size. Numerous perinephric nodules on the right are again noted. This appears slightly decreased. Ascites is noted. Bones/Soft Tissues: Multifocal subcutaneous fat stranding in the abdomen the pelvis is noted. There is a new destructive lesion in the rightward aspect of the sacrum. This extends into the right S1 foramen. There is a new destructive lesion in the left iliac wing extending into the SI joint. There is a new destructive lesion in the right iliac wing on image 144. Postoperative changes in the lower thoracic spine and upper lumbar spine are again noted. Bone destruction of L1 is increased. Increasing metastatic disease along the surface of the liver Decreasing mesenteric adenopathy Stable heterogeneous enlargement of the right kidney Ascites Developing metastatic disease involving the bony pelvis as described. Incomplete distention of the ascending colon raising the question of mild colitis. Correlate clinically. Fat stranding throughout the abdomen pelvis raising the question of third-spacing of fluid. Ct Chest Wo Contrast    Result Date: 8/1/2018  EXAMINATION: CT OF THE CHEST WITHOUT CONTRAST 8/1/2018 9:26 am TECHNIQUE: CT of the chest was performed without the administration of intravenous contrast. Multiplanar reformatted images are provided for review. Dose modulation, iterative reconstruction, and/or weight based adjustment of the mA/kV was utilized to reduce the radiation dose to as low as reasonably achievable. COMPARISON: January 20 HISTORY: ORDERING SYSTEM PROVIDED HISTORY: renal cell carcinoma, restaging, no IV contrast FINDINGS: Mediastinum: Vascular calcifications are noted. Small mediastinal lymph nodes are again noted. Right larger than left pleural effusions are noted. Lungs/pleura: Partial consolidation in the right lower lobe is noted with a few air bronchograms. There is also a small segment of consolidation in the dependent aspect of the right lower lobe. There are no discrete pulmonary nodules.  Upper Abdomen: Multiple peripheral low-density lesions are noted along the margin of the liver. This is increased. Please refer to the CT abdomen report. Soft Tissues/Bones: Small right breast soft tissue nodule is noted on image 61. Correlate with patient's mammographic history. Postop changes are noted in the lower thoracic spine. Degenerative changes in the spine are noted. A small round lucency is noted along the inferior endplate of T8. Right larger than left pleural effusions with associated right greater than left basilar consolidation. This may represent passive atelectasis from the effusions. Pneumonia not excluded No discrete pulmonary nodules Rounded lucency along the inferior endplate of T8. This may represent a developing Schmorl's node deformity. A small metastatic lesion would be difficult to exclude given that this is a new finding. Upper abdominal findings discussed separately. Small right breast nodular density. Correlate with mammographic history     Ct Lumbar Spine Wo Contrast    Result Date: 7/25/2018  EXAMINATION: CT OF THE LUMBAR SPINE WITHOUT CONTRAST  7/25/2018 TECHNIQUE: CT of the lumbar spine was performed without the administration of intravenous contrast. Multiplanar reformatted images are provided for review. Dose modulation, iterative reconstruction, and/or weight based adjustment of the mA/kV was utilized to reduce the radiation dose to as low as reasonably achievable. COMPARISON: MRI lumbar spine from June 9, 2018 HISTORY: ORDERING SYSTEM PROVIDED HISTORY: r/o worsening fracture or increasing metastatic mass TECHNOLOGIST PROVIDED HISTORY: Reason for exam:->r/o worsening fracture or increasing metastatic mass FINDINGS: BONES/ALIGNMENT: Posterior transpedicular fusion hardware involving T11, T12, L2, and L3 is demonstrated. A pathologic compression fracture of L1 is again demonstrated with some retropulsion of fragments by approximately 0.4 cm. There is a dominant lytic lesion in the right sacral body encroaching upon the right S1 neural foramen.   This +---------+----------+-----------------------------------------------------+   - The patient's risk factor(s) include: diabetes mellitus and obesity.   - The patient has kidney cancer. Velocities are measured in cm/s ; Diameters are measured in cm Right Lower Extremities DVT Study Measurements Right 2D Measurements +------------------------------------+----------+---------------+----------+ ! Location                            ! Visualized! Compressibility! Thrombosis! +------------------------------------+----------+---------------+----------+ ! Common Femoral                      !Yes       !               !          ! +------------------------------------+----------+---------------+----------+ Right Doppler Measurements +----------------------------+------+------+-------------------------------+ ! Location                    ! Signal!Reflux! Reflux (msec)                  ! +----------------------------+------+------+-------------------------------+ ! Common Femoral              !Phasic!      !                               ! +----------------------------+------+------+-------------------------------+ Left Lower Extremities DVT Study Measurements Left 2D Measurements +------------------------------------+----------+---------------+----------+ ! Location                            ! Visualized! Compressibility! Thrombosis! +------------------------------------+----------+---------------+----------+ ! Common Femoral                      !Yes       ! No             !Acute     ! +------------------------------------+----------+---------------+----------+ ! Prox Femoral                        !Yes       ! Partial        !Acute     ! +------------------------------------+----------+---------------+----------+ ! Mid Femoral                         !Yes       ! No             !Acute     ! +------------------------------------+----------+---------------+----------+ ! Dist Femoral                        !Yes       ! No             !Acute ! +------------------------------------+----------+---------------+----------+ ! Popliteal                           !Yes       ! No             !Acute     ! +------------------------------------+----------+---------------+----------+ ! Sapheno Femoral Junction            ! Yes       ! Yes            ! None      ! +------------------------------------+----------+---------------+----------+ ! PTV                                 ! Yes       ! Yes            ! None      ! +------------------------------------+----------+---------------+----------+ ! Peroneal                            !No        !               !          ! +------------------------------------+----------+---------------+----------+ ! Gastroc                             ! Yes       ! No             !Acute     ! +------------------------------------+----------+---------------+----------+ ! GSV Thigh                           ! Yes       ! Yes            ! None      ! +------------------------------------+----------+---------------+----------+ ! GSV Knee                            ! Yes       ! Yes            ! None      ! +------------------------------------+----------+---------------+----------+ ! GSV Ankle                           ! Yes       ! Yes            ! None      ! +------------------------------------+----------+---------------+----------+ ! SSV                                 ! Yes       ! No             !Acute     ! +------------------------------------+----------+---------------+----------+ Left Doppler Measurements +-------------------------+----------+------+------------------------------+ ! Location                 ! Signal    !Reflux! Reflux (msec)                 ! +-------------------------+----------+------+------------------------------+ ! Common Femoral           !Diminished!      !                              ! +-------------------------+----------+------+------------------------------+ ! Prox Femoral             !Continuous!      ! these medications    cefUROXime 250 MG tablet  Commonly known as:  CEFTIN  Take 1 tablet by mouth every 12 hours for 5 days        CHANGE how you take these medications    oxyCODONE HCl 10 MG immediate release tablet  Commonly known as:  OXY-IR  Take 1 tablet by mouth every 6 hours as needed for Pain for up to 30 days. Intended supply: 30 days. What changed:  Another medication with the same name was removed. Continue taking this medication, and follow the directions you see here. CONTINUE taking these medications    acetaminophen 500 MG tablet  Commonly known as:  TYLENOL     amitriptyline 75 MG tablet  Commonly known as:  ELAVIL  Take 1 tablet by mouth nightly     atorvastatin 20 MG tablet  Commonly known as:  LIPITOR  TAKE 1 TABLET BY MOUTH ONE TIME A DAY     blood glucose test strips strip  Commonly known as:  ASCENSIA AUTODISC VI;ONE TOUCH ULTRA TEST VI  Use with associated glucose meter. Patient testing three times daily     cyclobenzaprine 5 MG tablet  Commonly known as:  FLEXERIL  TAKE 1 TABLET BY MOUTH THREE TIMES A DAY AS NEEDED FOR MUSCLE SPASMS     docusate sodium 100 MG capsule  Commonly known as:  COLACE  Take 2 capsules by mouth 2 times daily     gabapentin 100 MG capsule  Commonly known as:  NEURONTIN  Take 1 capsule by mouth 3 times daily for 30 days. Tona Stroud      HUMALOG 100 UNIT/ML injection vial  Generic drug:  insulin lispro     * Insulin Pen Needle 32G X 4 MM Misc     * B-D ULTRAFINE III SHORT PEN 31G X 8 MM Misc  Generic drug:  Insulin Pen Needle     Lancets Misc  Patient testing 3 times daily     megestrol 40 MG/ML suspension  Commonly known as:  MEGACE  Take 10 mLs by mouth daily     omeprazole 40 MG delayed release capsule  Commonly known as:  PRILOSEC  Take 1 capsule by mouth daily     ondansetron 4 MG tablet  Commonly known as:  ZOFRAN  Take 1 tablet by mouth daily as needed for Nausea or Vomiting     prochlorperazine 5 MG tablet  Commonly known as:  COMPAZINE     TRUE METRIX METER w/Device Kit     V-GO 20 Kit        * This list has 2 medication(s) that are the same as other medications prescribed for you. Read the directions carefully, and ask your doctor or other care provider to review them with you. STOP taking these medications    warfarin 2.5 MG tablet  Commonly known as:  COUMADIN           Where to Get Your Medications      These medications were sent to [address] [place] [address]. Kali Crespo [phone]  [address] [place] [id number]    Phone:  [phone]   · cefUROXime 250 MG tablet     You can get these medications from any pharmacy    Bring a paper prescription for each of these medications  · oxyCODONE HCl 10 MG immediate release tablet         Time Spent on discharge is  40 minutes  in patient examination, evaluation, counseling as well as medication reconciliation, prescriptions for required medications, discharge plan and follow up. Electronically signed by   Jes Toribio MD  8/22/2018  7:24 AM      Thank you Dr. Josie Sweet MD for the opportunity to be involved in this patient's care.

## 2018-08-31 NOTE — PROGRESS NOTES
HUMALOG 100 UNIT/ML injection vial With insulin pump      B-D ULTRAFINE III SHORT PEN 31G X 8 MM MISC       atorvastatin (LIPITOR) 20 MG tablet TAKE 1 TABLET BY MOUTH ONE TIME A DAY 90 tablet 3    ondansetron (ZOFRAN) 4 MG tablet Take 1 tablet by mouth daily as needed for Nausea or Vomiting 40 tablet 0    omeprazole (PRILOSEC) 40 MG delayed release capsule Take 1 capsule by mouth daily 90 capsule 3    amitriptyline (ELAVIL) 75 MG tablet Take 1 tablet by mouth nightly 90 tablet 3    prochlorperazine (COMPAZINE) 5 MG tablet Take 5 mg by mouth every 8 hours as needed       Blood Glucose Monitoring Suppl (TRUE METRIX METER) w/Device KIT       Lancets MISC Patient testing 3 times daily 100 each 3    glucose blood VI test strips (ASCENSIA AUTODISC VI;ONE TOUCH ULTRA TEST VI) strip Use with associated glucose meter. Patient testing three times daily 100 strip 3    docusate sodium (COLACE) 100 MG capsule Take 2 capsules by mouth 2 times daily 120 capsule 11    Insulin Pen Needle 32G X 4 MM MISC 2 each by Does not apply route 2 times daily      acetaminophen (TYLENOL) 500 MG tablet Take 1,000 mg by mouth 3 times daily       No current facility-administered medications on file prior to visit. Social History   Substance Use Topics    Smoking status: Never Smoker    Smokeless tobacco: Never Used    Alcohol use No       No Known Allergies    Review of Systems  Constitutional: Negative for activity change and appetite change. HENT: Negative for ear pain and facial swelling. Eyes: Negative for discharge and itching. Respiratory: Negative for choking and chest tightness. Cardiovascular: Negative for chest pain and leg swelling. Gastrointestinal: Negative for nausea and abdominal pain. Endocrine: Negative for cold intolerance and heat intolerance. Genitourinary: Negative for frequency and flank pain. Musculoskeletal: Negative for myalgias and joint swelling. Skin: Negative for rash and wound. Allergic/Immunologic: Negative for environmental allergies and food allergies. Hematological: Negative for adenopathy. Does not bruise/bleed easily. Psychiatric/Behavioral: Negative for self-injury. The patient is not nervous/anxious. Physical Exam:      Physical Examination  BP (!) 83/48 (Site: Left Arm, Position: Sitting, Cuff Size: Large Adult)   Pulse 73   Ht 5' 8\" (1.727 m)   Wt 189 lb (85.7 kg)   BMI 28.74 kg/m²   Estimated body mass index is 28.74 kg/m² as calculated from the following:    Height as of this encounter: 5' 8\" (1.727 m). Weight as of this encounter: 189 lb (85.7 kg). General:  Joe Laguna is a 66y.o. year old female who appears her stated age. HEENT: Normocephalic atraumatic. Neck supple. Chest: Clear to auscultation bilaterally. Regular rate and rhythm. Abdomen: Soft nontender nondistended. Normoactive bowel sounds. Neurologic Exam   Alert and oriented ×3. Appropriate affect. CN II through XII are intact. Motor examination: Upper extremity strength full and symmetric at 5/5 bilaterally. Lower extremity strength diminished on left at 1/5  To dorsiflexion and 4/5 plantar flexion, otherwise 5/5. Sensory examination: No gross deficits. Reflexes: +1/4 in bilateral upper and lower extremities. Negative Hoffmans, no clonus. Gait: Not currently ambulatory, in wheelchair due to deconditioning.     Studies Review:     Reviewed XR lumbar spine Type:  Film and Report    Images:  3 XRAY VIEWS OF THE LUMBAR SPINE       8/31/2018 10:41 am       COMPARISON:   07/25/2018, 03/13/2018       HISTORY:   ORDERING SYSTEM PROVIDED HISTORY: Secondary malignant neoplasm of lumbar   vertebral column ([facility] [address]   TECHNOLOGIST PROVIDED HISTORY:   Malignant Neoplasm of Lumbar       On going examination.  Subsequent study.       FINDINGS:   Status post posterior fusion of T11, T12, L2, and L3 secondary to a severe   pathologic L1 compression fracture.  Retropulsion of the posterior wall

## 2018-09-05 NOTE — PROGRESS NOTES
08/21/2018    HCT 24.3 (L) 08/21/2018    MCV 94.9 08/21/2018     08/21/2018    LYMPHOPCT 19 (L) 08/14/2018    RBC 2.56 (L) 08/21/2018    MCH 28.9 08/21/2018    MCHC 30.5 08/21/2018    RDW 17.8 (H) 08/21/2018    MONOPCT 8 08/14/2018    BASOPCT 1 08/14/2018    NEUTROABS 5.00 08/14/2018    LYMPHSABS 1.39 08/14/2018    MONOSABS 0.56 08/14/2018    EOSABS 0.18 08/14/2018    BASOSABS 0.04 08/14/2018         Chemistry        Component Value Date/Time     08/21/2018 0539    K 3.9 08/21/2018 0539     08/21/2018 0539    CO2 26 08/21/2018 0539    BUN 13 08/21/2018 0539    CREATININE 2.38 (H) 08/21/2018 0539        Component Value Date/Time    CALCIUM 7.7 (L) 08/21/2018 0539    ALKPHOS 99 08/16/2018 0529    AST 8 08/16/2018 0529    ALT <5 (L) 08/16/2018 0529    BILITOT 0.18 (L) 08/16/2018 0529        PATHOLOGY DATA:   Reported: 1/23/2018 07:03     -- Diagnosis --   RIGHT RENAL MASS, CT-GUIDED BIOPSY:  CLEAR CELL RENAL CELL CARCINOMA. IMAGING DATA:    Ct Lumbar Spine Wo Contrast     Result Date: 1/19/2018  EXAMINATION: CT OF THE LUMBAR SPINE WITHOUT CONTRAST  1/19/2018 TECHNIQUE: CT of the lumbar spine was performed without the administration of intravenous contrast. Multiplanar reformatted images are provided for review. Dose modulation, iterative reconstruction, and/or weight based adjustment of the mA/kV was utilized to reduce the radiation dose to as low as reasonably achievable.  COMPARISON: 01/18/2018 HISTORY: ORDERING SYSTEM PROVIDED HISTORY: L1 compression fx TECHNOLOGIST PROVIDED HISTORY: Reason for exam:->L1 compression fx FINDINGS: BONES/ALIGNMENT: Correlating with previous radiographs is a mild L1 compression fracture with approximately 25 percent loss of height at its narrowest point.  Mixed lytic and sclerotic appearance centered in the left half of the vertebral body extending across the midline and to the left pedicle, transverse process, and lamina most compatible with metastatic disease/pathologic compression fracture.  Less likely infection is in the differential diagnosis.  There appears to be a soft tissue component extending left laterally/posterolaterally.  Posterior cortical buckling/destruction of the posterior vertebral body wall with a soft tissue component which appears to extend to the spinal canal probably causing some degree of spinal stenosis and probable left neural foraminal stenosis.  MRI is recommended for further evaluation.  Slight levoconvex curvature and straightening of lumbar lordosis. DEGENERATIVE CHANGES: Mild lumbar spondylotic changes.  At L2-L3 there is mild facet arthropathy and ligamentum flavum thickening.  Slight flattening of the ventral aspect of thecal sac.  Mild neural foraminal stenosis.  At L3-L4 there is mild diffuse disc bulging.  Vacuum disc phenomenon.  Mild facet arthropathy and ligamentum flavum thickening.  Combination results in mild to moderate spinal stenosis.  Mild neural foraminal stenosis, right greater than left.  At L4-L5 there is diffuse disc bulging, eccentric to the left.  Bilateral facet arthropathy, greater on the left.  Mild central spinal and moderate left neural foraminal stenosis.  At L5-S1 there is mild diffuse disc bulging with calcification posteriorly along the periphery.  Mild facet arthropathy and ligamentum flavum thickening.  No significant acquired spinal stenosis.  Mild neural foraminal stenosis. SOFT TISSUES/RETROPERITONEUM: Partially visualized heterogeneous large right renal mass measuring at least 8 cm with prominent vessels in the renal hilum.  There are small lymph nodes and soft tissue stranding in the renal hilar region.  Apparent previous hysterectomy.  Heterogeneous 1 cm calcification in the deep pelvis is felt to be related to a colonic diverticulum and it appears to be outside of the bladder.      Pathologic compression fracture of L1 with tumor infiltration involving primarily the left half of the vertebral body extending to the posterior elements with a probable associated soft tissue component.  MRI is recommended for further evaluation. Partially visualized complex large right renal mass; renal cell carcinoma should be excluded. Findings were discussed with Darren Monk NP, at 3:13 pm on 1/19/2018.      Mri Cervical Spine Wo Contrast     Result Date: 1/19/2018  EXAMINATION: MRI OF THE CERVICAL SPINE WITHOUT CONTRAST 1/19/2018 4:44 pm TECHNIQUE: Multiplanar multisequence MRI of the cervical spine was performed without the administration of intravenous contrast. COMPARISON: None. HISTORY: ORDERING SYSTEM PROVIDED HISTORY: back pain FINDINGS: BONES/ALIGNMENT: There is normal alignment of the spine. The vertebral body heights are maintained. The bone marrow signal appears unremarkable. SPINAL CORD: No abnormal cord signal is seen. SOFT TISSUES: No paraspinal mass identified. C2-C3: There is no significant disc protrusion, spinal canal stenosis or neural foraminal narrowing. C3-C4: There is no significant disc protrusion, spinal canal stenosis or neural foraminal narrowing. C4-C5: Moderate uncinate spondylosis and neural foraminal narrowing bilaterally.  Central canal is patent. C5-C6: There is no significant disc protrusion, spinal canal stenosis or neural foraminal narrowing. C6-C7: There is no significant disc protrusion, spinal canal stenosis or neural foraminal narrowing.  C7-T1: There is no significant disc protrusion, spinal canal stenosis or neural foraminal narrowing.      Moderate Uncinate spondylosis and neural foraminal narrowing at C4-5.      Mri Thoracic Spine Wo Contrast     Addendum Date: 1/19/2018    ADDENDUM: Right renal mass is incompletely imaged and suspicious for neoplasm.  Further evaluation recommended.      Result Date: 1/19/2018  EXAMINATION: MRI OF THE THORACIC SPINE WITHOUT CONTRAST  1/19/2018 4:44 pm TECHNIQUE: Multiplanar multisequence MRI of the thoracic spine was performed without the control. Currently she is not taking any OxyContin therefore I will give a new prescription for OxyContin 10 mg twice a day. I will get restaging scans prior to initiating systemic treatment. I would switch her treatment to combination immunotherapy. During today's visit, the patient and the family had a number of reasonable questions which were answered to their satisfaction. They verbalized understanding of the information provided and they agreed to proceed as outlined above. PLAN:   I will plan to start immunotherapy in about 1 weeks  Hold off on cytoreductive nephrectomy  Return to clinic in 2-3 weeks      I spent more than 40 minutes examining, evaluating, reviewing data and counseling the patient. Greater than 50% of that time was spent face-to-face with the patient in counseling and coordinating her care. Sergey Multani MD  Hematologist/Medical Oncologist        This note is created with the assistance of a speech recognition program.  While intending to generate a document that actually reflects the content of the visit, the document can still have some errors including those of syntax and sound a like substitutions which may escape proof reading. It such instances, actual meaning can be extrapolated by contextual diversion.

## 2018-09-07 NOTE — PROGRESS NOTES
Patient had medium sized bowel movement during dialysis treatment, several streaks of blood and mucous noted throughout stool. Called and informed Martin Toshia at [address] [facility] where patient currently resides. Bailee Stokes RN called patient's nurse practitioner and made aware, stated patient could return to care center after completion of dialysis and they will monitor her bowel movements.

## 2018-09-07 NOTE — PROGRESS NOTES
Dialysis Post Treatment Note  Patient tolerated treatment well. Denies complaints at time of discharge.    Vitals:    09/07/18 1800   BP: (!) 118/50   Pulse: 88   Resp:    Temp: 98.2 °F (36.8 °C)     Pre-Weight = 88.8 kg   Post-weight = Weight: 191 lb 9.3 oz (86.9 kg)  Total Liters Processed = Total Liters Processed (l/min): 97.4 l/min  Rinseback Volume (mL) = Rinseback Volume (ml): 370 ml  Net Removal (mL) = 2480 ml   Length of treatment=4 hours

## 2018-09-15 PROBLEM — J18.9 SEPSIS DUE TO PNEUMONIA (HCC): Status: ACTIVE | Noted: 2018-01-01

## 2018-09-15 PROBLEM — A41.9 SEPSIS DUE TO PNEUMONIA (HCC): Status: ACTIVE | Noted: 2018-01-01

## 2018-09-15 NOTE — PROGRESS NOTES
Pharmacy Note  Vancomycin Consult    Alex Solitario is a 66 y.o. female started on Vancomycin for sepsis; consult received from Dr. Sruthi Rouse to manage therapy. Also receiving the following antibiotics: zosyn one time dose given in ED. 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)    Acute cystitis without hematuria    Renal mass    Metastasis to spinal column (HCC)    Renal cell carcinoma (HCC)    Acute kidney injury ([address]    Gluteal pain    Acute renal failure (ARF) (HCC)    Acute renal failure (HCC)    Other hydronephrosis    Severe malnutrition (HCC)    Urinary retention    Cervical muscle pain    Chronic right-sided low back pain without sciatica    Cervicalgia    RASHMI (acute kidney injury) ([address]    Pathologic compression fracture of spine with delayed healing    Hematuria    Absolute anemia    Renal cell cancer, right ([address]    Encounter for palliative care    Acute deep vein thrombosis (DVT) of femoral vein of left lower extremity (HCC)    ESRD on hemodialysis (HCC)    Hypotension    Chronic GI bleeding    Acute blood loss anemia    Gastric antral vascular ectasia (watermelon stomach)    Hypocalcemia    Hypokalemia       Allergies:  Patient has no known allergies.      Temp max: 36.8    Recent Labs      09/15/18   1205   BUN  23       Recent Labs      09/15/18   1205   CREATININE  3.05*       Recent Labs      09/15/18   1205   WBC  16.3*         Intake/Output Summary (Last 24 hours) at 09/15/18 1312  Last data filed at 09/15/18 1308   Gross per 24 hour   Intake 50 ml   Output 0 ml   Net 50 ml

## 2018-09-15 NOTE — ED PROVIDER NOTES
Interpretation    Interpreted by me    Rhythm: normal sinus   Rate: normal  Axis: normal  Ectopy: none  Conduction: normal  ST Segments: Depression laterally  T Waves: Inversion laterally  Q Waves: none  Low-voltage, poor R-wave progression anteriorly    Clinical Impression: Possible old anterior MI, nonspecific    Attending Physician Additional  Notes    ECF checked her blood work and found hemoglobin 5.5. She has fatigue and pallor. She has metastatic renal cell cancer to the liver and bone. She's had prior anemia with recent transfusion. She is completed radiation therapy and chemotherapy. She denies vomiting blood, chest pain. On exam she has borderline blood pressure, borderline tachycardia and has diffuse pallor. There is diffuse pitting edema more in the legs than the hands. Abdomen has mild diffuse tenderness. Lungs are clear. Cardiac exam is benign. Plan is blood work, type and cross for 2 units, transfusion, analgesics, anticipating admission. Ehsan Tidwell.  Arleen Sever, MD, [facility]  Attending Emergency  Physician                Deng Galeana MD  09/15/18 2359

## 2018-09-15 NOTE — PROGRESS NOTES
Briefly, GI was made aware of patient and patient was seen. Full recommendations to follow, in short there is not urgent indication for endoscopy at this time. PRBC transfusion, IV PPI, and close monitoring of CBC is recommended. Patients recurrent macrocytic anemia is mutlifactorial (metastatic RCC, ESRD, sepsis, etc), and unlikely related severe acute GI hemorrhage as there is no evidence of hematochezia, BRBPR, hematemesis, or melena. Insidious loss is possible and would recommend prn transfusion with priority being placed on patients septic state.

## 2018-09-15 NOTE — H&P
never used smokeless tobacco.  Alcohol:      reports that she does not drink alcohol. Drug Use:  reports that she does not use drugs. Family History:     Family History   Problem Relation Age of Onset    Heart Disease Mother     Diabetes Mother     Cancer Father         esophageal cancer    Cancer Maternal Grandfather         colon       Review of Systems:     Negative except for those highlighted:    CONSTITUTIONAL:  fevers, chills, sweats, fatigue, weight loss, generalized weakness  HEENT:  Vision changes, hearing changes, runny nose, throat pain  RESPIRATORY:  shortness of breath, cough, congestion, wheezing. CARDIOVASCULAR:  chest pain, palpitations  GASTROINTESTINAL:  nausea, vomiting, diarrhea, constipation, change in bowel habits, abdominal pain   GENITOURINARY:  difficulty of urination, burning with urination, frequency   INTEGUMENT:  rash, skin lesions, easy bruising   HEMATOLOGIC/LYMPHATIC:  swelling/edema   ALLERGIC/IMMUNOLOGIC:  urticaria , itching  ENDOCRINE:  increase in drinking, increase in urination, hot or cold intolerance  MUSCULOSKELETAL:  joint pains, muscle aches, swelling of joints  NEUROLOGICAL:  headaches, dizziness, lightheadedness, numbness, pain, tingling extremities  BEHAVIOR/PSYCH:  depression, anxiety    Physical Exam:   BP (!) 88/36   Pulse 92   Temp 100.9 °F (38.3 °C) (Oral)   Resp 16   Ht 5' 8\" (1.727 m)   Wt 190 lb (86.2 kg)   SpO2 96%   BMI 28.89 kg/m²   Temp (24hrs), Av.9 °F (37.2 °C), Min:97.5 °F (36.4 °C), Max:101 °F (38.3 °C)    No results for input(s): POCGLU in the last 72 hours.     Intake/Output Summary (Last 24 hours) at 09/15/18 1936  Last data filed at 09/15/18 1839   Gross per 24 hour   Intake             2800 ml   Output                0 ml   Net             2800 ml       General Appearance:  Lethargic, drowsy, pale, nods yes/no to questions  Mental status: oriented to person, place, and time with normal affect  Head:  normocephalic, Lymphocytes 5 (L) 24 - 43 %    Monocytes 5 3 - 12 %    Eosinophils % 0 (L) 1 - 4 %    Basophils 0 0 - 2 %    Absolute Immature Granulocyte 0.16 0.00 - 0.30 k/uL    Segs Absolute 14.50 (H) 1.50 - 8.10 k/uL    Absolute Lymph # 0.82 (L) 1.10 - 3.70 k/uL    Absolute Mono # 0.82 0.10 - 1.20 k/uL    Absolute Eos # 0.00 0.00 - 0.44 k/uL    Basophils # 0.00 0.00 - 0.20 k/uL    Morphology ANISOCYTOSIS PRESENT    Comprehensive Metabolic Panel    Collection Time: 09/15/18 12:05 PM   Result Value Ref Range    Glucose 99 70 - 99 mg/dL    BUN 23 8 - 23 mg/dL    CREATININE 3.05 (H) 0.50 - 0.90 mg/dL    Bun/Cre Ratio NOT REPORTED 9 - 20    Calcium 7.6 (L) 8.6 - 10.4 mg/dL    Sodium 135 135 - 144 mmol/L    Potassium 2.9 (LL) 3.7 - 5.3 mmol/L    Chloride 97 (L) 98 - 107 mmol/L    CO2 24 20 - 31 mmol/L    Anion Gap 14 9 - 17 mmol/L    Alkaline Phosphatase 237 (H) 35 - 104 U/L    ALT 8 5 - 33 U/L    AST 17 <32 U/L    Total Bilirubin 0.39 0.3 - 1.2 mg/dL    Total Protein 4.4 (L) 6.4 - 8.3 g/dL    Alb 1.4 (L) 3.5 - 5.2 g/dL    Albumin/Globulin Ratio 0.5 (L) 1.0 - 2.5    GFR Non- 16 (L) >60 mL/min    GFR  19 (L) >60 mL/min    GFR Comment          GFR Staging NOT REPORTED    LIPASE    Collection Time: 09/15/18 12:05 PM   Result Value Ref Range    Lipase 9 (L) 13 - 60 U/L   Lactic Acid, Whole Blood    Collection Time: 09/15/18 12:05 PM   Result Value Ref Range    Lactic Acid, Whole Blood 1.8 0.7 - 2.1 mmol/L   Brain Natriuretic Peptide    Collection Time: 09/15/18 12:05 PM   Result Value Ref Range    Pro-BNP 8,544 (H) <300 pg/mL    BNP Interpretation         POCT troponin    Collection Time: 09/15/18 12:05 PM   Result Value Ref Range    POC Troponin I 0.01 0.00 - 0.10 ng/mL    POC Troponin Interp       The Troponin-I (POC) results cannot be compared to the Troponin-T results.    Magnesium    Collection Time: 09/15/18 12:05 PM   Result Value Ref Range    Magnesium 1.8 1.6 - 2.6 mg/dL   Protime-INR    Collection

## 2018-09-15 NOTE — ED PROVIDER NOTES
[facility]. [facility]  Emergency Department Encounter  Emergency Medicine Resident     Pt Name: Alex Solitario  MRN: [id number]  [place] 1955  Date of evaluation: 9/15/18  PCP:  Stevenson Robledo MD    [address] [place] [address]       Chief Complaint   Patient presents with    Fatigue     At F took blood: Hgb of 5.5    Abdominal Pain     x1week       HISTORY OF PRESENT ILLNESS  (Location/Symptom, Timing/Onset, Context/Setting, Quality, Duration, Modifying Factors, Severity.)      Alex Solitario is a 66 y.o. female who presents with  3-4 days of fatigue. Patient resides at a [facility] care facility where a hemoglobin level was checked which was 5.5. Patient has a hx of GI bleeding, was recently admitted to the hospital for this as well. Hemeoccult stool was positive. But patient denies any hematochezia or melena this time. Previously on coumadin, however no longer on coumadin either. Denies any aspirin or plavix as well. No fevers or chills, but does report a cough, no purulent phlegm production, diffuse weaknses, and shortness of breath noted. No chest pain however. Patient does also report diffuse abdominal pain that has been on going for 1 week. No diarrhea, constipation, melena, nausea or vomiting. ESRD patient, last dialysis yesterday with no issues. + hx of renal cancer as well. No hx of liver disease, no hx of daily alcohol drinking. PAST MEDICAL / SURGICAL / SOCIAL / FAMILY HISTORY      has a past medical history of Cancer ([facility] [address] Chronic kidney disease; Depression; Diabetes mellitus ([facility] [address] GERD (gastroesophageal reflux disease); HA (headache); HBP (high blood pressure); History of blood transfusion; Hyperlipidemia; Hyperplastic polyp of intestine; Hypothyroid; Hypothyroidism; Left knee pain; Non-smoker; OA (osteoarthritis); and Tubular adenoma. has a past surgical history that includes Hysterectomy;  Tonsillectomy; Colonoscopy (08/16/2016); back surgery (N/A, 01/23/2018); pr lumbar spine fusn,post intrbdy (N/A, 1/23/2018); Nerve Block (07/13/2018); pr egd transoral biopsy single/multiple (N/A, 8/17/2018); and Colonoscopy (N/A, 8/20/2018). Social History     Social History    Marital status:      Spouse name: N/A    Number of children: N/A    Years of education: N/A     Occupational History    Not on file. Social History Main Topics    Smoking status: Never Smoker    Smokeless tobacco: Never Used    Alcohol use No    Drug use: No    Sexual activity: Not on file     Other Topics Concern    Not on file     Social History Narrative    No narrative on file       Family History   Problem Relation Age of Onset    Heart Disease Mother     Diabetes Mother     Cancer Father         esophageal cancer    Cancer Maternal Grandfather         colon       Allergies:  Patient has no known allergies. Home Medications:  Prior to Admission medications    Medication Sig Start Date End Date Taking? Authorizing Provider   oxyCODONE (OXYCONTIN) 10 MG extended release tablet Take 1 tablet by mouth 2 times daily for 30 days. Intended supply: 30 days. 9/5/18 10/5/18 Yes Smita Guy MD   gabapentin (NEURONTIN) 100 MG capsule Take 1 capsule by mouth 3 times daily for 30 days. . 8/1/18 9/15/18 Yes Mercedes Bang MD   cyclobenzaprine (FLEXERIL) 5 MG tablet TAKE 1 TABLET BY MOUTH THREE TIMES A DAY AS NEEDED FOR MUSCLE SPASMS 7/17/18  Yes Smita Guy MD   Insulin Disposable Pump (V-GO 20) KIT  4/26/18  Yes Historical Provider, MD   HUMALOG 100 UNIT/ML injection vial With insulin pump 5/16/18  Yes Historical Provider, MD CALDWELL ULTRAFINE III SHORT PEN 31G X 8 MM MISC  4/13/18  Yes Historical Provider, MD   atorvastatin (LIPITOR) 20 MG tablet TAKE 1 TABLET BY MOUTH ONE TIME A DAY 4/11/18  Yes Tavo Pineda MD   amitriptyline (ELAVIL) 75 MG tablet Take 1 tablet by mouth nightly 3/28/18  Yes Tavo Pineda MD   Blood Glucose Monitoring Suppl (TRUE METRIX METER) w/Device KIT  2/2/18  Yes Historical Provider, MD PANEL    Diet NPO Effective Now Exceptions are: Ice Chips    Telemetry monitoring    Vital signs per unit routine    Telemetry monitoring    Notify physician    Bedrest with bathroom privileges    Daily weights    Intake and output    Nursing to document all stools for color and consistency    Tobacco cessation education protocol    Place intermittent pneumatic compression device    Elevate heels off of bed at all times if patient is not able to move lower extremities    Turn or assist with turn every 2 hours if patient is unable to turn self. Remind patient to turn if necessary.     Inspect skin per unit guidelines    Maintain HOB at the lowest elevation consistent with medical plan of care    Full Code    Pharmacy to Dose: Vancomycin    Inpatient consult to Hospitalist    Inpatient consult to GI    Inpatient consult to GI    Inpatient consult to Nephrology    Inpatient consult to Infectious Diseases    Pharmacy to Dose: Vancomycin    Inpatient consult to Palliative Care    Initiate Oxygen Therapy Protocol    POCT troponin    POCT troponin    POCT troponin    EKG 12 Lead    TYPE AND SCREEN    PREPARE RBC (CROSSMATCH) Number of Units: 2, 2 Units    PREPARE RBC (CROSSMATCH) Number of Units: 1, 1 Units    Wound ostomy eval and treat    PATIENT STATUS (FROM ED OR OR/PROCEDURAL) Inpatient       MEDICATIONS ORDERED:  Orders Placed This Encounter   Medications    DISCONTD: 0.9 % sodium chloride bolus    pantoprazole (PROTONIX) 80 mg in sodium chloride 0.9 % 50 mL bolus    pantoprazole (PROTONIX) 80 mg in sodium chloride 0.9 % 100 mL infusion    0.9 % sodium chloride bolus    0.9 % sodium chloride bolus    potassium chloride 10 mEq/100 mL IVPB (Peripheral Line)    piperacillin-tazobactam (ZOSYN) 3.375 g in dextrose 5 % 50 mL IVPB (mini-bag)    vancomycin (VANCOCIN) intermittent dosing (placeholder)    vancomycin (VANCOCIN) 1250 mg in dextrose 5 % 250 mL IVPB    vancomycin Range    Pro-BNP 8,544 (H) <300 pg/mL    BNP Interpretation         Magnesium   Result Value Ref Range    Magnesium 1.8 1.6 - 2.6 mg/dL   Hemoglobin and Hematocrit, Blood   Result Value Ref Range    Hemoglobin 7.8 (L) 11.9 - 15.1 g/dL    Hematocrit 24.8 (L) 36.3 - 47.1 %   ELECTROLYTE PANEL   Result Value Ref Range    Sodium 133 (L) 135 - 144 mmol/L    Potassium 3.3 (L) 3.7 - 5.3 mmol/L    Chloride 95 (L) 98 - 107 mmol/L    CO2 22 20 - 31 mmol/L    Anion Gap 16 9 - 17 mmol/L   POCT troponin   Result Value Ref Range    POC Troponin I 0.01 0.00 - 0.10 ng/mL    POC Troponin Interp       The Troponin-I (POC) results cannot be compared to the Troponin-T results. POCT troponin   Result Value Ref Range    POC Troponin I 0.03 0.00 - 0.10 ng/mL    POC Troponin Interp       The Troponin-I (POC) results cannot be compared to the Troponin-T results. EKG 12 Lead   Result Value Ref Range    Ventricular Rate 89 BPM    Atrial Rate 89 BPM    P-R Interval 140 ms    QRS Duration 76 ms    Q-T Interval 342 ms    QTc Calculation (Bazett) 416 ms    P Axis 42 degrees    R Axis -4 degrees    T Axis 172 degrees   TYPE AND SCREEN   Result Value Ref Range    Expiration Date 09/18/2018     Arm Band Number [id number]     ABO/Rh A POSITIVE     Antibody Screen NEGATIVE     Unit Number [id number]     Product Code Leukocyte Reduced Red Cell     Unit Divison 0     Dispense Status ISSUED     Transfusion Status OK TO TRANSFUSE     Crossmatch Result COMPATIBLE     Unit Number [id number]     Product Code Leukocyte Reduced Red Cell     Unit Divison 0     Dispense Status ISSUED     Transfusion Status OK TO TRANSFUSE     Crossmatch Result COMPATIBLE     Unit Number [id number]     Product Code Leukocyte Reduced Red Cell     Unit Divison 0     Dispense Status ALLOCATED     Transfusion Status OK TO TRANSFUSE     Crossmatch Result COMPATIBLE        IMPRESSION: 67 yo female comes in with diffuse generalized weakness, fatigue and anemia.  PRevious hx of is seen. Limbs project slightly beyond the confines of the inferior vena cava. No surrounding fluid collection. No surrounding inflammatory changes. No abdominal aortic aneurysm. No definite adenopathy. Bones/Soft Tissues: Diffuse subcutaneous edema. No focal drainable fluid collection. Osseous metastatic disease is again seen involving the sacrum, left iliac wing, and lumbar spine. Small pleural effusions. Associated bibasilar lung opacities may represent atelectasis versus pneumonia. Hepatic metastatic disease versus subcapsular hematomas. Right renal mass similar to the prior study. Diffuse osseous metastatic disease. Air within the urinary bladder with bladder wall thickening. Correlate for infection. Xr Lumbar Spine (2-3 Views)    Result Date: 8/31/2018  EXAMINATION: 3 XRAY VIEWS OF THE LUMBAR SPINE 8/31/2018 10:41 am COMPARISON: 07/25/2018, 03/13/2018 HISTORY: ORDERING SYSTEM PROVIDED HISTORY: Secondary malignant neoplasm of lumbar vertebral column [facility]) TECHNOLOGIST PROVIDED HISTORY: Malignant Neoplasm of Lumbar On going examination. Subsequent study. FINDINGS: Status post posterior fusion of T11, T12, L2, and L3 secondary to a severe pathologic L1 compression fracture. Retropulsion of the posterior wall of L1 appears increased from prior spine radiographs. Alignment is stable. There is mild multilevel degenerative disc disease. An IVC filter is in place. Stable alignment status post thoracolumbar fusion secondary to a pathologic L1 compression fracture. Increased retropulsion of the posterior wall of L1 when compared with several prior lumbar spine radiographs, which is most likely secondary to tumor progression. Xr Chest Portable    Result Date: 9/15/2018  EXAMINATION: SINGLE XRAY VIEW OF THE CHEST 9/15/2018 11:57 am COMPARISON: March 4, 2018 HISTORY: ORDERING SYSTEM PROVIDED HISTORY: upright CXR. R/O free air TECHNOLOGIST PROVIDED HISTORY: upright CXR.  R/O free air Ordering or suspected source of infection = Sepsis    Sepsis Identified   Date: 9/16/18  Time: 12:05 (elevated WBC)   Sepsis Orders:   CBC: Yes   CMP: Yes   PT/PTT: Yes   Blood Cultures x2: Yes   Urinalysis and Urine Culture: Yes   Lactate: Yes   Broad Spectrum Antibiotics Given (within 3 hours of sepsis identification, after blood cultures): Yes              IV Crystalloid given: Yes    If lactate >2.0 MUST repeat within 6 hours        PROCEDURES:  None    CONSULTS:  PHARMACY TO DOSE VANCOMYCIN  IP CONSULT TO HOSPITALIST  IP CONSULT TO GI  IP CONSULT TO GI  IP CONSULT TO NEPHROLOGY  IP CONSULT TO INFECTIOUS DISEASES  PHARMACY TO DOSE VANCOMYCIN  IP CONSULT TO PALLIATIVE CARE    CRITICAL CARE:    30 minutes    FINAL IMPRESSION      1. Sepsis, due to unspecified organism ([facility] [address]    2. Pneumonia due to organism    3. Malignant neoplasm of kidney, unspecified laterality ([facility] [address]    4. ESRD (end stage renal disease) ([facility] [address]    5. Gastrointestinal hemorrhage, unspecified gastrointestinal hemorrhage type    6. Blood loss anemia    7.  Hypokalemia          DISPOSITION / PLAN     DISPOSITION Admitted 09/15/2018 01:31:39 PM      PATIENT REFERRED TO:  Saranya Black MD  [address] [place] [address]  [phone]            DISCHARGE MEDICATIONS:  Current Discharge Medication List          Anais Pruett MD  Emergency Medicine Resident    (Please note that portions of this note were completed with a voice recognition program.  Efforts were made to edit the dictations but occasionally words are mis-transcribed.)       Anais Pruett MD  09/16/18 2501

## 2018-09-15 NOTE — CONSULTS
amitriptyline (ELAVIL) tablet 75 mg, 75 mg, Oral, Nightly, Frances Hair MD    atorvastatin (LIPITOR) tablet 20 mg, 20 mg, Oral, Nightly, Frances Hair MD    cyclobenzaprine (FLEXERIL) tablet 5 mg, 5 mg, Oral, TID PRN, Frances Hair MD    docusate sodium (COLACE) capsule 200 mg, 200 mg, Oral, BID, Frances Hair MD    gabapentin (NEURONTIN) capsule 100 mg, 100 mg, Oral, TID, Frances Hair MD, 100 mg at 09/15/18 1823    megestrol (MEGACE) 40 MG/ML suspension 400 mg, 400 mg, Oral, Daily, Frances Hair MD, 400 mg at 09/15/18 1824    ondansetron (ZOFRAN) tablet 4 mg, 4 mg, Oral, Daily PRN, Frances Hair MD    oxyCODONE (OXYCONTIN) extended release tablet 10 mg, 10 mg, Oral, BID, Frances Hair MD    oxyCODONE (ROXICODONE) immediate release tablet 10 mg, 10 mg, Oral, Q6H PRN, Frances Hair MD, 10 mg at 09/15/18 1823    prochlorperazine (COMPAZINE) tablet 5 mg, 5 mg, Oral, Q8H PRN, Frances Hair MD    sodium chloride flush 0.9 % injection 10 mL, 10 mL, Intravenous, 2 times per day, Frances Hair MD    sodium chloride flush 0.9 % injection 10 mL, 10 mL, Intravenous, PRN, Frances Hair MD    acetaminophen (TYLENOL) tablet 650 mg, 650 mg, Oral, Q4H PRN, Frances Hair MD    pantoprazole (PROTONIX) 80 mg in sodium chloride 0.9 % 100 mL infusion, 8 mg/hr, Intravenous, Continuous, Frances Hair MD, Last Rate: 10 mL/hr at 09/15/18 1824, 8 mg/hr at 09/15/18 1824    piperacillin-tazobactam (ZOSYN) 3.375 g in dextrose 5 % 50 mL IVPB, 3.375 g, Intravenous, Q12H, Tamiko Sevilla MD    0.9 % sodium chloride bolus, 250 mL, Intravenous, Once, Frances Hair MD  Allergies  has No Known Allergies. Family History  Family Status   Relation Status    Mother     Father     MGF      family history includes Cancer in her father and maternal grandfather; Diabetes in her mother; Heart Disease in her mother. Social History   reports that she has never smoked.  She has never used smokeless tobacco.   reports that she does not

## 2018-09-15 NOTE — ED NOTES
Blood Transfusion consent explained and signed by pt. Pt resting in bed, family at bedside, no needs at this time. Will continue to monitor.       Corrinne Paula, RN  09/15/18 1610

## 2018-09-15 NOTE — CONSULTS
16.3  Micro:  Blood: pending  Urine: pending  Imaging:  CXR (9/15/18): Shallow inflation with basilar opacities favoring atelectasis.  Small left   effusion.       No free air identified. CT abdomen (9/15/18): Small pleural effusions.  Associated bibasilar lung opacities may represent   atelectasis versus pneumonia.       Hepatic metastatic disease versus subcapsular hematomas.       Right renal mass similar to the prior study.       Diffuse osseous metastatic disease.       Air within the urinary bladder with bladder wall thickening.  Correlate for   infection. I have personally reviewed the past medical history, past surgical history, medications, social history, and family history, and I have updated the database accordingly.   Past Medical History:     Past Medical History:   Diagnosis Date    Cancer ([facility] [address]     kidney    Chronic kidney disease     Depression     Diabetes mellitus (HCC)     type 2    GERD (gastroesophageal reflux disease)     HA (headache)     HBP (high blood pressure)     History of blood transfusion     no reaction    Hyperlipidemia     Hyperplastic polyp of intestine     Hypothyroid 11/26/2016    Hypothyroidism     Left knee pain     Non-smoker     OA (osteoarthritis)     bilat knees    Tubular adenoma        Past Surgical  History:     Past Surgical History:   Procedure Laterality Date    BACK SURGERY N/A 01/23/2018    Post T11-L2 Fusion    COLONOSCOPY  08/16/2016    hyperplastic polyp and tubular adenoma     COLONOSCOPY N/A 8/20/2018    COLONOSCOPY POLYPECTOMY SNARE/COLD BIOPSY performed by Spencer Ferreira MD at [address]  07/13/2018    aleta trigger point neck #1    [place] EGD TRANSORAL BIOPSY SINGLE/MULTIPLE N/A 8/17/2018    EGD performed by Chiquita Bumpers, MD at [address] INTRBDY N/A 1/23/2018    T11-L3 POSTERIOR FIXATION AND FUSION, SPINE ABBY[id number] by Mani Roberson DO at [facility]         Medications:      sodium chloride  250 mL Intravenous Once    piperacillin-tazobactam  3.375 g Intravenous Once    vancomycin (VANCOCIN) intermittent dosing (placeholder)   Other RX Placeholder    [START ON 9/17/2018] vancomycin  750 mg Intravenous Q MWF    acetaminophen  1,000 mg Oral TID    amitriptyline  75 mg Oral Nightly    atorvastatin  20 mg Oral Nightly    docusate sodium  200 mg Oral BID    gabapentin  100 mg Oral TID    megestrol  400 mg Oral Daily    oxyCODONE  10 mg Oral BID    sodium chloride flush  10 mL Intravenous 2 times per day    PIPERACILLIN-TAZOBACTAM (ZOSYN) 0.75 GM IVPB  3.375 g Intravenous Q12H    sodium chloride  250 mL Intravenous Once       Social History:     Social History     Social History    Marital status:      Spouse name: N/A    Number of children: N/A    Years of education: N/A     Occupational History    Not on file. Social History Main Topics    Smoking status: Never Smoker    Smokeless tobacco: Never Used    Alcohol use No    Drug use: No    Sexual activity: Not on file     Other Topics Concern    Not on file     Social History Narrative    No narrative on file       Family History:     Family History   Problem Relation Age of Onset    Heart Disease Mother     Diabetes Mother     Cancer Father         esophageal cancer    Cancer Maternal Grandfather         colon        Allergies:   Patient has no known allergies. Review of Systems:     Review of Systems   Constitutional: Positive for chills. Negative for activity change, appetite change and fever. HENT: Negative. Eyes: Negative. Respiratory: Negative. Cardiovascular: Positive for leg swelling. Negative for chest pain. Gastrointestinal: Positive for abdominal pain. Genitourinary: Positive for difficulty urinating and dysuria. Negative for urgency.         Admits to abdominal discomfort Musculoskeletal: Negative. Skin: Negative. Allergic/Immunologic: Negative. Neurological: Negative. Hematological: Negative. Psychiatric/Behavioral: Negative. Physical Examination :   Patient Vitals for the past 8 hrs:   BP Temp Temp src Pulse Resp SpO2 Height Weight   09/15/18 1621 (!) 96/40 100.2 °F (37.9 °C) Oral 88 15 98 % 5' 8\" (1.727 m) -   09/15/18 1518 - 97.5 °F (36.4 °C) - 87 14 - - -   09/15/18 1517 (!) 105/39 - - 90 - 97 % - -   09/15/18 1502 (!) 103/47 - - 87 - 99 % - -   09/15/18 1452 (!) 111/41 - - 90 - - - -   09/15/18 1415 (!) 119/35 97.5 °F (36.4 °C) Oral - 14 - - -   09/15/18 1410 - 98.5 °F (36.9 °C) Oral - 16 - - -   09/15/18 1405 (!) 104/40 98.5 °F (36.9 °C) Oral - 14 - - -   09/15/18 1347 (!) 97/37 - - 89 - 100 % - -   09/15/18 1333 (!) 108/35 98.7 °F (37.1 °C) Oral - 14 - - -   09/15/18 1328 (!) 112/36 98.2 °F (36.8 °C) Oral - 14 - - -   09/15/18 1320 (!) 109/39 98.6 °F (37 °C) Oral 87 14 96 % - -   09/15/18 1247 (!) 104/39 - - 87 - 99 % - -   09/15/18 1140 (!) 105/37 98.3 °F (36.8 °C) Oral 91 15 97 % 5' 8\" (1.727 m) 190 lb (86.2 kg)       Physical Exam   Constitutional: She is oriented to person, place, and time and well-developed, well-nourished, and in no distress. No distress. HENT:   Head: Normocephalic and atraumatic. Eyes: Pupils are equal, round, and reactive to light. Neck: Normal range of motion. Cardiovascular: Normal rate and regular rhythm. Pulmonary/Chest: Effort normal and breath sounds normal.   Abdominal: Soft. There is tenderness. Pain on palpation to the suprapubic region   Genitourinary: No vaginal discharge found. Musculoskeletal: She exhibits edema. Neurological: She is alert and oriented to person, place, and time. Skin: Skin is warm and dry.    Psychiatric: Mood and memory normal.         Medical Decision Making:   I have independently reviewed/ordered the following labs:    CBC with Differential: Recent Labs      09/15/18   1205 WBC  16.3*   HGB  6.4*   HCT  21.4*   PLT  269   LYMPHOPCT  5*   MONOPCT  5     BMP: Recent Labs      09/15/18   1205   NA  135   K  2.9*   CL  97*   CO2  24   BUN  23   CREATININE  3.05*   MG  1.8     Hepatic Function Panel: Recent Labs      09/15/18   1205   PROT  4.4*   LABALBU  1.4*   BILITOT  0.39   ALKPHOS  237*   ALT  8   AST  17     No results for input(s): RPR in the last 72 hours. No results for input(s): HIV in the last 72 hours. No results for input(s): BC in the last 72 hours. Lab Results   Component Value Date    CREATININE 3.05 09/15/2018    GLUCOSE 99 09/15/2018       Detailed results: Thank you for allowing us to participate in the care of this patient. Please call with questions. This note is created with the assistance of a speech recognition program.  While intending to generate a document that actually reflects the content of the visit, the document can still have some errors including those of syntax and sound a like substitutions which may escape proof reading. It such instances, actual meaning can be extrapolated by contextual diversion.     SUMIT Quintero, Infectious Diseases

## 2018-09-16 NOTE — CONSULTS
Nephrology ESRD Consult Note    Reason for Consult:  Fluid and hypertension management for pt with ESRD     Requesting Physician:  DR. Shae Gar    History Obtained From:  patient, electronic medical record    HD Unit:       [facility]    Dry Weight:         Chief Complaint:  Weakness and anemia    History of Present Illness: This is a 66 y.o. female with end stage renal disease on hemodialysis since July 2018 [facility] who presents to the hospital for evaluation of anemia. She resides in [place] and reported worsening fatigue, and weakness. HgB was 5.5, so she was sent to the ER. In the ER HGb was 6.6. She has received 3 units of PRBC thus far. HgB now 7.4.  GI consulted and currently no plans for testing. CXR and CT suggest pneumonia so she was started on Zosyn, and Vanco.  Pharmacy dosing. She has h/o right sided renal cell carcinoma with spread to the lumbar spine and peritoneum that was diagnosed in January 2018. She did receive palliative radiation. Previous admission in July, she had RASHMI contributed to obstructive uropathy, urinary retention and aggravated further by concomitant ACE inhibitor use and diuretic use and required HD with initiation on 7/27/18 via tunnel cath. She had CKD Stage 4, baseline creatinine 2.5-2.8.      Past Medical History:        Diagnosis Date    Cancer [facility])     kidney    Chronic kidney disease     Depression     Diabetes mellitus (HCC)     type 2    GERD (gastroesophageal reflux disease)     HA (headache)     HBP (high blood pressure)     History of blood transfusion     no reaction    Hyperlipidemia     Hyperplastic polyp of intestine     Hypothyroid 11/26/2016    Hypothyroidism     Left knee pain     Non-smoker     OA (osteoarthritis)     bilat knees    Tubular adenoma        Access:  Right chest wall tunnel cath    Past Surgical History:        Procedure Laterality Date    BACK SURGERY N/A 01/23/2018    Post T11-L2 Fusion    COLONOSCOPY  08/16/2016    hyperplastic polyp and tubular adenoma     COLONOSCOPY N/A 8/20/2018    COLONOSCOPY POLYPECTOMY SNARE/COLD BIOPSY performed by Shelli Jean MD at [address]  07/13/2018    aleta trigger point neck #1    NE EGD TRANSORAL BIOPSY SINGLE/MULTIPLE N/A 8/17/2018    EGD performed by Cristopher Sue MD at [address] INTRBDY N/A 1/23/2018    T11-L3 POSTERIOR FIXATION AND FUSION, SPINE ABBY, C-ARM, O-ARM WITH STEALTH[facility]  [id number] performed by Mani Roberson DO at [facility]         Outpatient Medications:     Prescriptions Prior to Admission: oxyCODONE (OXYCONTIN) 10 MG extended release tablet, Take 1 tablet by mouth 2 times daily for 30 days. Intended supply: 30 days. gabapentin (NEURONTIN) 100 MG capsule, Take 1 capsule by mouth 3 times daily for 30 days. .  cyclobenzaprine (FLEXERIL) 5 MG tablet, TAKE 1 TABLET BY MOUTH THREE TIMES A DAY AS NEEDED FOR MUSCLE SPASMS  Insulin Disposable Pump (V-GO 20) KIT,   HUMALOG 100 UNIT/ML injection vial, With insulin pump  B-D ULTRAFINE III SHORT PEN 31G X 8 MM MISC,   atorvastatin (LIPITOR) 20 MG tablet, TAKE 1 TABLET BY MOUTH ONE TIME A DAY  amitriptyline (ELAVIL) 75 MG tablet, Take 1 tablet by mouth nightly  Blood Glucose Monitoring Suppl (TRUE METRIX METER) w/Device KIT,   Lancets MISC, Patient testing 3 times daily  glucose blood VI test strips (ASCENSIA AUTODISC VI;ONE TOUCH ULTRA TEST VI) strip, Use with associated glucose meter. Patient testing three times daily  Insulin Pen Needle 32G X 4 MM MISC, 2 each by Does not apply route 2 times daily  oxyCODONE HCl (OXY-IR) 10 MG immediate release tablet, Take 1 tablet by mouth every 6 hours as needed for Pain for up to 30 days. Intended supply: 30 days.   megestrol (MEGACE) 40 MG/ML suspension, Take 10 mLs by mouth daily  ondansetron (ZOFRAN) 4 MG tablet, Take 1 tablet by mouth daily as needed for Nausea or Vomiting  omeprazole (PRILOSEC) 40 MG delayed release capsule, Take 1 capsule by mouth daily  prochlorperazine (COMPAZINE) 5 MG tablet, Take 5 mg by mouth every 8 hours as needed   docusate sodium (COLACE) 100 MG capsule, Take 2 capsules by mouth 2 times daily  acetaminophen (TYLENOL) 500 MG tablet, Take 1,000 mg by mouth 3 times daily    Current Medications:      piperacillin-tazobactam (ZOSYN) 3.375 g in dextrose 5 % 50 mL IVPB (mini-bag) Once   vancomycin (VANCOCIN) intermittent dosing (placeholder) RX Placeholder   [START ON 9/17/2018] vancomycin (VANCOCIN) 750 mg in dextrose 5 % 250 mL IVPB Q MWF   acetaminophen (TYLENOL) tablet 1,000 mg TID   amitriptyline (ELAVIL) tablet 75 mg Nightly   atorvastatin (LIPITOR) tablet 20 mg Nightly   cyclobenzaprine (FLEXERIL) tablet 5 mg TID PRN   docusate sodium (COLACE) capsule 200 mg BID   gabapentin (NEURONTIN) capsule 100 mg TID   megestrol (MEGACE) 40 MG/ML suspension 400 mg Daily   ondansetron (ZOFRAN) tablet 4 mg Daily PRN   oxyCODONE (OXYCONTIN) extended release tablet 10 mg BID   oxyCODONE (ROXICODONE) immediate release tablet 10 mg Q6H PRN   prochlorperazine (COMPAZINE) tablet 5 mg Q8H PRN   sodium chloride flush 0.9 % injection 10 mL 2 times per day   sodium chloride flush 0.9 % injection 10 mL PRN   acetaminophen (TYLENOL) tablet 650 mg Q4H PRN   pantoprazole (PROTONIX) 80 mg in sodium chloride 0.9 % 100 mL infusion Continuous   piperacillin-tazobactam (ZOSYN) 3.375 g in dextrose 5 % 50 mL IVPB Q12H       Allergies:  Patient has no known allergies. Social History:    Social History     Social History    Marital status:      Spouse name: N/A    Number of children: N/A    Years of education: N/A     Occupational History    Not on file.      Social History Main Topics    Smoking status: Never Smoker    Smokeless tobacco: Never Used    Alcohol use No    Drug use: No    Sexual activity: Not on file     Other Topics Concern    Not on file     Social

## 2018-09-16 NOTE — PROGRESS NOTES
Spoke with Dr. Kaiser Patient regarding patient's repeat potassium level of 3.3 from 2.9. No new orders received.

## 2018-09-16 NOTE — FLOWSHEET NOTE
Visited and prayed with patient at her bedside. Patient was a bit sleepy but was able to converse with . Family was not present a the time. When asked how she was feeling, patient responded; \"I am feeling better but it is very slow. \"  offered support. Patient said she was raised Sikh but not affiliated with any parish. Patient received sacrament of anointing of the sick. Follow up visits recommended for more spiritual and emotional support. 09/16/18 1008   Encounter Summary   Services provided to: Patient   Support System Family members   Place of Taoist None   Continue Visiting (09/16/2018)   Complexity of Encounter Moderate   Length of Encounter 15 minutes   Spiritual Assessment Completed Yes   Routine   Type Initial   Spiritual/Hoahaoism   Type Spiritual support   Assessment Calm; Approachable; Hopeful   Intervention Active listening;Nurtured hope;Prayer; Anointing   Outcome Expressed gratitude   Sacraments   Sacrament of Sick-Anointing Anointed

## 2018-09-16 NOTE — PROGRESS NOTES
[facility]    Progress Note    9/16/2018    8:51 AM    Name:   Courtney Salgado  MRN:     [id number]     Acct:      [de-identified]   Room:   [address] [place] [address] Day:  1  Admit Date:  9/15/2018 11:38 AM    PCP:   Imani Flynn MD  Code Status:  Full Code    Subjective:     C/C:   Chief Complaint   Patient presents with    Fatigue     At F took blood: Hgb of 5.5    Abdominal Pain     x1week       Interval History Status:  Unchanged    Patient's Hgb showed slight improvement. Appears to be a macrocytic anemia. As such, getting vitamin B12 and folate level. Giving dose of vitamin b12 today. To be seen by palliative care. I reviewed new lab results, imaging, and vitals summary from the past 24 hours. Also, I spoke with the RN caring for patient. Also, I reviewed all nursing/consult/specialty notes and addressed any concerns that may have been brought to light by Consultants, RNs, , or Social Workers. Brief History:     The patient is a 66 y.o. Non-/non  female who presents with Fatigue (At [facility] took blood: Hgb of 5.5) and Abdominal Pain (x1week)   and she is admitted to the hospital for the management of Acute blood loss anemia.      She has the following significant co-morbidities: ESRD on HD, recent hospitalization with blood loss anemia, Metastatic Renal Cell Carcinoma, HTN, HLD.      She presented with the following:      \"generalized weakness and abdominal pain. Pt states she was at [facility] and was informed after morning labs that her hemoglobin was 5.5. Pt states she received blood from hospital last week and also had abdominal pain then. Pt has a history of renal carcinoma and currently has dialysis 3 days a week (MWF). Pt states she is done with radiation but is receiving chemo treatments. Pt has noted swelling on lower legs bilaterally (ace wrapped) & 1+ pitting edema on arms bilaterally.  Pt is alert and orientedx3, RR even and unlabored, family at bedside. Will continue to monitor. \"     In the ED, she was found to have Hgb 6.4 and 1 U PRBCs ordered. She was noted to be hypokalemic with K+ of 2.9. She was started on protonix gtt. GI was consulted. She was also noted to have leukocytosis with tachycardia and CT showing Pneumonia. She was also noted to be hypotensive. She was started on vanc and zosyn. She was also noted to be fluid overloaded.      Patient was admitted for further care.      During initial encounter,     Review of Systems:     Negative except for those highlighted:    CONSTITUTIONAL:  fevers, chills, sweats, fatigue, weight loss, generalized weakness  HEENT:  Vision changes, hearing changes, runny nose, throat pain  RESPIRATORY:  shortness of breath, cough, congestion, wheezing. CARDIOVASCULAR:  chest pain, palpitations  GASTROINTESTINAL:  nausea, vomiting, diarrhea, constipation, change in bowel habits, abdominal pain   GENITOURINARY:  difficulty of urination, burning with urination, frequency   INTEGUMENT:  rash, skin lesions, easy bruising   HEMATOLOGIC/LYMPHATIC:  swelling/edema   ALLERGIC/IMMUNOLOGIC:  urticaria , itching  ENDOCRINE:  increase in drinking, increase in urination, hot or cold intolerance  MUSCULOSKELETAL:  joint pains, muscle aches, swelling of joints  NEUROLOGICAL:  headaches, dizziness, lightheadedness, numbness, pain, tingling extremities  BEHAVIOR/PSYCH:  depression, anxiety    Medications:      Allergies:  No Known Allergies    Current Meds:   Scheduled Meds:    piperacillin-tazobactam  3.375 g Intravenous Once    vancomycin (VANCOCIN) intermittent dosing (placeholder)   Other RX Placeholder    [START ON 9/17/2018] vancomycin  750 mg Intravenous Q MWF    acetaminophen  1,000 mg Oral TID    amitriptyline  75 mg Oral Nightly    atorvastatin  20 mg Oral Nightly    docusate sodium  200 mg Oral BID    gabapentin  100 mg Oral TID    megestrol  400 mg Oral Daily    oxyCODONE  10 mg Oral BID    sodium chloride flush  10 mL Intravenous 2 times per day    PIPERACILLIN-TAZOBACTAM (ZOSYN) 0.75 GM IVPB  3.375 g Intravenous Q12H     Continuous Infusions:    pantoprozole (PROTONIX) infusion 8 mg/hr (18 0350)     PRN Meds: cyclobenzaprine, ondansetron, oxyCODONE HCl, prochlorperazine, sodium chloride flush, acetaminophen    Data:     Past Medical History:   has a past medical history of Cancer ([facility] [address] [facility] Chronic kidney disease; Depression; [facility] GERD (gastroesophageal reflux disease); HA (headache); HBP (high blood pressure); History of blood transfusion; Hyperlipidemia; Hyperplastic polyp of intestine; Hypothyroid; Hypothyroidism; Left knee pain; Non-smoker; OA (osteoarthritis); and Tubular adenoma. Social History:   reports that she has never smoked. She has never used smokeless tobacco. She reports that she does not drink alcohol or use drugs. Family History:   Family History   Problem Relation Age of Onset    Heart Disease Mother     Diabetes Mother     Cancer Father         esophageal cancer    Cancer Maternal Grandfather         colon       Vitals:  BP (!) 94/32   Pulse 84   Temp 99.3 °F (37.4 °C) (Oral)   Resp 16   Ht 5' 8\" (1.727 m)   Wt 190 lb (86.2 kg)   SpO2 98%   BMI 28.89 kg/m²   Temp (24hrs), Av.8 °F (37.1 °C), Min:97.5 °F (36.4 °C), Max:101 °F (38.3 °C)    No results for input(s): POCGLU in the last 72 hours. I/O (24Hr):     Intake/Output Summary (Last 24 hours) at 18 0851  Last data filed at 18 0606   Gross per 24 hour   Intake             3484 ml   Output                0 ml   Net             3484 ml       Labs:    Hematology:  Recent Labs      09/15/18   1205  09/15/18   1206  09/15/18   2314  18   0654   WBC  16.3*   --    --   12.4*   RBC  2.14*   --    --   2.60*   HGB  6.4*   --   7.8*  7.4*   HCT  21.4*   --   24.8*  24.1*   MCV  100.0   --    --   92.7   MCH  29.9   --    --   28.5   MCHC  29.9 opacities may represent atelectasis with pneumonia not excluded. Cardiomegaly. No pericardial effusion. Organs: Evaluation of the solid organs is limited without intravenous contrast. Similar appearance of the liver with possible subcapsular hematoma versus metastatic disease. No gallstones. No definite pancreatic lesion. No splenomegaly. No adrenal lesion. Mild left-sided hydronephrosis and hydroureter. No renal calculus is seen. Heterogeneous appearance of the right kidney is similar to the previous study with abnormal enlargement. GI/Bowel: No bowel obstruction. Moderate amount of stool throughout the colon. No definite acute inflammatory process. Pelvis: Small amount of free fluid is seen within the pelvis. Air is seen within the urinary bladder. Mild bladder wall thickening. Peritoneum/Retroperitoneum: IVC filter is seen. Limbs project slightly beyond the confines of the inferior vena cava. No surrounding fluid collection. No surrounding inflammatory changes. No abdominal aortic aneurysm. No definite adenopathy. Bones/Soft Tissues: Diffuse subcutaneous edema. No focal drainable fluid collection. Osseous metastatic disease is again seen involving the sacrum, left iliac wing, and lumbar spine. Small pleural effusions. Associated bibasilar lung opacities may represent atelectasis versus pneumonia. Hepatic metastatic disease versus subcapsular hematomas. Right renal mass similar to the prior study. Diffuse osseous metastatic disease. Air within the urinary bladder with bladder wall thickening. Correlate for infection. Xr Lumbar Spine (2-3 Views)    Result Date: 8/31/2018  EXAMINATION: 3 XRAY VIEWS OF THE LUMBAR SPINE 8/31/2018 10:41 am COMPARISON: 07/25/2018, 03/13/2018 HISTORY: ORDERING SYSTEM PROVIDED HISTORY: Secondary malignant neoplasm of lumbar vertebral column [facility]) TECHNOLOGIST PROVIDED HISTORY: Malignant Neoplasm of Lumbar On going examination. Subsequent study.  FINDINGS: Status post posterior fusion of T11, T12, L2, and L3 secondary to a severe pathologic L1 compression fracture. Retropulsion of the posterior wall of L1 appears increased from prior spine radiographs. Alignment is stable. There is mild multilevel degenerative disc disease. An IVC filter is in place. Stable alignment status post thoracolumbar fusion secondary to a pathologic L1 compression fracture. Increased retropulsion of the posterior wall of L1 when compared with several prior lumbar spine radiographs, which is most likely secondary to tumor progression. Xr Chest Portable    Result Date: 9/15/2018  EXAMINATION: SINGLE XRAY VIEW OF THE CHEST 9/15/2018 11:57 am COMPARISON: 2018 HISTORY: ORDERING SYSTEM PROVIDED HISTORY: upright CXR. R/O free air TECHNOLOGIST PROVIDED HISTORY: upright CXR. R/O free air Ordering Physician Provided Reason for Exam: upright CXR. R/O free air Acuity: Acute Type of Exam: Unknown FINDINGS: Shallow inflation. Cardiac silhouette enlargement is again noted. A dialysis catheter from a right internal jugular approach terminates at the distal SVC. Relative elevation of the right hemidiaphragm is again noted. Basilar opacities are noted, although improved on the right side. A small left effusion is noted. No pneumothorax or evidence for edema. Lumbar fixation hardware is partially visualized. No free air identified in the upper abdomen. Shallow inflation with basilar opacities favoring atelectasis. Small left effusion. No free air identified. Physical Examination:        BP (!) 94/32   Pulse 84   Temp 99.3 °F (37.4 °C) (Oral)   Resp 16   Ht 5' 8\" (1.727 m)   Wt 190 lb (86.2 kg)   SpO2 98%   BMI 28.89 kg/m²   Temp (24hrs), Av.8 °F (37.1 °C), Min:97.5 °F (36.4 °C), Max:101 °F (38.3 °C)    No results for input(s): POCGLU in the last 72 hours.     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 *    Macrocytic Anemia. Getting B12 and Folate level. Giving one dose vitamin B12. Received 3 U PRBCs with mild improvement in Hgb. H&H Q 6. GI consulted. Not suspecting active bleed. protonix gtt stopped, changed to oral.       Sepsis 2/2 Pneumonia. P/W tachycardia, hypotension, leukocytosis, source PNA. Cont vanc and zosyn. BCx, Urine Cx pending.      Fluid overload. Cont HD.      ESRD on HD M/W/F. Nephrology consulted.      Metastatic RCC. Palliative care consult.      Recent DVT. No AC 2/2 Bleed/Severe anemia.      Hypokalemia. Replace. Repeat K this evening post replacement. Cont tele.      DM: Insulin sliding scale, Hypoglycemia protocol, Resume home doses of antidiabetic medications, diabetic diet once patient is not NPO.     HTN: currently hypotensive. Hold home meds.      Check Electrolytes and Electrolytes replacement as needed      DVT prophylaxis: reason for no prophylaxis: high risk bleeding -GI Bleed     PT, OT as needed.       IV Fluids: pantoprozole (PROTONIX) infusion Last Rate: 8 mg/hr (09/16/18 0350),    Nutrition:  DIET RENAL;      Consultations:   PHARMACY TO DOSE VANCOMYCIN  IP CONSULT TO HOSPITALIST  IP CONSULT TO GI  IP CONSULT TO GI  IP CONSULT TO NEPHROLOGY  IP CONSULT TO INFECTIOUS DISEASES  PHARMACY TO DOSE VANCOMYCIN  IP CONSULT TO PALLIATIVE CARE      Renate Rosen MD  9/16/2018  8:51 AM

## 2018-09-16 NOTE — PROGRESS NOTES
performed by Delia Hawkins MD at [place] [address] INTRBDY N/A 1/23/2018    T11-L3 POSTERIOR FIXATION AND FUSION, SPINE ABBY, C-ARM, O-ARM WITH STEALTH,  [facility] [id number] LJ performed by Shukri Rios DO at [facility]         Medications:      piperacillin-tazobactam  3.375 g Intravenous Once    vancomycin (VANCOCIN) intermittent dosing (placeholder)   Other RX Placeholder    [START ON 9/17/2018] vancomycin  750 mg Intravenous Q MWF    acetaminophen  1,000 mg Oral TID    amitriptyline  75 mg Oral Nightly    atorvastatin  20 mg Oral Nightly    docusate sodium  200 mg Oral BID    gabapentin  100 mg Oral TID    megestrol  400 mg Oral Daily    oxyCODONE  10 mg Oral BID    sodium chloride flush  10 mL Intravenous 2 times per day    PIPERACILLIN-TAZOBACTAM (ZOSYN) 0.75 GM IVPB  3.375 g Intravenous Q12H       Social History:     Social History     Social History    Marital status:      Spouse name: N/A    Number of children: N/A    Years of education: N/A     Occupational History    Not on file. Social History Main Topics    Smoking status: Never Smoker    Smokeless tobacco: Never Used    Alcohol use No    Drug use: No    Sexual activity: Not on file     Other Topics Concern    Not on file     Social History Narrative    No narrative on file       Family History:     Family History   Problem Relation Age of Onset    Heart Disease Mother     Diabetes Mother     Cancer Father         esophageal cancer    Cancer Maternal Grandfather         colon        Allergies:   Patient has no known allergies. Review of Systems:     Review of Systems   Constitutional: Positive for chills. Negative for fatigue and fever. HENT: Negative. Eyes: Negative. Respiratory: Negative. Cardiovascular: Positive for leg swelling. Gastrointestinal: Positive for abdominal pain. Negative for nausea. Endocrine: Negative.     Genitourinary: Positive for dysuria. Musculoskeletal: Negative. Skin: Negative. Allergic/Immunologic: Negative. Neurological: Negative. Hematological: Negative. Psychiatric/Behavioral: Negative. Physical Examination :   Patient Vitals for the past 8 hrs:   BP Temp Temp src Pulse Resp SpO2   09/16/18 1300 (!) 88/41 99 °F (37.2 °C) Oral 90 16 98 %   09/16/18 0746 (!) 94/32 99.3 °F (37.4 °C) Oral 84 16 98 %       Physical Exam   Constitutional: She is oriented to person, place, and time and well-developed, well-nourished, and in no distress. HENT:   Head: Normocephalic and atraumatic. Eyes: Pupils are equal, round, and reactive to light. Neck: Normal range of motion. Cardiovascular: Normal rate and regular rhythm. Pulmonary/Chest: Effort normal and breath sounds normal. She has no wheezes. She has no rales. Abdominal: Soft. There is tenderness. Genitourinary: No vaginal discharge found. Musculoskeletal: She exhibits edema. Neurological: She is alert and oriented to person, place, and time. Skin: Skin is warm. She is not diaphoretic. Psychiatric: Affect and judgment normal.         Medical Decision Making:   I have independently reviewed/ordered the following labs:    CBC with Differential: Recent Labs      09/15/18   1205   09/16/18   0654  09/16/18   1132   WBC  16.3*   --   12.4*   --    HGB  6.4*   < >  7.4*  7.5*   HCT  21.4*   < >  24.1*  23.1*   PLT  269   --   243   --    LYMPHOPCT  5*   --   3*   --    MONOPCT  5   --   0*   --     < > = values in this interval not displayed.      BMP: Recent Labs      09/15/18   1205  09/15/18   2314  09/16/18   0654   NA  135  133*  137   K  2.9*  3.3*  3.1*   CL  97*  95*  99   CO2  24  22  24   BUN  23   --   29*   CREATININE  3.05*   --   3.43*   MG  1.8   --   1.6     Hepatic Function Panel: Recent Labs      09/15/18   1205   PROT  4.4*   LABALBU  1.4*   BILITOT  0.39   ALKPHOS  237*   ALT  8   AST  17     No results for input(s): RPR in the last 72

## 2018-09-16 NOTE — PLAN OF CARE
Problem: Nutrition  Goal: Optimal nutrition therapy  Outcome: Ongoing  Nutrition Problem: Increased nutrient needs  Intervention: Food and/or Nutrient Delivery: Continue current diet, Start ONS  Nutritional Goals: Intake greater than 50%

## 2018-09-16 NOTE — CONSULTS
Palliative Care Inpatient Consult    NAME:  Julian Holbrook RECORD NUMBER:  [id number]  AGE: 66 y.o. GENDER: female  : 1955  TODAY'S DATE:  2018    Reason for Consult:  goals of care  History of Present Illness     The patient is a 66 y.o. Non-/non  female who presents with Fatigue (At [facility] took blood: Hgb of 5.5) and Abdominal Pain (x1week)      Referred to Palliative Care by  [x] Physician   [] Nursing  [] Family Request   [] Other:      She was admitted to the hospitalist service for Sepsis due to pneumonia ([facility] [J18.9, A41.9]. Her hospital course has been associated with progressive decline and worsening anemia, renal disease .  The patient has a complicated medical history and has been hospitalized since 9/15/2018 11:38 AM.    Active Hospital Problems    Diagnosis Date Noted    Severe malnutrition ([address] [E43] 2018     Priority: Medium     Class: Present on Admission    Sepsis due to pneumonia ([address] [J18.9, A41.9] 09/15/2018    Hypokalemia [E87.6]     ESRD on hemodialysis ([address] [N18.6, Z99.2] 2018    Acute deep vein thrombosis (DVT) of femoral vein of left lower extremity ([address] [I82.412] 2018    Encounter for palliative care [Z51.5]     Pathologic compression fracture of spine with delayed healing [M48.50XG]     Absolute anemia [D64.9]     Renal cell carcinoma ([address] [C64.9]     Anemia due to chronic kidney disease [N18.9, D63.1] 2018    Secondary malignant neoplasm of lumbar vertebral column ([address] [C79.51]     Diabetes mellitus ([address] [E11.9] 2012       PAST MEDICAL HISTORY      Diagnosis Date    Cancer [facility])     kidney    Chronic kidney disease     Depression     Diabetes mellitus ([address]     type 2    GERD (gastroesophageal reflux disease)     HA (headache)     HBP (high blood pressure)     History of blood transfusion     no reaction    Hyperlipidemia     Hyperplastic polyp of intestine     Hypothyroid 2016    Medications  No current facility-administered medications on file prior to encounter. Current Outpatient Prescriptions on File Prior to Encounter   Medication Sig Dispense Refill    oxyCODONE (OXYCONTIN) 10 MG extended release tablet Take 1 tablet by mouth 2 times daily for 30 days. Intended supply: 30 days. 60 tablet 0    gabapentin (NEURONTIN) 100 MG capsule Take 1 capsule by mouth 3 times daily for 30 days. . 90 capsule 0    cyclobenzaprine (FLEXERIL) 5 MG tablet TAKE 1 TABLET BY MOUTH THREE TIMES A DAY AS NEEDED FOR MUSCLE SPASMS 60 tablet 0    Insulin Disposable Pump (V-GO 20) KIT       HUMALOG 100 UNIT/ML injection vial With insulin pump      B-D ULTRAFINE III SHORT PEN 31G X 8 MM MISC       atorvastatin (LIPITOR) 20 MG tablet TAKE 1 TABLET BY MOUTH ONE TIME A DAY 90 tablet 3    amitriptyline (ELAVIL) 75 MG tablet Take 1 tablet by mouth nightly 90 tablet 3    Blood Glucose Monitoring Suppl (TRUE METRIX METER) w/Device KIT       Lancets MISC Patient testing 3 times daily 100 each 3    glucose blood VI test strips (ASCENSIA AUTODISC VI;ONE TOUCH ULTRA TEST VI) strip Use with associated glucose meter. Patient testing three times daily 100 strip 3    Insulin Pen Needle 32G X 4 MM MISC 2 each by Does not apply route 2 times daily      oxyCODONE HCl (OXY-IR) 10 MG immediate release tablet Take 1 tablet by mouth every 6 hours as needed for Pain for up to 30 days. Intended supply: 30 days.  4 tablet 0    megestrol (MEGACE) 40 MG/ML suspension Take 10 mLs by mouth daily 240 mL 3    ondansetron (ZOFRAN) 4 MG tablet Take 1 tablet by mouth daily as needed for Nausea or Vomiting 40 tablet 0    omeprazole (PRILOSEC) 40 MG delayed release capsule Take 1 capsule by mouth daily 90 capsule 3    prochlorperazine (COMPAZINE) 5 MG tablet Take 5 mg by mouth every 8 hours as needed       docusate sodium (COLACE) 100 MG capsule Take 2 capsules by mouth 2 times daily 120 capsule 11    acetaminophen (TYLENOL) 500 MG tablet Take 1,000 mg by mouth 3 times daily         Data         BP (!) 88/41   Pulse 90   Temp 99 °F (37.2 °C) (Oral)   Resp 16   Ht 5' 8\" (1.727 m)   Wt 190 lb (86.2 kg)   SpO2 98%   BMI 28.89 kg/m²     Wt Readings from Last 3 Encounters:   09/15/18 190 lb (86.2 kg)   09/07/18 191 lb 9.3 oz (86.9 kg)   09/05/18 189 lb (85.7 kg)        Pertinent Laboratory Studies  Lab Results   Component Value Date    WBC 12.4 (H) 09/16/2018    HGB 7.5 (L) 09/16/2018    HCT 23.1 (L) 09/16/2018    MCV 92.7 09/16/2018     09/16/2018         Code Status: Full Code     ADVANCED CARE PLANNING:  Patient has capacity for medical decisions: yes  [address] of : no  Living Will: no            Assessment        REVIEW OF SYSTEMS    []   UNABLE TO OBTAIN    Constitutional:  []   Chills   [x]  Fatigue   []  Fevers   []  Malaise   [x]  Weight loss   [] Other:     Respiratory:   []  Cough    [x]  Shortness of breath    []  Chest pain    [] Other:     Cardiovascular:   []  Chest pain  [x]  Dyspnea    []  Exertional chest pressure/discomfort     [x] Fatigue      []  Palpitations    []  Syncope   [] Other:     Gastrointestinal:   [x]  Abdominal pain   []  Constipation    []  Diarrhea    []   Dysphagia   []  Reflux             []  Vomiting      [] Other:     Genitourinary:  []  Dysuria     []  Frequency   []  Hematuria   [] Nocturia   []  Urinary incontinence         [] Other:     Musculoskeletal:   [x] Back pain    [x]  Muscle weakness   []  Myalgias    []  Neck pain     []  Stiff joints         [] Other:     Behavioral/Psych:   [x] Anxiety    []  Depression     []  Mood swings   [] Other:     PHYSICAL ASSESSMENT:  General appearance - ill-appearing  Mental status - alert, oriented to person, place, and time  Neck - supple, no significant adenopathy  Chest - clear to auscultation, no wheezes, rales or rhonchi, symmetric air entry  Heart - normal rate, regular rhythm, normal S1, S2, no murmurs, rubs, clicks or

## 2018-09-16 NOTE — PROGRESS NOTES
[facility]    Gastroenterology Daily Progress Note      Patient: Shannon Cpoeland   :    1955   Facility:   [address] [place] [address]   Date:     2018  Consultant:   Odalis Deal CNP      Subjective:     66 y.o. female admitted 9/15/2018 with Sepsis due to pneumonia ([facility] [address] [J18.9, A41.9] and seen for anemia. Patient seen and examined. T Max today 99.6. Mild hypotension with SBP    Tolerating CL diet. Patient denies any signs of overt GI bleeding.     Objective     Scheduled Meds:   piperacillin-tazobactam  3.375 g Intravenous Once    vancomycin (VANCOCIN) intermittent dosing (placeholder)   Other RX Placeholder    [START ON 2018] vancomycin  750 mg Intravenous Q MWF    acetaminophen  1,000 mg Oral TID    amitriptyline  75 mg Oral Nightly    atorvastatin  20 mg Oral Nightly    docusate sodium  200 mg Oral BID    gabapentin  100 mg Oral TID    megestrol  400 mg Oral Daily    oxyCODONE  10 mg Oral BID    sodium chloride flush  10 mL Intravenous 2 times per day    PIPERACILLIN-TAZOBACTAM (ZOSYN) 0.75 GM IVPB  3.375 g Intravenous Q12H       Vital Signs:  BP (!) 94/32   Pulse 84   Temp 99.3 °F (37.4 °C) (Oral)   Resp 16   Ht 5' 8\" (1.727 m)   Wt 190 lb (86.2 kg)   SpO2 98%   BMI 28.89 kg/m²      Physical Exam:   General appearance: Alert & oriented, NAD  Lungs: CTA bilaterally, decreased in bases   Heart: S1S2 RRR  Abdomen: Soft, Nontender, Not distended, BS WNL Estrella name  Skin: No jaundice, No clubbing, No cyanosis    Lab and Imaging Review     CBC  Recent Labs      09/15/18   1205  09/15/18   2314  18   0654   WBC  16.3*   --   12.4*   HGB  6.4*  7.8*  7.4*   HCT  21.4*  24.8*  24.1*   MCV  100.0   --   92.7   MCH  29.9   --   28.5   MCHC  29.9   --   30.7   PLT  269   --   243       BMP  Recent Labs      09/15/18   1205  09/15/18   2314  18   0654   NA  135  133*  137   K  2.9*  3.3*  3.1*   CL  97*  95*  99   CO2  24  22 24   BUN  23   --   29*   CREATININE  3.05*   --   3.43*   GLUCOSE  99   --   110*   CALCIUM  7.6*   --   7.6*       LFTS  Recent Labs      09/15/18   1205   ALKPHOS  237*   ALT  8   AST  17   PROT  4.4*   BILITOT  0.39   LABALBU  1.4*       AMYLASE/LIPASE/AMMONIA  Recent Labs      09/15/18   1205   LIPASE  9*       PT/INR  Recent Labs      09/15/18   1206  09/16/18   0654   PROTIME  14.5*  16.3*   INR  1.4  1.6       IMAGING  Ct Abdomen Pelvis Wo Contrast    Result Date: 9/15/2018  EXAMINATION: CT OF THE ABDOMEN AND PELVIS WITHOUT CONTRAST 9/15/2018 12:26 pm TECHNIQUE: CT of the abdomen and pelvis was performed without the administration of intravenous contrast. Multiplanar reformatted images are provided for review. Dose modulation, iterative reconstruction, and/or weight based adjustment of the mA/kV was utilized to reduce the radiation dose to as low as reasonably achievable. COMPARISON: August 1, 2018 HISTORY: ORDERING SYSTEM PROVIDED HISTORY: Diffuse abd pain. Low hemoglobin, rule out perforation FINDINGS: Lower Chest: Small pleural effusions. Associated lower lobe opacities may represent atelectasis with pneumonia not excluded. Cardiomegaly. No pericardial effusion. Organs: Evaluation of the solid organs is limited without intravenous contrast. Similar appearance of the liver with possible subcapsular hematoma versus metastatic disease. No gallstones. No definite pancreatic lesion. No splenomegaly. No adrenal lesion. Mild left-sided hydronephrosis and hydroureter. No renal calculus is seen. Heterogeneous appearance of the right kidney is similar to the previous study with abnormal enlargement. GI/Bowel: No bowel obstruction. Moderate amount of stool throughout the colon. No definite acute inflammatory process. Pelvis: Small amount of free fluid is seen within the pelvis. Air is seen within the urinary bladder. Mild bladder wall thickening. Peritoneum/Retroperitoneum: IVC filter is seen.   Limbs progression of patients tumor burden. Hgb stable - No signs of overt GI bleeding. Plan:     1. Monitor hemoglobin and hematocrit. Transfuse to keep hemoglobin greater than 7 g/dL  2. Continue Protonix 40 mg IV twice a day. Transition to PO when tolerating diet. 3. No immediate plans for endoscopy. GI will be available for endoscopy secondary to acute GI hemorrhage. 4. Recommend GOC discussion with patient and family, patients prognosis is guarded. 5. GI will sign off.   Please recall with any questions, concerns, or acute change in clinical status    This plan was formulated in collaboration with Dr. Glynn Foil    Electronically signed by Shira Alatorre CNP on 9/16/2018 at 10:14 AM

## 2018-09-17 NOTE — PROGRESS NOTES
[facility]  Occupational Therapy Not Seen Note    DATE: 2018  Name: Martita Felipe  : 1955  MRN: [id number]    Patient not available for Occupational Therapy due to:    [] Testing:    [x] Hemodialysis    [] Blood Transfusion in Progress    []Refusal by Patient:    [] Surgery/Procedure:    [] Strict Bedrest    [] Sedation    [] Spine Precautions     [] Pt being transferred to palliative care at this time. Spoke with pt/family and OT services to be defered. [] Pt independent with functional mobility and functional tasks.  Pt with no OT acute care needs at this time, will defer OT eval.    [] Other    Next Scheduled Treatment: Re-check 2018     Signature: JESSENIA Stockton/GILDA

## 2018-09-17 NOTE — PROGRESS NOTES
[facility]    Progress Note    9/17/2018    8:35 AM    Name:   Ladon Schwab  MRN:     [id number]     Acct:      [de-identified]   Room:   [address] [place] [address]  IP Day:  2  Admit Date:  9/15/2018 11:38 AM    PCP:   Seema Ryan MD  Code Status:  Full Code    Subjective:     C/C:   Chief Complaint   Patient presents with    Fatigue     At ESF took blood: Hgb of 5.5    Abdominal Pain     x1week     Interval History Status: improved. Patient seen and examined at bedside, no acute events overnight. Tmax of 100.2  Has some abdominal pain   Patient denies any chest pain, shortness of breath, chills, fevers, nausea or vomiting. Patient vitals, labs and all providers notes were reviewed,from overnight shift and morning updates were noted and discussed with the nurse      Brief History:       The patient is a 66 y.o. 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 In the ED, she was found to have Hgb 6.4 and 1 U PRBCs ordered. She was noted to be hypokalemic with K+ of 2.9. She was started on protonix gtt. GI was consulted. She was also noted to have leukocytosis with tachycardia and CT showing Pneumonia. She was also noted to be hypotensive. She was started on vanc and zosyn. She was also noted to be fluid overloaded. Seen and evaluated by ID, nephrology and GI. No plan for urgent EGD as she had one recently  ID started her on Zosyn    Review of Systems:     Constitutional:  negative for chills, fevers, sweats.  + fatigue and loss of appetite  Respiratory:  negative for cough, dyspnea on exertion, hemoptysis, shortness of breath, wheezing  Cardiovascular:  negative for chest pain, chest pressure/discomfort, lower extremity edema, palpitations  Gastrointestinal:  negative for abdominal pain, constipation, diarrhea, nausea, vomiting  Neurological:  negative for dizziness, headache    Medications: Allergies:  No Known Allergies    Current Meds:   Scheduled Meds:    vancomycin  750 mg Intravenous Q MWF    levofloxacin  500 mg Oral Every Other Day    pantoprazole  40 mg Oral QAM AC    piperacillin-tazobactam  3.375 g Intravenous Once    vancomycin (VANCOCIN) intermittent dosing (placeholder)   Other RX Placeholder    acetaminophen  1,000 mg Oral TID    amitriptyline  75 mg Oral Nightly    atorvastatin  20 mg Oral Nightly    docusate sodium  200 mg Oral BID    gabapentin  100 mg Oral TID    megestrol  400 mg Oral Daily    oxyCODONE  10 mg Oral BID    sodium chloride flush  10 mL Intravenous 2 times per day     Continuous Infusions:    pantoprozole (PROTONIX) infusion 8 mg/hr (09/16/18 0350)     PRN Meds: cyclobenzaprine, ondansetron, oxyCODONE HCl, prochlorperazine, sodium chloride flush, acetaminophen    Data:     Past Medical History:   has a past medical history of Cancer ([facility] [address] Chronic kidney disease; Depression; Diabetes mellitus ([facility] [address] GERD (gastroesophageal reflux disease); HA (headache); HBP (high blood pressure); History of blood transfusion; Hyperlipidemia; Hyperplastic polyp of intestine; Hypothyroid; Hypothyroidism; Left knee pain; Non-smoker; OA (osteoarthritis); and Tubular adenoma. Social History:   reports that she has never smoked. She has never used smokeless tobacco. She reports that she does not drink alcohol or use drugs.      Family History:   Family History   Problem Relation Age of Onset    Heart Disease Mother     Diabetes Mother     Cancer Father         esophageal cancer    Cancer Maternal Grandfather         colon       Vitals:  BP (!) 92/33   Pulse 91   Temp 98.4 °F (36.9 °C) (Oral)   Resp 16 anemia [D64.9]     Renal cell carcinoma ([facility] [C64.9]     Anemia due to chronic kidney disease [N18.9, D63.1] 01/19/2018    Secondary malignant neoplasm of lumbar vertebral column ([facility] [C79.51]     Diabetes mellitus ([facility] [E11.9] 08/21/2012       Plan:        Continue Abx  HD and fluid management per nephro  Macrocytic anemia might be 2/2 cancer  No overt GI bleed  No AC 2/2 GI and severe anemia  Check and replace electrolytes as needed  Continue other chronic home meds  Palliative care consulted, patient is full code  No DVT PPx Given the GI bleed and anemia, EPC, will restart if Hb stabilized      Noemy Juárez MD  9/17/2018  8:35 AM

## 2018-09-17 NOTE — PROGRESS NOTES
Laterality Date    BACK SURGERY N/A 01/23/2018    Post T11-L2 Fusion    COLONOSCOPY  08/16/2016    hyperplastic polyp and tubular adenoma     COLONOSCOPY N/A 8/20/2018    COLONOSCOPY POLYPECTOMY SNARE/COLD BIOPSY performed by Caroline Ruiz MD at [address] [place] [address]  07/13/2018    aleta trigger point neck #1    DE EGD TRANSORAL BIOPSY SINGLE/MULTIPLE N/A 8/17/2018    EGD performed by Jaymie [facility]yl[facility] at [facility] N/A 1/23/2018    T11-L3 POSTERIOR FIXATION AND FUSION, SPINE ABBY, C-ARM, O[facility] WITH STEALTH,  [id number] performed by Emanuel Flanagan DO at [address]         Medications:      vancomycin  750 mg Intravenous Q MWF    levofloxacin  500 mg Oral Every Other Day    pantoprazole  40 mg Oral QAM AC    piperacillin-tazobactam  3.375 g Intravenous Once    vancomycin (VANCOCIN) intermittent dosing (placeholder)   Other RX Placeholder    acetaminophen  1,000 mg Oral TID    amitriptyline  75 mg Oral Nightly    atorvastatin  20 mg Oral Nightly    docusate sodium  200 mg Oral BID    gabapentin  100 mg Oral TID    megestrol  400 mg Oral Daily    oxyCODONE  10 mg Oral BID    sodium chloride flush  10 mL Intravenous 2 times per day       Social History:     Social History     Social History    Marital status:      Spouse name: N/A    Number of children: N/A    Years of education: N/A     Occupational History    Not on file.      Social History Main Topics    Smoking status: Never Smoker    Smokeless tobacco: Never Used    Alcohol use No    Drug use: No    Sexual activity: Not on file     Other Topics Concern    Not on file     Social History Narrative    No narrative on file       Family History:     Family History   Problem Relation Age of Onset    Heart Disease Mother     Diabetes Mother     Cancer Father         esophageal cancer    Cancer Maternal Grandfather         colon

## 2018-09-17 NOTE — PROGRESS NOTES
09/17/18   0648   [facility]  [facility]  1.[facility]*       Dialysis bath: Dialysis Bath  K+ (Potassium): 4  Ca+ (Calcium): 2.25  Na+ (Sodium): 140  HCO3 (Bicarb): 35    Radiology:  Reviewed as available. Assessment:  1 ESRD:dialysis bath reviewed with nurse. 2.had a static renal cell carcinoma  3. hypokalemia  4 EDEMA, mainly secondary to protein calorie malnutrition and low protein stores  5. ANEMIA OF CHRONIC DISEASE:   6.SECONDARY HYPERPARATHYROIDISM:     Plan:  1. Weight removal and dialysis orders reviewed. 2. Consider palliative care and stopping dialysis  3. Follow up labs ordered. Please do not hesitate to call with questions.     Electronically signed by Naeem Rodriguez MD on 9/17/2018 at 1:42 PM

## 2018-09-17 NOTE — PROGRESS NOTES
Smoking Cessation - topics covered   []  Health Risks  []  Benefits of Quitting   []  Smoking Cessation  [x]  Patient has no history of tobacco use  []  Patient is former smoker. [x]  No need for tobacco cessation education. []  Booklet given  []  Patient verbalizes understanding. []  Patient denies need for tobacco cessation education.   Adryan Barahona  9:09 AM

## 2018-09-17 NOTE — CARE COORDINATION
Transition planning:  Spoke with Kayley from [facility], they can accept pt back on discharge, but will need precert. PT/OT not done today due to dialysis. Asked P/t/Ot to see early am, notify [place] when complete to start precert.

## 2018-09-18 NOTE — PROGRESS NOTES
Palliative Care Progress Note    NAME:  Julian Holbrook RECORD NUMBER:  [id number]  AGE: 66 y.o. GENDER: female  : 1955  TODAY'S DATE:  2018    Reason for Consult:  goals of care  History of Present Illness     The patient is a 66 y.o. Non-/non  female who presents with Fatigue (At [facility]SF took blood: Hgb of 5.5) and Abdominal Pain (x1week)      Referred to Palliative Care by  [x] Physician   [] Nursing  [] Family Request   [] Other:       She was admitted to the Internal medicine service for Sepsis due to pneumonia ([facility] [J18.9, A41.9].  Her hospital course has been associated with fatigue and abdominal pain The patient has a complicated medical history and has been hospitalized since 9/15/2018 11:38 AM.    OVERNIGHT EVENTS:  - No acute events overnight  - Patient had dialysis yesterday  - Patient denied any pain     BP (!) 82/36   Pulse 93   Temp 98.9 °F (37.2 °C) (Oral)   Resp 18   Ht 5' 8\" (1.727 m)   Wt 199 lb 15.3 oz (90.7 kg)   SpO2 97%   BMI 30.40 kg/m²     Assessment        REVIEW OF SYSTEMS    []   UNABLE TO OBTAIN:     Constitutional:  []   Chills   [x]  Fatigue   []  Fevers   []  Malaise   []  Weight loss   [] Other:     Respiratory:   []  Cough    []  Shortness of breath    []  Chest pain    [] Other:     Cardiovascular:   []  Chest pain  []  Dyspnea    []  Exertional chest pressure/discomfort     [x] Fatigue      []  Palpitations    []  Syncope   [] Other:     Gastrointestinal:   []  Abdominal pain   []  Constipation    []  Diarrhea    []   Dysphagia   []  Reflux             []  Vomiting   [] Other:     Genitourinary:  []  Dysuria     []  Frequency   []  Hematuria   [] Nocturia   []  Urinary incontinence   [] Other:     Musculoskeletal:   [] Back pain    []  Muscle weakness   []  Myalgias    []  Neck pain   []  Stiff joints   []  Other:     Behavioral/Psych:   [] Anxiety    []  Depression     []  Mood swings   [] Other:     PHYSICAL ASSESSMENT:     General: [x]  Oriented x3      [] well appearing      [] Intubated      [] ill appearing      [] Other:    Mental Status: [x] normal mental status exam      [] drowsy      [] Confused      [] Other:     Cardiovascular: [x]  Regular rate/rhythm      [] Arrhythmia      [] Other:     Chest: [x] Effort normal      [x] lungs clear      [] respiratory distress      [] Tachypnea      []  Other:    Abdomen: [x] Soft/non-tender      [x]  Normal appearance      [] Distended      [] Ascites      [] Other:    Neurological: [x] Normal Speech      [x] Normal Sensation      []  Deficits present:      Extremity:  [x] normal skin color/temp      [] clubbing/cyanosis      [x]  ++ edema      [] Other:     Palliative Performance Scale:  ___60%  Ambulation reduced; Significant disease; Can't do hobbies/housework; intake normal or reduced; occasional assist; LOC full/confusion  __x_50%  Mainly sit/lie; Extensive disease; Can't do any work; Considerable assist; intake normal or reduced; LOC full/confusion  ___40%  Mainly in bed; Extensive disease; Mainly assist; intake normal or reduced; LOC full/confusion   ___30%  Bed Bound; Extensive disease; Total care; intake reduced; LOCfull/confusion  ___20%  Bed Bound; Extensive disease; Total care; intake minimal; Drowsy/coma  ___10%  Bed Bound; Extensive disease;  Total care; Mouth care only; Drowsy/coma  ___0       Death      Plan      Palliative Interaction:    - Patient seen today and was sitting comfortably in the recliner    - I discussed patient's current medical conditions with her    - The significance of POA paperwork for health was discussed and patient told that her daughter Pao Santos is her POA for health    - I informed the patient that I had reviewed her POA paperwork    - I explained the different types of code status to the patient but the patient did not reply and kept quite    - I asked the patient regarding her further goals of care, the patient remained silent    - I told the patient that

## 2018-09-18 NOTE — TELEPHONE ENCOUNTER
SW received a message from patient's daughter stating she had questions for SW.  SW called number that was provided and the voicemail is full, so SW could not leave a message. SW will continue to try to reach patient's [place] #[phone]. Ryan Crystal.  Viky Acuna

## 2018-09-18 NOTE — PROGRESS NOTES
Back  Pain Orientation: Lower  Pain Descriptors: Discomfort  Pain Frequency: Intermittent  Clinical Progression: Not changed  Patient's Stated Pain Goal: No pain  Response to Pain Intervention: Patient Satisfied  Multiple Pain Sites: No     Social/Functional History  Social/Functional History  Type of Home: Facility  Bathroom Shower/Tub: Walk-in shower  Home Equipment: Rolling walker  Receives Help From: Other (comment) ([facility] staff)  Homemaking Responsibilities: No  Ambulation Assistance: Needs assistance  Transfer Assistance: Independent  Active : Yes  Mode of Transportation: Car  Additional Comments: Pt states walking with RW at facility with or without assistance. Objective   Vision: Within Functional Limits  Hearing: Within functional limits    Orientation  Overall Orientation Status: Within Functional Limits  Observation/Palpation  Posture: Fair  Observation: slightly kyphotic, noted L lateral lean. Able to minimally correct with VC/tactile cues   Balance  Sitting Balance: Minimal assistance (seated EOB d/t L lateral lean)  Standing Balance: Minimal assistance (with use of Karla Chiquita )  Standing Balance  Time: 3 min  Activity: sit > stand transfer, use of Karla Chiquita   Sit to stand: Maximum assistance  Stand to sit: Maximum assistance  Comment: bed<> chair transfer, VCs for hand placement to assist with transfer  Functional Mobility  Functional Mobility Comments: Unable to attempt this date   ADL  Feeding: Independent  Grooming: Setup;Minimal assistance  UE Bathing: Setup; Moderate assistance  LE Bathing: Setup;Maximum assistance  UE Dressing: Setup; Moderate assistance (to don gown )  LE Dressing: Maximum assistance;Setup (to don socks supine in bed)  Toileting: Maximum assistance (brief )  Additional Comments: Pt supine in bed on arrival. Pt assisted to complete bed mobility and sit at EOB. Pt reports increased pain and dizziness with activity.  Pt reports dizziness did not worsen with 3 min OT POC, discharge planning, safety   REQUIRES OT FOLLOW UP: Yes  Activity Tolerance  Activity Tolerance: Patient limited by fatigue;Patient limited by pain  Safety Devices  Safety Devices in place: Yes  Type of devices: Left in chair;Nurse notified;Call light within reach;Gait belt;Patient at risk for falls (sling placed in chair )  Restraints  Initially in place: No         Plan   Plan  Times per week: 3-4x/wk   Current Treatment Recommendations: Functional Mobility Training, Endurance Training, Safety Education & Training, Self-Care / ADL, Patient/Caregiver Education & Training    G-Code  OT G-codes  Functional Assessment Tool Used: [facility]  Score: 15/24  Functional Limitation: Self care  Self Care Current Status (): At least 40 percent but less than 60 percent impaired, limited or restricted  Self Care Goal Status (): At least 20 percent but less than 40 percent impaired, limited or restricted    AM-PAC Score  AM-[facility] Inpatient Daily Activity Raw Score: 15  AM-PAC Inpatient ADL T-Scale Score : 34.69  ADL Inpatient CMS 0-100% Score: 56.46  ADL Inpatient CMS G-Code Modifier : CK  How much help from another person does the pt currently need? Unable A Lot A Little None   1. Putting on and taking off regular lower body clothing? 1      2      3       4   2. Bathing (including washing, rinsing, drying)? 1      2      3      4   3. Toileting, which includes using toilet, bedpan, or urinal?      1      2        3      4   4. Putting on and taking off regular upper body clothing? 1      2      3       4   5. Taking care of personal grooming such as brushing teeth? 1      2      3      4   6. Eating meals? 1      2       3       4     1. Unable = Total/Dependent Assist  2. A Lot = Maximum/Moderate Assist  3. A Little = Minimum/Contact Guard Assist/Supervision  4.  None= Modified [place]/Independent    Raw Score Scale Score Scale Score Standard Error Approximate Degree of Functional Impairment     6 17.07 3.74 100%   7 20.13 3.68 92%   8 22.86 3.43 86%   9 25.33 3.17 80%   10 27.31 2.96 75%   11 29.04 2.79 70%   12 30.60 2.68 67%   13 32.03 2.62 63%   14 33.39 2.61 60%   15 34.69 2.65 56%   16 35.96 2.71 53%   17 37.26 2.82 50%   18 38.66 2.97 47%   19 40.22 3.20 43%   20 42.03 3.55 38%   21 44.27 4.08 33%   22 47.10 4.81 26%   23 51.12 5.88 16%   24 57.54 7.36 0%     Goals  Short term goals  Time Frame for Short term goals: by discharge, pt will  Short term goal 1: demo I in UE ADL activities   Short term goal 2: demo mod A for LE ADL activities   Short term goal 3: demo understanding and I use of safe transfer tech with LRD during functional activities   Short term goal 4: demo understanding and I use of EC/WS, fall prevention and proper pursed lipped breathing tech during functional activities   Short term goal 5: demo mod A for functional transfers to assist with ADL/ fucntional activities   Short term goal 6: demo increased activity tolerance of 20+ min to assist with ADL/ functional activities   Short term goal 7: OTR to update functional mobility goals as appropriate   Patient Goals   Patient goals : to have less pain     Therapy Time   Individual Concurrent Group Co-treatment   Time In 0920         Time Out 0955         Minutes 35          See above for LOF. RN reports patient is medically stable for therapy treatment this date. Chart reviewed prior to treatment and patient is agreeable for therapy. All lines intact and patient positioned comfortably at end of treatment. All patient needs addressed prior to ending therapy session.        Co-evaluation with PT  Rome Mendoza OTR/GILDA

## 2018-09-18 NOTE — PROGRESS NOTES
[facility]    Progress Note    9/18/2018    8:07 AM    Name:   Adrián Santos  MRN:     [id number]     [place]:      [de-identified]   Room:   [address] [place] [address] Day:  3  Admit Date:  9/15/2018 11:38 AM    PCP:   Dwayne Tse MD  Code Status:  Full Code    Subjective:     C/C:   Chief Complaint   Patient presents with    Fatigue     At ESF took blood: Hgb of 5.5    Abdominal Pain     x1week     Interval History Status: not changed    Patient seen and examined at bedside, no acute events overnight. Tmax of 99.3  Still fatigued and depresed  K low  BP low  Patient denies any chest pain, shortness of breath, chills, fevers, nausea or vomiting. Patient vitals, labs and all providers notes were reviewed,from overnight shift and morning updates were noted and discussed with the nurse      Brief History:       The patient is a 66 y.o. 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 In the ED, she was found to have Hgb 6.4 and 1 U PRBCs ordered. She was noted to be hypokalemic with K+ of 2.9. She was started on protonix gtt. GI was consulted. She was also noted to have leukocytosis with tachycardia and CT showing Pneumonia. She was also noted to be hypotensive. She was started on vanc and zosyn. She was also noted to be fluid overloaded. Seen and evaluated by ID, nephrology and GI. No plan for urgent EGD as she had one recently  ID started her on Zosyn    Review of Systems:     Constitutional:  negative for chills, fevers, sweats.  + fatigue and loss of appetite  Respiratory:  negative for cough, dyspnea on exertion, hemoptysis, shortness of results for input(s): POCGLU in the last 72 hours. I/O (24Hr): Intake/Output Summary (Last 24 hours) at 09/18/18 0807  Last data filed at 09/18/18 0525   Gross per 24 hour   Intake              444 ml   Output                0 ml   Net              444 ml       Labs:    Hematology:  Recent Labs      09/15/18   1205  09/15/18   1206   09/16/18   0654   09/17/18   0123  09/17/18   0648  09/17/18   1114   WBC  16.3*   --    --   12.4*   --    --    --    --    RBC  2.14*   --    --   2.60*   --    --    --    --    HGB  6.4*   --    < >  7.4*   < >  7.1*  7.1*  7.1*   HCT  21.4*   --    < >  24.1*   < >  22.6*  23.5*  23.2*   MCV  100.0   --    --   92.7   --    --    --    --    MCH  29.9   --    --   28.5   --    --    --    --    MCHC  29.9   --    --   30.7   --    --    --    --    RDW  22.3*   --    --   21.8*   --    --    --    --    PLT  269   --    --   243   --    --    --    --    MPV  9.6   --    --   9.6   --    --    --    --    INR   --   1.4   --   1.6   --    --    --    --     < > = values in this interval not displayed.      Chemistry:  Recent Labs      09/15/18   1205  09/15/18   1401  09/15/18   2314  09/16/18   0654  09/17/18   0648   NA  135   --   133*  137  138   K  2.9*   --   3.3*  3.1*  3.4*   CL  97*   --   95*  99  100   CO2  24   --   22  24  21   GLUCOSE  99   --    --   110*  84   BUN  23   --    --   29*  34*   CREATININE  3.05*   --    --   3.43*  4.21*   MG  1.8   --    --   1.6  1.7   ANIONGAP  14   --   16  14  17   LABGLOM  16*   --    --   14*  11*   GFRAA  19*   --    --   16*  13*   CALCIUM  7.6*   --    --   7.6*  7.5*   PHOS   --    --    --    --   3.7   PROBNP  8,544*   --    --    --    --    TROPONINI  0.01  0.03   --    --    --    LACTACIDWB  1.8   --    --    --    --      Recent Labs      09/15/18   1205  09/17/18   0648   PROT  4.4*  4.0*   LABALBU  1.4*  1.1*   AST  17  10   ALT  8  6   ALKPHOS  237*  200*   BILITOT  0.39  0.55   LIPASE  9*   --          Lab Results   Component Value Date/Time    SPECIAL LT AC 4ML 09/15/2018 02:00 PM     Lab Results   Component Value Date/Time    CULTURE NO GROWTH 3 DAYS 09/15/2018 02:00 PM       Lab Results   Component Value Date    PHART 7.292 01/23/2018    QVX9GMI 39.0 01/23/2018    PO2ART 159.0 01/23/2018    XEZ8PQB 18.3 01/23/2018    NBEA 7.2 01/23/2018    PBEA NOT REPORTED 01/23/2018    K9DNXFDZ 99.5 01/23/2018    FIO2 NOT REPORTED 08/14/2018       Radiology:    Ct Abdomen Pelvis Wo Contrast     Result Date: 9/15/2018  EXAMINATION: CT OF THE ABDOMEN AND PELVIS WITHOUT CONTRAST 9/15/2018 12:26 pm TECHNIQUE: CT of the abdomen and pelvis was performed without the administration of intravenous contrast. Multiplanar reformatted images are provided for review. Dose modulation, iterative reconstruction, and/or weight based adjustment of the mA/kV was utilized to reduce the radiation dose to as low as reasonably achievable. COMPARISON: August 1, 2018 HISTORY: ORDERING SYSTEM PROVIDED HISTORY: Diffuse abd pain. Low hemoglobin, rule out perforation FINDINGS: Lower Chest: Small pleural effusions. Associated lower lobe opacities may represent atelectasis with pneumonia not excluded. Cardiomegaly. No pericardial effusion. Organs: Evaluation of the solid organs is limited without intravenous contrast. Similar appearance of the liver with possible subcapsular hematoma versus metastatic disease. No gallstones. No definite pancreatic lesion. No splenomegaly. No adrenal lesion. Mild left-sided hydronephrosis and hydroureter. No renal calculus is seen. Heterogeneous appearance of the right kidney is similar to the previous study with abnormal enlargement. GI/Bowel: No bowel obstruction. Moderate amount of stool throughout the colon. No definite acute inflammatory process. Pelvis: Small amount of free fluid is seen within the pelvis. Air is seen within the urinary bladder. Mild bladder wall thickening.  Peritoneum/Retroperitoneum: IVC filter is seen. Limbs project slightly beyond the confines of the inferior vena cava. No surrounding fluid collection. No surrounding inflammatory changes. No abdominal aortic aneurysm. No definite adenopathy. Bones/Soft Tissues: Diffuse subcutaneous edema. No focal drainable fluid collection. Osseous metastatic disease is again seen involving the sacrum, left iliac wing, and lumbar spine.      Small pleural effusions. Associated bibasilar lung opacities may represent atelectasis versus pneumonia. Hepatic metastatic disease versus subcapsular hematomas. Right renal mass similar to the prior study. Diffuse osseous metastatic disease. Air within the urinary bladder with bladder wall thickening. Correlate for infection.      Xr Lumbar Spine (2-3 Views)     Result Date: 8/31/2018  EXAMINATION: 3 XRAY VIEWS OF THE LUMBAR SPINE 8/31/2018 10:41 am COMPARISON: 07/25/2018, 03/13/2018 HISTORY: ORDERING SYSTEM PROVIDED HISTORY: Secondary malignant neoplasm of lumbar vertebral column [facility]) TECHNOLOGIST PROVIDED HISTORY: Malignant Neoplasm of Lumbar On going examination. Subsequent study. FINDINGS: Status post posterior fusion of T11, T12, L2, and L3 secondary to a severe pathologic L1 compression fracture. Retropulsion of the posterior wall of L1 appears increased from prior spine radiographs. Alignment is stable. There is mild multilevel degenerative disc disease. An IVC filter is in place.      Stable alignment status post thoracolumbar fusion secondary to a pathologic L1 compression fracture. Increased retropulsion of the posterior wall of L1 when compared with several prior lumbar spine radiographs, which is most likely secondary to tumor progression.      Xr Chest Portable     Result Date: 9/15/2018  EXAMINATION: SINGLE XRAY VIEW OF THE CHEST 9/15/2018 11:57 am COMPARISON: March 4, 2018 HISTORY: ORDERING SYSTEM PROVIDED HISTORY: upright CXR. R/O free air TECHNOLOGIST PROVIDED HISTORY: upright CXR.  R/O free air Ordering Physician Provided Reason for Exam: upright CXR. R/O free air Acuity: Acute Type of Exam: Unknown FINDINGS: Shallow inflation. Cardiac silhouette enlargement is again noted. A dialysis catheter from a right internal jugular approach terminates at the distal SVC. Relative elevation of the right hemidiaphragm is again noted. Basilar opacities are noted, although improved on the right side. A small left effusion is noted. No pneumothorax or evidence for edema. Lumbar fixation hardware is partially visualized. No free air identified in the upper abdomen.      Shallow inflation with basilar opacities favoring atelectasis. Small left effusion. No free air identified.        Physical Examination:        General appearance:  alert, cooperative and no distress, nasal canula  Mental Status:  oriented to person, place and time and normal affect  Lungs:  clear to auscultation bilaterally, normal effort  Heart:  regular rate and rhythm, no murmur  Abdomen:  soft, nontender, nondistended, normal bowel sounds, no masses, hepatomegaly, splenomegaly  Extremities: 2 + edema, redness, tenderness in the calves  Skin:  no gross lesions, rashes, induration    Assessment:        Primary Problem  Sepsis [facility])    Active Hospital Problems    Diagnosis Date Noted    Severe malnutrition ([facility] [facility] [E43] 06/08/2018     Priority: Medium     Class: Present on Admission    Pneumonia of both lower lobes due to infectious organism ([address] [J18.1]     Bandemia [D72.825]     Fever chills [R50.9]     ESRD (end stage renal disease) ([address] [N18.6]     Malignant neoplasm of kidney ([address] [C64.9]     Sepsis ([address] [A41.9] 09/15/2018    Hypokalemia [E87.6]     ESRD on hemodialysis ([address] [N18.6, Z99.2] 08/01/2018    Acute deep vein thrombosis (DVT) of femoral vein of left lower extremity ([address] [I82.412] 07/29/2018    Encounter for palliative care [Z51.5]     Pathologic compression fracture of spine with delayed healing [M48.50XG]     Blood loss anemia [D50.0]     Renal cell carcinoma (HCC) [C64.9]     Anemia due to chronic kidney disease [N18.9, D63.1] 01/19/2018    Secondary malignant neoplasm of lumbar vertebral column ([facility] [address] [C79.51]     Diabetes mellitus ([facility] [E11.9] 08/21/2012       Plan:        Continue Zosyn for 5 more days per ID  HD and fluid management per nephro  Macrocytic anemia might be 2/2 cancer  No overt GI bleed  No AC 2/2 GI and severe anemia  Check and replace electrolytes as needed  Continue other chronic home meds  Palliative care consulted, patient is full code  No DVT PPx Given the GI bleed and anemia, EPC, will restart if Hb stabilized  DC planning    Rosalinda Vergara MD  9/18/2018  8:07 AM

## 2018-09-18 NOTE — PROGRESS NOTES
Physical Therapy    Facility/Department: [facility]  Initial Assessment    NAME: Sheran Blizzard  : 1955  MRN: [id number]    Chief Complaint   Patient presents with    Fatigue     At ESF took blood: Hgb of 5.5    Abdominal Pain     x1week       Date of Service: 2018    Discharge Recommendations:  [address]         Patient Diagnosis(es): The primary encounter diagnosis was Sepsis, due to unspecified organism [facility]). Diagnoses of Pneumonia due to organism, Malignant neoplasm of kidney, unspecified laterality ([facility] ESRD (end stage renal disease) ([facility] [facility] Gastrointestinal hemorrhage, unspecified gastrointestinal hemorrhage type, Blood loss anemia, and Hypokalemia were also pertinent to this visit. has a past medical history of Cancer ([facility] Chronic kidney disease; Depression; Diabetes mellitus ([facility] [facility] GERD (gastroesophageal reflux disease); HA (headache); HBP (high blood pressure); History of blood transfusion; Hyperlipidemia; Hyperplastic polyp of intestine; Hypothyroid; Hypothyroidism; Left knee pain; Non-smoker; OA (osteoarthritis); and Tubular adenoma. has a past surgical history that includes Hysterectomy; Tonsillectomy; Colonoscopy (2016); back surgery (N/A, 2018); pr lumbar spine fusn,post intrbdy (N/A, 2018); Nerve Block (2018); pr egd transoral biopsy single/multiple (N/A, 2018); and Colonoscopy (N/A, 2018). Restrictions  Restrictions/Precautions  Restrictions/Precautions: General Precautions, Fall Risk  Required Braces or Orthoses?: No  Position Activity Restriction  Other position/activity restrictions: bedrest with bathroom priv.   Vision/Hearing        Subjective  General  Patient assessed for rehabilitation services?: Yes  Response To Previous Treatment: Not applicable  Family / Caregiver Present: No  Follows Commands: Within Functional Limits  General Comment  Comments: OT co-eval  Subjective  Subjective: RN and pt agreeable to

## 2018-09-18 NOTE — DISCHARGE SUMMARY
compared with several prior lumbar spine radiographs, which is most likely secondary to tumor progression. Xr Chest Portable    Result Date: 9/15/2018  EXAMINATION: SINGLE XRAY VIEW OF THE CHEST 9/15/2018 11:57 am COMPARISON: March 4, 2018 HISTORY: ORDERING SYSTEM PROVIDED HISTORY: upright CXR. R/O free air TECHNOLOGIST PROVIDED HISTORY: upright CXR. R/O free air Ordering Physician Provided Reason for Exam: upright CXR. R/O free air Acuity: Acute Type of Exam: Unknown FINDINGS: Shallow inflation. Cardiac silhouette enlargement is again noted. A dialysis catheter from a right internal jugular approach terminates at the distal SVC. Relative elevation of the right hemidiaphragm is again noted. Basilar opacities are noted, although improved on the right side. A small left effusion is noted. No pneumothorax or evidence for edema. Lumbar fixation hardware is partially visualized. No free air identified in the upper abdomen. Shallow inflation with basilar opacities favoring atelectasis. Small left effusion. No free air identified. Consultations:    Consults:     Final Specialist Recommendations/Findings:   PHARMACY TO DOSE VANCOMYCIN  IP CONSULT TO HOSPITALIST  IP CONSULT TO GI  IP CONSULT TO GI  IP CONSULT TO NEPHROLOGY  IP CONSULT TO INFECTIOUS DISEASES  PHARMACY TO DOSE VANCOMYCIN  IP CONSULT TO PALLIATIVE CARE      The patient was seen and examined on day of discharge and this discharge summary is in conjunction with any daily progress note from day of discharge.     Discharge plan:     Disposition: Skilled Facility    Physician Follow Up:     Carissa Hightower MD  [address] [place] [address] Alicia Mckinney [address] [place], [place] [phone]             Requiring Further Evaluation/Follow Up POST HOSPITALIZATION/Incidental Findings:     Diet: renal diet    Activity: As tolerated    Instructions to Patient:     Discharge Medications:      Medication List      START taking these medications    cefepime medication(s) that are the same as other medications prescribed for you. Read the directions carefully, and ask your doctor or other care provider to review them with you. STOP taking these medications    oxyCODONE HCl 10 MG immediate release tablet  Commonly known as:  OXY-IR        ASK your doctor about these medications    * oxyCODONE HCl 10 MG immediate release tablet  Commonly known as:  OXY-IR  Take 1 tablet by mouth every 6 hours as needed for Pain for up to 2 days. Intended supply: 30 days. Ask about: Should I take this medication? * This list has 1 medication(s) that are the same as other medications prescribed for you. Read the directions carefully, and ask your doctor or other care provider to review them with you. Where to Get Your Medications      You can get these medications from any pharmacy    Bring a paper prescription for each of these medications  · cefepime infusion  · oxyCODONE 10 MG extended release tablet  · oxyCODONE HCl 10 MG immediate release tablet     Information about where to get these medications is not yet available    Ask your nurse or doctor about these medications  · midodrine 5 MG tablet         Time Spent on discharge is  34 mins in patient examination, evaluation, counseling as well as medication reconciliation, prescriptions for required medications, discharge plan and follow up. Electronically signed by   Gwen Ruiz MD  9/21/2018  12:52 PM      Thank you Dr. Harish Dempsey MD for the opportunity to be involved in this patient's care.

## 2018-09-18 NOTE — PROGRESS NOTES
NEPHROLOGY PROGRESS NOTE      SUBJECTIVE     Pt seen and examined in room. The patient has metastatic renal cell carcinoma. Last dialysis yesterday 9/17/2018. At that time, her potassium levels remain on the low side because of poor oral intake. Her albumin is low at 1.1, indicating severe protein calorie malnutrition. Poor PO intake continues today, as she \"tried to eat a little\" this morning. She notes more LE edema today with her feet propped up sitting in the chair. She says she is making her normal amount of urine but does not know how much that is. Her Hb this morning is 7.1, stable from yesterday but down overall this admission. Optimal diuresis  Hemodynamically stable  Denies SOB/Chest pain  Overall doing poorly. Oral intake poor. Severe protein calorie malnutrition. Significant lethargy. Activities of daily living affected. Palliative care input noted. Due for dialysis tomorrow. Blood pressures tending to run low  OBJECTIVE     Vitals:    09/18/18 0000 09/18/18 0400 09/18/18 0503 09/18/18 0825   BP:   (!) 92/40 (!) 82/36   Pulse: 90 87 85 93   Resp:   18 18   Temp:   98.7 °F (37.1 °C) 98.9 °F (37.2 °C)   TempSrc:   Oral Oral   SpO2:   98% 97%   Weight:       Height:         24HR INTAKE/OUTPUT:    Intake/Output Summary (Last 24 hours) at 09/18/18 1030  Last data filed at 09/18/18 0525   Gross per 24 hour   Intake              444 ml   Output                0 ml   Net              444 ml       General appearance:Awake, alert, in no acute distress  HEENT: PERRLA  Respiratory::vesicular breath sounds,no wheeze/crackles  Cardiovascular:S1 S2 normal,no gallop or organic murmur. Abdomen:Non tender/non distended. Bowel sounds present  Extremities: No Cyanosis or Clubbing,Lower extremity edema  Neurological:Alert and oriented. No abnormalities of mood, affect, memory, mentation, or behavior are noted      MEDICATIONS     Scheduled Meds:    piperacillin-tazobactam  2.25 g Intravenous Q8H    V medical student, nephrology service with Dr. Oliva Acevedo today. 9/18/2018 10:30 AM     Attending Physician Statement  I have discussed the care of Kulwant Gomez, including pertinent history and exam findings with the resident/fellow. I have reviewed the key elements of all parts of the encounter with the resident/fellow. I have seen and examined the patient with the resident/fellow. I agree with the assessment and plan and status of the problem list as documented.       .  Electronically signed by Mary Bang MD on 9/18/2018 at 11:59 AM

## 2018-09-18 NOTE — PROGRESS NOTES
Negative for nausea. Endocrine: Negative. Genitourinary: Positive for dysuria. HD    Musculoskeletal: Negative. Skin: Positive for wound (Bilateral heels). Allergic/Immunologic: Negative. Neurological: Negative. Hematological: Negative. Psychiatric/Behavioral: Negative. Physical Examination :     Patient Vitals for the past 8 hrs:   BP Temp Temp src Pulse Resp SpO2   09/18/18 1242 (!) 86/37 98.3 °F (36.8 °C) Oral 94 18 96 %       Physical Exam   Constitutional: She is oriented to person, place, and time and well-developed, well-nourished, and in no distress. HENT:   Head: Normocephalic and atraumatic. Eyes: Pupils are equal, round, and reactive to light. Neck: Normal range of motion. Cardiovascular: Normal rate and regular rhythm. Pulmonary/Chest: Effort normal and breath sounds normal. She has no wheezes. She has no rales. Abdominal: Soft. There is tenderness. Genitourinary: No vaginal discharge found. Musculoskeletal: She exhibits edema and tenderness (Bilateral heels). Neurological: She is alert and oriented to person, place, and time. Skin: Skin is warm. She is not diaphoretic. Stage 1 heel wound on right. POP, no increased warmth, no drainage. Unstagable heel wound on left. POP. Stable eschar, no drainage, erythema, or increased warmth. No fluctuance. Psychiatric: Affect and judgment normal.               Medical Decision Making:   I have independently reviewed/ordered the following labs:    CBC with Differential:   Recent Labs      09/16/18   0654   09/17/18   0648  09/17/18   1114   WBC  12.4*   --    --    --    HGB  7.4*   < >  7.1*  7.1*   HCT  24.1*   < >  23.5*  23.2*   PLT  243   --    --    --    LYMPHOPCT  3*   --    --    --    MONOPCT  0*   --    --    --     < > = values in this interval not displayed.      BMP:   Recent Labs      09/16/18   0654  09/17/18   0648  09/18/18   1037   NA  137  138  138   K  3.1*  3.4*  3.5*   CL  99  100

## 2018-09-18 NOTE — CARE COORDINATION
Transition planning dr Bijal Hernandez rounding  ready for dc need clarification form id regarding abx called joanne medeiros spoke to [facility] still await auth

## 2018-09-19 NOTE — PROGRESS NOTES
Physical Therapy  DATE: 2018    NAME: Moreno Alejandra  MRN: [id number]   : 1955    Patient not seen this date for Physical Therapy due to:  [] Blood transfusion in progress  [x] Hemodialysis  -  Will check on pt in the PM if time allows. []  Patient Declined  [] Spine Precautions   [] Strict Bedrest  [] Surgery/ Procedure  [] Testing      [] Other        [] PT being discontinued at this time. Patient independent. No further needs. [] PT being discontinued at this time as the patient has been transferred to palliative care. No further needs.     Christina Pencil, PTA

## 2018-09-19 NOTE — PROGRESS NOTES
Av.2 °F (36.8 °C), Min:97.8 °F (36.6 °C), Max:98.4 °F (36.9 °C)    No results for input(s): POCGLU in the last 72 hours. I/O (24Hr): Intake/Output Summary (Last 24 hours) at 18 1602  Last data filed at 18 1341   Gross per 24 hour   Intake              812 ml   Output                0 ml   Net              812 ml       Labs:    Hematology:  Recent Labs      18   0648  18   1114  18   1511   WBC   --    --   13.9*   RBC   --    --   2.27*   HGB  7.1*  7.1*  6.9*   HCT  23.5*  23.2*  23.0*   MCV   --    --   101.3   MCH   --    --   30.4   MCHC   --    --   30.0   RDW   --    --   23.4*   PLT   --    --   231   MPV   --    --   9.7     Chemistry:  Recent Labs      18   0648  18   1037   NA  138  138   K  3.4*  3.5*   CL  100  101   CO2  21  26   GLUCOSE  84  123*   BUN  34*  19   CREATININE  4.21*  3.11*   MG  1.7   --    ANIONGAP  17  11   LABGLOM  11*  15*   GFRAA  13*  18*   CALCIUM  7.5*  7.5*   PHOS  3.7   --      Recent Labs      18   0648   PROT  4.0*   LABALBU  1.1*   AST  10   ALT  6   ALKPHOS  200*   BILITOT  0.55         Lab Results   Component Value Date/Time    SPECIAL LT AC 4ML 09/15/2018 02:00 PM     Lab Results   Component Value Date/Time    CULTURE NO GROWTH 4 DAYS 09/15/2018 02:00 PM       Lab Results   Component Value Date    PHART 7.292 2018    SWJ5ESN 39.0 2018    PO2ART 159.0 2018    SAV7CJA 18.3 2018    NBEA 7.2 2018    PBEA NOT REPORTED 2018    X1QZUFQG 99.5 2018    FIO2 NOT REPORTED 2018       Radiology:    Ct Abdomen Pelvis Wo Contrast     Result Date: 9/15/2018  EXAMINATION: CT OF THE ABDOMEN AND PELVIS WITHOUT CONTRAST 9/15/2018 12:26 pm TECHNIQUE: CT of the abdomen and pelvis was performed without the administration of intravenous contrast. Multiplanar reformatted images are provided for review.  Dose modulation, iterative reconstruction, and/or weight based adjustment of the mA/kV was 8/31/2018 10:41 am COMPARISON: 07/25/2018, 03/13/2018 HISTORY: ORDERING SYSTEM PROVIDED HISTORY: Secondary malignant neoplasm of lumbar vertebral column [facility]) TECHNOLOGIST PROVIDED HISTORY: Malignant Neoplasm of Lumbar On going examination. Subsequent study. FINDINGS: Status post posterior fusion of T11, T12, L2, and L3 secondary to a severe pathologic L1 compression fracture. Retropulsion of the posterior wall of L1 appears increased from prior spine radiographs. Alignment is stable. There is mild multilevel degenerative disc disease. An IVC filter is in place.      Stable alignment status post thoracolumbar fusion secondary to a pathologic L1 compression fracture. Increased retropulsion of the posterior wall of L1 when compared with several prior lumbar spine radiographs, which is most likely secondary to tumor progression.      Xr Chest Portable     Result Date: 9/15/2018  EXAMINATION: SINGLE XRAY VIEW OF THE CHEST 9/15/2018 11:57 am COMPARISON: March 4, 2018 HISTORY: ORDERING SYSTEM PROVIDED HISTORY: upright CXR. R/O free air TECHNOLOGIST PROVIDED HISTORY: upright CXR. R/O free air Ordering Physician Provided Reason for Exam: upright CXR. R/O free air Acuity: Acute Type of Exam: Unknown FINDINGS: Shallow inflation. Cardiac silhouette enlargement is again noted. A dialysis catheter from a right internal jugular approach terminates at the distal SVC. Relative elevation of the right hemidiaphragm is again noted. Basilar opacities are noted, although improved on the right side. A small left effusion is noted. No pneumothorax or evidence for edema. Lumbar fixation hardware is partially visualized. No free air identified in the upper abdomen.      Shallow inflation with basilar opacities favoring atelectasis. Small left effusion. No free air identified.        Physical Examination:        General appearance:  alert, cooperative and no distress, nasal canula  Mental Status:  oriented to person, place and

## 2018-09-19 NOTE — PROGRESS NOTES
NEPHROLOGY DIALYSIS NOTE    PROCEDURE    Patient seen on Hemodialysis  9/19/2018 at 11:31 AM  Access cannulated without problems      TREATMENT ORDERS   See dialysis flowsheet for specifics on access, blood flow rate, dialysate baths, duration of dialysis, anticoagulation and other technical information. Dialysis Bath  K+ (Potassium): 4  Ca+ (Calcium): 2.25  Na+ (Sodium): 140  HCO3 (Bicarb): 35       INVESTIGATIONS     Last 3 CMP:    Recent Labs      09/17/18   0648  09/18/18   1037   NA  138  138   K  3.4*  3.5*   CL  100  101   CO2  21  26   BUN  34*  19   CREATININE  4.21*  3.11*   CALCIUM  7.5*  7.5*   PROT  4.0*   --    LABALBU  1.1*   --    BILITOT  0.55   --    ALKPHOS  200*   --    AST  10   --    ALT  6   --        Last 3 CBC:  Recent Labs      09/17/18   0123  09/17/18   0648  09/17/18   1114   HGB  7.1*  7.1*  7.1*   HCT  22.6*  23.5*  23.2*         GFR: Estimated Creatinine Clearance: 22 mL/min (A) (based on SCr of 3.11 mg/dL (H)).   Phosphorus:    Recent Labs      09/17/18   0648   PHOS  3.7     Magnesium:   Recent Labs      09/17/18   0648   MG  1.7     Albumin:   Recent Labs      09/17/18   0648   LABALBU  1.1*     PTH                :No results found for: PTH           MEDICATIONS     Scheduled Meds:    midodrine  10 mg Oral TID WC    piperacillin-tazobactam  2.25 g Intravenous Q8H    pantoprazole  40 mg Oral QAM AC    acetaminophen  1,000 mg Oral TID    amitriptyline  75 mg Oral Nightly    atorvastatin  20 mg Oral Nightly    docusate sodium  200 mg Oral BID    gabapentin  100 mg Oral TID    megestrol  400 mg Oral Daily    oxyCODONE  10 mg Oral BID    sodium chloride flush  10 mL Intravenous 2 times per day     Continuous Infusions:   PRN Meds:  midodrine, heparin (porcine), heparin (porcine), cyclobenzaprine, ondansetron, oxyCODONE HCl, prochlorperazine, sodium chloride flush, acetaminophen  Home Meds:                Prescriptions Prior to Admission: gabapentin (NEURONTIN) 100 MG crackles/wheeze  CNS:awake,alert  PA: non tender,non distended,Bowel sounds present    ASSESSMENT AND PLAN       1. ESRD: MWF perm cath at [facility]  2. DM2  3. Metastatic RCC  4. Anemia    The patient was seen and examined while on dialysis. Professional oversight of the patients dialysis care,access care and dialysis related co-morbidities were addressed as necessary with the patient and /or staff.       This note is created with the assistance of a speech-recognition program. While intending to generate a document that actually reflects the content of the visit, no guarantees can be provided that every mistake has been identified and corrected by editing    Melanie Brownlee MD, [facility] Jamie Carr, [address]   9/19/2018 11:31 AM    [facility] [place]

## 2018-09-19 NOTE — PROGRESS NOTES
ID paged regarding d/c and IV Ant./PICC line. Pt. Daughter phoned and updated on pt. Status and POC.

## 2018-09-19 NOTE — PROGRESS NOTES
Patient's BP 82/34 with a recheck of 75/33. Arlette Forbes NP notified. Order to give PRN Proamitine and contact nephrology since patient is on HD and has a fluid restriction. Will continue to monitor.

## 2018-09-20 NOTE — PROGRESS NOTES
Phoned Angeline Alcala to give report,  transferred writer to nurse but no answer. Writer phoned back and spoke with Adeline () and gave her writers number to call if any questions.

## 2018-09-20 NOTE — PROGRESS NOTES
°C), Min:97.4 °F (36.3 °C), Max:99.4 °F (37.4 °C)    Recent Labs      09/20/18   0751   POCGLU  128*       I/O (24Hr): Intake/Output Summary (Last 24 hours) at 09/20/18 0830  Last data filed at 09/19/18 2140   Gross per 24 hour   Intake          1213.99 ml   Output                0 ml   Net          1213.99 ml       Labs:    Hematology:  Recent Labs      09/17/18   1114  09/19/18   1511  09/20/18   0754   WBC   --   13.9*   --    RBC   --   2.27*   --    HGB  7.1*  6.9*  8.6*   HCT  23.2*  23.0*  27.9*   MCV   --   101.3   --    MCH   --   30.4   --    MCHC   --   30.0   --    RDW   --   23.4*   --    PLT   --   231   --    MPV   --   9.7   --      Chemistry:  Recent Labs      09/18/18   1037   NA  138   K  3.5*   CL  101   CO2  26   GLUCOSE  123*   BUN  19   CREATININE  3.11*   ANIONGAP  11   LABGLOM  15*   GFRAA  18*   CALCIUM  7.5*     Recent Labs      09/20/18   0751   POCGLU  128*         Lab Results   Component Value Date/Time    SPECIAL LT AC 4ML 09/15/2018 02:00 PM     Lab Results   Component Value Date/Time    CULTURE NO GROWTH 5 DAYS 09/15/2018 02:00 PM       Lab Results   Component Value Date    PHART 7.292 01/23/2018    JBS6ROF 39.0 01/23/2018    PO2ART 159.0 01/23/2018    FTW6HYT 18.3 01/23/2018    NBEA 7.2 01/23/2018    PBEA NOT REPORTED 01/23/2018    [id number] 99.5 01/23/2018    FIO2 NOT REPORTED 08/14/2018       Radiology:    Ct Abdomen Pelvis Wo Contrast     Result Date: 9/15/2018  EXAMINATION: CT OF THE ABDOMEN AND PELVIS WITHOUT CONTRAST 9/15/2018 12:26 pm TECHNIQUE: CT of the abdomen and pelvis was performed without the administration of intravenous contrast. Multiplanar reformatted images are provided for review. Dose modulation, iterative reconstruction, and/or weight based adjustment of the mA/kV was utilized to reduce the radiation dose to as low as reasonably achievable. COMPARISON: August 1, 2018 HISTORY: ORDERING SYSTEM PROVIDED HISTORY: Diffuse abd pain.  Low hemoglobin, rule out perforation FINDINGS: Lower Chest: Small pleural effusions. Associated lower lobe opacities may represent atelectasis with pneumonia not excluded. Cardiomegaly. No pericardial effusion. Organs: Evaluation of the solid organs is limited without intravenous contrast. Similar appearance of the liver with possible subcapsular hematoma versus metastatic disease. No gallstones. No definite pancreatic lesion. No splenomegaly. No adrenal lesion. Mild left-sided hydronephrosis and hydroureter. No renal calculus is seen. Heterogeneous appearance of the right kidney is similar to the previous study with abnormal enlargement. GI/Bowel: No bowel obstruction. Moderate amount of stool throughout the colon. No definite acute inflammatory process. Pelvis: Small amount of free fluid is seen within the pelvis. Air is seen within the urinary bladder. Mild bladder wall thickening. Peritoneum/Retroperitoneum: IVC filter is seen. Limbs project slightly beyond the confines of the inferior vena cava. No surrounding fluid collection. No surrounding inflammatory changes. No abdominal aortic aneurysm. No definite adenopathy. Bones/Soft Tissues: Diffuse subcutaneous edema. No focal drainable fluid collection. Osseous metastatic disease is again seen involving the sacrum, left iliac wing, and lumbar spine.      Small pleural effusions. Associated bibasilar lung opacities may represent atelectasis versus pneumonia. Hepatic metastatic disease versus subcapsular hematomas. Right renal mass similar to the prior study. Diffuse osseous metastatic disease. Air within the urinary bladder with bladder wall thickening.   Correlate for infection.      Xr Lumbar Spine (2-3 Views)     Result Date: 8/31/2018  EXAMINATION: 3 XRAY VIEWS OF THE LUMBAR SPINE 8/31/2018 10:41 am COMPARISON: 07/25/2018, 03/13/2018 HISTORY: ORDERING SYSTEM PROVIDED HISTORY: Secondary malignant neoplasm of lumbar vertebral column [facility]) TECHNOLOGIST PROVIDED HISTORY:

## 2018-09-24 NOTE — PROGRESS NOTES
 NERVE BLOCK  07/13/2018    aleta trigger point neck #1    MO EGD TRANSORAL BIOPSY SINGLE/MULTIPLE N/A 8/17/2018    [facility]    T11-L3 POSTERIOR FIXATION AND FUSION, SPINE ABBY, C-ARM, O-ARM WITH STEALTH,  [facility]- [id number] [address] performed by Fide Machuca,  at [address]       Family History   Problem Relation Age of Onset    Heart Disease Mother     Diabetes Mother     Cancer Father         esophageal cancer    Cancer Maternal Grandfather         colon     Current Outpatient Prescriptions on File Prior to Visit   Medication Sig Dispense Refill    midodrine (PROAMATINE) 5 MG tablet Take 2 tablets by mouth 3 times daily (with meals) 90 tablet 3    oxyCODONE (OXYCONTIN) 10 MG extended release tablet Take 1 tablet by mouth 2 times daily for 30 days. Intended supply: 30 days. 6 tablet 0    Insulin Disposable Pump (V-GO 20) KIT       HUMALOG 100 UNIT/ML injection vial With insulin pump      B-D ULTRAFINE III SHORT PEN 31G X 8 MM MISC       ondansetron (ZOFRAN) 4 MG tablet Take 1 tablet by mouth daily as needed for Nausea or Vomiting 40 tablet 0    omeprazole (PRILOSEC) 40 MG delayed release capsule Take 1 capsule by mouth daily 90 capsule 3    amitriptyline (ELAVIL) 75 MG tablet Take 1 tablet by mouth nightly 90 tablet 3    Blood Glucose Monitoring Suppl (TRUE METRIX METER) w/Device KIT       Lancets MISC Patient testing 3 times daily 100 each 3    glucose blood VI test strips (ASCENSIA AUTODISC VI;ONE TOUCH ULTRA TEST VI) strip Use with associated glucose meter.  Patient testing three times daily 100 strip 3    docusate sodium (COLACE) 100 MG capsule Take 2 capsules by mouth 2 times daily 120 capsule 11    Insulin Pen Needle 32G X 4 MM MISC 2 each by Does not apply route 2 times daily      acetaminophen (TYLENOL) 500 MG tablet Take 1,000 mg by mouth 3 times daily      megestrol (MEGACE) 40 MG/ML suspension Take 10 mLs by mouth daily 240 mL 3    gabapentin (NEURONTIN) 100 MG capsule Take 1 capsule by mouth 3 times daily for 30 days. . 90 capsule 0    cyclobenzaprine (FLEXERIL) 5 MG tablet TAKE 1 TABLET BY MOUTH THREE TIMES A DAY AS NEEDED FOR MUSCLE SPASMS 60 tablet 0    atorvastatin (LIPITOR) 20 MG tablet TAKE 1 TABLET BY MOUTH ONE TIME A DAY 90 tablet 3    prochlorperazine (COMPAZINE) 5 MG tablet Take 5 mg by mouth every 8 hours as needed        No current facility-administered medications on file prior to visit. Social History   Substance Use Topics    Smoking status: Never Smoker    Smokeless tobacco: Never Used    Alcohol use No       No Known Allergies    Review of Systems  Constitutional: Negative for activity change and appetite change. HENT: Negative for ear pain and facial swelling. Eyes: Negative for discharge and itching. Respiratory: Negative for choking and chest tightness. Cardiovascular: Negative for chest pain and leg swelling. Gastrointestinal: Negative for nausea and abdominal pain. Endocrine: Negative for cold intolerance and heat intolerance. Genitourinary: Negative for frequency and flank pain. Musculoskeletal: Negative for myalgias and joint swelling. Skin: Negative for rash and wound. Allergic/Immunologic: Negative for environmental allergies and food allergies. Hematological: Negative for adenopathy. Does not bruise/bleed easily. Psychiatric/Behavioral: Negative for self-injury. The patient is not nervous/anxious. Physical Exam:      BP (!) 83/54 (Site: Left Upper Arm, Position: Sitting, Cuff Size: Large Adult)   Pulse 94   Estimated body mass index is 30.74 kg/m² as calculated from the following:    Height as of 9/15/18: 5' 8\" (1.727 m). Weight as of 9/19/18: 202 lb 2.6 oz (91.7 kg). General:  Galileo Winter is a 66y.o. year old female who appears her stated age. HEENT: Normocephalic atraumatic. Neck supple.   Chest:

## 2018-09-25 NOTE — PROGRESS NOTES
Patient ID: Katie Rouse, 1955, [id number], 66 y.o. Diagnosis:   Metastatic RCC  Started on Pazopanib on 3/16/18  Radiation  Therapy completed to spine  Her recent MRI spine showing : Compression fracture and additional bone lesions in the iliac bone  She has finished radiation therapy to her spine on 8/29/18. Status post IVC filter placement due to GI bleed  HISTORY OF PRESENT ILLNESS:    Oncologic History: This is a 64 YO female was admitted with back pain after a fall. On admission she had imaging studies which showed L2 metastatic lesion with compression fracture. Had MRI spine which showed right renal mass c/w RCC and inferior vena cava invasion. It also showed L1 metastasis, pathologic fracture, epidural invasion, and severe thecal sac stenosis. Seen by neurosurgery and input awaited. She reports wt loss possibly intentional over last year for about 50 lbs. Patient seen by urology. SHe had a biopsy of the Kidney which showed RCC. SHe was seen by Neurosurgery and she had spinal fixation with pedicle screws On 1/23/18. She is then discharged from hospital.   She still has pain in back and using Oxycodone 10 mg 2-3 times day. She is seen by radiation oncology and is planning to started palliative RT. INTERVAL HISTORY  Patient is returning for follow-up visit. She was recently admitted to hospital and discharged on 9/20/18. She was noted to have significant RASHMI  And started on HD. She is at nursing home. Her ECOG performance status now significantly declined to 3+. She has multiple episodes of aspiration and she is currently on puréed diet. She was examined on wheelchair and she spent most of the time resting in bed. I discuss goals of care with patient and her daughter who is agreeable for comfort care and so hospice is recommended. During this visit patient's allergy, social, medical, surgical history and medications were reviewed and updated.   No Known Allergies    Current for sore mouth, sore throat, hoarseness and voice change   Respiratory: negative for cough , sputum, dyspnea, wheezing, hemoptysis, chest pain   Cardiovascular: negative for chest pain, dyspnea, palpitations, orthopnea, PND   Gastrointestinal: negative for nausea, vomiting, diarrhea, constipation, abdominal pain, Dysphagia, hematemesis and hematochezia   Genitourinary: negative for frequency, dysuria, nocturia, urinary incontinence, and hematuria   Integument: negative for rash, skin lesions, bruises.    Hematologic/Lymphatic: negative for easy bruising, bleeding, lymphadenopathy, petechiae and swelling/edema   Endocrine: negative for heat or cold intolerance, tremor, weight changes, change in bowel habits and hair loss   Musculoskeletal: negative for myalgias, arthralgias, ++back pain, joint swelling,and bone pain   Neurological: negative for headaches, dizziness, seizures, weakness, numbness  OBJECTIVE:         Vitals:    09/25/18 0828   BP: (!) 96/51   Pulse: 98   Temp: 99.5 °F (37.5 °C)       PHYSICAL EXAM:   General appearance - ill appearing, no in pain or distress   Mental status - awake and cooperative   Eyes - pupils equal and reactive, extraocular eye movements intact   Mouth - mucous membranes moist, pharynx normal without lesions   Neck - supple, no significant adenopathy   Lymphatics - no palpable lymphadenopathy, no hepatosplenomegaly   Chest - clear to auscultation, no wheezes, rales or rhonchi, symmetric air entry   Heart - normal rate, regular rhythm, normal S1, S2, no murmurs, rubs, clicks or gallops   Abdomen - soft, nontender, nondistended, no masses or organomegaly   Surgical scar noted on back healing well  Neurological - alert, oriented, normal speech, no focal findings or movement disorder noted   Musculoskeletal - no joint tenderness, deformity or swelling   Extremities - peripheral pulses normal, ++pedal edema, no clubbing or cyanosis   Skin - normal coloration and turgor, no rashes, no vertebral body extending across the midline and to the left pedicle, transverse process, and lamina most compatible with metastatic disease/pathologic compression fracture.  Less likely infection is in the differential diagnosis.  There appears to be a soft tissue component extending left laterally/posterolaterally.  Posterior cortical buckling/destruction of the posterior vertebral body wall with a soft tissue component which appears to extend to the spinal canal probably causing some degree of spinal stenosis and probable left neural foraminal stenosis.  MRI is recommended for further evaluation.  Slight levoconvex curvature and straightening of lumbar lordosis. DEGENERATIVE CHANGES: Mild lumbar spondylotic changes.  At L2-L3 there is mild facet arthropathy and ligamentum flavum thickening.  Slight flattening of the ventral aspect of thecal sac.  Mild neural foraminal stenosis.  At L3-L4 there is mild diffuse disc bulging.  Vacuum disc phenomenon.  Mild facet arthropathy and ligamentum flavum thickening.  Combination results in mild to moderate spinal stenosis.  Mild neural foraminal stenosis, right greater than left.  At L4-L5 there is diffuse disc bulging, eccentric to the left.  Bilateral facet arthropathy, greater on the left.  Mild central spinal and moderate left neural foraminal stenosis.  At L5-S1 there is mild diffuse disc bulging with calcification posteriorly along the periphery.  Mild facet arthropathy and ligamentum flavum thickening.  No significant acquired spinal stenosis.  Mild neural foraminal stenosis. SOFT TISSUES/RETROPERITONEUM: Partially visualized heterogeneous large right renal mass measuring at least 8 cm with prominent vessels in the renal hilum.  There are small lymph nodes and soft tissue stranding in the renal hilar region.  Apparent previous hysterectomy.  Heterogeneous 1 cm calcification in the deep pelvis is felt to be related to a colonic diverticulum and it appears to be outside during her treatment and we could not initiate immunotherapy recently as she was in the hospital for GI bleed and recently for acute kidney injury. Currently she is on hemodialysis. Her ECOG performance status has significantly declined to 3+. After discussing goals of care patient and family agreeable for comfort care/hospice. During today's visit, the patient and the family had a number of reasonable questions which were answered to their satisfaction. They verbalized understanding of the information provided and they agreed to proceed as outlined above. PLAN:   I discussed goals of care with patient  Her family signed DNR CC  We'll make hospice referral and hold off further treatment      I spent more than 40 minutes examining, evaluating, reviewing data and counseling the patient. Greater than 50% of that time was spent face-to-face with the patient in counseling and coordinating her care. Sergey Piedra MD  Hematologist/Medical Oncologist        This note is created with the assistance of a speech recognition program.  While intending to generate a document that actually reflects the content of the visit, the document can still have some errors including those of syntax and sound a like substitutions which may escape proof reading. It such instances, actual meaning can be extrapolated by contextual diversion.

## 2018-09-25 NOTE — TELEPHONE ENCOUNTER
Instructions   from Fred Seaz MD   Referral to hospice  NO more treatment      Faxing over referral to hospice. Cancelled all appts also scans.

## 2025-05-15 NOTE — CARE COORDINATION
1030 Left voice mail for [facility] to return call to see if they will accept patient. Met with patient and let her know we are waiting on hearing back from [facility]. Explained the importance of choosing a dialysis spot to proceed with transitional planning. Will call daughter and make a choice this morning. [address] with Laverne Gleason and there is no bed available. Spoke with patient and chose [address] [place] [address] for next choice. Referral sent. [facility] / [address] [place] [address] and facility is not in network for [employer]. SNF list printed from [facility] website and provided to patient for another choice. Addended by: RASHEL COOMBS on: 5/15/2025 09:01 AM     Modules accepted: Orders

## (undated) DEVICE — 3M™ IOBAN™ 2 ANTIMICROBIAL INCISE DRAPE 6650EZ: Brand: IOBAN™ 2

## (undated) DEVICE — C-ARMOR C-ARM EQUIPMENT COVERS CLEAR STERILE UNIVERSAL FIT 12 PER CASE: Brand: C-ARMOR

## (undated) DEVICE — BLADE ES L4IN INSUL EDGE

## (undated) DEVICE — TRAY CATHETER 16FR F INCLUDE BARDX IC COMPLT CARE DRNGE BG

## (undated) DEVICE — FORCEPS BX L240CM WRK CHN 2.8MM STD CAP W/ NDL MIC MESH

## (undated) DEVICE — SYRINGE, LUER LOCK, 10ML: Brand: MEDLINE

## (undated) DEVICE — CHLORAPREP 26ML ORANGE

## (undated) DEVICE — SUTURE ABSORBABLE BRAIDED 0 CT-1 8X27 IN UD VICRYL JJ41G

## (undated) DEVICE — CONNECTOR TBNG WHT PLAS SUCT STR 5IN1 LTWT W/ M CONN

## (undated) DEVICE — CODMAN® SURGICAL PATTIES 1/2" X 3" (1.27CM X 7.62CM): Brand: CODMAN®

## (undated) DEVICE — PREP SOL PVP IODINE 4%  4 OZ/BTL

## (undated) DEVICE — DRAPE,REIN 53X77,STERILE: Brand: MEDLINE

## (undated) DEVICE — BLADE ES ELASTOMERIC COAT INSUL DURABLE BEND UPTO 90DEG

## (undated) DEVICE — SUTURE VCRL SZ 2-0 L27IN ABSRB UD L36MM CT-1 1/2 CIR JJ42G

## (undated) DEVICE — GAUZE,SPONGE,FLUFF,6"X6.75",STRL,5/TRAY: Brand: MEDLINE

## (undated) DEVICE — TOWEL,OR,DSP,ST,NATURAL,DLX,4/PK,20PK/CS: Brand: MEDLINE

## (undated) DEVICE — TUBING, SUCTION, 9/32" X 20', STRAIGHT: Brand: MEDLINE INDUSTRIES, INC.

## (undated) DEVICE — SHEET, T, LAPAROTOMY, STERILE: Brand: MEDLINE

## (undated) DEVICE — STANDARD HYPODERMIC NEEDLE,POLYPROPYLENE HUB: Brand: MONOJECT

## (undated) DEVICE — TOOL 14MH30 LEGEND 14CM 3MM: Brand: MIDAS REX ™

## (undated) DEVICE — PACK PROCEDURE SURG LUMBAR SPINE SVMMC

## (undated) DEVICE — GLOVE ORANGE PI 7 1/2   MSG9075

## (undated) DEVICE — GOWN,AURORA,NONRNF,XL,30/CS: Brand: MEDLINE

## (undated) DEVICE — CONTROL SYRINGE LUER-LOCK TIP: Brand: MONOJECT

## (undated) DEVICE — 6.0MM X-COARSE DIAMOND

## (undated) DEVICE — 1000 S-DRAPE TOWEL DRAPE 10/BX: Brand: STERI-DRAPE™

## (undated) DEVICE — GLOVE SURG SZ 65 THK91MIL LTX FREE SYN POLYISOPRENE

## (undated) DEVICE — CODMAN® SURGICAL PATTIES 1/2" X 1/2" (1.27CM X 1.27CM): Brand: CODMAN®

## (undated) DEVICE — GLOVE ORANGE PI 8   MSG9080

## (undated) DEVICE — DRAPE C ARM UNIV W41XL74IN CLR PLAS XR VELC CLSR POLY STRP

## (undated) DEVICE — GLOVE,EXAM,NITRILE,RESTORE,OAT SENSE,L: Brand: MEDLINE

## (undated) DEVICE — PAD,NON-ADHERENT,3X8,STERILE,LF,1/PK: Brand: MEDLINE

## (undated) DEVICE — 3.0MM PRECISION NEURO (MATCH HEAD)

## (undated) DEVICE — ELECTRODE PT RET AD L9FT HI MOIST COND ADH HYDRGEL CORDED

## (undated) DEVICE — BIPOLAR SEALER 23-113-1 AQM 2.3 OM NEURO: Brand: AQUAMANTYS ®

## (undated) DEVICE — SUTURE MCRYL SZ 4-0 L18IN ABSRB UD L16MM PC-3 3/8 CIR PRIM Y845G

## (undated) DEVICE — SPONGE LAP W18XL18IN WHT COT 4 PLY FLD STRUNG RADPQ DISP ST

## (undated) DEVICE — ADHESIVE SKIN CLSR 0.7ML TOP DERMBND ADV

## (undated) DEVICE — MONITORING NEURO WO INTERPRETATION SYS PRICING

## (undated) DEVICE — Device: Brand: SNAP ON SPHERZ